# Patient Record
Sex: FEMALE | Race: WHITE | NOT HISPANIC OR LATINO | Employment: OTHER | URBAN - METROPOLITAN AREA
[De-identification: names, ages, dates, MRNs, and addresses within clinical notes are randomized per-mention and may not be internally consistent; named-entity substitution may affect disease eponyms.]

---

## 2017-07-20 LAB — HCV AB SER-ACNC: NEGATIVE

## 2022-11-08 ENCOUNTER — OFFICE VISIT (OUTPATIENT)
Dept: FAMILY MEDICINE CLINIC | Facility: CLINIC | Age: 72
End: 2022-11-08

## 2022-11-08 VITALS
WEIGHT: 164.2 LBS | HEIGHT: 64 IN | DIASTOLIC BLOOD PRESSURE: 98 MMHG | OXYGEN SATURATION: 94 % | SYSTOLIC BLOOD PRESSURE: 154 MMHG | TEMPERATURE: 96.9 F | BODY MASS INDEX: 28.03 KG/M2 | HEART RATE: 60 BPM

## 2022-11-08 DIAGNOSIS — I73.9 PAD (PERIPHERAL ARTERY DISEASE) (HCC): ICD-10-CM

## 2022-11-08 DIAGNOSIS — Z95.828 S/P VASCULAR BYPASS: ICD-10-CM

## 2022-11-08 DIAGNOSIS — Z23 IMMUNIZATION DUE: ICD-10-CM

## 2022-11-08 DIAGNOSIS — M79.89 LEFT LEG SWELLING: ICD-10-CM

## 2022-11-08 DIAGNOSIS — I25.810 CORONARY ARTERY DISEASE INVOLVING CORONARY BYPASS GRAFT OF NATIVE HEART WITHOUT ANGINA PECTORIS: Primary | ICD-10-CM

## 2022-11-08 DIAGNOSIS — R60.0 LOCALIZED EDEMA: ICD-10-CM

## 2022-11-08 DIAGNOSIS — F31.4 BIPOLAR AFFECTIVE DISORDER, DEPRESSED, SEVERE (HCC): ICD-10-CM

## 2022-11-08 DIAGNOSIS — Z11.59 NEED FOR HEPATITIS C SCREENING TEST: ICD-10-CM

## 2022-11-08 RX ORDER — CLOPIDOGREL BISULFATE 75 MG/1
75 TABLET ORAL DAILY
Qty: 90 TABLET | Refills: 1 | Status: SHIPPED | OUTPATIENT
Start: 2022-11-08

## 2022-11-08 RX ORDER — ISOSORBIDE DINITRATE 10 MG/1
10 TABLET ORAL 3 TIMES DAILY
COMMUNITY

## 2022-11-08 RX ORDER — DILTIAZEM HYDROCHLORIDE 120 MG/1
120 TABLET, FILM COATED ORAL DAILY
COMMUNITY
End: 2022-11-08 | Stop reason: SDUPTHER

## 2022-11-08 RX ORDER — CHOLECALCIFEROL (VITAMIN D3) 125 MCG
15 CAPSULE ORAL
COMMUNITY

## 2022-11-08 RX ORDER — ROSUVASTATIN CALCIUM 20 MG/1
20 TABLET, COATED ORAL DAILY
COMMUNITY

## 2022-11-08 RX ORDER — METOPROLOL SUCCINATE 50 MG/1
50 TABLET, EXTENDED RELEASE ORAL DAILY
COMMUNITY

## 2022-11-08 RX ORDER — LAMOTRIGINE 100 MG/1
100 TABLET ORAL 2 TIMES DAILY
COMMUNITY

## 2022-11-08 RX ORDER — CLOPIDOGREL BISULFATE 75 MG/1
75 TABLET ORAL DAILY
COMMUNITY
End: 2022-11-08 | Stop reason: SDUPTHER

## 2022-11-08 RX ORDER — TRAZODONE HYDROCHLORIDE 50 MG/1
50 TABLET ORAL
COMMUNITY
End: 2022-11-08

## 2022-11-08 RX ORDER — DILTIAZEM HYDROCHLORIDE 120 MG/1
120 TABLET, FILM COATED ORAL DAILY
Qty: 30 TABLET | Refills: 3 | Status: SHIPPED | OUTPATIENT
Start: 2022-11-08

## 2022-11-08 NOTE — PATIENT INSTRUCTIONS
Follow up with cardiology, vascular surgery, and psychiatry  Complete blood work and recheck in 1 month

## 2022-11-08 NOTE — PROGRESS NOTES
Assessment/Plan:     1  Coronary artery disease involving coronary bypass graft of native heart without angina pectoris  Assessment & Plan:  Reordered plavix for patient; c/w current medications as last prescribed and establish care with cardiology given significant cardiac history; requested records today in office for review to better assess pt needs; no current sx; f/u guidance given    Orders:  -     diltiazem (CARDIZEM) 120 MG tablet; Take 1 tablet (120 mg total) by mouth in the morning  -     clopidogrel (PLAVIX) 75 mg tablet; Take 1 tablet (75 mg total) by mouth in the morning  -     Ambulatory Referral to Cardiology; Future  -     Comprehensive metabolic panel; Future  -     CBC and differential; Future  -     Lipid panel; Future    2  PAD (peripheral artery disease) ([facility])  Assessment & Plan:  C/w statin statin and is on plavix; reviewed to vascular surgery for follow up given extensive hx; advised importance of smoking cessation    Orders:  -     Ambulatory Referral to Vascular Surgery; Future    3  S/P vascular bypass  -     Ambulatory Referral to Vascular Surgery; Future    4  Bipolar affective disorder, depressed, severe ([facility] [address] )  Assessment & Plan:  Reviewed to psychiatry given dual-diagnosis issues of alcohol abuse and bipolar; has been clean; given printed resources of alcohol abuse and mental health facilities in area and advised establishing care; f/u guidance given    Orders:  -     Ambulatory Referral to Psychiatry; Future    5  Left leg swelling  Assessment & Plan: May be chronic but patient is poor historian; check u/s and f/u with results to r/o DVT    Orders:  -     VAS lower limb venous duplex study, unilateral/limited; Future; Expected date: 11/08/2022    6  Localized edema   -     VAS lower limb venous duplex study, unilateral/limited; Future; Expected date: 11/08/2022    7  Need for hepatitis C screening test    8   Immunization due  -     influenza vaccine, high-dose, PF 0 7 mL (FLUZONE HIGH-DOSE)        Subjective:      Patient ID: Felipa Walker is a 71 y o  female  Patient is here to establish care with significant PMHx of CAD and vascular disease  No records available at time of visit and pt recently moved here from [place]  CAD: Patient had MI in February  Had [address] over [place] and then February had MI  She thinks she had 2 stents placed, per record review pt had CABG in past  Ran out of Plavix  She also states she is out of cardizem  No current chest pain  Still smoking  ? History of A fib but no currently or recently on anticoag  Was managed by cardiology in [place]  Also states she has CVA in past  as well  Chronically has SOB with activity  Bipolar: Pt states her mood is up and down  She states lamotrigine helps control her symptoms  Pt has not seen anyone since she has lived in [place]  Has not been on medication for over 1 month  No SI  Hx of alcohol abuse: Patient quit drinking a little over 100 days ago  Pt states she was drinking a lot prior  History of alcohol abuse for about 50 years  Did not go to rehab  Going to [facility]  Pt has noted more recently her left leg has been swelling  Has occurred since surgeries but seems a little worse more recently  History per last note in chart:     "CAD s/p CABG (neg stress test 2016); HTN; KOLE&BSO, which was complicated by DVT s/p IVC filter; L hip replacement and L foot fx; multple vascular bypass & stents that are now occluded (including aorto-R-renal bypass, L fem-R fem bypass, R fem-BK pop bypass, R EIA stent) now s/p RIGHT AXILLARY TO BIFEMORAL BYPASS 2/21/17 "          The following portions of the patient's history were reviewed and updated as appropriate: allergies, current medications, past family history, past medical history, past social history, past surgical history, and problem list     Review of Systems   Constitutional: Negative for chills and fever  Respiratory: Positive for shortness of breath  Cardiovascular: Negative for palpitations and leg swelling  Psychiatric/Behavioral: Positive for dysphoric mood  Negative for suicidal ideas  The patient is not nervous/anxious  Objective:      /98 (BP Location: Right arm, Patient Position: Sitting, Cuff Size: Adult)   Pulse 60   Temp (!) 96 9 °F (36 1 °C)   Ht 5' 4" (1 626 m)   Wt 74 5 kg (164 lb 3 2 oz)   SpO2 94%   BMI 28 18 kg/m²          Physical Exam  Vitals reviewed  Constitutional:       General: She is not in acute distress  Appearance: Normal appearance  She is not ill-appearing, toxic-appearing or diaphoretic  HENT:      Head: Normocephalic and atraumatic  Eyes:      General: No scleral icterus  Right eye: No discharge  Left eye: No discharge  Conjunctiva/sclera: Conjunctivae normal    Cardiovascular:      Rate and Rhythm: Normal rate and regular rhythm  Pulses: Normal pulses  Heart sounds: Normal heart sounds  No murmur heard  No gallop  Pulmonary:      Effort: Pulmonary effort is normal  No respiratory distress  Breath sounds: Normal breath sounds  No stridor  No wheezing, rhonchi or rales  Musculoskeletal:      Right lower leg: No edema  Left lower leg: Edema present  Neurological:      General: No focal deficit present  Mental Status: She is alert and oriented to person, place, and time  Psychiatric:         Mood and Affect: Mood normal          Behavior: Behavior normal          Thought Content:  Thought content normal          Judgment: Judgment normal

## 2022-11-08 NOTE — ASSESSMENT & PLAN NOTE
C/w statin statin and is on plavix; reviewed to vascular surgery for follow up given extensive hx; advised importance of smoking cessation

## 2022-11-08 NOTE — ASSESSMENT & PLAN NOTE
Reordered plavix for patient; c/w current medications as last prescribed and establish care with cardiology given significant cardiac history; requested records today in office for review to better assess pt needs; no current sx; f/u guidance given

## 2022-11-08 NOTE — ASSESSMENT & PLAN NOTE
Reviewed to psychiatry given dual-diagnosis issues of alcohol abuse and bipolar; has been clean; given printed resources of alcohol abuse and mental health facilities in area and advised establishing care; f/u guidance given

## 2022-11-09 ENCOUNTER — TELEPHONE (OUTPATIENT)
Dept: PSYCHIATRY | Facility: CLINIC | Age: 72
End: 2022-11-09

## 2022-11-10 ENCOUNTER — HOSPITAL ENCOUNTER (OUTPATIENT)
Dept: NON INVASIVE DIAGNOSTICS | Facility: CLINIC | Age: 72
Discharge: HOME | End: 2022-11-10

## 2022-11-10 DIAGNOSIS — M79.89 LEFT LEG SWELLING: ICD-10-CM

## 2022-11-10 DIAGNOSIS — R60.0 LOCALIZED EDEMA: ICD-10-CM

## 2022-11-14 ENCOUNTER — APPOINTMENT (OUTPATIENT)
Dept: LAB | Facility: CLINIC | Age: 72
End: 2022-11-14

## 2022-11-14 DIAGNOSIS — I25.810 CORONARY ARTERY DISEASE INVOLVING CORONARY BYPASS GRAFT OF NATIVE HEART WITHOUT ANGINA PECTORIS: ICD-10-CM

## 2022-11-14 LAB
ALBUMIN SERPL BCP-MCNC: 3.3 G/DL (ref 3.5–5)
ALP SERPL-CCNC: 85 U/L (ref 46–116)
ALT SERPL W P-5'-P-CCNC: 20 U/L (ref 12–78)
ANION GAP SERPL CALCULATED.3IONS-SCNC: 6 MMOL/L (ref 4–13)
AST SERPL W P-5'-P-CCNC: 14 U/L (ref 5–45)
BASOPHILS # BLD AUTO: 0.11 THOUSANDS/ÂΜL (ref 0–0.1)
BASOPHILS NFR BLD AUTO: 1 % (ref 0–1)
BILIRUB SERPL-MCNC: 0.46 MG/DL (ref 0.2–1)
BUN SERPL-MCNC: 15 MG/DL (ref 5–25)
CALCIUM ALBUM COR SERPL-MCNC: 10.1 MG/DL (ref 8.3–10.1)
CALCIUM SERPL-MCNC: 9.5 MG/DL (ref 8.3–10.1)
CHLORIDE SERPL-SCNC: 110 MMOL/L (ref 96–108)
CHOLEST SERPL-MCNC: 165 MG/DL
CO2 SERPL-SCNC: 25 MMOL/L (ref 21–32)
CREAT SERPL-MCNC: 1.09 MG/DL (ref 0.6–1.3)
EOSINOPHIL # BLD AUTO: 0.42 THOUSAND/ÂΜL (ref 0–0.61)
EOSINOPHIL NFR BLD AUTO: 5 % (ref 0–6)
ERYTHROCYTE [DISTWIDTH] IN BLOOD BY AUTOMATED COUNT: 12 % (ref 11.6–15.1)
GFR SERPL CREATININE-BSD FRML MDRD: 51 ML/MIN/1.73SQ M
GLUCOSE P FAST SERPL-MCNC: 88 MG/DL (ref 65–99)
HCT VFR BLD AUTO: 48.1 % (ref 34.8–46.1)
HDLC SERPL-MCNC: 33 MG/DL
HGB BLD-MCNC: 15.7 G/DL (ref 11.5–15.4)
IMM GRANULOCYTES # BLD AUTO: 0.03 THOUSAND/UL (ref 0–0.2)
IMM GRANULOCYTES NFR BLD AUTO: 0 % (ref 0–2)
LDLC SERPL CALC-MCNC: 101 MG/DL (ref 0–100)
LYMPHOCYTES # BLD AUTO: 1.89 THOUSANDS/ÂΜL (ref 0.6–4.47)
LYMPHOCYTES NFR BLD AUTO: 22 % (ref 14–44)
MCH RBC QN AUTO: 30.8 PG (ref 26.8–34.3)
MCHC RBC AUTO-ENTMCNC: 32.6 G/DL (ref 31.4–37.4)
MCV RBC AUTO: 94 FL (ref 82–98)
MONOCYTES # BLD AUTO: 0.59 THOUSAND/ÂΜL (ref 0.17–1.22)
MONOCYTES NFR BLD AUTO: 7 % (ref 4–12)
NEUTROPHILS # BLD AUTO: 5.42 THOUSANDS/ÂΜL (ref 1.85–7.62)
NEUTS SEG NFR BLD AUTO: 65 % (ref 43–75)
NONHDLC SERPL-MCNC: 132 MG/DL
NRBC BLD AUTO-RTO: 0 /100 WBCS
PLATELET # BLD AUTO: 188 THOUSANDS/UL (ref 149–390)
PMV BLD AUTO: 11.1 FL (ref 8.9–12.7)
POTASSIUM SERPL-SCNC: 4.7 MMOL/L (ref 3.5–5.3)
PROT SERPL-MCNC: 7.5 G/DL (ref 6.4–8.4)
RBC # BLD AUTO: 5.1 MILLION/UL (ref 3.81–5.12)
SODIUM SERPL-SCNC: 141 MMOL/L (ref 135–147)
TRIGL SERPL-MCNC: 155 MG/DL
WBC # BLD AUTO: 8.46 THOUSAND/UL (ref 4.31–10.16)

## 2022-11-15 ENCOUNTER — CONSULT (OUTPATIENT)
Dept: VASCULAR SURGERY | Facility: CLINIC | Age: 72
End: 2022-11-15

## 2022-11-15 VITALS
BODY MASS INDEX: 27.55 KG/M2 | DIASTOLIC BLOOD PRESSURE: 58 MMHG | WEIGHT: 161.4 LBS | SYSTOLIC BLOOD PRESSURE: 132 MMHG | HEART RATE: 61 BPM | OXYGEN SATURATION: 99 % | HEIGHT: 64 IN

## 2022-11-15 DIAGNOSIS — I77.9 AORTO-ILIAC DISEASE (HCC): ICD-10-CM

## 2022-11-15 DIAGNOSIS — I73.9 PAD (PERIPHERAL ARTERY DISEASE) (HCC): Primary | ICD-10-CM

## 2022-11-15 DIAGNOSIS — R09.89 CAROTID BRUIT, UNSPECIFIED LATERALITY: ICD-10-CM

## 2022-11-15 DIAGNOSIS — Z95.828 S/P VASCULAR BYPASS: ICD-10-CM

## 2022-11-15 NOTE — PROGRESS NOTES
Assessment/Plan:    PAD (peripheral artery disease) [facility])  80-year-old female smoker poor historian with HTN, CAD s/p CABG, bipolar disorder, alcohol abuse, renal stenosis, KOLE + BSO c/b DVT LLE chronic DVT with IVC, PAD s/p right ax-bi femoral bypass 2017 and multiple vascular interventions presents to establish care with complaints of LLE edema and intermittent left calf claudication     -Patient presents to establish care  She complains of LLE edema and intermittent calf claudication  Also complains of numbness sensation to ball of foot   -She denies rest pain  No tissue loss  -Patient reports multiple surgeries and interventions she is a poor historian, unclear  -Multiple surgical scars with history of right axis and bypass   -she is on optimal medical management with daily Plavix and statin  -Not on AC  -LE warm, perfused  -Palpable femoral pulses bilaterally  -Doppler DP/PT signals bilaterally  -    Plan: Will get updated baseline imaging and return with vascular surgeon  Continue Plavix and statin  Podiatry referral    Essential hypertension  -BP well controlled  -continue current medical regimen  -management per PCP      Tobacco abuse  -current 1 5pk per day smoker  -discussed the pathophysiology and relationship of smoking and vascular disease  -encourage smoking cessation  -reports will try to cut back       Diagnoses and all orders for this visit:    PAD (peripheral artery disease) ([facility] [address] )  -     Ambulatory Referral to Vascular Surgery  -     VAS lower limb arterial duplex, complete bilateral; Future  -     VAS abdominal aorta/iliacs; complete study; Future  -     VAS carotid complete study; Future  -     Ambulatory Referral to Podiatry; Future    S/P vascular bypass  -     Ambulatory Referral to Vascular Surgery  -     VAS lower limb arterial duplex, complete bilateral; Future  -     VAS abdominal aorta/iliacs; complete study; Future  -     Ambulatory Referral to Podiatry;  Future    Aorto-iliac disease ([facility] [address] )  -     VAS abdominal aorta/iliacs; complete study; Future    Carotid bruit, unspecified laterality  -     VAS carotid complete study; Future          Subjective:      Patient ID: Cher De Souza is a 71 y o  female  Patient is new to our office  She was referred here by Arleth Grimes,   She had a LEV done 11/10/2022  Patient has a history of multiple vascular procedure done OSH  Her last procedure was done at [facility] in [facility] about 1 year ago  Patient c/o pain in her left calf after walking 3 blocks  She denies any open wounds  She c/o swelling in the left leg from her knee to her ankle  Patient is taking Plavix and Rosuvastatin  She is a current smoker  HI  See assessment and plan    41-year-old male smoker care  No recent imaging available  Patient is on medical management with daily Plavix and statin  She is a current 1 5 pack/day smoker  Patient has had multiple surgeries and interventions which she is unclear about, poor historian  We will get baseline updated imaging of patient return with vascular surgeon to review make further recommendations if indicated      The following portions of the patient's history were reviewed and updated as appropriate: allergies, current medications, past family history, past medical history, past social history, past surgical history and problem list     Review of Systems   Constitutional: Negative  HENT: Negative  Eyes: Negative  Respiratory: Positive for shortness of breath  Cardiovascular: Positive for leg swelling  Gastrointestinal: Negative  Endocrine: Negative  Genitourinary: Negative  Musculoskeletal: Positive for arthralgias  Pain in legs when walking   Skin: Negative  Allergic/Immunologic: Negative  Neurological: Positive for dizziness and light-headedness  Hematological: Bruises/bleeds easily  Psychiatric/Behavioral: Negative          I have reviewed and made appropriate changes to the review of systems input by the medical assistant  Objective:      /58 (BP Location: Left arm, Patient Position: Sitting, Cuff Size: Standard)   Pulse 61   Ht 5' 4" (1 626 m)   Wt 73 2 kg (161 lb 6 4 oz)   SpO2 99%   BMI 27 70 kg/m²          Physical Exam  Constitutional:       Appearance: Normal appearance  HENT:      Head: Normocephalic and atraumatic  Nose: Nose normal       Mouth/Throat:      Mouth: Mucous membranes are moist    Neck:      Vascular: Carotid bruit present  Cardiovascular:      Rate and Rhythm: Normal rate and regular rhythm  Pulses:           Carotid pulses are 2+ on the right side and 2+ on the left side  Radial pulses are 2+ on the right side and 2+ on the left side  Femoral pulses are 2+ on the right side and 2+ on the left side  Dorsalis pedis pulses are detected w/ Doppler on the right side and detected w/ Doppler on the left side  Posterior tibial pulses are detected w/ Doppler on the right side and detected w/ Doppler on the left side  Heart sounds: Normal heart sounds  No murmur heard  Pulmonary:      Effort: Pulmonary effort is normal  No respiratory distress  Breath sounds: Normal breath sounds  No wheezing  Abdominal:      General: Abdomen is flat  Bowel sounds are normal  There is no abdominal bruit  Palpations: Abdomen is soft  There is no mass or pulsatile mass  Tenderness: There is no abdominal tenderness  Hernia: No hernia is present  Comments: Mid-line scar   Musculoskeletal:         General: Normal range of motion  Cervical back: Normal range of motion and neck supple  Right lower leg: No edema  Left lower leg: Edema present  Skin:     General: Skin is warm and dry  Comments: BLE surgical scars:  Bilateral groin and below-knee  Left medial thigh   Neurological:      General: No focal deficit present        Mental Status: She is alert and oriented to person, place, and time       Sensory: No sensory deficit  Motor: No weakness  Psychiatric:         Behavior: Behavior normal              Vitals:    11/15/22 1005   BP: 132/58   BP Location: Left arm   Patient Position: Sitting   Cuff Size: Standard   Pulse: 61   SpO2: 99%   Weight: 73 2 kg (161 lb 6 4 oz)   Height: 5' 4" (1 626 m)       Patient Active Problem List   Diagnosis   • Bipolar affective disorder, depressed, severe ([facility] [address] )   • CAD (coronary artery disease)   • Essential hypertension   • Hx of CABG   • S/P vascular bypass   • Thyroid nodule   • Renal artery stenosis ([facility])   • Presence of IVC filter   • PAD (peripheral artery disease) ([facility])   • Left leg swelling   • Medicare annual wellness visit, subsequent   • Mixed hyperlipidemia   • Aortoiliac occlusive disease ([facility] )   • Tobacco abuse       History reviewed  No pertinent surgical history  History reviewed  No pertinent family history  Social History     Socioeconomic History   • Marital status:       Spouse name: Not on file   • Number of children: Not on file   • Years of education: Not on file   • Highest education level: Not on file   Occupational History   • Not on file   Tobacco Use   • Smoking status: Every Day     Packs/day: 1 50     Types: Cigarettes     Start date: 5   • Smokeless tobacco: Never   Substance and Sexual Activity   • Alcohol use: Not Currently     Alcohol/week: 50 0 standard drinks     Types: 50 Cans of beer per week     Comment: has not had a drink in 102 days 11/8/22- patient admits to drinking at least 6 beers per day   • Drug use: Not Currently   • Sexual activity: Not Currently   Other Topics Concern   • Not on file   Social History Narrative   • Not on file     Social Determinants of Health     Financial Resource Strain: Not on file   Food Insecurity: Not on file   Transportation Needs: Not on file   Physical Activity: Not on file   Stress: Not on file   Social Connections: Not on file   Intimate Partner Violence: Not on file   Housing Stability: Not on file       No Known Allergies      Current Outpatient Medications:   •  clopidogrel (PLAVIX) 75 mg tablet, Take 1 tablet (75 mg total) by mouth in the morning, Disp: 90 tablet, Rfl: 1  •  diltiazem (CARDIZEM) 120 MG tablet, Take 1 tablet (120 mg total) by mouth in the morning, Disp: 30 tablet, Rfl: 3  •  isosorbide dinitrate (ISORDIL) 10 mg tablet, Take 10 mg by mouth 3 (three) times a day, Disp: , Rfl:   •  Melatonin 5 MG TABS, Take 15 mg by mouth, Disp: , Rfl:   •  metoprolol succinate (TOPROL-XL) 50 mg 24 hr tablet, Take 50 mg by mouth daily, Disp: , Rfl:   •  rosuvastatin (CRESTOR) 20 MG tablet, Take 20 mg by mouth daily, Disp: , Rfl:   •  buPROPion (Wellbutrin XL) 150 mg 24 hr tablet, Take 1 tablet (150 mg total) by mouth daily, Disp: 30 tablet, Rfl: 2  •  ezetimibe (ZETIA) 10 mg tablet, Take 1 tablet (10 mg total) by mouth daily, Disp: 30 tablet, Rfl: 5  •  FOLIC ACID PO, Take by mouth in the morning, Disp: , Rfl:   •  gabapentin (Neurontin) 300 mg capsule, Take 1 capsule (300 mg total) by mouth daily at bedtime, Disp: 30 capsule, Rfl: 0  •  lamoTRIgine (LaMICtal) 25 mg tablet, Take 1 tablet (25 mg total) by mouth daily, Disp: 60 tablet, Rfl: 1  •  lisinopril (ZESTRIL) 10 mg tablet, Take 1 tablet (10 mg total) by mouth daily, Disp: 30 tablet, Rfl: 2

## 2022-11-15 NOTE — PATIENT INSTRUCTIONS
Continue Plavix  Continue statin  Will get updated baseline imaging and return with vascular surgeon  Podiatry referral    Peripheral Artery Disease   AMBULATORY CARE:   Peripheral artery disease (PAD)  is narrow, weak, or blocked arteries  It may affect any arteries outside of your heart and brain  PAD is usually the result of a buildup of fat and cholesterol, also called plaque, along your artery walls  Inflammation, a blood clot, or abnormal cell growth could also block your arteries  PAD prevents normal blood flow to your legs and arms  You are at risk of an amputation if poor blood flow keeps wounds from healing or causes gangrene (tissue death)  Without treatment, PAD can also cause a heart attack or stroke  Common symptoms include:  Mild PAD usually does not cause symptoms  As the disease worsens over time, you may have the following:  Pain or cramps in your leg or hip while you walk    A numb, weak, or heavy feeling in your legs    Dry, scaly, red, or pale skin on your legs    Thick or brittle nails, or hair loss on your arms and legs    Foot sores that will not heal    Burning or aching in your feet and toes while resting (this may be worse when you lie down)    Call your local emergency number (911 in the [address] [facility]) if:   You have any of the following signs of a heart attack:      Squeezing, pressure, or pain in your chest    You may  also have any of the following:     Discomfort or pain in your back, neck, jaw, stomach, or arm    Shortness of breath    Nausea or vomiting    Lightheadedness or a sudden cold sweat    You have any of the following signs of a stroke:      Numbness or drooping on one side of your face     Weakness in an arm or leg    Confusion or difficulty speaking    Dizziness, a severe headache, or vision loss    Seek care immediately if:   You have sores or wounds that will not heal     You notice black or discolored skin on your arm or leg  Your skin is cool to the touch      Call your doctor if:   You have leg pain when you walk 1/8 mile (200 meters) or less, even with treatment  Your legs are red, dry, or pale, even with treatment  You have questions or concerns about your condition or care  Treatment for PAD  can help reduce your risk of a heart attack, stroke, or amputation  You may need more than one of the following:  Medicines  may be given to prevent blood clots and reduce the risk of a heart attack or stroke  You may be given medicine to help prevent your PAD from getting worse  A supervised exercise program  helps you stay active in normal daily activities  Healthcare providers will help you safely walk or do strength training exercises 3 times a week for 30 to 60 minutes  You will do this for several months, then transition to walking on your own  Angioplasty  is a procedure to open your artery so blood can flow through normally  A thin tube called a catheter is used to insert a small balloon into your artery  The balloon is inflated to open your blocked artery, and then removed  A tube called a stent may be placed in your artery to hold it open  Bypass surgery  is used to make a new connection to your artery with a vein from another part of your body, or an artificial graft  The vein or graft is attached to your artery above and below your blockage  This allows blood to flow around the blocked portion of your artery  Manage and prevent PAD:   Walk for 30 to 60 minutes at least 4 times a week  Your healthcare provider may also refer you to a supervised exercise program  The program helps increase how far you can walk without pain  It also helps you stay active in normal daily activities         Do not smoke  Nicotine and other chemicals in cigarettes and cigars can worsen PAD  They can also increase your risk for a heart attack or stroke  Ask your healthcare provider for information if you currently smoke and need help to quit   E-cigarettes or smokeless tobacco still contain nicotine  Talk to your healthcare provider before you use these products  Manage other health conditions  Take your medicines as directed and follow your healthcare provider's instructions if you have high blood pressure or high cholesterol  Perform foot care and check your blood sugar levels as directed if you have diabetes  Eat heart-healthy foods  Eat whole grains, fruits, and vegetables every day  Limit salt and high-fat foods  Ask your healthcare provider for more information on a heart healthy diet  Ask what a healthy weight is for you  Your healthcare provider can help you create a healthy weight-loss plan, if needed  Follow up with your doctor as directed:  Write down your questions so you remember to ask them during your visits  © Copyright [employer] 2022 Information is for End User's use only and may not be sold, redistributed or otherwise used for commercial purposes  All illustrations and images included in CareNotes® are the copyrighted property of A D A M , Inc  or [facility] Laurel Denney   The above information is an  only  It is not intended as medical advice for individual conditions or treatments  Talk to your doctor, nurse or pharmacist before following any medical regimen to see if it is safe and effective for you

## 2022-12-09 ENCOUNTER — OFFICE VISIT (OUTPATIENT)
Dept: FAMILY MEDICINE CLINIC | Facility: CLINIC | Age: 72
End: 2022-12-09

## 2022-12-09 VITALS
DIASTOLIC BLOOD PRESSURE: 72 MMHG | SYSTOLIC BLOOD PRESSURE: 132 MMHG | BODY MASS INDEX: 27.83 KG/M2 | WEIGHT: 163 LBS | RESPIRATION RATE: 16 BRPM | OXYGEN SATURATION: 96 % | HEIGHT: 64 IN | HEART RATE: 68 BPM | TEMPERATURE: 97.5 F

## 2022-12-09 DIAGNOSIS — F17.210 SMOKING GREATER THAN 20 PACK YEARS: ICD-10-CM

## 2022-12-09 DIAGNOSIS — Z12.31 ENCOUNTER FOR SCREENING MAMMOGRAM FOR BREAST CANCER: ICD-10-CM

## 2022-12-09 DIAGNOSIS — Z95.1 HX OF CABG: ICD-10-CM

## 2022-12-09 DIAGNOSIS — Z12.2 ENCOUNTER FOR SCREENING FOR LUNG CANCER: ICD-10-CM

## 2022-12-09 DIAGNOSIS — I73.9 PAD (PERIPHERAL ARTERY DISEASE) (HCC): ICD-10-CM

## 2022-12-09 DIAGNOSIS — Z00.00 MEDICARE ANNUAL WELLNESS VISIT, SUBSEQUENT: Primary | ICD-10-CM

## 2022-12-09 DIAGNOSIS — Z12.11 SCREENING FOR COLORECTAL CANCER: ICD-10-CM

## 2022-12-09 DIAGNOSIS — Z12.12 SCREENING FOR COLORECTAL CANCER: ICD-10-CM

## 2022-12-09 DIAGNOSIS — F31.4 BIPOLAR AFFECTIVE DISORDER, DEPRESSED, SEVERE (HCC): ICD-10-CM

## 2022-12-09 RX ORDER — LAMOTRIGINE 25 MG/1
25 TABLET ORAL DAILY
Qty: 60 TABLET | Refills: 1 | Status: SHIPPED | OUTPATIENT
Start: 2022-12-09

## 2022-12-09 NOTE — ASSESSMENT & PLAN NOTE
Advised on mammogram, CT lung screening, and colonoscopy which she is agreeable; declines DEXA for now; advised on living will; reviewed lifestyle modifications; encouraged to work on smoking cessation

## 2022-12-09 NOTE — PROGRESS NOTES
Assessment and Plan:     Problem List Items Addressed This Visit        Cardiovascular and Mediastinum    PAD (peripheral artery disease) ([facility] [address] )     As per vascular; has f/u studies scheduled            Other    Bipolar affective disorder, depressed, severe ([facility] [address] )     Pt has not been able to establish with psychiatry; has been off lamictal and mood has been worsening; no SI; will restart lamictal with plans of adding back trazodone as needed to help with sleep; advised importance to restart counseling due to hx of alcohol abuse; she remains clean; recheck in 1 month; due to complexity of health reviewed to complex care as she may need some assistance or guidance with establishing in order to prevent relapse         Relevant Medications    lamoTRIgine (LaMICtal) 25 mg tablet    Other Relevant Orders    Ambulatory Referral to Complex Care Management Program    Hx of CABG     Has appt with cardiology to establish care at end of month; no current CP         Medicare annual wellness visit, subsequent - Primary     Advised on mammogram, CT lung screening, and colonoscopy which she is agreeable; declines DEXA for now; advised on living will; reviewed lifestyle modifications; encouraged to work on smoking cessation        Other Visit Diagnoses     Encounter for screening mammogram for breast cancer        Relevant Orders    Mammo screening bilateral w 3d & cad    Screening for colorectal cancer        Relevant Orders    Ambulatory referral for Colonoscopy    Encounter for screening for lung cancer        Relevant Orders    CT lung screening program    Smoking greater than 20 pack years        Relevant Orders    CT lung screening program        BMI Counseling: Body mass index is 28 17 kg/m²  The BMI is above normal  Nutrition recommendations include decreasing portion sizes and moderation in carbohydrate intake  Exercise recommendations include exercising 3-5 times per week   Rationale for BMI follow-up plan is due to patient being overweight or obese  Preventive health issues were discussed with patient, and age appropriate screening tests were ordered as noted in patient's After Visit Summary  Personalized health advice and appropriate referrals for health education or preventive services given if needed, as noted in patient's After Visit Summary  History of Present Illness:     Patient presents for a Medicare Wellness Visit    Patient has not been able to get back into psychiatry  Continues to be sober for about 140 days  Starting to struggle with her bipolar  Having higher anxiety and difficulty with sleep  Did well on lamictal in past and would like to be considered to do it again  Trazodone helped with sleep  Denies any SI  Patient Care Team:  Ellen Meyers DO as PCP - General (Family Medicine)     Review of Systems:     Review of Systems   Constitutional: Negative for chills and fever  Respiratory: Negative for shortness of breath  Cardiovascular: Negative for chest pain  Psychiatric/Behavioral: Positive for dysphoric mood and sleep disturbance  Negative for suicidal ideas  The patient is nervous/anxious  Problem List:     Patient Active Problem List   Diagnosis   • Bipolar affective disorder, depressed, severe ([facility] [address] )   • CAD (coronary artery disease)   • Essential hypertension   • Hx of CABG   • S/P vascular bypass   • Thyroid nodule   • Renal artery stenosis (HCC)   • Presence of IVC filter   • PAD (peripheral artery disease) ([facility] [address] )   • Left leg swelling   • Medicare annual wellness visit, subsequent      Past Medical and Surgical History:     Past Medical History:   Diagnosis Date   • Atrial fibrillation ([facility] [address] )    • [facility] 02/2022   • Stroke [facility])      History reviewed  No pertinent surgical history  Family History:     History reviewed  No pertinent family history  Social History:     Social History     Socioeconomic History   • Marital status:       Spouse name: None   • Number of children: None   • Years of education: None   • Highest education level: None   Occupational History   • None   Tobacco Use   • Smoking status: Every Day     Packs/day: 1 50     Types: Cigarettes     Start date: 1967   • Smokeless tobacco: Never   Substance and Sexual Activity   • Alcohol use: Not Currently     Alcohol/week: 50 0 standard drinks     Types: 50 Cans of beer per week     Comment: has not had a drink in 102 days 11/8/22- patient admits to drinking at least 6 beers per day   • Drug use: Not Currently   • Sexual activity: Not Currently   Other Topics Concern   • None   Social History Narrative   • None     Social Determinants of Health     Financial Resource Strain: Not on file   Food Insecurity: Not on file   Transportation Needs: Not on file   Physical Activity: Not on file   Stress: Not on file   Social Connections: Not on file   Intimate Partner Violence: Not on file   Housing Stability: Not on file      Medications and Allergies:     Current Outpatient Medications   Medication Sig Dispense Refill   • clopidogrel (PLAVIX) 75 mg tablet Take 1 tablet (75 mg total) by mouth in the morning 90 tablet 1   • diltiazem (CARDIZEM) 120 MG tablet Take 1 tablet (120 mg total) by mouth in the morning 30 tablet 3   • isosorbide dinitrate (ISORDIL) 10 mg tablet Take 10 mg by mouth 3 (three) times a day     • lamoTRIgine (LaMICtal) 25 mg tablet Take 1 tablet (25 mg total) by mouth daily 60 tablet 1   • Melatonin 5 MG TABS Take 15 mg by mouth     • metoprolol succinate (TOPROL-XL) 50 mg 24 hr tablet Take 50 mg by mouth daily     • rosuvastatin (CRESTOR) 20 MG tablet Take 20 mg by mouth daily       No current facility-administered medications for this visit       No Known Allergies   Immunizations:     Immunization History   Administered Date(s) Administered   • Influenza, high dose seasonal 0 7 mL 11/08/2022   • Pneumococcal Conjugate 13-Valent 06/20/2016      Health Maintenance:         Topic Date Due   • Hepatitis C Screening  Never done   • Breast Cancer Screening: Mammogram  Never done   • Colorectal Cancer Screening  Never done         Topic Date Due   • Hepatitis B Vaccine (1 of 3 - 3-dose series) Never done   • COVID-19 Vaccine (1) Never done   • Pneumococcal Vaccine: 65+ Years (2 - PPSV23 if available, else PCV20) 06/20/2017      Medicare Screening Tests and Risk Assessments:     Rosario Cardenas is here for her Subsequent Wellness visit  Last Medicare Wellness visit information reviewed, patient interviewed and updates made to the record today  Health Risk Assessment:   Patient rates overall health as fair  Patient feels that their physical health rating is slightly worse  Patient is satisfied with their life  Eyesight was rated as slightly worse  Hearing was rated as slightly worse  Patient feels that their emotional and mental health rating is slightly worse  Patients states they are never, rarely angry  Patient states they are never, rarely unusually tired/fatigued  Pain experienced in the last 7 days has been none  Patient states that she has experienced no weight loss or gain in last 6 months  Fall Risk Screening: In the past year, patient has experienced: no history of falling in past year      Urinary Incontinence Screening:   Patient has not leaked urine accidently in the last six months  Home Safety:  Patient does not have trouble with stairs inside or outside of their home  Patient has working smoke alarms and has working carbon monoxide detector  Home safety hazards include: none  Nutrition:   Current diet is Unhealthy  Medications:   Patient is currently taking over-the-counter supplements  OTC medications include: see medication list  Patient is able to manage medications       Activities of Daily Living (ADLs)/Instrumental Activities of Daily Living (IADLs):   Walk and transfer into and out of bed and chair?: Yes  Dress and groom yourself?: Yes    Bathe or shower yourself?: Yes Feed yourself? Yes  Do your laundry/housekeeping?: Yes  Manage your money, pay your bills and track your expenses?: Yes  Make your own meals?: Yes    Do your own shopping?: Yes    Previous Hospitalizations:   Any hospitalizations or ED visits within the last 12 months?: Yes      Advance Care Planning:   Living will: Yes    Durable POA for healthcare: Yes    Advanced directive: Yes    Advanced directive counseling given: Yes    Five wishes given: Yes    End of Life Decisions reviewed with patient: Yes    Provider agrees with end of life decisions: Yes      Cognitive Screening:   Provider or family/friend/caregiver concerned regarding cognition?: No    PREVENTIVE SCREENINGS      Cardiovascular Screening:    General: Screening Current      Diabetes Screening:     General: Screening Current      Cervical Cancer Screening:    General: Screening Not Indicated      Lung Cancer Screening:     General: Screening Not Indicated    Screening, Brief Intervention, and Referral to Treatment (SBIRT)    Screening    Typical number of drinks in a week: 0    Single Item Drug Screening:  How often have you used an illegal drug (including marijuana) or a prescription medication for non-medical reasons in the past year? never    Single Item Drug Screen Score: 0  Interpretation: Negative screen for possible drug use disorder    No results found  Physical Exam:     /72   Pulse 68   Temp 97 5 °F (36 4 °C) (Temporal)   Resp 16   Ht 5' 3 78" (1 62 m)   Wt 73 9 kg (163 lb)   SpO2 96%   BMI 28 17 kg/m²     Physical Exam  Constitutional:       General: She is not in acute distress  Appearance: Normal appearance  She is not ill-appearing or toxic-appearing  HENT:      Head: Normocephalic and atraumatic  Cardiovascular:      Rate and Rhythm: Normal rate and regular rhythm  Heart sounds: Normal heart sounds  Pulmonary:      Effort: Pulmonary effort is normal       Breath sounds: Normal breath sounds  Neurological:      General: No focal deficit present  Mental Status: She is alert and oriented to person, place, and time  Psychiatric:         Mood and Affect: Mood normal          Behavior: Behavior normal          Thought Content:  Thought content normal          Judgment: Judgment normal           Kennedy Valentin DO

## 2022-12-09 NOTE — ASSESSMENT & PLAN NOTE
Pt has not been able to establish with psychiatry; has been off lamictal and mood has been worsening; no SI; will restart lamictal with plans of adding back trazodone as needed to help with sleep; advised importance to restart counseling due to hx of alcohol abuse; she remains clean; recheck in 1 month; due to complexity of health reviewed to complex care as she may need some assistance or guidance with establishing in order to prevent relapse

## 2022-12-09 NOTE — PATIENT INSTRUCTIONS
Take lamictal 25 mg daily for 2 weeks and increase to 50 mg daily for 2 weeks  Recheck in 1 month  Follow up with psychiatry and cardiology  Medicare Preventive Visit Patient Instructions  Thank you for completing your Welcome to Medicare Visit or Medicare Annual Wellness Visit today  Your next wellness visit will be due in one year (12/10/2023)  The screening/preventive services that you may require over the next 5-10 years are detailed below  Some tests may not apply to you based off risk factors and/or age  Screening tests ordered at today's visit but not completed yet may show as past due  Also, please note that scanned in results may not display below  Preventive Screenings:  Service Recommendations Previous Testing/Comments   Colorectal Cancer Screening  * Colonoscopy    * Fecal Occult Blood Test (FOBT)/Fecal Immunochemical Test (FIT)  * Fecal DNA/Cologuard Test  * Flexible Sigmoidoscopy Age: 39-70 years old   Colonoscopy: every 10 years (may be performed more frequently if at higher risk)  OR  FOBT/FIT: every 1 year  OR  Cologuard: every 3 years  OR  Sigmoidoscopy: every 5 years  Screening may be recommended earlier than age 39 if at higher risk for colorectal cancer  Also, an individualized decision between you and your healthcare provider will decide whether screening between the ages of 74-80 would be appropriate  Colonoscopy: Not on file  FOBT/FIT: Not on file  Cologuard: Not on file  Sigmoidoscopy: Not on file          Breast Cancer Screening Age: 36 years old  Frequency: every 1-2 years  Not required if history of left and right mastectomy Mammogram: Not on file        Cervical Cancer Screening Between the ages of 21-29, pap smear recommended once every 3 years  Between the ages of 33-67, can perform pap smear with HPV co-testing every 5 years     Recommendations may differ for women with a history of total hysterectomy, cervical cancer, or abnormal pap smears in past  Pap Smear: Not on file    Screening Not Indicated   Hepatitis C Screening Once for adults born between 1945 and 1965  More frequently in patients at high risk for Hepatitis C Hep C Antibody: Not on file        Diabetes Screening 1-2 times per year if you're at risk for diabetes or have pre-diabetes Fasting glucose: 88 mg/dL (11/14/2022)  A1C: No results in last 5 years (No results in last 5 years)  Screening Current   Cholesterol Screening Once every 5 years if you don't have a lipid disorder  May order more often based on risk factors  Lipid panel: 11/14/2022    Screening Current     Other Preventive Screenings Covered by Medicare:  Abdominal Aortic Aneurysm (AAA) Screening: covered once if your at risk  You're considered to be at risk if you have a family history of AAA  Lung Cancer Screening: covers low dose CT scan once per year if you meet all of the following conditions: (1) Age 50-69; (2) No signs or symptoms of lung cancer; (3) Current smoker or have quit smoking within the last 15 years; (4) You have a tobacco smoking history of at least 20 pack years (packs per day multiplied by number of years you smoked); (5) You get a written order from a healthcare provider  Glaucoma Screening: covered annually if you're considered high risk: (1) You have diabetes OR (2) Family history of glaucoma OR (3)  aged 48 and older OR (3)  American aged 72 and older  Osteoporosis Screening: covered every 2 years if you meet one of the following conditions: (1) You're estrogen deficient and at risk for osteoporosis based off medical history and other findings; (2) Have a vertebral abnormality; (3) On glucocorticoid therapy for more than 3 months; (4) Have primary hyperparathyroidism; (5) On osteoporosis medications and need to assess response to drug therapy  Last bone density test (DXA Scan): Not on file  HIV Screening: covered annually if you're between the age of 12-76   Also covered annually if you are younger than 13 and older than 72 with risk factors for HIV infection  For pregnant patients, it is covered up to 3 times per pregnancy  Immunizations:  Immunization Recommendations   Influenza Vaccine Annual influenza vaccination during flu season is recommended for all persons aged >= 6 months who do not have contraindications   Pneumococcal Vaccine   * Pneumococcal conjugate vaccine = PCV13 (Prevnar 13), PCV15 (Vaxneuvance), PCV20 (Prevnar 20)  * Pneumococcal polysaccharide vaccine = PPSV23 (Pneumovax) Adults 25-60 years old: 1-3 doses may be recommended based on certain risk factors  Adults 72 years old: 1-2 doses may be recommended based off what pneumonia vaccine you previously received   Hepatitis B Vaccine 3 dose series if at intermediate or high risk (ex: diabetes, end stage renal disease, liver disease)   Tetanus (Td) Vaccine - COST NOT COVERED BY MEDICARE PART B Following completion of primary series, a booster dose should be given every 10 years to maintain immunity against tetanus  Td may also be given as tetanus wound prophylaxis  Tdap Vaccine - COST NOT COVERED BY MEDICARE PART B Recommended at least once for all adults  For pregnant patients, recommended with each pregnancy  Shingles Vaccine (Shingrix) - COST NOT COVERED BY MEDICARE PART B  2 shot series recommended in those aged 48 and above     Health Maintenance Due:      Topic Date Due    Hepatitis C Screening  Never done    Breast Cancer Screening: Mammogram  Never done    Colorectal Cancer Screening  Never done     Immunizations Due:      Topic Date Due    Hepatitis B Vaccine (1 of 3 - 3-dose series) Never done    COVID-19 Vaccine (1) Never done    Pneumococcal Vaccine: 65+ Years (2 - PPSV23 if available, else PCV20) 06/20/2017     Advance Directives   What are advance directives? Advance directives are legal documents that state your wishes and plans for medical care   These plans are made ahead of time in case you lose your ability to make decisions for yourself  Advance directives can apply to any medical decision, such as the treatments you want, and if you want to donate organs  What are the types of advance directives? There are many types of advance directives, and each state has rules about how to use them  You may choose a combination of any of the following:  Living will: This is a written record of the treatment you want  You can also choose which treatments you do not want, which to limit, and which to stop at a certain time  This includes surgery, medicine, IV fluid, and tube feedings  Durable power of  for healthcare [facility]): This is a written record that states who you want to make healthcare choices for you when you are unable to make them for yourself  This person, called a proxy, is usually a family member or a friend  You may choose more than 1 proxy  Do not resuscitate (DNR) order:  A DNR order is used in case your heart stops beating or you stop breathing  It is a request not to have certain forms of treatment, such as CPR  A DNR order may be included in other types of advance directives  Medical directive: This covers the care that you want if you are in a coma, near death, or unable to make decisions for yourself  You can list the treatments you want for each condition  Treatment may include pain medicine, surgery, blood transfusions, dialysis, IV or tube feedings, and a ventilator (breathing machine)  Values history: This document has questions about your views, beliefs, and how you feel and think about life  This information can help others choose the care that you would choose  Why are advance directives important? An advance directive helps you control your care  Although spoken wishes may be used, it is better to have your wishes written down  Spoken wishes can be misunderstood, or not followed  Treatments may be given even if you do not want them   An advance directive may make it easier for your family to make difficult choices about your care  Cigarette Smoking and Your Health   Risks to your health if you smoke:  Nicotine and other chemicals found in tobacco damage every cell in your body  Even if you are a light smoker, you have an increased risk for cancer, heart disease, and lung disease  If you are pregnant or have diabetes, smoking increases your risk for complications  Benefits to your health if you stop smoking: You decrease respiratory symptoms such as coughing, wheezing, and shortness of breath  You reduce your risk for cancers of the lung, mouth, throat, kidney, bladder, pancreas, stomach, and cervix  If you already have cancer, you increase the benefits of chemotherapy  You also reduce your risk for cancer returning or a second cancer from developing  You reduce your risk for heart disease, blood clots, heart attack, and stroke  You reduce your risk for lung infections, and diseases such as pneumonia, asthma, chronic bronchitis, and emphysema  Your circulation improves  More oxygen can be delivered to your body  If you have diabetes, you lower your risk for complications, such as kidney, artery, and eye diseases  You also lower your risk for nerve damage  Nerve damage can lead to amputations, poor vision, and blindness  You improve your body's ability to heal and to fight infections  For more information and support to stop smoking:   [employer]  Phone: 6- 790 - [phone]  Web Address: www [employer]  Weight Management   Why it is important to manage your weight:  Being overweight increases your risk of health conditions such as heart disease, high blood pressure, type 2 diabetes, and certain types of cancer  It can also increase your risk for osteoarthritis, sleep apnea, and other respiratory problems  Aim for a slow, steady weight loss  Even a small amount of weight loss can lower your risk of health problems    How to lose weight safely:  A safe and healthy way to lose weight is to eat fewer calories and get regular exercise  You can lose up about 1 pound a week by decreasing the number of calories you eat by 500 calories each day  Healthy meal plan for weight management:  A healthy meal plan includes a variety of foods, contains fewer calories, and helps you stay healthy  A healthy meal plan includes the following:  Eat whole-grain foods more often  A healthy meal plan should contain fiber  Fiber is the part of grains, fruits, and vegetables that is not broken down by your body  Whole-grain foods are healthy and provide extra fiber in your diet  Some examples of whole-grain foods are whole-wheat breads and pastas, oatmeal, brown rice, and bulgur  Eat a variety of vegetables every day  Include dark, leafy greens such as spinach, kale, ra greens, and mustard greens  Eat yellow and orange vegetables such as carrots, sweet potatoes, and winter squash  Eat a variety of fruits every day  Choose fresh or canned fruit (canned in its own juice or light syrup) instead of juice  Fruit juice has very little or no fiber  Eat low-fat dairy foods  Drink fat-free (skim) milk or 1% milk  Eat fat-free yogurt and low-fat cottage cheese  Try low-fat cheeses such as mozzarella and other reduced-fat cheeses  Choose meat and other protein foods that are low in fat  Choose beans or other legumes such as split peas or lentils  Choose fish, skinless poultry (chicken or turkey), or lean cuts of red meat (beef or pork)  Before you cook meat or poultry, cut off any visible fat  Use less fat and oil  Try baking foods instead of frying them  Add less fat, such as margarine, sour cream, regular salad dressing and mayonnaise to foods  Eat fewer high-fat foods  Some examples of high-fat foods include french fries, doughnuts, ice cream, and cakes  Eat fewer sweets  Limit foods and drinks that are high in sugar  This includes candy, cookies, regular soda, and sweetened drinks    Exercise:  Exercise at least 30 minutes per day on most days of the week  Some examples of exercise include walking, biking, dancing, and swimming  You can also fit in more physical activity by taking the stairs instead of the elevator or parking farther away from stores  Ask your healthcare provider about the best exercise plan for you  © Copyright [employer] 2018 Information is for End User's use only and may not be sold, redistributed or otherwise used for commercial purposes  All illustrations and images included in CareNotes® are the copyrighted property of [employer]  or [facility] Preventive Visit Patient Instructions  Thank you for completing your Welcome to Medicare Visit or Medicare Annual Wellness Visit today  Your next wellness visit will be due in one year (12/10/2023)  The screening/preventive services that you may require over the next 5-10 years are detailed below  Some tests may not apply to you based off risk factors and/or age  Screening tests ordered at today's visit but not completed yet may show as past due  Also, please note that scanned in results may not display below  Preventive Screenings:  Service Recommendations Previous Testing/Comments   Colorectal Cancer Screening  * Colonoscopy    * Fecal Occult Blood Test (FOBT)/Fecal Immunochemical Test (FIT)  * Fecal DNA/Cologuard Test  * Flexible Sigmoidoscopy Age: 39-70 years old   Colonoscopy: every 10 years (may be performed more frequently if at higher risk)  OR  FOBT/FIT: every 1 year  OR  Cologuard: every 3 years  OR  Sigmoidoscopy: every 5 years  Screening may be recommended earlier than age 39 if at higher risk for colorectal cancer  Also, an individualized decision between you and your healthcare provider will decide whether screening between the ages of 74-80 would be appropriate   Colonoscopy: Not on file  FOBT/FIT: Not on file  Cologuard: Not on file  Sigmoidoscopy: Not on file          Breast Cancer Screening Age: 40+ years old  Frequency: every 1-2 years  Not required if history of left and right mastectomy Mammogram: Not on file        Cervical Cancer Screening Between the ages of 21-29, pap smear recommended once every 3 years  Between the ages of 33-67, can perform pap smear with HPV co-testing every 5 years  Recommendations may differ for women with a history of total hysterectomy, cervical cancer, or abnormal pap smears in past  Pap Smear: Not on file    Screening Not Indicated   Hepatitis C Screening Once for adults born between 1945 and 1965  More frequently in patients at high risk for Hepatitis C Hep C Antibody: Not on file        Diabetes Screening 1-2 times per year if you're at risk for diabetes or have pre-diabetes Fasting glucose: 88 mg/dL (11/14/2022)  A1C: No results in last 5 years (No results in last 5 years)  Screening Current   Cholesterol Screening Once every 5 years if you don't have a lipid disorder  May order more often based on risk factors  Lipid panel: 11/14/2022    Screening Current     Other Preventive Screenings Covered by Medicare:  Abdominal Aortic Aneurysm (AAA) Screening: covered once if your at risk  You're considered to be at risk if you have a family history of AAA  Lung Cancer Screening: covers low dose CT scan once per year if you meet all of the following conditions: (1) Age 50-69; (2) No signs or symptoms of lung cancer; (3) Current smoker or have quit smoking within the last 15 years; (4) You have a tobacco smoking history of at least 20 pack years (packs per day multiplied by number of years you smoked); (5) You get a written order from a healthcare provider    Glaucoma Screening: covered annually if you're considered high risk: (1) You have diabetes OR (2) Family history of glaucoma OR (3)  aged 48 and older OR (3)  American aged 72 and older  Osteoporosis Screening: covered every 2 years if you meet one of the following conditions: (1) You're estrogen deficient and at risk for osteoporosis based off medical history and other findings; (2) Have a vertebral abnormality; (3) On glucocorticoid therapy for more than 3 months; (4) Have primary hyperparathyroidism; (5) On osteoporosis medications and need to assess response to drug therapy  Last bone density test (DXA Scan): Not on file  HIV Screening: covered annually if you're between the age of 12-76  Also covered annually if you are younger than 13 and older than 72 with risk factors for HIV infection  For pregnant patients, it is covered up to 3 times per pregnancy  Immunizations:  Immunization Recommendations   Influenza Vaccine Annual influenza vaccination during flu season is recommended for all persons aged >= 6 months who do not have contraindications   Pneumococcal Vaccine   * Pneumococcal conjugate vaccine = PCV13 (Prevnar 13), PCV15 (Vaxneuvance), PCV20 (Prevnar 20)  * Pneumococcal polysaccharide vaccine = PPSV23 (Pneumovax) Adults 25-60 years old: 1-3 doses may be recommended based on certain risk factors  Adults 72 years old: 1-2 doses may be recommended based off what pneumonia vaccine you previously received   Hepatitis B Vaccine 3 dose series if at intermediate or high risk (ex: diabetes, end stage renal disease, liver disease)   Tetanus (Td) Vaccine - COST NOT COVERED BY MEDICARE PART B Following completion of primary series, a booster dose should be given every 10 years to maintain immunity against tetanus  Td may also be given as tetanus wound prophylaxis  Tdap Vaccine - COST NOT COVERED BY MEDICARE PART B Recommended at least once for all adults  For pregnant patients, recommended with each pregnancy     Shingles Vaccine (Shingrix) - COST NOT COVERED BY MEDICARE PART B  2 shot series recommended in those aged 48 and above     Health Maintenance Due:      Topic Date Due    Hepatitis C Screening  Never done    Breast Cancer Screening: Mammogram  Never done    Colorectal Cancer Screening  Never done Immunizations Due:      Topic Date Due    Hepatitis B Vaccine (1 of 3 - 3-dose series) Never done    COVID-19 Vaccine (1) Never done    Pneumococcal Vaccine: 65+ Years (2 - PPSV23 if available, else PCV20) 06/20/2017     Advance Directives   What are advance directives? Advance directives are legal documents that state your wishes and plans for medical care  These plans are made ahead of time in case you lose your ability to make decisions for yourself  Advance directives can apply to any medical decision, such as the treatments you want, and if you want to donate organs  What are the types of advance directives? There are many types of advance directives, and each state has rules about how to use them  You may choose a combination of any of the following:  Living will: This is a written record of the treatment you want  You can also choose which treatments you do not want, which to limit, and which to stop at a certain time  This includes surgery, medicine, IV fluid, and tube feedings  Durable power of  for healthcare [facility]): This is a written record that states who you want to make healthcare choices for you when you are unable to make them for yourself  This person, called a proxy, is usually a family member or a friend  You may choose more than 1 proxy  Do not resuscitate (DNR) order:  A DNR order is used in case your heart stops beating or you stop breathing  It is a request not to have certain forms of treatment, such as CPR  A DNR order may be included in other types of advance directives  Medical directive: This covers the care that you want if you are in a coma, near death, or unable to make decisions for yourself  You can list the treatments you want for each condition  Treatment may include pain medicine, surgery, blood transfusions, dialysis, IV or tube feedings, and a ventilator (breathing machine)  Values history:   This document has questions about your views, beliefs, and how you feel and think about life  This information can help others choose the care that you would choose  Why are advance directives important? An advance directive helps you control your care  Although spoken wishes may be used, it is better to have your wishes written down  Spoken wishes can be misunderstood, or not followed  Treatments may be given even if you do not want them  An advance directive may make it easier for your family to make difficult choices about your care  Cigarette Smoking and Your Health   Risks to your health if you smoke:  Nicotine and other chemicals found in tobacco damage every cell in your body  Even if you are a light smoker, you have an increased risk for cancer, heart disease, and lung disease  If you are pregnant or have diabetes, smoking increases your risk for complications  Benefits to your health if you stop smoking: You decrease respiratory symptoms such as coughing, wheezing, and shortness of breath  You reduce your risk for cancers of the lung, mouth, throat, kidney, bladder, pancreas, stomach, and cervix  If you already have cancer, you increase the benefits of chemotherapy  You also reduce your risk for cancer returning or a second cancer from developing  You reduce your risk for heart disease, blood clots, heart attack, and stroke  You reduce your risk for lung infections, and diseases such as pneumonia, asthma, chronic bronchitis, and emphysema  Your circulation improves  More oxygen can be delivered to your body  If you have diabetes, you lower your risk for complications, such as kidney, artery, and eye diseases  You also lower your risk for nerve damage  Nerve damage can lead to amputations, poor vision, and blindness  You improve your body's ability to heal and to fight infections  For more information and support to stop smoking:   [employer]  Phone: 2- 308 - [phone]  Web Address: www [facility]  Weight Management   Why it is important to manage your weight:  Being overweight increases your risk of health conditions such as heart disease, high blood pressure, type 2 diabetes, and certain types of cancer  It can also increase your risk for osteoarthritis, sleep apnea, and other respiratory problems  Aim for a slow, steady weight loss  Even a small amount of weight loss can lower your risk of health problems  How to lose weight safely:  A safe and healthy way to lose weight is to eat fewer calories and get regular exercise  You can lose up about 1 pound a week by decreasing the number of calories you eat by 500 calories each day  Healthy meal plan for weight management:  A healthy meal plan includes a variety of foods, contains fewer calories, and helps you stay healthy  A healthy meal plan includes the following:  Eat whole-grain foods more often  A healthy meal plan should contain fiber  Fiber is the part of grains, fruits, and vegetables that is not broken down by your body  Whole-grain foods are healthy and provide extra fiber in your diet  Some examples of whole-grain foods are whole-wheat breads and pastas, oatmeal, brown rice, and bulgur  Eat a variety of vegetables every day  Include dark, leafy greens such as spinach, kale, ra greens, and mustard greens  Eat yellow and orange vegetables such as carrots, sweet potatoes, and winter squash  Eat a variety of fruits every day  Choose fresh or canned fruit (canned in its own juice or light syrup) instead of juice  Fruit juice has very little or no fiber  Eat low-fat dairy foods  Drink fat-free (skim) milk or 1% milk  Eat fat-free yogurt and low-fat cottage cheese  Try low-fat cheeses such as mozzarella and other reduced-fat cheeses  Choose meat and other protein foods that are low in fat  Choose beans or other legumes such as split peas or lentils  Choose fish, skinless poultry (chicken or turkey), or lean cuts of red meat (beef or pork)   Before you cook meat or poultry, cut off any visible fat  Use less fat and oil  Try baking foods instead of frying them  Add less fat, such as margarine, sour cream, regular salad dressing and mayonnaise to foods  Eat fewer high-fat foods  Some examples of high-fat foods include french fries, doughnuts, ice cream, and cakes  Eat fewer sweets  Limit foods and drinks that are high in sugar  This includes candy, cookies, regular soda, and sweetened drinks  Exercise:  Exercise at least 30 minutes per day on most days of the week  Some examples of exercise include walking, biking, dancing, and swimming  You can also fit in more physical activity by taking the stairs instead of the elevator or parking farther away from stores  Ask your healthcare provider about the best exercise plan for you  © Copyright [employer] 2018 Information is for End User's use only and may not be sold, redistributed or otherwise used for commercial purposes   All illustrations and images included in CareNotes® are the copyrighted property of [employer]  or [address] [place] [address]

## 2022-12-12 ENCOUNTER — PATIENT OUTREACH (OUTPATIENT)
Dept: FAMILY MEDICINE CLINIC | Facility: CLINIC | Age: 72
End: 2022-12-12

## 2022-12-12 DIAGNOSIS — Z71.89 COMPLEX CARE COORDINATION: Primary | ICD-10-CM

## 2022-12-12 NOTE — PROGRESS NOTES
Outpatient Care Management Note:    New referral received from Dr Rosie Mata  Office visit notes from 12/9 [facility] patient needs assistance to establish with psychiatry and counseling  CM will place a referral to social work care management to outreach and assess

## 2022-12-15 ENCOUNTER — PATIENT OUTREACH (OUTPATIENT)
Dept: FAMILY MEDICINE CLINIC | Facility: CLINIC | Age: 72
End: 2022-12-15

## 2022-12-15 NOTE — PROGRESS NOTES
HEAVENLY DAWSON received referral from PCP to assist with psychiatry and counseling resources  HEAVENLY DAWSON introduced role and reason for call  Patient reported that she was interested with establishing care with a therapist who accepts medicare  Patient stated that she does not think she needs a psychiatrist at this time and is more interested in talk therapy  Patient has means for transportation and is looking for female therapists in the [facility]  HEAVENLY DAWSON will research resources and follow up with patient tomorrow

## 2022-12-16 ENCOUNTER — PATIENT OUTREACH (OUTPATIENT)
Dept: FAMILY MEDICINE CLINIC | Facility: CLINIC | Age: 72
End: 2022-12-16

## 2022-12-16 NOTE — PROGRESS NOTES
OP CM SAMIR contacted [address] clinic in [facility] clinic is currently taking new patients, have female therapists and accept medicare  OP CM SAMIR contacted patient and was provided with contact information and address to [facility] [address] [facility]  Patient was thankful  Patient reported no other needs  OP CM SW will close case but is available for any future needs

## 2022-12-22 ENCOUNTER — HOSPITAL ENCOUNTER (OUTPATIENT)
Dept: NON INVASIVE DIAGNOSTICS | Facility: CLINIC | Age: 72
Discharge: HOME | End: 2022-12-22

## 2022-12-22 DIAGNOSIS — I73.9 PAD (PERIPHERAL ARTERY DISEASE) (HCC): ICD-10-CM

## 2022-12-22 DIAGNOSIS — Z95.828 S/P VASCULAR BYPASS: ICD-10-CM

## 2022-12-22 DIAGNOSIS — R09.89 CAROTID BRUIT, UNSPECIFIED LATERALITY: ICD-10-CM

## 2022-12-22 DIAGNOSIS — I77.9 AORTO-ILIAC DISEASE (HCC): ICD-10-CM

## 2022-12-27 ENCOUNTER — CONSULT (OUTPATIENT)
Dept: CARDIOLOGY CLINIC | Facility: CLINIC | Age: 72
End: 2022-12-27

## 2022-12-27 VITALS
BODY MASS INDEX: 28.67 KG/M2 | SYSTOLIC BLOOD PRESSURE: 170 MMHG | WEIGHT: 161.8 LBS | DIASTOLIC BLOOD PRESSURE: 80 MMHG | HEART RATE: 65 BPM | HEIGHT: 63 IN

## 2022-12-27 DIAGNOSIS — I10 ESSENTIAL HYPERTENSION: ICD-10-CM

## 2022-12-27 DIAGNOSIS — Z95.828 S/P VASCULAR BYPASS: ICD-10-CM

## 2022-12-27 DIAGNOSIS — I48.0 PAF (PAROXYSMAL ATRIAL FIBRILLATION) (HCC): ICD-10-CM

## 2022-12-27 DIAGNOSIS — Z95.1 HX OF CABG: ICD-10-CM

## 2022-12-27 DIAGNOSIS — I70.1 RENAL ARTERY STENOSIS (HCC): ICD-10-CM

## 2022-12-27 DIAGNOSIS — E78.2 MIXED HYPERLIPIDEMIA: ICD-10-CM

## 2022-12-27 DIAGNOSIS — Z95.828 PRESENCE OF IVC FILTER: ICD-10-CM

## 2022-12-27 DIAGNOSIS — I73.9 PAD (PERIPHERAL ARTERY DISEASE) (HCC): ICD-10-CM

## 2022-12-27 DIAGNOSIS — I25.810 CORONARY ARTERY DISEASE INVOLVING CORONARY BYPASS GRAFT OF NATIVE HEART WITHOUT ANGINA PECTORIS: Primary | ICD-10-CM

## 2022-12-27 RX ORDER — LISINOPRIL 10 MG/1
10 TABLET ORAL DAILY
Qty: 30 TABLET | Refills: 2 | Status: SHIPPED | OUTPATIENT
Start: 2022-12-27

## 2022-12-27 RX ORDER — EZETIMIBE 10 MG/1
10 TABLET ORAL DAILY
Qty: 30 TABLET | Refills: 5 | Status: SHIPPED | OUTPATIENT
Start: 2022-12-27

## 2022-12-27 NOTE — PROGRESS NOTES
Cardiology Consultation     Maddi Wing  [id number]  1950  [facility] [place]  [address] [place] [id number]      1  Coronary artery disease involving coronary bypass graft of native heart without angina pectoris  Ambulatory Referral to Cardiology    POCT ECG    Echo complete w/ contrast if indicated    NM myocardial perfusion spect (rx stress and/or rest)      2  Essential hypertension  lisinopril (ZESTRIL) 10 mg tablet      3  Mixed hyperlipidemia  ezetimibe (ZETIA) 10 mg tablet      4  Renal artery stenosis ([facility] )        5  PAD (peripheral artery disease) ([facility] )        6  Hx of CABG        7  S/P vascular bypass        8  Presence of IVC filter        9   PAF (paroxysmal atrial fibrillation) ([facility])  AMB extended holter monitor          Discussion/Summary:  Coronary artery disease  - s/p CABG done in 2000 at [facility] with Plavix 75 mg daily  -Reports exertional dyspnea and generalized fatigue  -Check transthoracic echo and nuclear pharmacologic stress test  Hypertension  -Continue with diltiazem 120 mg daily, Isordil 10 mg 3 times daily, Toprol-XL 50 mg daily   -BP elevated today  - Given her history will start lisinopril 10 mg daily  Hyperlipidemia  -Continue with rosuvastatin 20 mg daily  -Lipid panel 11/14/2022 showed total cholesterol 165, triglycerides 155, HDL 33,   -Cholesterol not at goal  - Discussed starting Repatha, but patient would like to avoid injectable medications  - prescribed Zetia 10 mg daily, will reassess cholesterol in 3 months  Paroxysmal atrial fibrillation  - She reports having an episode of atrial fibrillation while being hospitalized earlier this year  - Will check 2-week Zio patch to see if she is having recurrent episodes of atrial fibrillation  Peripheral arterial disease  -Aorto right renal artery bypass graft in 2000  - 8/6/2014 femoral/popliteal with stents and angioplasty, iliac arteries with stents and angioplasty and tibial peroneal artery angioplasty  -Right Day to below-knee bypass occluded in 2010  - Fem-Fem bypass graft left to right occluded  -2/21/2017 right axillary 2 by Day bypass  -11/15/2022 lower extremity arterial ultrasound right lower limb shows 50-69% stenosis distal to the SFA with high-grade stenosis versus occlusion in the posterior tibial artery LUANN 0 53 (severe range), right Fem to below-knee bypass graft occluded, left lower limb patent CFA to posterior tibial bypass graft, LUANN 0 96  - 11/15/2022 abdominal aorta/iliac study showed occlusion of bilateral common and external iliac arteries, greater than 70% stenosis in the celiac artery, patent right axillo bifemoral arterial bypass graft  -11/15/2022 carotid ultrasound showed less than 50% bilateral carotid stenosis  -Has appoint with vascular surgery tomorrow  -May benefit from low-dose Xarelto versus full AC given her DVT and paroxysmal atrial fibrillation  DVT  -Lower extremity venous duplex 11/10/2022 showed normal right lower extremity, left lower extremity had a chronic nonocclusive DVT in the common femoral, proximal femoral and popliteal veins  -s/p IVC filter  Tobacco use  -Smoking about 1 5 packs/day  - Encouraged smoking cessation  Renal artery stenosis    History of Present Illness:  Jaden Dietrich is a 71y o  year old female with a past medical history of CAD s/p CABG, hypertension, hyperlipidemia, PAD, bipolar disorder, IVC filter, renal artery stenosis and tobacco use  Her main complaint today is generalized fatigue  She reports drinking heavily September 2022 at which time she quit and moved into the area with her son  Since then, she has been establishing care in the area  Denies any episodes of chest pain at rest or with exertion, but does have dyspnea on exertion which has been worsening more recently  She also reports increased generalized fatigue with minimal activities    Also reports circulation issues due to her significant peripheral arterial disease  Still smoking about 1 and half packs per day  Patient Active Problem List   Diagnosis   • Bipolar affective disorder, depressed, severe ([place] [address] )   • CAD (coronary artery disease)   • Essential hypertension   • Hx of CABG   • S/P vascular bypass   • Thyroid nodule   • Renal artery stenosis (HCC)   • Presence of IVC filter   • PAD (peripheral artery disease) ([place] [address] )   • Left leg swelling   • Medicare annual wellness visit, subsequent   • Mixed hyperlipidemia     Past Medical History:   Diagnosis Date   • Atrial fibrillation ([place] [address] )    • Heart attack ([place] [address] ) 02/2022   • Stroke [facility])      Social History     Socioeconomic History   • Marital status:      Spouse name: Not on file   • Number of children: Not on file   • Years of education: Not on file   • Highest education level: Not on file   Occupational History   • Not on file   Tobacco Use   • Smoking status: Every Day     Packs/day: 1 50     Types: Cigarettes     Start date: 5   • Smokeless tobacco: Never   Substance and Sexual Activity   • Alcohol use: Not Currently     Alcohol/week: 50 0 standard drinks     Types: 50 Cans of beer per week     Comment: has not had a drink in 102 days 11/8/22- patient admits to drinking at least 6 beers per day   • Drug use: Not Currently   • Sexual activity: Not Currently   Other Topics Concern   • Not on file   Social History Narrative   • Not on file     Social Determinants of Health     Financial Resource Strain: Not on file   Food Insecurity: Not on file   Transportation Needs: Not on file   Physical Activity: Not on file   Stress: Not on file   Social Connections: Not on file   Intimate Partner Violence: Not on file   Housing Stability: Not on file      History reviewed  No pertinent family history  History reviewed  No pertinent surgical history      Current Outpatient Medications:   •  clopidogrel (PLAVIX) 75 mg tablet, Take 1 tablet (75 mg total) by mouth in the morning, Disp: 90 tablet, Rfl: 1  •  diltiazem (CARDIZEM) 120 MG tablet, Take 1 tablet (120 mg total) by mouth in the morning, Disp: 30 tablet, Rfl: 3  •  ezetimibe (ZETIA) 10 mg tablet, Take 1 tablet (10 mg total) by mouth daily, Disp: 30 tablet, Rfl: 5  •  FOLIC ACID PO, Take by mouth in the morning, Disp: , Rfl:   •  isosorbide dinitrate (ISORDIL) 10 mg tablet, Take 10 mg by mouth 3 (three) times a day, Disp: , Rfl:   •  lamoTRIgine (LaMICtal) 25 mg tablet, Take 1 tablet (25 mg total) by mouth daily, Disp: 60 tablet, Rfl: 1  •  lisinopril (ZESTRIL) 10 mg tablet, Take 1 tablet (10 mg total) by mouth daily, Disp: 30 tablet, Rfl: 2  •  Melatonin 5 MG TABS, Take 15 mg by mouth, Disp: , Rfl:   •  metoprolol succinate (TOPROL-XL) 50 mg 24 hr tablet, Take 50 mg by mouth daily, Disp: , Rfl:   •  rosuvastatin (CRESTOR) 20 MG tablet, Take 20 mg by mouth daily, Disp: , Rfl:   No Known Allergies      Labs:  Lab Results   Component Value Date    ALT 20 11/14/2022    AST 14 11/14/2022    BUN 15 11/14/2022    CALCIUM 9 5 11/14/2022     (H) 11/14/2022    CO2 25 11/14/2022    CREATININE 1 09 11/14/2022    HDL 33 (L) 11/14/2022    HCT 48 1 (H) 11/14/2022    HGB 15 7 (H) 11/14/2022     11/14/2022    K 4 7 11/14/2022    TRIG 155 (H) 11/14/2022    WBC 8 46 11/14/2022       Imaging: VAS abdominal aorta/iliacs; complete study    Result Date: 12/22/2022  Narrative:  [facility] REPORT CLINICAL: Indications: Aortic Occlusive disease [I70 0]  Evaluate for progression of Aorto-Iliac occlusive disease   Operative History: 2017-02-21 Right Axillary to Bi-Fem Bypass, outside facility 2016-11-15 CT Angiogram Aorta w/ runoff, outside facility 2014-08-06 Catheterization Abdominal/Pelvic LE Arterial Branch, outside facility 2014-08-06 Femoral/popliteal w/ stents and angioplasty, outside facility 2014-08-06 Iliac arteries w/ stents and angioplasty, occluded, outside facility 2014-08-06 Tibio-Peroneal Artery Angioplasty, outside facility Aorta-Right Renal A Bypass Graft, 2000, outside facility CABG, 2000, outside facility Cardiac stents x 2, outside facility Right Fem to Below Knee Bypass, occluded, 2010, outside facility Fem-Fem Bypass Graft, Left to Right, occluded, outside facility IVC Filter, outside facility Risk Factors The patient has history of HTN, PAD, CAD and smoking (current) 1 5 ppd  FINDINGS:  Unilateral       Impression  PSV (cm/s)  EDV (cm/s)  AP (cm)  Sup-Jing Ao                           50          17      1 8  Jux Renal Aorta                      79          22           Px Inf-Arsalan Ao                        52                       Ds Inf-Arsalna Ao                        61                  2 1  Celiac           >70%               267          62           Prox  SMA                           112          15           ADA                                 307                        Right                       PSV (cm/s)  EDV (cm/s)  Common Femoral Artery               89           0  Prox SFA                            67           0  Dist Third (Graft)                  82              Dsital Anastomosis (Graft)          41              Middle Third (Graft)                92              Prox Third (Graft)                  72                 CONCLUSION:  Impression The abdominal aorta is patent and normal in caliber  Known occlusion of the bilateral common and external iliac arteries  >70% stenosis identified in the celiac artery  Superior mesenteric artery is patent  Patent right Axillo bi-fem artery bypass graft  SIGNATURE: Electronically Signed by: Nilay Werner on 2022-12-22 05:13:49 PM    VAS carotid complete study    Result Date: 12/22/2022  Narrative:  [facility] REPORT CLINICAL: Indications:  Bilateral Carotid disease w/o CVA [I65 29]  Patient presents with a cervical bruit and multiple cardiovascular risk factors  Patient is asymptomatic from a cerebral vascular standpoint   Operative History: 2017-02-21 Right Axillary to Bi-Fem Bypass, outside facility 2016-11-15 CT Angiogram Aorta w/ runoff, outside facility 2014-08-06 Catheterization Abdominal/Pelvic LE Arterial Branch, outside facility 2014-08-06 Femoral/popliteal w/ stents and angioplasty, outside facility 2014-08-06 Iliac arteries w/ stents and angioplasty, occluded, outside facility 2014-08-06 Tibio-Peroneal Artery Angioplasty, outside facility Aorta-Right Renal A Bypass Graft, 2000, outside facility CABG, 2000, outside facility Cardiac stents x 2, outside facility Right Fem to Below Knee Bypass, occluded, 2010, outside facility Fem-Fem Bypass Graft, Left to Right, occluded, outside facility IVC Filter, outside facility Risk Factors The patient has history of HTN, PAD, CAD and smoking (current) 1 5 ppd  Clinical Right Pressure:  166/ mm Hg, Left Pressure:  179/ mm Hg  FINDINGS:  Right        Impression  PSV  EDV (cm/s)  Direction of Flow  Ratio  Dist  ICA                 67          17                      1 50  Mid  ICA                 139          39                      3 14  Prox  ICA    1 - 49%      53          16                      1 20  Dist CCA                  50          14                            Mid CCA                   44          10                      0 77  Prox CCA                  58          12                            Ext Carotid               64           7                      1 43  Prox Vert                 60          15  Antegrade                 Subclavian               130           0                             Left         Impression  PSV  EDV (cm/s)  Direction of Flow  Ratio  Dist  ICA                 35          15                      0 59  Mid  ICA                  99          34                      1 65  Prox   ICA    1 - 49%      76          25                      1 27  Dist CCA                  50          14                            Mid CCA                   60          15                      0 90  Prox CCA                  67 14                            Ext Carotid               60           6                      1 00  Prox Vert                 72          21  Antegrade                 Subclavian               129           0                               CONCLUSION: Impression RIGHT: There is <50% stenosis noted in the internal carotid artery  Plaque is heterogenous and irregular  Vertebral artery flow is antegrade  There is no significant subclavian artery disease  LEFT: There is <50% stenosis noted in the internal carotid artery  Plaque is heterogenous and irregular  Vertebral artery flow is antegrade  There is no significant subclavian artery disease  SIGNATURE: Electronically Signed by: Daniel Fung DO on 2022-12-22 01:13:47 PM    VAS lower limb arterial duplex, complete bilateral    Result Date: 12/22/2022  Narrative:  [facility] REPORT CLINICAL: Indications:  Bilateral PVD, Unspecified [I73 9]  Physician wants to determine patency of LE bypass graft  Operative History: 2017-02-21 Right Axillary to Bi-Fem Bypass, outside facility 2016-11-15 CT Angiogram Aorta w/ runoff, outside facility 2014-08-06 Catheterization Abdominal/Pelvic LE Arterial Branch, outside facility 2014-08-06 Femoral/popliteal w/ stents and angioplasty, outside facility 2014-08-06 Iliac arteries w/ stents and angioplasty, occluded, outside facility 2014-08-06 Tibio-Peroneal Artery Angioplasty, outside facility Aorta-Right Renal A Bypass Graft, 2000, outside facility CABG, 2000, outside facility Cardiac stents x 2, outside facility Right Fem to Below Knee Bypass, occluded, 2010, outside facility Fem-Fem Bypass Graft, Left to Right, occluded, outside facility IVC Filter, outside facility Risk Factors The patient has history of HTN, PAD, CAD and smoking (current) 1 5 ppd  Clinical Right Pressure:  166/ mm Hg, Left Pressure:  179/ mm Hg    FINDINGS:  Unilateral     PSV (cm/s)  EDV  Ds Inf-Arsalan Ao          61    0   Segment                Right Left                                          PSV (cm/s)  EDV  PSV (cm/s)  EDV  High Thigh (Graft)                              34       Mid Thigh (Graft)                               49       Low Thigh (Graft)                               50       Near Knee (Graft)                               45    5  Outflow Anastomosis                             39       Common Femoral Artery          89                        Prox SFA                       67                        Mid SFA                        65                        Dist SFA                      162    6                   Proximal Pop                   29                        Distal Pop                     22                        Prox Post Tibial                                47       Dist Post Tibial                                28    2  Dist  Ant  Tibial              22                        Dist Peroneal                  35                           CONCLUSION: Impression: RIGHT LOWER LIMB: Diffuse disease throghout the femoral and popliteal arteries with a 50-69% stenosis identified in the distal SFA  High grade stenosis versus occlusion identified in the posterior tibial artery  Known right fem to below knee bypass graft occluded  Ankle/Brachial index:  0 53 (Severe Range) PVR/ PPG tracings are dampened  Metatarsal pressure of 83mmHg Great toe pressure of 54mmHg, within the healing range  LEFT LOWER LIMB: Patent CFA to Posterior tibial bypass graft  Ankle/Brachial index:   0 96 (Normal Range) PVR/ PPG tracings are normal  Metatarsal pressure of 138mmHg Great toe pressure of 117mmHg, within the healing range  SIGNATURE: Electronically Signed by: Abbey Barnes DO on 2022-12-22 01:14:49 PM      ECG: Normal sinus rhythm, incomplete RBBB, nonspecific T wave changes in the lateral leads, borderline left atrial enlargement    Review of Systems:  Review of Systems   Constitutional: Positive for fatigue  Negative for chills, diaphoresis and fever  HENT: Negative for congestion  Eyes: Negative for photophobia and visual disturbance  Respiratory: Positive for shortness of breath  Negative for chest tightness  Cardiovascular: Positive for leg swelling  Negative for chest pain and palpitations  Gastrointestinal: Negative for abdominal distention, abdominal pain, diarrhea, nausea and vomiting  Genitourinary: Negative for difficulty urinating and dysuria  Musculoskeletal: Negative for arthralgias, gait problem and joint swelling  Skin: Negative for color change, pallor and rash  Neurological: Negative for dizziness, syncope, numbness and headaches  Psychiatric/Behavioral: Negative for agitation, behavioral problems and confusion           Vitals:    12/27/22 0929   BP: 170/80   Pulse: 65      Vitals:    12/27/22 0929   Weight: 73 4 kg (161 lb 12 8 oz)     Height: 5' 3" (160 cm)     Physical Exam:  General appearance:  Appears stated age, alert, well appearing and in no distress  HEENT:  PERRLA, EOMI, no scleral icterus, no conjunctival pallor  NECK:  Supple, No elevated JVP, no thyromegaly, no carotid bruits  HEART:  Regular rate and rhythm, normal V8/X1, 3/6 systolic murmur  LUNGS:  Clear to auscultation bilaterally  ABDOMEN:  Soft, non-tender, positive bowel sounds, no rebound or guarding, no organomegaly   EXTREMITIES: 1-2+ left lower extremity edema  VASCULAR:  Normal pedal pulses   SKIN: No lesions or rashes on exposed skin  NEURO:  CN II-XII intact, no focal deficits

## 2022-12-27 NOTE — PROGRESS NOTES
Assessment/Plan:    Renal artery stenosis ([facility] [address] )  ? Hx Aorto rt renal bypass    S/P vascular bypass  Of note patient's surgical procedures have been performed at outside facilities  Patient does have bilateral lower extremity scars indicating possible bilateral Day to pop bypasses  Duplex does suggest chronic occlusion of bypasses  Fortunately she does not have any tissue loss/rest pain  Patient counseled on the ill effects of continued smoking  She is amenable to trialing Wellbutrin  Left leg swelling  History of chronic left common femoral popliteal vein DVT  She does have some residual left lower extremity swelling  Recommend left lower extremity compression stocking  Aortoiliac occlusive disease ([facility] [address] )  Melissa Love is a pleasant 50-year-old woman with a complex vascular surgical history  She has had numerous vascular procedures all performed at outside facilities [facility], [facility], [place], as well as [facility] [address] [place])  Presents to our office to establish vascular care     She has had bilateral axis to bifemoral bypasses  She subsequently has had bilateral lower extremity femoral to popliteal bypasses  Most  recently she reported in 2020/2021 she underwent an aorto bifemoral bypass at [address] [place]  Unclear whether this is accurate although she does have old RUQ incision as well as midline laparotomy incision  Unclear whether she had midline laparotomy incision for history of Gee/BSO  Patient is not the best historian  She has history of ETOH abuse  There are no reports available to review  Nevertheless she does have evidence of multiple bilateral groin incisions  She has bilateral palpable femoral pulses  Her feet are warm and well-perfused  Will obtain surveillance imaging AOIL/GINETTE in 6 months  Diagnoses and all orders for this visit:    Aortoiliac occlusive disease ([facility] [address] )  -     VAS abdominal aorta/iliacs; complete study;  Future  -     VAS lower limb arterial duplex, complete bilateral; Future    PAOD (peripheral arterial occlusive disease) ([facility])  -     VAS lower limb arterial duplex, complete bilateral; Future    Tobacco abuse  -     buPROPion (Wellbutrin XL) 150 mg 24 hr tablet; Take 1 tablet (150 mg total) by mouth daily    S/P vascular bypass    PAD (peripheral artery disease) ([facility])    Renal artery stenosis ([facility])    Left leg swelling          Subjective:      Patient ID: Carmita Villaseñor is a 71 y o  female  Patient present to review AOIL,GINETTE and CV done on 12/22/22  Patient complains of BLE pain and swelling L>R  Patient states she can walk about 50ft before her legs starts hurting  Also heaviness of legs and SOB when going up steps  Patient is currently taking Plavix and Rosuvastatin  Patient smokes about 1PPD  Skyler Palma presents to the office to review on invasive arterial imaging  Does complain of short distance claudication  Also has chronic left lower extremity swelling from chronic DVT  The following portions of the patient's history were reviewed and updated as appropriate: allergies, current medications, past family history, past medical history, past social history, past surgical history and problem list     Review of Systems   Constitutional: Negative  HENT: Negative  Eyes: Negative  Respiratory: Negative  Cardiovascular: Negative  Gastrointestinal: Negative  Endocrine: Negative  Genitourinary: Negative  Musculoskeletal: Negative  Skin: Negative  Allergic/Immunologic: Negative  Neurological: Negative  Hematological: Negative  Psychiatric/Behavioral: Negative  I have personally reviewed the ROS entered by MA and agree as documented  Objective:      /68 (BP Location: Right arm, Patient Position: Sitting, Cuff Size: Adult)   Pulse 63   Ht 5' 3" (1 6 m)   Wt 74 4 kg (164 lb)   BMI 29 05 kg/m²          Physical Exam  Constitutional:       General: She is not in acute distress  Appearance: She is well-developed  HENT:      Head: Normocephalic and atraumatic  Eyes:      General: No scleral icterus  Conjunctiva/sclera: Conjunctivae normal    Neck:      Trachea: No tracheal deviation  Cardiovascular:      Rate and Rhythm: Normal rate and regular rhythm  Pulses:           Femoral pulses are 2+ on the right side and 2+ on the left side  Heart sounds: Normal heart sounds  Pulmonary:      Effort: Pulmonary effort is normal       Breath sounds: Normal breath sounds  Abdominal:      General: There is no distension  Palpations: Abdomen is soft  There is no mass (no appreciable aortic pulsation/aneurysm)  Tenderness: There is no abdominal tenderness  There is no guarding or rebound  Musculoskeletal:         General: Normal range of motion  Cervical back: Normal range of motion and neck supple  Skin:     General: Skin is warm and dry  Neurological:      Mental Status: She is alert and oriented to person, place, and time  Psychiatric:         Behavior: Behavior normal        Aortoiliac duplex:  CONCLUSION:     Impression  The abdominal aorta is patent and normal in caliber  Known occlusion of the bilateral common and external iliac arteries  >70% stenosis identified in the celiac artery  Superior mesenteric artery is  patent  Patent right Axillo bi-fem artery bypass graft  Lower extremity arterial duplex:  CONCLUSION:  Impression:  RIGHT LOWER LIMB:  Diffuse disease throghout the femoral and popliteal arteries with a 50-69%  stenosis identified in the distal SFA  High grade stenosis versus occlusion  identified in the posterior tibial artery  Known right fem to below knee bypass graft occluded  Ankle/Brachial index:  0 53 (Severe Range)  PVR/ PPG tracings are dampened  Metatarsal pressure of 83mmHg  Great toe pressure of 54mmHg, within the healing range     LEFT LOWER LIMB:  Patent CFA to Posterior tibial bypass graft    Ankle/Brachial index:   0 96 (Normal Range)  PVR/ PPG tracings are normal   Metatarsal pressure of 138mmHg  Great toe pressure of 117mmHg, within the healing range

## 2022-12-28 ENCOUNTER — OFFICE VISIT (OUTPATIENT)
Dept: VASCULAR SURGERY | Facility: CLINIC | Age: 72
End: 2022-12-28

## 2022-12-28 ENCOUNTER — OFFICE VISIT (OUTPATIENT)
Dept: PODIATRY | Facility: CLINIC | Age: 72
End: 2022-12-28

## 2022-12-28 VITALS
WEIGHT: 164 LBS | DIASTOLIC BLOOD PRESSURE: 68 MMHG | BODY MASS INDEX: 29.06 KG/M2 | HEART RATE: 63 BPM | SYSTOLIC BLOOD PRESSURE: 122 MMHG | HEIGHT: 63 IN

## 2022-12-28 VITALS
DIASTOLIC BLOOD PRESSURE: 60 MMHG | SYSTOLIC BLOOD PRESSURE: 142 MMHG | WEIGHT: 161 LBS | HEIGHT: 63 IN | BODY MASS INDEX: 28.53 KG/M2

## 2022-12-28 DIAGNOSIS — Z95.828 S/P VASCULAR BYPASS: ICD-10-CM

## 2022-12-28 DIAGNOSIS — G62.9 PERIPHERAL NERVE DISORDER: Primary | ICD-10-CM

## 2022-12-28 DIAGNOSIS — I77.9 PAOD (PERIPHERAL ARTERIAL OCCLUSIVE DISEASE) (HCC): ICD-10-CM

## 2022-12-28 DIAGNOSIS — M79.89 LEFT LEG SWELLING: ICD-10-CM

## 2022-12-28 DIAGNOSIS — Z72.0 TOBACCO ABUSE: ICD-10-CM

## 2022-12-28 DIAGNOSIS — I73.9 PAD (PERIPHERAL ARTERY DISEASE) (HCC): ICD-10-CM

## 2022-12-28 DIAGNOSIS — I70.1 RENAL ARTERY STENOSIS (HCC): ICD-10-CM

## 2022-12-28 DIAGNOSIS — I74.09 AORTOILIAC OCCLUSIVE DISEASE (HCC): Primary | ICD-10-CM

## 2022-12-28 RX ORDER — BUPROPION HYDROCHLORIDE 150 MG/1
150 TABLET ORAL DAILY
Qty: 30 TABLET | Refills: 2 | Status: SHIPPED | OUTPATIENT
Start: 2022-12-28

## 2022-12-28 RX ORDER — GABAPENTIN 300 MG/1
300 CAPSULE ORAL
Qty: 30 CAPSULE | Refills: 0 | Status: SHIPPED | OUTPATIENT
Start: 2022-12-28 | End: 2023-01-27

## 2022-12-28 NOTE — ASSESSMENT & PLAN NOTE
Of note patient's surgical procedures have been performed at outside facilities  Patient does have bilateral lower extremity scars indicating possible bilateral Day to pop bypasses  Duplex does suggest chronic occlusion of bypasses  Fortunately she does not have any tissue loss/rest pain  Patient counseled on the ill effects of continued smoking  She is amenable to trialing Wellbutrin

## 2022-12-28 NOTE — ASSESSMENT & PLAN NOTE
Bruno Boggs is a pleasant 52-year-old woman with a complex vascular surgical history  She has had numerous vascular procedures all performed at outside facilities [facility], [facility], [place], as well as [facility] [address] [place])  Presents to our office to establish vascular care     She has had bilateral axis to bifemoral bypasses  She subsequently has had bilateral lower extremity femoral to popliteal bypasses  Most  recently she reported in 2020/2021 she underwent an aorto bifemoral bypass at [address] [place]  Unclear whether this is accurate although she does have old RUQ incision as well as midline laparotomy incision  Unclear whether she had midline laparotomy incision for history of Gee/BSO  Patient is not the best historian  She has history of ETOH abuse  There are no reports available to review  Nevertheless she does have evidence of multiple bilateral groin incisions  She has bilateral palpable femoral pulses  Her feet are warm and well-perfused  Will obtain surveillance imaging AOIL/GINETTE in 6 months

## 2022-12-28 NOTE — PROGRESS NOTES
Assessment/Plan:  Explained to patient that the discomfort in the left lower extremity could be vascular in nature however it also may be due to peripheral neuropathy  Patient has had difficulties with alcohol in the past   Prescribed gabapentin 300 mg at bedtime for daily dosing  Interestingly, vascular studies are positive for blockage in the right leg while she is here complaining of issues with the left leg  Patient will be reassessed in 4 weeks  No problem-specific Assessment & Plan notes found for this encounter  Diagnoses and all orders for this visit:    Peripheral nerve disorder  -     gabapentin (Neurontin) 300 mg capsule; Take 1 capsule (300 mg total) by mouth daily at bedtime    [facility]  -     Ambulatory Referral to Podiatry    S/P vascular bypass  -     Ambulatory Referral to Podiatry          Subjective:      Patient ID: Thelma Weiss is a 71 y o  female  HPI     Patient, a 30-year-old female presents complaining of paresthesia in the left lower extremity  Patient states that this left leg has bothered her for approximately 20 years  Recently the discomfort has intensified  She relates peculiar sensations in the ball of the foot as well as tingling in the foot  She states that it keeps her from sleeping  Patient has known vascular arterial blockage in the left lower extremity  She has had vascular bypass  Reviewed arterial duplex studies dated 12/22/2022  They are positive for a blockage within the right femoral artery  LUANN right lower limb at 0 53  LUANN left lower extremity 0 96 which is in the normal range and the great toe pressure is also at 117 within the healing range        The following portions of the patient's history were reviewed and updated as appropriate: allergies, current medications, past family history, past medical history, past social history, past surgical history and problem list     Review of Systems   Cardiovascular: Coronary artery disease   Musculoskeletal: Negative  Neurological:        Paresthesia   Psychiatric/Behavioral: Negative  Objective:      /60   Ht 5' 3" (1 6 m)   Wt 73 kg (161 lb)   BMI 28 52 kg/m²          Physical Exam  Constitutional:       Appearance: Normal appearance  Cardiovascular:      Comments: No palpable pedal pulses  Left foot mildly edematous  Musculoskeletal:         General: Normal range of motion  Skin:     General: Skin is warm  Neurological:      General: No focal deficit present  Mental Status: She is oriented to person, place, and time        Comments: Paresthesia left lower extremity

## 2022-12-28 NOTE — ASSESSMENT & PLAN NOTE
History of chronic left common femoral popliteal vein DVT  She does have some residual left lower extremity swelling  Recommend left lower extremity compression stocking

## 2022-12-30 ENCOUNTER — RA CDI HCC (OUTPATIENT)
Dept: OTHER | Facility: HOSPITAL | Age: 72
End: 2022-12-30

## 2022-12-30 NOTE — PROGRESS NOTES
[facility] [address]  coding opportunities       Chart reviewed, no opportunity found: CHART REVIEWED, NO OPPORTUNITY FOUND        Patients Insurance     Medicare Insurance: Medicare

## 2023-01-02 PROBLEM — Z72.0 TOBACCO ABUSE: Status: ACTIVE | Noted: 2023-01-02

## 2023-01-03 NOTE — ASSESSMENT & PLAN NOTE
-current 1 5pk per day smoker  -discussed the pathophysiology and relationship of smoking and vascular disease  -encourage smoking cessation  -reports will try to cut back

## 2023-01-03 NOTE — ASSESSMENT & PLAN NOTE
80-year-old female smoker poor historian with HTN, CAD s/p CABG, bipolar disorder, alcohol abuse, renal stenosis, KOLE + BSO c/b DVT LLE chronic DVT with IVC, PAD s/p right ax-bi femoral bypass 2017 and multiple vascular interventions presents to establish care with complaints of LLE edema and intermittent left calf claudication     -Patient presents to establish care  She complains of LLE edema and intermittent calf claudication  Also complains of numbness sensation to ball of foot   -She denies rest pain  No tissue loss  -Patient reports multiple surgeries and interventions she is a poor historian, unclear  -Multiple surgical scars with history of right axis and bypass   -she is on optimal medical management with daily Plavix and statin  -Not on AC  -LE warm, perfused  -Palpable femoral pulses bilaterally  -Doppler DP/PT signals bilaterally      Plan: Will get updated baseline imaging and return with vascular surgeon    Continue Plavix and statin  Podiatry referral

## 2023-01-09 ENCOUNTER — OFFICE VISIT (OUTPATIENT)
Dept: FAMILY MEDICINE CLINIC | Facility: CLINIC | Age: 73
End: 2023-01-09

## 2023-01-09 VITALS
SYSTOLIC BLOOD PRESSURE: 138 MMHG | HEIGHT: 63 IN | HEART RATE: 64 BPM | OXYGEN SATURATION: 94 % | RESPIRATION RATE: 16 BRPM | TEMPERATURE: 97.8 F | DIASTOLIC BLOOD PRESSURE: 84 MMHG | BODY MASS INDEX: 29.23 KG/M2 | WEIGHT: 165 LBS

## 2023-01-09 DIAGNOSIS — N18.30 STAGE 3 CHRONIC KIDNEY DISEASE, UNSPECIFIED WHETHER STAGE 3A OR 3B CKD (HCC): ICD-10-CM

## 2023-01-09 DIAGNOSIS — Z12.11 SCREENING FOR COLON CANCER: ICD-10-CM

## 2023-01-09 DIAGNOSIS — F31.4 BIPOLAR AFFECTIVE DISORDER, DEPRESSED, SEVERE (HCC): ICD-10-CM

## 2023-01-09 DIAGNOSIS — Z72.0 TOBACCO ABUSE: ICD-10-CM

## 2023-01-09 DIAGNOSIS — I74.09 AORTOILIAC OCCLUSIVE DISEASE (HCC): ICD-10-CM

## 2023-01-09 DIAGNOSIS — I10 ESSENTIAL HYPERTENSION: Primary | ICD-10-CM

## 2023-01-09 DIAGNOSIS — I48.0 PAF (PAROXYSMAL ATRIAL FIBRILLATION) (HCC): ICD-10-CM

## 2023-01-09 DIAGNOSIS — Z78.0 POST-MENOPAUSAL: ICD-10-CM

## 2023-01-09 DIAGNOSIS — R42 DIZZINESS: ICD-10-CM

## 2023-01-09 RX ORDER — LAMOTRIGINE 100 MG/1
100 TABLET ORAL DAILY
Qty: 30 TABLET | Refills: 1 | Status: SHIPPED | OUTPATIENT
Start: 2023-01-09

## 2023-01-09 RX ORDER — METOPROLOL SUCCINATE 50 MG/1
50 TABLET, EXTENDED RELEASE ORAL DAILY
Qty: 30 TABLET | Refills: 3 | Status: SHIPPED | OUTPATIENT
Start: 2023-01-09

## 2023-01-09 NOTE — ASSESSMENT & PLAN NOTE
Chronic and not worsening, infrequent; advised to complete cardiac testing and if unremarkable will need further assessment, possibly neurology; feels okay today; f/u guidance given should sx worsen

## 2023-01-09 NOTE — ASSESSMENT & PLAN NOTE
Lab Results   Component Value Date    EGFR 51 11/14/2022    CREATININE 1 09 11/14/2022     Most recent labs; continue to avoid nephrotoxic medications

## 2023-01-09 NOTE — PATIENT INSTRUCTIONS
Increase lamictal to 100 mg daily  Try nicotine patches  Follow up with [facility] and other specialists  Complete cardiac testing and follow up with cardiology

## 2023-01-09 NOTE — PROGRESS NOTES
Assessment/Plan:     1  Essential hypertension  Assessment & Plan:  Stable; c/w current tx    Orders:  -     metoprolol succinate (TOPROL-XL) 50 mg 24 hr tablet; Take 1 tablet (50 mg total) by mouth daily    2  Post-menopausal    3  Screening for colon cancer  -     Ambulatory referral for colonoscopy; Future    4  Bipolar affective disorder, depressed, severe ([place] [address] )  Assessment & Plan:  Suspect acute worsening of symptoms related to wellbutrin which she has already self discontinued; is establishing with first counseling tomorrow with [facility] clinic for alcohol abuse; remains sober at this time; will increase lamictal to 100 mg daily from 50 mg; recheck in 1 month; f/u guidance given should sx worsen    Orders:  -     lamoTRIgine (LaMICtal) 100 mg tablet; Take 1 tablet (100 mg total) by mouth daily    5  PAF (paroxysmal atrial fibrillation) ([place] [address] )  Assessment & Plan:  Advised f/u with cardiology for heart monitor; f/u with cardiology as recommended      6  Stage 3 chronic kidney disease, unspecified whether stage 3a or 3b CKD ([place] [address] )  Assessment & Plan:  Lab Results   Component Value Date    EGFR 51 11/14/2022    CREATININE 1 09 11/14/2022     Most recent labs; continue to avoid nephrotoxic medications      7  Aortoiliac occlusive disease ([place] [address] )  Assessment & Plan:  Management as per vascular      8  Tobacco abuse  Assessment & Plan:  Advised need to quit smoking but stopped wellbutrin due to intolerance and making mood unstable; encouraged to go back to patches which she has at home      9  Dizziness  Assessment & Plan:  Chronic and not worsening, infrequent; advised to complete cardiac testing and if unremarkable will need further assessment, possibly neurology; feels okay today; f/u guidance given should sx worsen          Subjective:      Patient ID: Elis Canales is a 68 y o  female  Patient has an appointment tomorrow with [facility] clinic to start counseling   She feels somewhat better since starting Lamictal but feels needs higher dose  She was put on wellbutrin for smoking cessation but did not tolerate medication  Gave headaches and made her feel dizzy and mood was off  She took for a few days and stopped  Pt remains sober  Getting overwhelmed by the testing she needs to do for her health but taking it day by day  Patient has an appointment for GI coming up  Patient states she broke the heart monitor and has to call to have replaced  Recently saw cardiology who ordered stress test, Echo, and extended monitor  She was also started on Gabapentin which helps with her symptoms of pain with foot  Also saw vascular surgery and has upcoming vascular studies  She admits to recurrent dizziness  Says she has had this for years but no one could figure out cause  Feels lightheaded at times  Does not monitor home blood pressure  No syncope or falls  Seemed worse on Wellbutrin but improved since coming off  The following portions of the patient's history were reviewed and updated as appropriate: allergies, current medications, past family history, past medical history, past social history, past surgical history, and problem list     Review of Systems   Constitutional: Negative for chills and fever  Neurological: Positive for headaches  Negative for light-headedness  Psychiatric/Behavioral: Positive for dysphoric mood  Negative for suicidal ideas           Objective:      /84 (BP Location: Left arm, Patient Position: Sitting, Cuff Size: Standard)   Pulse 64   Temp 97 8 °F (36 6 °C) (Tympanic)   Resp 16   Ht 5' 3" (1 6 m)   Wt 74 8 kg (165 lb)   SpO2 94%   BMI 29 23 kg/m²          Physical Exam

## 2023-01-09 NOTE — ASSESSMENT & PLAN NOTE
Advised need to quit smoking but stopped wellbutrin due to intolerance and making mood unstable; encouraged to go back to patches which she has at home

## 2023-01-09 NOTE — ASSESSMENT & PLAN NOTE
Suspect acute worsening of symptoms related to wellbutrin which she has already self discontinued; is establishing with first counseling tomorrow with [address] [place] [address]  clinic for alcohol abuse; remains sober at this time; will increase lamictal to 100 mg daily from 50 mg; recheck in 1 month; f/u guidance given should sx worsen

## 2023-01-19 ENCOUNTER — TELEPHONE (OUTPATIENT)
Dept: CARDIOLOGY CLINIC | Facility: CLINIC | Age: 73
End: 2023-01-19

## 2023-01-19 NOTE — TELEPHONE ENCOUNTER
[employer] called issue with monitor and leads not staying connected patient only wore for 1 day and sending back  Judy Rosales  Ref #[id number]  Will scan what they receive and if needs to be reapplied notify [employer] with above ref #

## 2023-01-25 ENCOUNTER — OFFICE VISIT (OUTPATIENT)
Dept: PODIATRY | Facility: CLINIC | Age: 73
End: 2023-01-25

## 2023-01-25 VITALS
BODY MASS INDEX: 29.23 KG/M2 | WEIGHT: 165 LBS | HEIGHT: 63 IN | DIASTOLIC BLOOD PRESSURE: 72 MMHG | SYSTOLIC BLOOD PRESSURE: 132 MMHG

## 2023-01-25 DIAGNOSIS — G62.9 PERIPHERAL NERVE DISORDER: Primary | ICD-10-CM

## 2023-01-25 RX ORDER — GABAPENTIN 300 MG/1
300 CAPSULE ORAL
Qty: 90 CAPSULE | Refills: 1 | Status: SHIPPED | OUTPATIENT
Start: 2023-01-25 | End: 2023-07-24

## 2023-01-25 NOTE — PROGRESS NOTES
Patient presents for assessment of left lower extremity  Patient was diagnosed with peripheral neuropathy at her last visit  She was placed on gabapentin 300 mg at bedtime  Patient relates significant improvement  She can now sleep and does not have left foot discomfort  No adverse effects from the medication  Explained that we cannot increase the dosage from this due to history of renal disease  Prescribed gabapentin 300 mg at bedtime for 90 days with 1 refill  Reappoint 6 months

## 2023-01-26 ENCOUNTER — HOSPITAL ENCOUNTER (OUTPATIENT)
Dept: NUCLEAR MEDICINE | Facility: HOSPITAL | Age: 73
Discharge: HOME | End: 2023-01-26
Attending: STUDENT IN AN ORGANIZED HEALTH CARE EDUCATION/TRAINING PROGRAM

## 2023-01-26 ENCOUNTER — HOSPITAL ENCOUNTER (OUTPATIENT)
Dept: NON INVASIVE DIAGNOSTICS | Facility: HOSPITAL | Age: 73
Discharge: HOME | End: 2023-01-26
Attending: STUDENT IN AN ORGANIZED HEALTH CARE EDUCATION/TRAINING PROGRAM

## 2023-01-26 VITALS
BODY MASS INDEX: 29.23 KG/M2 | HEIGHT: 63 IN | DIASTOLIC BLOOD PRESSURE: 72 MMHG | HEART RATE: 64 BPM | SYSTOLIC BLOOD PRESSURE: 132 MMHG | WEIGHT: 165 LBS

## 2023-01-26 VITALS — HEIGHT: 63 IN | WEIGHT: 165 LBS | BODY MASS INDEX: 29.23 KG/M2

## 2023-01-26 DIAGNOSIS — I25.810 CORONARY ARTERY DISEASE INVOLVING CORONARY BYPASS GRAFT OF NATIVE HEART WITHOUT ANGINA PECTORIS: ICD-10-CM

## 2023-01-26 LAB
AORTIC ROOT: 2.7 CM
AORTIC VALVE MEAN VELOCITY: 12.5 M/S
APICAL FOUR CHAMBER EJECTION FRACTION: 60 %
ASCENDING AORTA: 4 CM
AV AREA BY CONTINUOUS VTI: 1.5 CM2
AV AREA PEAK VELOCITY: 1.4 CM2
AV LVOT MEAN GRADIENT: 3 MMHG
AV LVOT PEAK GRADIENT: 5 MMHG
AV MEAN GRADIENT: 7 MMHG
AV PEAK GRADIENT: 15 MMHG
AV REGURGITATION PRESSURE HALF TIME: 787 MS
AV VALVE AREA: 1.5 CM2
AV VELOCITY RATIO: 0.56
CHEST PAIN STATEMENT: NORMAL
DOP CALC AO PEAK VEL: 1.95 M/S
DOP CALC AO VTI: 44.39 CM
DOP CALC LVOT AREA: 2.54 CM2
DOP CALC LVOT DIAMETER: 1.8 CM
DOP CALC LVOT PEAK VEL VTI: 26.15 CM
DOP CALC LVOT PEAK VEL: 1.1 M/S
DOP CALC LVOT STROKE INDEX: 35.4 ML/M2
DOP CALC LVOT STROKE VOLUME: 66.51
E WAVE DECELERATION TIME: 146 MS
FRACTIONAL SHORTENING: 35 (ref 28–44)
INTERVENTRICULAR SEPTUM IN DIASTOLE (PARASTERNAL SHORT AXIS VIEW): 1 CM
INTERVENTRICULAR SEPTUM: 1 CM (ref 0.6–1.1)
IVC: 22.4 MM
LAAS-AP2: 19.6 CM2
LAAS-AP4: 19.3 CM2
LEFT ATRIUM SIZE: 4.2 CM
LEFT INTERNAL DIMENSION IN SYSTOLE: 3.1 CM (ref 2.1–4)
LEFT VENTRICULAR INTERNAL DIMENSION IN DIASTOLE: 4.8 CM (ref 3.5–6)
LEFT VENTRICULAR POSTERIOR WALL IN END DIASTOLE: 1 CM
LEFT VENTRICULAR STROKE VOLUME: 71 ML
LVSV (TEICH): 71 ML
MAX DIASTOLIC BP: 74 MMHG
MAX HEART RATE: 72 BPM
MAX HR PERCENT: 48 %
MAX HR: 72 BPM
MAX PREDICTED HEART RATE: 148 BPM
MAX. SYSTOLIC BP: 145 MMHG
MV E'TISSUE VEL-SEP: 8 CM/S
MV PEAK A VEL: 0.63 M/S
MV PEAK E VEL: 99 CM/S
MV STENOSIS PRESSURE HALF TIME: 42 MS
MV VALVE AREA P 1/2 METHOD: 5.24
NUC STRESS EJECTION FRACTION: 61 %
PROTOCOL NAME: NORMAL
RA PRESSURE ESTIMATED: 10 MMHG
RATE PRESSURE PRODUCT: 9017
REASON FOR TERMINATION: NORMAL
RIGHT ATRIAL 2D VOLUME: 31 ML
RIGHT ATRIUM AREA SYSTOLE A4C: 14.2 CM2
RIGHT VENTRICLE ID DIMENSION: 4.1 CM
RV PSP: 40 MMHG
SL CV AV DECELERATION TIME RETROGRADE: 2713 MS
SL CV AV PEAK GRADIENT RETROGRADE: 46 MMHG
SL CV LEFT ATRIUM LENGTH A2C: 5.4 CM
SL CV LV EF: 60
SL CV PED ECHO LEFT VENTRICLE DIASTOLIC VOLUME (MOD BIPLANE) 2D: 110 ML
SL CV PED ECHO LEFT VENTRICLE SYSTOLIC VOLUME (MOD BIPLANE) 2D: 39 ML
SL CV REST NUCLEAR ISOTOPE DOSE: 10.6 MCI
SL CV STRESS NUCLEAR ISOTOPE DOSE: 32.4 MCI
SL CV STRESS RECOVERY BP: NORMAL MMHG
SL CV STRESS RECOVERY HR: 65 BPM
SL CV STRESS RECOVERY O2 SAT: 94 %
STRESS ANGINA INDEX: 0
STRESS BASELINE BP: NORMAL MMHG
STRESS BASELINE HR: 49 BPM
STRESS O2 SAT REST: 96 %
STRESS PEAK HR: 71 BPM
STRESS POST ESTIMATED WORKLOAD: 0 METS
STRESS POST O2 SAT PEAK: 93 %
STRESS POST PEAK BP: 127 MMHG
STRESS/REST PERFUSION RATIO: 1.06
TARGET HR FORMULA: NORMAL
TEST INDICATION: NORMAL
TIME IN EXERCISE PHASE: NORMAL
TR MAX PG: 30 MMHG
TR PEAK VELOCITY: 2.8 M/S
TRICUSPID ANNULAR PLANE SYSTOLIC EXCURSION: 1.9 CM
TRICUSPID VALVE PEAK REGURGITATION VELOCITY: 2.75 M/S

## 2023-01-26 RX ADMIN — REGADENOSON 0.4 MG: 0.08 INJECTION, SOLUTION INTRAVENOUS at 09:53

## 2023-02-03 ENCOUNTER — RA CDI HCC (OUTPATIENT)
Dept: OTHER | Facility: HOSPITAL | Age: 73
End: 2023-02-03

## 2023-02-06 NOTE — PROGRESS NOTES
Cardiology Consultation     Seng Campoverde  [id number]  1950  [facility] CARDIOLOGY [place]  [address] [place] [id number]      1  Coronary artery disease involving coronary bypass graft of native heart without angina pectoris  rivaroxaban (Xarelto) 2 5 mg tablet      2  PAF (paroxysmal atrial fibrillation) ([facility] )        3  Essential hypertension        4  Mixed hyperlipidemia        5  PAD (peripheral artery disease) ([facility])  rivaroxaban (Xarelto) 2 5 mg tablet      6  Aortoiliac occlusive disease ([facility] )        7  Renal artery stenosis ([facility] )        8  Hx of CABG        9  Tobacco abuse        10   Sleep apnea, unspecified type  Diagnostic Sleep Study          Discussion/Summary:  Coronary artery disease  - s/p CABG done in 2000 at [facility] with Plavix 75 mg daily  -Reports exertional dyspnea and generalized fatigue  -TTE 1/26/2023 showed EF 60%, mildly dilated RV, mild to moderate AI, mild AS, mild TR, estimated PA pressure 40 mmHg  - Nuclear pharmacologic stress test 1/26/2023 showed a perfusion defect in the anterior wall that was likely due to artifact from breast attenuation, cannot completely exclude a small area of ischemia, normal LV systolic function  Hypertension  -Continue with diltiazem 120 mg daily, Imdur 30 mg daily, Toprol-XL 50 mg daily, lisinopril 10 mg daily  -BP mildly elevated again today, but appears better controlled on prior visits  - Will continue to monitor for now  Sleep apnea  - Reports multiple frequent nighttime awakenings as well as heavy snoring  - Also reports daytime fatigue as well as feeling headaches she wakes up first thing in the morning  - Will check a sleep study to evaluate for possible sleep apnea  Hyperlipidemia  -Continue with rosuvastatin 20 mg daily and Zetia 10 mg daily  -Lipid panel 11/14/2022 showed total cholesterol 165, triglycerides 155, HDL 33,   -Cholesterol not at goal  - Discussed starting Repatha, but patient would like to avoid injectable medications  - prescribed Zetia 10 mg daily, will reassess cholesterol in April 2023  Paroxysmal atrial fibrillation  - She reports having an episode of atrial fibrillation while being hospitalized earlier this year  -Ordered a 2-week Zio patch last visit, however had a problem with the device and it was only worn for 1 day for sending back  - Working on getting in contact with company to repeat the Zio patch  Peripheral arterial disease  -Aorto right renal artery bypass graft in 2000  - 8/6/2014 femoral/popliteal with stents and angioplasty, iliac arteries with stents and angioplasty and tibial peroneal artery angioplasty  -Right Fem to below-knee bypass occluded in 2010  - Fem-Fem bypass graft left to right occluded  -2/21/2017 right axillary to Bi-Fem bypass  -11/15/2022 lower extremity arterial ultrasound right lower limb shows 50-69% stenosis distal to the SFA with high-grade stenosis versus occlusion in the posterior tibial artery LUANN 0 53 (severe range), right Fem to below-knee bypass graft occluded, left lower limb patent CFA to posterior tibial bypass graft, LUANN 0 96  - 11/15/2022 abdominal aorta/iliac study showed occlusion of bilateral common and external iliac arteries, greater than 70% stenosis in the celiac artery, patent right axillo bifemoral arterial bypass graft  -11/15/2022 carotid ultrasound showed less than 50% bilateral carotid stenosis  -Following with vascular surgery  -Started low-dose Xarelto 2 5 mg twice daily with her Plavix 75 mg daily  DVT  -Lower extremity venous duplex 11/10/2022 showed normal right lower extremity, left lower extremity had a chronic nonocclusive DVT in the common femoral, proximal femoral and popliteal veins  -s/p IVC filter  Tobacco use  -Smoking about 1 to 1 5 packs/day  -Tried Wellbutrin prescribed by vascular surgery for 1 week, however discontinued due to side effects  -Again encouraged smoking cessation  Renal artery stenosis    History of Present Illness:  Evelyn Chan is a 68y o  year old female with a past medical history of CAD s/p CABG, hypertension, hyperlipidemia, PAD, bipolar disorder, IVC filter, renal artery stenosis and tobacco use  12/27/2022: Her main complaint today is generalized fatigue  She reports drinking heavily September 2022 at which time she quit and moved into the area with her son  Since then, she has been establishing care in the area  Denies any episodes of chest pain at rest or with exertion, but does have dyspnea on exertion which has been worsening more recently  She also reports increased generalized fatigue with minimal activities  Also reports circulation issues due to her significant peripheral arterial disease  Still smoking about 1 and half packs per day  Interval history:  Reports feeling okay today  She continues to have generalized fatigue, reports it does fluctuate  Today she reports feeling well, but last week she reports feeling very tired throughout the whole week  Denies any episodes of chest pain, palpitations, lower extremity edema, shortness of breath or other cardiac symptoms with rest or with exertion  Patient Active Problem List   Diagnosis   • Bipolar affective disorder, depressed, severe ([facility] )   • CAD (coronary artery disease)   • Essential hypertension   • Hx of CABG   • S/P vascular bypass   • Thyroid nodule   • Renal artery stenosis (HCC)   • Presence of IVC filter   • PAD (peripheral artery disease) ([facility])   • Left leg swelling   • Mixed hyperlipidemia   • Aortoiliac occlusive disease (HCC)   • Tobacco abuse   • PAF (paroxysmal atrial fibrillation) ([facility])   • Stage 3 chronic kidney disease, unspecified whether stage 3a or 3b CKD ([facility] )   • Dizziness     Past Medical History:   Diagnosis Date   • Atrial fibrillation ([facility] )    • Heart attack ([facility] ) 02/2022   • Hypertension    • Stroke [facility])      Social History     Socioeconomic History   • Marital status:       Spouse name: Not on file   • Number of children: Not on file   • Years of education: Not on file   • Highest education level: Not on file   Occupational History   • Not on file   Tobacco Use   • Smoking status: Every Day     Packs/day: 1 00     Types: Cigarettes     Start date: 5   • Smokeless tobacco: Never   Vaping Use   • Vaping Use: Never used   Substance and Sexual Activity   • Alcohol use: Not Currently     Alcohol/week: 50 0 standard drinks     Types: 50 Cans of beer per week     Comment: has not had a drink in 102 days 11/8/22- patient admits to drinking at least 6 beers per day   • Drug use: Never   • Sexual activity: Not Currently     Partners: Male   Other Topics Concern   • Not on file   Social History Narrative   • Not on file     Social Determinants of Health     Financial Resource Strain: Not on file   Food Insecurity: Not on file   Transportation Needs: Not on file   Physical Activity: Not on file   Stress: Not on file   Social Connections: Not on file   Intimate Partner Violence: Not on file   Housing Stability: Not on file      History reviewed  No pertinent family history  History reviewed  No pertinent surgical history      Current Outpatient Medications:   •  clopidogrel (PLAVIX) 75 mg tablet, Take 1 tablet (75 mg total) by mouth in the morning, Disp: 90 tablet, Rfl: 1  •  diltiazem (CARDIZEM) 120 MG tablet, Take 1 tablet (120 mg total) by mouth in the morning, Disp: 30 tablet, Rfl: 3  •  ezetimibe (ZETIA) 10 mg tablet, Take 1 tablet (10 mg total) by mouth daily, Disp: 30 tablet, Rfl: 5  •  FOLIC ACID PO, Take by mouth in the morning, Disp: , Rfl:   •  gabapentin (Neurontin) 300 mg capsule, Take 1 capsule (300 mg total) by mouth daily at bedtime, Disp: 90 capsule, Rfl: 1  •  isosorbide mononitrate (IMDUR) 30 mg 24 hr tablet, Take 30 mg by mouth every morning, Disp: , Rfl:   •  lamoTRIgine (LaMICtal) 100 mg tablet, Take 1 tablet (100 mg total) by mouth daily (Patient taking differently: Take 50 mg by mouth daily), Disp: 30 tablet, Rfl: 1  •  lisinopril (ZESTRIL) 10 mg tablet, Take 1 tablet (10 mg total) by mouth daily, Disp: 30 tablet, Rfl: 2  •  Melatonin 5 MG TABS, Take 15 mg by mouth Patient states she takes 20MG, Disp: , Rfl:   •  metoprolol succinate (TOPROL-XL) 50 mg 24 hr tablet, Take 1 tablet (50 mg total) by mouth daily, Disp: 30 tablet, Rfl: 3  •  rivaroxaban (Xarelto) 2 5 mg tablet, Take 1 tablet (2 5 mg total) by mouth 2 (two) times a day, Disp: 60 tablet, Rfl: 3  •  rosuvastatin (CRESTOR) 20 MG tablet, Take 20 mg by mouth daily, Disp: , Rfl:   No Known Allergies      Labs:  Lab Results   Component Value Date    ALT 20 11/14/2022    AST 14 11/14/2022    BUN 15 11/14/2022    CALCIUM 9 5 11/14/2022     (H) 11/14/2022    CO2 25 11/14/2022    CREATININE 1 09 11/14/2022    HDL 33 (L) 11/14/2022    HCT 48 1 (H) 11/14/2022    HGB 15 7 (H) 11/14/2022     11/14/2022    K 4 7 11/14/2022    TRIG 155 (H) 11/14/2022    WBC 8 46 11/14/2022       Imaging: VAS abdominal aorta/iliacs; complete study    Result Date: 12/22/2022  Narrative:  [facility] REPORT CLINICAL: Indications: Aortic Occlusive disease [I70 0]  Evaluate for progression of Aorto-Iliac occlusive disease   Operative History: 2017-02-21 Right Axillary to Bi-Fem Bypass, outside facility 2016-11-15 CT Angiogram Aorta w/ runoff, outside facility 2014-08-06 Catheterization Abdominal/Pelvic LE Arterial Branch, outside facility 2014-08-06 Femoral/popliteal w/ stents and angioplasty, outside facility 2014-08-06 Iliac arteries w/ stents and angioplasty, occluded, outside facility 2014-08-06 Tibio-Peroneal Artery Angioplasty, outside facility Aorta-Right Renal A Bypass Graft, 2000, outside facility CABG, 2000, outside facility Cardiac stents x 2, outside facility Right Fem to Below Knee Bypass, occluded, 2010, outside facility Fem-Fem Bypass Graft, Left to Right, occluded, outside facility IVC Filter, outside facility Risk Factors The patient has history of HTN, PAD, CAD and smoking (current) 1 5 ppd  FINDINGS:  Unilateral       Impression  PSV (cm/s)  EDV (cm/s)  AP (cm)  Sup-Jing Ao                           50          17      1 8  Jux Renal Aorta                      79          22           Px Inf-Arsalan Ao                        52                       Ds Inf-Arsalan Ao                        61                  2 1  Celiac           >70%               267          62           Prox  SMA                           112          15           ADA                                 307                        Right                       PSV (cm/s)  EDV (cm/s)  Common Femoral Artery               89           0  Prox SFA                            67           0  Dist Third (Graft)                  82              Dsital Anastomosis (Graft)          41              Middle Third (Graft)                92              Prox Third (Graft)                  72                 CONCLUSION:  Impression The abdominal aorta is patent and normal in caliber  Known occlusion of the bilateral common and external iliac arteries  >70% stenosis identified in the celiac artery  Superior mesenteric artery is patent  Patent right Axillo bi-fem artery bypass graft  SIGNATURE: Electronically Signed by: Sal Quintana on 2022-12-22 05:13:49 PM    VAS carotid complete study    Result Date: 12/22/2022  Narrative:  [facility] REPORT CLINICAL: Indications:  Bilateral Carotid disease w/o CVA [I65 29]  Patient presents with a cervical bruit and multiple cardiovascular risk factors  Patient is asymptomatic from a cerebral vascular standpoint   Operative History: 2017-02-21 Right Axillary to Bi-Fem Bypass, outside facility 2016-11-15 CT Angiogram Aorta w/ runoff, outside facility 2014-08-06 Catheterization Abdominal/Pelvic LE Arterial Branch, outside facility 2014-08-06 Femoral/popliteal w/ stents and angioplasty, outside facility 2014-08-06 Iliac arteries w/ stents and angioplasty, occluded, outside facility 2014-08-06 Tibio-Peroneal Artery Angioplasty, outside facility Aorta-Right Renal A Bypass Graft, 2000, outside facility CABG, 2000, outside facility Cardiac stents x 2, outside facility Right Fem to Below Knee Bypass, occluded, 2010, outside facility Fem-Fem Bypass Graft, Left to Right, occluded, outside facility IVC Filter, outside facility Risk Factors The patient has history of HTN, PAD, CAD and smoking (current) 1 5 ppd  Clinical Right Pressure:  166/ mm Hg, Left Pressure:  179/ mm Hg  FINDINGS:  Right        Impression  PSV  EDV (cm/s)  Direction of Flow  Ratio  Dist  ICA                 67          17                      1 50  Mid  ICA                 139          39                      3 14  Prox  ICA    1 - 49%      53          16                      1 20  Dist CCA                  50          14                            Mid CCA                   44          10                      0 77  Prox CCA                  58          12                            Ext Carotid               64           7                      1 43  Prox Vert                 60          15  Antegrade                 Subclavian               130           0                             Left         Impression  PSV  EDV (cm/s)  Direction of Flow  Ratio  Dist  ICA                 35          15                      0 59  Mid  ICA                  99          34                      1 65  Prox   ICA    1 - 49%      76          25                      1 27  Dist CCA                  50          14                            Mid CCA                   60          15                      0 90  Prox CCA                  67          14                            Ext Carotid               60           6                      1 00  Prox Vert                 72          21  Antegrade                 Subclavian               129           0                               CONCLUSION: Impression RIGHT: There is <50% stenosis noted in the internal carotid artery  Plaque is heterogenous and irregular  Vertebral artery flow is antegrade  There is no significant subclavian artery disease  LEFT: There is <50% stenosis noted in the internal carotid artery  Plaque is heterogenous and irregular  Vertebral artery flow is antegrade  There is no significant subclavian artery disease  SIGNATURE: Electronically Signed by: Kong Hussein DO on 2022-12-22 01:13:47 PM    VAS lower limb arterial duplex, complete bilateral    Result Date: 12/22/2022  Narrative:  [facility] REPORT CLINICAL: Indications:  Bilateral PVD, Unspecified [I73 9]  Physician wants to determine patency of LE bypass graft  Operative History: 2017-02-21 Right Axillary to Bi-Fem Bypass, outside facility 2016-11-15 CT Angiogram Aorta w/ runoff, outside facility 2014-08-06 Catheterization Abdominal/Pelvic LE Arterial Branch, outside facility 2014-08-06 Femoral/popliteal w/ stents and angioplasty, outside facility 2014-08-06 Iliac arteries w/ stents and angioplasty, occluded, outside facility 2014-08-06 Tibio-Peroneal Artery Angioplasty, outside facility Aorta-Right Renal A Bypass Graft, 2000, outside facility CABG, 2000, outside facility Cardiac stents x 2, outside facility Right Fem to Below Knee Bypass, occluded, 2010, outside facility Fem-Fem Bypass Graft, Left to Right, occluded, outside facility IVC Filter, outside facility Risk Factors The patient has history of HTN, PAD, CAD and smoking (current) 1 5 ppd  Clinical Right Pressure:  166/ mm Hg, Left Pressure:  179/ mm Hg    FINDINGS:  Unilateral     PSV (cm/s)  EDV  Ds Inf-Arsalan Ao          61    0   Segment                Right            Left                                          PSV (cm/s)  EDV  PSV (cm/s)  EDV  High Thigh (Graft)                              34       Mid Thigh (Graft)                               49       Low Thigh (Graft)                               50       Near Knee (Graft) 45    5  Outflow Anastomosis                             39       Common Femoral Artery          89                        Prox SFA                       67                        Mid SFA                        65                        Dist SFA                      162    6                   Proximal Pop                   29                        Distal Pop                     22                        Prox Post Tibial                                47       Dist Post Tibial                                28    2  Dist  Ant  Tibial              22                        Dist Peroneal                  35                           CONCLUSION: Impression: RIGHT LOWER LIMB: Diffuse disease throghout the femoral and popliteal arteries with a 50-69% stenosis identified in the distal SFA  High grade stenosis versus occlusion identified in the posterior tibial artery  Known right fem to below knee bypass graft occluded  Ankle/Brachial index:  0 53 (Severe Range) PVR/ PPG tracings are dampened  Metatarsal pressure of 83mmHg Great toe pressure of 54mmHg, within the healing range  LEFT LOWER LIMB: Patent CFA to Posterior tibial bypass graft  Ankle/Brachial index:   0 96 (Normal Range) PVR/ PPG tracings are normal  Metatarsal pressure of 138mmHg Great toe pressure of 117mmHg, within the healing range  SIGNATURE: Electronically Signed by: Juanpablo Ellington DO on 2022 01:14:49 PM      EC2022 normal sinus rhythm, incomplete RBBB, nonspecific T wave changes in the lateral leads, borderline left atrial enlargement    Review of Systems:  Review of Systems   Constitutional: Positive for fatigue  Negative for chills, diaphoresis and fever  HENT: Negative for congestion  Eyes: Negative for photophobia and visual disturbance  Respiratory: Negative for chest tightness and shortness of breath  Cardiovascular: Negative for chest pain, palpitations and leg swelling     Gastrointestinal: Negative for abdominal distention, abdominal pain, diarrhea, nausea and vomiting  Genitourinary: Negative for difficulty urinating and dysuria  Musculoskeletal: Negative for arthralgias, gait problem and joint swelling  Skin: Negative for color change, pallor and rash  Neurological: Negative for dizziness, syncope, numbness and headaches  Psychiatric/Behavioral: Negative for agitation, behavioral problems and confusion           Vitals:    02/08/23 0912   BP: 150/82   Pulse: 65   SpO2: 95%      Vitals:    02/08/23 0912   Weight: 73 7 kg (162 lb 6 4 oz)     Height: 5' 3" (160 cm)     Physical Exam:  General appearance:  Appears stated age, alert, well appearing and in no distress  HEENT:  PERRLA, EOMI, no scleral icterus, no conjunctival pallor  NECK:  Supple, No elevated JVP, no thyromegaly, no carotid bruits  HEART:  Regular rate and rhythm, normal L3/P9, 3/6 systolic murmur  LUNGS:  Clear to auscultation bilaterally  ABDOMEN:  Soft, non-tender, positive bowel sounds, no rebound or guarding, no organomegaly   EXTREMITIES: No significant lower extremity edema  VASCULAR:  Normal pedal pulses   SKIN: No lesions or rashes on exposed skin  NEURO:  CN II-XII intact, no focal deficits

## 2023-02-07 PROBLEM — Z00.00 MEDICARE ANNUAL WELLNESS VISIT, SUBSEQUENT: Status: RESOLVED | Noted: 2022-12-09 | Resolved: 2023-02-07

## 2023-02-08 ENCOUNTER — OFFICE VISIT (OUTPATIENT)
Dept: CARDIOLOGY CLINIC | Facility: CLINIC | Age: 73
End: 2023-02-08

## 2023-02-08 VITALS
HEIGHT: 63 IN | OXYGEN SATURATION: 95 % | WEIGHT: 162.4 LBS | DIASTOLIC BLOOD PRESSURE: 82 MMHG | HEART RATE: 65 BPM | BODY MASS INDEX: 28.77 KG/M2 | SYSTOLIC BLOOD PRESSURE: 150 MMHG

## 2023-02-08 DIAGNOSIS — I25.810 CORONARY ARTERY DISEASE INVOLVING CORONARY BYPASS GRAFT OF NATIVE HEART WITHOUT ANGINA PECTORIS: Primary | ICD-10-CM

## 2023-02-08 DIAGNOSIS — Z95.1 HX OF CABG: ICD-10-CM

## 2023-02-08 DIAGNOSIS — E78.2 MIXED HYPERLIPIDEMIA: ICD-10-CM

## 2023-02-08 DIAGNOSIS — I73.9 PAD (PERIPHERAL ARTERY DISEASE) (HCC): ICD-10-CM

## 2023-02-08 DIAGNOSIS — G47.30 SLEEP APNEA, UNSPECIFIED TYPE: ICD-10-CM

## 2023-02-08 DIAGNOSIS — I74.09 AORTOILIAC OCCLUSIVE DISEASE (HCC): ICD-10-CM

## 2023-02-08 DIAGNOSIS — I48.0 PAF (PAROXYSMAL ATRIAL FIBRILLATION) (HCC): ICD-10-CM

## 2023-02-08 DIAGNOSIS — Z72.0 TOBACCO ABUSE: ICD-10-CM

## 2023-02-08 DIAGNOSIS — I10 ESSENTIAL HYPERTENSION: ICD-10-CM

## 2023-02-08 DIAGNOSIS — I70.1 RENAL ARTERY STENOSIS (HCC): ICD-10-CM

## 2023-02-08 RX ORDER — ISOSORBIDE MONONITRATE 30 MG/1
30 TABLET, EXTENDED RELEASE ORAL EVERY MORNING
COMMUNITY
Start: 2022-12-11

## 2023-02-08 NOTE — PATIENT INSTRUCTIONS
Hypertension   AMBULATORY CARE:   Hypertension  is high blood pressure  Your blood pressure is the force of your blood moving against the walls of your arteries  Hypertension causes your blood pressure to get so high that your heart has to work much harder than normal  This can damage your heart  The cause of hypertension may not be known  This is called essential or primary hypertension  Hypertension caused by another medical condition, such as kidney disease, is called secondary hypertension  Common symptoms include the following:   Headache    Blurred vision    Chest pain    Dizziness or weakness    Trouble breathing    Nosebleeds    Call your local emergency number (911 in the [address] [facility]) or have someone call if:   You have chest pain  You have any of the following signs of a heart attack:      Squeezing, pressure, or pain in your chest    You may  also have any of the following:     Discomfort or pain in your back, neck, jaw, stomach, or arm    Shortness of breath    Nausea or vomiting    Lightheadedness or a sudden cold sweat    You become confused or have trouble speaking  You suddenly feel lightheaded or have trouble breathing  Seek care immediately if:   You have a severe headache or vision loss  You have weakness in an arm or leg  Call your doctor or cardiologist if:   You feel faint, dizzy, confused, or drowsy  You have been taking your blood pressure medicine but your pressure is higher than your provider says it should be  You have questions or concerns about your condition or care  What you need to know about the stages of hypertension:       Normal blood pressure is 119/79 or lower   Your healthcare provider may only check your blood pressure each year if it stays at a normal level  Elevated blood pressure is 120/79 to 129/79   This is sometimes called prehypertension  Your healthcare provider may suggest lifestyle changes to help lower your blood pressure to a normal level   He or she may then check it again in 3 to 6 months  Stage 1 hypertension is 130/80  to 139/89   Your provider may recommend lifestyle changes, medication, and checks every 3 to 6 months until your blood pressure is controlled  Stage 2 hypertension is 140/90 or higher   Your provider will recommend lifestyle changes and have you take 2 kinds of hypertension medicines  You will also need to have your blood pressure checked monthly until it is controlled  Treatment for hypertension  may include medicine to lower your blood pressure and lower your cholesterol level  A low cholesterol level helps prevent heart disease and makes it easier to control your blood pressure  Take your medicine exactly as directed  You may also need to make lifestyle changes  Manage hypertension:   Check your blood pressure at home  Avoid smoking, caffeine, and exercise at least 30 minutes before checking your blood pressure  Sit and rest for 5 minutes before you take your blood pressure  Extend your arm and support it on a flat surface  Your arm should be at the same level as your heart  Follow the directions that came with your blood pressure monitor  Check your blood pressure 2 times, 1 minute apart, before you take your medicine in the morning  Also check your blood pressure before your evening meal  Keep a record of your readings and bring it to your follow-up visits  Ask your healthcare provider what your blood pressure should be  Manage any other health conditions you have  Health conditions such as diabetes can increase your risk for hypertension  Follow your healthcare provider's instructions and take all your medicines as directed  Ask about all medicines  Certain medicines can increase your blood pressure  Examples include oral birth control pills, decongestants, herbal supplements, and NSAIDs, such as ibuprofen  Your healthcare provider can tell you which medicines are safe for you to take   This includes prescription and over-the-counter medicines  Lifestyle changes you can make to manage hypertension:  Your healthcare provider may recommend you work with a team to manage hypertension  The team may include medical experts such as a dietitian, an exercise or physical therapist, and a behavior therapist  Your family members may be included in helping you create lifestyle changes  Limit sodium (salt) as directed  Too much sodium can affect your fluid balance  Check labels to find low-sodium or no-salt-added foods  Some low-sodium foods use potassium salts for flavor  Too much potassium can also cause health problems  Your healthcare provider will tell you how much sodium and potassium are safe for you to have in a day  He or she may recommend that you limit sodium to 2,300 mg a day  Follow the meal plan recommended by your healthcare provider  A dietitian or your provider can give you more information on low-sodium plans or the DASH (Dietary Approaches to Stop Hypertension) eating plan  The DASH plan is low in sodium, processed sugar, unhealthy fats, and total fat  It is high in potassium, calcium, and fiber  These can be found in vegetables, fruit, and whole-grain foods  Be physically active throughout the day  Physical activity, such as exercise, can help control your blood pressure and your weight  Be physically active for at least 30 minutes per day, on most days of the week  Include aerobic activity, such as walking or riding a bicycle  Also include strength training at least 2 times each week  Your healthcare providers can help you create a physical activity plan  Decrease stress  This may help lower your blood pressure  Learn ways to relax, such as deep breathing or listening to music  Limit alcohol as directed  Alcohol can increase your blood pressure  A drink of alcohol is 12 ounces of beer, 5 ounces of wine, or 1½ ounces of liquor  Do not smoke    Nicotine and other chemicals in cigarettes and cigars can increase your blood pressure and also cause lung damage  Ask your healthcare provider for information if you currently smoke and need help to quit  E-cigarettes or smokeless tobacco still contain nicotine  Talk to your healthcare provider before you use these products  Follow up with your doctor or cardiologist as directed: You will need to return to have your blood pressure checked and to have other lab tests done  Write down your questions so you remember to ask them during your visits  © Copyright [employer] 2022 Information is for End User's use only and may not be sold, redistributed or otherwise used for commercial purposes  All illustrations and images included in CareNotes® are the copyrighted property of A D A M , Inc  or [facility]   The above information is an  only  It is not intended as medical advice for individual conditions or treatments  Talk to your doctor, nurse or pharmacist before following any medical regimen to see if it is safe and effective for you

## 2023-02-10 ENCOUNTER — OFFICE VISIT (OUTPATIENT)
Dept: FAMILY MEDICINE CLINIC | Facility: CLINIC | Age: 73
End: 2023-02-10

## 2023-02-10 VITALS
SYSTOLIC BLOOD PRESSURE: 120 MMHG | HEIGHT: 63 IN | RESPIRATION RATE: 16 BRPM | HEART RATE: 56 BPM | TEMPERATURE: 97.7 F | OXYGEN SATURATION: 96 % | WEIGHT: 164 LBS | DIASTOLIC BLOOD PRESSURE: 70 MMHG | BODY MASS INDEX: 29.06 KG/M2

## 2023-02-10 DIAGNOSIS — Z72.0 TOBACCO ABUSE: ICD-10-CM

## 2023-02-10 DIAGNOSIS — G47.00 INSOMNIA, UNSPECIFIED TYPE: ICD-10-CM

## 2023-02-10 DIAGNOSIS — I25.810 CORONARY ARTERY DISEASE INVOLVING CORONARY BYPASS GRAFT OF NATIVE HEART WITHOUT ANGINA PECTORIS: ICD-10-CM

## 2023-02-10 DIAGNOSIS — I73.9 PAD (PERIPHERAL ARTERY DISEASE) (HCC): ICD-10-CM

## 2023-02-10 DIAGNOSIS — F31.4 BIPOLAR AFFECTIVE DISORDER, DEPRESSED, SEVERE (HCC): Primary | ICD-10-CM

## 2023-02-10 RX ORDER — AMLODIPINE BESYLATE 5 MG/1
5 TABLET ORAL DAILY
COMMUNITY
Start: 2023-02-01

## 2023-02-10 RX ORDER — TRAZODONE HYDROCHLORIDE 50 MG/1
50 TABLET ORAL
Qty: 30 TABLET | Refills: 0 | Status: SHIPPED | OUTPATIENT
Start: 2023-02-10

## 2023-02-10 NOTE — PROGRESS NOTES
Assessment/Plan:     1  Bipolar affective disorder, depressed, severe ([facility] [address] )  Assessment & Plan:  C/w counseling from [facility]; feels stable on current lamictal dosage; recheck in 1 month      2  Insomnia, unspecified type  Assessment & Plan:  Trial of trazodone which she has done well with in past; reviewed medication and potential SE; recheck in 1 month    Orders:  -     traZODone (DESYREL) 50 mg tablet; Take 1 tablet (50 mg total) by mouth daily at bedtime    3  PAD (peripheral artery disease) (HCC)  Assessment & Plan:  C/w plavix and statin; repeat u/s in 3-4 months      4  Coronary artery disease involving coronary bypass graft of native heart without angina pectoris  Assessment & Plan:  Stable; c/w current tx; established with cardiology      5  Tobacco abuse  Assessment & Plan:  Encouraged again smoking cessation; did not tolerate wellbutrin          Subjective:      Patient ID: Jody Jessica is a 68 y o  female  Patient is here for follow up on bipolar  Bipolar: Patient states she is feeling good on the lamictal  She states mood feels stable  No alcohol relapses  No SI  Still having difficulty with sleep  Took trazodone in past and really helped her without adverse SE  Currently started at [facility], has not seen a psychiatrist yet  Smoking: Can't take wellbutrin  Has patches but has not tried them  Not quite yet ready to quit  CAD: Pt established with cardiology  Placed on Xarelto for PAF, was unable to wear monitor long to see if recurrent  The following portions of the patient's history were reviewed and updated as appropriate: allergies, current medications, past family history, past medical history, past social history, past surgical history, and problem list     Review of Systems   Constitutional: Negative for chills and fever  Respiratory: Negative for shortness of breath  Cardiovascular: Negative for chest pain     Psychiatric/Behavioral: Positive for sleep disturbance  Negative for dysphoric mood and suicidal ideas  The patient is not nervous/anxious  Objective:      /70 (BP Location: Left arm, Patient Position: Sitting, Cuff Size: Adult)   Pulse 56   Temp 97 7 °F (36 5 °C) (Temporal)   Resp 16   Ht 5' 2 99" (1 6 m)   Wt 74 4 kg (164 lb)   SpO2 96%   BMI 29 06 kg/m²          Physical Exam  Vitals reviewed  Constitutional:       General: She is not in acute distress  Appearance: Normal appearance  She is not ill-appearing, toxic-appearing or diaphoretic  HENT:      Head: Normocephalic and atraumatic  Eyes:      General: No scleral icterus  Right eye: No discharge  Left eye: No discharge  Conjunctiva/sclera: Conjunctivae normal    Cardiovascular:      Rate and Rhythm: Normal rate and regular rhythm  Pulses: Normal pulses  Heart sounds: Normal heart sounds  No murmur heard  No gallop  Pulmonary:      Effort: Pulmonary effort is normal  No respiratory distress  Breath sounds: Normal breath sounds  No stridor  No wheezing, rhonchi or rales  Musculoskeletal:      Right lower leg: No edema  Left lower leg: No edema  Neurological:      General: No focal deficit present  Mental Status: She is alert and oriented to person, place, and time  Psychiatric:         Mood and Affect: Mood normal          Behavior: Behavior normal          Thought Content:  Thought content normal          Judgment: Judgment normal

## 2023-02-10 NOTE — ASSESSMENT & PLAN NOTE
C/w counseling from [facility]; feels stable on current lamictal dosage; recheck in 1 month

## 2023-02-10 NOTE — ASSESSMENT & PLAN NOTE
Trial of trazodone which she has done well with in past; reviewed medication and potential SE; recheck in 1 month

## 2023-02-15 ENCOUNTER — TELEPHONE (OUTPATIENT)
Dept: CARDIOLOGY CLINIC | Facility: CLINIC | Age: 73
End: 2023-02-15

## 2023-02-15 NOTE — TELEPHONE ENCOUNTER
Phone call to [facility] regarding Zio worn for less than 24 hours  Spoke with Anyi Stevenson  She states there is documentation patient wore Zio less than 24 hours  [employer] will overnight another Zio monitor  Left message patient voice mail she should receive new zio monitor by the end of this week

## 2023-02-16 ENCOUNTER — OFFICE VISIT (OUTPATIENT)
Dept: GASTROENTEROLOGY | Facility: MEDICAL CENTER | Age: 73
End: 2023-02-16

## 2023-02-16 ENCOUNTER — TELEPHONE (OUTPATIENT)
Dept: GASTROENTEROLOGY | Facility: MEDICAL CENTER | Age: 73
End: 2023-02-16

## 2023-02-16 VITALS
WEIGHT: 165.6 LBS | BODY MASS INDEX: 29.34 KG/M2 | HEART RATE: 74 BPM | DIASTOLIC BLOOD PRESSURE: 68 MMHG | HEIGHT: 63 IN | OXYGEN SATURATION: 95 % | SYSTOLIC BLOOD PRESSURE: 126 MMHG

## 2023-02-16 DIAGNOSIS — R14.0 BLOATING: ICD-10-CM

## 2023-02-16 DIAGNOSIS — Z12.12 SCREENING FOR COLORECTAL CANCER: Primary | ICD-10-CM

## 2023-02-16 DIAGNOSIS — Z12.11 SCREENING FOR COLORECTAL CANCER: Primary | ICD-10-CM

## 2023-02-16 DIAGNOSIS — K59.00 CONSTIPATION, UNSPECIFIED CONSTIPATION TYPE: ICD-10-CM

## 2023-02-16 NOTE — PROGRESS NOTES
[facility] - Outpatient Consultation  Valeria Alfredo 68 y o  female MRN: [id number]  Encounter: [id number]          ASSESSMENT AND PLAN:   81-year-old female with history of coronary artery disease status post CABG on Plavix, atrial fibrillation on Xarelto, peripheral arterial disease hypertension, bipolar disorder who presents for evaluation  1  Screening for colorectal cancer  She reports undergoing a colonoscopy 15 years ago and states that exam was normal   Procedure report is not available for review  She is overdue for colonoscopy at this time  Discussed indication, risk and benefit of colonoscopy with her today and she is agreeable to proceed  We will request cardiac clearance and clearance to hold her antiplatelet and anticoagulant medications  She states she is taking Plavix but is not taking Xarelto due to cost and she will clarify this with the doctor that prescribes the medication  - Colonoscopy; Future  - polyethylene glycol (GOLYTELY) 4000 mL solution; Take as instructed on bowel preparation instructions  Dispense: 4000 mL; Refill: 0    2  Constipation, unspecified constipation type  3  Bloating  She reports constipation with hard difficult to pass bowel movement starting around the time the gabapentin was started  She also has abdominal bloating  She is started a stool softener with some relief  Discussed constipation management with her including high-fiber diet, fiber supplementation with goal of 25 g/day and adequate hydration  ______________________________________________________________________    HPI: 81-year-old female with history of coronary artery disease status post CABG on Plavix, atrial fibrillation on Xarelto, peripheral arterial disease hypertension, bipolar disorder who presents for evaluation  She believes she underwent a colonoscopy 15 years ago for routine screening purposes    She states that exam was normal   Procedure report is not available for review  She reports new onset of constipation with hard and difficult to pass bowel movements  She states this started around the time she started taking gabapentin  She has to strain with bowel movements  She denies rectal bleeding  She is started a stool softener with some relief  She reports abdominal bloating as well  She has no family history of colorectal cancer  Her antiplatelet use includes Plavix  She is ordered for Xarelto twice daily but states she has not taken the medication due to cost        November 2022 hemoglobin 15 7  Liver enzymes within normal limits  She has no abdominal imaging available for review        REVIEW OF SYSTEMS:    CONSTITUTIONAL: Denies any fever, chills, rigors, and weight loss  HEENT: No earache or tinnitus  Denies hearing loss or visual disturbances  CARDIOVASCULAR: No chest pain or palpitations  RESPIRATORY: Denies any cough, hemoptysis, shortness of breath or dyspnea on exertion  GASTROINTESTINAL: As noted in the History of Present Illness  GENITOURINARY: No problems with urination  Denies any hematuria or dysuria  NEUROLOGIC: No dizziness or vertigo, denies headaches  MUSCULOSKELETAL: Denies any muscle or joint pain  SKIN: Denies skin rashes or itching  ENDOCRINE: Denies excessive thirst  Denies intolerance to heat or cold  PSYCHOSOCIAL: Denies depression or anxiety  Denies any recent memory loss  Historical Information   Past Medical History:   Diagnosis Date   • Atrial fibrillation ([facility] [address] )    • Heart attack ([facility] [address] ) 02/2022   • Hypertension    • Stroke [facility])      No past surgical history on file    Social History   Social History     Substance and Sexual Activity   Alcohol Use Not Currently   • Alcohol/week: 50 0 standard drinks   • Types: 50 Cans of beer per week    Comment: has not had a drink in 102 days 11/8/22- patient admits to drinking at least 6 beers per day     Social History     Substance and Sexual Activity   Drug Use Never     Social History     Tobacco Use   Smoking Status Every Day   • Packs/day: 1 00   • Types: Cigarettes   • Start date: 5   Smokeless Tobacco Never     No family history on file  Meds/Allergies       Current Outpatient Medications:   •  amLODIPine (NORVASC) 5 mg tablet  •  clopidogrel (PLAVIX) 75 mg tablet  •  diltiazem (CARDIZEM) 120 MG tablet  •  ezetimibe (ZETIA) 10 mg tablet  •  FOLIC ACID PO  •  gabapentin (Neurontin) 300 mg capsule  •  isosorbide mononitrate (IMDUR) 30 mg 24 hr tablet  •  lamoTRIgine (LaMICtal) 100 mg tablet  •  lisinopril (ZESTRIL) 10 mg tablet  •  Melatonin 5 MG TABS  •  metoprolol succinate (TOPROL-XL) 50 mg 24 hr tablet  •  rivaroxaban (Xarelto) 2 5 mg tablet  •  rosuvastatin (CRESTOR) 20 MG tablet  •  traZODone (DESYREL) 50 mg tablet    No Known Allergies        Objective     Blood pressure 126/68, pulse 74, height 5' 3" (1 6 m), weight 75 1 kg (165 lb 9 6 oz), SpO2 95 %  There is no height or weight on file to calculate BMI  PHYSICAL EXAM:      General Appearance:   Alert, cooperative, no distress   HEENT:   Normocephalic, atraumatic, anicteric  Neck:  Supple, symmetrical, trachea midline   Lungs:   Clear to auscultation bilaterally; no rales, rhonchi or wheezing; respirations unlabored    Heart[de-identified]   Regular rate and rhythm; no murmur, rub, or gallop  Abdomen:   Soft, well-healed abdominal surgical scars and CABG scar non-tender, non-distended; normal bowel sounds; no masses, no organomegaly    Genitalia:   Deferred    Rectal:   Deferred    Extremities:  No cyanosis, clubbing or edema    Pulses:  2+ and symmetric    Skin:  No jaundice, rashes, or lesions    Lymph nodes:  No palpable cervical lymphadenopathy        Lab Results:   No visits with results within 1 Day(s) from this visit     Latest known visit with results is:   Hospital Outpatient Visit on 01/26/2023   Component Date Value   • Protocol Name 01/26/2023 ANDERS CUEVAS    • Time In Exercise Phase 01/26/2023 00:03:28    • MAX  SYSTOLIC BP 53/48/0913 893    • Max Diastolic Bp 80/43/0254 74    • Max Heart Rate 01/26/2023 72    • Max Predicted Heart Rate 01/26/2023 148    • Reason for Termination 01/26/2023 Test Complete    • Test Indication 01/26/2023 CHEST PAIN    • Target Hr Formular 01/26/2023 (220 - Age)*85%    • Chest Pain Statement 01/26/2023 none          Radiology Results:   Stress strip    Result Date: 1/26/2023  Narrative: Confirmed by Reymundo Cornell ([id number]),  Renetta Snell ([id number]) on 1/26/2023 11:17:56 AM    Echo complete w/ contrast if indicated    Result Date: 1/26/2023  Narrative: •  Left Ventricle: Left ventricular cavity size is normal  Wall thickness is normal  The left ventricular ejection fraction is 60% by single dimension measurement  Systolic function is normal  Wall motion is normal  Diastolic function is normal   Left atrial filling pressure is normal  •  Right Ventricle: Right ventricular cavity size is mildly dilated  •  Aortic Valve: The aortic valve is trileaflet  The leaflets are mildly thickened  The leaflets are mildly calcified  There is mildly reduced mobility  There is mild to moderate regurgitation  There is mild stenosis  The aortic valve peak velocity is 1 95 m/s  The aortic valve mean gradient is 7 mmHg  The dimensionless velocity index is 0 56  The aortic valve area is 1 50 cm2  The stroke volume index is 35 40 ml/m2  •  Tricuspid Valve: There is mild regurgitation  The right ventricular systolic pressure is mildly elevated  The estimated right ventricular systolic pressure is 47 58 mmHg  •  Aorta: The aortic root is normal in size  The ascending aorta is mildly dilated  NM myocardial perfusion spect (rx stress and/or rest)    Result Date: 1/26/2023  Narrative: •  Stress ECG: There were no arrhythmias during recovery    The ECG was not diagnostic due to resting ST-T abnormalities and pharmacological (vasodilator) stress   •  Stress Function: Left ventricular function post-stress is normal  Post-stress ejection fraction is 61 %  •  Perfusion: There is a left ventricular perfusion defect that is small in size with mild to moderate reduction in uptake present in the mid to apical anterior location(s) that is partially reversible  •  Stress Combined Conclusion: Left ventricular perfusion is abnormal  There is a small sized, mild perfusion defect at rest in the anterior wall that worsens slightly with stress  This likely represents artifact from breast attenuation, but cannot completely exclude a small area of ischemia  Normal LV systolic function with no regional wall motion abnormalities noted

## 2023-02-16 NOTE — TELEPHONE ENCOUNTER
BLOOD THINNER CLEARANCE    Our mutual patient is scheduled for procedure: Colonoscopy     On: 05/11/2023      With: Dr Vargas President    She is taking the following blood thinner:         Plavix                                       Can this be stopped 5 days prior to the procedure?        Physician Approving clearance: ________________________

## 2023-02-16 NOTE — TELEPHONE ENCOUNTER
BLOOD THINNER CLEARANCE    Our mutual patient is scheduled for procedure: Colonoscopy     On: 05/11/2023     With: Dr Ab Jewell    Could you please clarify for us if she is taking Xarelto as well as Plavix (prescribed by PCP>)  If she is on Xarelto we would like to request a hold  We requested a separate hold for Plavix from her PCP  Can this be stopped 2 days prior to the procedure?        Physician Approving clearance: ________________________

## 2023-02-16 NOTE — PATIENT INSTRUCTIONS
Constipation   AMBULATORY CARE:   Constipation  means you have hard, dry bowel movements, or you go longer than usual between bowel movements  Constipation may be caused by a lack of water or high-fiber foods  Certain medicines, or a lack of fiber or physical activity may also cause constipation  Common symptoms include the following:   Trouble pushing out your bowel movement    Pain or bleeding during your bowel movement    A feeling that you did not finish having your bowel movement    Nausea    Bloating    Headache    Call your doctor if:   You have blood in your bowel movements  You have a fever and abdominal pain with the constipation  Your constipation gets worse  You start to vomit  You have questions or concerns about your condition or care  Relieve constipation:  Medicine can keep you have a bowel movement more easily  Medicines may increase moisture in your bowel movement or increase the motion of your intestines  A suppository  may be used to help soften your bowel movements  This may make them easier to pass  A suppository is guided into your rectum through your anus  Laxatives  can help stimulate your bowels to have a bowel movement  Your provider may recommend you only use laxatives for a short time  Long-term use may make your bowels dependent on the medicine  An enema  is liquid medicine used to clear bowel movement from your rectum  The medicine is put into your rectum through your anus  Prevent constipation:   Drink liquids as directed  You may need to drink extra liquids to help soften and move your bowels  Ask how much liquid to drink each day and which liquids are best for you  Eat high-fiber foods  This may help decrease constipation by adding bulk to your bowel movements  High-fiber foods include fruit, vegetables, whole-grain breads and cereals, and beans  Your healthcare provider or dietitian can help you create a high-fiber meal plan   Your provider may also recommend a fiber supplement if you cannot get enough fiber from food  Exercise regularly  Regular physical activity can help stimulate your intestines  Walking is a good exercise to prevent or relieve constipation  Ask which exercises are best for you  Schedule a time each day to have a bowel movement  This may help train your body to have regular bowel movements  Bend forward while you are on the toilet to help move the bowel movement out  Sit on the toilet for at least 10 minutes, even if you do not have a bowel movement  Talk to your healthcare provider about your medicines  Certain medicines, such as opioids, can cause constipation  Your provider may be able to make medicine changes  For example, he or she may change the kind of medicine, or change when you take it  Follow up with your doctor as directed:  Write down your questions so you remember to ask them during your visits  © Copyright [employer] 2022 Information is for End User's use only and may not be sold, redistributed or otherwise used for commercial purposes  All illustrations and images included in CareNotes® are the copyrighted property of A D A M , Inc  or [facility] Laurel Denney   The above information is an  only  It is not intended as medical advice for individual conditions or treatments  Talk to your doctor, nurse or pharmacist before following any medical regimen to see if it is safe and effective for you  High Fiber Diet   AMBULATORY CARE:   A high-fiber diet  includes foods that have a high amount of fiber  Fiber is the part of fruits, vegetables, and grains that is not broken down by your body  Fiber keeps your bowel movements regular  Fiber can also help lower your cholesterol level, control blood sugar in people with diabetes, and relieve constipation  Fiber can also help you control your weight because it helps you feel full faster   Most adults should eat 25 to 35 grams of fiber each day  Talk to your dietitian or healthcare provider about the amount of fiber you need  Good sources of fiber:       Foods with at least 4 grams of fiber per serving:      ? to ½ cup of high-fiber cereal (check the nutrition label on the box)    ½ cup of blackberries or raspberries    4 dried prunes    1 cooked artichoke    ½ cup of cooked legumes, such as lentils, or red, kidney, and iqbal beans    Foods with 1 to 3 grams of fiber per servin slice of whole-wheat, pumpernickel, or rye bread    ½ cup of cooked brown rice    4 whole-wheat crackers    1 cup of oatmeal    ½ cup of cereal with 1 to 3 grams of fiber per serving (check the nutrition label on the box)    1 small piece of fruit, such as an apple, banana, pear, kiwi, or orange    3 dates    ½ cup of canned apricots, fruit cocktail, peaches, or pears    ½ cup of raw or cooked vegetables, such as carrots, cauliflower, cabbage, spinach, squash, or corn    Ways that you can increase fiber in your diet:   Choose brown or wild rice instead of white rice  Use whole wheat flour in recipes instead of white or all-purpose flour  Add beans and peas to casseroles or soups  Choose fresh fruit and vegetables with peels or skins on instead of juices  Other diet guidelines to follow: Add fiber to your diet slowly  You may have abdominal discomfort, bloating, and gas if you add fiber to your diet too quickly  Drink plenty of liquids as you add fiber to your diet  You may have nausea or develop constipation if you do not drink enough water  Ask how much liquid to drink each day and which liquids are best for you  © Copyright [employer]  Information is for End User's use only and may not be sold, redistributed or otherwise used for commercial purposes  All illustrations and images included in CareNotes® are the copyrighted property of A D A M , Inc  or [employer]  The above information is an  only   It is not intended as medical advice for individual conditions or treatments  Talk to your doctor, nurse or pharmacist before following any medical regimen to see if it is safe and effective for you      Scheduled date of Colonoscopy (as of today) 05/11/2023  Physician performing: Dr Ivon Victoria  [facility] of [facility]  [facility]  Instructions given to patient:  [place]  Clearances: 5 day Plavix hold requested PCP< 2 day Xarelto hold requested Cardio

## 2023-02-24 ENCOUNTER — TELEPHONE (OUTPATIENT)
Dept: GASTROENTEROLOGY | Facility: MEDICAL CENTER | Age: 73
End: 2023-02-24

## 2023-02-24 NOTE — TELEPHONE ENCOUNTER
Left voice message for patient that she is to hold her Xarelto 2 days prior to the procedure and she is also to hold her Plavix 5 day prior to the procedure   Patient was given the office number if she has any further questions

## 2023-03-10 ENCOUNTER — OFFICE VISIT (OUTPATIENT)
Dept: FAMILY MEDICINE CLINIC | Facility: CLINIC | Age: 73
End: 2023-03-10

## 2023-03-10 VITALS
HEIGHT: 64 IN | BODY MASS INDEX: 28 KG/M2 | SYSTOLIC BLOOD PRESSURE: 134 MMHG | WEIGHT: 164 LBS | TEMPERATURE: 98.2 F | OXYGEN SATURATION: 92 % | HEART RATE: 64 BPM | DIASTOLIC BLOOD PRESSURE: 90 MMHG

## 2023-03-10 DIAGNOSIS — I48.0 PAF (PAROXYSMAL ATRIAL FIBRILLATION) (HCC): ICD-10-CM

## 2023-03-10 DIAGNOSIS — G47.00 INSOMNIA, UNSPECIFIED TYPE: ICD-10-CM

## 2023-03-10 DIAGNOSIS — R42 ORTHOSTATIC LIGHTHEADEDNESS: ICD-10-CM

## 2023-03-10 DIAGNOSIS — R42 DIZZINESS: Primary | ICD-10-CM

## 2023-03-10 DIAGNOSIS — F31.4 BIPOLAR AFFECTIVE DISORDER, DEPRESSED, SEVERE (HCC): ICD-10-CM

## 2023-03-10 RX ORDER — TRAZODONE HYDROCHLORIDE 50 MG/1
50 TABLET ORAL
Qty: 30 TABLET | Refills: 1 | Status: SHIPPED | OUTPATIENT
Start: 2023-03-10

## 2023-03-10 RX ORDER — LAMOTRIGINE 100 MG/1
50 TABLET ORAL DAILY
Qty: 30 TABLET | Refills: 1 | Status: SHIPPED | OUTPATIENT
Start: 2023-03-10 | End: 2023-03-10 | Stop reason: SDUPTHER

## 2023-03-10 RX ORDER — LAMOTRIGINE 100 MG/1
100 TABLET ORAL DAILY
Qty: 30 TABLET | Refills: 1 | Status: SHIPPED | OUTPATIENT
Start: 2023-03-10

## 2023-03-10 NOTE — PROGRESS NOTES
Assessment/Plan:     1  Dizziness  Assessment & Plan:  ziopatch results pending; will add Tilt test although suspect more vertigo given hx; neuro exam WNL; per pt had MRI brain in past that was unremarkable; referred to PT and neurology since chronic and ongoing    Orders:  -     Ambulatory Referral to Neurology; Future  -     Ambulatory Referral to Physical Therapy; Future    2  Bipolar affective disorder, depressed, severe ([facility] [address] )  Assessment & Plan:  Overall stable; c/w current tx; encouraged counseling    Orders:  -     lamoTRIgine (LaMICtal) 100 mg tablet; Take 1 tablet (100 mg total) by mouth daily    3  Orthostatic lightheadedness  -     Tilt table; Future; Expected date: 03/10/2023    4  Insomnia, unspecified type  Assessment & Plan:  C/w trazodone; does not appear to be cause of her symptoms as it started prior to onset    Orders:  -     traZODone (DESYREL) 50 mg tablet; Take 1 tablet (50 mg total) by mouth daily at bedtime    5  PAF [facility]oxys[facility]  Assessment & Plan:  C/w tx; f/u with cardiology as recommended; HR controlled today          Subjective:      Patient ID: Shivani Thorpe is a 68 y o  female  Patient is here with multiple concerns  States symptoms started about 3-4 years ago  Seems to be getting worse  She states it started when she was drinking but when she stopped did not notice any change  She had thought it was due to her alcohol abuse she had those symptoms initially  She states she feels like she has symptoms related to POTS  Complains of various symptoms including palpitations, brain fog, migraines, muscle fatigue, tremors, poor concentration, lightheadedness  She states her vision and concentration seem off at times  Feels dizzy and sometimes off balance  Notices it is the worst with position changes like going from laying to standing  Feels room is spinning at times  She states she just handed in her extended Holter monitor   She feels her heart starts pounding at times  Does have pain but also has fibromylagia  Hx of alcohol abuse  Pt states she did have an MRI of brain and was WNL as she was sent for balance issues  States it is not due to her medications as she has been put on these medications after sx started  Frustrated as she said she has had so much testing and no one can figure out the cause  The following portions of the patient's history were reviewed and updated as appropriate: allergies, current medications, past family history, past medical history, past social history, past surgical history, and problem list     Review of Systems   Constitutional: Positive for fatigue  Negative for chills and fever  Cardiovascular: Positive for palpitations  Musculoskeletal: Positive for arthralgias  Neurological: Positive for dizziness  Objective:      /90 (BP Location: Left arm, Patient Position: Sitting, Cuff Size: Adult)   Pulse 64   Temp 98 2 °F (36 8 °C)   Ht 5' 3 78" (1 62 m)   Wt 74 4 kg (164 lb)   SpO2 92%   BMI 28 35 kg/m²          Physical Exam  Vitals reviewed  Constitutional:       General: She is not in acute distress  Appearance: Normal appearance  She is not ill-appearing, toxic-appearing or diaphoretic  HENT:      Head: Normocephalic and atraumatic  Right Ear: Tympanic membrane normal       Left Ear: Tympanic membrane normal    Eyes:      General: No scleral icterus  Right eye: No discharge  Left eye: No discharge  Extraocular Movements: Extraocular movements intact  Conjunctiva/sclera: Conjunctivae normal       Pupils: Pupils are equal, round, and reactive to light  Cardiovascular:      Rate and Rhythm: Normal rate and regular rhythm  Pulses: Normal pulses  Heart sounds: Normal heart sounds  No murmur heard  No gallop  Pulmonary:      Effort: Pulmonary effort is normal  No respiratory distress  Breath sounds: Normal breath sounds  No stridor   No wheezing, rhonchi or rales  Musculoskeletal:      Right lower leg: No edema  Left lower leg: No edema  Neurological:      General: No focal deficit present  Mental Status: She is alert and oriented to person, place, and time  Cranial Nerves: No cranial nerve deficit  Psychiatric:         Mood and Affect: Mood normal          Behavior: Behavior normal          Thought Content:  Thought content normal          Judgment: Judgment normal

## 2023-03-10 NOTE — ASSESSMENT & PLAN NOTE
ziopatch results pending; will add Tilt test although suspect more vertigo given hx; neuro exam WNL; per pt had MRI brain in past that was unremarkable; referred to PT and neurology since chronic and ongoing

## 2023-03-10 NOTE — PATIENT INSTRUCTIONS
Complete Tilt table test and if holter and tilt table unremarkable I do think you would benefit from physical therapy and seeing neurology

## 2023-03-13 DIAGNOSIS — I25.810 CORONARY ARTERY DISEASE INVOLVING CORONARY BYPASS GRAFT OF NATIVE HEART WITHOUT ANGINA PECTORIS: ICD-10-CM

## 2023-03-13 RX ORDER — DILTIAZEM HYDROCHLORIDE 120 MG/1
120 TABLET, FILM COATED ORAL DAILY
Qty: 30 TABLET | Refills: 3 | Status: SHIPPED | OUTPATIENT
Start: 2023-03-13

## 2023-03-17 ENCOUNTER — CLINICAL SUPPORT (OUTPATIENT)
Dept: CARDIOLOGY CLINIC | Facility: CLINIC | Age: 73
End: 2023-03-17

## 2023-03-17 DIAGNOSIS — I48.0 PAF (PAROXYSMAL ATRIAL FIBRILLATION) (HCC): ICD-10-CM

## 2023-03-20 ENCOUNTER — EVALUATION (OUTPATIENT)
Dept: PHYSICAL THERAPY | Facility: CLINIC | Age: 73
End: 2023-03-20

## 2023-03-20 DIAGNOSIS — R42 DIZZINESS: Primary | ICD-10-CM

## 2023-03-20 NOTE — PROGRESS NOTES
PT Evaluation     Today's date: 3/20/2023  Patient name: Joann Vazquez  : 1950  MRN: [id number]  Referring provider: Anyi Álvarez DO  Dx:   Encounter Diagnosis     ICD-10-CM    1  Dizziness  R42 Ambulatory Referral to Physical Therapy                     Assessment  Assessment details: Joann Vazquez is a 68 y o  female presenting to physical therapy with dizziness, LOB, and many other red flag symptoms that are chronic in nature and present with gait/balance dysfunction and decreased activity tolerance  Assessment negative for vestibular pathology of symptoms  Secondary to these impairments, patient has increased difficulty performing ADL's and household chores  Ingrid Burgos would benefit from further assessment to rule out a neurological cause of symptoms  Impairments: abnormal gait, abnormal movement, activity intolerance, impaired balance and poor posture     Symptom irritability: highUnderstanding of Dx/Px/POC: excellent   Prognosis: fair    Goals  N/A    Plan  Plan details: HEP only  Patient would benefit from: PT eval and skilled physical therapy  Planned therapy interventions: patient education, strengthening, therapeutic activities, therapeutic exercise, home exercise program and balance  Treatment plan discussed with: patient        Subjective Evaluation    History of Present Illness  Mechanism of injury: Patient presents to outpatient PT secondary to the onset of vertigo symptoms  Patient states that the symptoms began several years ago (which at that time she attributed to alcohol abuse and describes the symptoms as lightheaded and dizzy that worsens with positional changes and is improved with _____  Saw her PCP who ordered an MRI of the brain which was normal, Tilt table assessment, and referred her to outpatient PT and neurology for further assessment   Patient reports many red flags (night sweats, fainting, nausea, SOB among many others), self diagnosing with POTS and notes that these are all chronic  Patient is retired but notes difficulty with ADL's and household chores as a result  Patients goals for PT are to resolve the dizziness to improve ADL/work related functions  Recurrent probem    Quality of life: good    Pain  No pain reported  Relieving factors: rest and relaxation  Exacerbated by: change of position in the morning  Progression: worsening    Social Support  Steps to enter house: yes  Stairs in house: yes   Lives in: multiple-level home  Lives with: adult children    Employment status: not working    Diagnostic Tests  MRI studies: normal  Treatments  No previous or current treatments  Patient Goals  Patient goals for therapy: independence with ADLs/IADLs, return to sport/leisure activities, return to work, increased motion and improved balance          Objective     Concurrent Complaints  Positive for headaches, nausea/motion sickness, tinnitus, memory loss, aural fullness and poor concentration  Negative for visual change, hearing loss and peripheral neuropathy  Neuro Exam:     Dizziness  Positive for disequilibrium, vertigo, oscillopsia, motion sickness, light-headedness, rocking or swaying, diplopia and floating or swimming  Exacerbating factors  Positive for bending over, looking up, supine to/from sitting, optokinetic movement and walking in busy environment  Negative for rolling in bed, walking and turning head  Headaches   Patient reports headaches: Yes  Cervical exam   Ligament Laxity Testing   Alar ligament: WNL  Sharp Evie:  WNL  Modified VBI   Left: asymptomatic  Right: asymptomatic    Oculomotor exam   Oculomotor ROM: WNL  Resting nystagmus: not present   Gaze holding nystagmus: not present left  and not present right  Smooth pursuits: within normal limits  Vertical saccades: normal  Horizontal saccades: normal  Convergence: normal (5cm)  Head thrust: left normal and right normal    Positional testing   Lg-Hallpike   Left posterior canal: WNL  Right posterior canal: WNL  Roll test   Left horizontal canal: WNL  Right horizontal canal: WNL  Positional testing comment: Balance screen:    EO firm: > 20 seconds    EC firm: > 2 seconds w/ increased sway    EO foam: > 20 seconds w/ increased sway    EC foam: > 20 seconds w/ increased sway (no LOB)    Gait Assessment: Normal gait speed, no LOB, left Trendelenburg pattern    Orthostatic testin/76 in supine; 170/82 in sitting (negative test)      Flowsheet Rows    Flowsheet Row Most Recent Value   PT/OT G-Codes    Current Score 46   Projected Score 0             Precautions: Hx of MI w/ CABG, Hx of TIA, HTN, A-fib, Hx of Bipolar disorder, Hx of fibromyalgia, CKD stage 3      Manuals 3/20                                                                Neuro Re-Ed             Balance HEP (corner + varying surfaces and narrow base) 10'                                                                                          Ther Ex                                                                                                                     Ther Activity                                       Gait Training                                       Modalities

## 2023-03-27 ENCOUNTER — TELEPHONE (OUTPATIENT)
Dept: PSYCHIATRY | Facility: CLINIC | Age: 73
End: 2023-03-27

## 2023-04-06 ENCOUNTER — RA CDI HCC (OUTPATIENT)
Dept: OTHER | Facility: HOSPITAL | Age: 73
End: 2023-04-06

## 2023-04-27 ENCOUNTER — TELEPHONE (OUTPATIENT)
Dept: GASTROENTEROLOGY | Facility: MEDICAL CENTER | Age: 73
End: 2023-04-27

## 2023-04-27 NOTE — TELEPHONE ENCOUNTER
Spoke to patient confirming upcoming procedure  Patient was instructed to call [phone] if they have any questions or concerns about the prep instructions or if they need to change or cancel the procedure  Pt is aware to hold Plavix 5 days prior to her procedure  She stated she is not taking Xarelto anymore

## 2023-05-01 DIAGNOSIS — I25.810 CORONARY ARTERY DISEASE INVOLVING CORONARY BYPASS GRAFT OF NATIVE HEART WITHOUT ANGINA PECTORIS: ICD-10-CM

## 2023-05-01 RX ORDER — CLOPIDOGREL BISULFATE 75 MG/1
75 TABLET ORAL DAILY
Qty: 90 TABLET | Refills: 1 | Status: SHIPPED | OUTPATIENT
Start: 2023-05-01

## 2023-05-02 ENCOUNTER — APPOINTMENT (OUTPATIENT)
Dept: LAB | Facility: CLINIC | Age: 73
End: 2023-05-02

## 2023-05-02 DIAGNOSIS — I10 ESSENTIAL HYPERTENSION: ICD-10-CM

## 2023-05-02 LAB
BASOPHILS # BLD AUTO: 0.12 THOUSANDS/ΜL (ref 0–0.1)
BASOPHILS NFR BLD AUTO: 1 % (ref 0–1)
EOSINOPHIL # BLD AUTO: 0.43 THOUSAND/ΜL (ref 0–0.61)
EOSINOPHIL NFR BLD AUTO: 4 % (ref 0–6)
ERYTHROCYTE [DISTWIDTH] IN BLOOD BY AUTOMATED COUNT: 13.8 % (ref 11.6–15.1)
HCT VFR BLD AUTO: 45.8 % (ref 34.8–46.1)
HGB BLD-MCNC: 14.5 G/DL (ref 11.5–15.4)
IMM GRANULOCYTES # BLD AUTO: 0.04 THOUSAND/UL (ref 0–0.2)
IMM GRANULOCYTES NFR BLD AUTO: 0 % (ref 0–2)
LYMPHOCYTES # BLD AUTO: 2.13 THOUSANDS/ΜL (ref 0.6–4.47)
LYMPHOCYTES NFR BLD AUTO: 21 % (ref 14–44)
MCH RBC QN AUTO: 28.3 PG (ref 26.8–34.3)
MCHC RBC AUTO-ENTMCNC: 31.7 G/DL (ref 31.4–37.4)
MCV RBC AUTO: 89 FL (ref 82–98)
MONOCYTES # BLD AUTO: 0.65 THOUSAND/ΜL (ref 0.17–1.22)
MONOCYTES NFR BLD AUTO: 6 % (ref 4–12)
NEUTROPHILS # BLD AUTO: 6.81 THOUSANDS/ΜL (ref 1.85–7.62)
NEUTS SEG NFR BLD AUTO: 68 % (ref 43–75)
NRBC BLD AUTO-RTO: 0 /100 WBCS
PLATELET # BLD AUTO: 205 THOUSANDS/UL (ref 149–390)
PMV BLD AUTO: 10.7 FL (ref 8.9–12.7)
RBC # BLD AUTO: 5.13 MILLION/UL (ref 3.81–5.12)
WBC # BLD AUTO: 10.18 THOUSAND/UL (ref 4.31–10.16)

## 2023-05-03 LAB
ALBUMIN SERPL BCP-MCNC: 3.7 G/DL (ref 3.5–5)
ALP SERPL-CCNC: 85 U/L (ref 46–116)
ALT SERPL W P-5'-P-CCNC: 18 U/L (ref 12–78)
ANION GAP SERPL CALCULATED.3IONS-SCNC: 3 MMOL/L (ref 4–13)
AST SERPL W P-5'-P-CCNC: 13 U/L (ref 5–45)
BILIRUB SERPL-MCNC: 0.34 MG/DL (ref 0.2–1)
BUN SERPL-MCNC: 12 MG/DL (ref 5–25)
CALCIUM SERPL-MCNC: 9.2 MG/DL (ref 8.3–10.1)
CHLORIDE SERPL-SCNC: 110 MMOL/L (ref 96–108)
CO2 SERPL-SCNC: 23 MMOL/L (ref 21–32)
CREAT SERPL-MCNC: 0.89 MG/DL (ref 0.6–1.3)
GFR SERPL CREATININE-BSD FRML MDRD: 64 ML/MIN/1.73SQ M
GLUCOSE SERPL-MCNC: 86 MG/DL (ref 65–140)
POTASSIUM SERPL-SCNC: 4.2 MMOL/L (ref 3.5–5.3)
PROT SERPL-MCNC: 7.5 G/DL (ref 6.4–8.4)
SODIUM SERPL-SCNC: 136 MMOL/L (ref 135–147)
TSH SERPL DL<=0.05 MIU/L-ACNC: 1.3 UIU/ML (ref 0.45–4.5)

## 2023-05-08 ENCOUNTER — HOSPITAL ENCOUNTER (OUTPATIENT)
Facility: MEDICAL CENTER | Age: 73
Discharge: HOME | End: 2023-05-08

## 2023-05-08 DIAGNOSIS — R42 DIZZINESS: ICD-10-CM

## 2023-05-08 RX ADMIN — GADOBUTROL 7 ML: 604.72 INJECTION INTRAVENOUS at 09:24

## 2023-05-10 RX ORDER — ONDANSETRON 2 MG/ML
4 INJECTION INTRAMUSCULAR; INTRAVENOUS EVERY 6 HOURS PRN
Status: CANCELLED | OUTPATIENT
Start: 2023-05-10

## 2023-05-10 RX ORDER — SODIUM CHLORIDE 9 MG/ML
125 INJECTION, SOLUTION INTRAVENOUS CONTINUOUS
Status: CANCELLED | OUTPATIENT
Start: 2023-05-10

## 2023-05-11 ENCOUNTER — ANESTHESIA (OUTPATIENT)
Dept: GASTROENTEROLOGY | Facility: HOSPITAL | Age: 73
End: 2023-05-11

## 2023-05-11 ENCOUNTER — ANESTHESIA EVENT (OUTPATIENT)
Dept: GASTROENTEROLOGY | Facility: HOSPITAL | Age: 73
End: 2023-05-11

## 2023-05-11 ENCOUNTER — HOSPITAL ENCOUNTER (OUTPATIENT)
Dept: GASTROENTEROLOGY | Facility: HOSPITAL | Age: 73
Setting detail: OUTPATIENT SURGERY
Discharge: HOME | End: 2023-05-11
Attending: INTERNAL MEDICINE

## 2023-05-11 VITALS
OXYGEN SATURATION: 97 % | RESPIRATION RATE: 20 BRPM | TEMPERATURE: 96.1 F | SYSTOLIC BLOOD PRESSURE: 144 MMHG | DIASTOLIC BLOOD PRESSURE: 62 MMHG | HEART RATE: 61 BPM

## 2023-05-11 DIAGNOSIS — Z12.11 SCREENING FOR COLORECTAL CANCER: ICD-10-CM

## 2023-05-11 DIAGNOSIS — Z12.12 SCREENING FOR COLORECTAL CANCER: ICD-10-CM

## 2023-05-11 RX ORDER — PROPOFOL 10 MG/ML
INJECTION, EMULSION INTRAVENOUS AS NEEDED
Status: DISCONTINUED | OUTPATIENT
Start: 2023-05-11 | End: 2023-05-11

## 2023-05-11 RX ORDER — SODIUM CHLORIDE 9 MG/ML
125 INJECTION, SOLUTION INTRAVENOUS CONTINUOUS
Status: DISCONTINUED | OUTPATIENT
Start: 2023-05-11 | End: 2023-05-15 | Stop reason: HOSPADM

## 2023-05-11 RX ORDER — ONDANSETRON 2 MG/ML
4 INJECTION INTRAMUSCULAR; INTRAVENOUS EVERY 6 HOURS PRN
Status: DISCONTINUED | OUTPATIENT
Start: 2023-05-11 | End: 2023-05-15 | Stop reason: HOSPADM

## 2023-05-11 RX ADMIN — PROPOFOL 80 MG: 10 INJECTION, EMULSION INTRAVENOUS at 07:39

## 2023-05-11 RX ADMIN — PROPOFOL 50 MG: 10 INJECTION, EMULSION INTRAVENOUS at 07:51

## 2023-05-11 RX ADMIN — PROPOFOL 20 MG: 10 INJECTION, EMULSION INTRAVENOUS at 07:41

## 2023-05-11 RX ADMIN — PROPOFOL 40 MG: 10 INJECTION, EMULSION INTRAVENOUS at 07:45

## 2023-05-11 RX ADMIN — SODIUM CHLORIDE 125 ML/HR: 0.9 INJECTION, SOLUTION INTRAVENOUS at 07:19

## 2023-05-11 RX ADMIN — PROPOFOL 50 MG: 10 INJECTION, EMULSION INTRAVENOUS at 07:57

## 2023-05-11 RX ADMIN — PROPOFOL 50 MG: 10 INJECTION, EMULSION INTRAVENOUS at 08:01

## 2023-05-11 NOTE — H&P
H&P EXAM - Outpatient Endoscopy   Sonia Hampton 68 y o  female MRN: [id number]    [id number] GI LAB PRE/POST   Encounter: [id number]      History and Physical -  [facility]  Sonia Hampton 68 y o  female MRN: [id number]                  HPI: Sonia Hampton is a 68y o  year old female who presents for colon cancer screning      REVIEW OF SYSTEMS: Per the HPI, and otherwise unremarkable  Historical Information   Past Medical History:   Diagnosis Date   • Atrial fibrillation ([facility] [address] )    • Heart attack ([facility] [address] ) 02/2022   • Hypertension    • Myocardial infarction [facility])     2022   • Stroke [facility])      Past Surgical History:   Procedure Laterality Date   • CARDIAC SURGERY     • HYSTERECTOMY     • VASCULAR SURGERY       Social History   Social History     Substance and Sexual Activity   Alcohol Use Not Currently   • Alcohol/week: 50 0 standard drinks   • Types: 50 Cans of beer per week    Comment: has not had a drink in 102 days 11/8/22- patient admits to drinking at least 6 beers per day     Social History     Substance and Sexual Activity   Drug Use Never     Social History     Tobacco Use   Smoking Status Every Day   • Packs/day: 1 00   • Types: Cigarettes   • Start date: 1967   Smokeless Tobacco Never     History reviewed  No pertinent family history  Meds/Allergies     (Not in a hospital admission)      No Known Allergies    Objective     /67   Temp 97 6 °F (36 4 °C) (Temporal)   Resp 15   SpO2 95%       PHYSICAL EXAM    Gen: NAD  CV: RRR  CHEST: Clear  ABD: soft, NT/ND  EXT: no edema      ASSESSMENT/PLAN:  This is a 68y o  year old female here for colonoscopy, and she is stable and optimized for her procedure

## 2023-05-11 NOTE — ANESTHESIA PREPROCEDURE EVALUATION
Procedure:  COLONOSCOPY    Relevant Problems   ANESTHESIA (within normal limits)      CARDIO  Stress ECG: There were no arrhythmias during recovery    The ECG was not diagnostic due to resting ST-T abnormalities and pharmacological (vasodilator) stress  •  Stress Function: Left ventricular function post-stress is normal  Post-stress ejection fraction is 61 %  •  Perfusion: There is a left ventricular perfusion defect that is small in size with mild to moderate reduction in uptake present in the mid to apical anterior location(s) that is partially reversible  •  Stress Combined Conclusion: Left ventricular perfusion is abnormal      There is a small sized, mild perfusion defect at rest in the anterior wall that worsens slightly with stress  This likely represents artifact from breast attenuation, but cannot completely exclude a small area of ischemia  Normal LV systolic function with no regional wall motion abnormalities noted      History    CAD, CABG, HTN, HLD, Smoker, COPD, PAF, PAD  Meds: Plavix, Cardizem, Isordil, Lisinopril, Metoprolol-Succinate, Rosuvastatin  Pt reported to taking all meds morning of test        (+) Aortoiliac occlusive disease (HCC)   (+) CAD (coronary artery disease)   (+) Essential hypertension   (+) Hx of CABG   (+) Mixed hyperlipidemia   (+) PAD (peripheral artery disease) (HCC)   (+) PAF (paroxysmal atrial fibrillation) ([facility])   (+) Renal artery stenosis (HCC)      /RENAL   (+) Stage 3 chronic kidney disease, unspecified whether stage 3a or 3b CKD ([facility])      NEURO/PSYCH   (+) Bipolar affective disorder, depressed, severe ([facility])        Physical Exam    Airway    Mallampati score: II         Dental   lower dentures and upper dentures,     Cardiovascular  Rhythm: regular, Rate: normal, Cardiovascular exam normal    Pulmonary  Pulmonary exam normal Breath sounds clear to auscultation,     Other Findings        Anesthesia Plan  ASA Score- 3     Anesthesia Type- general with ASA Monitors  Additional Monitors:   Airway Plan:           Plan Factors-    Chart reviewed  Patient summary reviewed  Patient is not a current smoker  Induction- intravenous  Postoperative Plan-     Informed Consent- Anesthetic plan and risks discussed with patient

## 2023-05-16 NOTE — RESULT ENCOUNTER NOTE
Please call the patient with the results    The  colon polyp removed was called an adenoma  This is a pre-cancerous lesion and was completely removed  There was no evidence of cancer in the polyp       She should have the colonoscopy repeated in 3 years due to a history of colon polyps

## 2023-05-31 ENCOUNTER — HOSPITAL ENCOUNTER (OUTPATIENT)
Dept: CT IMAGING | Facility: HOSPITAL | Age: 73
Discharge: HOME | End: 2023-05-31

## 2023-05-31 DIAGNOSIS — F17.210 SMOKING GREATER THAN 20 PACK YEARS: ICD-10-CM

## 2023-05-31 DIAGNOSIS — Z12.2 ENCOUNTER FOR SCREENING FOR LUNG CANCER: ICD-10-CM

## 2023-06-07 ENCOUNTER — RA CDI HCC (OUTPATIENT)
Dept: OTHER | Facility: HOSPITAL | Age: 73
End: 2023-06-07

## 2023-06-08 DIAGNOSIS — R91.8 LUNG MASS: Primary | ICD-10-CM

## 2023-06-13 ENCOUNTER — HOSPITAL ENCOUNTER (OUTPATIENT)
Dept: NON INVASIVE DIAGNOSTICS | Facility: HOSPITAL | Age: 73
Discharge: HOME | End: 2023-06-13
Payer: MEDICARE

## 2023-06-13 VITALS
RESPIRATION RATE: 16 BRPM | HEART RATE: 50 BPM | DIASTOLIC BLOOD PRESSURE: 60 MMHG | WEIGHT: 165 LBS | BODY MASS INDEX: 28.17 KG/M2 | SYSTOLIC BLOOD PRESSURE: 120 MMHG | HEIGHT: 64 IN

## 2023-06-13 DIAGNOSIS — R42 ORTHOSTATIC LIGHTHEADEDNESS: ICD-10-CM

## 2023-06-13 LAB
CHEST PAIN STATEMENT: NORMAL
MAX DIASTOLIC BP: 62 MMHG
MAX HEART RATE: 60 BPM
MAX PREDICTED HEART RATE: 148 BPM
MAX. SYSTOLIC BP: 126 MMHG
PROTOCOL NAME: NORMAL
REASON FOR TERMINATION: NORMAL
TARGET HR FORMULA: NORMAL
TEST INDICATION: NORMAL
TIME IN EXERCISE PHASE: NORMAL

## 2023-06-13 PROCEDURE — 93660 TILT TABLE EVALUATION: CPT

## 2023-06-14 ENCOUNTER — TELEPHONE (OUTPATIENT)
Dept: HEMATOLOGY ONCOLOGY | Facility: CLINIC | Age: 73
End: 2023-06-14

## 2023-06-14 ENCOUNTER — OFFICE VISIT (OUTPATIENT)
Dept: FAMILY MEDICINE CLINIC | Facility: CLINIC | Age: 73
End: 2023-06-14
Payer: MEDICARE

## 2023-06-14 ENCOUNTER — TELEPHONE (OUTPATIENT)
Dept: ADMINISTRATIVE | Facility: OTHER | Age: 73
End: 2023-06-14

## 2023-06-14 VITALS
OXYGEN SATURATION: 90 % | DIASTOLIC BLOOD PRESSURE: 66 MMHG | WEIGHT: 168.2 LBS | BODY MASS INDEX: 28.87 KG/M2 | HEART RATE: 90 BPM | TEMPERATURE: 98.5 F | SYSTOLIC BLOOD PRESSURE: 120 MMHG

## 2023-06-14 DIAGNOSIS — I10 ESSENTIAL HYPERTENSION: ICD-10-CM

## 2023-06-14 DIAGNOSIS — R91.8 LUNG MASS: Primary | ICD-10-CM

## 2023-06-14 DIAGNOSIS — R42 DIZZINESS: ICD-10-CM

## 2023-06-14 DIAGNOSIS — Z78.0 POSTMENOPAUSAL: ICD-10-CM

## 2023-06-14 DIAGNOSIS — F31.4 BIPOLAR AFFECTIVE DISORDER, DEPRESSED, SEVERE (HCC): ICD-10-CM

## 2023-06-14 DIAGNOSIS — N18.30 STAGE 3 CHRONIC KIDNEY DISEASE, UNSPECIFIED WHETHER STAGE 3A OR 3B CKD (HCC): ICD-10-CM

## 2023-06-14 PROCEDURE — 93660 TILT TABLE EVALUATION: CPT | Performed by: INTERNAL MEDICINE

## 2023-06-14 PROCEDURE — 99214 OFFICE O/P EST MOD 30 MIN: CPT | Performed by: FAMILY MEDICINE

## 2023-06-14 RX ORDER — AMLODIPINE BESYLATE 5 MG/1
5 TABLET ORAL DAILY
COMMUNITY
Start: 2023-05-02

## 2023-06-14 RX ORDER — METOPROLOL SUCCINATE 50 MG/1
50 TABLET, EXTENDED RELEASE ORAL DAILY
Qty: 30 TABLET | Refills: 3 | Status: SHIPPED | OUTPATIENT
Start: 2023-06-14

## 2023-06-14 NOTE — ASSESSMENT & PLAN NOTE
MRI and cardiac testing so far unremarkable; no worsening of symptoms; has f/u with neurology and given fluid in sinuses advised f/u with ENT; pt will trial antihistamine

## 2023-06-14 NOTE — ASSESSMENT & PLAN NOTE
Lab Results   Component Value Date    CREATININE 0 89 05/02/2023    CREATININE 1 09 11/14/2022    EGFR 64 05/02/2023    EGFR 51 11/14/2022   stable; repeat labs

## 2023-06-14 NOTE — TELEPHONE ENCOUNTER
I called Leonor Case in response to a referral that was received for patient to establish care with Medical Oncology  Outreach was made to schedule a consultation     I left a voicemail explaining the reason for my call and advised patient to call [facility] at [phone]  Another attempt will be made to contact patient

## 2023-06-14 NOTE — TELEPHONE ENCOUNTER
----- Message from Magali English DO sent at 6/14/2023  8:27 AM EDT -----  Regarding: Quality Update  06/14/23 8:27 AM    Hello, our patient Bertha Hare has had Hepatitis C completed/performed  Please assist in updating the patient chart by pulling the Care Everywhere (CE) document  The date of service is 2017       Thank you,  Magali English DO  [facility]

## 2023-06-14 NOTE — ASSESSMENT & PLAN NOTE
Reviewed again CT results with patient; pt has appt with pulmonary scheduled next week; also referred to heme/onc

## 2023-06-14 NOTE — ASSESSMENT & PLAN NOTE
Has not yet established with psychiatrist but going to [facility]; c/w counseling; overall stable on current medication and will continue with current tx; f/u guidance given

## 2023-06-14 NOTE — PATIENT INSTRUCTIONS
Follow up with pulmonary and other specialists  Recheck in 2 months  Call if any questions or concerns       Complete mammogram

## 2023-06-14 NOTE — PROGRESS NOTES
Assessment/Plan:     1  Lung mass  Assessment & Plan:  Reviewed again CT results with patient; pt has appt with pulmonary scheduled next week; also referred to heme/onc    Orders:  -     Ambulatory Referral to Hematology / Oncology; Future    2  Essential hypertension  Assessment & Plan:  Controlled; c/w current tx    Orders:  -     metoprolol succinate (TOPROL-XL) 50 mg 24 hr tablet; Take 1 tablet (50 mg total) by mouth daily  -     CBC and differential; Future  -     Comprehensive metabolic panel; Future    3  Dizziness  Assessment & Plan:  MRI and cardiac testing so far unremarkable; no worsening of symptoms; has f/u with neurology and given fluid in sinuses advised f/u with ENT; pt will trial antihistamine    Orders:  -     CBC and differential; Future  -     Comprehensive metabolic panel; Future    4  Bipolar affective disorder, depressed, severe ([facility] [address] )  Assessment & Plan:  Has not yet established with psychiatrist but going to [facility] clinic; c/w counseling; overall stable on current medication and will continue with current tx; f/u guidance given    Orders:  -     CBC and differential; Future  -     Comprehensive metabolic panel; Future    5  Stage 3 chronic kidney disease, unspecified whether stage 3a or 3b CKD [facility])  Assessment & Plan:  Lab Results   Component Value Date    CREATININE 0 89 05/02/2023    CREATININE 1 09 11/14/2022    EGFR 64 05/02/2023    EGFR 51 11/14/2022   stable; repeat labs    Orders:  -     CBC and differential; Future  -     Comprehensive metabolic panel; Future    6  Postmenopausal  -     DXA bone density spine hip and pelvis; Future; Expected date: 06/14/2023        Subjective:      Patient ID: Elenita Walsh is a 68 y o  female  Patient is here for follow up  Lung mass: Recent lung cancer screening showed a 1 8 cm nodule concerning it is malignant  Has already scheduled f/u with pulmonary on 6/20   She has noticed a cough recently when she lays down but felt it was related to PND  She does complain of fatigue  Bipolar: Patient states she has been splitting the 100 mg in half and taking it twice a day  She has not noticed a difference  She tried decreasing medication to see if it affected her dizziness at all but did not notice a difference  Mood has been stable  No thoughts of wanting to drink alcohol  Has not been able to establish with psychiatrist yet  Dizziness: Patient had recent Holter and tilt test  Preliminary findings unremarkable for cause of symptoms  Pt was evaluated by physical therapy and did not have significant findings that are vestibular  MRI should some fluid in mastoid region but otherwise unremarkable  She is going to try an antihistamine  Has upcoming appt with vascular  The following portions of the patient's history were reviewed and updated as appropriate: allergies, current medications, past family history, past medical history, past social history, past surgical history, and problem list     Review of Systems   Constitutional: Positive for fatigue  Negative for chills and fever  HENT: Positive for postnasal drip  Respiratory: Positive for cough  Cardiovascular: Negative for chest pain  Neurological: Positive for dizziness  Psychiatric/Behavioral: Negative for dysphoric mood and suicidal ideas  Objective:      /66 (BP Location: Right arm, Patient Position: Sitting, Cuff Size: Adult)   Pulse 90   Temp 98 5 °F (36 9 °C)   Wt 76 3 kg (168 lb 3 2 oz)   SpO2 90%   BMI 28 87 kg/m²          Physical Exam  Vitals reviewed  Constitutional:       General: She is not in acute distress  Appearance: Normal appearance  She is not ill-appearing, toxic-appearing or diaphoretic  HENT:      Head: Normocephalic and atraumatic  Eyes:      General: No scleral icterus  Right eye: No discharge  Left eye: No discharge        Conjunctiva/sclera: Conjunctivae normal    Cardiovascular:      Rate and Rhythm: Normal rate and regular rhythm  Pulses: Normal pulses  Heart sounds: Normal heart sounds  No murmur heard  No gallop  Pulmonary:      Effort: Pulmonary effort is normal  No respiratory distress  Breath sounds: Normal breath sounds  No stridor  No wheezing, rhonchi or rales  Musculoskeletal:      Right lower leg: No edema  Left lower leg: No edema  Neurological:      General: No focal deficit present  Mental Status: She is alert and oriented to person, place, and time  Psychiatric:         Mood and Affect: Mood normal          Behavior: Behavior normal          Thought Content:  Thought content normal          Judgment: Judgment normal

## 2023-06-15 ENCOUNTER — TELEPHONE (OUTPATIENT)
Dept: HEMATOLOGY ONCOLOGY | Facility: CLINIC | Age: 73
End: 2023-06-15

## 2023-06-15 NOTE — TELEPHONE ENCOUNTER
I called Lynette Sue in response to a referral that was received for patient to establish care with Thoracic Surgery  Outreach was made to schedule a consultation       A consultation was scheduled for patient during this call   Patient is scheduled on 7/5/23 at 11:00AM with Dr Ashley Sim at the [facility]

## 2023-06-15 NOTE — TELEPHONE ENCOUNTER
Appointment Change  Cancel, Reschedule, Change to Virtual      Who are you speaking with? Patient   If it is not the patient, are they listed on an active communication consent form? N/A   Which provider is the appointment scheduled with? Dr Tessa Balderrama   When is the appointment scheduled? Please list date and time 7/5/23 940   At which location is the appointment scheduled to take place? [facility]   Was the appointment rescheduled or changed from an in person visit to a virtual visit? If so, please list the details of the change  7/5/23 320 albander   What is the reason for the appointment change? appt conflict   Was [facility] transport scheduled for this visit? N/A   Does [facility] transport need to be scheduled for the new visit (if applicable) N/A   Does the patient need an infusion appointment rescheduled? N/A   Does the patient have an infusion appointment scheduled? If so, when? No   Is the patient undergoing chemotherapy? N/A   Was the no-show policy reviewed for appointments being changed with less then 24 hours of notice?  N/A

## 2023-06-15 NOTE — TELEPHONE ENCOUNTER
I called Chaitanya Wise in response to a referral that was received for patient to establish care with Medical Oncology  Outreach was made to schedule a consultation       A consultation was scheduled for patient during this call   Patient is scheduled on 07/05/23 at 9:40AM with Dr Ketty Castro at the [facility]

## 2023-06-20 ENCOUNTER — CONSULT (OUTPATIENT)
Dept: PULMONOLOGY | Facility: CLINIC | Age: 73
End: 2023-06-20
Payer: MEDICARE

## 2023-06-20 VITALS
SYSTOLIC BLOOD PRESSURE: 132 MMHG | BODY MASS INDEX: 28.51 KG/M2 | WEIGHT: 167 LBS | OXYGEN SATURATION: 91 % | HEIGHT: 64 IN | DIASTOLIC BLOOD PRESSURE: 80 MMHG | RESPIRATION RATE: 18 BRPM | TEMPERATURE: 97.5 F | HEART RATE: 69 BPM

## 2023-06-20 DIAGNOSIS — R91.8 LUNG MASS: ICD-10-CM

## 2023-06-20 DIAGNOSIS — R06.02 SHORTNESS OF BREATH: Primary | ICD-10-CM

## 2023-06-20 DIAGNOSIS — I25.9 CHRONIC ISCHEMIC HEART DISEASE, UNSPECIFIED: ICD-10-CM

## 2023-06-20 PROCEDURE — 99205 OFFICE O/P NEW HI 60 MIN: CPT | Performed by: INTERNAL MEDICINE

## 2023-06-20 PROCEDURE — 94618 PULMONARY STRESS TESTING: CPT | Performed by: INTERNAL MEDICINE

## 2023-06-20 RX ORDER — ALBUTEROL SULFATE 90 UG/1
2 AEROSOL, METERED RESPIRATORY (INHALATION) EVERY 6 HOURS PRN
Qty: 8.5 G | Refills: 2 | Status: SHIPPED | OUTPATIENT
Start: 2023-06-20

## 2023-06-20 NOTE — PROGRESS NOTES
Pulmonary Initial Visit  Elisabeth Peck 68 y o  female MRN: [id number]  @ Encounter: [id number]      Impressions/Recommendations:   Patient is a 73-year-old female with past medical history significant for active tobacco abuse, vascular disease who presents to establish pulmonary care  Patient was noted to have a lung mass on CT scan which given the size and her history is considered high risk  Discussed with IP and feel that PET/CT would be next best step with potential biopsy if positive  We will check PFTs to determine patient's functional status in case the testing is positive  Does report shortness of breath and limitations in her daily activities  We will further evaluate via PFTs  Also the patient did have an oxygen titration walk which was negative for need for supplemental oxygen but did not have a significant rise in heart rate  Management of beta-blockers to primary team and cardiology      Lung mass  -Reviewed with IP, recommended checking PET CT scan   -We will check PET CT scan as well as CT chest with navigation protocol    Shortness of breath  -Likely strong component of COPD but no PFTs available for review  -Sample of Trelegy provided, may refill if patient finds benefit  -Order placed for albuterol HFA, spacer provided  -PFTs  -Patient went in office oxygen titration study, there was no significant desaturation although her oxygen at rest was low but also her heart rate at rest was 58 and did not increase above 70   -May discuss her current beta-blockade with cardiology to see if this is an inappropriately low heart rate    Nicotine dependence  -Counseled on smoking cessation  -On Wellbutrin per PCP    Vascular disease  -Patient with history of CAD status post CABG, peripheral vascular disease status post bypass x2  -She is on antiplatelet as well as low dose xarelto, this will be held prior to any procedure   -Unclear if patient is still taking Xarelto  -The patient was able tolerate a colonoscopy while off medications therefore less likely needed bridging  -  S/p IVC filter given chronic non  occlusive LLE DVT    Follow-up will be dependent upon results of testing  Orders Placed This Encounter   Procedures   • POCT Oxygen Titration   • NM PET CT skull base to mid thigh     Standing Status:   Future     Standing Expiration Date:   6/20/2027     Scheduling Instructions: Your study will take approximately 2 hours to complete  You will be receiving an injection for this test  Do not eat/drink anything but plain water 6 hours prior to the start of the test   Please bring your insurance cards, a form of photo ID and a list of your medications with you  Arrive 15 minutes prior to your appointment time in order to register  To schedule this appointment, please contact Central Scheduling at [phone]  Order Specific Question:   Release to patient through [employer]     Answer:   Immediate     Order Specific Question:   Reason for Exam (FREE TEXT)     Answer:   1 8cm lung mass   • CT chest without contrast     With navigation bronchoscopy protocol  Standing Status:   Future     Standing Expiration Date:   6/20/2027     Scheduling Instructions: There is no prep for this study  Please bring your insurance cards, a form of photo ID and a list of your medications with you  Arrive 15 minutes prior to your appointment time to register  On the day of your test, please bring any prior CT or MRI studies of this area with you that were not performed at a [facility]  To schedule this appointment, please contact Central Scheduling at [phone]  Order Specific Question:   What is the patient's sedation requirement? If Medication for Claustrophobia is selected, order medication at this point       Answer:   No Sedation     Order Specific Question:   Release to patient through [employer]     Answer:   Immediate     Order Specific Question: Reason for Exam (FREE TEXT)     Answer:   lung nodule   • Protime-INR     Standing Status:   Future     Standing Expiration Date:   6/20/2024   • Complete PFT with post bronchodilator     Standing Status:   Future     Standing Expiration Date:   6/20/2024     Order Specific Question:   Would you like to add MVV, MIP, MEP into this order? Answer:   No         History of Present Illness   HPI:  Keena Aragon is a 68 y o  female with pmhx sig for CVA, CAD s/p CABG, Atrial fibrillation who presents to establish care in setting of pulmonary mass  The patient denies any recent fevers, chills, night sweats or weight loss  The patient denies use of inhalers  The patient has had bypass in both legs  She recently had a negative stress test       She does not monitor her oxygen at home  Her breathing has gotten worse over the past several years  She does report shortness of breath  She has difficulty walking up a flight of steps  She is smoking 1 5ppd  She started smoking at age 14  She is interested in quitting  The chantix made her sick  The patient is an etoh  She quit in June 2022  She was drinking 6 beers daily  She denies illicit drug use  She was a   She was off medications for her colonoscopy  She had no difficulty off of antiplatelet and anticoagulation  Review of systems:  Review of systems was completed and was otherwise negative except as listed in HPI  Historical Information   Past Medical History:   Diagnosis Date   • Atrial fibrillation ([facility] [address] )    • Heart attack ([facility] [address] ) 02/2022   • Hypertension    • Myocardial infarction [facility])     2022   • Stroke [facility])      Past Surgical History:   Procedure Laterality Date   • CARDIAC SURGERY     • HYSTERECTOMY     • VASCULAR SURGERY       Family History   Problem Relation Age of Onset   • Stomach cancer Mother      Social History     Socioeconomic History   • Marital status:       Spouse name: None   • Number "of children: None   • Years of education: None   • Highest education level: None   Occupational History   • None   Tobacco Use   • Smoking status: Every Day     Packs/day: 1 50     Types: Cigarettes     Start date: 5   • Smokeless tobacco: Never   Vaping Use   • Vaping Use: Never used   Substance and Sexual Activity   • Alcohol use: Not Currently     Alcohol/week: 50 0 standard drinks of alcohol     Types: 50 Cans of beer per week     Comment: has not had a drink in 102 days 11/8/22- patient admits to drinking at least 6 beers per day   • Drug use: Never   • Sexual activity: Not Currently     Partners: Male   Other Topics Concern   • None   Social History Narrative   • None     Social Determinants of Health     Financial Resource Strain: Not on file   Food Insecurity: Not on file   Transportation Needs: Not on file   Physical Activity: Not on file   Stress: Not on file   Social Connections: Not on file   Intimate Partner Violence: Not on file   Housing Stability: Not on file       Meds/Allergies   No current facility-administered medications for this visit  (Not in a hospital admission)    No Known Allergies    Vitals: Blood pressure 132/80, pulse 69, temperature 97 5 °F (36 4 °C), temperature source Tympanic, resp  rate 18, height 5' 4\" (1 626 m), weight 75 8 kg (167 lb), SpO2 91 % , RA, Body mass index is 28 67 kg/m²      Physical Exam  General: Pleasant, Awake alert and oriented x 3, conversant without conversational dyspnea, NAD, normal affect  HEENT:  PERRL, Sclera noninjected, nonicteric OU, Nares patent, no nasal flaring, no nasal drainage, Mucous membranes, moist, no oral lesions, normal dentition  NECK: Trachea midline, no accessory muscle use, no stridor, no cervical or supraclavicular adenopathy, JVP not elevated  CARDIAC: Reg, single s1/S2, no m/r/g  PULM: CTA b/l; no w/r/c  CHEST: No gross deformities, equal chest expansion on inspiration bilaterally  ABD: Normoactive bowel sounds, soft nontender, " nondistended, no rebound, no rigidity, no guarding  EXT: No cyanosis, no clubbing, no edema, normal capillary refill  SKIN:  No rashes, no lesions  NEURO: no focal neurologic deficits, AAOx3, moving all extremities appropriately    Labs: I have personally reviewed pertinent lab results  Lab Results   Component Value Date    SODIUM 136 05/02/2023    K 4 2 05/02/2023     (H) 05/02/2023    CO2 23 05/02/2023    BUN 12 05/02/2023    CREATININE 0 89 05/02/2023    GLUC 86 05/02/2023    CALCIUM 9 2 05/02/2023     Lab Results   Component Value Date    WBC 10 18 (H) 05/02/2023    HGB 14 5 05/02/2023    HCT 45 8 05/02/2023    MCV 89 05/02/2023     05/02/2023     Imaging and other studies: I have personally reviewed pertinent reports  and I have personally reviewed pertinent films in PACS  CT Lung Screening 5/31/2023  LUNGS: Occlusion of the posterior segment right upper lobe bronchus with 1 8 cm right upper lobe nodule  Moderate emphysema  AIRWAYS: No significant filling defects  PLEURA:  Unremarkable  HEART/GREAT VESSELS: Mild cardiomegaly  Moderate coronary artery calcifications status post CABG  Pulmonary artery enlargement  Ascending aorta at upper limit of normal in size at 3 9 cm  MEDIASTINUM AND ROSIBEL:  Unremarkable  CHEST WALL AND LOWER NECK: Bilateral axillofemoral bypass grafts with left axillary clips  8 mm right thyroid nodule  No follow-up needed  UPPER ABDOMEN: Subcentimeter hepatic cyst  1 5 cm lipid rich right adrenal adenoma  Pulmonary function testing:     No pulmonary function testing available for review  Echocardiogram: I have personally reviewed pertinent reports  1/26/2023:    Left Ventricle: Left ventricular cavity size is normal  Wall thickness is normal  The left ventricular ejection fraction is 60% by single dimension measurement   Systolic function is normal  Wall motion is normal  Diastolic function is normal   Left atrial filling pressure is normal   • Right Ventricle: Right ventricular cavity size is mildly dilated  •  Aortic Valve: The aortic valve is trileaflet  The leaflets are mildly thickened  The leaflets are mildly calcified  There is mildly reduced mobility  There is mild to moderate regurgitation  There is mild stenosis  The aortic valve peak velocity is 1 95 m/s  The aortic valve mean gradient is 7 mmHg  The dimensionless velocity index is 0 56  The aortic valve area is 1 50 cm2  The stroke volume index is 35 40 ml/m2  •  Tricuspid Valve: There is mild regurgitation  The right ventricular systolic pressure is mildly elevated  The estimated right ventricular systolic pressure is 06 77 mmHg  •  Aorta: The aortic root is normal in size  The ascending aorta is mildly dilated  EKG, Pathology, and Other Studies: I have personally reviewed pertinent reports  and I have personally reviewed pertinent films in PACS  12/27/2022:  Normal sinus rhythm with incomplete bundle branch block    Referring:  Aisha Doss DO  [address]  [address]  [facility],  [address]  [facility]

## 2023-06-25 ENCOUNTER — HOSPITAL ENCOUNTER (OUTPATIENT)
Dept: CT IMAGING | Facility: HOSPITAL | Age: 73
Discharge: HOME | End: 2023-06-25
Attending: INTERNAL MEDICINE
Payer: MEDICARE

## 2023-06-25 DIAGNOSIS — R91.8 LUNG MASS: ICD-10-CM

## 2023-06-25 PROCEDURE — G1004 CDSM NDSC: HCPCS

## 2023-06-25 PROCEDURE — 71250 CT THORAX DX C-: CPT

## 2023-06-27 ENCOUNTER — HOSPITAL ENCOUNTER (OUTPATIENT)
Dept: PULMONOLOGY | Facility: HOSPITAL | Age: 73
Discharge: HOME | End: 2023-06-27
Attending: INTERNAL MEDICINE
Payer: MEDICARE

## 2023-06-27 DIAGNOSIS — R91.8 LUNG MASS: ICD-10-CM

## 2023-06-27 PROCEDURE — 94729 DIFFUSING CAPACITY: CPT

## 2023-06-27 PROCEDURE — 94760 N-INVAS EAR/PLS OXIMETRY 1: CPT

## 2023-06-27 PROCEDURE — 94060 EVALUATION OF WHEEZING: CPT

## 2023-06-27 PROCEDURE — 94726 PLETHYSMOGRAPHY LUNG VOLUMES: CPT

## 2023-06-27 RX ORDER — ALBUTEROL SULFATE 2.5 MG/3ML
2.5 SOLUTION RESPIRATORY (INHALATION) ONCE
Status: COMPLETED | OUTPATIENT
Start: 2023-06-27 | End: 2023-06-27

## 2023-06-27 RX ADMIN — ALBUTEROL SULFATE 2.5 MG: 2.5 SOLUTION RESPIRATORY (INHALATION) at 11:19

## 2023-06-28 ENCOUNTER — HOSPITAL ENCOUNTER (OUTPATIENT)
Dept: NON INVASIVE DIAGNOSTICS | Facility: CLINIC | Age: 73
Discharge: HOME | End: 2023-06-28
Payer: MEDICARE

## 2023-06-28 DIAGNOSIS — I77.9 PAOD (PERIPHERAL ARTERIAL OCCLUSIVE DISEASE) (HCC): ICD-10-CM

## 2023-06-28 DIAGNOSIS — I74.09 AORTOILIAC OCCLUSIVE DISEASE (HCC): ICD-10-CM

## 2023-06-28 PROCEDURE — 93925 LOWER EXTREMITY STUDY: CPT

## 2023-06-28 PROCEDURE — 93978 VASCULAR STUDY: CPT

## 2023-06-28 PROCEDURE — 93923 UPR/LXTR ART STDY 3+ LVLS: CPT

## 2023-07-05 ENCOUNTER — OFFICE VISIT (OUTPATIENT)
Dept: PODIATRY | Facility: CLINIC | Age: 73
End: 2023-07-05
Payer: MEDICARE

## 2023-07-05 ENCOUNTER — OFFICE VISIT (OUTPATIENT)
Dept: CARDIAC SURGERY | Facility: CLINIC | Age: 73
End: 2023-07-05
Payer: MEDICARE

## 2023-07-05 ENCOUNTER — DOCUMENTATION (OUTPATIENT)
Dept: CARDIAC SURGERY | Facility: CLINIC | Age: 73
End: 2023-07-05

## 2023-07-05 VITALS
WEIGHT: 167.55 LBS | BODY MASS INDEX: 28.6 KG/M2 | TEMPERATURE: 97.8 F | DIASTOLIC BLOOD PRESSURE: 66 MMHG | HEART RATE: 73 BPM | SYSTOLIC BLOOD PRESSURE: 122 MMHG | OXYGEN SATURATION: 96 % | RESPIRATION RATE: 16 BRPM | HEIGHT: 64 IN

## 2023-07-05 VITALS
DIASTOLIC BLOOD PRESSURE: 74 MMHG | BODY MASS INDEX: 28.51 KG/M2 | HEIGHT: 64 IN | WEIGHT: 167 LBS | SYSTOLIC BLOOD PRESSURE: 136 MMHG | RESPIRATION RATE: 18 BRPM | HEART RATE: 77 BPM

## 2023-07-05 DIAGNOSIS — Z95.828 S/P VASCULAR BYPASS: Primary | ICD-10-CM

## 2023-07-05 DIAGNOSIS — I73.9 PAD (PERIPHERAL ARTERY DISEASE) (HCC): ICD-10-CM

## 2023-07-05 DIAGNOSIS — G62.9 PERIPHERAL NERVE DISORDER: ICD-10-CM

## 2023-07-05 DIAGNOSIS — R91.1 NODULE OF UPPER LOBE OF RIGHT LUNG: ICD-10-CM

## 2023-07-05 DIAGNOSIS — R91.8 LUNG MASS: Primary | ICD-10-CM

## 2023-07-05 PROCEDURE — 99213 OFFICE O/P EST LOW 20 MIN: CPT | Performed by: PODIATRIST

## 2023-07-05 PROCEDURE — 99205 OFFICE O/P NEW HI 60 MIN: CPT | Performed by: THORACIC SURGERY (CARDIOTHORACIC VASCULAR SURGERY)

## 2023-07-05 RX ORDER — GABAPENTIN 300 MG/1
300 CAPSULE ORAL 2 TIMES DAILY
Qty: 60 CAPSULE | Refills: 1 | Status: SHIPPED | OUTPATIENT
Start: 2023-07-05 | End: 2023-09-03

## 2023-07-05 NOTE — PROGRESS NOTES
Thoracic Consult  Assessment/Plan:    Nodule of upper lobe of right lung  Ms. Steve's imaging and results were reviewed personally by myself in PACS, with Dr. Zack Richmond and with the patient in detail. She has a right upper lobe lung nodule and given it's appearance as well as her smoking history, this is suspicious for malignancy. Dr. Zack Richmond is recommending a flexible bronchoscopy, endobronchial ultrasound and possible navigational bronchoscopy for tissue biopsy. Dr. Zack Richmond explained the procedure course, risks, benefits, and alternatives in detail and consent was signed at this visit. She will need to be off her Palvix for 7 days prior to this procedure. She should continue with her PET-CT scheduled 7/7/2023. We did discuss smoking cessation in detail, she does not feel like this is possible for her at this time. I offered referral to smoking cessation program which she declined. All questions were answered and she is in agreement with this plan. Diagnoses and all orders for this visit:    Lung mass  -     Case request operating room: ENDOBRONCHIAL ULTRASOUND (EBUS), BRONCHOSCOPY FLEXIBLE, BRONCHOSCOPY NAVIGATIONAL; Standing  -     Type and screen; Future  -     Case request operating room: ENDOBRONCHIAL ULTRASOUND (EBUS), BRONCHOSCOPY FLEXIBLE, BRONCHOSCOPY NAVIGATIONAL    Nodule of upper lobe of right lung    Other orders  -     Diet NPO; Sips with meds; Standing  -     Void on call to OR; Standing  -     Insert peripheral IV; Standing  -     Place sequential compression device; Standing          Thoracic History   Diagnosis: Right upper lobe lung nodule    Procedures/Surgeries:    Pathology:    Adjuvant Therapy:       Subjective:    Patient ID: Nataliia Lopez is a 68 y.o. female. ECOG 2    HPI   Ms. Mark Douglass is a 68year old female with a PMH current tobacco abuse 1-1.5pk/day at least 65pk/yr  history  CAD s/p CABG 2000 on Plavix,  CKID III, history of DVT s/p IVC filter, HLD,  HTN, PAD,  who was referred to our office by Dr. Sterling Palafox for evaluation of a 1.8cm right upper lobe lung nodule found on routine lung surveillance screening. CT chest 6/25/2023 reveals occlusion of the posterior segment of the right upper lobe bronchus with a 1.8cm right upper lobe nodule. PFTs 6/7/2023 FEV1 1.31L 79% predicted, DLCOcor 38%    PET-CT scheduled for 7/7/2023     On discussion she cannot climb one flight of stairs without SOB and has to stop half way up to catch her breath. She is also limited by claudication. She has noticed a productive cough with clear sputum that started about a month ago. Has some night sweats. Denies unintentional weight loss. The following portions of the patient's history were reviewed and updated as appropriate: allergies, current medications, past family history, past medical history, past social history, past surgical history and problem list.    Past Medical History:   Diagnosis Date   • Atrial fibrillation ([address])    • Heart attack ([address]) 02/2022   • Hypertension    • Myocardial infarction ([address])     2022   • Stroke [facility])       Past Surgical History:   Procedure Laterality Date   • CARDIAC SURGERY     • HYSTERECTOMY     • VASCULAR SURGERY        Family History   Problem Relation Age of Onset   • Stomach cancer Mother       Social History     Socioeconomic History   • Marital status:       Spouse name: Not on file   • Number of children: Not on file   • Years of education: Not on file   • Highest education level: Not on file   Occupational History   • Not on file   Tobacco Use   • Smoking status: Every Day     Packs/day: 1.50     Types: Cigarettes     Start date: 5   • Smokeless tobacco: Never   Vaping Use   • Vaping Use: Never used   Substance and Sexual Activity   • Alcohol use: Not Currently     Alcohol/week: 50.0 standard drinks of alcohol     Types: 50 Cans of beer per week     Comment: has not had a drink in 102 days 11/8/22- patient admits to drinking at least 6 beers per day   • Drug use: Never   • Sexual activity: Not Currently     Partners: Male   Other Topics Concern   • Not on file   Social History Narrative   • Not on file     Social Determinants of Health     Financial Resource Strain: Not on file   Food Insecurity: Not on file   Transportation Needs: Not on file   Physical Activity: Not on file   Stress: Not on file   Social Connections: Not on file   Intimate Partner Violence: Not on file   Housing Stability: Not on file      Review of Systems   Constitutional: Negative for chills, diaphoresis and unexpected weight change. HENT: Negative. Eyes: Negative. Respiratory: Negative for cough, shortness of breath and wheezing. Cardiovascular: Negative for chest pain and leg swelling. Gastrointestinal: Negative for abdominal pain, diarrhea and nausea. Endocrine: Negative. Genitourinary: Negative. Musculoskeletal: Negative. Skin: Negative. Allergic/Immunologic: Negative. Neurological: Negative. Hematological: Negative for adenopathy. Does not bruise/bleed easily. Psychiatric/Behavioral: Negative. All other systems reviewed and are negative. Objective:   Physical Exam  Vitals reviewed. Constitutional:       General: She is not in acute distress. Appearance: Normal appearance. She is not ill-appearing. HENT:      Head: Normocephalic. Nose: Nose normal.      Mouth/Throat:      Mouth: Mucous membranes are moist.   Eyes:      Pupils: Pupils are equal, round, and reactive to light. Cardiovascular:      Rate and Rhythm: Normal rate and regular rhythm. Heart sounds: Normal heart sounds. Pulmonary:      Effort: Pulmonary effort is normal.      Breath sounds: Normal breath sounds. No wheezing, rhonchi or rales. Abdominal:      Palpations: Abdomen is soft. Musculoskeletal:         General: No swelling. Normal range of motion. Lymphadenopathy:      Cervical: No cervical adenopathy.    Skin:     General: Skin is warm.   Neurological:      General: No focal deficit present. Mental Status: She is alert and oriented to person, place, and time. Psychiatric:         Mood and Affect: Mood normal.     /66 (BP Location: Left arm, Patient Position: Sitting, Cuff Size: Standard)   Pulse 73   Temp 97.8 °F (36.6 °C) (Temporal)   Resp 16   Ht 5' 4" (1.626 m)   Wt 76 kg (167 lb 8.8 oz)   SpO2 96%   BMI 28.76 kg/m²     No Chest XR results available for this patient. CT chest without contrast    Result Date: 6/30/2023  Narrative CT CHEST WITHOUT IV CONTRAST INDICATION:   R91.8: Other nonspecific abnormal finding of lung field. Follow-up lung nodule. Patient is a current smoker. COMPARISON: Chest CT from 5/31/2023. TECHNIQUE: CT examination of the chest was performed without intravenous contrast. Multiplanar 2D reformatted images were created from the source data. Study was performed with navigation bronchoscopy protocol. This examination, like all CT scans performed in the [facility], was performed utilizing techniques to minimize radiation dose exposure, including the use of iterative reconstruction and automated exposure control. Radiation dose length product (DLP) for this visit:  470.12 mGy-cm FINDINGS: LUNGS: Unchanged 1.8 cm right upper lobe pulmonary nodule with occlusion of the posterior segment of the right upper lobe bronchus (series 6 image 45.) Again seen is moderate emphysema. No new pulmonary abnormalities. PLEURA:  Unremarkable. HEART/GREAT VESSELS: Heart is unremarkable for patient's age. No thoracic aortic aneurysm. MEDIASTINUM AND ROSIBEL:  Unremarkable. CHEST WALL AND LOWER NECK:  Unremarkable. VISUALIZED STRUCTURES IN THE UPPER ABDOMEN:  Unremarkable. OSSEOUS STRUCTURES:  No acute fracture or destructive osseous lesion.      Impression Unchanged 1.8 cm right upper lobe pulmonary nodule with occlusion of the posterior segment of the right upper lobe bronchus (series 6 image 45.) This is suspicious for malignancy. Again seen is moderate emphysema. Workstation performed: [id number]     No CT Chest,Abdomen,Pelvis results available for this patient. No NM PET CT results available for this patient. No Barium Swallow results available for this patient.

## 2023-07-05 NOTE — PROGRESS NOTES
I met with Radha Patterson at her visit with Dr. Evalyn Carrel today. I introduced myself and explained my role to her. Questions answered, support provided. Patient declined smoking cessation counseling.

## 2023-07-05 NOTE — ASSESSMENT & PLAN NOTE
Ms. Espinoza Oz imaging and results were reviewed personally by myself in PACS, with Dr. Delicia Ruiz and with the patient in detail. She has a right upper lobe lung nodule and given it's appearance as well as her smoking history, this is suspicious for malignancy. Dr. Delicia Ruiz is recommending a flexible bronchoscopy, endobronchial ultrasound and possible navigational bronchoscopy for tissue biopsy. Dr. Delicia Ruiz explained the procedure course, risks, benefits, and alternatives in detail and consent was signed at this visit. She will need to be off her Palvix for 7 days prior to this procedure. She should continue with her PET-CT scheduled 7/7/2023. We did discuss smoking cessation in detail, she does not feel like this is possible for her at this time. I offered referral to smoking cessation program which she declined. All questions were answered and she is in agreement with this plan.

## 2023-07-05 NOTE — PROGRESS NOTES
Patient presents in severe pain secondary to likely vascular compromise left lower extremity. Patient is unable to walk very far without severe pain. At one time, I placed her on gabapentin 300 mg at bedtime for pain and there was relief especially at night when she tried to sleep. Patient was recently  referred for arterial Doppler studies. Patient has a history of vascular invention left lower extremity on 2 occasions. I personally reviewed the results of an arterial Doppler scheduled 6/28/2023 by Dr. Abdifatah Morrow. The studies reveal an occluded graft left lower extremity. There are also severe vascular issues right leg but patient is not complaining of pain in the right leg. At patient request, increased gabapentin dosage to 300 mg twice daily from 300 mg at bedtime. I personally reviewed a comprehensive metabolic panel dated 5/1/9398. The BUN was within normal limits at 12 and the creatinine was within normal limits at 0.89.     Advised patient to contact Dr. Abdifatah Morrow for a vascular referral.

## 2023-07-07 ENCOUNTER — HOSPITAL ENCOUNTER (OUTPATIENT)
Dept: NUCLEAR MEDICINE | Facility: HOSPITAL | Age: 73
End: 2023-07-07
Attending: INTERNAL MEDICINE
Payer: MEDICARE

## 2023-07-07 DIAGNOSIS — D38.1 NEOPLASM OF UNCERTAIN BEHAVIOR OF RIGHT UPPER LOBE OF LUNG: ICD-10-CM

## 2023-07-07 LAB — GLUCOSE SERPL-MCNC: 101 MG/DL (ref 65–140)

## 2023-07-07 PROCEDURE — A9552 F18 FDG: HCPCS

## 2023-07-07 PROCEDURE — 82948 REAGENT STRIP/BLOOD GLUCOSE: CPT

## 2023-07-07 PROCEDURE — G1004 CDSM NDSC: HCPCS

## 2023-07-07 PROCEDURE — 78815 PET IMAGE W/CT SKULL-THIGH: CPT

## 2023-07-09 NOTE — PROGRESS NOTES
Hematology/Oncology Outpatient Office Note    Date of Service: 7/11/2023    [facility]  [phone]    Reason for Consultation:   Chief Complaint   Patient presents with   • Consult       Cancer Stage at diagnosis: at least 1A2     Referral Physician: Amanda Moran DO    Primary Care Physician:  Amanda Moran DO     Nickname: Digna Parker lives with her: Kvng Weinberg ECOG: 3    Today's ECOG: 3 (sits more than half the day due to PAD)    Goals and Barriers:  Current Goal: Minimize effects of disease burden, extend life. Barriers to accomplishing this: pain when she walks in her calf/feet    Patient's Capacity to Self Care:  Patient is able to self care      ASSESSMENT & PLAN      Diagnosis ICD-10-CM Associated Orders   1. Nodule of upper lobe of right lung  R91.1       2. Lung mass  R91.8 Ambulatory Referral to Hematology / Oncology            This is a 68 y.o. c PMHx notable for CAD s/p MI/CABG in 2000, COURTNEY, PAD with stents, paroxysmal A-fib, HTN, CVA, IVC filter hx, renal artery stenosis, being seen in consultation for early suspected stage lung cancer        Discussion of decision making  • Oncology history updated, accordingly, during this visit  • Goals of care/patient communication  o I discussed with the patient the clinical course leading up to their cancer diagnosis. I reviewed relevant office notes, imaging reports and pathology result as well.  o I told the patient that this is a case of curable disease and what this means. We discussed that the goal of anti-cancer therapy is to provide best quality of life, extend overall survival, and progression free survival as shown in clinical trials.  We also discussed that there might be a point when the cancer will no longer respond to this anti-neoplastic therapy. [id number]  • TNM/Staging At Diagnosis  o [id number]  Cancer Staging   1A2 (clinical)  • Disease Features/Tumor Markers/Genetics  o Tumor Marker: n/a  o Notable Path Features: planned  • Treatment  • Other Supportive care:   • Treatment Team Members:  o Surgeon: Dr. Hedy Gonzales  5/8/2023 MRI Brain w/wo c: Moderate, chronic microangiopathy. Large left mastoid air cell effusion. Trace right mastoid air cell effusion. No acute infarction, intracranial mass or mass. No cerebellopontine angle cistern mass lesion. Normal appearing bilateral 7th or 8th cranial nerve complexes  7/7/2023 PET/CT: Marked increased metabolic activity in association with right upper lobe nodule highly suggestive for neoplasm. No evidence for thoracic adenopathy or metastatic disease to the abdomen or pelvis. Right renal atrophy and multiple cystic renal lesions. Likely nonfunctioning right kidney. 2 mm right upper lobe nodule series series 4/52 is unchanged. . 1.8 x 1.9 cm nodule in the right upper lobe associated with occlusion of posterior segment right upper lobe bronchus SUV 22    Discussion of decision making    I personally reviewed the case with Dr. Chan Ortega and the following lab results, the image studies, pathology, other specialty/physicians consult notes and recommendations, and outside medical records. I had a lengthy discussion with the patient and shared the work-up findings. We discussed the diagnosis and management plan as below. I spent 62 minutes reviewing the records (labs, clinician notes, outside records, medical history, ordering medicine/tests/procedures, interpreting the imaging/labs previously done) and coordination of care as well as direct time with the patient today, of which greater than 50% of the time was spent in counseling and coordination of care with the patient/family.       · Plan/Labs  · F/u 7/31/2023 EBUS with Thoracic Surgery  · Pt may be a candidate for definitive resection for cure and it is unlikely she will need adjuvant therapy if she remains stage 1A2 at the time of surgery  · Should she be found to be stage 1B or greater, then would get EGFR to determine if adjuvant anti-EGFR therapy is indicated  · Should she be found to unexpectedly be Stage II or higher, then would assess her for adjuvant chemotherapy        Follow Up: 6 weeks to assess for adjuvant therapy    All questions were answered to the patient's satisfaction during this encounter. The patient knows the contact information for our office and knows to reach out for any relevant concerns related to this encounter. They are to call for any temperature 100.4 or higher, new symptoms including but not restricted to shaking chills, decreased appetite, nausea, vomiting, diarrhea, increased fatigue, shortness of breath or chest pain, confusion, and not feeling the strength to come to the clinic. For all other listed problems and medical diagnosis in their chart - they are managed by PCP and/or other specialists, which the patient acknowledges. Thank you very much for your consultation and making us a part of this patient's care. We are continuing to follow closely with you. Please do not hesitate to reach out to me with any additional questions or concerns. Quan Oliva MD  Hematology & Medical Oncology Staff Physician             Disclaimer: This document was prepared using [employer] technology. If a word or phrase is confusing, or does not make sense, this is likely due to recognition error which was not discovered during this clinician's review. If you believe an error has occurred, please contact me through [phone] [facility] line for breanna? cation. ONCOLOGY HISTORY OF PRESENT ILLNESS        Oncology History    No history exists.          SUBJECTIVE  (INTERVAL HISTORY)      Clotting History Arterial disease related thrombi   Bleeding History None   Cancer History Lung mass   Family Cancer History Mom (gastric), sister (blood cancer)   H/O Blood/Plt Transfusion None   Tobacco/etoh/drug abuse 1 PPD x 52 years (since age 22), no etoh abuse or rec drug use       Cancer Screening history C-scope (polyps removed, due again 3 years)   Occupation Retired from cleaning work     Pain: L knee      I have reviewed the relevant past medical, surgical, social and family history. I have also reviewed allergies and medications for this patient. Review of Systems    Baseline weight: 165-170 lbs    Can get a shaky feeling. TOMAS going up the steps. Gets SOB lying down on her back (sleeps on 3 pillows). Chest palpitations is daily. Chronic fatigue. Denies F/C, N/V, CP, LH, HA, rash, itching, gen weakness, melena, hematuria, hematochezia, falls, diarrhea, or constipation at this time. Sometimes she can have days with: Nausea, chest pain or pressure, lightheadedness, brain fog, migraines, high or low blood pressure, tremors, poor sleep, muscle weakness, cold in her extremities, light or sound intolerance, abnormal sweating, increased or decreased urination, incomplete bladder emptying, blurred vision, poor concentration and memory, bloating, trouble swallowing, dry mouth, poor balance, high or low blood pressure, exercise intolerance, abdominal discomfort, joint pain or stiffness       A 10-point review of system was performed, pertinent positive and negative were detailed as above. Otherwise, the 10-point review of system was negative. Past Medical History:   Diagnosis Date   • Atrial fibrillation ([address])    • Heart attack ([address]) 02/2022   • Hypertension    • Myocardial infarction [facility])     2022   • Stroke [facility])        Past Surgical History:   Procedure Laterality Date   • CARDIAC SURGERY     • HYSTERECTOMY     • VASCULAR SURGERY         Family History   Problem Relation Age of Onset   • Stomach cancer Mother        Social History     Socioeconomic History   • Marital status:       Spouse name: Not on file   • Number of children: Not on file   • Years of education: Not on file   • Highest education level: Not on file   Occupational History   • Not on file   Tobacco Use   • Smoking status: Every Day     Packs/day: 1.50     Types: Cigarettes     Start date: 1967   • Smokeless tobacco: Never   Vaping Use   • Vaping Use: Never used   Substance and Sexual Activity   • Alcohol use: Not Currently     Alcohol/week: 50.0 standard drinks of alcohol     Types: 50 Cans of beer per week     Comment: has not had a drink in 102 days 11/8/22- patient admits to drinking at least 6 beers per day   • Drug use: Never   • Sexual activity: Not Currently     Partners: Male   Other Topics Concern   • Not on file   Social History Narrative   • Not on file     Social Determinants of Health     Financial Resource Strain: Not on file   Food Insecurity: Not on file   Transportation Needs: Not on file   Physical Activity: Not on file   Stress: Not on file   Social Connections: Not on file   Intimate Partner Violence: Not on file   Housing Stability: Not on file       No Known Allergies    Current Outpatient Medications   Medication Sig Dispense Refill   • albuterol (ProAir HFA) 90 mcg/act inhaler Inhale 2 puffs every 6 (six) hours as needed for wheezing 8.5 g 2   • amLODIPine (NORVASC) 5 mg tablet Take 5 mg by mouth daily     • clopidogrel (PLAVIX) 75 mg tablet Take 1 tablet (75 mg total) by mouth in the morning 90 tablet 1   • diltiazem (CARDIZEM) 120 MG tablet Take 1 tablet (120 mg total) by mouth in the morning 30 tablet 3   • ezetimibe (ZETIA) 10 mg tablet Take 1 tablet (10 mg total) by mouth daily 30 tablet 5   • Folic Acid 0.8 MG CAPS Take 1 capsule (0.8 mg total) by mouth in the morning 90 capsule 1   • gabapentin (Neurontin) 300 mg capsule Take 1 capsule (300 mg total) by mouth daily at bedtime 90 capsule 1   • gabapentin (Neurontin) 300 mg capsule Take 1 capsule (300 mg total) by mouth 2 (two) times a day 60 capsule 1   • isosorbide mononitrate (IMDUR) 30 mg 24 hr tablet Take 30 mg by mouth every morning • lamoTRIgine (LaMICtal) 100 mg tablet Take 1 tablet (100 mg total) by mouth daily 30 tablet 3   • lisinopril (ZESTRIL) 10 mg tablet take 1 tablet by mouth once daily 30 tablet 5   • Melatonin 5 MG TABS Take 15 mg by mouth Patient states she takes 20MG     • metoprolol succinate (TOPROL-XL) 50 mg 24 hr tablet Take 1 tablet (50 mg total) by mouth daily 30 tablet 3   • rosuvastatin (CRESTOR) 20 MG tablet Take 20 mg by mouth daily     • traZODone (DESYREL) 50 mg tablet Take 1 tablet (50 mg total) by mouth daily at bedtime (Patient taking differently: Take 50 mg by mouth daily at bedtime as needed) 30 tablet 1     No current facility-administered medications for this visit. (Not in a hospital admission)      Objective:     24 Hour Vitals Assessment:     Vitals:    07/11/23 0952   BP: 128/76   Pulse: 90   Resp: 18   Temp: (!) 96.8 °F (36 °C)   SpO2: 90%       PHYSICIAN EXAM:    General: Appearance: alert, cooperative, no distress. HEENT: Normocephalic, atraumatic. No scleral icterus. conjunctivae clear. EOMI. Chest: No tenderness to palpation. No open wound noted. Lungs: Clear to auscultation bilaterally, Respirations unlabored. Cardiac: Regular rate and rhythm, +S1and S2  Abdomen: Soft, non-tender, non-distended. Bowel sounds are normal.  Extremities:  No edema, cyanosis, clubbing. Skin: Skin color, turgor are normal. No rashes. Lymphatics: no palpable supra-cervical, axillary, or inguinal adenopathy  Neurologic: Awake, Alert, and oriented, no gross focal deficits noted b/l. DATA REVIEW:    Pathology Result:    Final Diagnosis   Date Value Ref Range Status   05/11/2023   Final    A. Large Intestine, Cecum, polyp:  - Tubular adenoma. - Negative for high-grade dysplasia and malignancy. B. Large Intestine, Right/Ascending Colon, polyp:  - Tubular adenoma. - Negative for high-grade dysplasia and malignancy.      C. Large Intestine, Right/Ascending Colon, polypoid tissue:  - Polypoid colonic mucosa with no significant histopathologic abnormality.  - Negative for dysplasia and malignancy. D. Large Intestine, Transverse Colon, lipoma biopsy:  - Polypoid colonic mucosa with no significant histopathologic abnormality.  - Negative for dysplasia and malignancy. E. Large Intestine, Left/Descending Colon, polyps x 4:  - Multiple fragments of tubular adenoma. - Negative for high-grade dysplasia and malignancy. F. Large Intestine, Sigmoid Colon, polyp:  - Tubular adenoma. - Negative for high-grade dysplasia and malignancy. Image Results:   Image result are reviewed and documented in Hematology/Oncology history    NM PET CT skull base to mid thigh  Narrative: PET/CT SCAN    INDICATION: D38.1: Neoplasm of uncertain behavior of trachea, bronchus and lung 1.8 cm right upper lobe lung nodule with occlusion of right upper lobe bronchus    MODIFIER: PI    COMPARISON: CT thorax June 2023, May 2023    CELL TYPE: Not available, initial staging for lung nodule    TECHNIQUE:   8.3 mCi F-18-FDG administered IV. Multiplanar attenuation corrected and non attenuation corrected PET images are available for interpretation, and contiguous, low dose, axial CT sections were obtained from the skull base through the femurs. Intravenous contrast material was not utilized. This examination, like all CT scans performed in the [facility], was performed utilizing techniques to minimize radiation dose exposure, including the use of iterative reconstruction and   automated exposure control. Fasting serum glucose: 167 mg/dl    FINDINGS:  Mediastinal blood flow SUV max 2.2  Hepatic parenchyma SUV max 3.3  VISUALIZED BRAIN:  No acute abnormalities are seen. HEAD/NECK:   There is intramuscular activity in the right neck with no mass visualized, image 44. No FDG avid cervical adenopathy is seen. CT images: Unremarkable.     CHEST:  There is marked increased metabolic activity in association with the right upper lobe lung nodule with SUV max of 22 on image 66. This is highly suggestive for malignancy. There is no hypermetabolic adenopathy. No other hypermetabolic lung nodules are demonstrated. CT images:  Centrilobular emphysema. 2 mm right upper lobe nodule series series 4/52 is unchanged. .  1.8 x 1.9 cm nodule in the right upper lobe associated with occlusion of posterior segment right upper lobe bronchus, series, series 4/67  Nonspecific interstitial opacities are seen in the right upper lobe series 4/70, unchanged. Tortuosity/aneurysmal dilatation of the distal thoracic aorta series 3/85. ABDOMEN:  No FDG avid soft tissue lesions are seen. There is no metabolic activity in association with the right kidney. CT images: Right renal atrophy and multiple renal cystic lesions are seen. IVC filter. Axillobifemoral bypass graft. Severe atherosclerotic disease of the abdominal aorta and its branches. PELVIS:  There is metabolic activity is seen at the level of the left psoas which is slightly heterogeneous. Findings could reflect underlying infection or inflammation in association with total left hip replacement. Additional evaluation with CT pelvis should be   considered. SUV max is 11 on image 164. CT images: Heterogeneous density of the left psoas muscle. Series 4/161. Hysterectomy. OSSEOUS STRUCTURES:  No FDG avid lesions are seen. CT images: No significant findings. Impression: 1. Marked increased metabolic activity in association with right upper lobe nodule highly suggestive for neoplasm. 2. No evidence for thoracic adenopathy or metastatic disease to the abdomen or pelvis. 3. Right renal atrophy and multiple cystic renal lesions. Likely nonfunctioning right kidney. 4. Metabolic activity in the left psoas muscle with heterogeneity. Findings are most likely secondary to bursitis/infection or inflammation in the setting of left hip replacement. Correlate with CT pelvis.   4. Dilatation of the distal descending thoracic aorta which is tortuous and possibly aneurysmal.  The study was marked in EPIC for significant notification. Workstation performed: [id number]      LABS:  Lab data are reviewed and documented in HemOnc history. Lab Results   Component Value Date    HGB 14.5 05/02/2023    HCT 45.8 05/02/2023    MCV 89 05/02/2023     05/02/2023    WBC 10.18 (H) 05/02/2023    NRBC 0 05/02/2023     Lab Results   Component Value Date    K 4.2 05/02/2023     (H) 05/02/2023    CO2 23 05/02/2023    BUN 12 05/02/2023    CREATININE 0.89 05/02/2023    GLUF 88 11/14/2022    CALCIUM 9.2 05/02/2023    CORRECTEDCA 10.1 11/14/2022    AST 13 05/02/2023    ALT 18 05/02/2023    ALKPHOS 85 05/02/2023    EGFR 64 05/02/2023       No results found for: "IRON", "TIBC", "FERRITIN"    No results found for: "Rudolfo Bellow"    No results for input(s): "WBC", "CREAT", "PLT" in the last 72 hours.     By:  Alysha Babcock MD, 7/11/2023, 10:06 AM

## 2023-07-10 ENCOUNTER — TELEPHONE (OUTPATIENT)
Dept: PULMONOLOGY | Facility: CLINIC | Age: 73
End: 2023-07-10

## 2023-07-11 ENCOUNTER — TELEPHONE (OUTPATIENT)
Dept: CARDIAC SURGERY | Facility: CLINIC | Age: 73
End: 2023-07-11

## 2023-07-11 ENCOUNTER — CONSULT (OUTPATIENT)
Dept: HEMATOLOGY ONCOLOGY | Facility: CLINIC | Age: 73
End: 2023-07-11
Payer: MEDICARE

## 2023-07-11 VITALS
RESPIRATION RATE: 18 BRPM | HEIGHT: 64 IN | SYSTOLIC BLOOD PRESSURE: 128 MMHG | DIASTOLIC BLOOD PRESSURE: 76 MMHG | HEART RATE: 90 BPM | TEMPERATURE: 96.8 F | OXYGEN SATURATION: 90 % | WEIGHT: 169 LBS | BODY MASS INDEX: 28.85 KG/M2

## 2023-07-11 DIAGNOSIS — R91.8 LUNG MASS: ICD-10-CM

## 2023-07-11 DIAGNOSIS — T45.1X5A CHEMOTHERAPY INDUCED NAUSEA AND VOMITING: ICD-10-CM

## 2023-07-11 DIAGNOSIS — R91.1 NODULE OF UPPER LOBE OF RIGHT LUNG: Primary | ICD-10-CM

## 2023-07-11 DIAGNOSIS — R11.2 CHEMOTHERAPY INDUCED NAUSEA AND VOMITING: ICD-10-CM

## 2023-07-11 PROCEDURE — 99205 OFFICE O/P NEW HI 60 MIN: CPT | Performed by: INTERNAL MEDICINE

## 2023-07-11 RX ORDER — ONDANSETRON 8 MG/1
8 TABLET, ORALLY DISINTEGRATING ORAL EVERY 8 HOURS PRN
Qty: 20 TABLET | Refills: 0 | Status: ON HOLD | OUTPATIENT
Start: 2023-07-11

## 2023-07-11 NOTE — TELEPHONE ENCOUNTER
I called and left a message on [employer] I had reviewed her PET scan. This shows significant PET uptake at the right upper lobe posterior segmental mass as expected. No other evidence of distant uptake. Based on this I would recommend continuing our our plan for a navigational bronchoscopy with biopsy on the 31st.  I discussed this with her over message. I asked her to call us with any questions or concerns.   Otherwise we will proceed with biopsy as previously scheduled    Ronal Alicea

## 2023-07-13 ENCOUNTER — TELEPHONE (OUTPATIENT)
Dept: FAMILY MEDICINE CLINIC | Facility: CLINIC | Age: 73
End: 2023-07-13

## 2023-07-13 DIAGNOSIS — I73.9 PAD (PERIPHERAL ARTERY DISEASE) (HCC): Primary | ICD-10-CM

## 2023-07-13 NOTE — TELEPHONE ENCOUNTER
Please notify patient I have placed a referral to vascular surgery for her for her peripheral artery disease as recommended by podiatry.

## 2023-07-18 ENCOUNTER — APPOINTMENT (EMERGENCY)
Dept: RADIOLOGY | Facility: HOSPITAL | Age: 73
DRG: 240 | End: 2023-07-18
Payer: MEDICARE

## 2023-07-18 ENCOUNTER — HOSPITAL ENCOUNTER (INPATIENT)
Facility: HOSPITAL | Age: 73
LOS: 7 days | DRG: 240 | End: 2023-07-25
Attending: EMERGENCY MEDICINE | Admitting: SURGERY
Payer: MEDICARE

## 2023-07-18 ENCOUNTER — TELEPHONE (OUTPATIENT)
Dept: VASCULAR SURGERY | Facility: CLINIC | Age: 73
End: 2023-07-18

## 2023-07-18 DIAGNOSIS — I99.8 ACUTE LOWER LIMB ISCHEMIA: ICD-10-CM

## 2023-07-18 DIAGNOSIS — Z72.0 TOBACCO ABUSE: ICD-10-CM

## 2023-07-18 DIAGNOSIS — I74.09 AORTOILIAC OCCLUSIVE DISEASE (HCC): ICD-10-CM

## 2023-07-18 DIAGNOSIS — M79.672 PAIN IN LEFT FOOT: ICD-10-CM

## 2023-07-18 DIAGNOSIS — Z95.828 S/P VASCULAR BYPASS: ICD-10-CM

## 2023-07-18 DIAGNOSIS — Z95.1 HX OF CABG: ICD-10-CM

## 2023-07-18 DIAGNOSIS — I74.09 AORTOILIAC OCCLUSIVE DISEASE (HCC): Primary | ICD-10-CM

## 2023-07-18 DIAGNOSIS — G47.00 INSOMNIA, UNSPECIFIED TYPE: ICD-10-CM

## 2023-07-18 DIAGNOSIS — M79.609 PAIN IN LIMB: ICD-10-CM

## 2023-07-18 DIAGNOSIS — Z89.612 HX OF AKA (ABOVE KNEE AMPUTATION), LEFT (HCC): Primary | ICD-10-CM

## 2023-07-18 DIAGNOSIS — I73.9 PAD (PERIPHERAL ARTERY DISEASE) (HCC): ICD-10-CM

## 2023-07-18 LAB
ALBUMIN SERPL BCP-MCNC: 4 G/DL (ref 3.5–5)
ALP SERPL-CCNC: 75 U/L (ref 46–116)
ALT SERPL W P-5'-P-CCNC: 25 U/L (ref 12–78)
ANION GAP SERPL CALCULATED.3IONS-SCNC: 4 MMOL/L
APTT PPP: 101 SECONDS (ref 23–37)
APTT PPP: 26 SECONDS (ref 23–37)
AST SERPL W P-5'-P-CCNC: 28 U/L (ref 5–45)
ATRIAL RATE: 59 BPM
BASOPHILS # BLD AUTO: 0.16 THOUSANDS/ÂΜL (ref 0–0.1)
BASOPHILS NFR BLD AUTO: 1 % (ref 0–1)
BILIRUB SERPL-MCNC: 0.42 MG/DL (ref 0.2–1)
BUN SERPL-MCNC: 12 MG/DL (ref 5–25)
CALCIUM SERPL-MCNC: 9.7 MG/DL (ref 8.3–10.1)
CHLORIDE SERPL-SCNC: 110 MMOL/L (ref 96–108)
CO2 SERPL-SCNC: 22 MMOL/L (ref 21–32)
CREAT SERPL-MCNC: 0.97 MG/DL (ref 0.6–1.3)
EOSINOPHIL # BLD AUTO: 0.49 THOUSAND/ÂΜL (ref 0–0.61)
EOSINOPHIL NFR BLD AUTO: 4 % (ref 0–6)
ERYTHROCYTE [DISTWIDTH] IN BLOOD BY AUTOMATED COUNT: 13.2 % (ref 11.6–15.1)
GFR SERPL CREATININE-BSD FRML MDRD: 58 ML/MIN/1.73SQ M
GLUCOSE SERPL-MCNC: 94 MG/DL (ref 65–140)
HCT VFR BLD AUTO: 45 % (ref 34.8–46.1)
HGB BLD-MCNC: 15.1 G/DL (ref 11.5–15.4)
IMM GRANULOCYTES # BLD AUTO: 0.05 THOUSAND/UL (ref 0–0.2)
IMM GRANULOCYTES NFR BLD AUTO: 0 % (ref 0–2)
INR PPP: 0.92 (ref 0.84–1.19)
LYMPHOCYTES # BLD AUTO: 1.87 THOUSANDS/ÂΜL (ref 0.6–4.47)
LYMPHOCYTES NFR BLD AUTO: 16 % (ref 14–44)
MCH RBC QN AUTO: 29.6 PG (ref 26.8–34.3)
MCHC RBC AUTO-ENTMCNC: 33.6 G/DL (ref 31.4–37.4)
MCV RBC AUTO: 88 FL (ref 82–98)
MONOCYTES # BLD AUTO: 0.75 THOUSAND/ÂΜL (ref 0.17–1.22)
MONOCYTES NFR BLD AUTO: 6 % (ref 4–12)
NEUTROPHILS # BLD AUTO: 8.66 THOUSANDS/ÂΜL (ref 1.85–7.62)
NEUTS SEG NFR BLD AUTO: 73 % (ref 43–75)
NRBC BLD AUTO-RTO: 0 /100 WBCS
P AXIS: 52 DEGREES
PLATELET # BLD AUTO: 205 THOUSANDS/UL (ref 149–390)
PMV BLD AUTO: 10.2 FL (ref 8.9–12.7)
POTASSIUM SERPL-SCNC: 4.6 MMOL/L (ref 3.5–5.3)
PR INTERVAL: 146 MS
PROT SERPL-MCNC: 7.9 G/DL (ref 6.4–8.4)
PROTHROMBIN TIME: 12.5 SECONDS (ref 11.6–14.5)
QRS AXIS: 52 DEGREES
QRSD INTERVAL: 90 MS
QT INTERVAL: 414 MS
QTC INTERVAL: 409 MS
RBC # BLD AUTO: 5.1 MILLION/UL (ref 3.81–5.12)
SODIUM SERPL-SCNC: 136 MMOL/L (ref 135–147)
T WAVE AXIS: 58 DEGREES
VENTRICULAR RATE: 59 BPM
WBC # BLD AUTO: 11.98 THOUSAND/UL (ref 4.31–10.16)

## 2023-07-18 PROCEDURE — 85730 THROMBOPLASTIN TIME PARTIAL: CPT | Performed by: SURGERY

## 2023-07-18 PROCEDURE — 99223 1ST HOSP IP/OBS HIGH 75: CPT | Performed by: SURGERY

## 2023-07-18 PROCEDURE — 93010 ELECTROCARDIOGRAM REPORT: CPT | Performed by: INTERNAL MEDICINE

## 2023-07-18 PROCEDURE — 85610 PROTHROMBIN TIME: CPT | Performed by: PHYSICIAN ASSISTANT

## 2023-07-18 PROCEDURE — NC001 PR NO CHARGE: Performed by: SURGERY

## 2023-07-18 PROCEDURE — NC001 PR NO CHARGE: Performed by: NURSE PRACTITIONER

## 2023-07-18 PROCEDURE — 96375 TX/PRO/DX INJ NEW DRUG ADDON: CPT

## 2023-07-18 PROCEDURE — 99223 1ST HOSP IP/OBS HIGH 75: CPT | Performed by: INTERNAL MEDICINE

## 2023-07-18 PROCEDURE — G1004 CDSM NDSC: HCPCS

## 2023-07-18 PROCEDURE — 99285 EMERGENCY DEPT VISIT HI MDM: CPT

## 2023-07-18 PROCEDURE — 93005 ELECTROCARDIOGRAM TRACING: CPT

## 2023-07-18 PROCEDURE — 71275 CT ANGIOGRAPHY CHEST: CPT

## 2023-07-18 PROCEDURE — 96365 THER/PROPH/DIAG IV INF INIT: CPT

## 2023-07-18 PROCEDURE — 36415 COLL VENOUS BLD VENIPUNCTURE: CPT

## 2023-07-18 PROCEDURE — 85025 COMPLETE CBC W/AUTO DIFF WBC: CPT

## 2023-07-18 PROCEDURE — 73706 CT ANGIO LWR EXTR W/O&W/DYE: CPT

## 2023-07-18 PROCEDURE — 99223 1ST HOSP IP/OBS HIGH 75: CPT | Performed by: STUDENT IN AN ORGANIZED HEALTH CARE EDUCATION/TRAINING PROGRAM

## 2023-07-18 PROCEDURE — 74174 CTA ABD&PLVS W/CONTRAST: CPT

## 2023-07-18 PROCEDURE — 99291 CRITICAL CARE FIRST HOUR: CPT | Performed by: EMERGENCY MEDICINE

## 2023-07-18 PROCEDURE — 80053 COMPREHEN METABOLIC PANEL: CPT

## 2023-07-18 PROCEDURE — 85730 THROMBOPLASTIN TIME PARTIAL: CPT | Performed by: PHYSICIAN ASSISTANT

## 2023-07-18 RX ORDER — ONDANSETRON 2 MG/ML
4 INJECTION INTRAMUSCULAR; INTRAVENOUS EVERY 6 HOURS PRN
Status: DISCONTINUED | OUTPATIENT
Start: 2023-07-18 | End: 2023-07-25 | Stop reason: HOSPADM

## 2023-07-18 RX ORDER — HEPARIN SODIUM 1000 [USP'U]/ML
3000 INJECTION, SOLUTION INTRAVENOUS; SUBCUTANEOUS EVERY 6 HOURS PRN
Status: DISCONTINUED | OUTPATIENT
Start: 2023-07-18 | End: 2023-07-21

## 2023-07-18 RX ORDER — LIDOCAINE 50 MG/G
1 PATCH TOPICAL DAILY
Status: DISCONTINUED | OUTPATIENT
Start: 2023-07-18 | End: 2023-07-19

## 2023-07-18 RX ORDER — METOPROLOL SUCCINATE 50 MG/1
50 TABLET, EXTENDED RELEASE ORAL DAILY
Status: DISCONTINUED | OUTPATIENT
Start: 2023-07-18 | End: 2023-07-25 | Stop reason: HOSPADM

## 2023-07-18 RX ORDER — AMLODIPINE BESYLATE 5 MG/1
5 TABLET ORAL DAILY
Status: DISCONTINUED | OUTPATIENT
Start: 2023-07-18 | End: 2023-07-25 | Stop reason: HOSPADM

## 2023-07-18 RX ORDER — POLYETHYLENE GLYCOL 3350 17 G/17G
17 POWDER, FOR SOLUTION ORAL DAILY PRN
Status: DISCONTINUED | OUTPATIENT
Start: 2023-07-18 | End: 2023-07-25 | Stop reason: HOSPADM

## 2023-07-18 RX ORDER — HYDROMORPHONE HCL/PF 1 MG/ML
0.5 SYRINGE (ML) INJECTION EVERY 4 HOURS PRN
Status: DISCONTINUED | OUTPATIENT
Start: 2023-07-18 | End: 2023-07-18

## 2023-07-18 RX ORDER — METHOCARBAMOL 500 MG/1
500 TABLET, FILM COATED ORAL EVERY 6 HOURS SCHEDULED
Status: DISCONTINUED | OUTPATIENT
Start: 2023-07-18 | End: 2023-07-25 | Stop reason: HOSPADM

## 2023-07-18 RX ORDER — IODIXANOL 320 MG/ML
120 INJECTION, SOLUTION INTRAVASCULAR
Status: COMPLETED | OUTPATIENT
Start: 2023-07-18 | End: 2023-07-18

## 2023-07-18 RX ORDER — NICOTINE 21 MG/24HR
1 PATCH, TRANSDERMAL 24 HOURS TRANSDERMAL DAILY
Status: DISCONTINUED | OUTPATIENT
Start: 2023-07-18 | End: 2023-07-25 | Stop reason: HOSPADM

## 2023-07-18 RX ORDER — LAMOTRIGINE 100 MG/1
100 TABLET ORAL DAILY
Status: DISCONTINUED | OUTPATIENT
Start: 2023-07-18 | End: 2023-07-25 | Stop reason: HOSPADM

## 2023-07-18 RX ORDER — HEPARIN SODIUM 1000 [USP'U]/ML
6000 INJECTION, SOLUTION INTRAVENOUS; SUBCUTANEOUS ONCE
Status: COMPLETED | OUTPATIENT
Start: 2023-07-18 | End: 2023-07-18

## 2023-07-18 RX ORDER — DOCUSATE SODIUM 100 MG/1
100 CAPSULE, LIQUID FILLED ORAL 2 TIMES DAILY
Status: DISCONTINUED | OUTPATIENT
Start: 2023-07-18 | End: 2023-07-25 | Stop reason: HOSPADM

## 2023-07-18 RX ORDER — OXYCODONE HYDROCHLORIDE 10 MG/1
10 TABLET ORAL EVERY 4 HOURS PRN
Status: DISCONTINUED | OUTPATIENT
Start: 2023-07-18 | End: 2023-07-25 | Stop reason: HOSPADM

## 2023-07-18 RX ORDER — TRAZODONE HYDROCHLORIDE 50 MG/1
50 TABLET ORAL
Status: DISCONTINUED | OUTPATIENT
Start: 2023-07-18 | End: 2023-07-25 | Stop reason: HOSPADM

## 2023-07-18 RX ORDER — GABAPENTIN 300 MG/1
300 CAPSULE ORAL
Status: DISCONTINUED | OUTPATIENT
Start: 2023-07-18 | End: 2023-07-25 | Stop reason: HOSPADM

## 2023-07-18 RX ORDER — ALBUTEROL SULFATE 90 UG/1
2 AEROSOL, METERED RESPIRATORY (INHALATION) EVERY 6 HOURS PRN
Status: DISCONTINUED | OUTPATIENT
Start: 2023-07-18 | End: 2023-07-25 | Stop reason: HOSPADM

## 2023-07-18 RX ORDER — EZETIMIBE 10 MG/1
10 TABLET ORAL DAILY
Status: DISCONTINUED | OUTPATIENT
Start: 2023-07-18 | End: 2023-07-25 | Stop reason: HOSPADM

## 2023-07-18 RX ORDER — ACETAMINOPHEN 325 MG/1
650 TABLET ORAL EVERY 6 HOURS PRN
Status: DISCONTINUED | OUTPATIENT
Start: 2023-07-18 | End: 2023-07-20

## 2023-07-18 RX ORDER — HYDROMORPHONE HCL/PF 1 MG/ML
0.5 SYRINGE (ML) INJECTION ONCE
Status: COMPLETED | OUTPATIENT
Start: 2023-07-18 | End: 2023-07-18

## 2023-07-18 RX ORDER — HYDROMORPHONE HCL/PF 1 MG/ML
0.5 SYRINGE (ML) INJECTION EVERY 2 HOUR PRN
Status: DISCONTINUED | OUTPATIENT
Start: 2023-07-18 | End: 2023-07-25 | Stop reason: HOSPADM

## 2023-07-18 RX ORDER — ISOSORBIDE MONONITRATE 30 MG/1
30 TABLET, EXTENDED RELEASE ORAL EVERY MORNING
Status: DISCONTINUED | OUTPATIENT
Start: 2023-07-19 | End: 2023-07-25 | Stop reason: HOSPADM

## 2023-07-18 RX ORDER — LANOLIN ALCOHOL/MO/W.PET/CERES
9 CREAM (GRAM) TOPICAL
Status: DISCONTINUED | OUTPATIENT
Start: 2023-07-18 | End: 2023-07-25 | Stop reason: HOSPADM

## 2023-07-18 RX ORDER — ATORVASTATIN CALCIUM 40 MG/1
40 TABLET, FILM COATED ORAL
Status: DISCONTINUED | OUTPATIENT
Start: 2023-07-18 | End: 2023-07-25 | Stop reason: HOSPADM

## 2023-07-18 RX ORDER — ACETAMINOPHEN 325 MG/1
975 TABLET ORAL EVERY 8 HOURS SCHEDULED
Status: DISCONTINUED | OUTPATIENT
Start: 2023-07-18 | End: 2023-07-25 | Stop reason: HOSPADM

## 2023-07-18 RX ORDER — HEPARIN SODIUM 1000 [USP'U]/ML
6000 INJECTION, SOLUTION INTRAVENOUS; SUBCUTANEOUS EVERY 6 HOURS PRN
Status: DISCONTINUED | OUTPATIENT
Start: 2023-07-18 | End: 2023-07-21

## 2023-07-18 RX ORDER — FOLIC ACID 1 MG/1
1 TABLET ORAL DAILY
Status: DISCONTINUED | OUTPATIENT
Start: 2023-07-18 | End: 2023-07-25 | Stop reason: HOSPADM

## 2023-07-18 RX ORDER — CLOPIDOGREL BISULFATE 75 MG/1
75 TABLET ORAL DAILY
Status: DISCONTINUED | OUTPATIENT
Start: 2023-07-18 | End: 2023-07-25 | Stop reason: HOSPADM

## 2023-07-18 RX ORDER — HEPARIN SODIUM 10000 [USP'U]/100ML
3-30 INJECTION, SOLUTION INTRAVENOUS
Status: DISCONTINUED | OUTPATIENT
Start: 2023-07-18 | End: 2023-07-21

## 2023-07-18 RX ORDER — DILTIAZEM HYDROCHLORIDE 60 MG/1
120 TABLET, FILM COATED ORAL DAILY
Status: DISCONTINUED | OUTPATIENT
Start: 2023-07-18 | End: 2023-07-25 | Stop reason: HOSPADM

## 2023-07-18 RX ORDER — GABAPENTIN 300 MG/1
300 CAPSULE ORAL 2 TIMES DAILY
Status: DISCONTINUED | OUTPATIENT
Start: 2023-07-18 | End: 2023-07-25 | Stop reason: HOSPADM

## 2023-07-18 RX ORDER — OXYCODONE HYDROCHLORIDE 5 MG/1
5 TABLET ORAL EVERY 4 HOURS PRN
Status: DISCONTINUED | OUTPATIENT
Start: 2023-07-18 | End: 2023-07-25 | Stop reason: HOSPADM

## 2023-07-18 RX ADMIN — FOLIC ACID 1 MG: 1 TABLET ORAL at 14:38

## 2023-07-18 RX ADMIN — AMLODIPINE BESYLATE 5 MG: 5 TABLET ORAL at 20:52

## 2023-07-18 RX ADMIN — DOCUSATE SODIUM 100 MG: 100 CAPSULE ORAL at 17:51

## 2023-07-18 RX ADMIN — HEPARIN SODIUM 6000 UNITS: 1000 INJECTION INTRAVENOUS; SUBCUTANEOUS at 12:30

## 2023-07-18 RX ADMIN — LAMOTRIGINE 100 MG: 100 TABLET ORAL at 17:11

## 2023-07-18 RX ADMIN — GABAPENTIN 300 MG: 300 CAPSULE ORAL at 20:50

## 2023-07-18 RX ADMIN — MELATONIN TAB 3 MG 9 MG: 3 TAB at 20:49

## 2023-07-18 RX ADMIN — METHOCARBAMOL TABLETS 500 MG: 500 TABLET, COATED ORAL at 23:33

## 2023-07-18 RX ADMIN — ACETAMINOPHEN 975 MG: 325 TABLET, FILM COATED ORAL at 20:50

## 2023-07-18 RX ADMIN — OXYCODONE HYDROCHLORIDE 10 MG: 10 TABLET ORAL at 20:50

## 2023-07-18 RX ADMIN — NICOTINE 7 MG: 7 PATCH, EXTENDED RELEASE TRANSDERMAL at 12:55

## 2023-07-18 RX ADMIN — HEPARIN SODIUM 18 UNITS/KG/HR: 10000 INJECTION, SOLUTION INTRAVENOUS at 12:30

## 2023-07-18 RX ADMIN — IODIXANOL 120 ML: 320 INJECTION, SOLUTION INTRAVASCULAR at 12:24

## 2023-07-18 RX ADMIN — METHOCARBAMOL TABLETS 500 MG: 500 TABLET, COATED ORAL at 17:51

## 2023-07-18 RX ADMIN — GABAPENTIN 300 MG: 300 CAPSULE ORAL at 17:51

## 2023-07-18 RX ADMIN — OXYCODONE HYDROCHLORIDE 10 MG: 10 TABLET ORAL at 14:38

## 2023-07-18 RX ADMIN — HYDROMORPHONE HYDROCHLORIDE 0.5 MG: 1 INJECTION, SOLUTION INTRAMUSCULAR; INTRAVENOUS; SUBCUTANEOUS at 23:33

## 2023-07-18 RX ADMIN — HYDROMORPHONE HYDROCHLORIDE 0.5 MG: 1 INJECTION, SOLUTION INTRAMUSCULAR; INTRAVENOUS; SUBCUTANEOUS at 11:27

## 2023-07-18 NOTE — TELEPHONE ENCOUNTER
Per William OHARA pt to go to [facility] AND [facility] ED for eval, s/w pt and notified of same. Swapnil Lugo and FORTUNATO Crabtree to make them aware.

## 2023-07-18 NOTE — CONSULTS
Consultation - Vascular Surgery   Pastora Kee 68 y.o. female MRN: [id number]  Unit/Bed#: ED 03 Encounter: [id number]      Assessment/ Plan:    Acute LLE ischemia  PAD/ AIOD s/p mult open & endovascular procedures   -- exhausted options for LLE revascularization  --admit for pain control/ surgical intervention  --initiate anticoagulation w/ Heparin gtt  --cont Plavix/ statin  --anticipate R AKA  (h/o L THR)- Thurs, 7/20/23  --Cardiology consult for risk stratification  --APS consult for ischemic pain/ postop pain  --PMR consult      Tobacco abuse  --Nicotine patch  --cessation discussed    B/L Carotid stenosis  --b/l <50% (duplex 12/22/22)  --routine outpt surveillance    RUL mass w/ plan for Navigational bronch/ biopsy 7/31/2023 (Dr. Flakita Ferreira)  --Thoracic surg service notified of admission      HTN  --Norvasc, Metoprolol, Imdur  --Lisinopril held (KEATON risk reduction)  HLD  --Zetia, Crestor  CAD/ CABG  --Plavix, statin, Bblocker, Nitrate, CCB  --ACE held  PAF/ SVT  COURTNEY          *Seen and evaluated w/ Dr. Garfield Hernandez  _______________________________________________________________  Physician Requesting Consult: Ngoc Ruth MD    Additional consultants:     Reason for Consult / Principal Problem: cold L leg    HPI: Pastora Kee is a 68y.o. year old female w/ HTN, HLD, CAD/ CABG, h/o CVA, CKD 3, COURTNEY, PAF/ SVT (not on a/c), h/o LLE DVT s/p IVC filter, ongoing tobacco abuse (1ppd x 60+ years), and newly diagnosed RUL lung mass for which she is planned Navigational bronchoscopy w/ biopsy on 7/31/2023 w/ Thoracic surgery, Dr. Flakita Ferreira, and an extensive vascular history to include b/l carotid stenosis and AIOD/ PAD having previously undergone the following at [facility], [facility] [place], and [facility]:  · B/L Ax-fem bypass  · Fem-Fem bypass  · B/L Fem-BK popliteal bypass  · Ao- L fem bypass  · Ao- RRA bypass ?   She presents to the [facility] emergency department with c/o L leg pain & numbness. Symptoms have been intermittently present for a few months with L calf claudication but has worsened over 2 days w/ worsening rest pain and numbness. She describes pain as "burning like it's on fire". She has some relief at times w/ the limb in a dependent position. She is able to move the foot. She affirms the extensive history of her vascular procedures stating that her last surgeon told her that "no one else will be able to get through there again" following her Ao- L fem surgical procedure. There is no tissue loss. Patient does affirm baseline swelling of LLE from prior ankle fracture. Patient is able to ambulate w/ assistance of a cane.     Risk factors for Limb ischemia:  · Age  · Sex   · Ongoing tobacco abuse of 1ppd for 60+years  · Known atherosclerosis w/ CAD/ CABG, B/L carotid stenosis  · HTN  · HLD  · Hypercoagulability w/ RUL mass/ malignancy      Historical Information   Past Medical History:   Diagnosis Date   • Aorto-iliac disease ([facility])    • Atrial fibrillation ([facility])    • CAD (coronary artery disease)    • Carotid stenosis, bilateral    • Celiac artery stenosis ([facility])    • CKD (chronic kidney disease) stage 3, GFR 30-59 ml/min ([facility])    • DVT (deep venous thrombosis) ([facility])     LLE (CFV, popl)   • Heart attack ([facility]) 02/2022   • HLD (hyperlipidemia)    • Hypertension    • Lung mass     RUL   • Myocardial infarction ([facility])     2022   • COURTNEY (obstructive sleep apnea)    • PAD (peripheral artery disease) ([facility])    • Stroke [facility])      Past Surgical History:   Procedure Laterality Date   • AORTA - FEMORAL ARTERY BYPASS GRAFT Left    • AXILLO-FEMORAL BYASS GRAFT Bilateral    • BYPASS FEMORAL-FEMORAL     • CORONARY ARTERY BYPASS GRAFT  2000   • FEMORAL ARTERY - POPLITEAL ARTERY BYPASS GRAFT Bilateral    • IVC FILTER INSERTION     • TOTAL ABDOMINAL HYSTERECTOMY W/ BILATERAL SALPINGOOPHORECTOMY     • TOTAL HIP ARTHROPLASTY Left      Social History   Social History Substance and Sexual Activity   Alcohol Use Not Currently   • Alcohol/week: 50.0 standard drinks of alcohol   • Types: 50 Cans of beer per week    Comment: has not had a drink in 243 days (7/18/23)  h/o at least 6 beers per day     Social History     Substance and Sexual Activity   Drug Use Never     Social History     Tobacco Use   Smoking Status Every Day   • Packs/day: 1.50   • Types: Cigarettes   • Start date: 1967   Smokeless Tobacco Never     Family History:   Family History   Problem Relation Age of Onset   • Stomach cancer Mother    }    Meds/Allergies   Home meds:   Prior to Admission medications    Medication Sig Start Date End Date Taking?  Authorizing Provider   albuterol (ProAir HFA) 90 mcg/act inhaler Inhale 2 puffs every 6 (six) hours as needed for wheezing 6/20/23   Maisha Merino MD   amLODIPine (NORVASC) 5 mg tablet Take 5 mg by mouth daily 5/2/23   Historical Provider, MD   clopidogrel (PLAVIX) 75 mg tablet Take 1 tablet (75 mg total) by mouth in the morning 5/1/23   Faisal Backer, DO   diltiazem (CARDIZEM) 120 MG tablet Take 1 tablet (120 mg total) by mouth in the morning 3/13/23   Faisal Backer, DO   ezetimibe (ZETIA) 10 mg tablet Take 1 tablet (10 mg total) by mouth daily 12/27/22   Avery James MD   Folic Acid 0.8 MG CAPS Take 1 capsule (0.8 mg total) by mouth in the morning 4/14/23   Faisal Backer, DO   gabapentin (Neurontin) 300 mg capsule Take 1 capsule (300 mg total) by mouth daily at bedtime 1/25/23 7/24/23  Blossom Macie, DPM   gabapentin (Neurontin) 300 mg capsule Take 1 capsule (300 mg total) by mouth 2 (two) times a day 7/5/23 9/3/23  Blossom Macie, DPM   isosorbide mononitrate (IMDUR) 30 mg 24 hr tablet Take 30 mg by mouth every morning 12/11/22   Historical Provider, MD   lamoTRIgine (LaMICtal) 100 mg tablet Take 1 tablet (100 mg total) by mouth daily 4/14/23   Faisal Backer, DO   lisinopril (ZESTRIL) 10 mg tablet take 1 tablet by mouth once daily 5/15/23   Atul Cobb MD   Melatonin 5 MG TABS Take 15 mg by mouth Patient states she takes 20MG    Historical Provider, MD   metoprolol succinate (TOPROL-XL) 50 mg 24 hr tablet Take 1 tablet (50 mg total) by mouth daily 6/14/23   Pauly Owens DO   ondansetron (ZOFRAN-ODT) 8 mg disintegrating tablet Take 1 tablet (8 mg total) by mouth every 8 (eight) hours as needed for nausea or vomiting 7/11/23   Keke Rodney MD   rosuvastatin (CRESTOR) 20 MG tablet Take 20 mg by mouth daily    Historical Provider, MD   traZODone (DESYREL) 50 mg tablet Take 1 tablet (50 mg total) by mouth daily at bedtime  Patient taking differently: Take 50 mg by mouth daily at bedtime as needed 3/10/23   Pauly Owens DO     Scheduled Meds:  Current Facility-Administered Medications   Medication Dose Route Frequency Provider Last Rate   • acetaminophen  650 mg Oral Q6H PRN Nikkiissa Ramos PA-C     • acetaminophen  975 mg Oral Q8H [facility] Shar Maza MD     • albuterol  2 puff Inhalation Q6H PRN Nikkirenetta Ramos PA-C     • amLODIPine  5 mg Oral Daily Nikki Ramos PA-C     • atorvastatin  40 mg Oral Daily With Norberto Desai PA-C     • clopidogrel  75 mg Oral Daily Nikki MARCI Ramos     • diltiazem  120 mg Oral Daily Nikki MARCI Ramos     • docusate sodium  100 mg Oral BID Nikki Ramos PA-C     • ezetimibe  10 mg Oral Daily Nikki MARCI Ramos     • folic acid  1 mg Oral Daily Nikki MARCI Ramos     • gabapentin  300 mg Oral HS Nikki MARCI Ramos     • gabapentin  300 mg Oral BID Nikki MARCI Ramos     • heparin (porcine)  3-30 Units/kg/hr (Order-Specific) Intravenous Titrated Nikki JAYLEN RamosC 18 Units/kg/hr (07/18/23 1230)   • heparin (porcine)  3,000 Units Intravenous Q6H PRN Nikki MARCI Ramos     • heparin (porcine)  6,000 Units Intravenous Q6H PRN Nikki Ramos PA-C     • HYDROmorphone  0.5 mg Intravenous Q2H PRN Shar Hughes MD     • [START ON 7/19/2023] isosorbide mononitrate  30 mg Oral QAM Kimberly Quijano PA-C     • lamoTRIgine  100 mg Oral Daily Kimberly Quijano PA-C     • lidocaine  1 patch Topical Daily Shar Hughes MD     • melatonin  9 mg Oral HS PRN Viona MacieMARCI caban     • methocarbamol  500 mg Oral Q6H [address] Shar Sarabia MD     • metoprolol succinate  50 mg Oral Daily Viona MacieMARCI caban     • nicotine  1 patch Transdermal Daily Viona MacieMARCI caban     • ondansetron  4 mg Intravenous Q6H PRN Viona MacieMARCI caban     • oxyCODONE  5 mg Oral Q4H PRN Viona MacieMARCI caban      Or   • oxyCODONE  10 mg Oral Q4H PRN Viona MacieJAYLEN cabanC     • polyethylene glycol  17 g Oral Daily PRN Perlaona MacieMARCI caban     • traZODone  50 mg Oral HS PRN Viona Macie, MARCI       Continuous Infusions:heparin (porcine), 3-30 Units/kg/hr (Order-Specific), Last Rate: 18 Units/kg/hr (07/18/23 1230)      PRN Meds:  •  acetaminophen  •  albuterol  •  heparin (porcine)  •  heparin (porcine)  •  HYDROmorphone  •  melatonin  •  ondansetron  •  oxyCODONE **OR** oxyCODONE  •  polyethylene glycol  •  traZODone    ALLERGIES: No Known Allergies    Review of Systems:  General: positive for  - as noted in HPI  Cardiovascular: no chest pain or dyspnea on exertion  Respiratory: no cough, shortness of breath, or wheezing  Gastrointestinal: no abdominal pain, change in bowel habits, or black or bloody stools  Genitourinary: positive for - incontinence for which patient wears adult disposable undergarment  Musculoskeletal: positive for - as noted in HPI  Neurological: no TIA or stroke symptoms  Hematological and Lymphatic: positive for - RUL mass  Dermatological : negative  Psychological: negative  Ophthalmic: negative  ENT: negative      Objective   Vitals:  Blood pressure 108/72, pulse 62, temperature 97.7 °F (36.5 °C), temperature source Oral, resp. rate 20, SpO2 93 %. There is no height or weight on file to calculate BMI. I/Os:  I/O     None          Invasive Lines/Tubes:  Invasive Devices     Peripheral Intravenous Line  Duration           Peripheral IV 06/13/23 Left;Ventral (anterior) Forearm 35 days    Peripheral IV 07/18/23 Right Arm <1 day    Peripheral IV 07/18/23 Right;Ventral (anterior) Forearm <1 day                Physical Exam  General appearance: alert and appears stated age . Cooperative. Moderate distress from LLE rest pain  Head: Normocephalic, without obvious abnormality, atraumatic  Eyes: conjunctivae/corneas clear. PERRL, EOM's intact. Neck: no adenopathy, no carotid bruit, no JVD and supple, symmetrical, trachea midline  Back: symmetric, no curvature. ROM normal. No CVA tenderness. Lungs: clear to auscultation bilaterally  Chest wall: no tenderness, sternotomy scar. B/L infraclavicular scars w/ palpable prosthetic graft tunneled along lateral chest walls  Heart[de-identified] regular rate and rhythm, S1, S2 normal, no murmur, click, rub or gallop  Abdomen: soft, non-tender; bowel sounds normal; no masses,  no organomegaly. Midline surgical scar  Genitalia: deferred, Adult disposable underwear  Rectal: deferred  Extremities: BLE atraumatic.   - RLE non-threatened. Warm to touch. (-) tissue loss  - LLE threatened. Cool to touch from knee to toes. Toes and foot cyanotic. (-) tissue loss. Hair growth to level of ankle. Delayed capillary refill to level of knee. +M. Sensation intact but abnormal (feels touch lateral foot w/ palpation of GT. Subjectively less than RLE).  Moderate swelling at baseline (h/o old ankle fx)  Skin: Skin color, texture, turgor normal. No rashes or lesions  Neurologic: Grossly normal x as noted above    Pulse exam:  Radial: Right: 1+               Left: 1+  Femoral: Right: 1+                   Left: doppler signal  Popliteal: Right: non-palpable                    Left: doppler signal  DP: Right: doppler signal Left: absent  PT: Right: doppler signal         Left: absent     R AxFem: doppler signal  L AxFem: doppler signal  Fem-Fem: absent    Lab Results and Cultures:   COVID:   Last COVID19 Screening Values     None         CBC:   Results from last 7 days   Lab Units 07/18/23  1128   WBC Thousand/uL 11.98*   HEMOGLOBIN g/dL 15.1   HEMATOCRIT % 45.0   PLATELETS Thousands/uL 205     BMP/CMP:  Results from last 7 days   Lab Units 07/18/23  1128   POTASSIUM mmol/L 4.6   CHLORIDE mmol/L 110*   CO2 mmol/L 22   BUN mg/dL 12   CREATININE mg/dL 0.97   CALCIUM mg/dL 9.7     Coags:   Results from last 7 days   Lab Units 07/18/23  1225   INR  0.92   PTT seconds 26     Lipid panel:     HgbA1c: No results found for: "HGBA1C"    Imaging Studies: I have personally reviewed pertinent films in PACS with a Radiologist. (interventionalist) and Dr. Nora Romero    EKG, Pathology, and Other Studies:    VTE Prophylaxis:      Code Status: Level 1 - Full Code  Advance Directive and Living Will:      Power of :    POLST:        Palma Gates PA-C  7/18/2023

## 2023-07-18 NOTE — ED PROVIDER NOTES
History  Chief Complaint   Patient presents with   • Foot Pain     poor circulation in L foot c/o pain and it "turning a funny color" sent from pcp for further evaluation. HPI  Patient 78-year-old female presenting for left foot pain. Past medical history significant for CAD, hypertension, CABG, vascular bypass surgeries, PAD, aortoiliac occlusive disease, PAF, stage III CKD. Patient states that she noticed that she had increased pain over the left foot in approximately 2 days ago and is acutely worse over the last 24 hours, accompanied by numbness, and color change of her foot. Patient denies any trauma or falls. Denies any fever/chills, nausea/vomit/diarrhea, chest pain, shortness of breath, lightheadedness, dizziness. On exam, patient's left foot is bluish discoloration, swollen, no palpable DP or PT pulses and no dopplerable PT or PT pulses. Patient still has motor of her foot (is able to wiggle toes, move her foot up and down) but sensation is significantly decreased, is able to feel slight pressure. Patient states she has been taking all her medications as prescribed. Prior to Admission Medications   Prescriptions Last Dose Informant Patient Reported? Taking?    Folic Acid 0.8 MG CAPS  Self No No   Sig: Take 1 capsule (0.8 mg total) by mouth in the morning   Melatonin 5 MG TABS  Self Yes No   Sig: Take 15 mg by mouth Patient states she takes 20MG   albuterol (ProAir HFA) 90 mcg/act inhaler  Self No No   Sig: Inhale 2 puffs every 6 (six) hours as needed for wheezing   amLODIPine (NORVASC) 5 mg tablet  Self Yes No   Sig: Take 5 mg by mouth daily   clopidogrel (PLAVIX) 75 mg tablet  Self No No   Sig: Take 1 tablet (75 mg total) by mouth in the morning   diltiazem (CARDIZEM) 120 MG tablet  Self No No   Sig: Take 1 tablet (120 mg total) by mouth in the morning   ezetimibe (ZETIA) 10 mg tablet  Self No No   Sig: Take 1 tablet (10 mg total) by mouth daily   gabapentin (Neurontin) 300 mg capsule  Self No No   Sig: Take 1 capsule (300 mg total) by mouth daily at bedtime   gabapentin (Neurontin) 300 mg capsule   No No   Sig: Take 1 capsule (300 mg total) by mouth 2 (two) times a day   isosorbide mononitrate (IMDUR) 30 mg 24 hr tablet  Self Yes No   Sig: Take 30 mg by mouth every morning   lamoTRIgine (LaMICtal) 100 mg tablet  Self No No   Sig: Take 1 tablet (100 mg total) by mouth daily   lisinopril (ZESTRIL) 10 mg tablet  Self No No   Sig: take 1 tablet by mouth once daily   metoprolol succinate (TOPROL-XL) 50 mg 24 hr tablet  Self No No   Sig: Take 1 tablet (50 mg total) by mouth daily   ondansetron (ZOFRAN-ODT) 8 mg disintegrating tablet   No No   Sig: Take 1 tablet (8 mg total) by mouth every 8 (eight) hours as needed for nausea or vomiting   rosuvastatin (CRESTOR) 20 MG tablet  Self Yes No   Sig: Take 20 mg by mouth daily   traZODone (DESYREL) 50 mg tablet  Self No No   Sig: Take 1 tablet (50 mg total) by mouth daily at bedtime   Patient taking differently: Take 50 mg by mouth daily at bedtime as needed      Facility-Administered Medications: None       Past Medical History:   Diagnosis Date   • Aorto-iliac disease ([address] [facility])    • Atrial fibrillation ([facility])    • CAD (coronary artery disease)    • Carotid stenosis, bilateral    • Celiac artery stenosis ([facility])    • CKD (chronic kidney disease) stage 3, GFR 30-59 ml/min ([facility])    • DVT (deep venous thrombosis) ([facility])     LLE (CFV, popl)   • Heart attack ([address]) 02/2022   • HLD (hyperlipidemia)    • Hypertension    • Lung mass     RUL   • Myocardial infarction ([address])     2022   • COURTNEY (obstructive sleep apnea)    • PAD (peripheral artery disease) ([facility])    • Stroke [facility])        Past Surgical History:   Procedure Laterality Date   • AORTA - FEMORAL ARTERY BYPASS GRAFT Left    • AXILLO-FEMORAL BYASS GRAFT Bilateral    • BYPASS FEMORAL-FEMORAL     • CORONARY ARTERY BYPASS GRAFT  2000   • FEMORAL ARTERY - POPLITEAL ARTERY BYPASS GRAFT Bilateral    • IVC FILTER INSERTION     • TOTAL ABDOMINAL HYSTERECTOMY W/ BILATERAL SALPINGOOPHORECTOMY     • TOTAL HIP ARTHROPLASTY Left        Family History   Problem Relation Age of Onset   • Stomach cancer Mother      I have reviewed and agree with the history as documented. E-Cigarette/Vaping   • E-Cigarette Use Never User      E-Cigarette/Vaping Substances   • Nicotine No    • THC No    • CBD No    • Flavoring No    • Other No    • Unknown No      Social History     Tobacco Use   • Smoking status: Every Day     Packs/day: 1.50     Types: Cigarettes     Start date: 1967   • Smokeless tobacco: Never   Vaping Use   • Vaping Use: Never used   Substance Use Topics   • Alcohol use: Not Currently     Alcohol/week: 50.0 standard drinks of alcohol     Types: 50 Cans of beer per week     Comment: has not had a drink in 243 days (7/18/23)  h/o at least 6 beers per day   • Drug use: Never        Review of Systems   Constitutional: Negative. HENT: Negative. Eyes: Negative. Respiratory: Negative. Cardiovascular: Negative. Gastrointestinal: Negative. Endocrine: Negative. Genitourinary: Negative. Musculoskeletal:        Acute left leg pain with bluish discoloration and swelling   Skin: Negative. Neurological: Positive for numbness. Hematological: Negative. Psychiatric/Behavioral: Negative.         Physical Exam  ED Triage Vitals   Temperature Pulse Respirations Blood Pressure SpO2   07/18/23 1041 07/18/23 1041 07/18/23 1041 07/18/23 1041 07/18/23 1041   97.7 °F (36.5 °C) 79 18 148/70 91 %      Temp Source Heart Rate Source Patient Position - Orthostatic VS BP Location FiO2 (%)   07/18/23 1041 07/18/23 1041 07/18/23 1041 07/18/23 1041 --   Oral Monitor Lying Right arm       Pain Score       07/18/23 1127       9             Orthostatic Vital Signs  Vitals:    07/18/23 1041 07/18/23 1245 07/18/23 1430   BP: 148/70 161/74 108/72   Pulse: 79 62 62   Patient Position - Orthostatic VS: Lying Lying Lying       Physical Exam  Vitals and nursing note reviewed. Constitutional:       Appearance: Normal appearance. She is normal weight. HENT:      Head: Normocephalic and atraumatic. Right Ear: Tympanic membrane, ear canal and external ear normal.      Left Ear: Tympanic membrane, ear canal and external ear normal.      Nose: Nose normal.      Mouth/Throat:      Mouth: Mucous membranes are moist.      Pharynx: Oropharynx is clear. Eyes:      Extraocular Movements: Extraocular movements intact. Conjunctiva/sclera: Conjunctivae normal.      Pupils: Pupils are equal, round, and reactive to light. Cardiovascular:      Rate and Rhythm: Normal rate and regular rhythm. Heart sounds: Normal heart sounds. Comments: Patient has no palpable or dopplerable PT/DP pulses over left foot. Foot is bluish in color and swollen. Acutely tender to touch. Patient has sensory deficit, numbness but is able to wiggle his toes and dorsiflex/plantarflex foot. Plan to emergently contact vascular for concerns of acute limb ischemia/occlusion. Pulmonary:      Effort: Pulmonary effort is normal.      Breath sounds: Normal breath sounds. Abdominal:      General: Abdomen is flat. Bowel sounds are normal.      Palpations: Abdomen is soft. Musculoskeletal:         General: Swelling and tenderness present. Cervical back: Normal range of motion and neck supple. Skin:     General: Skin is warm. Comments: Bluish discoloration of left foot. Cool to touch. Neurological:      Mental Status: She is alert and oriented to person, place, and time. Psychiatric:         Mood and Affect: Mood normal.         Behavior: Behavior normal.         Thought Content:  Thought content normal.         Judgment: Judgment normal.         ED Medications  Medications   heparin (porcine) 25,000 units in 0.45% NaCl 250 mL infusion (premix) ( Intravenous Not Given 7/18/23 1251)   heparin (porcine) injection 6,000 Units (has no administration in time range)   heparin (porcine) injection 3,000 Units (has no administration in time range)   albuterol (PROVENTIL HFA,VENTOLIN HFA) inhaler 2 puff (has no administration in time range)   amLODIPine (NORVASC) tablet 5 mg (has no administration in time range)   clopidogrel (PLAVIX) tablet 75 mg (75 mg Oral Not Given 7/18/23 1432)   diltiazem (CARDIZEM) tablet 120 mg (120 mg Oral Not Given 7/18/23 1432)   ezetimibe (ZETIA) tablet 10 mg (10 mg Oral Not Given 9/23/85 0589)   folic acid (FOLVITE) tablet 1 mg (1 mg Oral Given 7/18/23 1438)   gabapentin (NEURONTIN) capsule 300 mg (has no administration in time range)   gabapentin (NEURONTIN) capsule 300 mg (has no administration in time range)   isosorbide mononitrate (IMDUR) 24 hr tablet 30 mg (has no administration in time range)   lamoTRIgine (LaMICtal) tablet 100 mg (has no administration in time range)   melatonin tablet 9 mg (has no administration in time range)   metoprolol succinate (TOPROL-XL) 24 hr tablet 50 mg (50 mg Oral Not Given 7/18/23 1433)   atorvastatin (LIPITOR) tablet 40 mg (has no administration in time range)   traZODone (DESYREL) tablet 50 mg (has no administration in time range)   docusate sodium (COLACE) capsule 100 mg (has no administration in time range)   polyethylene glycol (MIRALAX) packet 17 g (has no administration in time range)   ondansetron (ZOFRAN) injection 4 mg (has no administration in time range)   nicotine (NICODERM CQ) 21 mg/24 hr TD 24 hr patch 1 patch (1 patch Transdermal Not Given 7/18/23 1437)   oxyCODONE (ROXICODONE) IR tablet 5 mg ( Oral See Alternative 7/18/23 1438)     Or   oxyCODONE (ROXICODONE) immediate release tablet 10 mg (10 mg Oral Given 7/18/23 1438)   acetaminophen (TYLENOL) tablet 650 mg (has no administration in time range)   HYDROmorphone (DILAUDID) injection 0.5 mg (has no administration in time range)   acetaminophen (TYLENOL) tablet 975 mg (has no administration in time range)   methocarbamol (ROBAXIN) tablet 500 mg (has no administration in time range)   lidocaine (LIDODERM) 5 % patch 1 patch (has no administration in time range)   HYDROmorphone (DILAUDID) injection 0.5 mg (0.5 mg Intravenous Given 7/18/23 1127)   heparin (porcine) injection 6,000 Units (6,000 Units Intravenous Given 7/18/23 1230)   iodixanol (VISIPAQUE) 320 MG/ML injection 120 mL (120 mL Intravenous Given 7/18/23 1224)       Diagnostic Studies  Results Reviewed     Procedure Component Value Units Date/Time    APTT [[id number]]  (Normal) Collected: 07/18/23 1225    Lab Status: Final result Specimen: Blood from Arm, Right Updated: 07/18/23 1303     PTT 26 seconds     Protime-INR [[id number]]  (Normal) Collected: 07/18/23 1225    Lab Status: Final result Specimen: Blood from Arm, Right Updated: 07/18/23 1303     Protime 12.5 seconds      INR 0.92    Comprehensive metabolic panel [[id number]]  (Abnormal) Collected: 07/18/23 1128    Lab Status: Final result Specimen: Blood from Arm, Right Updated: 07/18/23 1153     Sodium 136 mmol/L      Potassium 4.6 mmol/L      Chloride 110 mmol/L      CO2 22 mmol/L      ANION GAP 4 mmol/L      BUN 12 mg/dL      Creatinine 0.97 mg/dL      Glucose 94 mg/dL      Calcium 9.7 mg/dL      AST 28 U/L      ALT 25 U/L      Alkaline Phosphatase 75 U/L      Total Protein 7.9 g/dL      Albumin 4.0 g/dL      Total Bilirubin 0.42 mg/dL      eGFR 58 ml/min/1.73sq m     Narrative:      [place] guidelines for Chronic Kidney Disease (CKD):   •  Stage 1 with normal or high GFR (GFR > 90 mL/min/1.73 square meters)  •  Stage 2 Mild CKD (GFR = 60-89 mL/min/1.73 square meters)  •  Stage 3A Moderate CKD (GFR = 45-59 mL/min/1.73 square meters)  •  Stage 3B Moderate CKD (GFR = 30-44 mL/min/1.73 square meters)  •  Stage 4 Severe CKD (GFR = 15-29 mL/min/1.73 square meters)  •  Stage 5 End Stage CKD (GFR <15 mL/min/1.73 square meters)  Note: GFR calculation is accurate only with a steady state creatinine    CBC and differential [[id number]] (Abnormal) Collected: 07/18/23 1128    Lab Status: Final result Specimen: Blood from Arm, Right Updated: 07/18/23 1139     WBC 11.98 Thousand/uL      RBC 5.10 Million/uL      Hemoglobin 15.1 g/dL      Hematocrit 45.0 %      MCV 88 fL      MCH 29.6 pg      MCHC 33.6 g/dL      RDW 13.2 %      MPV 10.2 fL      Platelets 288 Thousands/uL      nRBC 0 /100 WBCs      Neutrophils Relative 73 %      Immat GRANS % 0 %      Lymphocytes Relative 16 %      Monocytes Relative 6 %      Eosinophils Relative 4 %      Basophils Relative 1 %      Neutrophils Absolute 8.66 Thousands/µL      Immature Grans Absolute 0.05 Thousand/uL      Lymphocytes Absolute 1.87 Thousands/µL      Monocytes Absolute 0.75 Thousand/µL      Eosinophils Absolute 0.49 Thousand/µL      Basophils Absolute 0.16 Thousands/µL                  CTA chest abdomen pelvis w wo contrast   Final Result by Marcella Campos MD (07/18 1350)      Minimal collateral flow to the left thigh with a few diminutive patent collaterals below the left knee, but no flow in the major vessels. Occlusion of left femoral and popliteal bypass grafts, as detailed above. The critical finding was discussed with    Dr. Chantelle Bernstein, vascular surgery, 7/18/2023 at . The vascular surgery team has already reviewed the images. Complete occlusion of the right common, external and internal iliac arteries. Reconstitution of flow at the right common femoral artery from the subclavian to femoral bypass. Occluded right femoropopliteal bypass. Patent right deep femoral artery with    multiple collaterals supplying the right popliteal artery. Nonvisualization of the distal most right popliteal artery, with multiple collaterals supplying three-vessel runoff to the ankle and at least 2 vessel run off to the foot. Ectasia of the descending thoracic aorta to 2.9 cm. Atherosclerotic changes. Infrarenal aortic mural thrombus occupying about 50% of the lumen.    Right perihilar mass, 2.3 cm, suspicious for neoplasm on a recent PET. Right adrenal nodule, without FDG avidity, likely an adenoma. Hepatic hyperenhancing foci, possibly perfusion anomalies, not further characterized. Recommend nonemergent MRI with contrast.                     Workstation performed: [id number]         CTA lower extremity left w wo contrast    (Results Pending)         Procedures  Procedures      ED Course  ED Course as of 07/18/23 1526   Tue Jul 18, 2023   1145 WBC(!): 11.98                                       Medical Decision Making  Patient 67-year-old female presenting for acute left foot pain/leg pain. DDx: Acute arterial limb ischemia  Based on patient presents and physical exam findings, primary concern is for acute limb ischemia/arterial occlusion. Vascular surgery emergently contacted, consult placed. Lab ordered. Imaging ordered. Heparin/anticoagulation per vascular. Plan to admit to vascular after discussion. Patient understands and agrees. Patient admitted to vascular surgery service. Acute lower limb ischemia: acute illness or injury  Pain in limb: acute illness or injury  Amount and/or Complexity of Data Reviewed  Labs: ordered. Decision-making details documented in ED Course. Risk  Prescription drug management. Decision regarding hospitalization.             Disposition  Final diagnoses:   Pain in limb   Acute lower limb ischemia     Time reflects when diagnosis was documented in both MDM as applicable and the Disposition within this note     Time User Action Codes Description Comment    7/18/2023 11:20 AM Darshan Morena Add [M79.609] Pain in limb     7/18/2023  1:18 PM Darshan [place] Add [I99.8] Acute lower limb ischemia     7/18/2023  1:18 PM Darshan [place]na Modify [M79.609] Pain in limb     7/18/2023  1:18 PM Darshan [place] Modify [I99.8] Acute lower limb ischemia     7/18/2023  1:30 PM Charles Desai L Add [Z95.1] Hx of CABG     7/18/2023  1:30 PM Charles Desai L Add [Z72.0] Tobacco abuse 7/18/2023  1:30 PM Danyell Desai Add [I73.9] PAD (peripheral artery disease) ([address])     7/18/2023  1:40 PM Nba Desai Add [W71.514] S/P vascular bypass     7/18/2023  1:40 PM Nba Desai Add [I74.09] Aortoiliac occlusive disease [facility])       ED Disposition     ED Disposition   Admit    Condition   Stable    Date/Time   Tue Jul 18, 2023  1:18 PM    Comment   Case was discussed with Vascular Surgery and the patient's admission status was agreed to be Admission Status: inpatient status to the service of Dr. Nabeel Bates . Follow-up Information    None         Patient's Medications   Discharge Prescriptions    No medications on file     No discharge procedures on file. PDMP Review       Value Time User    PDMP Reviewed  Yes 7/18/2023  2:14 PM Shar Montgomery MD           ED Provider  Attending physically available and evaluated Jaret Degroot. I managed the patient along with the ED Attending.     Electronically Signed by         Hebert Romberg, MD  07/18/23 1651

## 2023-07-18 NOTE — TELEPHONE ENCOUNTER
Pt called requesting ov to be evaluated for L foot pain and discoloration. Pt states since yesterday her L foot is purple and grey, "pain unbearable" she rates it 8-9 out of 10. She states foot is cold to touch and she has numbness. She was only able to sleep for 2 hours last night and must use a cane to ambulate (she normally does not need to use a cane). She was last in office 12/28/22 to see Dr. Hilda Nick and had LEAD 6/28/23. Her symptoms were not present at time of doppler. Discussed w/ R. Aldair Carlisle, she advises pt to go to [facility] ED for eval.  I will tiger text Renée Gonzales to make her aware and placed ADT21 order.

## 2023-07-18 NOTE — ED ATTENDING ATTESTATION
7/18/2023  I, Dereck Banuelos DO, saw and evaluated the patient. I have discussed the patient with the resident/non-physician practitioner and agree with the resident's/non-physician practitioner's findings, Plan of Care, and MDM as documented in the resident's/non-physician practitioner's note, except where noted. All available labs and Radiology studies were reviewed. I was present for key portions of any procedure(s) performed by the resident/non-physician practitioner and I was immediately available to provide assistance. At this point I agree with the current assessment done in the Emergency Department.   I have conducted an independent evaluation of this patient a history and physical is as follows:  MDM    All labs reviewed and interpreted by myself   All radiology studies independently viewed by me and interpreted by myself     No orders to display       Labs Reviewed - No data to display    Clinical Impression:    Final diagnoses:   None         ED Course         Critical Care Time  Procedures

## 2023-07-18 NOTE — ED ATTENDING ATTESTATION
7/18/2023  I, Deonte Loo DO, saw and evaluated the patient. I have discussed the patient with the resident/non-physician practitioner and agree with the resident's/non-physician practitioner's findings, Plan of Care, and MDM as documented in the resident's/non-physician practitioner's note, except where noted. All available labs and Radiology studies were reviewed. I was present for key portions of any procedure(s) performed by the resident/non-physician practitioner and I was immediately available to provide assistance. At this point I agree with the current assessment done in the Emergency Department. I have conducted an independent evaluation of this patient a history and physical is as follows:  Medical Decision Making  60-year-old female presents emergency department with left lower extremity pain with noted loss of pulse over the last 2 days duration. Patient has known      Unable to palpate a pulse with noted Doppler or palpation, patient has noted change in coloration and cool to the touch,    Concern for possible issues of arterial clot, venous clot, emergent stat consult with vascular surgery to come at the bedside. With admitted for heparin drip and further management    Amount and/or Complexity of Data Reviewed  Labs: ordered. Radiology: ordered. Risk  OTC drugs. Prescription drug management. Decision regarding hospitalization.           All labs reviewed and interpreted by myself   All radiology studies independently viewed by me and interpreted by myself     CTA lower extremity left w wo contrast    (Results Pending)   CTA chest abdomen pelvis w wo contrast    (Results Pending)       Labs Reviewed   CBC AND DIFFERENTIAL - Abnormal       Result Value Ref Range Status    WBC 11.98 (*) 4.31 - 10.16 Thousand/uL Final    RBC 5.10  3.81 - 5.12 Million/uL Final    Hemoglobin 15.1  11.5 - 15.4 g/dL Final    Hematocrit 45.0  34.8 - 46.1 % Final    MCV 88  82 - 98 fL Final    MCH 29.6 26.8 - 34.3 pg Final    MCHC 33.6  31.4 - 37.4 g/dL Final    RDW 13.2  11.6 - 15.1 % Final    MPV 10.2  8.9 - 12.7 fL Final    Platelets 459  420 - 390 Thousands/uL Final    nRBC 0  /100 WBCs Final    Neutrophils Relative 73  43 - 75 % Final    Immat GRANS % 0  0 - 2 % Final    Lymphocytes Relative 16  14 - 44 % Final    Monocytes Relative 6  4 - 12 % Final    Eosinophils Relative 4  0 - 6 % Final    Basophils Relative 1  0 - 1 % Final    Neutrophils Absolute 8.66 (*) 1.85 - 7.62 Thousands/µL Final    Immature Grans Absolute 0.05  0.00 - 0.20 Thousand/uL Final    Lymphocytes Absolute 1.87  0.60 - 4.47 Thousands/µL Final    Monocytes Absolute 0.75  0.17 - 1.22 Thousand/µL Final    Eosinophils Absolute 0.49  0.00 - 0.61 Thousand/µL Final    Basophils Absolute 0.16 (*) 0.00 - 0.10 Thousands/µL Final   COMPREHENSIVE METABOLIC PANEL - Abnormal    Sodium 136  135 - 147 mmol/L Final    Potassium 4.6  3.5 - 5.3 mmol/L Final    Chloride 110 (*) 96 - 108 mmol/L Final    CO2 22  21 - 32 mmol/L Final    ANION GAP 4  mmol/L Final    BUN 12  5 - 25 mg/dL Final    Creatinine 0.97  0.60 - 1.30 mg/dL Final    Comment: Standardized to IDMS reference method    Glucose 94  65 - 140 mg/dL Final    Comment: If the patient is fasting, the ADA then defines impaired fasting glucose as > 100 mg/dL and diabetes as > or equal to 123 mg/dL. Specimen collection should occur prior to Sulfasalazine administration due to the potential for falsely depressed results. Specimen collection should occur prior to Sulfapyridine administration due to the potential for falsely elevated results. Calcium 9.7  8.3 - 10.1 mg/dL Final    AST 28  5 - 45 U/L Final    Comment: Specimen collection should occur prior to Sulfasalazine administration due to the potential for falsely depressed results.      ALT 25  12 - 78 U/L Final    Comment: Specimen collection should occur prior to Sulfasalazine and/or Sulfapyridine administration due to the potential for falsely depressed results. Alkaline Phosphatase 75  46 - 116 U/L Final    Total Protein 7.9  6.4 - 8.4 g/dL Final    Albumin 4.0  3.5 - 5.0 g/dL Final    Total Bilirubin 0.42  0.20 - 1.00 mg/dL Final    Comment: Use of this assay is not recommended for patients undergoing treatment with eltrombopag due to the potential for falsely elevated results. eGFR 58  ml/min/1.73sq m Final    Narrative:     [place] guidelines for Chronic Kidney Disease (CKD):   •  Stage 1 with normal or high GFR (GFR > 90 mL/min/1.73 square meters)  •  Stage 2 Mild CKD (GFR = 60-89 mL/min/1.73 square meters)  •  Stage 3A Moderate CKD (GFR = 45-59 mL/min/1.73 square meters)  •  Stage 3B Moderate CKD (GFR = 30-44 mL/min/1.73 square meters)  •  Stage 4 Severe CKD (GFR = 15-29 mL/min/1.73 square meters)  •  Stage 5 End Stage CKD (GFR <15 mL/min/1.73 square meters)  Note: GFR calculation is accurate only with a steady state creatinine   APTT - Normal    PTT 26  23 - 37 seconds Final    Comment: Therapeutic Heparin Range =  60-90 seconds   PROTIME-INR - Normal    Protime 12.5  11.6 - 14.5 seconds Final    INR 0.92  0.84 - 1.19 Final       Clinical Impression:    Final diagnoses:   Pain in limb   Acute lower limb ischemia         ED Course  ED Course as of 07/18/23 1345   Tue Jul 18, 2023   1117 Patient noted with no pulse in the LLE, discoloration cool to touch, no doppler pulse. 2 days hx. Stat consult to vascular surgery at this time.             Critical Care Time  CriticalCare Time    Date/Time: 7/18/2023 1:44 PM    Performed by: Paty Bolaños DO  Authorized by: Paty Bolaños DO    Critical care provider statement:     Critical care time (minutes):  60    Critical care time was exclusive of:  Separately billable procedures and treating other patients and teaching time    Critical care was necessary to treat or prevent imminent or life-threatening deterioration of the following conditions: vascular emergency with arterial clot in      Critical care was time spent personally by me on the following activities:  Blood draw for specimens, obtaining history from patient or surrogate, development of treatment plan with patient or surrogate, evaluation of patient's response to treatment, examination of patient, ordering and performing treatments and interventions, ordering and review of laboratory studies, ordering and review of radiographic studies, re-evaluation of patient's condition and review of old charts  Comments:      Upon my evaluation, this patient has a high probability of imminent or life-threatening deterioration due to vascular emergency  which required my direct attention, intervention, and personal management.     I have personally provided 60 minutes of critical care time, exclusive of procedures, teaching, and any prior time recorded by providers other than myself. Time includes review of laboratory data, radiology results, discussion with consultants, and monitoring for potential decompensation.

## 2023-07-18 NOTE — H&P
Please see consult as completed by me and use as H&P. Pt was seen in ED as vascular consult w/ decision to admit to our service given extensive vascular history and Acute L limb ischemia.         Rosetta Nichols  7/18/2023

## 2023-07-18 NOTE — CONSULTS
[facility]  CONSULT    Patient Name: Rubio Lewis  Patient MRN: [id number]  Admission Date: 7/18/2023 10:47 AM  Attending Provider: Missael Tirado MD  Service: Cardiology    Reason for consult: Pre-op cardiac risk assessment    Assessment / Plan  This is a 68 y.o. female who presents with    #Pre-operative cardiac risk assessment  #Acute limb ischemia  #CAD s/p CABG 2000  #Infrarenal aortic mural thrombus (note on CTA)   · Planned procedure: Left AKA for acute limb ischemia  · Active cardiac complaints: None  · Cardiac co-morbidities: CAD s/p CABG, PAD, hypertension, paroxysmal atrial fibrillation, tobacco use. · Functional status: Decreased due to PAD  · Able to walk 4 blocks without symptoms?: No  · Able to climb 2 flights of stairs without symptoms?: No  · No h/o heart failure or hemodynamically significant valve disease. · Vitals - reviewed and stable  · ECG - Sinus rhythm with sinus arrhythmia  · Echo - As noted below. · Stress Test - As noted below. · No cardiac contraindications to proceed with planned procedure as high but not prohibitive cardiac risk for vascular surgery. She has had recent structural and ischemia evaluation as outlined below, and I do not anticipate that additional testing would further optimize her risk profile from a cardiac standpoint. IV Heparin and Plavix per vascular surgery. Code Status: Level 1 - Full Code    HPI    This is a 68 y.o. female with a past medical history significant for CAD s/p CABG (Done in 2000 at [facility]), renal artery stenosis s/p aorto-renal bypass graft 2000, hypertension, paroxysmal atrial fibrillation, tobacco use disorder, PAD, and COURTNEY, who presented for evaluation of left sided foot pain. Cardiology consultation has been requested for pre-operative cardiac risk assessment prior to planned above knee amputation within the next 48 hours for acute limb ischemia.  She reports that she has had left lower extremity pain and numbness for weeks, and was unable to describe to me what exactly changed recently that prompted presentation. Per the chart, it appears that increasing pain was her reason for presentation. Past Medical History:   Diagnosis Date   • Aorto-iliac disease ([address])    • Atrial fibrillation (HCC)    • CAD (coronary artery disease)    • Carotid stenosis, bilateral    • Celiac artery stenosis (HCC)    • CKD (chronic kidney disease) stage 3, GFR 30-59 ml/min (HCC)    • DVT (deep venous thrombosis) ([facility])     LLE (CFV, popl)   • Heart attack ([address]) 02/2022   • HLD (hyperlipidemia)    • Hypertension    • Lung mass     RUL   • Myocardial infarction ([address])     2022   • COURTNEY (obstructive sleep apnea)    • PAD (peripheral artery disease) ([facility])    • Stroke ([facility])        Past Surgical History:   Procedure Laterality Date   • AORTA - FEMORAL ARTERY BYPASS GRAFT Left    • AXILLO-FEMORAL BYASS GRAFT Bilateral    • BYPASS FEMORAL-FEMORAL     • CORONARY ARTERY BYPASS GRAFT  2000   • FEMORAL ARTERY - POPLITEAL ARTERY BYPASS GRAFT Bilateral    • IVC FILTER INSERTION     • TOTAL ABDOMINAL HYSTERECTOMY W/ BILATERAL SALPINGOOPHORECTOMY     • TOTAL HIP ARTHROPLASTY Left        Family History   Problem Relation Age of Onset   • Stomach cancer Mother        Social History     Tobacco Use   • Smoking status: Every Day     Packs/day: 1.50     Types: Cigarettes     Start date: 1967   • Smokeless tobacco: Never   Substance Use Topics   • Alcohol use: Not Currently     Alcohol/week: 50.0 standard drinks of alcohol     Types: 50 Cans of beer per week     Comment: has not had a drink in 243 days (7/18/23)  h/o at least 6 beers per day       Vitals:    07/18/23 1430   BP: 108/72   Pulse: 62   Resp: 20   Temp:    SpO2: 93%        Oxygen:  O2 Device: None (Room air)      Physical Examination:  Gen: A&Ox3  HEENT: no JVD  CVS: RRR, S1S2 audible and w/o m/r/g. LLE is cool and dry. Mild edema. RLE warm. Distal pulses non-palpable.    Resp: Clear to ascultation bilaterally, no wheeze, rales, ronchi. Chest wall non tender  Abd: Soft, non-tender, non-distended.    Neuro: Normal strength and sensory exam.      Current Medications:  Scheduled Meds:  Current Facility-Administered Medications   Medication Dose Route Frequency Provider Last Rate   • acetaminophen  650 mg Oral Q6H PRN Mary Danger, PA-C     • acetaminophen  975 mg Oral [facility] Shar Helton MD     • albuterol  2 puff Inhalation Q6H PRN Mary Danger, PA-C     • amLODIPine  5 mg Oral Daily Mary Danger, PA-C     • atorvastatin  40 mg Oral Daily With [employer] Strockoz-Scaff, PA-C     • clopidogrel  75 mg Oral Daily Mary Danger, PA-C     • diltiazem  120 mg Oral Daily Mary Danger, PA-C     • docusate sodium  100 mg Oral BID Mary Danger, PA-C     • ezetimibe  10 mg Oral Daily Mary Danger, PA-C     • folic acid  1 mg Oral Daily Mary Danger, PA-C     • gabapentin  300 mg Oral HS Mary Danger, PA-C     • gabapentin  300 mg Oral BID Mary Danger, PA-C     • heparin (porcine)  3-30 Units/kg/hr (Order-Specific) Intravenous Titrated Mary Danger, PA-C 18 Units/kg/hr (07/18/23 1230)   • heparin (porcine)  3,000 Units Intravenous Q6H PRN Mary Danger, PA-C     • heparin (porcine)  6,000 Units Intravenous Q6H PRN Mary Danger, PA-C     • HYDROmorphone  0.5 mg Intravenous Q2H PRN Shar Hughes MD     • [START ON 7/19/2023] isosorbide mononitrate  30 mg Oral QAM Mary Danger, PA-C     • lamoTRIgine  100 mg Oral Daily Mary Danger, PA-C     • lidocaine  1 patch Topical Daily Shar Hughes MD     • melatonin  9 mg Oral HS PRN Mary Danger, PA-C     • methocarbamol  500 mg Oral Q6H [address] Shar Hughes MD     • metoprolol succinate  50 mg Oral Daily Mary Danger, PA-C     • nicotine  1 patch Transdermal Daily Mary Danger, PA-C     • ondansetron  4 mg Intravenous Q6H PRN Gary Burhc PA-C     • oxyCODONE  5 mg Oral Q4H PRN Gary Burch PA-C      Or   • oxyCODONE  10 mg Oral Q4H PRN Gary Burch PA-C     • polyethylene glycol  17 g Oral Daily PRN Gary Burch PA-C     • traZODone  50 mg Oral HS PRN Gary Burch PA-C       Continuous Infusions:heparin (porcine), 3-30 Units/kg/hr (Order-Specific), Last Rate: 18 Units/kg/hr (07/18/23 1230)      PRN Meds:.•  acetaminophen  •  albuterol  •  heparin (porcine)  •  heparin (porcine)  •  HYDROmorphone  •  melatonin  •  ondansetron  •  oxyCODONE **OR** oxyCODONE  •  polyethylene glycol  •  traZODone    Laboratory Studies:  Lab Results   Component Value Date    WBC 11.98 (H) 07/18/2023    HGB 15.1 07/18/2023    HCT 45.0 07/18/2023    MCV 88 07/18/2023     07/18/2023       Lab Results   Component Value Date    CALCIUM 9.7 07/18/2023    SODIUM 136 07/18/2023    K 4.6 07/18/2023    CO2 22 07/18/2023     (H) 07/18/2023    BUN 12 07/18/2023    CREATININE 0.97 07/18/2023         Lab Results   Component Value Date    HDL 33 (L) 11/14/2022    LDLCALC 101 (H) 11/14/2022    TRIG 155 (H) 11/14/2022       CARDIAC IMAGING:    Cardiac testing:   EKG reviewed personally: Sinus rhythm with sinus arrhythmia. Prior vascular imaging:   Aorto right renal artery bypass graft in 2000  - 8/6/2014 femoral/popliteal with stents and angioplasty, iliac arteries with stents and angioplasty and tibial peroneal artery angioplasty  -Right Day to below-knee bypass occluded in 2010  - Fem-Fem bypass graft left to right occluded  -2/21/2017 right axillary 2 by Day bypass  -11/15/2022 lower extremity arterial ultrasound right lower limb shows 50-69% stenosis distal to the SFA with high-grade stenosis versus occlusion in the posterior tibial artery LUANN 0.53 (severe range), right Fem to below-knee bypass graft occluded, left lower limb patent CFA to posterior tibial bypass graft, LUANN 0.96  - 11/15/2022 abdominal aorta/iliac study showed occlusion of bilateral common and external iliac arteries, greater than 70% stenosis in the celiac artery, patent right axillo bifemoral arterial bypass graft  -11/15/2022 carotid ultrasound showed less than 50% bilateral carotid stenosis    Echocardiogram:  TTE 1/26/2023 showed EF 60%, mildly dilated RV, mild to moderate AI, mild AS, mild TR, estimated PA pressure 40 mmHg    Cardiac Cath:  None    Stress Test:  Nuclear pharmacologic stress test 1/26/2023 showed a perfusion defect in the anterior wall that was likely due to artifact from breast attenuation, cannot completely exclude a small area of ischemia, normal LV systolic function    IMAGING  CTA chest abdomen pelvis w wo contrast   Final Result      Minimal collateral flow to the left thigh with a few diminutive patent collaterals below the left knee, but no flow in the major vessels. Occlusion of left femoral and popliteal bypass grafts, as detailed above. The critical finding was discussed with    Dr. Tamara Peña, vascular surgery, 7/18/2023 at . The vascular surgery team has already reviewed the images. Complete occlusion of the right common, external and internal iliac arteries. Reconstitution of flow at the right common femoral artery from the subclavian to femoral bypass. Occluded right femoropopliteal bypass. Patent right deep femoral artery with    multiple collaterals supplying the right popliteal artery. Nonvisualization of the distal most right popliteal artery, with multiple collaterals supplying three-vessel runoff to the ankle and at least 2 vessel run off to the foot. Ectasia of the descending thoracic aorta to 2.9 cm. Atherosclerotic changes. Infrarenal aortic mural thrombus occupying about 50% of the lumen. Right perihilar mass, 2.3 cm, suspicious for neoplasm on a recent PET. Right adrenal nodule, without FDG avidity, likely an adenoma.    Hepatic hyperenhancing foci, possibly perfusion anomalies, not further characterized.  Recommend nonemergent MRI with contrast.                     Workstation performed: [id number]         CTA lower extremity left w wo contrast    (Results Pending)          Jake Guan MD

## 2023-07-18 NOTE — CONSULTS
Consult Note- Acute Pain Service   Keren Don 68 y.o. female MRN: [id number]  Unit/Bed#: ED 03 Encounter: [id number]               Assessment/Plan     Assessment:   Patient Active Problem List   Diagnosis   • Bipolar affective disorder, depressed, severe ([address])   • CAD (coronary artery disease)   • Essential hypertension   • Hx of CABG   • S/P vascular bypass   • Thyroid nodule   • Renal artery stenosis (HCC)   • Presence of IVC filter   • PAD (peripheral artery disease) ([address])   • Left leg swelling   • Mixed hyperlipidemia   • Aortoiliac occlusive disease (HCC)   • Tobacco abuse   • PAF (paroxysmal atrial fibrillation) ([facility])   • Stage 3 chronic kidney disease, unspecified whether stage 3a or 3b CKD ([address])   • Dizziness   • Insomnia   • Nodule of upper lobe of right lung      Keren Don is a 68 y.o. female with history of hypertension, lung cancer, CAD status post MI/CABG in 2018, PAD with stenting, renal artery stenosis, hyperlipidemia, aortoiliac occlusive disease, paroxysmal A-fib, stage III CKD, bipolar disorder, who presents with left lower extremity pain and numbness. Pain is currently 8-9 out of 10, and described as burning, sharp, achy, throbbing, cold. Patient has been taking gabapentin and ibuprofen for pain recently, but has been escalating ibuprofen doses. Patient takes Plavix at home. At this time, recommend continue current medication with oxycodone for moderate/severe pain, with Dilaudid IV for breakthrough pain. Recommend decreasing Dilaudid IV dose from every 4 hours as needed to every 2 hours as needed for breakthrough pain. Recommend starting multimodal analgesia as outlined below. Patient is not a candidate for neuraxial analgesia due to utilization of Plavix, which would require 5-7 days of medication hold per [facility] recommendations and guidelines.      Plan:   • Continue oxycodone 5 mg/10 mg PO q4hrs PRN for moderate/severe pain  • Decrease Dilaudid 0.5 mg IV q4hr to q2hr PRN for breakthrough pain  • Continue Neurontin 300 mg TID for neuropathic pain    Multimodal analgesia:  • Tylenol 975 mg PO q8hrs standing  • Robaxin 500 mg PO q6hrs    • Lidoderm 5% patch 12 hrs on/off    Bowel Regimen:  • - Bowel regimen when appropriate from surgical perspective    APS will continue to follow. Please contact Acute Pain Service - [facility] via [employer] from [phone] with additional questions or concerns. See Dejamort or Amion for additional contacts and after hours information. History of Present Illness    Admit Date:  7/18/2023  Hospital Day:  0 days  Primary Service:  Vascular Surgery  Attending Provider:  Rena Bryan MD  Reason for Consult / Principal Problem: "Acute/chronic pain"  HPI: Manuel Nazario is a 68 y.o. female hypertension, Lung Cancer, CAD status post MI/CABG in 2018, PAD with stenting, renal artery stenosis, hyperlipidemia, aortoiliac occlusive disease, paroxysmal A-fib, stage III CKD, bipolar disorder, who presents with left lower extremity pain and numbness. Patient has history of vascular intervention of the left lower extremity on 2 separate occasions before. On arterial Doppler ultrasound on 6/28/2023, patient appeared to have an occluded graft of the left lower extremity. Pt reports that she has been experiencing pain in the left lower extremity for several years and has recently worsened in the last several days. She notes her pain for the last two days has been 8-10/10. It is mostly located below her knee but notes feeling crampy above the knee to the area of thigh. Pain quality reported as sharp, burning, achy, throbbing, and she reports feeling numb In that extremity for the last several days. Because of pain, she notes that she is unable to move around and has not been able to sleep. She has been using Ibuprofen at escalating doses multiple times a day. Today, she called her vascular surgery office and was told to come to the ED.  She notes that she takes Plavix at home. Current pain location(s): left foot  Pain Scale:   7-9/10  Quality: sharp, achy, throbbing, burning, cold  Current Analgesic regimen:  Oxycodone 5/10 mg Q4H PRN for moderate/severe pain, Dilaudid 0.5 mg IV PRN Q4H for breakthrough pain    Pain History: pain for several years. Does not take opioid medications. Takes Gabapentin and Ibuprofen 6-8 times per day, 1-2 tablets at a time. Pain Management Provider:  none    I have reviewed the patient's controlled substance dispensing history in the Prescription Drug Monitoring Program in compliance with the [facility] regulations before prescribing any controlled substances. Consults    Review of Systems   Constitutional: Negative for appetite change, chills, diaphoresis, fatigue and fever. HENT: Negative for nosebleeds, rhinorrhea, sneezing, sore throat and trouble swallowing. Eyes: Negative for photophobia, pain and visual disturbance. Respiratory: Negative for cough, chest tightness and shortness of breath. Cardiovascular: Negative for chest pain and palpitations. Gastrointestinal: Negative for abdominal pain, diarrhea, nausea and vomiting. Genitourinary: Negative for difficulty urinating, hematuria and pelvic pain. Musculoskeletal: Negative for arthralgias and back pain. Skin: Positive for color change (left lower extremity). Neurological: Negative for facial asymmetry, speech difficulty, light-headedness and headaches. Psychiatric/Behavioral: Positive for sleep disturbance. Negative for behavioral problems and confusion.        Historical Information   Past Medical History:   Diagnosis Date   • Atrial fibrillation ([address])    • Heart attack ([address]) 02/2022   • Hypertension    • Myocardial infarction [facility])     2022   • Stroke [facility])      Past Surgical History:   Procedure Laterality Date   • CARDIAC SURGERY     • HYSTERECTOMY     • VASCULAR SURGERY       Social History   Social History     Substance and Sexual Activity   Alcohol Use Not Currently   • Alcohol/week: 50.0 standard drinks of alcohol   • Types: 50 Cans of beer per week    Comment: has not had a drink in 102 days 11/8/22- patient admits to drinking at least 6 beers per day     Social History     Substance and Sexual Activity   Drug Use Never     Social History     Tobacco Use   Smoking Status Every Day   • Packs/day: 1.50   • Types: Cigarettes   • Start date: 1966   Smokeless Tobacco Never     Family History:   Family History   Problem Relation Age of Onset   • Stomach cancer Mother        Meds/Allergies   all current active meds have been reviewed, current meds:   Current Facility-Administered Medications   Medication Dose Route Frequency   • acetaminophen (TYLENOL) tablet 650 mg  650 mg Oral Q6H PRN   • acetaminophen (TYLENOL) tablet 975 mg  975 mg Oral Q8H [address]   • albuterol (PROVENTIL HFA,VENTOLIN HFA) inhaler 2 puff  2 puff Inhalation Q6H PRN   • amLODIPine (NORVASC) tablet 5 mg  5 mg Oral Daily   • atorvastatin (LIPITOR) tablet 40 mg  40 mg Oral Daily With Dinner   • clopidogrel (PLAVIX) tablet 75 mg  75 mg Oral Daily   • diltiazem (CARDIZEM) tablet 120 mg  120 mg Oral Daily   • docusate sodium (COLACE) capsule 100 mg  100 mg Oral BID   • ezetimibe (ZETIA) tablet 10 mg  10 mg Oral Daily   • folic acid (FOLVITE) tablet 1 mg  1 mg Oral Daily   • gabapentin (NEURONTIN) capsule 300 mg  300 mg Oral HS   • gabapentin (NEURONTIN) capsule 300 mg  300 mg Oral BID   • heparin (porcine) 25,000 units in 0.45% NaCl 250 mL infusion (premix)  3-30 Units/kg/hr (Order-Specific) Intravenous Titrated   • heparin (porcine) injection 3,000 Units  3,000 Units Intravenous Q6H PRN   • heparin (porcine) injection 6,000 Units  6,000 Units Intravenous Q6H PRN   • HYDROmorphone (DILAUDID) injection 0.5 mg  0.5 mg Intravenous Q2H PRN   • [START ON 7/19/2023] isosorbide mononitrate (IMDUR) 24 hr tablet 30 mg  30 mg Oral QAM   • lamoTRIgine (LaMICtal) tablet 100 mg  100 mg Oral Daily   • lidocaine (LIDODERM) 5 % patch 1 patch  1 patch Topical Daily   • melatonin tablet 9 mg  9 mg Oral HS PRN   • methocarbamol (ROBAXIN) tablet 500 mg  500 mg Oral Q6H [address]   • metoprolol succinate (TOPROL-XL) 24 hr tablet 50 mg  50 mg Oral Daily   • nicotine (NICODERM CQ) 21 mg/24 hr TD 24 hr patch 1 patch  1 patch Transdermal Daily   • ondansetron (ZOFRAN) injection 4 mg  4 mg Intravenous Q6H PRN   • oxyCODONE (ROXICODONE) IR tablet 5 mg  5 mg Oral Q4H PRN    Or   • oxyCODONE (ROXICODONE) immediate release tablet 10 mg  10 mg Oral Q4H PRN   • polyethylene glycol (MIRALAX) packet 17 g  17 g Oral Daily PRN   • traZODone (DESYREL) tablet 50 mg  50 mg Oral HS PRN    and PTA meds:   Prior to Admission Medications   Prescriptions Last Dose Informant Patient Reported? Taking?    Folic Acid 0.8 MG CAPS  Self No No   Sig: Take 1 capsule (0.8 mg total) by mouth in the morning   Melatonin 5 MG TABS  Self Yes No   Sig: Take 15 mg by mouth Patient states she takes 20MG   albuterol (ProAir HFA) 90 mcg/act inhaler  Self No No   Sig: Inhale 2 puffs every 6 (six) hours as needed for wheezing   amLODIPine (NORVASC) 5 mg tablet  Self Yes No   Sig: Take 5 mg by mouth daily   clopidogrel (PLAVIX) 75 mg tablet  Self No No   Sig: Take 1 tablet (75 mg total) by mouth in the morning   diltiazem (CARDIZEM) 120 MG tablet  Self No No   Sig: Take 1 tablet (120 mg total) by mouth in the morning   ezetimibe (ZETIA) 10 mg tablet  Self No No   Sig: Take 1 tablet (10 mg total) by mouth daily   gabapentin (Neurontin) 300 mg capsule  Self No No   Sig: Take 1 capsule (300 mg total) by mouth daily at bedtime   gabapentin (Neurontin) 300 mg capsule   No No   Sig: Take 1 capsule (300 mg total) by mouth 2 (two) times a day   isosorbide mononitrate (IMDUR) 30 mg 24 hr tablet  Self Yes No   Sig: Take 30 mg by mouth every morning   lamoTRIgine (LaMICtal) 100 mg tablet  Self No No   Sig: Take 1 tablet (100 mg total) by mouth daily   lisinopril (ZESTRIL) 10 mg tablet  Self No No   Sig: take 1 tablet by mouth once daily   metoprolol succinate (TOPROL-XL) 50 mg 24 hr tablet  Self No No   Sig: Take 1 tablet (50 mg total) by mouth daily   ondansetron (ZOFRAN-ODT) 8 mg disintegrating tablet   No No   Sig: Take 1 tablet (8 mg total) by mouth every 8 (eight) hours as needed for nausea or vomiting   rosuvastatin (CRESTOR) 20 MG tablet  Self Yes No   Sig: Take 20 mg by mouth daily   traZODone (DESYREL) 50 mg tablet  Self No No   Sig: Take 1 tablet (50 mg total) by mouth daily at bedtime   Patient taking differently: Take 50 mg by mouth daily at bedtime as needed      Facility-Administered Medications: None       No Known Allergies    Objective   Temp:  [97.7 °F (36.5 °C)] 97.7 °F (36.5 °C)  HR:  [62-79] 62  Resp:  [18-20] 20  BP: (148-161)/(70-74) 161/74  No intake or output data in the 24 hours ending 07/18/23 1404    Physical Exam  Vitals and nursing note reviewed. Constitutional:       General: She is not in acute distress. Appearance: Normal appearance. She is obese. She is not diaphoretic. HENT:      Head: Normocephalic and atraumatic. Right Ear: External ear normal.      Left Ear: External ear normal.      Nose: Nose normal.      Mouth/Throat:      Pharynx: Oropharynx is clear. Eyes:      Extraocular Movements: Extraocular movements intact. Conjunctiva/sclera: Conjunctivae normal.   Cardiovascular:      Rate and Rhythm: Normal rate and regular rhythm. Pulmonary:      Effort: Pulmonary effort is normal. No respiratory distress. Abdominal:      General: Abdomen is flat. Tenderness: There is no guarding. Musculoskeletal:         General: Tenderness (LLE below knee) present. Normal range of motion. Cervical back: Normal range of motion. Skin:     General: Skin is warm and dry. Comments: LLE purple discoloration below knee level. Cool to touch below knee. Neurological:      General: No focal deficit present.       Mental Status: She is alert and oriented to person, place, and time. Psychiatric:         Behavior: Behavior normal.         Thought Content: Thought content normal.         Lab Results:   I have personally reviewed pertinent labs. , CBC:   Lab Results   Component Value Date    WBC 11.98 (H) 07/18/2023    HGB 15.1 07/18/2023    HCT 45.0 07/18/2023    MCV 88 07/18/2023     07/18/2023    RBC 5.10 07/18/2023    MCH 29.6 07/18/2023    MCHC 33.6 07/18/2023    RDW 13.2 07/18/2023    MPV 10.2 07/18/2023    NRBC 0 07/18/2023   , CMP:   Lab Results   Component Value Date    SODIUM 136 07/18/2023    K 4.6 07/18/2023     (H) 07/18/2023    CO2 22 07/18/2023    BUN 12 07/18/2023    CREATININE 0.97 07/18/2023    CALCIUM 9.7 07/18/2023    AST 28 07/18/2023    ALT 25 07/18/2023    ALKPHOS 75 07/18/2023    EGFR 58 07/18/2023   , BMP:  Lab Results   Component Value Date    SODIUM 136 07/18/2023    K 4.6 07/18/2023     (H) 07/18/2023    CO2 22 07/18/2023    BUN 12 07/18/2023    CREATININE 0.97 07/18/2023    GLUC 94 07/18/2023    CALCIUM 9.7 07/18/2023    AGAP 4 07/18/2023    EGFR 58 07/18/2023   , PT/PTT:  Lab Results   Component Value Date    PTT 26 07/18/2023       Imaging Studies: I have personally reviewed pertinent reports. EKG, Pathology, and Other Studies: I have personally reviewed pertinent reports. Counseling / Coordination of Care  Total floor / unit time spent today Level 1 = 20 minutes. Greater than 50% of total time was spent with the patient and / or family counseling and / or coordination of care. A description of the counseling / coordination of care: Patient interview, physical examination, review of PDMP, review of medical record, review of imaging and laboratory data, development of pain management plan, discussion of pain management plan with patient and primary service. Please note that the APS provides consultative services regarding pain management only.   With the exception of ketamine and epidural infusions and except when indicated, final decisions regarding starting or changing doses of analgesic medications are at the discretion of the consulting service. Off hours consultation and/or medication management is generally not available.     Shar Helton MD  Acute Pain Service

## 2023-07-19 ENCOUNTER — ANESTHESIA EVENT (INPATIENT)
Dept: PERIOP | Facility: HOSPITAL | Age: 73
DRG: 240 | End: 2023-07-19
Payer: MEDICARE

## 2023-07-19 LAB
ABO GROUP BLD: NORMAL
ABO GROUP BLD: NORMAL
ANION GAP SERPL CALCULATED.3IONS-SCNC: 3 MMOL/L
APTT PPP: 136 SECONDS (ref 23–37)
APTT PPP: 50 SECONDS (ref 23–37)
APTT PPP: 54 SECONDS (ref 23–37)
APTT PPP: 75 SECONDS (ref 23–37)
BLD GP AB SCN SERPL QL: POSITIVE
BLOOD GROUP ANTIBODIES SERPL: NORMAL
BUN SERPL-MCNC: 17 MG/DL (ref 5–25)
CALCIUM SERPL-MCNC: 8.7 MG/DL (ref 8.3–10.1)
CHLORIDE SERPL-SCNC: 109 MMOL/L (ref 96–108)
CO2 SERPL-SCNC: 21 MMOL/L (ref 21–32)
CREAT SERPL-MCNC: 1.13 MG/DL (ref 0.6–1.3)
E AG RBC QL: NEGATIVE
ERYTHROCYTE [DISTWIDTH] IN BLOOD BY AUTOMATED COUNT: 13.6 % (ref 11.6–15.1)
GFR SERPL CREATININE-BSD FRML MDRD: 48 ML/MIN/1.73SQ M
GLUCOSE SERPL-MCNC: 68 MG/DL (ref 65–140)
HCT VFR BLD AUTO: 42.3 % (ref 34.8–46.1)
HGB BLD-MCNC: 13.9 G/DL (ref 11.5–15.4)
LITTLE C AG RBC QL: NEGATIVE
MCH RBC QN AUTO: 30.2 PG (ref 26.8–34.3)
MCHC RBC AUTO-ENTMCNC: 32.9 G/DL (ref 31.4–37.4)
MCV RBC AUTO: 92 FL (ref 82–98)
PLATELET # BLD AUTO: 166 THOUSANDS/UL (ref 149–390)
PMV BLD AUTO: 10.9 FL (ref 8.9–12.7)
POTASSIUM SERPL-SCNC: 5.7 MMOL/L (ref 3.5–5.3)
RBC # BLD AUTO: 4.6 MILLION/UL (ref 3.81–5.12)
RH BLD: POSITIVE
RH BLD: POSITIVE
SODIUM SERPL-SCNC: 133 MMOL/L (ref 135–147)
SPECIMEN EXPIRATION DATE: NORMAL
WBC # BLD AUTO: 12.28 THOUSAND/UL (ref 4.31–10.16)

## 2023-07-19 PROCEDURE — 86921 COMPATIBILITY TEST INCUBATE: CPT

## 2023-07-19 PROCEDURE — 86870 RBC ANTIBODY IDENTIFICATION: CPT | Performed by: SURGERY

## 2023-07-19 PROCEDURE — 86900 BLOOD TYPING SEROLOGIC ABO: CPT | Performed by: PHYSICIAN ASSISTANT

## 2023-07-19 PROCEDURE — 86901 BLOOD TYPING SEROLOGIC RH(D): CPT | Performed by: PHYSICIAN ASSISTANT

## 2023-07-19 PROCEDURE — 86922 COMPATIBILITY TEST ANTIGLOB: CPT

## 2023-07-19 PROCEDURE — 85730 THROMBOPLASTIN TIME PARTIAL: CPT | Performed by: SURGERY

## 2023-07-19 PROCEDURE — 86905 BLOOD TYPING RBC ANTIGENS: CPT

## 2023-07-19 PROCEDURE — 86850 RBC ANTIBODY SCREEN: CPT | Performed by: PHYSICIAN ASSISTANT

## 2023-07-19 PROCEDURE — 85730 THROMBOPLASTIN TIME PARTIAL: CPT | Performed by: PHYSICIAN ASSISTANT

## 2023-07-19 PROCEDURE — 99232 SBSQ HOSP IP/OBS MODERATE 35: CPT | Performed by: ANESTHESIOLOGY

## 2023-07-19 PROCEDURE — 99222 1ST HOSP IP/OBS MODERATE 55: CPT | Performed by: NURSE PRACTITIONER

## 2023-07-19 PROCEDURE — NC001 PR NO CHARGE: Performed by: SURGERY

## 2023-07-19 PROCEDURE — 80048 BASIC METABOLIC PNL TOTAL CA: CPT | Performed by: SURGERY

## 2023-07-19 PROCEDURE — 85027 COMPLETE CBC AUTOMATED: CPT | Performed by: SURGERY

## 2023-07-19 RX ORDER — CEFAZOLIN SODIUM 1 G/50ML
1000 SOLUTION INTRAVENOUS ONCE
Status: CANCELLED | OUTPATIENT
Start: 2023-07-19 | End: 2023-07-19

## 2023-07-19 RX ORDER — SODIUM CHLORIDE 9 MG/ML
75 INJECTION, SOLUTION INTRAVENOUS CONTINUOUS
Status: DISCONTINUED | OUTPATIENT
Start: 2023-07-20 | End: 2023-07-20

## 2023-07-19 RX ORDER — CHLORHEXIDINE GLUCONATE 0.12 MG/ML
15 RINSE ORAL ONCE
Status: COMPLETED | OUTPATIENT
Start: 2023-07-19 | End: 2023-07-19

## 2023-07-19 RX ORDER — LIDOCAINE 50 MG/G
2 PATCH TOPICAL DAILY
Status: DISCONTINUED | OUTPATIENT
Start: 2023-07-19 | End: 2023-07-25 | Stop reason: HOSPADM

## 2023-07-19 RX ADMIN — OXYCODONE HYDROCHLORIDE 5 MG: 5 TABLET ORAL at 09:31

## 2023-07-19 RX ADMIN — LIDOCAINE 1 PATCH: 50 PATCH CUTANEOUS at 09:11

## 2023-07-19 RX ADMIN — GABAPENTIN 300 MG: 300 CAPSULE ORAL at 21:40

## 2023-07-19 RX ADMIN — OXYCODONE HYDROCHLORIDE 10 MG: 10 TABLET ORAL at 21:39

## 2023-07-19 RX ADMIN — SODIUM CHLORIDE 75 ML/HR: 0.9 INJECTION, SOLUTION INTRAVENOUS at 23:03

## 2023-07-19 RX ADMIN — METHOCARBAMOL TABLETS 500 MG: 500 TABLET, COATED ORAL at 17:43

## 2023-07-19 RX ADMIN — AMLODIPINE BESYLATE 5 MG: 5 TABLET ORAL at 21:40

## 2023-07-19 RX ADMIN — METHOCARBAMOL TABLETS 500 MG: 500 TABLET, COATED ORAL at 04:55

## 2023-07-19 RX ADMIN — OXYCODONE HYDROCHLORIDE 10 MG: 10 TABLET ORAL at 14:35

## 2023-07-19 RX ADMIN — HEPARIN SODIUM 19 UNITS/KG/HR: 10000 INJECTION, SOLUTION INTRAVENOUS at 23:01

## 2023-07-19 RX ADMIN — CLOPIDOGREL BISULFATE 75 MG: 75 TABLET ORAL at 09:09

## 2023-07-19 RX ADMIN — CHLORHEXIDINE GLUCONATE 15 ML: 1.2 SOLUTION ORAL at 12:24

## 2023-07-19 RX ADMIN — HEPARIN SODIUM 18 UNITS/KG/HR: 10000 INJECTION, SOLUTION INTRAVENOUS at 04:55

## 2023-07-19 RX ADMIN — MELATONIN TAB 3 MG 9 MG: 3 TAB at 21:43

## 2023-07-19 RX ADMIN — EZETIMIBE 10 MG: 10 TABLET ORAL at 09:10

## 2023-07-19 RX ADMIN — DILTIAZEM HYDROCHLORIDE 120 MG: 60 TABLET, FILM COATED ORAL at 09:09

## 2023-07-19 RX ADMIN — HEPARIN SODIUM 3000 UNITS: 1000 INJECTION INTRAVENOUS; SUBCUTANEOUS at 23:06

## 2023-07-19 RX ADMIN — METHOCARBAMOL TABLETS 500 MG: 500 TABLET, COATED ORAL at 12:24

## 2023-07-19 RX ADMIN — DOCUSATE SODIUM 100 MG: 100 CAPSULE ORAL at 09:09

## 2023-07-19 RX ADMIN — GABAPENTIN 300 MG: 300 CAPSULE ORAL at 17:43

## 2023-07-19 RX ADMIN — DOCUSATE SODIUM 100 MG: 100 CAPSULE ORAL at 17:43

## 2023-07-19 RX ADMIN — ACETAMINOPHEN 975 MG: 325 TABLET, FILM COATED ORAL at 14:36

## 2023-07-19 RX ADMIN — ISOSORBIDE MONONITRATE 30 MG: 30 TABLET, EXTENDED RELEASE ORAL at 09:10

## 2023-07-19 RX ADMIN — NICOTINE 1 PATCH: 21 PATCH, EXTENDED RELEASE TRANSDERMAL at 09:09

## 2023-07-19 RX ADMIN — LIDOCAINE 5% 2 PATCH: 700 PATCH TOPICAL at 12:23

## 2023-07-19 RX ADMIN — METHOCARBAMOL TABLETS 500 MG: 500 TABLET, COATED ORAL at 23:07

## 2023-07-19 RX ADMIN — METOPROLOL SUCCINATE 50 MG: 50 TABLET, EXTENDED RELEASE ORAL at 09:09

## 2023-07-19 RX ADMIN — ACETAMINOPHEN 975 MG: 325 TABLET, FILM COATED ORAL at 21:39

## 2023-07-19 RX ADMIN — GABAPENTIN 300 MG: 300 CAPSULE ORAL at 09:09

## 2023-07-19 RX ADMIN — HEPARIN SODIUM 3000 UNITS: 1000 INJECTION INTRAVENOUS; SUBCUTANEOUS at 07:28

## 2023-07-19 RX ADMIN — OXYCODONE HYDROCHLORIDE 10 MG: 10 TABLET ORAL at 04:55

## 2023-07-19 RX ADMIN — ACETAMINOPHEN 975 MG: 325 TABLET, FILM COATED ORAL at 04:55

## 2023-07-19 RX ADMIN — FOLIC ACID 1 MG: 1 TABLET ORAL at 09:10

## 2023-07-19 RX ADMIN — LAMOTRIGINE 100 MG: 100 TABLET ORAL at 09:10

## 2023-07-19 RX ADMIN — ATORVASTATIN CALCIUM 40 MG: 40 TABLET, FILM COATED ORAL at 17:43

## 2023-07-19 NOTE — PROGRESS NOTES
Progress Note - Acute Pain Service    Deion Benjamin 68 y.o. female MRN: [id number]  Unit/Bed#: [facility] 12-46 Encounter: [id number]      Assessment:     Acute Left Lower Extremity Ischemia  Patient Active Problem List   Diagnosis   • Bipolar affective disorder, depressed, severe ([address])   • CAD (coronary artery disease)   • Essential hypertension   • Hx of CABG   • S/P vascular bypass   • Thyroid nodule   • Renal artery stenosis (HCC)   • Presence of IVC filter   • PAD (peripheral artery disease) (HCC)   • Left leg swelling   • Mixed hyperlipidemia   • Aortoiliac occlusive disease (HCC)   • Tobacco abuse   • PAF (paroxysmal atrial fibrillation) (HCC)   • Stage 3 chronic kidney disease, unspecified whether stage 3a or 3b CKD ([address])   • Dizziness   • Insomnia   • Nodule of upper lobe of right lung       Deion Benjamin is a 68 y.o. female  with history of hypertension, lung cancer, CAD status post MI/CABG in 2018, PAD with stenting, renal artery stenosis, hyperlipidemia, aortoiliac occlusive disease, paroxysmal A-fib, stage III CKD, bipolar disorder, who presents with left lower extremity pain and numbness. Etiology of presentation due to Aortoiliac and infrainguinal arterial occlusive disease. Pain is currently 8-9 out of 10, and described as burning, sharp, achy, throbbing, cold. Patient has been taking gabapentin and ibuprofen for pain recently, but has been escalating ibuprofen doses. Patient takes Plavix at home. Pt was started on Oxycodone 5/10 mg Q4H PRN for moderate/severe pain, and Dilaudid 0.5 mg IV Q4H PRN for breakthrough pain, which was decreased to Q2H PRN. Today, patient reports that the pain level has improved, currently at 4/10 and localized to the left lower extremity, throbbing quality today. She notes that the current medication regimen has been working to control her pain. Also has been using lidocaine patch on her foot which she says has provided good pain relief.  She reports some nausea, but denies vomiting, and denies chills, diaphoresis, headaches, lightheadedness (though notes she has had chronic dizziness in the past and evaluated for POTS), SOB, CP, pain elsewhere. This time, patient pain is controlled on current medication regimen. Patient is tentatively scheduled for left above-knee amputation tomorrow. She will also receive regional nerve block of her left lower extremity at the time. Plan:   · Continue Oxycodone 5 mg / 10 mg every 4 hours as needed for moderate/severe pain  · Continue Dilaudid 0.5 mg IV every 2 hours as needed for breakthrough pain  · Plan for Perioperative Regional Analgesia of left lower extremity    Multimodal analgesia:   - Tylenol 975 mg PO q8hrs standing  - Gabapentin 300 mg PO TID  - Robaxin 500 mg PO q6hrs   - Lidocaine patches to affected areas 12 hours on, 12 hours off    Bowel Regimen:  - Docusate (Colace) 100 mg PO twice daily  - Polyethylene glycol (Miralax) 17g PO once daily PRN    APS will continue to follow. Please contact Acute Pain Service - [facility] via [facility] from [phone] with additional questions or concerns. See [facility] or [facility] for additional contacts and after hours information. Pain History  Current pain location(s): Left lower extremity  Pain Scale:   4/10  Quality: Throbbing  24 hour history: Reports pain has improved on current medication regimen. Notes pain ranging from 4-8 out of 10. No acute events overnight. Labs reviewed, shows hypokalemia with 5.7, and hyponatremia of 133. Leukocytosis with white count of 12.28 on CBC today.     Opioid requirement previous 24 hours: Oxycodone p.o. 20 mg, hydromorphone IV 0.5 mg    Meds/Allergies   all current active meds have been reviewed, current meds:   Current Facility-Administered Medications   Medication Dose Route Frequency   • acetaminophen (TYLENOL) tablet 650 mg  650 mg Oral Q6H PRN   • acetaminophen (TYLENOL) tablet 975 mg  975 mg Oral Q8H [address]   • albuterol (PROVENTIL HFA,VENTOLIN HFA) inhaler 2 puff  2 puff Inhalation Q6H PRN   • amLODIPine (NORVASC) tablet 5 mg  5 mg Oral Daily   • atorvastatin (LIPITOR) tablet 40 mg  40 mg Oral Daily With Dinner   • clopidogrel (PLAVIX) tablet 75 mg  75 mg Oral Daily   • diltiazem (CARDIZEM) tablet 120 mg  120 mg Oral Daily   • docusate sodium (COLACE) capsule 100 mg  100 mg Oral BID   • ezetimibe (ZETIA) tablet 10 mg  10 mg Oral Daily   • folic acid (FOLVITE) tablet 1 mg  1 mg Oral Daily   • gabapentin (NEURONTIN) capsule 300 mg  300 mg Oral HS   • gabapentin (NEURONTIN) capsule 300 mg  300 mg Oral BID   • heparin (porcine) 25,000 units in 0.45% NaCl 250 mL infusion (premix)  3-30 Units/kg/hr (Order-Specific) Intravenous Titrated   • heparin (porcine) injection 3,000 Units  3,000 Units Intravenous Q6H PRN   • heparin (porcine) injection 6,000 Units  6,000 Units Intravenous Q6H PRN   • HYDROmorphone (DILAUDID) injection 0.5 mg  0.5 mg Intravenous Q2H PRN   • isosorbide mononitrate (IMDUR) 24 hr tablet 30 mg  30 mg Oral QAM   • lamoTRIgine (LaMICtal) tablet 100 mg  100 mg Oral Daily   • lidocaine (LIDODERM) 5 % patch 1 patch  1 patch Topical Daily   • melatonin tablet 9 mg  9 mg Oral HS PRN   • methocarbamol (ROBAXIN) tablet 500 mg  500 mg Oral Q6H TAIWO   • metoprolol succinate (TOPROL-XL) 24 hr tablet 50 mg  50 mg Oral Daily   • nicotine (NICODERM CQ) 21 mg/24 hr TD 24 hr patch 1 patch  1 patch Transdermal Daily   • ondansetron (ZOFRAN) injection 4 mg  4 mg Intravenous Q6H PRN   • oxyCODONE (ROXICODONE) IR tablet 5 mg  5 mg Oral Q4H PRN    Or   • oxyCODONE (ROXICODONE) immediate release tablet 10 mg  10 mg Oral Q4H PRN   • polyethylene glycol (MIRALAX) packet 17 g  17 g Oral Daily PRN   • traZODone (DESYREL) tablet 50 mg  50 mg Oral HS PRN    and PTA meds:   Prior to Admission Medications   Prescriptions Last Dose Informant Patient Reported? Taking?    Folic Acid 0.8 MG CAPS  Self No No   Sig: Take 1 capsule (0.8 mg total) by mouth in the morning   Melatonin 5 MG TABS  Self Yes No   Sig: Take 15 mg by mouth Patient states she takes 20MG   albuterol (ProAir HFA) 90 mcg/act inhaler  Self No No   Sig: Inhale 2 puffs every 6 (six) hours as needed for wheezing   amLODIPine (NORVASC) 5 mg tablet  Self Yes No   Sig: Take 5 mg by mouth daily   clopidogrel (PLAVIX) 75 mg tablet  Self No No   Sig: Take 1 tablet (75 mg total) by mouth in the morning   diltiazem (CARDIZEM) 120 MG tablet  Self No No   Sig: Take 1 tablet (120 mg total) by mouth in the morning   ezetimibe (ZETIA) 10 mg tablet  Self No No   Sig: Take 1 tablet (10 mg total) by mouth daily   gabapentin (Neurontin) 300 mg capsule  Self No No   Sig: Take 1 capsule (300 mg total) by mouth daily at bedtime   gabapentin (Neurontin) 300 mg capsule   No No   Sig: Take 1 capsule (300 mg total) by mouth 2 (two) times a day   isosorbide mononitrate (IMDUR) 30 mg 24 hr tablet  Self Yes No   Sig: Take 30 mg by mouth every morning   lamoTRIgine (LaMICtal) 100 mg tablet  Self No No   Sig: Take 1 tablet (100 mg total) by mouth daily   lisinopril (ZESTRIL) 10 mg tablet  Self No No   Sig: take 1 tablet by mouth once daily   metoprolol succinate (TOPROL-XL) 50 mg 24 hr tablet  Self No No   Sig: Take 1 tablet (50 mg total) by mouth daily   ondansetron (ZOFRAN-ODT) 8 mg disintegrating tablet   No No   Sig: Take 1 tablet (8 mg total) by mouth every 8 (eight) hours as needed for nausea or vomiting   rosuvastatin (CRESTOR) 20 MG tablet  Self Yes No   Sig: Take 20 mg by mouth daily   traZODone (DESYREL) 50 mg tablet  Self No No   Sig: Take 1 tablet (50 mg total) by mouth daily at bedtime   Patient taking differently: Take 50 mg by mouth daily at bedtime as needed      Facility-Administered Medications: None       No Known Allergies    Objective     Temp:  [97.2 °F (36.2 °C)-98.1 °F (36.7 °C)] 98.1 °F (36.7 °C)  HR:  [50-79] 62  Resp:  [18-20] 18  BP: (107-161)/(57-74) 137/61    Physical Exam  Vitals and nursing note reviewed.    Constitutional: General: She is not in acute distress. Appearance: Normal appearance. She is not ill-appearing, toxic-appearing or diaphoretic. HENT:      Head: Normocephalic and atraumatic. Right Ear: External ear normal.      Left Ear: External ear normal.      Nose: Nose normal.      Mouth/Throat:      Pharynx: Oropharynx is clear. Eyes:      General: No scleral icterus. Extraocular Movements: Extraocular movements intact. Conjunctiva/sclera: Conjunctivae normal.   Cardiovascular:      Rate and Rhythm: Normal rate and regular rhythm. Pulmonary:      Effort: Pulmonary effort is normal. No respiratory distress. Abdominal:      General: Abdomen is flat. There is no distension. Tenderness: There is no guarding. Musculoskeletal:         General: Tenderness (LLE below knee, worse in dorsal and plantar aspect of foot) present. Normal range of motion. Cervical back: Normal range of motion. Skin:     General: Skin is warm and dry. Comments: Purple discoloration of LLE below knee     Neurological:      General: No focal deficit present. Mental Status: She is alert and oriented to person, place, and time. Psychiatric:         Behavior: Behavior normal.         Thought Content: Thought content normal.         Lab Results:   Results from last 7 days   Lab Units 07/19/23  0446   WBC Thousand/uL 12.28*   HEMOGLOBIN g/dL 13.9   HEMATOCRIT % 42.3   PLATELETS Thousands/uL 166      Results from last 7 days   Lab Units 07/19/23  0446 07/18/23  1128   POTASSIUM mmol/L 5.7* 4.6   CHLORIDE mmol/L 109* 110*   CO2 mmol/L 21 22   BUN mg/dL 17 12   CREATININE mg/dL 1.13 0.97   CALCIUM mg/dL 8.7 9.7   ALK PHOS U/L  --  75   ALT U/L  --  25   AST U/L  --  28       Imaging Studies: I have personally reviewed pertinent reports. EKG, Pathology, and Other Studies: I have personally reviewed pertinent reports. Counseling / Coordination of Care  Total floor / unit time spent today 15 minutes. Greater than 50% of total time was spent with the patient and / or family counseling and / or coordination of care. A description of the counseling / coordination of care: Patient interview, physical examination, review of PDMP, review of medical record, review of imaging and laboratory data, development of pain management plan, discussion of pain management plan with patient and primary service. Please note that the APS provides consultative services regarding pain management only. With the exception of ketamine and epidural infusions and except when indicated, final decisions regarding starting or changing doses of analgesic medications are at the discretion of the consulting service. Off hours consultation and/or medication management is generally not available.     Shar Hercules MD  Acute Pain Service

## 2023-07-19 NOTE — CONSULTS
sPHYSICAL MEDICINE AND REHABILITATION CONSULT NOTE  Angie Reid 68 y.o. female MRN: [id number]  Unit/Bed#: [facility] 512-01 Encounter: [id number]    Requested by (Physician/Service): Nabeel Washburn MD  Reason for Consultation:  Assessment of rehabilitation needs    Assessment:  Rehabilitation Diagnosis:   • Left lower extremity ischemia due to aortoiliac and infrainguinal arterial occlusive disease s/p multiple re-vascularization procedures   • Impaired mobility and self care    Recommendations:  Rehabilitation Plan:  • Therapy is pending AKA however the patient may be a candidate for acute inpatient rehabilitation once medically stable. Will continue to follow. • Follow up in amputee clinic with PM&R. • Covid-19 Testing: [facility] inpatient rehabilitation units require testing within 48 hours of all potential admissions at this time. *Re-testing is NOT required for patients recovering from COVID-19 infection if isolation has been discontinued per CDC criteria. Medical Co-morbidities Plan:  · Hypertension   · Lung cancer  · CAD s/p MI/CABG  · Renal artery stenosis  · HLD  · Atrial fibrillation  · CKD stage 3  · Bipolar disorder  · DVT ppx: heparin SQ and SCD    Thank you for this consultation. Do not hesitate to contact service with further questions. LEV Stewart  PM&R    I have spent a total time of 30 minutes on 07/19/23 in caring for this patient including Patient and family education, Counseling / Coordination of care, Documenting in the medical record, Reviewing / ordering tests, medicine, procedures  , Obtaining or reviewing history   and Communicating with other healthcare professionals . History of Present Illness:  Angie Reid is a 68 y.o. female with a PMH of HTN, lung cancer, CAD s/p MI/CABG, renal artery stenosis, HLD, atrial fibrillation.  CKD stage 3, bipolar disorder and chronic left lower extremity ischemia due to aortoiliac and infrainguinal arterial occlusive disease s/p multiple failed revascularization procedures presented to the [address] [place] [address] on 7/18/2023 with left lower extremity pain, claudication and numbness. CTA showed multiple inflow graft occlusions on the left. She was placed on a heparin drip and APS was consulted for pain management. She will likely require AKA TBD. PM&R are consulted for rehabilitation recommendations. The patient was seen in her room. She reports significant pain in her left leg which is worse with elevation. She denies any chest pain, SOB or dizziness. Review of Systems: 10 point ROS negative except for what is noted in HPI    Function:  Prior level of function and living situation: The patient lives with her family and was independent. She used a cane for ambulation. Current level of function:  Physical Therapy: Pending  Occupational Therapy: Pending     Physical Exam:  /61 (BP Location: Right arm)   Pulse 62   Temp 98.1 °F (36.7 °C) (Oral)   Resp 18   Ht 5' 4" (1.626 m)   Wt 76.6 kg (168 lb 12.8 oz)   SpO2 90%   BMI 28.97 kg/m²        Intake/Output Summary (Last 24 hours) at 7/19/2023 1021  Last data filed at 7/19/2023 0600  Gross per 24 hour   Intake 716.26 ml   Output --   Net 716.26 ml       Body mass index is 28.97 kg/m². Physical Exam  Constitutional:       General: She is not in acute distress. Appearance: She is not toxic-appearing. HENT:      Head: Normocephalic and atraumatic. Right Ear: External ear normal.      Left Ear: External ear normal.      Nose: Nose normal.      Mouth/Throat:      Mouth: Mucous membranes are moist.      Pharynx: Oropharynx is clear. Eyes:      Extraocular Movements: Extraocular movements intact. Pulmonary:      Effort: Pulmonary effort is normal. No respiratory distress. Abdominal:      General: There is no distension.    Musculoskeletal:      Comments: LLE with ischemic changes, cool to touch  Bilateral UE/RLE 5/5 throughout Skin:     General: Skin is warm and dry. Neurological:      Mental Status: She is alert and oriented to person, place, and time. Psychiatric:         Mood and Affect: Mood normal.        Social History:    Social History     Socioeconomic History   • Marital status:       Spouse name: None   • Number of children: None   • Years of education: None   • Highest education level: None   Occupational History   • None   Tobacco Use   • Smoking status: Every Day     Packs/day: 1.50     Types: Cigarettes     Start date: 5   • Smokeless tobacco: Never   Vaping Use   • Vaping Use: Never used   Substance and Sexual Activity   • Alcohol use: Not Currently     Alcohol/week: 50.0 standard drinks of alcohol     Types: 50 Cans of beer per week     Comment: has not had a drink in 243 days (7/18/23)  h/o at least 6 beers per day   • Drug use: Never   • Sexual activity: Not Currently     Partners: Male   Other Topics Concern   • None   Social History Narrative   • None     Social Determinants of Health     Financial Resource Strain: Not on file   Food Insecurity: Not on file   Transportation Needs: Not on file   Physical Activity: Not on file   Stress: Not on file   Social Connections: Not on file   Intimate Partner Violence: Not on file   Housing Stability: Not on file        Family History:    Family History   Problem Relation Age of Onset   • Stomach cancer Mother          Medications:     Current Facility-Administered Medications:   •  acetaminophen (TYLENOL) tablet 650 mg, 650 mg, Oral, Q6H PRN, Radhaees Barrier, PA-C  •  acetaminophen (TYLENOL) tablet 975 mg, 975 mg, Oral, Q8H [address], Shar Hughes MD, 975 mg at 07/19/23 0455  •  albuterol (PROVENTIL HFA,VENTOLIN HFA) inhaler 2 puff, 2 puff, Inhalation, Q6H PRN, Ulgriffin Barrier, PA-C  •  amLODIPine (NORVASC) tablet 5 mg, 5 mg, Oral, Daily, Ulrajanees Barrier, PA-C, 5 mg at 07/18/23 2052  •  atorvastatin (LIPITOR) tablet 40 mg, 40 mg, Oral, Daily With Sole Desai, PA-C  •  chlorhexidine (PERIDEX) 0.12 % oral rinse 15 mL, 15 mL, Swish & Spit, Once, [employer], PA-C  •  clopidogrel (PLAVIX) tablet 75 mg, 75 mg, Oral, Daily, [employer], PA-C, 75 mg at 07/19/23 0909  •  diltiazem (CARDIZEM) tablet 120 mg, 120 mg, Oral, Daily, [employer], PA-C, 120 mg at 07/19/23 0909  •  docusate sodium (COLACE) capsule 100 mg, 100 mg, Oral, BID, [employer], PA-C, 100 mg at 07/19/23 4653  •  ezetimibe (ZETIA) tablet 10 mg, 10 mg, Oral, Daily, [employer], PA-C, 10 mg at 03/39/38 8149  •  folic acid (FOLVITE) tablet 1 mg, 1 mg, Oral, Daily, [employer], PA-C, 1 mg at 07/19/23 7076  •  gabapentin (NEURONTIN) capsule 300 mg, 300 mg, Oral, HS, [employer], PA-C, 300 mg at 07/18/23 2050  •  gabapentin (NEURONTIN) capsule 300 mg, 300 mg, Oral, BID, [employer], PA-C, 300 mg at 07/19/23 2324  •  heparin (porcine) 25,000 units in 0.45% NaCl 250 mL infusion (premix), 3-30 Units/kg/hr (Order-Specific), Intravenous, Titrated, [employer], PA-C, Last Rate: 15 mL/hr at 07/19/23 0729, 20 Units/kg/hr at 07/19/23 0729  •  heparin (porcine) injection 3,000 Units, 3,000 Units, Intravenous, Q6H PRN, [employer], PA-C, 3,000 Units at 07/19/23 9149  •  heparin (porcine) injection 6,000 Units, 6,000 Units, Intravenous, Q6H PRN, [employer], PA-C  •  HYDROmorphone (DILAUDID) injection 0.5 mg, 0.5 mg, Intravenous, Q2H PRN, Shar Hughes MD, 0.5 mg at 07/18/23 2333  •  isosorbide mononitrate (IMDUR) 24 hr tablet 30 mg, 30 mg, Oral, Dori RONQUILLO, PA-C, 30 mg at 07/19/23 6211  •  lamoTRIgine (LaMICtal) tablet 100 mg, 100 mg, Oral, Daily, [employer], PA-C, 100 mg at 07/19/23 0910  •  lidocaine (LIDODERM) 5 % patch 1 patch, 1 patch, Topical, Daily, Shar Hughes MD, 1 patch at 07/19/23 0911  •  melatonin tablet 9 mg, 9 mg, Oral, HS PRN, Ana Baires MARCI Desai, 9 mg at 07/18/23 2049  •  methocarbamol (ROBAXIN) tablet 500 mg, 500 mg, Oral, Q6H [address] [facility], Shar Hughes MD, 500 mg at 07/19/23 0455  •  metoprolol succinate (TOPROL-XL) 24 hr tablet 50 mg, 50 mg, Oral, Daily, Briana Schlatter, PA-C, 50 mg at 07/19/23 0909  •  nicotine (NICODERM CQ) 21 mg/24 hr TD 24 hr patch 1 patch, 1 patch, Transdermal, Daily, Briana Schlatter, PA-C, 1 patch at 07/19/23 8724  •  ondansetron (ZOFRAN) injection 4 mg, 4 mg, Intravenous, Q6H PRN, Briana Schlatter, PA-C  •  oxyCODONE (ROXICODONE) IR tablet 5 mg, 5 mg, Oral, Q4H PRN, 5 mg at 07/19/23 0931 **OR** oxyCODONE (ROXICODONE) immediate release tablet 10 mg, 10 mg, Oral, Q4H PRN, Briana Schlatter, PA-C, 10 mg at 07/19/23 0455  •  polyethylene glycol (MIRALAX) packet 17 g, 17 g, Oral, Daily PRN, Briana Schlatter, PA-C  •  [START ON 7/20/2023] sodium chloride 0.9 % infusion, 75 mL/hr, Intravenous, Continuous, Briana Schlatter, PA-C  •  traZODone (DESYREL) tablet 50 mg, 50 mg, Oral, HS PRN, Briana Schlatter, PA-C    Past Medical History:     Past Medical History:   Diagnosis Date   • Aorto-iliac disease ([address])    • Atrial fibrillation ([address])    • CAD (coronary artery disease)    • Carotid stenosis, bilateral    • Celiac artery stenosis (HCC)    • CKD (chronic kidney disease) stage 3, GFR 30-59 ml/min ([facility])    • DVT (deep venous thrombosis) ([facility])     LLE (CFV, popl)   • Heart attack ([address]) 02/2022   • HLD (hyperlipidemia)    • Hypertension    • Lung mass     RUL   • Myocardial infarction ([address])     2022   • COURTNEY (obstructive sleep apnea)    • PAD (peripheral artery disease) ([address])    • Stroke [facility])         Past Surgical History:     Past Surgical History:   Procedure Laterality Date   • AORTA - FEMORAL ARTERY BYPASS GRAFT Left    • AXILLO-FEMORAL BYASS GRAFT Bilateral    • BYPASS FEMORAL-FEMORAL     • CORONARY ARTERY BYPASS GRAFT  2000   • FEMORAL ARTERY - POPLITEAL ARTERY BYPASS GRAFT Bilateral    • IVC FILTER INSERTION     • TOTAL ABDOMINAL HYSTERECTOMY W/ BILATERAL SALPINGOOPHORECTOMY     • TOTAL HIP ARTHROPLASTY Left          Allergies:     No Known Allergies        LABORATORY RESULTS:      Lab Results   Component Value Date    HGB 13.9 07/19/2023    HCT 42.3 07/19/2023    WBC 12.28 (H) 07/19/2023     Lab Results   Component Value Date    BUN 17 07/19/2023    K 5.7 (H) 07/19/2023     (H) 07/19/2023    CREATININE 1.13 07/19/2023     Lab Results   Component Value Date    PROTIME 12.5 07/18/2023    INR 0.92 07/18/2023        DIAGNOSTIC STUDIES: Reviewed  CTA chest abdomen pelvis w wo contrast    Result Date: 7/18/2023  Impression: Minimal collateral flow to the left thigh with a few diminutive patent collaterals below the left knee, but no flow in the major vessels. Occlusion of left femoral and popliteal bypass grafts, as detailed above. The critical finding was discussed with Dr. Vickey Mayen, vascular surgery, 7/18/2023 at . The vascular surgery team has already reviewed the images. Complete occlusion of the right common, external and internal iliac arteries. Reconstitution of flow at the right common femoral artery from the subclavian to femoral bypass. Occluded right femoropopliteal bypass. Patent right deep femoral artery with multiple collaterals supplying the right popliteal artery. Nonvisualization of the distal most right popliteal artery, with multiple collaterals supplying three-vessel runoff to the ankle and at least 2 vessel run off to the foot. Ectasia of the descending thoracic aorta to 2.9 cm. Atherosclerotic changes. Infrarenal aortic mural thrombus occupying about 50% of the lumen. Right perihilar mass, 2.3 cm, suspicious for neoplasm on a recent PET. Right adrenal nodule, without FDG avidity, likely an adenoma. Hepatic hyperenhancing foci, possibly perfusion anomalies, not further characterized.  Recommend nonemergent MRI with contrast. Workstation performed: [id number]

## 2023-07-19 NOTE — PROGRESS NOTES
Progress Note -vascular surgery  Jaret Degroot 68 y.o. female MRN: [id number]  Unit/Bed#: [facility] 12-46 Encounter: [id number]    Assessment:  68 y.o. female with chronic left lower extremity ischemia secondary to aortoiliac and infrainguinal arterial occlusive disease. Patient has multiple failed revascularization procedures. She has no further targets for revascularization in the left lower extremity. 7/18 CTA CAP: Minimal collateral flow to the left thigh with a few diminutive patent collaterals below the left knee, but no flow in the major vessels. Occlusion of left femoral and popliteal bypass grafts. Complete occlusion of the right common, external and internal iliac arteries. Reconstitution of flow at the right common femoral artery from the subclavian to femoral bypass. Occluded right femoropopliteal bypass. Patent right deep femoral artery with   multiple collaterals supplying the right popliteal artery. Plan:  - Diet Regular; Regular House  - Pain and Nausea control PRN  -Maintain on heparin drip  - Plan for L AKA sometime this week  -Continue Plavix, statin    Subjective/Objective     Subjective: Patient reports mild pain. Objective:   Vitals: Blood pressure 123/64, pulse 77, temperature 97.6 °F (36.4 °C), resp. rate 20, height 5' 4" (1.626 m), weight 76.6 kg (168 lb 12.8 oz), SpO2 91 %. ,Body mass index is 28.97 kg/m². I/O       07/17 0701  07/18 0700 07/18 0701  07/19 0700 07/19 0701  07/20 0700    P. O.  480     I.V. (mL/kg)  236.3 (3.1)     Total Intake(mL/kg)  716.3 (9.4)     Net  +716.3            Unmeasured Urine Occurrence  1 x           Physical Exam:  Gen: NAD, Aox3, Comfortable in bed  Chest: Normal work of breathing, no respiratory distress  Abd: Soft, ND, NT. Ext: Cool LLE palpable popliteal negative distal signals. Bluish discoloration of the leg. Skin: Warm, Dry, Intact      Lab, Imaging and other studies:   I have personally reviewed pertinent reports.   , CBC with diff: Lab Results   Component Value Date    WBC 12.28 (H) 07/19/2023    HGB 13.9 07/19/2023    HCT 42.3 07/19/2023    MCV 92 07/19/2023     07/19/2023    RBC 4.60 07/19/2023    MCH 30.2 07/19/2023    MCHC 32.9 07/19/2023    RDW 13.6 07/19/2023    MPV 10.9 07/19/2023    NRBC 0 07/18/2023   , BMP/CMP:   Lab Results   Component Value Date    SODIUM 133 (L) 07/19/2023    K 5.7 (H) 07/19/2023     (H) 07/19/2023    CO2 21 07/19/2023    BUN 17 07/19/2023    CREATININE 1.13 07/19/2023    CALCIUM 8.7 07/19/2023    AST 28 07/18/2023    ALT 25 07/18/2023    ALKPHOS 75 07/18/2023    EGFR 48 07/19/2023   , Magnesium: No components found for: "MAG"  VTE Pharmacologic Prophylaxis: Heparin  VTE Mechanical Prophylaxis: sequential compression device        Salli Favre, MD  7/19/2023  7:25 AM

## 2023-07-19 NOTE — CASE MANAGEMENT
Case Management Assessment    Patient name Nedra Escort  Location [address] [facility] 512/[facility] 594-56 MRN [id number]  : 1950 Date 2023       Current Admission Date: 2023  Current Admission Diagnosis:Pain in limb, Tobacco abuse, Left foot pain, PAD (peripheral artery disease) ([address]), Hx of CABG, Aortoiliac occlusive disease ([address]), S/P vascular bypass, Acute lower limb ischemia   Patient Active Problem List    Diagnosis Date Noted   • Nodule of upper lobe of right lung 2023   • Insomnia 02/10/2023   • PAF (paroxysmal atrial fibrillation) ([address]) 2023   • Stage 3 chronic kidney disease, unspecified whether stage 3a or 3b CKD ([address]) 2023   • Dizziness 2023   • Tobacco abuse 2023   • Aortoiliac occlusive disease ([address]) 2022   • Mixed hyperlipidemia 2022   • Left leg swelling 2022   • Bipolar affective disorder, depressed, severe ([address]) 2017   • S/P vascular bypass 2017   • Hx of CABG 2016   • Presence of IVC filter 2016   • Essential hypertension 2016   • Thyroid nodule 2016   • CAD (coronary artery disease) 2014   • Renal artery stenosis ([address]) 2014   • PAD (peripheral artery disease) ([address]) 2014      LOS (days): 1  Geometric Mean LOS (GMLOS) (days): 2.70  Days to GMLOS:1.8     OBJECTIVE:    Risk of Unplanned Readmission Score: 10.92         Current admission status: Inpatient       Preferred Pharmacy:   [address] [place] [address] Drive, [address] [place] [address] - [place] [address] [address]   Phone: [phone] Fax: [phone]    Primary Care Provider: Vickey Gonsalez DO    Primary Insurance: MEDICARE  Secondary Insurance:     ASSESSMENT:  [place] Proxies    There are no active Health Care Proxies on file.        Advance Directives  Does patient have a [address]?: No    Readmission Root Cause  30 Day Readmission: No    Patient Information  Admitted from[de-identified] Home  Mental Status: Alert  During Assessment patient was accompanied by: Not accompanied during assessment  Assessment information provided by[de-identified] Patient  Primary Caregiver: Self  Support Systems: Son, Daughter  [place] of Residence: [address] [place] [address] do you live in?: [address]. entry access options.  Select all that apply.: Stairs  Number of steps to enter home.: 2  Do the steps have railings?: Yes  Type of Current Residence: 2 story home  Upon entering residence, is there a bedroom on the main floor (no further steps)?: Yes  Upon entering residence, is there a bathroom on the main floor (no further steps)?: Yes  In the last 12 months, was there a time when you were not able to pay the mortgage or rent on time?: No  In the last 12 months, how many places have you lived?: 1  In the last 12 months, was there a time when you did not have a steady place to sleep or slept in a shelter (including now)?: No  Homeless/housing insecurity resource given?: N/A  Living Arrangements: Lives w/ Son  Is patient a ?: No    Activities of Daily Living Prior to Admission  Functional Status: Independent  Completes ADLs independently?: Yes  Ambulates independently?: Yes  Does patient use assisted devices?: Yes  Assisted Devices (DME) used: Straight Cane  Does patient currently own DME?: Yes  What DME does the patient currently own?: Straight Cane, Other (Comment) (inhaler)  Does patient have a history of Outpatient Therapy (PT/OT)?: No  Does the patient have a history of Short-Term Rehab?: No  Does patient have a history of HHC?: No  Does patient currently have [facility]?: No    Patient Information Continued  Income Source: Pension/snf  Does patient have prescription coverage?: Yes  Within the past 12 months, you worried that your food would run out before you got the money to buy more.: Never true  Within the past 12 months, the food you bought just didn't last and you didn't have money to get more.: Never true  Food insecurity resource given?: N/A  Does patient receive dialysis treatments?: No  Does patient have a history of substance abuse?: No  Does patient have a history of Mental Health Diagnosis?: No    Means of Transportation  Means of Transport to Appts[de-identified] Family transport  In the past 12 months, has lack of transportation kept you from medical appointments or from getting medications?: No  In the past 12 months, has lack of transportation kept you from meetings, work, or from getting things needed for daily living?: No  Was application for public transport provided?: N/A      Summary: Met with patient at bedside. Lives with son, daughter in law and grandchildren. Has a 1st floor setup with 1/2 bath. Independent with cane. Family provides transport to appointments. Anticipated rehab on DC. CM reviewed d/c planning process including the following: identifying help at home, patient preference for d/c planning needs,[facility] Meds to Bed program, availability of treatment team to discuss questions or concerns patient and/or family may have regarding understanding medications and recognizing signs and symptoms once discharged. CM also encouraged patient to follow up with all recommended appointments after discharge. Patient advised of importance for patient and family to participate in managing patient’s medical well being.

## 2023-07-20 ENCOUNTER — ANESTHESIA (INPATIENT)
Dept: PERIOP | Facility: HOSPITAL | Age: 73
DRG: 240 | End: 2023-07-20
Payer: MEDICARE

## 2023-07-20 PROBLEM — I99.8 ACUTE LOWER LIMB ISCHEMIA: Status: ACTIVE | Noted: 2023-07-20

## 2023-07-20 LAB
ANION GAP SERPL CALCULATED.3IONS-SCNC: 3 MMOL/L
APTT PPP: 102 SECONDS (ref 23–37)
APTT PPP: 53 SECONDS (ref 23–37)
BUN SERPL-MCNC: 12 MG/DL (ref 5–25)
CALCIUM SERPL-MCNC: 8.9 MG/DL (ref 8.3–10.1)
CHLORIDE SERPL-SCNC: 109 MMOL/L (ref 96–108)
CO2 SERPL-SCNC: 23 MMOL/L (ref 21–32)
CREAT SERPL-MCNC: 0.86 MG/DL (ref 0.6–1.3)
ERYTHROCYTE [DISTWIDTH] IN BLOOD BY AUTOMATED COUNT: 13.1 % (ref 11.6–15.1)
GFR SERPL CREATININE-BSD FRML MDRD: 67 ML/MIN/1.73SQ M
GLUCOSE SERPL-MCNC: 94 MG/DL (ref 65–140)
HCT VFR BLD AUTO: 39.9 % (ref 34.8–46.1)
HGB BLD-MCNC: 12.7 G/DL (ref 11.5–15.4)
MCH RBC QN AUTO: 29.3 PG (ref 26.8–34.3)
MCHC RBC AUTO-ENTMCNC: 31.8 G/DL (ref 31.4–37.4)
MCV RBC AUTO: 92 FL (ref 82–98)
PLATELET # BLD AUTO: 190 THOUSANDS/UL (ref 149–390)
PMV BLD AUTO: 11 FL (ref 8.9–12.7)
POTASSIUM SERPL-SCNC: 4.2 MMOL/L (ref 3.5–5.3)
RBC # BLD AUTO: 4.34 MILLION/UL (ref 3.81–5.12)
SODIUM SERPL-SCNC: 135 MMOL/L (ref 135–147)
WBC # BLD AUTO: 11.41 THOUSAND/UL (ref 4.31–10.16)

## 2023-07-20 PROCEDURE — 80048 BASIC METABOLIC PNL TOTAL CA: CPT | Performed by: PHYSICIAN ASSISTANT

## 2023-07-20 PROCEDURE — 85027 COMPLETE CBC AUTOMATED: CPT | Performed by: PHYSICIAN ASSISTANT

## 2023-07-20 PROCEDURE — 99024 POSTOP FOLLOW-UP VISIT: CPT | Performed by: SURGERY

## 2023-07-20 PROCEDURE — 0Y6D0Z3 DETACHMENT AT LEFT UPPER LEG, LOW, OPEN APPROACH: ICD-10-PCS | Performed by: SURGERY

## 2023-07-20 PROCEDURE — 85730 THROMBOPLASTIN TIME PARTIAL: CPT | Performed by: SURGERY

## 2023-07-20 PROCEDURE — 99232 SBSQ HOSP IP/OBS MODERATE 35: CPT | Performed by: ANESTHESIOLOGY

## 2023-07-20 RX ADMIN — DOCUSATE SODIUM 100 MG: 100 CAPSULE ORAL at 08:55

## 2023-07-20 RX ADMIN — MELATONIN TAB 3 MG 9 MG: 3 TAB at 21:25

## 2023-07-20 RX ADMIN — METHOCARBAMOL TABLETS 500 MG: 500 TABLET, COATED ORAL at 06:28

## 2023-07-20 RX ADMIN — OXYCODONE HYDROCHLORIDE 10 MG: 10 TABLET ORAL at 06:28

## 2023-07-20 RX ADMIN — METHOCARBAMOL TABLETS 500 MG: 500 TABLET, COATED ORAL at 11:56

## 2023-07-20 RX ADMIN — GABAPENTIN 300 MG: 300 CAPSULE ORAL at 17:54

## 2023-07-20 RX ADMIN — METOPROLOL SUCCINATE 50 MG: 50 TABLET, EXTENDED RELEASE ORAL at 08:55

## 2023-07-20 RX ADMIN — ATORVASTATIN CALCIUM 40 MG: 40 TABLET, FILM COATED ORAL at 17:53

## 2023-07-20 RX ADMIN — SODIUM CHLORIDE 75 ML/HR: 0.9 INJECTION, SOLUTION INTRAVENOUS at 12:41

## 2023-07-20 RX ADMIN — OXYCODONE HYDROCHLORIDE 10 MG: 10 TABLET ORAL at 18:14

## 2023-07-20 RX ADMIN — HEPARIN SODIUM 19 UNITS/KG/HR: 10000 INJECTION, SOLUTION INTRAVENOUS at 23:52

## 2023-07-20 RX ADMIN — NICOTINE 1 PATCH: 21 PATCH, EXTENDED RELEASE TRANSDERMAL at 08:56

## 2023-07-20 RX ADMIN — AMLODIPINE BESYLATE 5 MG: 5 TABLET ORAL at 21:38

## 2023-07-20 RX ADMIN — FOLIC ACID 1 MG: 1 TABLET ORAL at 08:55

## 2023-07-20 RX ADMIN — METHOCARBAMOL TABLETS 500 MG: 500 TABLET, COATED ORAL at 17:54

## 2023-07-20 RX ADMIN — METHOCARBAMOL TABLETS 500 MG: 500 TABLET, COATED ORAL at 23:52

## 2023-07-20 RX ADMIN — GABAPENTIN 300 MG: 300 CAPSULE ORAL at 21:25

## 2023-07-20 RX ADMIN — HEPARIN SODIUM 3000 UNITS: 1000 INJECTION INTRAVENOUS; SUBCUTANEOUS at 21:27

## 2023-07-20 RX ADMIN — LAMOTRIGINE 100 MG: 100 TABLET ORAL at 08:55

## 2023-07-20 RX ADMIN — HYDROMORPHONE HYDROCHLORIDE 0.5 MG: 1 INJECTION, SOLUTION INTRAMUSCULAR; INTRAVENOUS; SUBCUTANEOUS at 08:56

## 2023-07-20 RX ADMIN — HYDROMORPHONE HYDROCHLORIDE 0.5 MG: 1 INJECTION, SOLUTION INTRAMUSCULAR; INTRAVENOUS; SUBCUTANEOUS at 14:42

## 2023-07-20 RX ADMIN — OXYCODONE HYDROCHLORIDE 10 MG: 10 TABLET ORAL at 23:51

## 2023-07-20 RX ADMIN — OXYCODONE HYDROCHLORIDE 10 MG: 10 TABLET ORAL at 11:56

## 2023-07-20 RX ADMIN — ACETAMINOPHEN 975 MG: 325 TABLET, FILM COATED ORAL at 21:25

## 2023-07-20 RX ADMIN — DILTIAZEM HYDROCHLORIDE 120 MG: 60 TABLET, FILM COATED ORAL at 08:55

## 2023-07-20 RX ADMIN — ACETAMINOPHEN 975 MG: 325 TABLET, FILM COATED ORAL at 06:28

## 2023-07-20 RX ADMIN — ISOSORBIDE MONONITRATE 30 MG: 30 TABLET, EXTENDED RELEASE ORAL at 08:55

## 2023-07-20 RX ADMIN — CLOPIDOGREL BISULFATE 75 MG: 75 TABLET ORAL at 08:55

## 2023-07-20 RX ADMIN — GABAPENTIN 300 MG: 300 CAPSULE ORAL at 08:55

## 2023-07-20 RX ADMIN — EZETIMIBE 10 MG: 10 TABLET ORAL at 08:55

## 2023-07-20 RX ADMIN — ACETAMINOPHEN 975 MG: 325 TABLET, FILM COATED ORAL at 13:58

## 2023-07-20 NOTE — DISCHARGE INSTR - AVS FIRST PAGE
DISCHARGE INSTRUCTIONS  AMPUTATION    REHABILITATION:   You will require some form of rehabilitation after this procedure. This will assist with your return to daily activities by incorporating exercise and balance training. This may be in the form of visiting nurses and physical therapy in your home or more commonly, admission to a rehabilitation facility for a period of time before you return home. ACTIVITY:  You cannot put any weight on the bottom of your residual limb. Physical therapy and rehab staff will direct progression of your activity based on your progress. Please follow their recommendations closely. It is incredibly important to avoid falling on your residual limb as this can cause bleeding and the wound to open up. Your surgeon will decide when you are ready to be referred to the prosthetist for a fitting. This will occur sometime after your 4-week appointment and often later. DIET:  Resume your normal diet. Good nutrition is important for healing of your incision. INCISION:  Wash incision daily with soap and water and thoroughly pat dry. Apply clean, dry gauze and an ACE wrap daily to decrease swelling and get your stump ready to be fit for a prosthetic. It is normal to have swelling or discoloration around the incision. If increasing redness or pain develops, call our office immediately. You will have stitches or staples and these will be evaluated for removal at your first post-operative visit around 4 weeks after surgery. If any of your incisions are open and require dressing changes, you will be given instructions for your daily incision care. If you are not able to change the dressings, a visiting nurse will be arranged. Do not bathe in a tub or swim until your incision is completely healed. DO NOT put any powders, creams, ointments, or lotions on your incision.     FOLLOW UP APPOINTMENTS:  Making and keeping follow up appointments and ultrasound tests are important to your recovery. If you have difficulty making it to or keeping your follow up appointments, call the office. If you have increased pain, fever >101.5, increased drainage, redness or a bad smell at your surgery site, new coldness/numbness of your arm or leg, please call us immediately and GO directly to the ER. Appt rachel/ LEV Baires: 8/18/2023 at [facility] office      PLEASE CALL THE OFFICE IF YOU HAVE ANY QUESTIONS  [phone]  [phone]  [address] [facility]., 97 [facility] Ajit ([place]), 2601 [facility]  [address] [facility],409, [facility], [facility], [address] [facility] [address]  [address], [facility], 630 [facility]  [address] [facility], 161 [facility], [facility] [address]  1001 [facility],Sixth Floor, 1st Floor, [place], [address]  [address], [address], [address], [facility], [address]  [address], 4800 [facility] Ajit [address] ([place]), 1200 [facility]  1501 [facility], 319 [facility], [address] [place] [address] [address] [place] [address] [address], [place]  400 [facility], [facility], 5266 [facility]

## 2023-07-20 NOTE — PROGRESS NOTES
Progress Note - Acute Pain Service    Kimberly Phipps 68 y.o. female MRN: [id number]  Unit/Bed#: [facility] 12-46 Encounter: [id number]      Assessment:   Principal Problem:    Acute lower limb ischemia    Patient Active Problem List   Diagnosis   • Bipolar affective disorder, depressed, severe ([address])   • CAD (coronary artery disease)   • Essential hypertension   • Hx of CABG   • S/P vascular bypass   • Thyroid nodule   • Renal artery stenosis (HCC)   • Presence of IVC filter   • PAD (peripheral artery disease) (HCC)   • Left leg swelling   • Mixed hyperlipidemia   • Aortoiliac occlusive disease (HCC)   • Tobacco abuse   • PAF (paroxysmal atrial fibrillation) (HCC)   • Stage 3 chronic kidney disease, unspecified whether stage 3a or 3b CKD (HCC)   • Dizziness   • Insomnia   • Nodule of upper lobe of right lung   • Acute lower limb ischemia       Kimberly Phipps is a 68 y.o. female  with history of hypertension, lung cancer, CAD status post MI/CABG in 2018, PAD with stenting, renal artery stenosis, hyperlipidemia, aortoiliac occlusive disease, paroxysmal A-fib, stage III CKD, bipolar disorder, who presents with left lower extremity pain and numbness. Etiology of presentation due to Aortoiliac and infrainguinal arterial occlusive disease. Pain is currently 8-9 out of 10, and described as burning, sharp, achy, throbbing, cold. Patient has been taking gabapentin and ibuprofen for pain recently, but has been escalating ibuprofen doses. Patient takes Plavix at home. Pt was started on Oxycodone 5/10 mg Q4H PRN for moderate/severe pain, and Dilaudid 0.5 mg IV Q4H PRN for breakthrough pain, which was decreased to Q2H PRN. Patient reports ongoing pain, though notes that his controlled on current medication regimen. She notes that her pain is 3/10, localized to the left lower extremity, mostly below the knee. Quality of pain is throbbing.   Reports some nausea when she takes her medications, but has not had episodes of vomiting. She would like to continue p.o. medications. Denies chills, diaphoresis, headaches, lightheadedness, shortness of breath, chest pain, pain elsewhere. Pain is controlled on current medication regimen. Patient is scheduled for left above-knee amputation today. Patient will receive preoperative nerve block. Plan:   ·  Continue Oxycodone 5 mg / 10 mg every 4 hours as needed for moderate/severe pain  ·  Continue Dilaudid 0.5 mg IV every 2 hours as needed for breakthrough pain  ·  Plan for Pre-operative blocks for postoperative analgesia    Multimodal analgesia:  - Tylenol 975 mg PO q8hrs standing  - Gabapentin 300 mg PO TID  - Robaxin 500 mg PO q6hrs   - Lidocaine patches to affected areas 12 hours on, 12 hours off    Bowel Regimen:  - per primary team    APS will continue to follow. Please contact Acute Pain Service - [facility] via SeeMe from [phone] with additional questions or concerns. See SeeMe or Amion for additional contacts and after hours information. Pain History  Current pain location(s): left lower extremity  Pain Scale:   3-4/10  Quality: throbbing  24 hour history: Reports that pain is controlled with current medication regimen. No acute events noted overnight. Labs reviewed. Hyperkalemia resolved.      Opioid requirement previous 24 hours: 35 mg Oxycodone PO, 0.5 mg Dilaudid IV    Meds/Allergies   all current active meds have been reviewed, current meds:   Current Facility-Administered Medications   Medication Dose Route Frequency   • acetaminophen (TYLENOL) tablet 975 mg  975 mg Oral Q8H [address]   • albuterol (PROVENTIL HFA,VENTOLIN HFA) inhaler 2 puff  2 puff Inhalation Q6H PRN   • amLODIPine (NORVASC) tablet 5 mg  5 mg Oral Daily   • atorvastatin (LIPITOR) tablet 40 mg  40 mg Oral Daily With Dinner   • clopidogrel (PLAVIX) tablet 75 mg  75 mg Oral Daily   • diltiazem (CARDIZEM) tablet 120 mg  120 mg Oral Daily   • docusate sodium (COLACE) capsule 100 mg  100 mg Oral BID   • ezetimibe (ZETIA) tablet 10 mg  10 mg Oral Daily   • folic acid (FOLVITE) tablet 1 mg  1 mg Oral Daily   • gabapentin (NEURONTIN) capsule 300 mg  300 mg Oral HS   • gabapentin (NEURONTIN) capsule 300 mg  300 mg Oral BID   • heparin (porcine) 25,000 units in 0.45% NaCl 250 mL infusion (premix)  3-30 Units/kg/hr (Order-Specific) Intravenous Titrated   • heparin (porcine) injection 3,000 Units  3,000 Units Intravenous Q6H PRN   • heparin (porcine) injection 6,000 Units  6,000 Units Intravenous Q6H PRN   • HYDROmorphone (DILAUDID) injection 0.5 mg  0.5 mg Intravenous Q2H PRN   • isosorbide mononitrate (IMDUR) 24 hr tablet 30 mg  30 mg Oral QAM   • lamoTRIgine (LaMICtal) tablet 100 mg  100 mg Oral Daily   • lidocaine (LIDODERM) 5 % patch 2 patch  2 patch Topical Daily   • melatonin tablet 9 mg  9 mg Oral HS PRN   • methocarbamol (ROBAXIN) tablet 500 mg  500 mg Oral Q6H TAIWO   • metoprolol succinate (TOPROL-XL) 24 hr tablet 50 mg  50 mg Oral Daily   • nicotine (NICODERM CQ) 21 mg/24 hr TD 24 hr patch 1 patch  1 patch Transdermal Daily   • ondansetron (ZOFRAN) injection 4 mg  4 mg Intravenous Q6H PRN   • oxyCODONE (ROXICODONE) IR tablet 5 mg  5 mg Oral Q4H PRN    Or   • oxyCODONE (ROXICODONE) immediate release tablet 10 mg  10 mg Oral Q4H PRN   • polyethylene glycol (MIRALAX) packet 17 g  17 g Oral Daily PRN   • sodium chloride 0.9 % infusion  75 mL/hr Intravenous Continuous   • traZODone (DESYREL) tablet 50 mg  50 mg Oral HS PRN    and PTA meds:   Prior to Admission Medications   Prescriptions Last Dose Informant Patient Reported? Taking?    Folic Acid 0.8 MG CAPS  Self No No   Sig: Take 1 capsule (0.8 mg total) by mouth in the morning   Melatonin 5 MG TABS  Self Yes No   Sig: Take 15 mg by mouth Patient states she takes 20MG   albuterol (ProAir HFA) 90 mcg/act inhaler  Self No No   Sig: Inhale 2 puffs every 6 (six) hours as needed for wheezing   amLODIPine (NORVASC) 5 mg tablet  Self Yes No   Sig: Take 5 mg by mouth daily clopidogrel (PLAVIX) 75 mg tablet  Self No No   Sig: Take 1 tablet (75 mg total) by mouth in the morning   diltiazem (CARDIZEM) 120 MG tablet  Self No No   Sig: Take 1 tablet (120 mg total) by mouth in the morning   ezetimibe (ZETIA) 10 mg tablet  Self No No   Sig: Take 1 tablet (10 mg total) by mouth daily   gabapentin (Neurontin) 300 mg capsule  Self No No   Sig: Take 1 capsule (300 mg total) by mouth daily at bedtime   gabapentin (Neurontin) 300 mg capsule   No No   Sig: Take 1 capsule (300 mg total) by mouth 2 (two) times a day   isosorbide mononitrate (IMDUR) 30 mg 24 hr tablet  Self Yes No   Sig: Take 30 mg by mouth every morning   lamoTRIgine (LaMICtal) 100 mg tablet  Self No No   Sig: Take 1 tablet (100 mg total) by mouth daily   lisinopril (ZESTRIL) 10 mg tablet  Self No No   Sig: take 1 tablet by mouth once daily   metoprolol succinate (TOPROL-XL) 50 mg 24 hr tablet  Self No No   Sig: Take 1 tablet (50 mg total) by mouth daily   ondansetron (ZOFRAN-ODT) 8 mg disintegrating tablet   No No   Sig: Take 1 tablet (8 mg total) by mouth every 8 (eight) hours as needed for nausea or vomiting   rosuvastatin (CRESTOR) 20 MG tablet  Self Yes No   Sig: Take 20 mg by mouth daily   traZODone (DESYREL) 50 mg tablet  Self No No   Sig: Take 1 tablet (50 mg total) by mouth daily at bedtime   Patient taking differently: Take 50 mg by mouth daily at bedtime as needed      Facility-Administered Medications: None       No Known Allergies    Objective     Temp:  [97.3 °F (36.3 °C)-97.7 °F (36.5 °C)] 97.6 °F (36.4 °C)  HR:  [50-59] 54  Resp:  [16-20] 16  BP: (124-163)/(65-90) 153/71    Physical Exam  Vitals and nursing note reviewed. Constitutional:       General: She is not in acute distress. Appearance: Normal appearance. She is not ill-appearing or diaphoretic. HENT:      Head: Normocephalic and atraumatic.       Right Ear: External ear normal.      Left Ear: External ear normal.      Nose: Nose normal. Mouth/Throat:      Pharynx: Oropharynx is clear. Eyes:      Extraocular Movements: Extraocular movements intact. Conjunctiva/sclera: Conjunctivae normal.   Cardiovascular:      Rate and Rhythm: Normal rate and regular rhythm. Pulmonary:      Effort: Pulmonary effort is normal. No respiratory distress. Abdominal:      Palpations: Abdomen is soft. Tenderness: There is no abdominal tenderness. There is no guarding. Musculoskeletal:         General: Tenderness (ankle, dorsal and plantar side of foot) present. Normal range of motion. Cervical back: Normal range of motion. Left lower leg: Edema present. Skin:     General: Skin is warm and dry. Comments: Edema of left lower extremity below knee, purple discoloration, cool to touch   Neurological:      General: No focal deficit present. Mental Status: She is alert and oriented to person, place, and time. Psychiatric:         Behavior: Behavior normal.         Thought Content: Thought content normal.         Lab Results:   Results from last 7 days   Lab Units 07/20/23  0507   WBC Thousand/uL 11.41*   HEMOGLOBIN g/dL 12.7   HEMATOCRIT % 39.9   PLATELETS Thousands/uL 190      Results from last 7 days   Lab Units 07/20/23  0507 07/19/23  0446 07/18/23  1128   POTASSIUM mmol/L 4.2   < > 4.6   CHLORIDE mmol/L 109*   < > 110*   CO2 mmol/L 23   < > 22   BUN mg/dL 12   < > 12   CREATININE mg/dL 0.86   < > 0.97   CALCIUM mg/dL 8.9   < > 9.7   ALK PHOS U/L  --   --  75   ALT U/L  --   --  25   AST U/L  --   --  28    < > = values in this interval not displayed. Imaging Studies: I have personally reviewed pertinent reports. EKG, Pathology, and Other Studies: I have personally reviewed pertinent reports. Counseling / Coordination of Care  Total floor / unit time spent today 15 minutes. Greater than 50% of total time was spent with the patient and / or family counseling and / or coordination of care.  A description of the counseling / coordination of care: Patient interview, physical examination, review of PDMP, review of medical record, review of imaging and laboratory data, development of pain management plan, discussion of pain management plan with patient and primary service. Please note that the APS provides consultative services regarding pain management only. With the exception of ketamine and epidural infusions and except when indicated, final decisions regarding starting or changing doses of analgesic medications are at the discretion of the consulting service. Off hours consultation and/or medication management is generally not available.     Shar Helton MD  Acute Pain Service

## 2023-07-20 NOTE — PROGRESS NOTES
Progress Note - Vascular Surgery  Erika Gabriela 68 y.o. female MRN: [id number]  Unit/Bed#: [facility] 12-46 Encounter: [id number]    Assessment:  68 y.o. female with chronic LLE ischemia secondary to aortoiliac and infrainguinal arterial occlusive disease, multiple failed revasc procedures, no further targets for revascularization. Plan:  - Diet NPO; Sips with meds  - Pain and Nausea control PRN  - OOB, ambulate as tolerated  - Plan for L AKA today. Discussed at bedside with patient, any questions and concerns addressed. Written consent obtained. - Hold heparin on call to OR.  - Continue plavix/statin. Subjective/Objective     Subjective: Patient is awake, alert, no acute distress. Reports pain to LLE that improves when leg is in dependent position. She is aware that she is going for procedure today. NPO confirmed. Objective:   Vitals: Blood pressure 163/90, pulse (!) 50, temperature (!) 97.3 °F (36.3 °C), resp. rate 20, height 5' 4" (1.626 m), weight 77.7 kg (171 lb 3.2 oz), SpO2 91 %. ,Body mass index is 29.39 kg/m². I/O       07/18 0701  07/19 0700 07/19 0701  07/20 0700    P. O. 480 240    I.V. (mL/kg) 236.3 (3.1)     Total Intake(mL/kg) 716.3 (9.4) 240 (3.1)    Urine (mL/kg/hr)  802 (0.4)    Stool  0    Total Output  802    Net +716.3 -562          Unmeasured Urine Occurrence 1 x     Unmeasured Stool Occurrence  1 x          Physical Exam:  Gen: NAD, Aox3, Comfortable in bed  Chest: Normal work of breathing, no respiratory distress  Abd: Soft, ND, NT. Ext: LLE rubor, edema. B/l lower extremity 5/5 MMT and ROM WNL. Skin: Warm, Dry, Intact. LLE cool to touch. Pulses: palp fem b/l, palpable pop b/l, RLE DP/PT dopplerable, LLE DP/PT non dopplerable      Lab, Imaging and other studies: I have personally reviewed pertinent reports.     VTE Pharmacologic Prophylaxis: Heparin  VTE Mechanical Prophylaxis: sequential compression device        Poonam Jasso DPM  7/20/2023  6:50 AM

## 2023-07-21 LAB
ABO GROUP BLD: NORMAL
ANION GAP SERPL CALCULATED.3IONS-SCNC: 4 MMOL/L
APTT PPP: 36 SECONDS (ref 23–37)
APTT PPP: 62 SECONDS (ref 23–37)
BLD GP AB SCN SERPL QL: POSITIVE
BUN SERPL-MCNC: 11 MG/DL (ref 5–25)
CALCIUM SERPL-MCNC: 9.1 MG/DL (ref 8.3–10.1)
CHLORIDE SERPL-SCNC: 107 MMOL/L (ref 96–108)
CO2 SERPL-SCNC: 25 MMOL/L (ref 21–32)
CREAT SERPL-MCNC: 0.84 MG/DL (ref 0.6–1.3)
ERYTHROCYTE [DISTWIDTH] IN BLOOD BY AUTOMATED COUNT: 13.2 % (ref 11.6–15.1)
GFR SERPL CREATININE-BSD FRML MDRD: 69 ML/MIN/1.73SQ M
GLUCOSE SERPL-MCNC: 114 MG/DL (ref 65–140)
HCT VFR BLD AUTO: 39.1 % (ref 34.8–46.1)
HGB BLD-MCNC: 12.9 G/DL (ref 11.5–15.4)
MCH RBC QN AUTO: 29.6 PG (ref 26.8–34.3)
MCHC RBC AUTO-ENTMCNC: 33 G/DL (ref 31.4–37.4)
MCV RBC AUTO: 90 FL (ref 82–98)
PLATELET # BLD AUTO: 182 THOUSANDS/UL (ref 149–390)
PMV BLD AUTO: 10.1 FL (ref 8.9–12.7)
POTASSIUM SERPL-SCNC: 3.9 MMOL/L (ref 3.5–5.3)
RBC # BLD AUTO: 4.36 MILLION/UL (ref 3.81–5.12)
RH BLD: POSITIVE
SODIUM SERPL-SCNC: 136 MMOL/L (ref 135–147)
SPECIMEN EXPIRATION DATE: NORMAL
WBC # BLD AUTO: 10.66 THOUSAND/UL (ref 4.31–10.16)

## 2023-07-21 PROCEDURE — 99233 SBSQ HOSP IP/OBS HIGH 50: CPT | Performed by: STUDENT IN AN ORGANIZED HEALTH CARE EDUCATION/TRAINING PROGRAM

## 2023-07-21 PROCEDURE — 86901 BLOOD TYPING SEROLOGIC RH(D): CPT | Performed by: SURGERY

## 2023-07-21 PROCEDURE — 85027 COMPLETE CBC AUTOMATED: CPT | Performed by: SURGERY

## 2023-07-21 PROCEDURE — 86900 BLOOD TYPING SEROLOGIC ABO: CPT | Performed by: SURGERY

## 2023-07-21 PROCEDURE — 86850 RBC ANTIBODY SCREEN: CPT | Performed by: SURGERY

## 2023-07-21 PROCEDURE — 88300 SURGICAL PATH GROSS: CPT | Performed by: STUDENT IN AN ORGANIZED HEALTH CARE EDUCATION/TRAINING PROGRAM

## 2023-07-21 PROCEDURE — 80048 BASIC METABOLIC PNL TOTAL CA: CPT | Performed by: SURGERY

## 2023-07-21 PROCEDURE — 27590 AMPUTATE LEG AT THIGH: CPT | Performed by: SURGERY

## 2023-07-21 PROCEDURE — 99024 POSTOP FOLLOW-UP VISIT: CPT | Performed by: SURGERY

## 2023-07-21 PROCEDURE — 85730 THROMBOPLASTIN TIME PARTIAL: CPT | Performed by: SURGERY

## 2023-07-21 RX ORDER — CHLORHEXIDINE GLUCONATE 0.12 MG/ML
15 RINSE ORAL ONCE
Status: COMPLETED | OUTPATIENT
Start: 2023-07-21 | End: 2023-07-21

## 2023-07-21 RX ORDER — CEFAZOLIN SODIUM 1 G/3ML
INJECTION, POWDER, FOR SOLUTION INTRAMUSCULAR; INTRAVENOUS AS NEEDED
Status: DISCONTINUED | OUTPATIENT
Start: 2023-07-21 | End: 2023-07-21

## 2023-07-21 RX ORDER — ROCURONIUM BROMIDE 10 MG/ML
INJECTION, SOLUTION INTRAVENOUS AS NEEDED
Status: DISCONTINUED | OUTPATIENT
Start: 2023-07-21 | End: 2023-07-21

## 2023-07-21 RX ORDER — DEXAMETHASONE SODIUM PHOSPHATE 10 MG/ML
INJECTION, SOLUTION INTRAMUSCULAR; INTRAVENOUS AS NEEDED
Status: DISCONTINUED | OUTPATIENT
Start: 2023-07-21 | End: 2023-07-21

## 2023-07-21 RX ORDER — SODIUM CHLORIDE, SODIUM LACTATE, POTASSIUM CHLORIDE, CALCIUM CHLORIDE 600; 310; 30; 20 MG/100ML; MG/100ML; MG/100ML; MG/100ML
INJECTION, SOLUTION INTRAVENOUS CONTINUOUS PRN
Status: DISCONTINUED | OUTPATIENT
Start: 2023-07-21 | End: 2023-07-21

## 2023-07-21 RX ORDER — SODIUM CHLORIDE 9 MG/ML
INJECTION, SOLUTION INTRAVENOUS CONTINUOUS PRN
Status: DISCONTINUED | OUTPATIENT
Start: 2023-07-21 | End: 2023-07-21

## 2023-07-21 RX ORDER — SODIUM CHLORIDE 9 MG/ML
100 INJECTION, SOLUTION INTRAVENOUS CONTINUOUS
Status: DISPENSED | OUTPATIENT
Start: 2023-07-21 | End: 2023-07-21

## 2023-07-21 RX ORDER — PROPOFOL 10 MG/ML
INJECTION, EMULSION INTRAVENOUS AS NEEDED
Status: DISCONTINUED | OUTPATIENT
Start: 2023-07-21 | End: 2023-07-21

## 2023-07-21 RX ORDER — FENTANYL CITRATE 50 UG/ML
INJECTION, SOLUTION INTRAMUSCULAR; INTRAVENOUS AS NEEDED
Status: DISCONTINUED | OUTPATIENT
Start: 2023-07-21 | End: 2023-07-21

## 2023-07-21 RX ORDER — HYDROMORPHONE HCL/PF 1 MG/ML
0.5 SYRINGE (ML) INJECTION
Status: DISCONTINUED | OUTPATIENT
Start: 2023-07-21 | End: 2023-07-21 | Stop reason: HOSPADM

## 2023-07-21 RX ORDER — FENTANYL CITRATE/PF 50 MCG/ML
25 SYRINGE (ML) INJECTION
Status: DISCONTINUED | OUTPATIENT
Start: 2023-07-21 | End: 2023-07-21 | Stop reason: HOSPADM

## 2023-07-21 RX ORDER — PROPOFOL 10 MG/ML
INJECTION, EMULSION INTRAVENOUS CONTINUOUS PRN
Status: DISCONTINUED | OUTPATIENT
Start: 2023-07-21 | End: 2023-07-21

## 2023-07-21 RX ORDER — ONDANSETRON 2 MG/ML
4 INJECTION INTRAMUSCULAR; INTRAVENOUS ONCE AS NEEDED
Status: DISCONTINUED | OUTPATIENT
Start: 2023-07-21 | End: 2023-07-21 | Stop reason: HOSPADM

## 2023-07-21 RX ORDER — LIDOCAINE HYDROCHLORIDE 10 MG/ML
INJECTION, SOLUTION EPIDURAL; INFILTRATION; INTRACAUDAL; PERINEURAL AS NEEDED
Status: DISCONTINUED | OUTPATIENT
Start: 2023-07-21 | End: 2023-07-21

## 2023-07-21 RX ORDER — HEPARIN SODIUM 5000 [USP'U]/ML
5000 INJECTION, SOLUTION INTRAVENOUS; SUBCUTANEOUS EVERY 8 HOURS SCHEDULED
Status: DISCONTINUED | OUTPATIENT
Start: 2023-07-21 | End: 2023-07-25 | Stop reason: HOSPADM

## 2023-07-21 RX ORDER — ONDANSETRON 2 MG/ML
INJECTION INTRAMUSCULAR; INTRAVENOUS AS NEEDED
Status: DISCONTINUED | OUTPATIENT
Start: 2023-07-21 | End: 2023-07-21

## 2023-07-21 RX ORDER — BUPIVACAINE HYDROCHLORIDE 5 MG/ML
INJECTION, SOLUTION EPIDURAL; INTRACAUDAL AS NEEDED
Status: DISCONTINUED | OUTPATIENT
Start: 2023-07-21 | End: 2023-07-21

## 2023-07-21 RX ADMIN — ISOSORBIDE MONONITRATE 30 MG: 30 TABLET, EXTENDED RELEASE ORAL at 08:51

## 2023-07-21 RX ADMIN — CLOPIDOGREL BISULFATE 75 MG: 75 TABLET ORAL at 08:51

## 2023-07-21 RX ADMIN — METOPROLOL SUCCINATE 50 MG: 50 TABLET, EXTENDED RELEASE ORAL at 08:50

## 2023-07-21 RX ADMIN — METHOCARBAMOL TABLETS 500 MG: 500 TABLET, COATED ORAL at 05:32

## 2023-07-21 RX ADMIN — HEPARIN SODIUM 5000 UNITS: 5000 INJECTION INTRAVENOUS; SUBCUTANEOUS at 21:20

## 2023-07-21 RX ADMIN — CHLORHEXIDINE GLUCONATE 15 ML: 1.2 SOLUTION ORAL at 08:50

## 2023-07-21 RX ADMIN — ROCURONIUM BROMIDE 50 MG: 10 INJECTION, SOLUTION INTRAVENOUS at 14:21

## 2023-07-21 RX ADMIN — DOCUSATE SODIUM 100 MG: 100 CAPSULE ORAL at 17:41

## 2023-07-21 RX ADMIN — SUGAMMADEX 100 MG: 100 INJECTION, SOLUTION INTRAVENOUS at 15:29

## 2023-07-21 RX ADMIN — AMLODIPINE BESYLATE 5 MG: 5 TABLET ORAL at 21:16

## 2023-07-21 RX ADMIN — PROPOFOL 100 MCG/KG/MIN: 10 INJECTION, EMULSION INTRAVENOUS at 14:33

## 2023-07-21 RX ADMIN — FOLIC ACID 1 MG: 1 TABLET ORAL at 08:51

## 2023-07-21 RX ADMIN — CEFAZOLIN 2000 MG: 1 INJECTION, POWDER, FOR SOLUTION INTRAMUSCULAR; INTRAVENOUS at 14:27

## 2023-07-21 RX ADMIN — LAMOTRIGINE 100 MG: 100 TABLET ORAL at 08:51

## 2023-07-21 RX ADMIN — EZETIMIBE 10 MG: 10 TABLET ORAL at 08:50

## 2023-07-21 RX ADMIN — METHOCARBAMOL TABLETS 500 MG: 500 TABLET, COATED ORAL at 17:41

## 2023-07-21 RX ADMIN — PROPOFOL 150 MG: 10 INJECTION, EMULSION INTRAVENOUS at 14:21

## 2023-07-21 RX ADMIN — SUGAMMADEX 50 MG: 100 INJECTION, SOLUTION INTRAVENOUS at 15:40

## 2023-07-21 RX ADMIN — OXYCODONE HYDROCHLORIDE 10 MG: 10 TABLET ORAL at 11:06

## 2023-07-21 RX ADMIN — FENTANYL CITRATE 50 MCG: 50 INJECTION, SOLUTION INTRAMUSCULAR; INTRAVENOUS at 13:26

## 2023-07-21 RX ADMIN — OXYCODONE HYDROCHLORIDE 10 MG: 10 TABLET ORAL at 21:16

## 2023-07-21 RX ADMIN — MELATONIN TAB 3 MG 9 MG: 3 TAB at 21:16

## 2023-07-21 RX ADMIN — SODIUM CHLORIDE 100 ML/HR: 0.9 INJECTION, SOLUTION INTRAVENOUS at 17:43

## 2023-07-21 RX ADMIN — GABAPENTIN 300 MG: 300 CAPSULE ORAL at 17:41

## 2023-07-21 RX ADMIN — NICOTINE 1 PATCH: 21 PATCH, EXTENDED RELEASE TRANSDERMAL at 08:53

## 2023-07-21 RX ADMIN — BUPIVACAINE HYDROCHLORIDE 20 ML: 5 INJECTION, SOLUTION EPIDURAL; INTRACAUDAL; PERINEURAL at 13:31

## 2023-07-21 RX ADMIN — SODIUM CHLORIDE: 9 INJECTION, SOLUTION INTRAVENOUS at 14:33

## 2023-07-21 RX ADMIN — ACETAMINOPHEN 975 MG: 325 TABLET, FILM COATED ORAL at 05:32

## 2023-07-21 RX ADMIN — METHOCARBAMOL TABLETS 500 MG: 500 TABLET, COATED ORAL at 11:06

## 2023-07-21 RX ADMIN — DEXAMETHASONE SODIUM PHOSPHATE 8 MG: 10 INJECTION, SOLUTION INTRAMUSCULAR; INTRAVENOUS at 15:21

## 2023-07-21 RX ADMIN — DILTIAZEM HYDROCHLORIDE 120 MG: 60 TABLET, FILM COATED ORAL at 08:51

## 2023-07-21 RX ADMIN — METHOCARBAMOL TABLETS 500 MG: 500 TABLET, COATED ORAL at 23:07

## 2023-07-21 RX ADMIN — DOCUSATE SODIUM 100 MG: 100 CAPSULE ORAL at 08:50

## 2023-07-21 RX ADMIN — OXYCODONE HYDROCHLORIDE 10 MG: 10 TABLET ORAL at 05:35

## 2023-07-21 RX ADMIN — SODIUM CHLORIDE, SODIUM LACTATE, POTASSIUM CHLORIDE, AND CALCIUM CHLORIDE: .6; .31; .03; .02 INJECTION, SOLUTION INTRAVENOUS at 14:05

## 2023-07-21 RX ADMIN — GABAPENTIN 300 MG: 300 CAPSULE ORAL at 08:51

## 2023-07-21 RX ADMIN — BUPIVACAINE HYDROCHLORIDE 30 ML: 5 INJECTION, SOLUTION EPIDURAL; INTRACAUDAL; PERINEURAL at 13:39

## 2023-07-21 RX ADMIN — FENTANYL CITRATE 50 MCG: 50 INJECTION, SOLUTION INTRAMUSCULAR; INTRAVENOUS at 13:33

## 2023-07-21 RX ADMIN — GABAPENTIN 300 MG: 300 CAPSULE ORAL at 21:16

## 2023-07-21 RX ADMIN — LIDOCAINE HYDROCHLORIDE 50 MG: 10 INJECTION, SOLUTION EPIDURAL; INFILTRATION; INTRACAUDAL; PERINEURAL at 14:21

## 2023-07-21 RX ADMIN — LIDOCAINE 5% 1 PATCH: 700 PATCH TOPICAL at 08:56

## 2023-07-21 RX ADMIN — ACETAMINOPHEN 975 MG: 325 TABLET, FILM COATED ORAL at 21:16

## 2023-07-21 RX ADMIN — ONDANSETRON 4 MG: 2 INJECTION INTRAMUSCULAR; INTRAVENOUS at 14:21

## 2023-07-21 NOTE — TREATMENT PLAN
Vascular Surgery  Marquita Gomez 68 y.o. female MRN: [id number]  Unit/Bed#: [facility] 512-01 Encounter: [id number]      TREATMENT PLAN   - Plan for Left AKA 7/21, Discussed risks and benefits of the procedure.  All questions and concerns were addressed at bedside.   - NPO at midnight  - Okay to continue Hep Gtt overnight.  - Continue Plavix/ Statin      Jenna Leon PA-C   Vascular Surgery

## 2023-07-21 NOTE — ANESTHESIA POSTPROCEDURE EVALUATION
Post-Op Assessment Note    CV Status:  Stable  Pain Score: 0    Pain management: adequate     Mental Status:  Alert and awake   Hydration Status:  Euvolemic   PONV Controlled:  Controlled   Airway Patency:  Patent      Post Op Vitals Reviewed: Yes      Staff: CRNA, Anesthesiologist   Comments: Report given to recovering RN, VSS, Pt states she is comfortable        There were no known notable events for this encounter.     /64 (07/21/23 1602)    Temp 97.7 °F (36.5 °C) (07/21/23 1602)    Pulse (!) 53 (07/21/23 1602)   Resp 16 (07/21/23 1602)    SpO2 96 % (07/21/23 1602)

## 2023-07-21 NOTE — QUICK NOTE
Post-Op Check - Vascular Surgery   Aloma Carter 68 y.o. female MRN: [id number]  Unit/Bed#: [facility] [place] 12-46 Encounter: [id number]    ASSESMENT:  66yoF s/p L AKA in setting of LLE CLTI with no further revascularization options    PLAN:  - Incentive spirometry, wean O2 per NC as able  - Diet as tolerated; NS 100mL/hr x 4 hours post-op or until PO intake  - PRN Analgesia  - SQ Heparin for VTE prophylaxis  - Plan for dressing down POD 2; 7/23    Subjective: Patient denies chest pain, shortness of breath, or pain at present. Denies phantom limb pains. Vitals:  /61   Pulse 63   Temp 97.5 °F (36.4 °C)   Resp 16   Ht 5' 4" (1.626 m)   Wt 78.1 kg (172 lb 3.2 oz)   SpO2 (!) 88%   BMI 29.56 kg/m²     Physical Exam:  General appearance: alert and oriented, in no acute distress  Neurologic: Grossly neurologically intact  Neck: supple, symmetrical, trachea midline  Lungs: Normal work of breathing. Heart: Regular rate, grossly warm and well-perfused  Abdomen: Soft, rounded abdomen non-tender to palpation  Extremities: RLE M/S intact. L AKA dressing in place with ACE/Ioban without noted saturation, bleeding, or other discharge; clean and dry    Pulse exam:  Femoral: Right: 2+ Left: 2+  DP: Right: doppler signal PT: Right: doppler signal     I/Os:  I/O last 3 completed shifts: In: 6653 [P.O.:1340; I.V.:450]  Out: 4400 [Urine:4400]  I/O this shift:  In: 649.1 [I.V.:649.1]  Out: 700 [Urine:600; Blood:100]    Invasive Lines/Tubes:  Invasive Devices     Peripheral Intravenous Line  Duration           Peripheral IV 07/20/23 Dorsal (posterior); Left Forearm 1 day    Peripheral IV 07/21/23 Left Hand <1 day    Peripheral IV 07/21/23 Right Wrist <1 day              VTE Prophylaxis: Heparin    Eber Jimenez PA-C  7/21/2023

## 2023-07-21 NOTE — ANESTHESIA PROCEDURE NOTES
Peripheral Block    Patient location during procedure: holding area  Start time: 7/21/2023 1:31 PM  Reason for block: at surgeon's request and post-op pain management  Staffing  Performed by: Nuria Solitario MD  Authorized by: Nuria Solitario MD    Preanesthetic Checklist  Completed: patient identified, IV checked, site marked, risks and benefits discussed, surgical consent, monitors and equipment checked, pre-op evaluation and timeout performed  Peripheral Block  Patient position: supine  Prep: ChloraPrep  Patient monitoring: continuous pulse ox and frequent blood pressure checks  Block type: femoral  Laterality: left  Injection technique: single-shot  Procedures: ultrasound guided, Ultrasound guidance required for the procedure to increase accuracy and safety of medication placement and decrease risk of complications. Needle  Needle type: Stimuplex   Needle localization: ultrasound guidance and anatomical landmarks  Test dose: negative  Assessment  Injection assessment: incremental injection, local visualized surrounding nerve on ultrasound, negative aspiration for heme and no paresthesia on injection  Paresthesia pain: none  Heart rate change: no  Slow fractionated injection: yes  Post-procedure:  site cleaned  patient tolerated the procedure well with no immediate complications  Additional Notes  Difficult visualization due to shadowing from prior femoral artery graft or bypass.

## 2023-07-21 NOTE — ANESTHESIA PREPROCEDURE EVALUATION
Procedure:  AMPUTATION ABOVE KNEE (AKA) (Left: Leg Upper)    Relevant Problems   CARDIO   (+) Aortoiliac occlusive disease (HCC)   (+) CAD (coronary artery disease)   (+) Essential hypertension   (+) Mixed hyperlipidemia   (+) PAF (paroxysmal atrial fibrillation) (HCC)      /RENAL   (+) Stage 3 chronic kidney disease, unspecified whether stage 3a or 3b CKD (HCC)      NEURO/PSYCH   (+) Bipolar affective disorder, depressed, severe (HCC)      CAD s/p CABG  COURTNEY  Daily smoker    Stress test Jan 2023:  •  Stress ECG: There were no arrhythmias during recovery. . The ECG was not diagnostic due to resting ST-T abnormalities and pharmacological (vasodilator) stress. •  Stress Function: Left ventricular function post-stress is normal. Post-stress ejection fraction is 61 %. •  Perfusion: There is a left ventricular perfusion defect that is small in size with mild to moderate reduction in uptake present in the mid to apical anterior location(s) that is partially reversible. •  Stress Combined Conclusion: Left ventricular perfusion is abnormal.      Physical Exam    Airway    Mallampati score: II  TM Distance: >3 FB  Neck ROM: full     Dental       Cardiovascular  Cardiovascular exam normal    Pulmonary  Pulmonary exam normal     Other Findings        Anesthesia Plan  ASA Score- 3     Anesthesia Type- general with ASA Monitors. Additional Monitors:   Airway Plan: ETT. Comment: + PNB. Plan Factors-    Chart reviewed. EKG reviewed. Existing labs reviewed. Patient summary reviewed. Obstructive sleep apnea risk education given perioperatively. Induction- intravenous. Postoperative Plan- Plan for postoperative opioid use. Planned trial extubation    Informed Consent- Anesthetic plan and risks discussed with patient. I personally reviewed this patient with the CRNA. Discussed and agreed on the Anesthesia Plan with the CRNA. Cristiana Mulligan

## 2023-07-21 NOTE — PHYSICAL THERAPY NOTE
PHYSICAL THERAPY NOTE          Patient Name: Angie Reid  [id number] Date: 7/21/2023 07/21/23 1158   PT Last Visit   PT Visit Date 07/21/23   Note Type   Note type Evaluation; Cancelled Session   Cancel Reasons Patient to operating room     PT orders received and chart reviewed. Patient off floor at OR for L AKA. PT will continue to follow as able.     Brigitte Ahuja, PT, DPT

## 2023-07-21 NOTE — OP NOTE
OPERATIVE REPORT  PATIENT NAME: Erika Ochoa    :  1950  MRN: [id number]  Pt Location: BE OR ROOM 08    SURGERY DATE: 2023    Surgeon(s) and Role:     * Montrell Shearer MD - Primary     * Juwan Matos MD - DO [place] - Fellow    Preop Diagnosis:  Acute lower limb ischemia [I99.8]  PAD (peripheral artery disease) ([address]) [I73.9]  S/P vascular bypass [Z95.828]  Aortoiliac occlusive disease ([address]) [I74.09]    Post-Op Diagnosis Codes:     * Acute lower limb ischemia [I99.8]     * PAD (peripheral artery disease) ([address]) [I73.9]     * S/P vascular bypass [T42.078]     * Aortoiliac occlusive disease ([address]) [I74.09]    Procedure(s):  Left - AMPUTATION ABOVE KNEE (AKA)    Specimen(s):  * No specimens in log *    Estimated Blood Loss:   Minimal    Drains:  * No LDAs found *    Anesthesia Type:   General    Operative Indications:  Acute lower limb ischemia [I99.8]  PAD (peripheral artery disease) ([address]) [I73.9]  S/P vascular bypass [Z95.828]  Aortoiliac occlusive disease ([address]) [I74.09]    Ms. Gertrudis Walker is a 66yo female with multiple revascularization procedures now presenting with chronic left lower extremity limb threatening ischemia. Plan for left AKA. Informed consent was obtained. Operative Findings:  Viable muscle noted in left thigh, contractile  Amputation at distal 3/8GQ femor    Complications:   None    Procedure and Technique:  The patient was brought to the operating room. The patient was placed in the supine position. Femoral nerve block was performed by anesthesia in pre-op holding. After anesthesia monitoring lines were placed, induction of anesthesia was performed. Time out with site verification of the left leg was performed. The patient was prepped and draped in a sterile manner. The patient had a preoperative antibiotic administered within one hour of the skin incision.     A fishmouth skin incision was made and carried through the soft tissues and muscles down to the left femur. Satisfactory bleeding and tissue color were noted. The femur was transected with an oscillating saw and beveled ensuring the bone was cut to a smooth surface. Posterior muscle groups were divided and the specimen was delivered from the operative field. Additional hemostasis was achieved with cautery or ligation of individual vessels. The thrombosed left leg PTFE bypass graft was identified, isolated and ligated/transected high up in the thigh and allowed to retract upwards. The wound was irrigated with saline. A myoxpexy over the bone was performed. Fascia was re-approximated with interrupted 2-0 Vicryl. The subcutaneous tissue was re-approximated with interrupted 3-0 vicryl. The skin was re-approximated with skin staples. All sponge, needle and instrument counts were correct at the end of the case. The patient tolerated the procedure well. I was present for the entire procedure.     Patient Disposition:  PACU  and hemodynamically stable        SIGNATURE: Angi Solorzano MD  DATE: July 21, 2023  TIME: 2:51 PM

## 2023-07-21 NOTE — ANESTHESIA PROCEDURE NOTES
Peripheral Block    Patient location during procedure: holding area  Start time: 7/21/2023 1:39 PM  Reason for block: at surgeon's request and post-op pain management  Staffing  Performed by: Debra Anaya MD  Authorized by: Debra Anaya MD    Preanesthetic Checklist  Completed: patient identified, IV checked, site marked, risks and benefits discussed, surgical consent, monitors and equipment checked, pre-op evaluation and timeout performed  Peripheral Block  Patient position: right lateral decubitus  Prep: ChloraPrep  Patient monitoring: continuous pulse ox and frequent blood pressure checks  Block type: sciatic  Laterality: left  Injection technique: single-shot  Procedures: ultrasound guided, Ultrasound guidance required for the procedure to increase accuracy and safety of medication placement and decrease risk of complications.   Needle  Needle type: Stimuplex   Needle localization: ultrasound guidance and anatomical landmarks  Test dose: negative  Assessment  Injection assessment: incremental injection, local visualized surrounding nerve on ultrasound, negative aspiration for heme, no paresthesia on injection and transient paresthesias  Paresthesia pain: none  Heart rate change: no  Slow fractionated injection: yes  Post-procedure:  site cleaned  patient tolerated the procedure well with no immediate complications

## 2023-07-21 NOTE — PROGRESS NOTES
Progress Note - Vascular Surgery  Aloma Staple 68 y.o. female MRN: [id number]  Unit/Bed#: [facility] 12-46 Encounter: [id number]    Assessment:  68 y.o. female with chronic LLE ischemia secondary to aortoiliac and infrainguinal arterial occlusive disease, multiple failed revasc procedures, no further targets for revascularization. Plan:  - Diet NPO; Sips with meds  - Pain and Nausea control PRN  - OOB, ambulate as tolerated  - Plan for OR today for L AKA. Discussed procedure at bedside with patient. Any questions and concerns addressed. - Hold heparin on call to OR.  - Continue Plavix/statin. Subjective/Objective     Subjective: Patient is awake, alert, no acute distress. She is aware that she is going for procedure today. Pain reported to LLE. NPO status confirmed. Objective:   Vitals: Blood pressure 156/76, pulse 61, temperature 98.1 °F (36.7 °C), resp. rate 20, height 5' 4" (1.626 m), weight 78.1 kg (172 lb 3.2 oz), SpO2 90 %. ,Body mass index is 29.56 kg/m². I/O       07/19 0701  07/20 0700 07/20 0701  07/21 0700    P. O. 720 620    I.V. (mL/kg)  450 (5.8)    Total Intake(mL/kg) 720 (9.3) 1070 (13.7)    Urine (mL/kg/hr) 1802 (1) 2600 (1.4)    Stool 0     Total Output 1802 2600    Net -1082 -1530          Unmeasured Stool Occurrence 1 x           Physical Exam:  Gen: NAD, Aox3, Comfortable in bed  Chest: Normal work of breathing, no respiratory distress  Abd: Soft, ND, NT. Ext: LLE rubor, edema. B/l lower extremity 5/5 MMT and ROM WNL. Skin: Warm, Dry, Intact. LLE cool to touch. Pulses: palp fem b/l, palpable pop b/l, RLE DP/PT dopplerable, LLE DP/PT non dopplerable      Lab, Imaging and other studies: I have personally reviewed pertinent reports.     VTE Pharmacologic Prophylaxis: Heparin  VTE Mechanical Prophylaxis: sequential compression device        Marce Peguero DPM  7/21/2023  6:59 AM

## 2023-07-21 NOTE — PROGRESS NOTES
Progress Note - Acute Pain Service    Alberto Martines 68 y.o. female MRN: [id number]  Unit/Bed#: [facility] 12-46 Encounter: [id number]      Assessment:   Principal Problem:    Acute lower limb ischemia    Patient Active Problem List   Diagnosis   • Bipolar affective disorder, depressed, severe ([address])   • CAD (coronary artery disease)   • Essential hypertension   • Hx of CABG   • S/P vascular bypass   • Thyroid nodule   • Renal artery stenosis (HCC)   • Presence of IVC filter   • PAD (peripheral artery disease) (HCC)   • Left leg swelling   • Mixed hyperlipidemia   • Aortoiliac occlusive disease (HCC)   • Tobacco abuse   • PAF (paroxysmal atrial fibrillation) (HCC)   • Stage 3 chronic kidney disease, unspecified whether stage 3a or 3b CKD (HCC)   • Dizziness   • Insomnia   • Nodule of upper lobe of right lung   • Acute lower limb ischemia       Alberto Martines is a 68 y.o. female  with history of hypertension, lung cancer, CAD status post MI/CABG in 2018, PAD with stenting, renal artery stenosis, hyperlipidemia, aortoiliac occlusive disease, paroxysmal A-fib, stage III CKD, bipolar disorder, who presents with left lower extremity pain and numbness. Etiology of presentation due to Aortoiliac and infrainguinal arterial occlusive disease. Pain is currently 8-9 out of 10, and described as burning, sharp, achy, throbbing, cold. Patient has been taking gabapentin and ibuprofen for pain recently, but has been escalating ibuprofen doses. Patient takes Plavix at home. Pt was started on Oxycodone 5/10 mg Q4H PRN for moderate/severe pain, and Dilaudid 0.5 mg IV Q4H PRN for breakthrough pain, which was decreased to Q2H PRN. At this time, patient reports that she is getting adequate pain relief whenever she takes the as needed oxycodone 10 mg dose. However, she notes variable duration of pain relief and pain exacerbation.   She notes that when she does not move her left lower extremity, pain is lesser and more tolerable, and when she uses it, pain exacerbates. She reports readiness to proceed with the amputation scheduled today at 2 PM.  She denied dizziness, lightheadedness, headaches, chest pain, shortness of breath, nausea, vomiting, pain elsewhere. At this time, continue current medication regimen. She is scheduled for a left AKA at 2 PM today. Plan for preoperative nerve blocks for analgesia. Plan:   - Continue Oxycodone 5 mg / 10 mg every 4 hours as needed for moderate/severe pain  - Continue Dilaudid 0.5 mg IV every 2 hours as needed for breakthrough pain  - Plan for latia-operative nerve block       Multimodal analgesia:  - Tylenol 975 mg PO q8hrs standing  - Gabapentin 300 mg PO TID  - Robaxin 500 mg PO q6hrs   - Lidocaine patches to affected areas 12 hours on, 12 hours off    Bowel Regimen:  - Bowel regimen when appropriate from surgical perspective    APS will continue to follow. Please contact Acute Pain Service - [facility] via [employer] from [phone] with additional questions or concerns. See [employer] or [facility] for additional contacts and after hours information. Pain History  Current pain location(s): left lower extremity  Pain Scale:   7-8/10  Quality: throbbing  24 hour history: Reports adequate pain control when she takes the Oxycodone 10 mg, providing pain relief for at least several hours, though unsure how long exactly pain relief lasts. Has requested PRN opioids infrequently.      Opioid requirement previous 24 hours: 40 mg of oxycodone PO, 1 mg of Dilaudid IV    Meds/Allergies   all current active meds have been reviewed, current meds:   Current Facility-Administered Medications   Medication Dose Route Frequency   • acetaminophen (TYLENOL) tablet 975 mg  975 mg Oral Q8H [address]   • albuterol (PROVENTIL HFA,VENTOLIN HFA) inhaler 2 puff  2 puff Inhalation Q6H PRN   • amLODIPine (NORVASC) tablet 5 mg  5 mg Oral Daily   • atorvastatin (LIPITOR) tablet 40 mg  40 mg Oral Daily With Dinner   • clopidogrel (PLAVIX) tablet 75 mg  75 mg Oral Daily   • diltiazem (CARDIZEM) tablet 120 mg  120 mg Oral Daily   • docusate sodium (COLACE) capsule 100 mg  100 mg Oral BID   • ezetimibe (ZETIA) tablet 10 mg  10 mg Oral Daily   • folic acid (FOLVITE) tablet 1 mg  1 mg Oral Daily   • gabapentin (NEURONTIN) capsule 300 mg  300 mg Oral HS   • gabapentin (NEURONTIN) capsule 300 mg  300 mg Oral BID   • heparin (porcine) 25,000 units in 0.45% NaCl 250 mL infusion (premix)  3-30 Units/kg/hr (Order-Specific) Intravenous Titrated   • heparin (porcine) injection 3,000 Units  3,000 Units Intravenous Q6H PRN   • heparin (porcine) injection 6,000 Units  6,000 Units Intravenous Q6H PRN   • HYDROmorphone (DILAUDID) injection 0.5 mg  0.5 mg Intravenous Q2H PRN   • isosorbide mononitrate (IMDUR) 24 hr tablet 30 mg  30 mg Oral QAM   • lamoTRIgine (LaMICtal) tablet 100 mg  100 mg Oral Daily   • lidocaine (LIDODERM) 5 % patch 2 patch  2 patch Topical Daily   • melatonin tablet 9 mg  9 mg Oral HS PRN   • methocarbamol (ROBAXIN) tablet 500 mg  500 mg Oral Q6H TAIWO   • metoprolol succinate (TOPROL-XL) 24 hr tablet 50 mg  50 mg Oral Daily   • nicotine (NICODERM CQ) 21 mg/24 hr TD 24 hr patch 1 patch  1 patch Transdermal Daily   • ondansetron (ZOFRAN) injection 4 mg  4 mg Intravenous Q6H PRN   • oxyCODONE (ROXICODONE) IR tablet 5 mg  5 mg Oral Q4H PRN    Or   • oxyCODONE (ROXICODONE) immediate release tablet 10 mg  10 mg Oral Q4H PRN   • polyethylene glycol (MIRALAX) packet 17 g  17 g Oral Daily PRN   • traZODone (DESYREL) tablet 50 mg  50 mg Oral HS PRN    and PTA meds:   Prior to Admission Medications   Prescriptions Last Dose Informant Patient Reported? Taking?    Folic Acid 0.8 MG CAPS  Self No No   Sig: Take 1 capsule (0.8 mg total) by mouth in the morning   Melatonin 5 MG TABS  Self Yes No   Sig: Take 15 mg by mouth Patient states she takes 20MG   albuterol (ProAir HFA) 90 mcg/act inhaler  Self No No   Sig: Inhale 2 puffs every 6 (six) hours as needed for wheezing amLODIPine (NORVASC) 5 mg tablet  Self Yes No   Sig: Take 5 mg by mouth daily   clopidogrel (PLAVIX) 75 mg tablet  Self No No   Sig: Take 1 tablet (75 mg total) by mouth in the morning   diltiazem (CARDIZEM) 120 MG tablet  Self No No   Sig: Take 1 tablet (120 mg total) by mouth in the morning   ezetimibe (ZETIA) 10 mg tablet  Self No No   Sig: Take 1 tablet (10 mg total) by mouth daily   gabapentin (Neurontin) 300 mg capsule  Self No No   Sig: Take 1 capsule (300 mg total) by mouth daily at bedtime   gabapentin (Neurontin) 300 mg capsule   No No   Sig: Take 1 capsule (300 mg total) by mouth 2 (two) times a day   isosorbide mononitrate (IMDUR) 30 mg 24 hr tablet  Self Yes No   Sig: Take 30 mg by mouth every morning   lamoTRIgine (LaMICtal) 100 mg tablet  Self No No   Sig: Take 1 tablet (100 mg total) by mouth daily   lisinopril (ZESTRIL) 10 mg tablet  Self No No   Sig: take 1 tablet by mouth once daily   metoprolol succinate (TOPROL-XL) 50 mg 24 hr tablet  Self No No   Sig: Take 1 tablet (50 mg total) by mouth daily   ondansetron (ZOFRAN-ODT) 8 mg disintegrating tablet   No No   Sig: Take 1 tablet (8 mg total) by mouth every 8 (eight) hours as needed for nausea or vomiting   rosuvastatin (CRESTOR) 20 MG tablet  Self Yes No   Sig: Take 20 mg by mouth daily   traZODone (DESYREL) 50 mg tablet  Self No No   Sig: Take 1 tablet (50 mg total) by mouth daily at bedtime   Patient taking differently: Take 50 mg by mouth daily at bedtime as needed      Facility-Administered Medications: None       No Known Allergies    Objective     Temp:  [97.5 °F (36.4 °C)-98.1 °F (36.7 °C)] 98.1 °F (36.7 °C)  HR:  [56-70] 56  Resp:  [16-20] 20  BP: (130-156)/(68-81) 146/73    Physical Exam  Vitals and nursing note reviewed. Constitutional:       General: She is not in acute distress. Appearance: Normal appearance. She is not ill-appearing or diaphoretic. HENT:      Head: Normocephalic and atraumatic.       Right Ear: External ear normal.      Left Ear: External ear normal.      Nose: Nose normal.      Mouth/Throat:      Pharynx: Oropharynx is clear. Eyes:      Extraocular Movements: Extraocular movements intact. Conjunctiva/sclera: Conjunctivae normal.   Cardiovascular:      Rate and Rhythm: Normal rate and regular rhythm. Pulmonary:      Effort: Pulmonary effort is normal. No respiratory distress. Abdominal:      Palpations: Abdomen is soft. Tenderness: There is no abdominal tenderness. There is no guarding. Musculoskeletal:         General: Swelling and tenderness (pain on palpation of Left ankle, dorsum of foot, and plantar foot.) present. Normal range of motion. Cervical back: Normal range of motion. Comments: Below knee, limb is cool to touch, purple discoloration. Skin:     General: Skin is warm and dry. Neurological:      General: No focal deficit present. Mental Status: She is alert and oriented to person, place, and time. Psychiatric:         Behavior: Behavior normal.         Thought Content: Thought content normal.       Lab Results:   Results from last 7 days   Lab Units 07/21/23  0353   WBC Thousand/uL 10.66*   HEMOGLOBIN g/dL 12.9   HEMATOCRIT % 39.1   PLATELETS Thousands/uL 182      Results from last 7 days   Lab Units 07/21/23  0353 07/19/23  0446 07/18/23  1128   POTASSIUM mmol/L 3.9   < > 4.6   CHLORIDE mmol/L 107   < > 110*   CO2 mmol/L 25   < > 22   BUN mg/dL 11   < > 12   CREATININE mg/dL 0.84   < > 0.97   CALCIUM mg/dL 9.1   < > 9.7   ALK PHOS U/L  --   --  75   ALT U/L  --   --  25   AST U/L  --   --  28    < > = values in this interval not displayed. Imaging Studies: I have personally reviewed pertinent reports. EKG, Pathology, and Other Studies: I have personally reviewed pertinent reports. Counseling / Coordination of Care  Total floor / unit time spent today 20 minutes.  Greater than 50% of total time was spent with the patient and / or family counseling and / or coordination of care. A description of the counseling / coordination of care: Patient interview, physical examination, review of PDMP, review of medical record, review of imaging and laboratory data, development of pain management plan, discussion of pain management plan with patient and primary service. Please note that the APS provides consultative services regarding pain management only. With the exception of ketamine and epidural infusions and except when indicated, final decisions regarding starting or changing doses of analgesic medications are at the discretion of the consulting service. Off hours consultation and/or medication management is generally not available.     Shar Jaquez MD  Acute Pain Service

## 2023-07-21 NOTE — PLAN OF CARE
Problem: PAIN - ADULT  Goal: Verbalizes/displays adequate comfort level or baseline comfort level  Description: Interventions:  - Encourage patient to monitor pain and request assistance  - Assess pain using appropriate pain scale  - Administer analgesics based on type and severity of pain and evaluate response  - Implement non-pharmacological measures as appropriate and evaluate response  - Consider cultural and social influences on pain and pain management  - Notify physician/advanced practitioner if interventions unsuccessful or patient reports new pain  Outcome: Progressing     Problem: INFECTION - ADULT  Goal: Absence or prevention of progression during hospitalization  Description: INTERVENTIONS:  - Assess and monitor for signs and symptoms of infection  - Monitor lab/diagnostic results  - Monitor all insertion sites, i.e. indwelling lines, tubes, and drains  - Monitor endotracheal if appropriate and nasal secretions for changes in amount and color  - Dawson appropriate cooling/warming therapies per order  - Administer medications as ordered  - Instruct and encourage patient and family to use good hand hygiene technique  - Identify and instruct in appropriate isolation precautions for identified infection/condition  Outcome: Progressing     Problem: INFECTION - ADULT  Goal: Absence of fever/infection during neutropenic period  Description: INTERVENTIONS:  - Monitor WBC    Outcome: Progressing

## 2023-07-22 LAB
ABO GROUP BLD BPU: NORMAL
ABO GROUP BLD BPU: NORMAL
ANION GAP SERPL CALCULATED.3IONS-SCNC: 5 MMOL/L
BPU ID: NORMAL
BPU ID: NORMAL
BUN SERPL-MCNC: 9 MG/DL (ref 5–25)
CALCIUM SERPL-MCNC: 8.9 MG/DL (ref 8.3–10.1)
CHLORIDE SERPL-SCNC: 110 MMOL/L (ref 96–108)
CO2 SERPL-SCNC: 24 MMOL/L (ref 21–32)
CREAT SERPL-MCNC: 0.83 MG/DL (ref 0.6–1.3)
CROSSMATCH: NORMAL
CROSSMATCH: NORMAL
ERYTHROCYTE [DISTWIDTH] IN BLOOD BY AUTOMATED COUNT: 13.3 % (ref 11.6–15.1)
GFR SERPL CREATININE-BSD FRML MDRD: 70 ML/MIN/1.73SQ M
GLUCOSE SERPL-MCNC: 153 MG/DL (ref 65–140)
HCT VFR BLD AUTO: 38.2 % (ref 34.8–46.1)
HGB BLD-MCNC: 12.8 G/DL (ref 11.5–15.4)
MCH RBC QN AUTO: 29.7 PG (ref 26.8–34.3)
MCHC RBC AUTO-ENTMCNC: 33.5 G/DL (ref 31.4–37.4)
MCV RBC AUTO: 89 FL (ref 82–98)
PLATELET # BLD AUTO: 164 THOUSANDS/UL (ref 149–390)
PMV BLD AUTO: 10.6 FL (ref 8.9–12.7)
POTASSIUM SERPL-SCNC: 4.1 MMOL/L (ref 3.5–5.3)
RBC # BLD AUTO: 4.31 MILLION/UL (ref 3.81–5.12)
SODIUM SERPL-SCNC: 139 MMOL/L (ref 135–147)
UNIT DISPENSE STATUS: NORMAL
UNIT DISPENSE STATUS: NORMAL
UNIT PRODUCT CODE: NORMAL
UNIT PRODUCT CODE: NORMAL
UNIT PRODUCT VOLUME: 350 ML
UNIT PRODUCT VOLUME: 350 ML
UNIT RH: NORMAL
UNIT RH: NORMAL
WBC # BLD AUTO: 13.95 THOUSAND/UL (ref 4.31–10.16)

## 2023-07-22 PROCEDURE — 99024 POSTOP FOLLOW-UP VISIT: CPT | Performed by: SURGERY

## 2023-07-22 PROCEDURE — 85027 COMPLETE CBC AUTOMATED: CPT | Performed by: SURGERY

## 2023-07-22 PROCEDURE — 99232 SBSQ HOSP IP/OBS MODERATE 35: CPT | Performed by: STUDENT IN AN ORGANIZED HEALTH CARE EDUCATION/TRAINING PROGRAM

## 2023-07-22 PROCEDURE — 97167 OT EVAL HIGH COMPLEX 60 MIN: CPT

## 2023-07-22 PROCEDURE — 80048 BASIC METABOLIC PNL TOTAL CA: CPT | Performed by: SURGERY

## 2023-07-22 RX ADMIN — ACETAMINOPHEN 975 MG: 325 TABLET, FILM COATED ORAL at 13:13

## 2023-07-22 RX ADMIN — DOCUSATE SODIUM 100 MG: 100 CAPSULE ORAL at 08:59

## 2023-07-22 RX ADMIN — LAMOTRIGINE 100 MG: 100 TABLET ORAL at 08:59

## 2023-07-22 RX ADMIN — AMLODIPINE BESYLATE 5 MG: 5 TABLET ORAL at 21:35

## 2023-07-22 RX ADMIN — FOLIC ACID 1 MG: 1 TABLET ORAL at 08:59

## 2023-07-22 RX ADMIN — METOPROLOL SUCCINATE 50 MG: 50 TABLET, EXTENDED RELEASE ORAL at 08:59

## 2023-07-22 RX ADMIN — ATORVASTATIN CALCIUM 40 MG: 40 TABLET, FILM COATED ORAL at 17:08

## 2023-07-22 RX ADMIN — HEPARIN SODIUM 5000 UNITS: 5000 INJECTION INTRAVENOUS; SUBCUTANEOUS at 13:13

## 2023-07-22 RX ADMIN — OXYCODONE HYDROCHLORIDE 10 MG: 10 TABLET ORAL at 17:08

## 2023-07-22 RX ADMIN — ISOSORBIDE MONONITRATE 30 MG: 30 TABLET, EXTENDED RELEASE ORAL at 08:59

## 2023-07-22 RX ADMIN — HEPARIN SODIUM 5000 UNITS: 5000 INJECTION INTRAVENOUS; SUBCUTANEOUS at 05:44

## 2023-07-22 RX ADMIN — OXYCODONE HYDROCHLORIDE 10 MG: 10 TABLET ORAL at 13:13

## 2023-07-22 RX ADMIN — GABAPENTIN 300 MG: 300 CAPSULE ORAL at 17:08

## 2023-07-22 RX ADMIN — OXYCODONE HYDROCHLORIDE 10 MG: 10 TABLET ORAL at 21:34

## 2023-07-22 RX ADMIN — ACETAMINOPHEN 975 MG: 325 TABLET, FILM COATED ORAL at 05:44

## 2023-07-22 RX ADMIN — EZETIMIBE 10 MG: 10 TABLET ORAL at 08:59

## 2023-07-22 RX ADMIN — METHOCARBAMOL TABLETS 500 MG: 500 TABLET, COATED ORAL at 23:36

## 2023-07-22 RX ADMIN — METHOCARBAMOL TABLETS 500 MG: 500 TABLET, COATED ORAL at 05:44

## 2023-07-22 RX ADMIN — DOCUSATE SODIUM 100 MG: 100 CAPSULE ORAL at 17:09

## 2023-07-22 RX ADMIN — CLOPIDOGREL BISULFATE 75 MG: 75 TABLET ORAL at 08:59

## 2023-07-22 RX ADMIN — OXYCODONE HYDROCHLORIDE 10 MG: 10 TABLET ORAL at 08:59

## 2023-07-22 RX ADMIN — GABAPENTIN 300 MG: 300 CAPSULE ORAL at 08:59

## 2023-07-22 RX ADMIN — DILTIAZEM HYDROCHLORIDE 120 MG: 60 TABLET, FILM COATED ORAL at 08:59

## 2023-07-22 RX ADMIN — MELATONIN TAB 3 MG 9 MG: 3 TAB at 21:34

## 2023-07-22 RX ADMIN — METHOCARBAMOL TABLETS 500 MG: 500 TABLET, COATED ORAL at 17:08

## 2023-07-22 RX ADMIN — HEPARIN SODIUM 5000 UNITS: 5000 INJECTION INTRAVENOUS; SUBCUTANEOUS at 21:34

## 2023-07-22 RX ADMIN — METHOCARBAMOL TABLETS 500 MG: 500 TABLET, COATED ORAL at 12:17

## 2023-07-22 RX ADMIN — ACETAMINOPHEN 975 MG: 325 TABLET, FILM COATED ORAL at 21:35

## 2023-07-22 RX ADMIN — GABAPENTIN 300 MG: 300 CAPSULE ORAL at 21:35

## 2023-07-22 RX ADMIN — NICOTINE 1 PATCH: 21 PATCH, EXTENDED RELEASE TRANSDERMAL at 09:00

## 2023-07-22 NOTE — ASSESSMENT & PLAN NOTE
68 y.o. female with chronic left lower extremity ischemia secondary to aortoiliac and infrainguinal arterial occlusive disease. Patient has multiple failed revascularization procedures. She has no further targets for revascularization in the left lower extremity.     Now S/p L AKA 7/21/23 Elpidio Gaston)    Recommendations:  - Incentive spirometry, wean O2 per NC as able  - Diet as tolerated  - PRN Analgesia  - SQ Heparin for VTE prophylaxis  - Plan for dressing down POD 2; 7/23  - Appreciate APS/PMR input  - Will discuss with Dr. Elpidio Gaston

## 2023-07-22 NOTE — OCCUPATIONAL THERAPY NOTE
Occupational Therapy Evaluation      Jesu Spray    7/22/2023    Principal Problem:    Acute lower limb ischemia  Active Problems:    PAD (peripheral artery disease) ([facility])      Past Medical History:   Diagnosis Date   • Aorto-iliac disease ([address])    • Atrial fibrillation ([address])    • CAD (coronary artery disease)    • Carotid stenosis, bilateral    • Celiac artery stenosis ([facility])    • CKD (chronic kidney disease) stage 3, GFR 30-59 ml/min ([facility])    • DVT (deep venous thrombosis) ([facility])     LLE (CFV, popl)   • Heart attack ([address]) 02/2022   • HLD (hyperlipidemia)    • Hypertension    • Lung mass     RUL   • Myocardial infarction ([address])     2022   • COURTNEY (obstructive sleep apnea)    • PAD (peripheral artery disease) ([address])    • Stroke [facility])        Past Surgical History:   Procedure Laterality Date   • AORTA - FEMORAL ARTERY BYPASS GRAFT Left    • AXILLO-FEMORAL BYASS GRAFT Bilateral    • BYPASS FEMORAL-FEMORAL     • CORONARY ARTERY BYPASS GRAFT  2000   • FEMORAL ARTERY - POPLITEAL ARTERY BYPASS GRAFT Bilateral    • IVC FILTER INSERTION     • TOTAL ABDOMINAL HYSTERECTOMY W/ BILATERAL SALPINGOOPHORECTOMY     • TOTAL HIP ARTHROPLASTY Left         07/22/23 0829   OT Last Visit   OT Visit Date 07/22/23   Note Type   Note type Evaluation   Pain Assessment   Pain Assessment Tool 0-10   Pain Score 8   Pain Location/Orientation Orientation: Left; Location: Leg;Location: Incision   Patient's Stated Pain Goal No pain   Hospital Pain Intervention(s) Repositioned; Ambulation/increased activity; Emotional support   Restrictions/Precautions   Weight Bearing Precautions Per Order Yes   LLE Weight Bearing Per Order (S)  NWB  (s/p AKA)   Other Precautions Chair Alarm; Bed Alarm;Multiple lines;O2;Fall Risk;Pain  (4L NC O2)   Home Living   Type of [address] [place] [address] Dr One level;Performs ADLs on one level;Stairs to enter with rails    FFSU; 2STE thru front door and garage   Bathroom Shower/Tub Tub/shower unit   Bathroom Toilet Standard Bathroom Equipment   (denies DME)   Home Equipment Cane   Additional Comments Pt reports use of SPC only for a few days leading up to her surgery. Prior to that, she was ambulating w/o AD. Prior Function   Level of [place] Independent with ADLs; Independent with functional mobility   Lives With Son  (and daughter in law, grandkids)   [address] Help From Family   IADLs Independent with driving; Independent with meal prep; Independent with medication management   Falls in the last 6 months 0   Vocational Retired   Lifestyle   Autonomy Pt reports being IND w/ all ADLS and IADLS; (+) drives; only recently ambulating w/ SPC PTA   Reciprocal Relationships Pt lives w/ her son, dtr in law, adult grandkids, and greatgrandkid. Pt reports her dtr in law works from home and son works out of the home. Pt reports her adult grandkids are home on summer break from school. Service to Others Pt is retired   Intrinsic Gratification Pt reports enjoying spending time w/ family   ADL   Where Assessed Edge of bed   Eating Assistance 5  Supervision/Setup   Grooming Assistance 5  Supervision/Setup   UB Bathing Assistance 4  Minimal Assistance    [address] 3  Moderate Assistance   UB Dressing Assistance 4  Minimal Assistance   LB [address] Final 2  Maximal [address]  2  Maximal Assistance   Bed Mobility   Supine to Sit 3  Moderate assistance   Additional items Assist x 1; Increased time required;LE management;Verbal cues; Bedrails;HOB elevated   Sit to Supine 4  Minimal assistance   Additional items Assist x 1; Increased time required;LE management;Verbal cues; Bedrails   Additional Comments Pt laying supine in bed upon OT arrival. Pt seated at EOB at close S level for ~5 min. Pt not agreeable for OOB transfers at this time due to LLE pain. Pt returned laying supine in bed w/ HOB elevated and all needs in reach s/p OT session.    Transfers   Sit to Stand Unable to assess   Stand to Sit Unable to assess   Sit pivot Unable to assess   Additional Comments Pt not agreeable for OOB transfers at this time due to LLE pain   Balance   Static Sitting Fair -   Dynamic Sitting Poor +   Activity Tolerance   Activity Tolerance Patient limited by fatigue;Patient limited by pain   Nurse Made Aware JACQUELYN Fisher cleared Pt for OT session   RUE Assessment   RUE Assessment WFL   LUE Assessment   LUE Assessment WFL   Hand Function   Gross Motor Coordination Functional   Fine Motor Coordination Functional   Sensation   Additional Comments Pt reports (+) phantom sensations LLE   Cognition   Overall Cognitive Status WFL   Arousal/Participation Alert; Cooperative   Attention Within functional limits   Orientation Level Oriented X4   Memory Within functional limits   Following Commands Follows all commands and directions without difficulty   Comments Pt is pleasant and cooperative for therapy today   Assessment   Limitation Decreased ADL status; Decreased UE strength;Decreased Safe judgement during ADL;Decreased endurance;Decreased high-level ADLs   Prognosis Good   Assessment Pt is a 69 yo female seen for OT eval s/p adm to [facility] on 7/18/23 dx'd w/ acute lower limb ischemia. Pt now s/p L AKA 7/21/23. Pt  has a past medical history of Aorto-iliac disease ([address]), Atrial fibrillation ([address]), CAD (coronary artery disease), Carotid stenosis, bilateral, Celiac artery stenosis ([address]), CKD (chronic kidney disease) stage 3, GFR 30-59 ml/min ([address]), DVT (deep venous thrombosis) ([address]), Heart attack ([address]) (02/2022), HLD (hyperlipidemia), Hypertension, Lung mass, Myocardial infarction ([address]), COURTNEY (obstructive sleep apnea), PAD (peripheral artery disease) ([address]), and Stroke ([address]). Pt with active OT orders and activity as tolerated orders. Pt is retired and resides w/ son, dtr in law, adult grandkids, and great grand kid w/ FFSU and 2STE. Pt reports family members work during the day, however adult grandkids are home for school summer break.  Pt was I w/  ADLS and IADLS, drove, & required use of DME PTA, including only recent use of SPC PTA. Pt is currently demonstrating the following occupational deficits: Min A U self care, Mod to Max A LB self care and toileting, Mod A bed mobility. OOB transfers not assessed at this time due to Pt refusal from LLE pain. These deficits that are impacting pt's baseline areas of occupation are a result of the following impairments: pain, endurance, activity tolerance, functional mobility, forward functional reach, balance, functional standing tolerance, unsupportive home environment, decreased I w/ ADLS/IADLS, strength and decreased safety awareness. The following Occupational Performance Areas to address include: grooming, bathing/shower, toilet hygiene, dressing, health maintenance, functional mobility and clothing management. Based on the aforementioned OT evaluation, functional performance deficits, and assessments, pt has been identified as a high complexity evaluation. Recommend STR upon D/C. The patient's raw score on the -PAC Daily Activity Inpatient Short Form is 16. A raw score of less than 19 suggests the patient may benefit from discharge to post-acute rehabilitation services. Please refer to the recommendation of the Occupational Therapist for safe discharge planning. Pt to continue to benefit from acute immediate OT services to address the following goals 2-3x/week to  w/in 10-14 days:   Goals   Patient Goals To decrease pain and increase strength   LTG Time Frame 7-10   Long Term Goal #1 Refer to goals below   Plan   Treatment Interventions ADL retraining;Functional transfer training;UE strengthening/ROM; Endurance training;Patient/family training;Equipment evaluation/education; Neuromuscular reeducation; Fine motor coordination activities; Compensatory technique education; Energy conservation; Activityengagement   Goal Expiration Date 23   OT Frequency 2-3x/wk   Recommendation   OT Discharge Recommendation Post acute rehabilitation services   AM-PAC Daily Activity Inpatient   Lower Body Dressing 2   Bathing 2   Toileting 2   Upper Body Dressing 3   Grooming 3   Eating 4   Daily Activity Raw Score 16   Daily Activity Standardized Score (Calc for Raw Score >=11) 35.96   AM-PAC Applied Cognition Inpatient   Following a Speech/Presentation 4   Understanding Ordinary Conversation 4   Taking Medications 4   Remembering Where Things Are Placed or Put Away 4   Remembering List of 4-5 Errands 4   Taking Care of Complicated Tasks 4   Applied Cognition Raw Score 24   Applied Cognition Standardized Score 62.21     GOALS    1) Pt will improve sitting tolerance to 10 minutes at EOB with mod I to facilitate OOB participation in ADLs   2) Pt will improve activity tolerance to G for min 30 min txment sessions to increase participation in ADLs. 3) Pt will increase bed mobility to mod I and transfer EOB to participate in functional activity with G tolerance and balance.   4) Pt will complete ADLs/self care at seated level w/ mod I w/ G hyiene/thoroughness using adaptive equipment as needed w/ min cues fro cog support  5) Pt will complete toileting w/ mod I w/ G hygiene/thoroughness using DME as needed  6) Pt will improve functional sit pivot/ slide board transfers on/off all surfaces using DME as needed w/ G balance/safety including toileting w/ mod I  7) Pt will improve functional sit to stand transfers on/off all surfaces using DME as needed w/ G balance/safety including toileting w/ supervision  8) Pt will improve functional mobility using w/c during ADL/IADL/leisure tasks using DME as needed w/ g balance/safety w/ mod I  9) Pt will improve functional mobility using rw during ADL/IADL/leisure tasks using DME as needed w/ g balance/safety w/ min A  10) Pt will demonstrate G carryover of pt/caregiver education and training as appropriate w/ mod I w/o cues w/ G tolerance  11) Pt will demonstrate 100% carryover of learned E.C. techniques s/p skilled education w/o cues t/o functional ADL/ IADL/leisure interest tasks w/ mod I  12) Pt will demonstrate 100% carryover of precautions s/p review w/o cues w/ mod I w/ G tolerance/participation t/o functional ADL/IADL/leisure tasks   13) Pt will identify 2-3 coping strategies to manage stress   14) Pt will be able tolerate desensitization to residual limb s/p skilled education and guided practice  15) Pt will engage in depression screen/leisure interest checklist w/ mod I and G participation to monitor s/s depression and ID 3 positive coping strategies to A w/ emotional regulation and management  16) Pt will engage in ongoing cognitive assessment w/ G participation w/ mod I to assist w/ safe d/c planning/recommendations  17) Pt will demonstrate G high level balance and tolerance t/o fx'l I/ADL/leisure tasks w/ mod I w/ DME PRN w/ G balance/safety w/o cues  18) Pt will improve UB fx'l use/strength and core strength via therex s/p skilled education of HEP w/ mod I w/o cues for carryover      Andres Barriga, MS, OTR/L

## 2023-07-22 NOTE — PROGRESS NOTES
[address] [place] [address]  Progress Note  Name: Jesus Mckeon  MRN: [id number]  Unit/Bed#: [facility] 355-66 I Date of Admission: 7/18/2023   Date of Service: 7/22/2023 I Hospital Day: 4    Assessment/Plan   PAD (peripheral artery disease) [facility])  Assessment & Plan  68 y.o. female with chronic left lower extremity ischemia secondary to aortoiliac and infrainguinal arterial occlusive disease. Patient has multiple failed revascularization procedures. She has no further targets for revascularization in the left lower extremity. Now S/p L AKA 7/21/23 Enrique Pacheco)    Recommendations:  - Incentive spirometry, wean O2 per NC as able  - Diet as tolerated  - PRN Analgesia  - SQ Heparin for VTE prophylaxis  - Plan for dressing down POD 2; 7/23  - Appreciate APS/PMR input  - Will discuss with Dr. Tyrese Hernandez: Patient feels well with pain well controlled, Does endorse some L phantom limb sensation but without phantom limb pain. Denies chest pain, shortness of breath    Vitals:  /63 (BP Location: Right arm)   Pulse 70   Temp 97.5 °F (36.4 °C) (Oral)   Resp 18   Ht 5' 4" (1.626 m)   Wt 73.4 kg (161 lb 12.8 oz)   SpO2 93%   BMI 27.77 kg/m²     I/Os:  I/O last 3 completed shifts: In: 1719.1 [P.O.:620; I.V.:1099.1]  Out: 3300 [Urine:3200; Blood:100]  I/O this shift:   In: 428.3 [I.V.:428.3]  Out: 1850 [Urine:1850]      Lab Results and Cultures:   CBC with diff: Lab Results   Component Value Date    WBC 10.66 (H) 07/21/2023    HGB 12.9 07/21/2023    HCT 39.1 07/21/2023    MCV 90 07/21/2023     07/21/2023    RBC 4.36 07/21/2023    MCH 29.6 07/21/2023    MCHC 33.0 07/21/2023    RDW 13.2 07/21/2023    MPV 10.1 07/21/2023    NRBC 0 07/18/2023   ,   BMP/CMP:  Lab Results   Component Value Date    SODIUM 136 07/21/2023    K 3.9 07/21/2023     07/21/2023    CO2 25 07/21/2023    BUN 11 07/21/2023    CREATININE 0.84 07/21/2023    CALCIUM 9.1 07/21/2023    AST 28 07/18/2023    ALT 25 07/18/2023    ALKPHOS 75 07/18/2023    EGFR 69 07/21/2023   ,   Lipid Panel: No results found for: "CHOL",   Coags:   Lab Results   Component Value Date    PTT 36 07/21/2023    INR 0.92 07/18/2023   ,     Blood Culture: No results found for: "BLOODCX",   Urinalysis: No results found for: "COLORU", "CLARITYU", "SPECGRAV", "PHUR", "LEUKOCYTESUR", "NITRITE", "PROTEINUA", "GLUCOSEU", "Marzella Double", "BILIRUBINUR", "BLOODU",   Urine Culture: No results found for: "URINECX",   Wound Culure: No results found for: "WOUNDCULT"    Medications:  Current Facility-Administered Medications   Medication Dose Route Frequency   • acetaminophen (TYLENOL) tablet 975 mg  975 mg Oral Q8H [facility]   • albuterol (PROVENTIL HFA,VENTOLIN HFA) inhaler 2 puff  2 puff Inhalation Q6H PRN   • amLODIPine (NORVASC) tablet 5 mg  5 mg Oral Daily   • atorvastatin (LIPITOR) tablet 40 mg  40 mg Oral Daily With Dinner   • clopidogrel (PLAVIX) tablet 75 mg  75 mg Oral Daily   • diltiazem (CARDIZEM) tablet 120 mg  120 mg Oral Daily   • docusate sodium (COLACE) capsule 100 mg  100 mg Oral BID   • ezetimibe (ZETIA) tablet 10 mg  10 mg Oral Daily   • folic acid (FOLVITE) tablet 1 mg  1 mg Oral Daily   • gabapentin (NEURONTIN) capsule 300 mg  300 mg Oral HS   • gabapentin (NEURONTIN) capsule 300 mg  300 mg Oral BID   • heparin (porcine) subcutaneous injection 5,000 Units  5,000 Units Subcutaneous Q8H [facility]   • HYDROmorphone (DILAUDID) injection 0.5 mg  0.5 mg Intravenous Q2H PRN   • isosorbide mononitrate (IMDUR) 24 hr tablet 30 mg  30 mg Oral QAM   • lamoTRIgine (LaMICtal) tablet 100 mg  100 mg Oral Daily   • lidocaine (LIDODERM) 5 % patch 2 patch  2 patch Topical Daily   • melatonin tablet 9 mg  9 mg Oral HS PRN   • methocarbamol (ROBAXIN) tablet 500 mg  500 mg Oral Q6H [facility]   • metoprolol succinate (TOPROL-XL) 24 hr tablet 50 mg  50 mg Oral Daily   • nicotine (NICODERM CQ) 21 mg/24 hr TD 24 hr patch 1 patch  1 patch Transdermal Daily   • ondansetron (ZOFRAN) injection 4 mg  4 mg Intravenous Q6H PRN   • oxyCODONE (ROXICODONE) IR tablet 5 mg  5 mg Oral Q4H PRN    Or   • oxyCODONE (ROXICODONE) immediate release tablet 10 mg  10 mg Oral Q4H PRN   • polyethylene glycol (MIRALAX) packet 17 g  17 g Oral Daily PRN   • traZODone (DESYREL) tablet 50 mg  50 mg Oral HS PRN       Imaging:  Reviewed    Physical Exam:  General appearance: alert and oriented, in no acute distress  Neurologic: Grossly neurologically intact  Neck: supple, symmetrical, trachea midline  Lungs: Normal work of breathing. Heart: Regular rate, grossly warm and well-perfused  Abdomen: Soft, rounded abdomen non-tender to palpation  Extremities: RLE M/S intact.  L AKA dressing in place with ACE/Ioban without noted saturation, bleeding, or other discharge; clean and dry     Pulse exam:  Femoral: Right: 2+ Left: 2+  DP: Right: doppler signal PT: Right: doppler signal     Flakito Amor PA-C  7/22/2023

## 2023-07-22 NOTE — PLAN OF CARE
Problem: OCCUPATIONAL THERAPY ADULT  Goal: Performs self-care activities at highest level of function for planned discharge setting. See evaluation for individualized goals. Description: Treatment Interventions: ADL retraining, Functional transfer training, UE strengthening/ROM, Endurance training, Patient/family training, Equipment evaluation/education, Neuromuscular reeducation, Fine motor coordination activities, Compensatory technique education, Energy conservation, Activityengagement          See flowsheet documentation for full assessment, interventions and recommendations. Note: Limitation: Decreased ADL status, Decreased UE strength, Decreased Safe judgement during ADL, Decreased endurance, Decreased high-level ADLs  Prognosis: Good  Assessment: Pt is a 69 yo female seen for OT eval s/p adm to [facility] on 7/18/23 dx'd w/ acute lower limb ischemia. Pt now s/p L AKA 7/21/23. Pt  has a past medical history of Aorto-iliac disease ([address]), Atrial fibrillation ([address]), CAD (coronary artery disease), Carotid stenosis, bilateral, Celiac artery stenosis ([address]), CKD (chronic kidney disease) stage 3, GFR 30-59 ml/min ([address]), DVT (deep venous thrombosis) ([address]), Heart attack ([address]) (02/2022), HLD (hyperlipidemia), Hypertension, Lung mass, Myocardial infarction ([address]), COURTNEY (obstructive sleep apnea), PAD (peripheral artery disease) ([address]), and Stroke ([address]). Pt with active OT orders and activity as tolerated orders. Pt is retired and resides w/ son, dtr in law, adult grandkids, and great grand kid w/ FFSU and 2STE. Pt reports family members work during the day, however adult grandkids are home for school summer break. Pt was I w/  ADLS and IADLS, drove, & required use of DME PTA, including only recent use of SPC PTA. Pt is currently demonstrating the following occupational deficits: Min A U self care, Mod to Max A LB self care and toileting, Mod A bed mobility.  OOB transfers not assessed at this time due to Pt refusal from [facility] pain. These deficits that are impacting pt's baseline areas of occupation are a result of the following impairments: pain, endurance, activity tolerance, functional mobility, forward functional reach, balance, functional standing tolerance, unsupportive home environment, decreased I w/ ADLS/IADLS, strength and decreased safety awareness. The following Occupational Performance Areas to address include: grooming, bathing/shower, toilet hygiene, dressing, health maintenance, functional mobility and clothing management. Based on the aforementioned OT evaluation, functional performance deficits, and assessments, pt has been identified as a high complexity evaluation. Recommend STR upon D/C. The patient's raw score on the AM-PAC Daily Activity Inpatient Short Form is 16. A raw score of less than 19 suggests the patient may benefit from discharge to post-acute rehabilitation services. Please refer to the recommendation of the Occupational Therapist for safe discharge planning.  Pt to continue to benefit from acute immediate OT services to address the following goals 2-3x/week to  w/in 10-14 days:     OT Discharge Recommendation: Post acute rehabilitation services

## 2023-07-22 NOTE — PROGRESS NOTES
Progress Note - Acute Pain Service    Bea Gardner 68 y.o. female MRN: [id number]  Unit/Bed#: [facility] 12-46 Encounter: [id number]      Assessment:   Principal Problem:    Acute lower limb ischemia  Active Problems:    PAD (peripheral artery disease) ([address])    Bea Gardner is a 68 y.o. female  with history of hypertension, lung cancer, CAD status post MI/CABG in 2018, PAD with stenting, renal artery stenosis, hyperlipidemia, aortoiliac occlusive disease, paroxysmal A-fib, stage III CKD, bipolar disorder, who presents with left lower extremity pain and numbness. She is s/p left AKA on 7/21. She received left femoral and infragluteal sciatic blocks with 0.5% bupivacaine for post-operative analgesia. APS consulted for post-operative pain control. Upon bedside evaluation, George Christianson was doing well. In bed without acute distress. Endorses some stump but phantom pain. Pain controlled on MMA. Stump looks intact. Denies opioid-induced side effects including nausea/vomiting/itching/constipation. Plan: no indication for repeat blocks  - oxycodone 5 mg/10 mg PO q4hrs PRN for moderate/severe pain   - D/c IV dilaudid for breakthrough    Multimodal analgesia:  - - Tylenol 975 mg PO q8hrs standing  - Gabapentin 300 mg PO TID  - Robaxin 500 mg PO q6hrs     Bowel Regimen:  - Polyethylene glycol (Miralax) 17g PO once daily PRN    APS will sign off at this time. Thank you for the consult. All opioids and other analgesics to be written at discretion of primary team. Please contact Acute Pain Service - [facility] via [employer] from [phone] with additional questions or concerns. See [employer] or [employer] for additional contacts and after hours information.     Pain History  Current pain location(s): Left AKA stump  Pain Scale:   7-8/10  Quality: Achy  24 hour history: See above    Opioid requirement previous 24 hours: PO oxycodone 20mg    Meds/Allergies   all current active meds have been reviewed    No Known Allergies    Objective     Temp: [97.5 °F (36.4 °C)-98.8 °F (37.1 °C)] 98.7 °F (37.1 °C)  HR:  [52-73] 69  Resp:  [16-19] 19  BP: (124-163)/(61-78) 131/66    Physical Exam  HENT:      Head: Atraumatic. Mouth/Throat:      Mouth: Mucous membranes are dry. Eyes:      Pupils: Pupils are equal, round, and reactive to light. Cardiovascular:      Rate and Rhythm: Normal rate. Pulses: Normal pulses. Pulmonary:      Effort: Pulmonary effort is normal.   Abdominal:      Palpations: Abdomen is soft. Musculoskeletal:      Comments: Left AKA   Skin:     General: Skin is warm and dry. Neurological:      General: No focal deficit present. Mental Status: She is alert and oriented to person, place, and time. Psychiatric:         Mood and Affect: Mood normal.         Lab Results:   Results from last 7 days   Lab Units 07/22/23  0616   WBC Thousand/uL 13.95*   HEMOGLOBIN g/dL 12.8   HEMATOCRIT % 38.2   PLATELETS Thousands/uL 164      Results from last 7 days   Lab Units 07/22/23  0616 07/19/23  0446 07/18/23  1128   POTASSIUM mmol/L 4.1   < > 4.6   CHLORIDE mmol/L 110*   < > 110*   CO2 mmol/L 24   < > 22   BUN mg/dL 9   < > 12   CREATININE mg/dL 0.83   < > 0.97   CALCIUM mg/dL 8.9   < > 9.7   ALK PHOS U/L  --   --  75   ALT U/L  --   --  25   AST U/L  --   --  28    < > = values in this interval not displayed. Imaging Studies: I have personally reviewed pertinent reports. EKG, Pathology, and Other Studies: I have personally reviewed pertinent reports. Counseling / Coordination of Care  Total floor / unit time spent today 20 minutes. Greater than 50% of total time was spent with the patient and / or family counseling and / or coordination of care. Please note that the APS provides consultative services regarding pain management only.   With the exception of ketamine and epidural infusions and except when indicated, final decisions regarding starting or changing doses of analgesic medications are at the discretion of the consulting service. Off hours consultation and/or medication management is generally not available.     Kelly Gutiérrez MD  Acute Pain Service

## 2023-07-22 NOTE — PLAN OF CARE
Problem: PAIN - ADULT  Goal: Verbalizes/displays adequate comfort level or baseline comfort level  Description: Interventions:  - Encourage patient to monitor pain and request assistance  - Assess pain using appropriate pain scale  - Administer analgesics based on type and severity of pain and evaluate response  - Implement non-pharmacological measures as appropriate and evaluate response  - Consider cultural and social influences on pain and pain management  - Notify physician/advanced practitioner if interventions unsuccessful or patient reports new pain  Outcome: Progressing     Problem: PAIN - ADULT  Goal: Agree to be compliant with medication regime, as prescribed and report medication side effects  Outcome: Progressing     Problem: INFECTION - ADULT  Goal: Absence or prevention of progression during hospitalization  Description: INTERVENTIONS:  - Assess and monitor for signs and symptoms of infection  - Monitor lab/diagnostic results  - Monitor all insertion sites, i.e. indwelling lines, tubes, and drains  - Monitor endotracheal if appropriate and nasal secretions for changes in amount and color  - [place] appropriate cooling/warming therapies per order  - Administer medications as ordered  - Instruct and encourage patient and family to use good hand hygiene technique  - Identify and instruct in appropriate isolation precautions for identified infection/condition  Outcome: Progressing     Problem: INFECTION - ADULT  Goal: Absence of fever/infection during neutropenic period  Description: INTERVENTIONS:  - Monitor WBC    Outcome: Progressing     Problem: Nutrition/Hydration-ADULT  Goal: Nutrient/Hydration intake appropriate for improving, restoring or maintaining nutritional needs  Description: Monitor and assess patient's nutrition/hydration status for malnutrition. Collaborate with interdisciplinary team and initiate plan and interventions as ordered.   Monitor patient's weight and dietary intake as ordered or per policy. Utilize nutrition screening tool and intervene as necessary. Determine patient's food preferences and provide high-protein, high-caloric foods as appropriate.      INTERVENTIONS:  - Monitor oral intake, urinary output, labs, and treatment plans  - Assess nutrition and hydration status and recommend course of action  - Evaluate amount of meals eaten  - Assist patient with eating if necessary   - Allow adequate time for meals  - Recommend/ encourage appropriate diets, oral nutritional supplements, and vitamin/mineral supplements  - Order, calculate, and assess calorie counts as needed  - Recommend, monitor, and adjust tube feedings and TPN/PPN based on assessed needs  - Assess need for intravenous fluids  - Provide specific nutrition/hydration education as appropriate  - Include patient/family/caregiver in decisions related to nutrition  Outcome: Progressing     Problem: MOBILITY - ADULT  Goal: Maintain or return to baseline ADL function  Description: INTERVENTIONS:  -  Assess patient's ability to carry out ADLs; assess patient's baseline for ADL function and identify physical deficits which impact ability to perform ADLs (bathing, care of mouth/teeth, toileting, grooming, dressing, etc.)  - Assess/evaluate cause of self-care deficits   - Assess range of motion  - Assess patient's mobility; develop plan if impaired  - Assess patient's need for assistive devices and provide as appropriate  - Encourage maximum independence but intervene and supervise when necessary  - Involve family in performance of ADLs  - Assess for home care needs following discharge   - Consider OT consult to assist with ADL evaluation and planning for discharge  - Provide patient education as appropriate  Outcome: Progressing

## 2023-07-23 ENCOUNTER — APPOINTMENT (INPATIENT)
Dept: RADIOLOGY | Facility: HOSPITAL | Age: 73
DRG: 240 | End: 2023-07-23
Payer: MEDICARE

## 2023-07-23 LAB
ERYTHROCYTE [DISTWIDTH] IN BLOOD BY AUTOMATED COUNT: 13.7 % (ref 11.6–15.1)
HCT VFR BLD AUTO: 35.4 % (ref 34.8–46.1)
HGB BLD-MCNC: 11.5 G/DL (ref 11.5–15.4)
MCH RBC QN AUTO: 29.9 PG (ref 26.8–34.3)
MCHC RBC AUTO-ENTMCNC: 32.5 G/DL (ref 31.4–37.4)
MCV RBC AUTO: 92 FL (ref 82–98)
PLATELET # BLD AUTO: 167 THOUSANDS/UL (ref 149–390)
PMV BLD AUTO: 10.6 FL (ref 8.9–12.7)
RBC # BLD AUTO: 3.85 MILLION/UL (ref 3.81–5.12)
WBC # BLD AUTO: 13.11 THOUSAND/UL (ref 4.31–10.16)

## 2023-07-23 PROCEDURE — 99024 POSTOP FOLLOW-UP VISIT: CPT | Performed by: SURGERY

## 2023-07-23 PROCEDURE — 85027 COMPLETE CBC AUTOMATED: CPT | Performed by: SURGERY

## 2023-07-23 PROCEDURE — 71045 X-RAY EXAM CHEST 1 VIEW: CPT

## 2023-07-23 RX ADMIN — METOPROLOL SUCCINATE 50 MG: 50 TABLET, EXTENDED RELEASE ORAL at 09:52

## 2023-07-23 RX ADMIN — HEPARIN SODIUM 5000 UNITS: 5000 INJECTION INTRAVENOUS; SUBCUTANEOUS at 05:04

## 2023-07-23 RX ADMIN — METHOCARBAMOL TABLETS 500 MG: 500 TABLET, COATED ORAL at 12:25

## 2023-07-23 RX ADMIN — ISOSORBIDE MONONITRATE 30 MG: 30 TABLET, EXTENDED RELEASE ORAL at 09:51

## 2023-07-23 RX ADMIN — HEPARIN SODIUM 5000 UNITS: 5000 INJECTION INTRAVENOUS; SUBCUTANEOUS at 21:54

## 2023-07-23 RX ADMIN — METHOCARBAMOL TABLETS 500 MG: 500 TABLET, COATED ORAL at 23:17

## 2023-07-23 RX ADMIN — ATORVASTATIN CALCIUM 40 MG: 40 TABLET, FILM COATED ORAL at 17:52

## 2023-07-23 RX ADMIN — OXYCODONE HYDROCHLORIDE 10 MG: 10 TABLET ORAL at 09:52

## 2023-07-23 RX ADMIN — ACETAMINOPHEN 975 MG: 325 TABLET, FILM COATED ORAL at 05:05

## 2023-07-23 RX ADMIN — HEPARIN SODIUM 5000 UNITS: 5000 INJECTION INTRAVENOUS; SUBCUTANEOUS at 13:54

## 2023-07-23 RX ADMIN — DILTIAZEM HYDROCHLORIDE 120 MG: 60 TABLET, FILM COATED ORAL at 09:51

## 2023-07-23 RX ADMIN — MELATONIN TAB 3 MG 9 MG: 3 TAB at 21:55

## 2023-07-23 RX ADMIN — CLOPIDOGREL BISULFATE 75 MG: 75 TABLET ORAL at 09:51

## 2023-07-23 RX ADMIN — FOLIC ACID 1 MG: 1 TABLET ORAL at 09:51

## 2023-07-23 RX ADMIN — DOCUSATE SODIUM 100 MG: 100 CAPSULE ORAL at 17:52

## 2023-07-23 RX ADMIN — METHOCARBAMOL TABLETS 500 MG: 500 TABLET, COATED ORAL at 17:52

## 2023-07-23 RX ADMIN — NICOTINE 1 PATCH: 21 PATCH, EXTENDED RELEASE TRANSDERMAL at 09:52

## 2023-07-23 RX ADMIN — GABAPENTIN 300 MG: 300 CAPSULE ORAL at 21:55

## 2023-07-23 RX ADMIN — OXYCODONE HYDROCHLORIDE 10 MG: 10 TABLET ORAL at 17:52

## 2023-07-23 RX ADMIN — ACETAMINOPHEN 975 MG: 325 TABLET, FILM COATED ORAL at 13:54

## 2023-07-23 RX ADMIN — OXYCODONE HYDROCHLORIDE 10 MG: 10 TABLET ORAL at 13:54

## 2023-07-23 RX ADMIN — LAMOTRIGINE 100 MG: 100 TABLET ORAL at 09:51

## 2023-07-23 RX ADMIN — DOCUSATE SODIUM 100 MG: 100 CAPSULE ORAL at 09:51

## 2023-07-23 RX ADMIN — AMLODIPINE BESYLATE 5 MG: 5 TABLET ORAL at 21:55

## 2023-07-23 RX ADMIN — GABAPENTIN 300 MG: 300 CAPSULE ORAL at 09:51

## 2023-07-23 RX ADMIN — OXYCODONE HYDROCHLORIDE 10 MG: 10 TABLET ORAL at 21:55

## 2023-07-23 RX ADMIN — ACETAMINOPHEN 975 MG: 325 TABLET, FILM COATED ORAL at 21:55

## 2023-07-23 RX ADMIN — METHOCARBAMOL TABLETS 500 MG: 500 TABLET, COATED ORAL at 05:05

## 2023-07-23 RX ADMIN — GABAPENTIN 300 MG: 300 CAPSULE ORAL at 17:52

## 2023-07-23 RX ADMIN — EZETIMIBE 10 MG: 10 TABLET ORAL at 09:51

## 2023-07-23 NOTE — ASSESSMENT & PLAN NOTE
68 y.o. female with chronic left lower extremity ischemia secondary to aortoiliac and infrainguinal arterial occlusive disease. Patient has multiple failed revascularization procedures. She has no further targets for revascularization in the left lower extremity.     Now S/p L AKA 7/21/23 Nelia Arnold)    Recommendations:  - Plan for dressing change today  - Emphasized incentive spirometry, wean O2 per NC as able, still with 4.5L O2 NC not on home O2  - Diet as tolerated  - PRN Analgesia; appreciate APS recs  - Appreciate PMR input  - Appreciate PT/OT evaluation - would benefit from post-acute rehab  - Will discuss with Dr. Nelia Arnold

## 2023-07-23 NOTE — PLAN OF CARE
Problem: PAIN - ADULT  Goal: Verbalizes/displays adequate comfort level or baseline comfort level  Description: Interventions:  - Encourage patient to monitor pain and request assistance  - Assess pain using appropriate pain scale  - Administer analgesics based on type and severity of pain and evaluate response  - Implement non-pharmacological measures as appropriate and evaluate response  - Consider cultural and social influences on pain and pain management  - Notify physician/advanced practitioner if interventions unsuccessful or patient reports new pain  Outcome: Progressing  Goal: Agree to be compliant with medication regime, as prescribed and report medication side effects  Outcome: Progressing     Problem: INFECTION - ADULT  Goal: Absence or prevention of progression during hospitalization  Description: INTERVENTIONS:  - Assess and monitor for signs and symptoms of infection  - Monitor lab/diagnostic results  - Monitor all insertion sites, i.e. indwelling lines, tubes, and drains  - Monitor endotracheal if appropriate and nasal secretions for changes in amount and color  - [place] appropriate cooling/warming therapies per order  - Administer medications as ordered  - Instruct and encourage patient and family to use good hand hygiene technique  - Identify and instruct in appropriate isolation precautions for identified infection/condition  Outcome: Progressing  Goal: Absence of fever/infection during neutropenic period  Description: INTERVENTIONS:  - Monitor WBC    Outcome: Progressing     Problem: Nutrition/Hydration-ADULT  Goal: Nutrient/Hydration intake appropriate for improving, restoring or maintaining nutritional needs  Description: Monitor and assess patient's nutrition/hydration status for malnutrition. Collaborate with interdisciplinary team and initiate plan and interventions as ordered. Monitor patient's weight and dietary intake as ordered or per policy.  Utilize nutrition screening tool and intervene as necessary. Determine patient's food preferences and provide high-protein, high-caloric foods as appropriate.      INTERVENTIONS:  - Monitor oral intake, urinary output, labs, and treatment plans  - Assess nutrition and hydration status and recommend course of action  - Evaluate amount of meals eaten  - Assist patient with eating if necessary   - Allow adequate time for meals  - Recommend/ encourage appropriate diets, oral nutritional supplements, and vitamin/mineral supplements  - Order, calculate, and assess calorie counts as needed  - Recommend, monitor, and adjust tube feedings and TPN/PPN based on assessed needs  - Assess need for intravenous fluids  - Provide specific nutrition/hydration education as appropriate  - Include patient/family/caregiver in decisions related to nutrition  Outcome: Progressing

## 2023-07-23 NOTE — PLAN OF CARE
Problem: PAIN - ADULT  Goal: Verbalizes/displays adequate comfort level or baseline comfort level  Description: Interventions:  - Encourage patient to monitor pain and request assistance  - Assess pain using appropriate pain scale  - Administer analgesics based on type and severity of pain and evaluate response  - Implement non-pharmacological measures as appropriate and evaluate response  - Consider cultural and social influences on pain and pain management  - Notify physician/advanced practitioner if interventions unsuccessful or patient reports new pain  Outcome: Progressing  Goal: Agree to be compliant with medication regime, as prescribed and report medication side effects  Outcome: Progressing

## 2023-07-23 NOTE — PROGRESS NOTES
[address] [place] [address]  Progress Note  Name: Kimberlee Meraz  MRN: [id number]  [facility] Date of Admission: 7/18/2023   Date of Service: 7/23/2023 I Hospital Day: 5    Assessment/Plan   PAD (peripheral artery disease) [facility])  Assessment & Plan  68 y.o. female with chronic left lower extremity ischemia secondary to aortoiliac and infrainguinal arterial occlusive disease. Patient has multiple failed revascularization procedures. She has no further targets for revascularization in the left lower extremity. Now S/p L AKA 7/21/23 Ghada Kelly)    Recommendations:  - Plan for dressing change today  - Emphasized incentive spirometry, wean O2 per NC as able, still with 4.5L O2 NC not on home O2; consider CXR  - Diet as tolerated  - PRN Analgesia; appreciate APS recs  - Appreciate PMR input  - Appreciate PT/OT evaluation - would benefit from post-acute rehab  - Will discuss with Dr. Ghada Kelly       Subjective:  Pain well controlled, without acute complaints. Vitals:  /58 (BP Location: Right arm)   Pulse 58   Temp 98 °F (36.7 °C) (Oral)   Resp 16   Ht 5' 4" (1.626 m)   Wt 76.1 kg (167 lb 11.2 oz)   SpO2 (!) 89%   BMI 28.79 kg/m²     I/Os:  I/O last 3 completed shifts: In: 1784.4 [P.O.:707;  I.V.:1077.4]  Out: 3450 [Urine:3350; Blood:100]  I/O this shift:  In: 240 [P.O.:240]  Out: 1000 [Urine:1000]    Lab Results and Cultures:   CBC with diff: Lab Results   Component Value Date    WBC 13.11 (H) 07/23/2023    HGB 11.5 07/23/2023    HCT 35.4 07/23/2023    MCV 92 07/23/2023     07/23/2023    RBC 3.85 07/23/2023    MCH 29.9 07/23/2023    MCHC 32.5 07/23/2023    RDW 13.7 07/23/2023    MPV 10.6 07/23/2023    NRBC 0 07/18/2023   ,   BMP/CMP:  Lab Results   Component Value Date    SODIUM 139 07/22/2023    K 4.1 07/22/2023     (H) 07/22/2023    CO2 24 07/22/2023    BUN 9 07/22/2023    CREATININE 0.83 07/22/2023    CALCIUM 8.9 07/22/2023    AST 28 07/18/2023    ALT 25 07/18/2023    ALKPHOS 75 07/18/2023    EGFR 70 07/22/2023   ,   Lipid Panel: No results found for: "CHOL",   Coags:   Lab Results   Component Value Date    PTT 36 07/21/2023    INR 0.92 07/18/2023   ,     Blood Culture: No results found for: "BLOODCX",   Urinalysis: No results found for: "COLORU", "CLARITYU", "SPECGRAV", "PHUR", "LEUKOCYTESUR", "NITRITE", "PROTEINUA", "GLUCOSEU", "García Columba", "BILIRUBINUR", "BLOODU",   Urine Culture: No results found for: "URINECX",   Wound Culure: No results found for: "WOUNDCULT"    Medications:  Current Facility-Administered Medications   Medication Dose Route Frequency   • acetaminophen (TYLENOL) tablet 975 mg  975 mg Oral Q8H [address]   • albuterol (PROVENTIL HFA,VENTOLIN HFA) inhaler 2 puff  2 puff Inhalation Q6H PRN   • amLODIPine (NORVASC) tablet 5 mg  5 mg Oral Daily   • atorvastatin (LIPITOR) tablet 40 mg  40 mg Oral Daily With Dinner   • clopidogrel (PLAVIX) tablet 75 mg  75 mg Oral Daily   • diltiazem (CARDIZEM) tablet 120 mg  120 mg Oral Daily   • docusate sodium (COLACE) capsule 100 mg  100 mg Oral BID   • ezetimibe (ZETIA) tablet 10 mg  10 mg Oral Daily   • folic acid (FOLVITE) tablet 1 mg  1 mg Oral Daily   • gabapentin (NEURONTIN) capsule 300 mg  300 mg Oral HS   • gabapentin (NEURONTIN) capsule 300 mg  300 mg Oral BID   • heparin (porcine) subcutaneous injection 5,000 Units  5,000 Units Subcutaneous Q8H [address]   • HYDROmorphone (DILAUDID) injection 0.5 mg  0.5 mg Intravenous Q2H PRN   • isosorbide mononitrate (IMDUR) 24 hr tablet 30 mg  30 mg Oral QAM   • lamoTRIgine (LaMICtal) tablet 100 mg  100 mg Oral Daily   • lidocaine (LIDODERM) 5 % patch 2 patch  2 patch Topical Daily   • melatonin tablet 9 mg  9 mg Oral HS PRN   • methocarbamol (ROBAXIN) tablet 500 mg  500 mg Oral Q6H [address]   • metoprolol succinate (TOPROL-XL) 24 hr tablet 50 mg  50 mg Oral Daily   • nicotine (NICODERM CQ) 21 mg/24 hr TD 24 hr patch 1 patch  1 patch Transdermal Daily   • ondansetron (ZOFRAN) injection 4 mg  4 mg Intravenous Q6H PRN   • oxyCODONE (ROXICODONE) IR tablet 5 mg  5 mg Oral Q4H PRN    Or   • oxyCODONE (ROXICODONE) immediate release tablet 10 mg  10 mg Oral Q4H PRN   • polyethylene glycol (MIRALAX) packet 17 g  17 g Oral Daily PRN   • traZODone (DESYREL) tablet 50 mg  50 mg Oral HS PRN       Imaging:  Reviewed    Physical Exam:  General appearance: alert and oriented, in no acute distress  Neurologic: Grossly neurologically intact  Neck: supple, symmetrical, trachea midline  Lungs: Normal work of breathing.   Heart: Regular rate, grossly warm and well-perfused  Abdomen: Soft, rounded abdomen non-tender to palpation  Extremities: RLE M/S intact.  L AKA dressing in place with ACE/Ioban without noted saturation, bleeding, or other discharge; clean and dry     Pulse exam:  Femoral: Right: 2+ Left: 2+  DP: Right: doppler signal PT: Right: doppler signal     Saritha Sauer PA-C  7/23/2023

## 2023-07-24 ENCOUNTER — DOCUMENTATION (OUTPATIENT)
Dept: VASCULAR SURGERY | Facility: CLINIC | Age: 73
End: 2023-07-24

## 2023-07-24 PROBLEM — I99.8 ACUTE LOWER LIMB ISCHEMIA: Status: RESOLVED | Noted: 2023-07-20 | Resolved: 2023-07-24

## 2023-07-24 LAB
BASOPHILS # BLD AUTO: 0.09 THOUSANDS/ÂΜL (ref 0–0.1)
BASOPHILS NFR BLD AUTO: 1 % (ref 0–1)
EOSINOPHIL # BLD AUTO: 0.39 THOUSAND/ÂΜL (ref 0–0.61)
EOSINOPHIL NFR BLD AUTO: 4 % (ref 0–6)
ERYTHROCYTE [DISTWIDTH] IN BLOOD BY AUTOMATED COUNT: 13.8 % (ref 11.6–15.1)
FLUAV RNA RESP QL NAA+PROBE: NEGATIVE
FLUBV RNA RESP QL NAA+PROBE: NEGATIVE
HCT VFR BLD AUTO: 38.5 % (ref 34.8–46.1)
HGB BLD-MCNC: 12.4 G/DL (ref 11.5–15.4)
IMM GRANULOCYTES # BLD AUTO: 0.04 THOUSAND/UL (ref 0–0.2)
IMM GRANULOCYTES NFR BLD AUTO: 0 % (ref 0–2)
LYMPHOCYTES # BLD AUTO: 2.15 THOUSANDS/ÂΜL (ref 0.6–4.47)
LYMPHOCYTES NFR BLD AUTO: 20 % (ref 14–44)
MCH RBC QN AUTO: 29.7 PG (ref 26.8–34.3)
MCHC RBC AUTO-ENTMCNC: 32.2 G/DL (ref 31.4–37.4)
MCV RBC AUTO: 92 FL (ref 82–98)
MONOCYTES # BLD AUTO: 1.02 THOUSAND/ÂΜL (ref 0.17–1.22)
MONOCYTES NFR BLD AUTO: 9 % (ref 4–12)
NEUTROPHILS # BLD AUTO: 7.27 THOUSANDS/ÂΜL (ref 1.85–7.62)
NEUTS SEG NFR BLD AUTO: 66 % (ref 43–75)
NRBC BLD AUTO-RTO: 0 /100 WBCS
PLATELET # BLD AUTO: 187 THOUSANDS/UL (ref 149–390)
PMV BLD AUTO: 10.6 FL (ref 8.9–12.7)
RBC # BLD AUTO: 4.18 MILLION/UL (ref 3.81–5.12)
RSV RNA RESP QL NAA+PROBE: NEGATIVE
SARS-COV-2 RNA RESP QL NAA+PROBE: NEGATIVE
WBC # BLD AUTO: 10.96 THOUSAND/UL (ref 4.31–10.16)

## 2023-07-24 PROCEDURE — 97535 SELF CARE MNGMENT TRAINING: CPT

## 2023-07-24 PROCEDURE — 97163 PT EVAL HIGH COMPLEX 45 MIN: CPT

## 2023-07-24 PROCEDURE — 85025 COMPLETE CBC W/AUTO DIFF WBC: CPT | Performed by: SURGERY

## 2023-07-24 PROCEDURE — 97530 THERAPEUTIC ACTIVITIES: CPT

## 2023-07-24 PROCEDURE — 99024 POSTOP FOLLOW-UP VISIT: CPT | Performed by: SURGERY

## 2023-07-24 PROCEDURE — 0241U HB NFCT DS VIR RESP RNA 4 TRGT: CPT | Performed by: PHYSICIAN ASSISTANT

## 2023-07-24 RX ORDER — LISINOPRIL 10 MG/1
10 TABLET ORAL DAILY
Status: DISCONTINUED | OUTPATIENT
Start: 2023-07-24 | End: 2023-07-25 | Stop reason: HOSPADM

## 2023-07-24 RX ADMIN — ACETAMINOPHEN 975 MG: 325 TABLET, FILM COATED ORAL at 13:11

## 2023-07-24 RX ADMIN — ACETAMINOPHEN 975 MG: 325 TABLET, FILM COATED ORAL at 21:15

## 2023-07-24 RX ADMIN — METHOCARBAMOL TABLETS 500 MG: 500 TABLET, COATED ORAL at 17:14

## 2023-07-24 RX ADMIN — DOCUSATE SODIUM 100 MG: 100 CAPSULE ORAL at 17:14

## 2023-07-24 RX ADMIN — GABAPENTIN 300 MG: 300 CAPSULE ORAL at 09:13

## 2023-07-24 RX ADMIN — HEPARIN SODIUM 5000 UNITS: 5000 INJECTION INTRAVENOUS; SUBCUTANEOUS at 13:12

## 2023-07-24 RX ADMIN — EZETIMIBE 10 MG: 10 TABLET ORAL at 09:13

## 2023-07-24 RX ADMIN — GABAPENTIN 300 MG: 300 CAPSULE ORAL at 17:14

## 2023-07-24 RX ADMIN — NICOTINE 1 PATCH: 21 PATCH, EXTENDED RELEASE TRANSDERMAL at 09:13

## 2023-07-24 RX ADMIN — METOPROLOL SUCCINATE 50 MG: 50 TABLET, EXTENDED RELEASE ORAL at 09:13

## 2023-07-24 RX ADMIN — METHOCARBAMOL TABLETS 500 MG: 500 TABLET, COATED ORAL at 11:41

## 2023-07-24 RX ADMIN — AMLODIPINE BESYLATE 5 MG: 5 TABLET ORAL at 21:15

## 2023-07-24 RX ADMIN — METHOCARBAMOL TABLETS 500 MG: 500 TABLET, COATED ORAL at 05:15

## 2023-07-24 RX ADMIN — METHOCARBAMOL TABLETS 500 MG: 500 TABLET, COATED ORAL at 23:34

## 2023-07-24 RX ADMIN — ATORVASTATIN CALCIUM 40 MG: 40 TABLET, FILM COATED ORAL at 17:14

## 2023-07-24 RX ADMIN — OXYCODONE HYDROCHLORIDE 10 MG: 10 TABLET ORAL at 09:16

## 2023-07-24 RX ADMIN — FOLIC ACID 1 MG: 1 TABLET ORAL at 09:13

## 2023-07-24 RX ADMIN — DOCUSATE SODIUM 100 MG: 100 CAPSULE ORAL at 09:13

## 2023-07-24 RX ADMIN — GABAPENTIN 300 MG: 300 CAPSULE ORAL at 21:15

## 2023-07-24 RX ADMIN — ACETAMINOPHEN 975 MG: 325 TABLET, FILM COATED ORAL at 05:15

## 2023-07-24 RX ADMIN — LISINOPRIL 10 MG: 10 TABLET ORAL at 09:39

## 2023-07-24 RX ADMIN — LAMOTRIGINE 100 MG: 100 TABLET ORAL at 09:13

## 2023-07-24 RX ADMIN — CLOPIDOGREL BISULFATE 75 MG: 75 TABLET ORAL at 09:13

## 2023-07-24 RX ADMIN — OXYCODONE HYDROCHLORIDE 10 MG: 10 TABLET ORAL at 17:14

## 2023-07-24 RX ADMIN — DILTIAZEM HYDROCHLORIDE 120 MG: 60 TABLET, FILM COATED ORAL at 09:13

## 2023-07-24 RX ADMIN — ISOSORBIDE MONONITRATE 30 MG: 30 TABLET, EXTENDED RELEASE ORAL at 09:13

## 2023-07-24 RX ADMIN — HEPARIN SODIUM 5000 UNITS: 5000 INJECTION INTRAVENOUS; SUBCUTANEOUS at 05:15

## 2023-07-24 RX ADMIN — HEPARIN SODIUM 5000 UNITS: 5000 INJECTION INTRAVENOUS; SUBCUTANEOUS at 21:16

## 2023-07-24 NOTE — ARC ADMISSION
Referral was received for consideration of patient for inpatient acute rehab at [facility].  After review with [facility] physician patient is accepted for [facility] pending medical stability and bed availability

## 2023-07-24 NOTE — DISCHARGE INSTR - OTHER ORDERS
L AKA Incisional Care:  --ok to shower  --Wash incision daily w/ soap and water. Pat dry thoroughly.   --Betadine fany  --ABD pad  --ACE wrap to assist w/ edema control/ stump shrinkage

## 2023-07-24 NOTE — DISCHARGE SUMMARY
Discharge Summary   [facility] 68 y.o. female MRN: [id number]  Unit/Bed#: [facility] 12-46 Encounter: [id number]    Admission Date: 7/18/2023     Discharge Date: 7/25/2023      Attending:Rosendo Dodd MD     Consultants:    · Cardiology  · Acute pain service  · PM&R    Admitting Diagnosis: Pain in limb [M79.609]  Tobacco abuse [Z72.0]  Left foot pain [M79.672]  PAD (peripheral artery disease) ([address]) [I73.9]  Hx of CABG [Z95.1]  Aortoiliac occlusive disease ([address]) [I74.09]  S/P vascular bypass [Z95.828]  Acute lower limb ischemia [I99.8]    Principle/ Secondary Diagnosis:  · AIOD/PAD with acute left lower extremity ischemia    Past Medical History:   Diagnosis Date   • Aorto-iliac disease ([address])    • Atrial fibrillation ([address])    • CAD (coronary artery disease)    • Carotid stenosis, bilateral    • Celiac artery stenosis ([facility])    • CKD (chronic kidney disease) stage 3, GFR 30-59 ml/min ([facility])    • DVT (deep venous thrombosis) ([facility])     LLE (CFV, popl)   • Heart attack ([address]) 02/2022   • HLD (hyperlipidemia)    • Hypertension    • Lung mass     RUL   • Myocardial infarction ([address])     2022   • COURTNEY (obstructive sleep apnea)    • PAD (peripheral artery disease) ([address])    • Stroke [facility])      Past Surgical History:   Procedure Laterality Date   • AORTA - FEMORAL ARTERY BYPASS GRAFT Left    • AXILLO-FEMORAL BYASS GRAFT Bilateral    • BYPASS FEMORAL-FEMORAL     • CORONARY ARTERY BYPASS GRAFT  2000   • FEMORAL ARTERY - POPLITEAL ARTERY BYPASS GRAFT Bilateral    • IVC FILTER INSERTION     • MS AMPUTATION THIGH THROUGH FEMUR ANY LEVEL Left 7/21/2023    Procedure: AMPUTATION ABOVE KNEE (AKA);   Surgeon: Nicole Regalado MD;  Location: [facility];  Service: Vascular   • TOTAL ABDOMINAL HYSTERECTOMY W/ BILATERAL SALPINGOOPHORECTOMY     • TOTAL HIP ARTHROPLASTY Left        Procedures Performed:   · 7/21/2023 left AKA- [place]    Laboratory data at discharge:   Results from last 7 days   Lab Units 07/24/23  0650 07/23/23  0459 07/22/23  0802 WBC Thousand/uL 10.96* 13.11* 13.95*   HEMOGLOBIN g/dL 12.4 11.5 12.8   HEMATOCRIT % 38.5 35.4 38.2   PLATELETS Thousands/uL 187 167 164     Results from last 7 days   Lab Units 07/22/23  0616 07/21/23  0353 07/20/23  0507   POTASSIUM mmol/L 4.1 3.9 4.2   CHLORIDE mmol/L 110* 107 109*   CO2 mmol/L 24 25 23   BUN mg/dL 9 11 12   CREATININE mg/dL 0.83 0.84 0.86   CALCIUM mg/dL 8.9 9.1 8.9     Results from last 7 days   Lab Units 07/21/23  1113 07/21/23  0353 07/20/23  2030 07/18/23  1830 07/18/23  1225   INR   --   --   --   --  0.92   PTT seconds 36 62* 53*   < > 26    < > = values in this interval not displayed. Discharge instructions/Information to patient and family:   See after visit summary for information provided to patient and family. · Amputation instructions    Discharge Medications:  See after visit summary for reconciled discharge medications provided to patient and family. · Continued antiplatelet-Plavix  · [facility]:   Deep Tian is a 68y.o. year old female w/ HTN, HLD, CAD/ CABG, h/o CVA, CKD 3, COURTNEY, PAF/ SVT (not on a/c), h/o LLE DVT s/p IVC filter, ongoing tobacco abuse (1ppd x 60+ years), and newly diagnosed RUL lung mass for which she is planned Navigational bronchoscopy w/ biopsy on 7/31/2023 w/ Thoracic surgery, Dr. Peace Youngblood, and an extensive vascular history to include b/l carotid stenosis and AIOD/ PAD having previously undergone the following [place] [facility] [place] [address] [place] [address]:  • B/L Ax-fem bypass  • Fem-Fem bypass  • B/L Fem-BK popliteal bypass  • Ao- L fem bypass  • Ao- RRA bypass ? She presents to the [address] [place] [address] emergency department with c/o L leg pain & numbness. Symptoms have been intermittently present for a few months with L calf claudication but has worsened over 2 days w/ worsening rest pain and numbness. She describes pain as "burning like it's on fire".   She has some relief at times w/ the limb in a dependent position. She is able to move the foot. She affirms the extensive history of her vascular procedures stating that her last surgeon told her that "no one else will be able to get through there again" following her Ao- L fem surgical procedure. There is no tissue loss. Patient does affirm baseline swelling of LLE from prior ankle fracture. Patient is able to ambulate w/ assistance of a cane. The patient was admitted to the hospital on 7/19/2023 at which time she was started on anticoagulation in the form of heparin drip. Was performed to further delineate anatomy. With radiologic results reviewed, it was felt that she had exhausted options for left lower extremity revascularization, therefore the plan was for admission for pain control and eventual amputation. Consultation was placed to cardiology for risk stratification who deemed her high but not prohibitive cardiac risk. Consultation was placed to our acute pain service for assistance in overall pain management. Physical medicine and rehab were consulted for assistance with disposition planning. On 7/21/2023, the patient was taken to the operating room at which time she underwent left AKA by Dr. Robert Blevins. Postoperatively, she was maintained in the PACU then transferred to a medical surgical/telemetry/stepdown floor where she continued to convalesce for the remainder of her hospitalization. Her pain control was assisted by her acute pain service with a multimodal regimen to include oral and IV narcotic, gabapentin which she was previously on as a home med, along with ATC Tylenol and Robaxin. Operative dressing was removed on POD #2 with a well appearing incision. Hospital course was complicated by the following:  · Tobacco abuse and COPD  Given his significant history and active use of tobacco with known COPD, the patient was oxygen requiring during the hospitalization.   With mobilization and respiratory protocol, oxygen was able to be weaned off to keep sat >88%. By the date of discharge, the patient remained neurovascularly intact with dopplerable right femoral, popliteal, DP and PT pulse. The left AKA incision was healing well. Her pain was controlled with a multimodal regimen to include narcotic, Tylenol, Neurontin, and Robaxin. She was tolerating a diet. She was evaluated by physical therapy and Occupational Therapy who deemed her appropriate for postacute rehab. Physical medicine and rehab recommendations were for acute rehab. With the assistance of our case management colleagues, arrangements were made for the patient to transition to the [address] [place] [address] for continued convalescence. Preadmission screening COVID testing was negative. Insurance authorization was obtained. The patient was deemed appropriate and stable for discharge and was discharged on 7/25/2023. Prior to discharge, the patient was given instructions for outpatient care and follow-up. The patient has been instructed to call w/ any questions, changes, or concerns for the medical condition. For further details of the hospitalization, please refer to the medical record. Condition at Discharge: stable     Provisions for Follow-Up Care:  See after visit summary for information related to follow-up care and any pertinent home health orders. Disposition: Short-term rehab at [facility]    Planned Readmission: No    Discharge Statement   I spent 30 minutes discharging the patient. This time was spent on the day of discharge. I had direct contact with the patient on the day of discharge. Additional documentation is required if more than 30 minutes were spent on discharge. Matthew Leavitt PA-C  7/25/2023      This text is generated with voice recognition software. There may be translation, syntax,  or grammatical errors.  If you have any questions, please contact the dictating provider.

## 2023-07-24 NOTE — COVID-19 HEALTH CARE FACILITY TRANSFER FORM
[facility] to [address] [place] [address] 349 Transfer - COVID-19 Assessment             Name of Patient: Deion Benjamin                : 1950          Transport Date: 23       Has the patient been laboratory tested for COVID-19? []  NO  If No,Test was not indicated per  CDC Testing Criteria.  May Transfer Patient   [x] YES  If Tested Results below     COVID-19 References              SARS-CoV-2   Date/Time Value Ref Range Status   2023 02:04 PM Negative Negative Final     Comment:                  Question is to be completed for any patient who tests positive for COVID-19.       1. [x] Yes [May Transfer] [] No [May Not Transfer]          Question is to be completed for any patient who is tested for COVID-19.           2.   [] Yes [May Not Transfer] [x] No [May Transfer]          Signature of Physician or Health Care Professional: Jessica Avila PA-C 23          Form updated as of 3/24/2020

## 2023-07-24 NOTE — PROGRESS NOTES
Vascular Nurse Navigator Post Op Education    Met with patient at bedside to introduce myself as Vascular Nurse Navigator and explained my role. Patient is appropriate and accepting to education. Patient was educated with Review of written materials provided, Teachback, Explanation, Demonstration and Question & Answer on expectations of post op care and recovery on Left AKA. Patient is a smoker  (1 1/2 ppd x 56 yrs), as such Smoking effects on the lungs, tobacco triggers and Smoking cessation was reviewed. Education provided to patient on infection prevention, activity limitations, when to call the office, importance of follow up, and incisional care. Discharge instruction handout provided to patient to review. Provided patient with information on [facility] and a list of prosthetic suppliers to review.

## 2023-07-24 NOTE — OCCUPATIONAL THERAPY NOTE
Occupational Therapy Progress Note     Patient Name: Nedra Rodney  OWOCG'I Date: 7/24/2023  Problem List  Principal Problem:    Acute lower limb ischemia  Active Problems:    PAD (peripheral artery disease) ([address])            07/24/23 1100   OT Last Visit   OT Visit Date 07/24/23   Note Type   Note Type Treatment   Pain Assessment   Pain Assessment Tool 0-10   Pain Score No Pain   Restrictions/Precautions   Weight Bearing Precautions Per Order Yes   LLE Weight Bearing Per Order NWB  (s/p AKA)   Other Precautions Fall Risk;O2   Lifestyle   Autonomy Pt reports being IND w/ all ADLS and IADLS; (+) drives; only recently ambulating w/ [facility] PTA   Reciprocal Relationships Pt lives w/ her son, dtr in law, adult grandkids, and greatgrandkid. Pt reports her dtr in law works from home and son works out of the home. Pt reports her adult grandkids are home on summer break from school. Service to Others Pt is retired   Intrinsic Gratification Pt reports enjoying spending time w/ family   ADL   Where Assessed Chair   UB Dressing Assistance 5  Supervision/Setup   UB Dressing Comments Pt able to don hospital gown. LB Dressing Assistance 4  Minimal Assistance   LB Dressing Deficit Increased time to complete; Don/doff R sock   LB Dressing Comments While seated in chair, pt doffed and donned new sock. She needs assist in standing, therefore will need assist with pulling up clothing. Functional Standing Tolerance   Time 1 minute   Activity standing at chair and hopping 2-3 steps forward to work on balance and endurance as a prereq for ADLs. Comments Pt benefited from min Ax1 with mobility for steadying   Bed Mobility   Supine to Sit 3  Moderate assistance   Additional items Assist x 1; Increased time required   Transfers   Sit to Stand 3  Moderate assistance   Additional items Assist x 1   Stand to Sit 3  Moderate assistance   Additional items Assist x 1   Sit pivot 4  Minimal assistance   Additional items Assist x 2; Increased time required   Additional Comments Pt needed Ax2 to sit pivot from bed to drop arm recliner w/ cues for positioning and technique. Cognition   Overall Cognitive Status WFL   Arousal/Participation Alert; Responsive; Cooperative   Attention Within functional limits   Orientation Level Oriented X4   Memory Within functional limits   Following Commands Follows one step commands without difficulty   Comments Pt pleasant and motivated to participate. Activity Tolerance   Activity Tolerance Patient tolerated treatment well   Medical Staff Made Aware RN and PT   Assessment   Assessment Patient participated in Skilled OT session this date with interventions consisting of ADL re training with the use of correct body mechanics, Energy Conservation techniques, safety awareness and fall prevention techniques, increase dynamic sit/ stand balance during functional activity  and increase OOB/ sitting tolerance. Patient agreeable to OT treatment session, upon arrival patient was found supine in bed. In comparison to previous session, patient with improvements in activity tolerance, endurance, transfer, and functional mobility. Pt completed UB ADLs w S while seated and LB ADLs w/ min A. She benefits from 1-2 person for transfer OOB using sit pivot and progressed to Ax1 for sit <>stand and hopping. Patient continues to be functioning below baseline level, occupational performance remains limited secondary to factors listed above and increased risk for falls and injury. From OT standpoint, recommendation at time of d/c would be Short Term Rehab. Patient to benefit from continued Occupational Therapy treatment while in the hospital to address deficits as defined above and maximize level of functional independence with ADLs and functional mobility. Plan   Treatment Interventions ADL retraining;Functional transfer training; Endurance training;Equipment evaluation/education; Compensatory technique education; Energy conservation Goal Expiration Date 08/01/23   OT Treatment Day 1   OT Frequency 2-3x/wk   Recommendation   OT Discharge Recommendation Post acute rehabilitation services   AM-PAC Daily Activity Inpatient   Lower Body Dressing 3   Bathing 2   Toileting 2   Upper Body Dressing 4   Grooming 3   Eating 4   Daily Activity Raw Score 18   Daily Activity Standardized Score (Calc for Raw Score >=11) 38.66       Lakhwinder Frye OTR/L

## 2023-07-24 NOTE — PHYSICAL THERAPY NOTE
Physical Therapy Evaluation and Treatment    Patient's Name: Hilliard Sever    Admitting Diagnosis  Pain in limb [M79.609]  Tobacco abuse [Z72.0]  Left foot pain [M79.672]  PAD (peripheral artery disease) ([address]) [I73.9]  Hx of CABG [Z95.1]  Aortoiliac occlusive disease ([address]) [I74.09]  S/P vascular bypass [Z95.828]  Acute lower limb ischemia [I99.8]    Problem List  Patient Active Problem List   Diagnosis   • Bipolar affective disorder, depressed, severe ([address])   • CAD (coronary artery disease)   • Essential hypertension   • Hx of CABG   • S/P vascular bypass   • Thyroid nodule   • Renal artery stenosis (HCC)   • Presence of IVC filter   • PAD (peripheral artery disease) ([address])   • Left leg swelling   • Mixed hyperlipidemia   • Aortoiliac occlusive disease (HCC)   • Tobacco abuse   • PAF (paroxysmal atrial fibrillation) ([facility])   • Stage 3 chronic kidney disease, unspecified whether stage 3a or 3b CKD ([facility])   • Dizziness   • Insomnia   • Nodule of upper lobe of right lung   • Acute lower limb ischemia       Past Medical History  Past Medical History:   Diagnosis Date   • Aorto-iliac disease ([address])    • Atrial fibrillation ([address])    • CAD (coronary artery disease)    • Carotid stenosis, bilateral    • Celiac artery stenosis ([facility])    • CKD (chronic kidney disease) stage 3, GFR 30-59 ml/min ([facility])    • DVT (deep venous thrombosis) ([facility])     LLE (CFV, popl)   • Heart attack ([address]) 02/2022   • HLD (hyperlipidemia)    • Hypertension    • Lung mass     RUL   • Myocardial infarction ([address])     2022   • COURTNEY (obstructive sleep apnea)    • PAD (peripheral artery disease) ([address])    • Stroke [facility])        Past Surgical History  Past Surgical History:   Procedure Laterality Date   • AORTA - FEMORAL ARTERY BYPASS GRAFT Left    • AXILLO-FEMORAL BYASS GRAFT Bilateral    • BYPASS FEMORAL-FEMORAL     • CORONARY ARTERY BYPASS GRAFT  2000   • FEMORAL ARTERY - POPLITEAL ARTERY BYPASS GRAFT Bilateral    • IVC FILTER INSERTION     • OH AMPUTATION THIGH THROUGH FEMUR ANY LEVEL Left 7/21/2023    Procedure: AMPUTATION ABOVE KNEE (AKA); Surgeon: Aleksandra Moon MD;  [facility]: [facility];  Service: Vascular   • TOTAL ABDOMINAL HYSTERECTOMY W/ BILATERAL SALPINGOOPHORECTOMY     • TOTAL HIP ARTHROPLASTY Left         07/24/23 1059   PT Last Visit   PT Visit Date 07/24/23   Note Type   Note type Evaluation  (and treatment)   Pain Assessment   Pain Assessment Tool FLACC   Pain Location/Orientation Orientation: Left  (residual limb)   Hospital Pain Intervention(s) Ambulation/increased activity;MD notified (Comment)   Pain Rating: FLACC (Rest) - Face 0   Pain Rating: FLACC (Rest) - Legs 0   Pain Rating: FLACC (Rest) - Activity 0   Pain Rating: FLACC (Rest) - Cry 0   Pain Rating: FLACC (Rest) - Consolability 0   Score: FLACC (Rest) 0   Pain Rating: FLACC (Activity) - Face 1   Pain Rating: FLACC (Activity) - Legs 0   Pain Rating: FLACC (Activity) - Activity 0   Pain Rating: FLACC (Activity) - Cry 0   Pain Rating: FLACC (Activity) - Consolability 0   Score: FLACC (Activity) 1   Restrictions/Precautions   Weight Bearing Precautions Per Order Yes   LLE Weight Bearing Per Order (S)  NWB  (s/p AKA)   Other Precautions Pain; Fall Risk;O2;Multiple lines; Bed Alarm; Chair Alarm   Home Living   Type of [address] [place] [address] One level;Stairs to enter with rails  (2 imani)   Bathroom Shower/Tub Tub/shower unit   [facility]   Additional Comments Prior to this admission patient resided with son, d-I-l, and grandchildren in a one level home (2 IMANI through front door and garage; first floor set up available). At her baseline she is I with mobility (recenlty ambulating with SPC), ADLs, and iADLS. + . Dght and S-I-L work from home. Prior Function   Level of [place] Independent with ADLs; Independent with functional mobility   Lives With [place] Help From Family   IADLs Independent with driving; Independent with meal prep; Independent with medication management   Falls in the last 6 months 0   Vocational Retired   General   Additional Pertinent History 68 y.o. female admitted to [facility] on 7/18/2023 with symptoms of: L foot pain and discoloration. Patient with acute L LE ischemia and underwent L AKA on 7/21. Family/Caregiver Present No   Cognition   Overall Cognitive Status WFL   Arousal/Participation Alert   Attention Within functional limits   Orientation Level Oriented X4   Memory Within functional limits   Following Commands Follows one step commands without difficulty   Subjective   Subjective "when I go home I know I have to use a wheelchair"   RUE Assessment   RUE Assessment WFL   LUE Assessment   LUE Assessment WFL   RLE Assessment   RLE Assessment WFL   LLE Assessment   LLE Assessment   (s/p AKA; hip flexion 2/5 grossly)   Bed Mobility   Supine to Sit 3  Moderate assistance   Additional items Assist x 1; Increased time required;Verbal cues   Sit to Supine Unable to assess   Additional Comments fair sitting balance EOB; post eval/treat patient in chair with alarm active   Transfers   Sit pivot 4  Minimal assistance   Additional items Assist x 2; Increased time required;Verbal cues   Additional Comments min-AX2 for sit pivot transfer from bed to drop arm chair w/ increased VC for mechanics/hand positioning   Endurance Deficit   Endurance Deficit Yes   Endurance Deficit Description s/p AKA, pain, fatigue   Activity Tolerance   Activity Tolerance Patient limited by pain; Patient limited by fatigue   Medical Staff Made Aware This high complexity evaluation was performed with an occupational therapist due to the patient's co-morbidities, clinically unstable presentation, and present impairments which are a regression from the patient's baseline. Nurse Made Aware alban to see per RN Ila and f/u post   Assessment   Prognosis Good   Problem List Decreased strength;Decreased endurance; Impaired balance;Decreased mobility;Pain;Decreased skin integrity   Assessment PT completed evaluation of 68 y.o. female admitted to [address] [place] [address] on 7/18/2023 with symptoms of: L foot pain and discoloration. Patient with acute L LE ischemia and underwent L AKA on 7/21. Patient's current status instabilities include ongoing pain, continuous O2/HR monitoring, falls risk, bed/chair alarms, and a regression in function from baseline. PMH is significant for COVID (patient states daily AM nausea and dizziness from COVID), HTN, CABG, vascular bypass surgeries, PAD, and CKD. Prior to this admission patient resided with son, d-I-l, and grandchildren in a one level home (2 IMANI through front door and garage; first floor set up available). At her baseline she is I with mobility (recenlty ambulating with SPC), ADLs, and iADLS. + . Dght and S-I-L work from home. Patient presents at time of PT evaluation functioning below baseline and currently w/ overall mobility deficits 2* to: impaired balance, gait deviations, decreased activity tolerance and fall risk. During PT evaluation, patient currently is requiring mod-AX1 for bed mobility and min-AX2 for sit pivot transfer (bed to drop arm chair). See treatment note below for ongoing mobility. This patient is functioning below their baseline and is recommended for rehab (PMR referral). Patient will continue to benefit from continued skilled PT this admission to achieve maximal function and safety.    Goals   Patient Goals to go home   LTG Expiration Date 08/14/23   Long Term Goal #1 1) Perform bed mobility mod-I to participate in frequent repositioning and improve skin integrity; 2) Perform functional transfers mod-I to promote I with toileting and OOB mobility; 3) Ambulate 50 feet mod-I with least restrictive device to participate in household and community level mobility; 4) Improve b/l LE strength by 1/2 grade in order to improve efficiency of tranfers; 5) Navigate 2 steps S level in order to safely navigate steps into home; 6) Self propel manual WC x 150 feet mod-I in order to participate in community level mobility   PT Treatment Day 0   Plan   Treatment/Interventions Functional transfer training;LE strengthening/ROM; Therapeutic exercise; Endurance training;Patient/family training;Equipment eval/education; Bed mobility;Gait training;OT;Spoke to nursing;Elevations   PT Frequency 3-5x/wk   Recommendation   PT Discharge Recommendation Post acute rehabilitation services  (PMR consult)   Equipment Recommended Vicci Ayse; Wheelchair   AM-PAC Basic Mobility Inpatient   Turning in Flat Bed Without Bedrails 2   Lying on Back to Sitting on Edge of Flat Bed Without Bedrails 2   Moving Bed to Chair 2   Standing Up From Chair Using Arms 2   Walk in Room 2   Climb 3-5 Stairs With Railing 1   Basic Mobility Inpatient Raw Score 11   Basic Mobility Standardized Score 30.25   Highest Level Of Mobility   JH-HLM Goal 4: Move to chair/commode   JH-HLM Achieved 4: Move to chair/commode   Additional Treatment Session   Start Time 1048   End Time 1059   Treatment Assessment PT initiated treatment session in order to assist patient in achieving goals to improve transfers and gait training, and overall activity tolerance. With use of RW and VC for hand placement/mechanics while transferring, she performed sit-->stand transfer from chair with mod-AX1 and maintained stance x 30 seconds. Patient utilized RW to performed controlled hops on R LE in order to ambulate 5 feet with min-AX1. Patient able to perform functional mobility tasks AX1. She is motivated and eager to progress. Post treatment patient seated OOB in chair with alarm active. Patient will continue to benefit from continued skilled PT this admission to achieve maximal function and safety. Equipment Use rw   Additional Treatment Day 1   End of Consult   Patient Position at End of Consult Bedside chair; All needs within reach;Bed/Chair alarm activated     The patient's AM-PAC Basic Mobility Inpatient Standardized Score is less than 42.9, suggesting this patient may benefit from discharge to post-acute rehabilitation services. Please also refer to the recommendation of the Physical Therapist for safe discharge planning.       Katrina Ricketts, PT, DPT

## 2023-07-24 NOTE — PROGRESS NOTES
[address] [place] [address]  Progress Note  Name: Taina Dixon  MRN: [id number]  Unit/Bed#: [facility] 369-58 I Date of Admission: 7/18/2023   Date of Service: 7/24/2023 I Hospital Day: 6    Assessment/Plan   PAD (peripheral artery disease) [facility])  Assessment & Plan  68 y.o. female with chronic left lower extremity ischemia secondary to aortoiliac and infrainguinal arterial occlusive disease. Patient has multiple failed revascularization procedures. She has no further targets for revascularization in the left lower extremity. Now S/p L AKA 7/21/23 Sekou Carvajal)    Recommendations:  - Dressing change completed today; see media image, well approximated, staple line intact  - Weaned off of O2 this AM  - Diet as tolerated  - PRN Analgesia; appreciate APS recs  - Will re-engage PMR for candidacy in [facility]  - Appreciate PT/OT evaluation - would benefit from post-acute rehab  - CM for dispo planning  - Will discuss with Dr. Alecia Leon: In good spirits this AM, pain well controlled, without shortness of breath. Vitals:  /61 (BP Location: Right arm)   Pulse 57   Temp 98.1 °F (36.7 °C) (Oral)   Resp 18   Ht 5' 4" (1.626 m)   Wt 76.2 kg (168 lb)   SpO2 92%   BMI 28.84 kg/m²     I/Os:  I/O last 3 completed shifts: In: 510 [P.O.:510]  Out: 3300 [Urine:3300]  No intake/output data recorded.       Lab Results and Cultures:   CBC with diff: Lab Results   Component Value Date    WBC 10.96 (H) 07/24/2023    HGB 12.4 07/24/2023    HCT 38.5 07/24/2023    MCV 92 07/24/2023     07/24/2023    RBC 4.18 07/24/2023    MCH 29.7 07/24/2023    MCHC 32.2 07/24/2023    RDW 13.8 07/24/2023    MPV 10.6 07/24/2023    NRBC 0 07/24/2023   ,   BMP/CMP:  Lab Results   Component Value Date    SODIUM 139 07/22/2023    K 4.1 07/22/2023     (H) 07/22/2023    CO2 24 07/22/2023    BUN 9 07/22/2023    CREATININE 0.83 07/22/2023    CALCIUM 8.9 07/22/2023    AST 28 07/18/2023    ALT 25 07/18/2023    ALKPHOS 75 07/18/2023    EGFR 70 07/22/2023   ,   Lipid Panel: No results found for: "CHOL",   Coags:   Lab Results   Component Value Date    PTT 36 07/21/2023    INR 0.92 07/18/2023   ,     Blood Culture: No results found for: "BLOODCX",   Urinalysis: No results found for: "COLORU", "CLARITYU", "SPECGRAV", "PHUR", "LEUKOCYTESUR", "NITRITE", "PROTEINUA", "GLUCOSEU", "Marzella Double", "BILIRUBINUR", "BLOODU",   Urine Culture: No results found for: "URINECX",   Wound Culure: No results found for: "WOUNDCULT"    Medications:  Current Facility-Administered Medications   Medication Dose Route Frequency   • acetaminophen (TYLENOL) tablet 975 mg  975 mg Oral Q8H [facility]   • albuterol (PROVENTIL HFA,VENTOLIN HFA) inhaler 2 puff  2 puff Inhalation Q6H PRN   • amLODIPine (NORVASC) tablet 5 mg  5 mg Oral Daily   • atorvastatin (LIPITOR) tablet 40 mg  40 mg Oral Daily With Dinner   • clopidogrel (PLAVIX) tablet 75 mg  75 mg Oral Daily   • diltiazem (CARDIZEM) tablet 120 mg  120 mg Oral Daily   • docusate sodium (COLACE) capsule 100 mg  100 mg Oral BID   • ezetimibe (ZETIA) tablet 10 mg  10 mg Oral Daily   • folic acid (FOLVITE) tablet 1 mg  1 mg Oral Daily   • gabapentin (NEURONTIN) capsule 300 mg  300 mg Oral HS   • gabapentin (NEURONTIN) capsule 300 mg  300 mg Oral BID   • heparin (porcine) subcutaneous injection 5,000 Units  5,000 Units Subcutaneous Q8H [facility]   • HYDROmorphone (DILAUDID) injection 0.5 mg  0.5 mg Intravenous Q2H PRN   • isosorbide mononitrate (IMDUR) 24 hr tablet 30 mg  30 mg Oral QAM   • lamoTRIgine (LaMICtal) tablet 100 mg  100 mg Oral Daily   • lidocaine (LIDODERM) 5 % patch 2 patch  2 patch Topical Daily   • melatonin tablet 9 mg  9 mg Oral HS PRN   • methocarbamol (ROBAXIN) tablet 500 mg  500 mg Oral Q6H [facility]WO   • metoprolol succinate (TOPROL-XL) 24 hr tablet 50 mg  50 mg Oral Daily   • nicotine (NICODERM CQ) 21 mg/24 hr TD 24 hr patch 1 patch  1 patch Transdermal Daily   • ondansetron (ZOFRAN) injection 4 mg  4 mg Intravenous Q6H PRN   • oxyCODONE (ROXICODONE) IR tablet 5 mg  5 mg Oral Q4H PRN    Or   • oxyCODONE (ROXICODONE) immediate release tablet 10 mg  10 mg Oral Q4H PRN   • polyethylene glycol (MIRALAX) packet 17 g  17 g Oral Daily PRN   • traZODone (DESYREL) tablet 50 mg  50 mg Oral HS PRN       Physical Exam:  General appearance: alert and oriented, in no acute distress  Neurologic: Grossly neurologically intact  Neck: supple, symmetrical, trachea midline  Lungs: Normal work of breathing.   Heart: Regular rate, grossly warm and well-perfused  Abdomen: Soft, rounded abdomen non-tender to palpation  Extremities: RLE M/S intact.  L AKA dressing in place with ACE without noted saturation, bleeding, or other discharge; clean and dry           Pulse exam:  Femoral: Right: 2+ Left: 2+  DP: Right: doppler signal PT: Right: doppler signal     Chris Nice PA-C  7/24/2023

## 2023-07-24 NOTE — CASE MANAGEMENT
Case Management Progress Note    Patient name Marino Yang  Location [address] [place] [id number] [id number]  : 1950 Date 2023       LOS (days): 6  Geometric Mean LOS (GMLOS) (days): 2.70  Days to GMLOS:-3.2        OBJECTIVE:        Current admission status: Inpatient  Preferred Pharmacy:   [address] [place] [address]  Phone: [phone] Fax: [phone]    Primary Care Provider: Jaz Dhaliwal DO    Primary Insurance: MEDICARE  Secondary Insurance:     PROGRESS NOTE: Per provider, patient medically cleared for DC. Patient was evaluated by PMR on  and was a probable AR[facility] candidate. Met with patient at bedside to discuss DC plan. Patient agreeable to rehab on DC. Patient requesting 1st choice to be [facility] AR[facility]. Educated on referral process. Patient verbalized understanding. IMM reviewed with patient, patient agrees with discharge determination. Referral sent to [facility]. Awaiting review and acceptance. Update at [phone]: Patient accepted into ARC. No available bed today. COVID requested in anticipation of possible admission tomorrow.

## 2023-07-24 NOTE — PROGRESS NOTES
PHYSICAL MEDICINE AND REHABILITATION   PREADMISSION ASSESSMENT      Projected [facility] Diagnoses:  Impairment of mobility, safety and Activities of Daily Living (ADLs) due to Amputation:  05.5  Bilateral Lower Limb Above the Knee  Etiologic: Left lower extremity ischemia s/p L AKA  Date of Onset: 7/18/23   Date of surgery: 7/21/23    PATIENT INFORMATION  Name: Nedra Rodney Phone #: [phone] (home)   Address: [place] [id number]  YOB: 1950 Age: 68 y.o. SS#   Marital Status:   Ethnicity: White  Employment Status: retired  Extended Emergency Contact Information  Primary Emergency Contact: [address] [facility]  Mobile Phone: [phone]  Relation: Son  Advance Directive: Level 1 Full Code (no ACP docs)    INSURANCE/COVERAGE:     Primary Payor: MEDICARE / Plan: MEDICARE A AND B / Product Type: Medicare A & B Fee for Service /   Secondary Payer:Self Pay   Payer Contact:  Payer Contact:   Contact Phone:  Contact Phone:     MEDICARE #: [id number]  Medicare Days: 60/30/60  Medical Record #: [id number]    REFERRAL SOURCE:   Referring provider: Radha Reina MD  Referring facility: [facility]   Room: [address] [place] [address]  PCP: Vickey Gonsalez DO PCP phone number: [phone]    MEDICAL INFORMATION  HPI: Pt is a 68 y.o. female with a PMH of HTN, lung cancer, CAD s/p MI/CABG, renal artery stenosis, HLD, atrial fibrillation. CKD stage 3, bipolar disorder and chronic left lower extremity ischemia due to aortoiliac and infrainguinal arterial occlusive disease s/p multiple failed revascularization procedures presented to the [facility] on 7/18/2023 with left lower extremity pain, claudication and numbness. CTA showed multiple inflow graft occlusions on the left. She was placed on a heparin drip and APS was consulted for pain management. Pt is s/p L AKA 7/21/23. Doing well, pain controlled. Intermittently with 1-2L NC O2 requirements. Dressing change completed 7/24. PT and OT have been consulted and are recommending post-acute rehab services. Patient's case has been reviewed with [facility] medical director, patient meets medical criteria for acute rehab and has demonstrated the ability to tolerate three or more hours of therapy per day. Patient is medically stable and ready for discharge to [facility]. No vaccines on file; Covid test negative 7/24    Past Medical History:   Past Surgical History: Allergies:     Past Medical History:   Diagnosis Date   • Aorto-iliac disease ([address])    • Atrial fibrillation (HCC)    • CAD (coronary artery disease)    • Carotid stenosis, bilateral    • Celiac artery stenosis ([facility])    • CKD (chronic kidney disease) stage 3, GFR 30-59 ml/min ([facility])    • DVT (deep venous thrombosis) ([facility])     LLE (CFV, popl)   • Heart attack ([address]) 02/2022   • HLD (hyperlipidemia)    • Hypertension    • Lung mass     RUL   • Myocardial infarction ([address])     2022   • COURTNEY (obstructive sleep apnea)    • PAD (peripheral artery disease) ([address])    • Stroke [facility])     Past Surgical History:   Procedure Laterality Date   • AORTA - FEMORAL ARTERY BYPASS GRAFT Left    • AXILLO-FEMORAL BYASS GRAFT Bilateral    • BYPASS FEMORAL-FEMORAL     • CORONARY ARTERY BYPASS GRAFT  2000   • FEMORAL ARTERY - POPLITEAL ARTERY BYPASS GRAFT Bilateral    • IVC FILTER INSERTION     • MN AMPUTATION THIGH THROUGH FEMUR ANY LEVEL Left 7/21/2023    Procedure: AMPUTATION ABOVE KNEE (AKA);   Surgeon: Maral Teran MD;  Location: [facility];  Service: Vascular   • TOTAL ABDOMINAL HYSTERECTOMY W/ BILATERAL SALPINGOOPHORECTOMY     • TOTAL HIP ARTHROPLASTY Left      No Known Allergies      Medical/functional conditions requiring inpatient rehabilitation: Left lower extremity ischemia due to aortoiliac and infrainguinal arterial occlusive disease s/p multiple re-vascularization procedures, Impaired mobility and self care    Risk for medical/clinical complications: Risk for falls, Risk for skin breakdown secondary to decreased mobility, Risk for infection or dehiscence, Risk for hypertensive episodes    Comorbidities:  • Hypertension   • Lung cancer  • CAD s/p MI/CABG  • Renal artery stenosis  • HLD  • Atrial fibrillation  • CKD stage 3  • Bipolar disorder  • DVT ppx: heparin SQ and SCD       Surgeries in the last 100 days: s/p L AKA 7/21/23    CURRENT VITAL SIGNS:   Temp:  [98.3 °F (36.8 °C)-99 °F (37.2 °C)] 98.3 °F (36.8 °C)  HR:  [54-67] 67  Resp:  [20] 20  BP: (121-149)/(58-72) 146/72   Intake/Output Summary (Last 24 hours) at 7/25/2023 1023  Last data filed at 7/25/2023 0548  Gross per 24 hour   Intake 820 ml   Output 2200 ml   Net -1380 ml        LABORATORY RESULTS:      Lab Results   Component Value Date    HGB 12.4 07/24/2023    HCT 38.5 07/24/2023    WBC 10.96 (H) 07/24/2023     Lab Results   Component Value Date    BUN 9 07/22/2023    K 4.1 07/22/2023     (H) 07/22/2023    CREATININE 0.83 07/22/2023     Lab Results   Component Value Date    PROTIME 12.5 07/18/2023    INR 0.92 07/18/2023        DIAGNOSTIC STUDIES:  CTA chest abdomen pelvis w wo contrast    Result Date: 7/18/2023  Impression: Minimal collateral flow to the left thigh with a few diminutive patent collaterals below the left knee, but no flow in the major vessels. Occlusion of left femoral and popliteal bypass grafts, as detailed above. The critical finding was discussed with Dr. Grey Shah, vascular surgery, 7/18/2023 at . The vascular surgery team has already reviewed the images. Complete occlusion of the right common, external and internal iliac arteries. Reconstitution of flow at the right common femoral artery from the subclavian to femoral bypass. Occluded right femoropopliteal bypass. Patent right deep femoral artery with multiple collaterals supplying the right popliteal artery.  Nonvisualization of the distal most right popliteal artery, with multiple collaterals supplying three-vessel runoff to the ankle and at least 2 vessel run off to the foot. Ectasia of the descending thoracic aorta to 2.9 cm. Atherosclerotic changes. Infrarenal aortic mural thrombus occupying about 50% of the lumen. Right perihilar mass, 2.3 cm, suspicious for neoplasm on a recent PET. Right adrenal nodule, without FDG avidity, likely an adenoma. Hepatic hyperenhancing foci, possibly perfusion anomalies, not further characterized.  Recommend nonemergent MRI with contrast. Workstation performed: [id number]       PRECAUTIONS/SPECIAL NEEDS:  Weight Bearing Precautions:  NWB L LE, Anticoagulation:  clopidogrel 75 mg orally every day and heparin, Edema Management, Safety Concerns, Pain Management, Requires O2: 2 L/min, Dietary Restrictions: [place]/CHO controlled and Language Preference: English    MEDICATIONS:     Current Facility-Administered Medications:   •  acetaminophen (TYLENOL) tablet 975 mg, 975 mg, Oral, Q8H [address], Jeremie Humphries MD, 975 mg at 07/25/23 0530  •  albuterol (PROVENTIL HFA,VENTOLIN HFA) inhaler 2 puff, 2 puff, Inhalation, Q6H PRN, Jeremie Humphries MD  •  amLODIPine (NORVASC) tablet 5 mg, 5 mg, Oral, Daily, Jeremie Humphries MD, 5 mg at 07/24/23 2115  •  atorvastatin (LIPITOR) tablet 40 mg, 40 mg, Oral, Daily With Dinner, Jeremie Humphries MD, 40 mg at 07/24/23 1714  •  clopidogrel (PLAVIX) tablet 75 mg, 75 mg, Oral, Daily, Jeremie Humphries MD, 75 mg at 07/25/23 2206  •  diltiazem (CARDIZEM) tablet 120 mg, 120 mg, Oral, Daily, Jeremie Humphries MD, 120 mg at 07/25/23 8161  •  docusate sodium (COLACE) capsule 100 mg, 100 mg, Oral, BID, Jeremie Humphries MD, 100 mg at 07/25/23 3226  •  ezetimibe (ZETIA) tablet 10 mg, 10 mg, Oral, Daily, Jeremie Humphries MD, 10 mg at 45/72/18 9382  •  folic acid (FOLVITE) tablet 1 mg, 1 mg, Oral, Daily, Jeremie Humphries MD, 1 mg at 07/25/23 7933  •  gabapentin (NEURONTIN) capsule 300 mg, 300 mg, Oral, HS, Jeremie Humphries MD, 300 mg at 07/24/23 7026  •  gabapentin (NEURONTIN) capsule 300 mg, 300 mg, Oral, BID, Nicole Regalado MD, 300 mg at 07/25/23 0903  •  heparin (porcine) subcutaneous injection 5,000 Units, 5,000 Units, Subcutaneous, Q8H [address], Murphy Saleh PA-C, 5,000 Units at 07/25/23 0530  •  HYDROmorphone (DILAUDID) injection 0.5 mg, 0.5 mg, Intravenous, Q2H PRN, Nicole Regalado MD, 0.5 mg at 07/20/23 1442  •  isosorbide mononitrate (IMDUR) 24 hr tablet 30 mg, 30 mg, Oral, QAM, Nicole Regalado MD, 30 mg at 07/25/23 0900  •  lamoTRIgine (LaMICtal) tablet 100 mg, 100 mg, Oral, Daily, Nicole Regalado MD, 100 mg at 07/25/23 6168  •  lidocaine (LIDODERM) 5 % patch 2 patch, 2 patch, Topical, Daily, Nicole Regalado MD, 1 patch at 07/21/23 0856  •  lisinopril (ZESTRIL) tablet 10 mg, 10 mg, Oral, Daily, Anila Macdonald PA-C, 10 mg at 07/25/23 4440  •  melatonin tablet 9 mg, 9 mg, Oral, HS PRN, Nicole Regalado MD, 9 mg at 07/23/23 2155  •  methocarbamol (ROBAXIN) tablet 500 mg, 500 mg, Oral, Q6H [address], Nicole Regalado MD, 500 mg at 07/25/23 0530  •  metoprolol succinate (TOPROL-XL) 24 hr tablet 50 mg, 50 mg, Oral, Daily, Nicole Regalado MD, 50 mg at 07/25/23 4827  •  nicotine (NICODERM CQ) 21 mg/24 hr TD 24 hr patch 1 patch, 1 patch, Transdermal, Daily, Nicole Regalado MD, 1 patch at 07/25/23 0939  •  ondansetron (ZOFRAN) injection 4 mg, 4 mg, Intravenous, Q6H PRN, Nicole Regalado MD  •  oxyCODONE (ROXICODONE) IR tablet 5 mg, 5 mg, Oral, Q4H PRN, 5 mg at 07/19/23 0931 **OR** oxyCODONE (ROXICODONE) immediate release tablet 10 mg, 10 mg, Oral, Q4H PRN, Nicole Regalado MD, 10 mg at 07/24/23 1714  •  polyethylene glycol (MIRALAX) packet 17 g, 17 g, Oral, Daily PRN, Nicole Regalado MD  •  traZODone (DESYREL) tablet 50 mg, 50 mg, Oral, HS PRN, Nicole Regalado MD    SKIN INTEGRITY:   Left leg stump site  Left distal, posterior arm skin tear    PRIOR LEVEL OF FUNCTION:  She lives in a(n) single family home  [facility] is  and lives with their son. Self Care: Independent, Indoor Mobility: Independent, Stairs (in/outdoor):  Independent and Cognition: Independent    FALLS IN THE LAST 6 MONTHS: 0    HOME ENVIRONMENT:  The living area: can live on one level  There are 2 steps to enter the home. The patient will have 24 hour supervision/physical assistance available upon discharge. PREVIOUS DME:  Equipment in home (previous DME): None    FUNCTIONAL STATUS:  Physical Therapy Occupational Therapy Speech Therapy   07/24/23 1059    PT Last Visit   PT Visit Date 07/24/23   Note Type   Note type Evaluation  (and treatment)   Pain Assessment   Pain Assessment Tool FLACC   Pain Location/Orientation Orientation: Left  (residual limb)   Hospital Pain Intervention(s) Ambulation/increased activity;MD notified (Comment)   Pain Rating: FLACC (Rest) - Face 0   Pain Rating: FLACC (Rest) - Legs 0   Pain Rating: FLACC (Rest) - Activity 0   Pain Rating: FLACC (Rest) - Cry 0   Pain Rating: FLACC (Rest) - Consolability 0   Score: FLACC (Rest) 0   Pain Rating: FLACC (Activity) - Face 1   Pain Rating: FLACC (Activity) - Legs 0   Pain Rating: FLACC (Activity) - Activity 0   Pain Rating: FLACC (Activity) - Cry 0   Pain Rating: FLACC (Activity) - Consolability 0   Score: FLACC (Activity) 1   Restrictions/Precautions   Weight Bearing Precautions Per Order Yes   LLE Weight Bearing Per Order (S)  NWB  (s/p AKA)   Other Precautions Pain; Fall Risk;O2;Multiple lines; Bed Alarm; Chair Alarm   Home Living   Type of [address] [place] [address] One level;Stairs to enter with rails  (2 imani)   Bathroom Shower/Tub Tub/shower unit   [facility]   Additional Comments Prior to this admission patient resided with son, d-I-l, and grandchildren in a one level home (2 IMANI through front door and garage; first floor set up available). At her baseline she is I with mobility (recenlty ambulating with SPC), ADLs, and iADLS. + . Dght and S-I-L work from home. Prior Function   Level of [place] Independent with ADLs; Independent with functional mobility   Lives With Son Receives Help From Family   IADLs Independent with driving; Independent with meal prep; Independent with medication management   Falls in the last 6 months 0   Vocational Retired   General   Additional Pertinent History 68 y.o. female admitted to [facility] on 7/18/2023 with symptoms of: L foot pain and discoloration. Patient with acute L LE ischemia and underwent L AKA on 7/21. Family/Caregiver Present No   Cognition   Overall Cognitive Status WFL   Arousal/Participation Alert   Attention Within functional limits   Orientation Level Oriented X4   Memory Within functional limits   Following Commands Follows one step commands without difficulty   Subjective   Subjective "when I go home I know I have to use a wheelchair"   RUE Assessment   RUE Assessment WFL   LUE Assessment   LUE Assessment WFL   RLE Assessment   RLE Assessment WFL   LLE Assessment   LLE Assessment    (s/p AKA; hip flexion 2/5 grossly)   Bed Mobility   Supine to Sit 3  Moderate assistance   Additional items Assist x 1; Increased time required;Verbal cues   Sit to Supine Unable to assess   Additional Comments fair sitting balance EOB; post eval/treat patient in chair with alarm active   Transfers   Sit pivot 4  Minimal assistance   Additional items Assist x 2; Increased time required;Verbal cues   Additional Comments min-AX2 for sit pivot transfer from bed to drop arm chair w/ increased VC for mechanics/hand positioning   Endurance Deficit   Endurance Deficit Yes   Endurance Deficit Description s/p AKA, pain, fatigue   Activity Tolerance   Activity Tolerance Patient limited by pain; Patient limited by fatigue   Medical Staff Made Aware This high complexity evaluation was performed with an occupational therapist due to the patient's co-morbidities, clinically unstable presentation, and present impairments which are a regression from the patient's baseline.    Nurse Made Aware alban to see per RN Ila and f/u post   Assessment   Prognosis Good Problem List Decreased strength;Decreased endurance; Impaired balance;Decreased mobility;Pain;Decreased skin integrity   Assessment PT completed evaluation of 68 y.o. female admitted to [facility] on 7/18/2023 with symptoms of: L foot pain and discoloration. Patient with acute L LE ischemia and underwent L AKA on 7/21.      Patient's current status instabilities include ongoing pain, continuous O2/HR monitoring, falls risk, bed/chair alarms, and a regression in function from baseline. PMH is significant for COVID (patient states daily AM nausea and dizziness from COVID), HTN, CABG, vascular bypass surgeries, PAD, and CKD. Prior to this admission patient resided with son, d-I-l, and grandchildren in a one level home (2 IMANI through front door and garage; first floor set up available). At her baseline she is I with mobility (recenlty ambulating with SPC), ADLs, and iADLS. + . Dght and S-I-L work from home.      Patient presents at time of PT evaluation functioning below baseline and currently w/ overall mobility deficits 2* to: impaired balance, gait deviations, decreased activity tolerance and fall risk. During PT evaluation, patient currently is requiring mod-AX1 for bed mobility and min-AX2 for sit pivot transfer (bed to drop arm chair). See treatment note below for ongoing mobility.      This patient is functioning below their baseline and is recommended for rehab (PMR referral). Patient will continue to benefit from continued skilled PT this admission to achieve maximal function and safety.       07/24/23 1100    OT Last Visit   OT Visit Date 07/24/23   Note Type   Note Type Treatment   Pain Assessment   Pain Assessment Tool 0-10   Pain Score No Pain   Restrictions/Precautions   Weight Bearing Precautions Per Order Yes   LLE Weight Bearing Per Order NWB  (s/p AKA)   Other Precautions Fall Risk;O2   Lifestyle   Autonomy Pt reports being IND w/ all ADLS and IADLS; (+) drives; only recently ambulating w/ SPC PTA   Reciprocal Relationships Pt lives w/ her son, dtr in law, adult grandkids, and greatgrandkid. Pt reports her dtr in law works from home and son works out of the home. Pt reports her adult grandkids are home on summer break from school. Service to Others Pt is retired   Intrinsic Gratification Pt reports enjoying spending time w/ family   ADL   Where Assessed Chair   UB Dressing Assistance 5  Supervision/Setup   UB Dressing Comments Pt able to don hospital gown. LB Dressing Assistance 4  Minimal Assistance   LB Dressing Deficit Increased time to complete; Don/doff R sock   LB Dressing Comments While seated in chair, pt doffed and donned new sock. She needs assist in standing, therefore will need assist with pulling up clothing. Functional Standing Tolerance   Time 1 minute   Activity standing at chair and hopping 2-3 steps forward to work on balance and endurance as a prereq for ADLs. Comments Pt benefited from min Ax1 with mobility for steadying   Bed Mobility   Supine to Sit 3  Moderate assistance   Additional items Assist x 1; Increased time required   Transfers   Sit to Stand 3  Moderate assistance   Additional items Assist x 1   Stand to Sit 3  Moderate assistance   Additional items Assist x 1   Sit pivot 4  Minimal assistance   Additional items Assist x 2; Increased time required   Additional Comments Pt needed Ax2 to sit pivot from bed to drop arm recliner w/ cues for positioning and technique. Cognition   Overall Cognitive Status WFL   Arousal/Participation Alert; Responsive; Cooperative   Attention Within functional limits   Orientation Level Oriented X4   Memory Within functional limits   Following Commands Follows one step commands without difficulty   Comments Pt pleasant and motivated to participate.    Activity Tolerance   Activity Tolerance Patient tolerated treatment well   Medical Staff Made Aware RN and PT   Assessment   Assessment Patient participated in Skilled OT session this date with interventions consisting of ADL re training with the use of correct body mechanics, Energy Conservation techniques, safety awareness and fall prevention techniques, increase dynamic sit/ stand balance during functional activity  and increase OOB/ sitting tolerance. Patient agreeable to OT treatment session, upon arrival patient was found supine in bed. In comparison to previous session, patient with improvements in activity tolerance, endurance, transfer, and functional mobility. Pt completed UB ADLs w S while seated and LB ADLs w/ min A. She benefits from 1-2 person for transfer OOB using sit pivot and progressed to Ax1 for sit <>stand and hopping. Patient continues to be functioning below baseline level, occupational performance remains limited secondary to factors listed above and increased risk for falls and injury. From OT standpoint, recommendation at time of d/c would be Short Term Rehab. Patient to benefit from continued Occupational Therapy treatment while in the hospital to address deficits as defined above and maximize level of functional independence with ADLs and functional mobility. CARE SCORES:  Self Care:  Eatin: Supervision or touching  assistance  Oral hygiene: 04: Supervision or touching  assistance  Toilet hygiene: 02:  Substantial/maximal assistance  Shower/bathing self: 03: Partial/moderate assistance  Upper body dressin: Supervision or touching  assistance  Lower body dressin: Partial/moderate assistance  Putting on/taking off footwear: 03: Partial/moderate assistance  Transfers:  Roll left and right: 09: Not applicable  Sit to lyin: Partial/moderate assistance  Lying to sitting on side of bed: 03: Partial/moderate assistance  Sit to stand: 01: Dependent (Min Ax2)   Chair/bed to chair transfer: 01: Dependent  (Min Ax2)   Toilet transfer: 09: Not applicable  Mobility:  Walk 10 ft: 09: Not applicable  Walk 50 ft with two turns: 09: Not applicable  Walk [address]: 09: Not applicable    CURRENT GAP IN FUNCTION  Prior to Admission: Functional Status: Patient was independent with mobility/ambulation, transfers, ADL's, IADL's. Expected functional outcomes: It is expected that with skilled acute rehabilitation services the patient will progress to Supervision for self care and Supervision for mobility     Estimated length of stay: 10 to 14 days    Anticipated Post-Discharge Disposition/Treatment  Disposition: Return to previous home/apartment. Outpatient Services: Physical Therapy (PT) and Occupational Therapy (OT)    BARRIERS TO DISCHARGE  Weakness, Pain, Balance Difficulty, Fatigue, Home Accessibility, Caregiver Accessibility and Equipment Needs    INTERVENTIONS FOR DISCHARGE  Adaptive equipment, Patient/Family/Caregiver Education, [employer], Arrange DME needs, Therapy exercises and Energy conservation education     REQUIRED THERAPY:  Patient will require PT and OT 90 minutes each per day, five days per week to achieve rehab goals. REQUIRED FUNCTIONAL AND MEDICAL MANAGEMENT FOR INPATIENT REHABILITATION:  Skin:  Monitor skin for breakdown , Pain Management: Overall pain is moderately controlled, Deep Vein Thrombosis (DVT) Prophylaxis:  Per MD orders, further internal medicine management of additional medical conditions while on ARC, PT/OT intervention, patient/family education and training, and any needed consults PRN. RECOMMENDED LEVEL OF CARE:    Pt is a 68 y.o. female with a PMH of HTN, lung cancer, CAD s/p MI/CABG, renal artery stenosis, HLD, atrial fibrillation. CKD stage 3, bipolar disorder and chronic left lower extremity ischemia due to aortoiliac and infrainguinal arterial occlusive disease s/p multiple failed revascularization procedures presented to the [address] [place] [address] on 7/18/2023 with left lower extremity pain, claudication and numbness. CTA showed multiple inflow graft occlusions on the left.  She was placed on a heparin drip and APS was consulted for pain management. Pt is s/p L AKA 7/21/23. Doing well, pain controlled. Intermittently with 1-2L NC O2 requirements. Dressing change completed 7/24. Pt was independent PTA with transfers, amb, and ADLs. Pt lives with son and DIL  in a one level home with 2 IMANI. Pt is currently functioning below baseline needing up to Max A for ADLs and Min Ax2 A for transfers. Pt would benefit from [facility] admission to have close medical management while participating in 3 hours of therapy per day that will include physical and occupational therapy. Physical and occupational therapists will address functional mobility deficits and assist patient in improving their strength, endurance, ROM, and self care. Pt will have 24/7 nursing care to monitor routine vitals, I/Os, skin integrity, and overall condition. The rehab nursing staff will follow therapy recommendations to have 24 hour follow through during non-therapy hours. PM&R to maximize function and provide medical oversight. The MD will monitor patient co-morbidities while on the unit as well as order any additional tests, labs, and consults needed. The [facility] specialized interdisciplinary team will meet weekly to discuss patient overall medical status and rehab goals in preparation for D/C home. Inpatient acute rehab is recommended for patient to maximize overall strength, endurance, self care, and mobility for a safe and timely transition back home.

## 2023-07-24 NOTE — PLAN OF CARE
Problem: OCCUPATIONAL THERAPY ADULT  Goal: Performs self-care activities at highest level of function for planned discharge setting. See evaluation for individualized goals. Description: Treatment Interventions: ADL retraining, Functional transfer training, UE strengthening/ROM, Endurance training, Patient/family training, Equipment evaluation/education, Neuromuscular reeducation, Fine motor coordination activities, Compensatory technique education, Energy conservation, Activityengagement          See flowsheet documentation for full assessment, interventions and recommendations. Outcome: Progressing  Note: Limitation: Decreased ADL status, Decreased UE strength, Decreased Safe judgement during ADL, Decreased endurance, Decreased high-level ADLs  Prognosis: Good  Assessment: Patient participated in Skilled OT session this date with interventions consisting of ADL re training with the use of correct body mechanics, Energy Conservation techniques, safety awareness and fall prevention techniques, increase dynamic sit/ stand balance during functional activity  and increase OOB/ sitting tolerance. Patient agreeable to OT treatment session, upon arrival patient was found supine in bed. In comparison to previous session, patient with improvements in activity tolerance, endurance, transfer, and functional mobility. Pt completed UB ADLs w S while seated and LB ADLs w/ min A. She benefits from 1-2 person for transfer OOB using sit pivot and progressed to Ax1 for sit <>stand and hopping. Patient continues to be functioning below baseline level, occupational performance remains limited secondary to factors listed above and increased risk for falls and injury. From OT standpoint, recommendation at time of d/c would be Short Term Rehab.  Patient to benefit from continued Occupational Therapy treatment while in the hospital to address deficits as defined above and maximize level of functional independence with ADLs and functional mobility.      OT Discharge Recommendation: Post acute rehabilitation services

## 2023-07-24 NOTE — ASSESSMENT & PLAN NOTE
68 y.o. female with chronic left lower extremity ischemia secondary to aortoiliac and infrainguinal arterial occlusive disease. Patient has multiple failed revascularization procedures. She has no further targets for revascularization in the left lower extremity.     Now S/p L AKA 7/21/23 Elisha Escamilla)    Recommendations:  - Dressing change completed today; see media image, well approximated, staple line intact  - Weaned off of O2 this AM  - Diet as tolerated  - PRN Analgesia; appreciate APS recs  - Will re-engage PMR for candidacy in [facility]  - Appreciate PT/OT evaluation - would benefit from post-acute rehab  - Will discuss with Dr. Elisha Escamilla

## 2023-07-24 NOTE — PLAN OF CARE
Problem: PHYSICAL THERAPY ADULT  Goal: Performs mobility at highest level of function for planned discharge setting. See evaluation for individualized goals. Description: Treatment/Interventions: Functional transfer training, LE strengthening/ROM, Therapeutic exercise, Endurance training, Patient/family training, Equipment eval/education, Bed mobility, Gait training, OT, Spoke to nursing, Elevations  Equipment Recommended: Bria Half, Wheelchair       See flowsheet documentation for full assessment, interventions and recommendations. Note: Prognosis: Good  Problem List: Decreased strength, Decreased endurance, Impaired balance, Decreased mobility, Pain, Decreased skin integrity  Assessment: PT completed evaluation of 68 y.o. female admitted to [facility] on [facility]/18/2023 with symptoms of: L foot pain and discoloration. Patient with acute L LE ischemia and underwent L AKA on 7/21. Patient's current status instabilities include ongoing pain, continuous O2/HR monitoring, falls risk, bed/chair alarms, and a regression in function from baseline. PMH is significant for COVID (patient states daily AM nausea and dizziness from COVID), HTN, CABG, vascular bypass surgeries, PAD, and CKD. Prior to this admission patient resided with son, d-I-l, and grandchildren in a one level home (2 IMANI through front door and garage; first floor set up available). At her baseline she is I with mobility (recenlty ambulating with SPC), ADLs, and iADLS. + . Dght and S-I-L work from home. Patient presents at time of PT evaluation functioning below baseline and currently w/ overall mobility deficits 2* to: impaired balance, gait deviations, decreased activity tolerance and fall risk. During PT evaluation, patient currently is requiring mod-AX1 for bed mobility and min-AX2 for sit pivot transfer (bed to drop arm chair). See treatment note below for ongoing mobility.  This patient is functioning below their baseline and is recommended for rehab (PMR referral). Patient will continue to benefit from continued skilled PT this admission to achieve maximal function and safety. PT Discharge Recommendation: Post acute rehabilitation services (PMR consult)    See flowsheet documentation for full assessment.

## 2023-07-25 ENCOUNTER — HOSPITAL ENCOUNTER (INPATIENT)
Facility: HOSPITAL | Age: 73
LOS: 15 days | Discharge: HOME HEALTH | DRG: 560 | End: 2023-08-09
Payer: MEDICARE

## 2023-07-25 VITALS
DIASTOLIC BLOOD PRESSURE: 72 MMHG | TEMPERATURE: 98.3 F | WEIGHT: 168 LBS | RESPIRATION RATE: 20 BRPM | HEART RATE: 67 BPM | BODY MASS INDEX: 28.68 KG/M2 | OXYGEN SATURATION: 90 % | SYSTOLIC BLOOD PRESSURE: 146 MMHG | HEIGHT: 64 IN

## 2023-07-25 DIAGNOSIS — I48.0 PAF (PAROXYSMAL ATRIAL FIBRILLATION) (HCC): ICD-10-CM

## 2023-07-25 DIAGNOSIS — E78.2 MIXED HYPERLIPIDEMIA: ICD-10-CM

## 2023-07-25 DIAGNOSIS — F31.4 BIPOLAR AFFECTIVE DISORDER, DEPRESSED, SEVERE (HCC): ICD-10-CM

## 2023-07-25 DIAGNOSIS — R52 PAIN: ICD-10-CM

## 2023-07-25 DIAGNOSIS — G25.81 RESTLESS LEG SYNDROME: ICD-10-CM

## 2023-07-25 DIAGNOSIS — N18.30 STAGE 3 CHRONIC KIDNEY DISEASE, UNSPECIFIED WHETHER STAGE 3A OR 3B CKD (HCC): Primary | ICD-10-CM

## 2023-07-25 DIAGNOSIS — Z89.612 HX OF AKA (ABOVE KNEE AMPUTATION), LEFT (HCC): ICD-10-CM

## 2023-07-25 DIAGNOSIS — Z89.619 S/P AKA (ABOVE KNEE AMPUTATION) (HCC): ICD-10-CM

## 2023-07-25 DIAGNOSIS — I74.09 AORTOILIAC OCCLUSIVE DISEASE (HCC): ICD-10-CM

## 2023-07-25 PROCEDURE — 97112 NEUROMUSCULAR REEDUCATION: CPT

## 2023-07-25 PROCEDURE — 99223 1ST HOSP IP/OBS HIGH 75: CPT

## 2023-07-25 PROCEDURE — 99024 POSTOP FOLLOW-UP VISIT: CPT | Performed by: SURGERY

## 2023-07-25 PROCEDURE — 88300 SURGICAL PATH GROSS: CPT | Performed by: STUDENT IN AN ORGANIZED HEALTH CARE EDUCATION/TRAINING PROGRAM

## 2023-07-25 PROCEDURE — NC001 PR NO CHARGE

## 2023-07-25 PROCEDURE — 97530 THERAPEUTIC ACTIVITIES: CPT

## 2023-07-25 RX ORDER — ACETAMINOPHEN 325 MG/1
975 TABLET ORAL EVERY 8 HOURS SCHEDULED
Status: ON HOLD
Start: 2023-07-25

## 2023-07-25 RX ORDER — ALBUTEROL SULFATE 90 UG/1
2 AEROSOL, METERED RESPIRATORY (INHALATION) EVERY 6 HOURS PRN
Status: DISCONTINUED | OUTPATIENT
Start: 2023-07-25 | End: 2023-08-09 | Stop reason: HOSPADM

## 2023-07-25 RX ORDER — POLYETHYLENE GLYCOL 3350 17 G/17G
17 POWDER, FOR SOLUTION ORAL DAILY PRN
Status: DISCONTINUED | OUTPATIENT
Start: 2023-07-25 | End: 2023-07-25

## 2023-07-25 RX ORDER — POLYETHYLENE GLYCOL 3350 17 G/17G
17 POWDER, FOR SOLUTION ORAL DAILY
Status: DISCONTINUED | OUTPATIENT
Start: 2023-07-25 | End: 2023-08-01

## 2023-07-25 RX ORDER — OXYCODONE HYDROCHLORIDE 10 MG/1
10 TABLET ORAL EVERY 4 HOURS PRN
Status: DISCONTINUED | OUTPATIENT
Start: 2023-07-25 | End: 2023-08-09 | Stop reason: HOSPADM

## 2023-07-25 RX ORDER — ACETAMINOPHEN 325 MG/1
975 TABLET ORAL EVERY 8 HOURS SCHEDULED
Status: DISCONTINUED | OUTPATIENT
Start: 2023-07-25 | End: 2023-08-09 | Stop reason: HOSPADM

## 2023-07-25 RX ORDER — CHOLECALCIFEROL (VITAMIN D3) 125 MCG
20 CAPSULE ORAL
Status: ON HOLD
Start: 2023-07-25

## 2023-07-25 RX ORDER — HEPARIN SODIUM 5000 [USP'U]/ML
5000 INJECTION, SOLUTION INTRAVENOUS; SUBCUTANEOUS EVERY 8 HOURS SCHEDULED
Status: DISCONTINUED | OUTPATIENT
Start: 2023-07-25 | End: 2023-08-09 | Stop reason: HOSPADM

## 2023-07-25 RX ORDER — METHOCARBAMOL 500 MG/1
500 TABLET, FILM COATED ORAL EVERY 6 HOURS SCHEDULED
Status: DISCONTINUED | OUTPATIENT
Start: 2023-07-25 | End: 2023-07-28

## 2023-07-25 RX ORDER — OXYCODONE HYDROCHLORIDE 5 MG/1
5 TABLET ORAL EVERY 4 HOURS PRN
Status: DISCONTINUED | OUTPATIENT
Start: 2023-07-25 | End: 2023-08-09 | Stop reason: HOSPADM

## 2023-07-25 RX ORDER — GABAPENTIN 300 MG/1
300 CAPSULE ORAL
Status: DISCONTINUED | OUTPATIENT
Start: 2023-07-25 | End: 2023-07-26

## 2023-07-25 RX ORDER — GABAPENTIN 300 MG/1
300 CAPSULE ORAL 2 TIMES DAILY
Status: DISCONTINUED | OUTPATIENT
Start: 2023-07-25 | End: 2023-08-01

## 2023-07-25 RX ORDER — LISINOPRIL 10 MG/1
10 TABLET ORAL DAILY
Status: DISCONTINUED | OUTPATIENT
Start: 2023-07-26 | End: 2023-07-29

## 2023-07-25 RX ORDER — LAMOTRIGINE 100 MG/1
100 TABLET ORAL DAILY
Status: DISCONTINUED | OUTPATIENT
Start: 2023-07-26 | End: 2023-08-09 | Stop reason: HOSPADM

## 2023-07-25 RX ORDER — OXYCODONE HYDROCHLORIDE 5 MG/1
5 TABLET ORAL EVERY 4 HOURS PRN
Status: ON HOLD
Start: 2023-07-25 | End: 2023-08-04

## 2023-07-25 RX ORDER — DOCUSATE SODIUM 100 MG/1
100 CAPSULE, LIQUID FILLED ORAL 2 TIMES DAILY
Status: ON HOLD
Start: 2023-07-25

## 2023-07-25 RX ORDER — ONDANSETRON 4 MG/1
4 TABLET, ORALLY DISINTEGRATING ORAL EVERY 6 HOURS PRN
Status: DISCONTINUED | OUTPATIENT
Start: 2023-07-25 | End: 2023-08-09 | Stop reason: HOSPADM

## 2023-07-25 RX ORDER — METHOCARBAMOL 500 MG/1
500 TABLET, FILM COATED ORAL EVERY 6 HOURS SCHEDULED
Status: ON HOLD
Start: 2023-07-25

## 2023-07-25 RX ORDER — NICOTINE 21 MG/24HR
1 PATCH, TRANSDERMAL 24 HOURS TRANSDERMAL DAILY
Status: ON HOLD
Start: 2023-07-25

## 2023-07-25 RX ORDER — LIDOCAINE 50 MG/G
2 PATCH TOPICAL DAILY
Status: ON HOLD
Start: 2023-07-25

## 2023-07-25 RX ORDER — ATORVASTATIN CALCIUM 40 MG/1
40 TABLET, FILM COATED ORAL
Status: DISCONTINUED | OUTPATIENT
Start: 2023-07-25 | End: 2023-08-09 | Stop reason: HOSPADM

## 2023-07-25 RX ORDER — TRAZODONE HYDROCHLORIDE 50 MG/1
50 TABLET ORAL
Status: ON HOLD
Start: 2023-07-25

## 2023-07-25 RX ORDER — DOCUSATE SODIUM 100 MG/1
100 CAPSULE, LIQUID FILLED ORAL 2 TIMES DAILY
Status: DISCONTINUED | OUTPATIENT
Start: 2023-07-25 | End: 2023-08-09 | Stop reason: HOSPADM

## 2023-07-25 RX ORDER — BISACODYL 10 MG
10 SUPPOSITORY, RECTAL RECTAL ONCE
Status: COMPLETED | OUTPATIENT
Start: 2023-07-25 | End: 2023-07-25

## 2023-07-25 RX ORDER — METOPROLOL SUCCINATE 50 MG/1
50 TABLET, EXTENDED RELEASE ORAL DAILY
Status: DISCONTINUED | OUTPATIENT
Start: 2023-07-26 | End: 2023-08-09 | Stop reason: HOSPADM

## 2023-07-25 RX ORDER — LANOLIN ALCOHOL/MO/W.PET/CERES
9 CREAM (GRAM) TOPICAL
Status: DISCONTINUED | OUTPATIENT
Start: 2023-07-25 | End: 2023-08-01

## 2023-07-25 RX ORDER — EZETIMIBE 10 MG/1
10 TABLET ORAL DAILY
Status: DISCONTINUED | OUTPATIENT
Start: 2023-07-26 | End: 2023-08-09 | Stop reason: HOSPADM

## 2023-07-25 RX ORDER — CLOPIDOGREL BISULFATE 75 MG/1
75 TABLET ORAL DAILY
Status: DISCONTINUED | OUTPATIENT
Start: 2023-07-26 | End: 2023-08-09 | Stop reason: HOSPADM

## 2023-07-25 RX ORDER — ISOSORBIDE MONONITRATE 30 MG/1
30 TABLET, EXTENDED RELEASE ORAL EVERY MORNING
Status: DISCONTINUED | OUTPATIENT
Start: 2023-07-26 | End: 2023-08-09 | Stop reason: HOSPADM

## 2023-07-25 RX ORDER — DILTIAZEM HYDROCHLORIDE 60 MG/1
120 TABLET, FILM COATED ORAL DAILY
Status: DISCONTINUED | OUTPATIENT
Start: 2023-07-26 | End: 2023-08-09 | Stop reason: HOSPADM

## 2023-07-25 RX ORDER — POLYETHYLENE GLYCOL 3350 17 G/17G
17 POWDER, FOR SOLUTION ORAL DAILY PRN
Refills: 0 | Status: ON HOLD
Start: 2023-07-25

## 2023-07-25 RX ORDER — NICOTINE 21 MG/24HR
1 PATCH, TRANSDERMAL 24 HOURS TRANSDERMAL DAILY
Status: DISCONTINUED | OUTPATIENT
Start: 2023-07-26 | End: 2023-08-09 | Stop reason: HOSPADM

## 2023-07-25 RX ORDER — OXYCODONE HYDROCHLORIDE 10 MG/1
10 TABLET ORAL EVERY 4 HOURS PRN
Status: ON HOLD
Start: 2023-07-25 | End: 2023-08-04

## 2023-07-25 RX ORDER — FOLIC ACID 1 MG/1
1 TABLET ORAL DAILY
Status: DISCONTINUED | OUTPATIENT
Start: 2023-07-26 | End: 2023-08-09 | Stop reason: HOSPADM

## 2023-07-25 RX ORDER — AMLODIPINE BESYLATE 5 MG/1
5 TABLET ORAL DAILY
Status: DISCONTINUED | OUTPATIENT
Start: 2023-07-25 | End: 2023-07-29

## 2023-07-25 RX ORDER — LIDOCAINE 50 MG/G
2 PATCH TOPICAL DAILY
Status: DISCONTINUED | OUTPATIENT
Start: 2023-07-26 | End: 2023-07-30

## 2023-07-25 RX ORDER — BISACODYL 10 MG
10 SUPPOSITORY, RECTAL RECTAL DAILY PRN
Status: DISCONTINUED | OUTPATIENT
Start: 2023-07-25 | End: 2023-08-09 | Stop reason: HOSPADM

## 2023-07-25 RX ORDER — TRAZODONE HYDROCHLORIDE 50 MG/1
50 TABLET ORAL
Status: DISCONTINUED | OUTPATIENT
Start: 2023-07-25 | End: 2023-08-09 | Stop reason: HOSPADM

## 2023-07-25 RX ADMIN — METHOCARBAMOL 500 MG: 500 TABLET ORAL at 17:20

## 2023-07-25 RX ADMIN — AMLODIPINE BESYLATE 5 MG: 5 TABLET ORAL at 21:27

## 2023-07-25 RX ADMIN — LAMOTRIGINE 100 MG: 100 TABLET ORAL at 09:38

## 2023-07-25 RX ADMIN — CLOPIDOGREL BISULFATE 75 MG: 75 TABLET ORAL at 09:38

## 2023-07-25 RX ADMIN — FOLIC ACID 1 MG: 1 TABLET ORAL at 09:38

## 2023-07-25 RX ADMIN — DOCUSATE SODIUM 100 MG: 100 CAPSULE ORAL at 17:20

## 2023-07-25 RX ADMIN — HEPARIN SODIUM 5000 UNITS: 5000 INJECTION INTRAVENOUS; SUBCUTANEOUS at 05:30

## 2023-07-25 RX ADMIN — GABAPENTIN 300 MG: 300 CAPSULE ORAL at 09:38

## 2023-07-25 RX ADMIN — ACETAMINOPHEN 975 MG: 325 TABLET, FILM COATED ORAL at 21:27

## 2023-07-25 RX ADMIN — ISOSORBIDE MONONITRATE 30 MG: 30 TABLET, EXTENDED RELEASE ORAL at 09:38

## 2023-07-25 RX ADMIN — DILTIAZEM HYDROCHLORIDE 120 MG: 60 TABLET, FILM COATED ORAL at 09:38

## 2023-07-25 RX ADMIN — POLYETHYLENE GLYCOL 3350 17 G: 17 POWDER, FOR SOLUTION ORAL at 15:35

## 2023-07-25 RX ADMIN — Medication 10 MG: at 15:35

## 2023-07-25 RX ADMIN — NICOTINE 1 PATCH: 21 PATCH, EXTENDED RELEASE TRANSDERMAL at 09:39

## 2023-07-25 RX ADMIN — METOPROLOL SUCCINATE 50 MG: 50 TABLET, EXTENDED RELEASE ORAL at 09:38

## 2023-07-25 RX ADMIN — METHOCARBAMOL TABLETS 500 MG: 500 TABLET, COATED ORAL at 11:11

## 2023-07-25 RX ADMIN — ATORVASTATIN CALCIUM 40 MG: 40 TABLET, FILM COATED ORAL at 15:35

## 2023-07-25 RX ADMIN — EZETIMIBE 10 MG: 10 TABLET ORAL at 09:38

## 2023-07-25 RX ADMIN — OXYCODONE HYDROCHLORIDE 5 MG: 5 TABLET ORAL at 21:30

## 2023-07-25 RX ADMIN — GABAPENTIN 300 MG: 300 CAPSULE ORAL at 17:20

## 2023-07-25 RX ADMIN — LISINOPRIL 10 MG: 10 TABLET ORAL at 09:38

## 2023-07-25 RX ADMIN — METHOCARBAMOL TABLETS 500 MG: 500 TABLET, COATED ORAL at 05:30

## 2023-07-25 RX ADMIN — OXYCODONE HYDROCHLORIDE 5 MG: 5 TABLET ORAL at 15:36

## 2023-07-25 RX ADMIN — GABAPENTIN 300 MG: 300 CAPSULE ORAL at 21:27

## 2023-07-25 RX ADMIN — HEPARIN SODIUM 5000 UNITS: 5000 INJECTION INTRAVENOUS; SUBCUTANEOUS at 21:27

## 2023-07-25 RX ADMIN — DOCUSATE SODIUM 100 MG: 100 CAPSULE ORAL at 09:37

## 2023-07-25 RX ADMIN — ACETAMINOPHEN 975 MG: 325 TABLET, FILM COATED ORAL at 05:30

## 2023-07-25 NOTE — PLAN OF CARE
Problem: PHYSICAL THERAPY ADULT  Goal: Performs mobility at highest level of function for planned discharge setting. See evaluation for individualized goals. Description: Treatment/Interventions: Functional transfer training, LE strengthening/ROM, Therapeutic exercise, Endurance training, Patient/family training, Equipment eval/education, Bed mobility, Gait training, OT, Spoke to nursing, Elevations  Equipment Recommended: Jon Goldebra, Wheelchair       See flowsheet documentation for full assessment, interventions and recommendations. Outcome: Progressing  Note: Prognosis: Good  Problem List: Decreased strength, Decreased endurance, Impaired balance, Decreased mobility, Pain, Decreased skin integrity  Assessment: PT initiated treatment session in order to assist patient in achieving goals to improve transfers, gait training, and overall activity tolerance. Patient demonstrated progress toward achieving functional mobility goals as evidenced by performing increased number of transfers. Patient ambulating short distances mod-AX1 with use of RW (2 feet x 3). Throughout treatment session patient required both verbal and tactile cuing to improve safety, efficiency, and mechanics of mobility in addition to hands on assistance for all aspects of functional mobility. Additionally, she required increased time to execute specific mobility tasks with rest breaks in between secondary to gross fatigue and weakness. PT d/c recommendation is for rehab. Patient will continue to benefit from continued skilled PT this admission to achieve maximal function and safety. PT Discharge Recommendation: Post acute rehabilitation services    See flowsheet documentation for full assessment.

## 2023-07-25 NOTE — ASSESSMENT & PLAN NOTE
- episode during previous hospitalization  - Zio patch (3/17/23) showed 5 episodes of SVT (longest 7 beats) 2 triggered events correlated with SR, no other significant arrhythmias  - home: Toprol XL 50 mg daily, diltiazem 120 mg daily  - Not on full A/C per prior providers   - continue here  - OP Cards follow-up

## 2023-07-25 NOTE — PROGRESS NOTES
Progress Note - Vascular Surgery  Silas Norwood 68 y.o. female MRN: [id number]  Unit/Bed#: [facility] 12-46 Encounter: [id number]    Assessment:  68 y.o. female now 4 Days Post-Op s/p Procedure(s) (LRB):  AMPUTATION ABOVE KNEE (AKA) (Left)    Plan:  - Diet [place]/CHO Controlled; Consistent Carbohydrate Diet Level 1 (4 carb servings/60 grams CHO/meal)  - Pain and Nausea control PRN  - Incentive spirometry  - OOB, ambulate as tolerated  - Dressings c/d/i.   - Continue statin/Plavix. - Continue APS recommendations. - Dispo planning - COVID testing negative. Accepted for [facility] pending bed availability. Appreciate CM assistance with this. Subjective/Objective     Subjective: Patient is awake, alert, no acute distress. Reports pain to LLE, well controlled on current regimen. Resting comfortably in chair. Objective:   Vitals: Blood pressure 149/68, pulse 63, temperature 99 °F (37.2 °C), resp. rate 20, height 5' 4" (1.626 m), weight 76.2 kg (168 lb), SpO2 (!) 88 %. ,Body mass index is 28.84 kg/m². I/O       07/23 0701  07/24 0700 07/24 0701  07/25 0700    P. O. 270 1160    Total Intake(mL/kg) 270 (3.5) 1160 (15.2)    Urine (mL/kg/hr) 2300 (1.3) 2200 (1.2)    Total Output 2300 2200    Net -2030 -1040          Unmeasured Urine Occurrence  1 x          Physical Exam:  Gen: NAD, Aox3, Comfortable in bed  Chest: Normal work of breathing, no respiratory distress  Abd: Soft, ND, NT. Ext: No Edema, 5/5 MMT, ROM WNL, s/p LLE AKA  Skin: Warm, Dry, Intact. LLE dressings c/d/i. Lab, Imaging and other studies: I have personally reviewed pertinent reports.     VTE Pharmacologic Prophylaxis: Heparin  VTE Mechanical Prophylaxis: sequential compression device        Zenda Galeazzi, DPM  7/25/2023  6:43 AM

## 2023-07-25 NOTE — H&P
PHYSICAL MEDICINE AND REHABILITATION H&P/ADMISSION NOTE  Erika Gabriela 68 y.o. female MRN: [id number]  Unit/Bed#: [facility] 457-01 Encounter: [id number]     Rehab Diagnosis: Impairment of mobility, safety and Activities of Daily Living (ADLs) due to Amputation:  05.3  Unilateral Lower Limb Above the Knee  Etiologic: Left lower extremity ischemia s/p L AKA  Date of Onset: 7/18/23     Date of surgery: 7/21/23    History of Present Illness:    Vahe Larson is a 68 y.o. female with a medical history of CAD, hypertension, CABG, vascular bypass surgeries, PAD, aortoiliac occlusive disease, tobacco abuse (1 ppd x 60+yrs), PAF/SVT (no AC), LLE DVT s/p IVC filter, RUL lung mass, stage III CKD who presented to [address] [place] [address] on 07/18 with left foot pain. Patient states that she noticed that she had increased pain over the left foot in approximately 2 days ago and is acutely worse over the last 24 hours, accompanied by numbness, and color change of her foot. On exam, patient's left foot is bluish discoloration, swollen, no palpable or doppler DP or PT pulses. Patient still has motor of her foot (is able to wiggle toes, move her foot up and down) but sensation is significantly decreased, is able to feel slight pressure. CT angiogram with multiple inflow graft occlusions on the left. The right to left component of the right axillobifemoral graft is occluded. The left axillary to profunda graft is occluded and a segment is excised. The aorta to left common femoral and jump graft from the alex of the common femoral artery anastomosis to below-knee popliteal artery is also occluded. The deep femoral artery beyond the previous anastomosis does reconstitute but is secondary or tertiary branch that is small at less than 2 mm. There are no other named vessels that reconstitute within the upper leg.   There does appear to be reconstitution of tibial vessels beyond the trifurcation but again these vessels are less than 2 mm in diameter. Vascular surgery was consulted, recommending L AKA. Patient was placed on heparin gtt and APS was consulted, patient now on oral analgesics. On 07/21 patient underwent a left AKA with Dr. Rober Cornell, EBL minimal. Patient is non-weight bearing left lower extremity. PT/OT were consulted and recommend inpatient acute rehabilitation. PT/OT were consulted and recommend acute inpatient rehabilitation. The patient was evaluated by the Rehabilitation team and deemed an appropriate candidate for comprehensive inpatient rehabilitation and admitted to the [facility] on 7/25/2023  2:33 PM    • Functional deficits: impaired mobility, self care  Begin PT/OT/SLP. Rehabilitation goals are to achieve a supervision level with mobility and self care. Prognosis is good. ELOS is 10-14 days. Estimated discharge is home. DVT prophylaxis  • Heparin sc    Pain  • Tylenol 975 mg every 8 hours prn  • Gabapentin 300 mg TID  • Robaxin 500 mg every 6 hours  • Oxycodone 5-10 mg every 4 hours prn      Bladder plan  • Incontinent  • Urge  • Timed void every 4 hours day, 6 hours night  • PVRs every 4 hours      Bowel plan  • Continent  • Colace 100 mg BID  • Senokot 17.2 mg q hs  • Miralax daily  • Bisacodyl suppository daily prn      Code Status  • Level 1- Full Code      * Hx of AKA (above knee amputation), left [facility])  Assessment & Plan  - 7/18 pt admitted with increased foot pain over 2 days with change of color and numbness  - exam, left foot with no palpable or doppler DP/PT, significantly decreased sensation, bluish color  - home: plavix  - was on Xarelto 2.5 mg, but discontinued d/t cost  -CT angiogram: multiple inflow graft occlusions on the left. The right to left component of the right axillobifemoral graft is occluded. The left axillary to profunda graft is occluded and a segment is excised.   The aorta to left common femoral and jump graft from the alex of the common femoral artery anastomosis to below-knee popliteal artery is also occluded. The deep femoral artery beyond the previous anastomosis does reconstitute but is secondary or tertiary branch that is small at less than 2 mm. There are no other named vessels that reconstitute within the upper leg.   There does appear to be reconstitution of tibial vessels beyond the trifurcation but again these vessels are less than 2 mm in diameter.  - Vascular surgery recommended AKA  - 7/21: underwent left AKA w/ Dr. Mary Delcid, no significant blood loss  - wound care  - amputation dressing training  - pain management  -PT/OT 3 hours a day, 5-6 days a week    PAD (peripheral artery disease) ([address])  Assessment & Plan  - hx of PAD, carotid stenosis   - following procedures with [facility], [facility], [facility] in [place], and [facility]:  - Aorto- right renal artery bypass 2000  - 8/6/2014 femoral/popliteal with stents and angioplasty, iliac arteries with stents and angioplasty and tibial peroneal artery angioplasty  -Right Fem to below-knee bypass occluded in 2010  - Fem-Fem bypass graft left to right occluded  -2/21/2017 right axillary to Bi-Fem bypass  -11/15/2022 lower extremity arterial ultrasound right lower limb shows 50-69% stenosis distal to the SFA with high-grade stenosis versus occlusion in the posterior tibial artery LUANN 0.53 (severe range), right Fem to below-knee bypass graft occluded, left lower limb patent CFA to posterior tibial bypass graft, LUANN 0.96  - 11/15/2022 abdominal aorta/iliac study showed occlusion of bilateral common and external iliac arteries, greater than 70% stenosis in the celiac artery, patent right axillo bifemoral arterial bypass graft  -11/15/2022 carotid ultrasound showed less than 50% bilateral carotid stenosis  - home: Plavix 75 mg, (previously on Xarelo 2.5 mg d/c d/t cost)    Tobacco abuse  Assessment & Plan  - 1 1/2 ppd for 56 years  - nicotine patch  - tried Wellbutrin prescribed by vascular surgery for 1 week, however discontinued due to side effects  - encourage smoking cessation    Nodule of upper lobe of right lung  Assessment & Plan  - newly diagnosed RUL lung mass  - PET/CT (7/7/23): marked increased metabolic activity in association with RUL nodule highly suggestive of neoplasm  - follows with [facility] Dr. Ninoska Masters  - planned navigational bronchoscopy with biopsy for 7/31/23 with thoracic surgery, Dr. Keri Webb    CAD (coronary artery disease)  Assessment & Plan  - home: Plavix 75 mg daily  [facility]  - TTE (1/26/23): EF 60%, mild dilated RV, mild-mod AI, mild AS/TR  - follows with Dr. Tamara Kuo        Stage 3 chronic kidney disease, unspecified whether stage 3a or 3b CKD [facility])  Assessment & Plan  Lab Results   Component Value Date    EGFR 70 07/22/2023    EGFR 69 07/21/2023    EGFR 67 07/20/2023    CREATININE 0.83 07/22/2023    CREATININE 0.84 07/21/2023    CREATININE 0.86 07/20/2023     - avoid nephrotoxic agents  - monitor BMP with routine labs  -consult IM for assist with management  - stable      PAF (paroxysmal atrial fibrillation) (HCC)  Assessment & Plan  - episode during previous hospitalization  - Zio patch (3/17/23) showed 5 episodes of SVT (longest 7 beats) 2 triggered events correlated with SR, no other significant arrhythmias  - home: Toprol XL 50 mg daily, diltiazem 120 mg daily  - continue here      Essential hypertension  Assessment & Plan  - home: amlodipine 5 mg daily, diltiazem 120 mg daily, lisinopril 10 mg daily, Toprol-XL 50 mg daily  - here: continue   - monitor BP  - monitor electrolytes  - consult IM to assist w/ management    Mixed hyperlipidemia  Assessment & Plan  - home: rosuvastatin 20 mg daily, Zetia 10 mg daily  - here: Zetia 10 mg, atorvastatin 40 mg (therapeutic substitution)  - Lipid panel (80/578039) total cholesterol 165, triglycerides 155, HDL 33,       Insomnia  Assessment & Plan  - here: Melatonin 9 mg qhs, Trazodone 50 mg qhs prn    Bipolar affective disorder, depressed, severe ([address])  Assessment & Plan  - home: lamictal 100 mg daily  - continue here  - neuropsychology consulted      Subjective/Interval Events:        Review of Systems   Constitutional: Negative for chills and fatigue. HENT: Negative for congestion and sore throat. Eyes: Negative for photophobia and visual disturbance. Respiratory: Negative for cough and shortness of breath. Cardiovascular: Negative for chest pain and palpitations. Gastrointestinal: Negative for constipation, diarrhea, nausea and vomiting. Endocrine: Negative for cold intolerance and heat intolerance. Genitourinary: Positive for urgency. Negative for dysuria. Musculoskeletal: Positive for back pain and myalgias. Skin: Negative for pallor and rash. Neurological: Positive for dizziness, weakness and light-headedness. Negative for headaches. Psychiatric/Behavioral: Negative for agitation and behavioral problems. Function:    Prior level of function and living situation:  PRIOR LEVEL OF FUNCTION:  She lives in [facility] single family home  [place] is  and lives with their son. Self Care: Independent, Indoor Mobility: Independent, Stairs (in/outdoor): Independent and Cognition: Independent     FALLS IN THE LAST 6 MONTHS: 0     HOME ENVIRONMENT:  The living area: can live on one level  There are 2 steps to enter the home. The patient will have 24 hour supervision/physical assistance available upon discharge.      PREVIOUS DME:  Equipment in home (previous DME): None  Current level of function:  Physical therapy: mobility- mod A, transfers- min A,   Occupational therapy: ADL: bathing- not assessed, dressing- UB supervision LB min A, toileting- not assessed  Speech therapy: dysphagia level 2/HTL liquids, aphasia, cognitive impairment      Physical Exam:  /87 (BP Location: Left arm)   Pulse 79   Temp 98.6 °F (37 °C) (Oral)   Resp 16   Ht 5' 4" (1.626 m)   Wt 74 kg (163 lb 2.3 oz) SpO2 90%   BMI 28.00 kg/m²      No intake or output data in the 24 hours ending 07/25/23 1635    Body mass index is 28 kg/m². Physical Exam  Constitutional:       Appearance: Normal appearance. HENT:      Head: Normocephalic and atraumatic. Nose: Nose normal.      Mouth/Throat:      Mouth: Mucous membranes are moist.   Cardiovascular:      Rate and Rhythm: Normal rate and regular rhythm. Pulses: Normal pulses. Heart sounds: Normal heart sounds. Pulmonary:      Effort: Pulmonary effort is normal.      Breath sounds: Normal breath sounds. Comments: diminished  Abdominal:      Palpations: Abdomen is soft. Comments: LBM 7/25   Musculoskeletal:         General: Tenderness present. Normal range of motion. Cervical back: Normal range of motion. Left lower leg: Edema present. Skin:     General: Skin is warm and dry. Capillary Refill: Capillary refill takes less than 2 seconds. Findings: Bruising present. Neurological:      Mental Status: She is alert and oriented to person, place, and time. Psychiatric:         Mood and Affect: Mood normal.         Judgment: Judgment normal.        Labs, medications, and imaging personally reviewed.     Laboratory:    Lab Results   Component Value Date    SODIUM 139 07/22/2023    K 4.1 07/22/2023     (H) 07/22/2023    CO2 24 07/22/2023    BUN 9 07/22/2023    CREATININE 0.83 07/22/2023    GLUC 153 (H) 07/22/2023    CALCIUM 8.9 07/22/2023     Lab Results   Component Value Date    WBC 10.96 (H) 07/24/2023    HGB 12.4 07/24/2023    HCT 38.5 07/24/2023    MCV 92 07/24/2023     07/24/2023     Lab Results   Component Value Date    INR 0.92 07/18/2023    PROTIME 12.5 07/18/2023         Current Facility-Administered Medications:   •  acetaminophen (TYLENOL) tablet 975 mg, 975 mg, Oral, Q8H Suzan MARAVILLA CRNP  •  albuterol (PROVENTIL HFA,VENTOLIN HFA) inhaler 2 puff, 2 puff, Inhalation, Q6H PRN, LEV Gallegos  • amLODIPine (NORVASC) tablet 5 mg, 5 mg, Oral, Daily, LEV Turner  •  atorvastatin (LIPITOR) tablet 40 mg, 40 mg, Oral, Daily With Dinner, LEV Peña, 40 mg at 07/25/23 1535  •  bisacodyl (DULCOLAX) rectal suppository 10 mg, 10 mg, Rectal, Daily PRN, LEV Peña  •  [START ON 7/26/2023] clopidogrel (PLAVIX) tablet 75 mg, 75 mg, Oral, Daily, LEV Turner  •  [START ON 7/26/2023] diltiazem (CARDIZEM) tablet 120 mg, 120 mg, Oral, Daily, LEV Turner  •  docusate sodium (COLACE) capsule 100 mg, 100 mg, Oral, BID, LEV Turner  •  [START ON 7/26/2023] ezetimibe (ZETIA) tablet 10 mg, 10 mg, Oral, Daily, LEV Turner  •  [START ON 3/70/8634] folic acid (FOLVITE) tablet 1 mg, 1 mg, Oral, Daily, LEV Turner  •  gabapentin (NEURONTIN) capsule 300 mg, 300 mg, Oral, BID, LEV Turner  •  gabapentin (NEURONTIN) capsule 300 mg, 300 mg, Oral, HS, LEV Turner  •  heparin (porcine) subcutaneous injection 5,000 Units, 5,000 Units, Subcutaneous, Q8H [facility], LEV Turner  •  [START ON 7/26/2023] isosorbide mononitrate (IMDUR) 24 hr tablet 30 mg, 30 mg, Oral, QAM, LEV Turner  •  [START ON 7/26/2023] lamoTRIgine (LaMICtal) tablet 100 mg, 100 mg, Oral, Daily, LEV Turner  •  [START ON 7/26/2023] lidocaine (LIDODERM) 5 % patch 2 patch, 2 patch, Topical, Daily, LEV Turner  •  [START ON 7/26/2023] lisinopril (ZESTRIL) tablet 10 mg, 10 mg, Oral, Daily, LEV Turner  •  melatonin tablet 9 mg, 9 mg, Oral, HS PRN, LEV Turner  •  methocarbamol (ROBAXIN) tablet 500 mg, 500 mg, Oral, Q6H TAIWO, LEV Turner  •  [START ON 7/26/2023] metoprolol succinate (TOPROL-XL) 24 hr tablet 50 mg, 50 mg, Oral, Daily, LEV Turner  •  [START ON 7/26/2023] nicotine (NICODERM CQ) 21 mg/24 hr TD 24 hr patch 1 patch, 1 patch, Transdermal, Daily, LEV Turner  •  ondansetron (ZOFRAN-ODT) dispersible tablet 4 mg, 4 mg, Oral, Q6H PRN, Nickola Felty, CRNP  •  oxyCODONE (ROXICODONE) IR tablet 5 mg, 5 mg, Oral, Q4H PRN, 5 mg at 07/25/23 1536 **OR** oxyCODONE (ROXICODONE) immediate release tablet 10 mg, 10 mg, Oral, Q4H PRN, LEV Turner  •  polyethylene glycol (MIRALAX) packet 17 g, 17 g, Oral, Daily, LEV Turner, 17 g at 07/25/23 1535  •  traZODone (DESYREL) tablet 50 mg, 50 mg, Oral, HS PRN, Nickola Felty, CRNP  No Known Allergies   Patient Active Problem List    Diagnosis Date Noted   • Hx of AKA (above knee amputation), left ([address]) 07/25/2023   • PAD (peripheral artery disease) ([address]) 06/23/2014   • Tobacco abuse 01/02/2023   • Nodule of upper lobe of right lung 06/14/2023   • CAD (coronary artery disease) 06/23/2014   • Stage 3 chronic kidney disease, unspecified whether stage 3a or 3b CKD ([address]) 01/09/2023   • PAF (paroxysmal atrial fibrillation) ([address]) 01/09/2023   • Essential hypertension 01/22/2016   • Mixed hyperlipidemia 12/27/2022   • Insomnia 02/10/2023   • Dizziness 01/09/2023   • Aortoiliac occlusive disease ([address]) 12/28/2022   • Left leg swelling 11/08/2022   • Bipolar affective disorder, depressed, severe ([address]) 04/19/2017   • S/P vascular bypass 02/24/2017   • Hx of CABG 11/30/2016   • Presence of IVC filter 11/30/2016   • Thyroid nodule 01/22/2016   • Renal artery stenosis ([address]) 06/23/2014     Past Medical History:   Diagnosis Date   • Aorto-iliac disease ([address])    • Atrial fibrillation ([address])    • CAD (coronary artery disease)    • Carotid stenosis, bilateral    • Celiac artery stenosis (HCC)    • CKD (chronic kidney disease) stage 3, GFR 30-59 ml/min (HCC)    • DVT (deep venous thrombosis) (HCC)     LLE (CFV, popl)   • Heart attack ([address]) 02/2022   • HLD (hyperlipidemia)    • Hypertension    • Lung mass     RUL   • Myocardial infarction ([address])     2022   • COURTNEY (obstructive sleep apnea)    • PAD (peripheral artery disease) ([address])    • Stroke [facility])      Past Surgical History:   Procedure Laterality Date   • AORTA - FEMORAL ARTERY BYPASS GRAFT Left    • AXILLO-FEMORAL BYASS GRAFT Bilateral    • BYPASS FEMORAL-FEMORAL     • CORONARY ARTERY BYPASS GRAFT  2000   • FEMORAL ARTERY - POPLITEAL ARTERY BYPASS GRAFT Bilateral    • IVC FILTER INSERTION     • VA AMPUTATION THIGH THROUGH FEMUR ANY LEVEL Left 7/21/2023    Procedure: AMPUTATION ABOVE KNEE (AKA); Surgeon: Peter Patel MD;  Location: [facility];  Service: Vascular   • TOTAL ABDOMINAL HYSTERECTOMY W/ BILATERAL SALPINGOOPHORECTOMY     • TOTAL HIP ARTHROPLASTY Left      Social History     Socioeconomic History   • Marital status:      Spouse name: Not on file   • Number of children: Not on file   • Years of education: Not on file   • Highest education level: Not on file   Occupational History   • Not on file   Tobacco Use   • Smoking status: Every Day     Packs/day: 1.50     Types: Cigarettes     Start date: 5   • Smokeless tobacco: Never   Vaping Use   • Vaping Use: Never used   Substance and Sexual Activity   • Alcohol use: Not Currently     Alcohol/week: 50.0 standard drinks of alcohol     Types: 50 Cans of beer per week     Comment: has not had a drink in 243 days (7/18/23)  h/o at least 6 beers per day   • Drug use: Never   • Sexual activity: Not Currently     Partners: Male   Other Topics Concern   • Not on file   Social History Narrative   • Not on file     Social Determinants of Health     Financial Resource Strain: Not on file   Food Insecurity: No Food Insecurity (7/19/2023)    Hunger Vital Sign    • Worried About Running Out of Food in the Last Year: Never true    • Ran Out of Food in the Last Year: Never true   Transportation Needs: No Transportation Needs (7/19/2023)    PRAPARE - Transportation    • Lack of Transportation (Medical): No    • Lack of Transportation (Non-Medical):  No   Physical Activity: Not on file   Stress: Not on file   Social Connections: Not on file   Intimate Partner Violence: Not on file   Housing Stability: Low Risk  (7/19/2023) Housing Stability Vital Sign    • Unable to Pay for Housing in the Last Year: No    • Number of Places Lived in the Last Year: 1    • Unstable Housing in the Last Year: No     Social History     Tobacco Use   Smoking Status Every Day   • Packs/day: 1.50   • Types: Cigarettes   • Start date: 5   Smokeless Tobacco Never     Social History     Substance and Sexual Activity   Alcohol Use Not Currently   • Alcohol/week: 50.0 standard drinks of alcohol   • Types: 50 Cans of beer per week    Comment: has not had a drink in 243 days (7/18/23)  h/o at least 6 beers per day     Family History   Problem Relation Age of Onset   • Stomach cancer Mother          Medical Necessity Criteria for [facility] Admission: Chronic Kidney Disease, Hypertension, Bowel/Bladder Management, Incision/Wound care and Leukocystosis. In addition, the preadmission screen, post-admission physical evaluation, overall plan of care and admissions order demonstrate a reasonable expectation that the following criteria were met at the time of admission to the [facility]. 1. The patient requires active and ongoing therapeutic intervention of multiple therapy disciplines (physical therapy, occupational therapy, speech-language pathology, or prosthetics/orthotics), one of which is physical or occupational therapy. 2. Patient requires an intensive rehabilitation therapy program, as defined in Chapter 1, section 110.2.2 of the CMS Medicare Policy Manual. This intensive rehabilitation therapy program will consist of at least 3 hours of therapy per day at least 5 days per week or at least 15 hours of intensive rehabilitation therapy within a 7 consecutive day period, beginning with the date of admission to the [facility]. 3. The patient is reasonably expected to actively participate in, and benefit significantly from, the intensive rehabilitation therapy program as defined in Chapter 1, section 110.2.2 of the CMS Medicare Policy Manual at this time of admission to the [facility]. She can reasonably be expected to make measurable improvement (that will be of practical value to improve the patient’s functional capacity or adaptation to impairments) as a result of the rehabilitation treatment, as defined in section 110.3, and such improvement can be expected to be made within the prescribed period of time. As noted in the CMS Medicare Policy Manual, the patient need not be expected to achieve complete independence in the domain of self-care nor be expected to return to his or her prior level of functioning in order to meet this standard. 4. The patient must require physician supervision by a rehabilitation physician. As such, a rehabilitation physician will conduct face-to-face visits with the patient at least 3 days per week throughout the patient’s stay in the [facility] to assess the patient both medically and functionally, as well as to modify the course of treatment as needed to maximize the patient’s capacity to benefit from the rehabilitation process. 5. The patient requires an intensive and coordinated interdisciplinary approach to providing rehabilitation, as defined in Chapter 1, section 110.2.5 of the CMS Medicare Policy Manual. This will be achieved through periodic team conferences, conducted at least once in a 7-day period, and comprising of an interdisciplinary team of medical professionals consisting of: a rehabilitation physician, registered nurse,  and/or , and a licensed/certified therapist from each therapy discipline involved in treating the patient. Changes Since Pre-admission Assessment: None -This patient's participation in rehab continues to be reasonable, necessary and appropriate. CMS Required Post-Admission Physician Evaluation Elements  History and Physical, including medical history, functional history and active comorbidities as in above text.      Post-Admission Physician Evaluation:  The patient has the potential to make improvement and is in need of physical, occupational, and/or therapy services. The patient may also need nutritional services. Given the patient's complex medical condition and risk of further medical complications, rehabilitative services cannot be safely provided at a lower level of care, such as a skilled nursing facility. I have reviewed the patient's functional and medical status at the time of the preadmission screening and they are the same as on the day of this admission. I acknowledge that I have personally performed a full physical examination on this patient within 24 hours of admission. The patient and/or family demonstrated understanding the rehabilitation program and the discharge process after we discussed them.      Agree in entirety: yes  Minor adaptions: none    Major changes: none    Major LEV Clinton  Physical Medicine and [place]

## 2023-07-25 NOTE — ASSESSMENT & PLAN NOTE
Lab Results   Component Value Date    EGFR 64 08/07/2023    EGFR 69 08/05/2023    EGFR 61 08/03/2023    CREATININE 0.90 08/07/2023    CREATININE 0.84 08/05/2023    CREATININE 0.93 08/03/2023     - Stable 7/27  - avoid nephrotoxic agents  - monitor BMP with routine labs  -consult IM for assist with management  - stable

## 2023-07-25 NOTE — PHYSICAL THERAPY NOTE
PT Treatment       07/25/23 1029   PT Last Visit   PT Visit Date 07/25/23   Note Type   Note Type Treatment   Pain Assessment   Pain Assessment Tool FLACC   Pain Location/Orientation Orientation: Left; Location: Leg  (s/p AKA)   Pain Rating: FLACC (Rest) - Face 1   Pain Rating: FLACC (Rest) - Legs 0   Pain Rating: FLACC (Rest) - Activity 0   Pain Rating: FLACC (Rest) - Cry 0   Pain Rating: FLACC (Rest) - Consolability 0   Score: FLACC (Rest) 1   Pain Rating: FLACC (Activity) - Face 1   Pain Rating: FLACC (Activity) - Legs 0   Pain Rating: FLACC (Activity) - Activity 0   Pain Rating: FLACC (Activity) - Cry 1   Pain Rating: FLACC (Activity) - Consolability 1   Score: FLACC (Activity) 3   Restrictions/Precautions   Weight Bearing Precautions Per Order Yes   LLE Weight Bearing Per Order (S)  NWB  (s/p AKA)   Other Precautions Fall Risk;Bed Alarm;O2;Pain   General   Chart Reviewed Yes   Additional Pertinent History 68 y.o. female admitted to [facility] on 7/18/2023 with symptoms of: L foot pain and discoloration. Patient with acute L LE ischemia and underwent L AKA on 7/21. Family/Caregiver Present No   Cognition   Overall Cognitive Status WFL   Arousal/Participation Alert   Attention Within functional limits   Orientation Level Oriented X4   Memory Within functional limits   Following Commands Follows one step commands without difficulty   Comments expresses frusteration today   Subjective   Subjective "I'm having a terrible day"   Bed Mobility   Supine to Sit Unable to assess   Sit to Supine 4  Minimal assistance   Additional items Assist x 1;HOB elevated; Increased time required;LE management   Additional Comments patient supine in bed (per her request) with alarm active post eval   Transfers   Sit to Stand 3  Moderate assistance   Additional items Assist x 1; Increased time required;Verbal cues   Stand to Sit 4  Minimal assistance   Additional items Assist x 1; Increased time required;Verbal cues   Stand pivot 3  Moderate assistance   Additional items Assist x 1; Increased time required;Verbal cues   Toilet transfer 3  Moderate assistance   Additional items Assist x 2; Increased time required;Verbal cues   Additional Comments patient received in chair and requesting to use bathroom. chair was positioned approx 2 feet from toilet and patient used RW to ambulate 2 feet (chair to toilet) with mod-AX1. mod-AX1 for stand-->sit toilet onto low positiond toilet. S for toileting, patient on edge of toilet with urination on floor. required extended time for cleaning/change gown/socks. patient required mod-AX2 for sit-->stand from toilet. ambulated 2 feet (toilet to chair) with mod-AX1. chair positioned next to bed and patient ambulated 2 feet (chair to bed). Ambulation/Elevation   Gait pattern Excessively slow;Decreased foot clearance;Decreased L stance   Gait Assistance 3  Moderate assist   Additional items Assist x 1   Assistive Device Rolling walker   Distance 2 feet x 2   Ambulation/Elevation Additional Comments controlled hops on R LE   Balance   Static Sitting Fair   Static Standing Poor +   Ambulatory Poor   Endurance Deficit   Endurance Deficit Yes   Endurance Deficit Description pain, s/p AKA, fatigue   Activity Tolerance   Activity Tolerance Patient limited by fatigue   Nurse Made Aware alban to see per RN Tracy Goodwin- aware patient's ace bandage to L LE removed since patient urinated and RN will replace   Exercises   Neuro re-ed 3 stand pivot transfers, mod-AX1   Assessment   Prognosis Good   Problem List Decreased strength;Decreased endurance; Impaired balance;Decreased mobility;Pain;Decreased skin integrity   Assessment PT initiated treatment session in order to assist patient in achieving goals to improve transfers, gait training, and overall activity tolerance. Patient demonstrated progress toward achieving functional mobility goals as evidenced by performing increased number of transfers.  Patient ambulating short distances mod-AX1 with use of RW (2 feet x 3). Throughout treatment session patient required both verbal and tactile cuing to improve safety, efficiency, and mechanics of mobility in addition to hands on assistance for all aspects of functional mobility. Additionally, she required increased time to execute specific mobility tasks with rest breaks in between secondary to gross fatigue and weakness. PT d/c recommendation is for rehab. Patient will continue to benefit from continued skilled PT this admission to achieve maximal function and safety. Goals   Patient Goals to go to the bathroom   LTG Expiration Date 08/14/23   PT Treatment Day 1   Plan   Treatment/Interventions Functional transfer training;LE strengthening/ROM; Therapeutic exercise; Endurance training;Patient/family training;Equipment eval/education; Bed mobility;Gait training;OT;Spoke to nursing   Progress Progressing toward goals   PT Frequency 3-5x/wk   Recommendation   PT Discharge Recommendation Post acute rehabilitation services   Equipment Recommended Wheelchair;Walker   AM-PAC Basic Mobility Inpatient   Turning in Flat Bed Without Bedrails 2   Lying on Back to Sitting on Edge of Flat Bed Without Bedrails 2   Moving Bed to Chair 2   Standing Up From Chair Using Arms 2   Walk in Room 2   Climb 3-5 Stairs With Railing 1   Basic Mobility Inpatient Raw Score 11   Basic Mobility Standardized Score 30.25   Highest Level Of Mobility   JH-HLM Goal 4: Move to chair/commode   JH-HLM Achieved 4: Move to chair/commode       The patient's AM-PAC Basic Mobility Inpatient Standardized Score is less than 42.9, suggesting this patient may benefit from discharge to post-acute rehabilitation services. Please also refer to the recommendation of the Physical Therapist for safe discharge planning.     FERNANDO ANDERSEN PT, DPT

## 2023-07-25 NOTE — ASSESSMENT & PLAN NOTE
- 1 1/2 ppd for 56 years  - nicotine patch  - tried Wellbutrin prescribed by vascular surgery for 1 week, however discontinued due to side effects  - encourage smoking cessation but is resistant

## 2023-07-25 NOTE — TREATMENT PLAN
Individualized Plan of [facility] 68 y.o. female MRN: [id number]  Unit/Bed#: -01 Encounter: [id number]     PATIENT INFORMATION  ADMISSION DATE: 7/25/2023  2:33 PM PATRICIO CATEGORY:Amputation:  05.3  Unilateral Lower Limb Above the Knee   ADMISSION DIAGNOSIS: Hx of AKA (above knee amputation), left ([address]) [Z89.612]  EXPECTED LOS: 10 to 14 days     MEDICAL/FUNCTIONAL PROGNOSIS  Based on my assessment of the patient's medical conditions and current functional status, the prognosis for attaining medical and functional goals or the IRF stay is:  Good    Medical Goals: Patient will be medically stable for discharge to [facility] upon completion of rehab program and Patient will be able to manage medical conditions and comorbid conditions with medications and follow up upon completion of rehab program    Ruddy Urias: Home - with supervision  INSTITUTIONAL SETTING: Intermediate care  Is a 24-hr caregiver available? Yes  Has discharge plan been discussed with primary caregiver? Yes  Date of Discussion: 07/25/2023    ANTICIPATED FOLLOW-UP SERVICE:   Outpatient Therapy Services: PT and OT          DISCIPLINE SPECIFIC PLANS:  Required Disciplines & Services: Rehabillitation Nursing, Case Management, Dietay/Nutrition and Prosthetics/Orthostics    REQUIRED THERAPY:  Therapy Hours per Day Days per Week Total Days   Physical Therapy 1.5 5 10-14   Occupational Therapy 1.5 5 10-14   NOTE: Additional therapy time(s) or changes to allocation of therapies as appropriate to meet patient needs and to achieve functional goals.     Patient will participate in above therapy regimen consisting of PT and OT due to the following medical procedure/condition:Amputation:  05.3  Unilateral Lower Limb Above the Knee    ANTICIPATED FUNCTIONAL OUTCOMES:  ADL:  supervision   Bladder/Bowel:  supervision   Transfers:  supervision Locomotion:  supervision   Cognitive:  supervision     DISCHARGE PLANNING NEEDS  Equipment needs: Discharge needs to be reviewed with team      REHAB ANTICIPATED PARTICIPATION RESTRICTIONS:  Assist with Mobility, Inability to Drive, Inaccessible Bathroom, Inaccessibel Home Entrance, Rquires Assist with ADLS, Requires Assist with Heavy Homemaking, Requires Assit with Homemaking, Requires Assit with Steps and Weight Bearing NWB LLE    Medical Necessity Criteria for ARC Admission:  The preadmission screen, post-admission physical evaluation, overall plan of care and admissions order demonstrate a reasonable expectation that the following criteria were met at the time of admission to the [facility]. (See "Specific areas of management and oversight in ARC setting" for additional details on medical necessity as outlined below). 1. The patient requires active and ongoing therapeutic intervention of multiple therapy disciplines (physical therapy, occupational therapy, speech-language pathology, or prosthetics/orthotics), one of which is physical or occupational therapy. 2. Patient requires an intensive rehabilitation therapy program, as defined in Chapter 1, section 110.2.2 of the CMS Medicare Policy Manual. This intensive rehabilitation therapy program will consist of at least 3 hours of therapy per day at least 5 days per week or at least 15 hours of intensive rehabilitation therapy within a 7 consecutive day period, beginning with the date of admission to the [facility]. 3. The patient is reasonably expected to actively participate in, and benefit significantly from, the intensive rehabilitation therapy program as defined in Chapter 1, section 110.2.2 of the CMS Medicare Policy Manual at this time of admission to the [facility].  She can reasonably be expected to make measurable improvement (that will be of practical value to improve the patient’s functional capacity or adaptation to impairments) as a result of the rehabilitation treatment, as defined in section 110.3, and such improvement can be expected to be made within the prescribed period of time. As noted in the CMS Medicare Policy Manual, the patient need not be expected to achieve complete independence in the domain of self-care nor be expected to return to his or her prior level of functioning in order to meet this standard. 4. The patient must require physician supervision by a rehabilitation physician. As such, a rehabilitation physician will conduct face-to-face visits with the patient at least 3 days per week throughout the patient’s stay in the [facility] to assess the patient both medically and functionally, as well as to modify the course of treatment as needed to maximize the patient’s capacity to benefit from the rehabilitation process. 5. The patient requires an intensive and coordinated interdisciplinary approach to providing rehabilitation, as defined in Chapter 1, section 110.2.5 of the CMS Medicare Policy Manual. This will be achieved through periodic team conferences, conducted at least once in a 7-day period, and comprising of an interdisciplinary team of medical professionals consisting of: a rehabilitation physician, registered nurse,  and/or , and a licensed/certified therapist from each therapy discipline involved in treating the patient. Changes Since Pre-admission Assessment: None -This patient's participation in rehab continues to be reasonable, necessary and appropriate. CMS Required Post-Admission Physician Evaluation Elements  History and Physical, including medical history, functional history and active comorbidities as in above text. Post-Admission Physician Evaluation:  The patient has the potential to make improvement and is in need of physical, occupational, and/or therapy services. The patient may also need nutritional services.  Given the patient's complex medical condition and risk of further medical complications, rehabilitative services cannot be safely provided at a lower level of care, such as a skilled nursing facility. I have reviewed the patient's functional and medical status at the time of the preadmission screening and they are the same as on the day of this admission. I acknowledge that I have personally performed a full physical examination on this patient within 24 hours of admission. The patient demonstrated understanding the rehabilitation program and the discharge process after we discussed them. Agree in entirety: yes  Minor adaptions: none    Major changes: none    Specific areas of management and oversight in ARC setting:  Cardiopulmonary function: Ensure cardiopulmonary stability and optimize cardiopulmonary function not only at rest but with activity as patient's activity level significantly increases in acute rehab compared with prior to transfer in preparation for safe discharge from [facility]. Must closely and frequently monitor blood pressure and HR to ensure adequate cardiac output during ADLs and ambulation as patient is at increased risk for orthostatic hypotension/syncope and potential injury if not monitored for and managed adequately. Blood pressure management:    Frequent monitoring of blood pressure with appropriate adjustments in blood pressure medication management to optimize blood pressure control and prevent/limit renal complications. Monitoring impact of blood pressure and side-effects of blood pressure medications at rest and with activity. Hypoxia prevention: Ensure appropriate level of oxygenation at rest and with activity to avoid symptomatic hypoxia, maximize functional performance, and decrease risk of atelectasis/pneumonia through close and frequent monitoring, providing appropriate respiratory treatments (such as incentive spirometry), and when necessary provide/adjust respiratory medications.     Pain management:  Pain will improve with frequent evaluation of pain, careful adjustments in medications, frequent re-evaluation of patient's pain and medical/neurologic status to ensure optimal pain control, avoidance of potential serious and even life-threatening side-effects and drug interactions, as well as weaning pain medications as soon as possible to decrease risk of short and long-term use. Orthopedic Disorder: L AKA causing impaired mobility, ADLs, and gait:  intensive skilled therapies with physical therapy and occupational therapy with close oversight and management by rehab specialized physician in acute rehabilitation setting to most expeditiously and effectively improve functional mobility, transfers, upper and lower body strengthening, conditioning, balance, and gait training with appropriate assistive device. Patient will have optimal supervision and management of patient's underlying orthopedic disorder with specialized rehabilitation physician during this period of recovery to ensure most expeditious and optimal recovery with decreased risks of fall/injury and other complications including acute worsening of ortho disorder, decrease risk of VTE, PNA, and skin ulceration. Inpatient rehabilitation education/teaching: To be provided to patient and typically family/caregiver (if able to be identified) by all skilled therapists, rehab nursing, case management, and rehab specialized physician to ensure optimal recovery and decrease risks of complications in both acute rehabilitation setting as well as after discharge. Depression: Patient's depression and it's impact on therapy participation and functional recovery will improve during course with supportive counseling, relaxation/breathing techniques and if necessary medication management. Requires frequent re-assessment and close management to ensure anxiety/depression management during acute rehab course with planning for appropriate outpatient management to ensure optimal mental health and functional recovery. Substance abuse hx: Alcohol dependence history - provide teaching/resources on substance abuse to patient (and family if appropriate) as well as counseling on abstinence, coping with anxiety/depression, and provide outpatient resources. Rehab physician oversight of behavioral management and if necessary pharmacologic management during course. Recommend psychology consultation and management as well during acute rehab course and possible additional recommendations after discharge from acute rehab setting. chronic kidney failure management and blood pressure management: Frequent measurements and evaluation of urinary intake, urinary output, and labs which include BUN/Cr and electrolytes with appropriate adjustments in medication and when necessary order additional testing. Frequent monitoring of blood pressure with appropriate adjustments in blood pressure medication management to optimize blood pressure control and prevent/limit renal complications. Bladder dysfunction:  Appropriate bladder management with appropriate toileting program from rehab nursing and staff with oversight management by rehabilitation physicians which include appropriate monitoring and possible adjustments in medications to with goals to optimize bladder function and decrease risk of bladder retention, incontinence, and urinary tract infection. Obesity:  Close monitoring of nutrition status, nutrition specialist with adjustments in diet.  on appropriate short and long term nutrition and activity. Obesity increases complexity of patient's overall condition and causes unique challenges during this part of patient's recovery process. Supervise and if necessary make adjustments in rehab nursing care and skilled therapy care to ensure appropriate toileting, bed mobility, other ADLs, and ambulation to decrease risk of falls/injuries, VTE, skin breakdown/ulceration and optimize functional recovery.      Skin wounds: Appropriate skin checks for wound/skin evaluation including evaluation of healing, worsening of wounds, or signs of infection. Wound care management from rehab nursing, wound care nursing, physicians. Ensure frequent appropriate turning, positioning in bed, in chair, when mobilizing, and when appropriate with use of appropriate devices to optimize healing and decrease risk of worsening or new skin breakdown.       Meño Galaviz, [address]

## 2023-07-25 NOTE — ASSESSMENT & PLAN NOTE
- home: amlodipine 5 mg daily, diltiazem 120 mg daily, lisinopril 10 mg daily, Toprol-XL 50 mg daily  - here: diltiazem 120 mg daily, lisinopril 10 mg daily, toprol xl 50 mg daily (amlodipine held since 7/29)  - monitor BP  - monitor electrolytes  - consult IM to assist w/ management  - follow-up with cardiology as outpatient

## 2023-07-25 NOTE — ARC ADMISSION
Pt is cleared for DC today. Pt will be admitting to [facility]. Pt will be going to room 457. Report # [id number] Fax# [phone]. Pt will have a 130 transport time to [facility].  CM made aware

## 2023-07-25 NOTE — ASSESSMENT & PLAN NOTE
- home: rosuvastatin 20 mg daily, Zetia 10 mg daily  - here: Zetia 10 mg, atorvastatin 40 mg (therapeutic substitution)  - Lipid panel (03/422712) total cholesterol 165, triglycerides 155, HDL 33,

## 2023-07-25 NOTE — CASE MANAGEMENT
Case Management Discharge Planning Note    Patient name Ghanshyam Batista  Location [address] [place] [address] [facility] [id number] MRN [id number]  : 1950 Date 2023       Current Admission Date: 2023  Current Admission Diagnosis:PAD (peripheral artery disease) [facility])  Patient Active Problem List    Diagnosis Date Noted   • Nodule of upper lobe of right lung 2023   • Insomnia 02/10/2023   • PAF (paroxysmal atrial fibrillation) ([address]) 2023   • Stage 3 chronic kidney disease, unspecified whether stage 3a or 3b CKD ([address]) 2023   • Dizziness 2023   • Tobacco abuse 2023   • Aortoiliac occlusive disease ([address]) 2022   • Mixed hyperlipidemia 2022   • Left leg swelling 2022   • Bipolar affective disorder, depressed, severe ([address]) 2017   • S/P vascular bypass 2017   • Hx of CABG 2016   • Presence of IVC filter 2016   • Essential hypertension 2016   • Thyroid nodule 2016   • CAD (coronary artery disease) 2014   • Renal artery stenosis ([address]) 2014   • PAD (peripheral artery disease) ([address]) 2014      LOS (days): 7  Geometric Mean LOS (GMLOS) (days): 2.70  Days to GMLOS:-4.2     OBJECTIVE:  Risk of Unplanned Readmission Score: 11.34         Current admission status: Inpatient   Preferred Pharmacy:   [address] [place] [address]  Phone: [phone] Fax: [phone]    Primary Care Provider: Deedee Mendez DO    Primary Insurance: MEDICARE  Secondary Insurance:     DISCHARGE DETAILS:    Discharge planning discussed with[de-identified] Patient  Freedom of Choice: Yes  Comments - Freedom of Choice: [facility]  CM contacted family/caregiver?: No- see comments  Were Treatment Team discharge recommendations reviewed with patient/caregiver?: Yes  Did patient/caregiver verbalize understanding of patient care needs?: N/A- going to facility  Were patient/caregiver advised of the risks associated with not following Treatment Team discharge recommendations?: Yes    Treatment Team Recommendation: Acute Rehab  Discharge Destination Plan[de-identified] Acute Rehab  Transport at Discharge : Other (Comment)     Number/Name of Dispatcher: Internal transport  Transported by [employer] and Unit #): Internal transport  ETA of Transport (Date): 07/25/23  ETA of Transport (Time): 1330     Transfer Mode: [facility] Name, [address] : [facility]  Receiving Facility/Agency Phone Number: [phone]  Facility/Agency Fax Number: [phone]          Summary: B [facility] able to accept today. Patient going to room 457. Transport set for 1:30 PM. Nurse and MD updated.

## 2023-07-25 NOTE — ASSESSMENT & PLAN NOTE
- newly diagnosed RUL lung mass  - PET/CT (7/7/23): marked increased metabolic activity in association with RUL nodule highly suggestive of neoplasm  - if remains stage 1A2 , she will not require adjuvant therapy    - follows with [facility] Dr. Davina Merino  - planned navigational bronchoscopy with biopsy for 7/31/23 with thoracic surgery, Dr. Brooks Padilla   - discussed with family who notified sx office and they rescheduled bx appt to 8/14

## 2023-07-25 NOTE — ASSESSMENT & PLAN NOTE
- hx of PAD, carotid stenosis   - following procedures with [facility], [facility] Hosp in [address]- right renal artery bypass 2000  - 8/6/2014 femoral/popliteal with stents and angioplasty, iliac arteries with stents and angioplasty and tibial peroneal artery angioplasty  -Right Fem to below-knee bypass occluded in 2010  - Fem-Fem bypass graft left to right occluded  -2/21/2017 right axillary to Bi-Fem bypass  -11/15/2022 lower extremity arterial ultrasound right lower limb shows 50-69% stenosis distal to the SFA with high-grade stenosis versus occlusion in the posterior tibial artery LUANN 0.53 (severe range), right Fem to below-knee bypass graft occluded, left lower limb patent CFA to posterior tibial bypass graft, LUANN 0.96  - 11/15/2022 abdominal aorta/iliac study showed occlusion of bilateral common and external iliac arteries, greater than 70% stenosis in the celiac artery, patent right axillo bifemoral arterial bypass graft  -11/15/2022 carotid ultrasound showed less than 50% bilateral carotid stenosis  - home: Plavix 75 mg, (previously on Xarelo 2.5 mg d/c d/t cost)  - here: plavix, statin, optimal BP control

## 2023-07-25 NOTE — ASSESSMENT & PLAN NOTE
Improved   - here: Melatonin 9 mg 2000, Traz 50mg HS PRN  - On gabapentin 400mg HS for pain at night as well now   - if needed start low dose melatonin (apparently was on high dose at home)  - gabapentin also for pain   - Recommend appropriate sleep hygiene  - Patient counselled/will be counselled on this when appropriate

## 2023-07-25 NOTE — ASSESSMENT & PLAN NOTE
Mood stable   - home: lamictal 100 mg daily  - continue here  - neuropsychology consulted  - refer to [facility] for outpatient follow-up

## 2023-07-25 NOTE — ASSESSMENT & PLAN NOTE
- 7/18 pt admitted with increased foot pain over 2 days with change of color and numbness found to have critical limb ischemia   -CT angiogram: multiple inflow graft occlusions on the left. The right to left component of the right axillobifemoral graft is occluded. The left axillary to profunda graft is occluded and a segment is excised. The aorta to left common femoral and jump graft from the alex of the common femoral artery anastomosis to below-knee popliteal artery is also occluded. The deep femoral artery beyond the previous anastomosis does reconstitute but is secondary or tertiary branch that is small at less than 2 mm. There are no other named vessels that reconstitute within the upper leg. There does appear to be reconstitution of tibial vessels beyond the trifurcation but again these vessels are less than 2 mm in diameter.  - Vascular surgery s/p AKA 7/21 by Dr. Zeina Owens  - Plavix, optimal BP control, statin  - NWB LLE  - Monitor CBC intermittently   - Optimal ROM to avoid/decrease risk of contractures  - Monitor incision/area for infection or dehiscence/impaired healing    - 7/27 slight latia-incisional erythema - recommended vasc sx c/s who saw patient on 7/27 and reported no concerns for infection at that    - thigh DTI possibly from overly tight ace wrap that was POA to [facility] - wound care c/s 7/27 - allevyn foam dressing; use Kerlex only for a few days and then go back to ACE but avoid overly tightening   - 7/28 slight latia-incisional erythema again today but stable without increased warmth, tenderness, or drainage - continue close monitoring , started on Keflex and probiotic continue to monitor with daily incisional site imaging/media. Discussed with nursing   - 8/3 increased latia-incisional erythema, Vascular surgery made aware. Antibiotics escalated to vancomycin and cefepime. VS monitoring for few days before final decision on discharge plan. Continue daily dressing changes.     - 8/8 IV abx through discharge, PO Bactrim for 4 days  - It is normal to have some swelling or discoloration around the incision initially post-operatively. If develops increasing redness, tenderness/pain, discharge, or dehiscence develops contact surgical service   - Wound care of incision site per vasc sx   - WBC stable mildly elevated at 10 K on 7/27 > monitor intermittently  - Monitor for signs/symptoms of VTE, atelectasis, acute blood loss anemia, or other infection   - Patient (if applicable caregiver) training and education on amputation, possible phantom symptoms, recovery process  - Neuropsychology consult, supportive counseling  - Optimal pain control  - Monitor contralateral limb closely for concerning s/s   - Fall Precautions   - Tolerating ARC setting adequately   - Continue acute comprehensive interdisciplinary inpatient rehabilitation to include intensive skilled therapies (PT, OT) as outlined with oversight and management by rehabilitation physician as well as inpatient rehab level nursing, case management and weekly interdisciplinary team meetings.    - Follow-up with PMR and Vas Sx after discharge

## 2023-07-25 NOTE — ASSESSMENT & PLAN NOTE
- s/p CABG 2020 [facility]  - TTE (1/26/23): EF 60%, mild dilated RV, mild-mod AI, mild AS/TR  - follows with Dr. Macie Astudillo  - Plavix, ISMN, statin

## 2023-07-26 PROBLEM — R32 URINARY INCONTINENCE: Status: ACTIVE | Noted: 2023-07-26

## 2023-07-26 PROCEDURE — 97530 THERAPEUTIC ACTIVITIES: CPT

## 2023-07-26 PROCEDURE — 99232 SBSQ HOSP IP/OBS MODERATE 35: CPT

## 2023-07-26 PROCEDURE — 97167 OT EVAL HIGH COMPLEX 60 MIN: CPT

## 2023-07-26 PROCEDURE — 97542 WHEELCHAIR MNGMENT TRAINING: CPT

## 2023-07-26 PROCEDURE — 97535 SELF CARE MNGMENT TRAINING: CPT

## 2023-07-26 PROCEDURE — 97162 PT EVAL MOD COMPLEX 30 MIN: CPT

## 2023-07-26 PROCEDURE — 99222 1ST HOSP IP/OBS MODERATE 55: CPT | Performed by: INTERNAL MEDICINE

## 2023-07-26 RX ORDER — GABAPENTIN 400 MG/1
400 CAPSULE ORAL
Status: DISCONTINUED | OUTPATIENT
Start: 2023-07-26 | End: 2023-08-02

## 2023-07-26 RX ORDER — SENNOSIDES 8.6 MG
1 TABLET ORAL 2 TIMES DAILY
Status: DISCONTINUED | OUTPATIENT
Start: 2023-07-26 | End: 2023-08-01

## 2023-07-26 RX ADMIN — ACETAMINOPHEN 975 MG: 325 TABLET, FILM COATED ORAL at 13:14

## 2023-07-26 RX ADMIN — TRAZODONE HYDROCHLORIDE 50 MG: 50 TABLET ORAL at 21:48

## 2023-07-26 RX ADMIN — HEPARIN SODIUM 5000 UNITS: 5000 INJECTION INTRAVENOUS; SUBCUTANEOUS at 05:44

## 2023-07-26 RX ADMIN — NICOTINE 1 PATCH: 21 PATCH, EXTENDED RELEASE TRANSDERMAL at 09:38

## 2023-07-26 RX ADMIN — SENNOSIDES 8.6 MG: 8.6 TABLET, FILM COATED ORAL at 09:39

## 2023-07-26 RX ADMIN — GABAPENTIN 300 MG: 300 CAPSULE ORAL at 09:38

## 2023-07-26 RX ADMIN — METOPROLOL SUCCINATE 50 MG: 50 TABLET, EXTENDED RELEASE ORAL at 09:38

## 2023-07-26 RX ADMIN — HEPARIN SODIUM 5000 UNITS: 5000 INJECTION INTRAVENOUS; SUBCUTANEOUS at 21:40

## 2023-07-26 RX ADMIN — ATORVASTATIN CALCIUM 40 MG: 40 TABLET, FILM COATED ORAL at 16:35

## 2023-07-26 RX ADMIN — HEPARIN SODIUM 5000 UNITS: 5000 INJECTION INTRAVENOUS; SUBCUTANEOUS at 13:14

## 2023-07-26 RX ADMIN — CLOPIDOGREL BISULFATE 75 MG: 75 TABLET ORAL at 09:38

## 2023-07-26 RX ADMIN — GABAPENTIN 400 MG: 400 CAPSULE ORAL at 21:43

## 2023-07-26 RX ADMIN — OXYCODONE HYDROCHLORIDE 5 MG: 5 TABLET ORAL at 21:49

## 2023-07-26 RX ADMIN — FOLIC ACID 1 MG: 1 TABLET ORAL at 09:38

## 2023-07-26 RX ADMIN — DOCUSATE SODIUM 100 MG: 100 CAPSULE ORAL at 17:13

## 2023-07-26 RX ADMIN — SENNOSIDES 8.6 MG: 8.6 TABLET, FILM COATED ORAL at 17:13

## 2023-07-26 RX ADMIN — METHOCARBAMOL 500 MG: 500 TABLET ORAL at 05:44

## 2023-07-26 RX ADMIN — LISINOPRIL 10 MG: 10 TABLET ORAL at 09:38

## 2023-07-26 RX ADMIN — EZETIMIBE 10 MG: 10 TABLET ORAL at 09:38

## 2023-07-26 RX ADMIN — DOCUSATE SODIUM 100 MG: 100 CAPSULE ORAL at 09:38

## 2023-07-26 RX ADMIN — ACETAMINOPHEN 975 MG: 325 TABLET, FILM COATED ORAL at 21:39

## 2023-07-26 RX ADMIN — DILTIAZEM HYDROCHLORIDE 120 MG: 60 TABLET, FILM COATED ORAL at 09:38

## 2023-07-26 RX ADMIN — GABAPENTIN 300 MG: 300 CAPSULE ORAL at 17:13

## 2023-07-26 RX ADMIN — ACETAMINOPHEN 975 MG: 325 TABLET, FILM COATED ORAL at 05:44

## 2023-07-26 RX ADMIN — OXYCODONE HYDROCHLORIDE 5 MG: 5 TABLET ORAL at 09:47

## 2023-07-26 RX ADMIN — METHOCARBAMOL 500 MG: 500 TABLET ORAL at 17:13

## 2023-07-26 RX ADMIN — ISOSORBIDE MONONITRATE 30 MG: 30 TABLET, EXTENDED RELEASE ORAL at 09:39

## 2023-07-26 RX ADMIN — AMLODIPINE BESYLATE 5 MG: 5 TABLET ORAL at 21:40

## 2023-07-26 RX ADMIN — LAMOTRIGINE 100 MG: 100 TABLET ORAL at 09:38

## 2023-07-26 RX ADMIN — METHOCARBAMOL 500 MG: 500 TABLET ORAL at 13:14

## 2023-07-26 NOTE — PROGRESS NOTES
07/26/23 1000   Rehab Team Goals   Transfer Team Goal Patient will be independent with transfers with least restrictive device upon completion of rehab program   Locomotion Team Goal Patient will be independent with locomotion with least restrictive device upon completion of rehab program   PT Transfer Goal   Roll left and right Goal 06. Independent - Patient completes the activity by him/herself with no assistance from a helper. Sit to lying Goal 06. Independent - Patient completes the activity by him/herself with no assistance from a helper. Lying to sitting on side of bed Goal 06. Independent - Patient completes the activity by him/herself with no assistance from a helper. Sit to stand Goal 06. Independent - Patient completes the activity by him/herself with no assistance from a helper. Chair/bed-to-chair transfer Goal 06. Independent - Patient completes the activity by him/herself with no assistance from a helper. Car Transfer Goal 05. Setup or clean-up assistance - [place] SETS UP or CLEANS UP, patient completes activity. [place] assists only prior to or following the activity. Assistive Device Wheelchair   Status Ongoing; Target goal - two weeks; Discharge Home   Locomotion Goal   Primary discharge mode of locomotion Wheelchair   Target Walk Distance 5 ft   Assist Device Roller Walker   Gait Pattern Improvement   (Hopping)   Walk 10 feet Goal 88. Not attempted due to medical condition or safety concerns   Walk 50 feet with 2 turns Goal 88. Not attempted due to medical condition or safety concerns   Walk 150 feet Goal 88. Not attempted due to medical condition or safety concerns   Walking 10 feet on uneven surface 88. Not attempted due to medical condition or safety concerns   Walking Goal Status Ongoing; Target goal - two weeks; Discharge Home   Type of Wheelchair Used 1. Manual   Target Wheel Distance- Level 150 ft   Target Wheel Distance- Grade 10 ft   Wheel 50 feet with 2 turns Goal 06.  Independent - Patient completes the activity by him/herself with no assistance from a helper. Wheel 150 feet Goal 06. Independent - Patient completes the activity by him/herself with no assistance from a helper. Decrease Assist With Locking Brakes; Remove Leg Rest;Replace Leg Rest   Environment Well Lit   Wheelchair Goal Status Ongoing; Target goal - two weeks; Discharge Home   Stairs Goal   1 step or curb goal 04. TOUCHING/ STEADYING assistance as patient completes activity. (hopping up step bkwd with RW in front, then sitting into chair on 2nd step to get in house through the garage)   4 steps Goal 88. Not attempted due to medical condition or safety concerns   12 steps Goal 88. Not attempted due to medical condition or safety concerns   Number of Stairs 2   Technique Curb Step   Status Ongoing; Target goal - two weeks; Discharge Home   Object Retrieval Goal   Picking up object Goal 88. Not attempted due to medical condition or safety concerns  (w/c level mobility)   Additional Goal (other)   Goal Type (other) Family training completed for skin care and wrapping of residual limb   Status Target goal - two weeks; Discharge Home

## 2023-07-26 NOTE — PLAN OF CARE
Problem: Prexisting or High Potential for Compromised Skin Integrity  Goal: Skin integrity is maintained or improved  Description: INTERVENTIONS:  - Identify patients at risk for skin breakdown  - Assess and monitor skin integrity  - Assess and monitor nutrition and hydration status  - Monitor labs   - Assess for incontinence   - Turn and reposition patient  - Assist with mobility/ambulation  - Relieve pressure over bony prominences  - Avoid friction and shearing  - Provide appropriate hygiene as needed including keeping skin clean and dry  - Evaluate need for skin moisturizer/barrier cream  - Collaborate with interdisciplinary team   - Patient/family teaching  - Consider wound care consult   Outcome: Progressing     Problem: MOBILITY - ADULT  Goal: Maintain or return to baseline ADL function  Description: INTERVENTIONS:  -  Assess patient's ability to carry out ADLs; assess patient's baseline for ADL function and identify physical deficits which impact ability to perform ADLs (bathing, care of mouth/teeth, toileting, grooming, dressing, etc.)  - Assess/evaluate cause of self-care deficits   - Assess range of motion  - Assess patient's mobility; develop plan if impaired  - Assess patient's need for assistive devices and provide as appropriate  - Encourage maximum independence but intervene and supervise when necessary  - Involve family in performance of ADLs  - Assess for home care needs following discharge   - Consider OT consult to assist with ADL evaluation and planning for discharge  - Provide patient education as appropriate  Outcome: Progressing  Goal: Maintains/Returns to pre admission functional level  Description: INTERVENTIONS:  - Perform BMAT or MOVE assessment daily.   - Set and communicate daily mobility goal to care team and patient/family/caregiver. - Collaborate with rehabilitation services on mobility goals if consulted  - Perform Range of Motion 3 times a day.   - Reposition patient every 2 hours.  - Dangle patient 3 times a day  - Stand patient 3 times a day  - Ambulate patient 3 times a day  - Out of bed to chair 3 times a day   - Out of bed for meals 3 times a day  - Out of bed for toileting  - Record patient progress and toleration of activity level   Outcome: Progressing     Problem: PAIN - ADULT  Goal: Verbalizes/displays adequate comfort level or baseline comfort level  Description: Interventions:  - Encourage patient to monitor pain and request assistance  - Assess pain using appropriate pain scale  - Administer analgesics based on type and severity of pain and evaluate response  - Implement non-pharmacological measures as appropriate and evaluate response  - Consider cultural and social influences on pain and pain management  - Notify physician/advanced practitioner if interventions unsuccessful or patient reports new pain  Outcome: Progressing     Problem: INFECTION - ADULT  Goal: Absence or prevention of progression during hospitalization  Description: INTERVENTIONS:  - Assess and monitor for signs and symptoms of infection  - Monitor lab/diagnostic results  - Monitor all insertion sites, i.e. indwelling lines, tubes, and drains  - Monitor endotracheal if appropriate and nasal secretions for changes in amount and color  - [place] appropriate cooling/warming therapies per order  - Administer medications as ordered  - Instruct and encourage patient and family to use good hand hygiene technique  - Identify and instruct in appropriate isolation precautions for identified infection/condition  Outcome: Progressing  Goal: Absence of fever/infection during neutropenic period  Description: INTERVENTIONS:  - Monitor WBC    Outcome: Progressing     Problem: SAFETY ADULT  Goal: Patient will remain free of falls  Description: INTERVENTIONS:  - Educate patient/family on patient safety including physical limitations  - Instruct patient to call for assistance with activity   - Consult OT/PT to assist with strengthening/mobility   - Keep Call bell within reach  - Keep bed low and locked with side rails adjusted as appropriate  - Keep care items and personal belongings within reach  - Initiate and maintain comfort rounds  - Make Fall Risk Sign visible to staff  - Offer Toileting every 2 Hours, in advance of need  - Initiate/Maintain bed/chair alarm  - Obtain necessary fall risk management equipment: non-skid footwear, pt instructed to call for assistance, call bell in reach and pt rings appropriately, pt in room near nurses' station  - Apply yellow socks and bracelet for high fall risk patients  - Consider moving patient to room near nurses station  Outcome: Progressing  Goal: Maintain or return to baseline ADL function  Description: INTERVENTIONS:  -  Assess patient's ability to carry out ADLs; assess patient's baseline for ADL function and identify physical deficits which impact ability to perform ADLs (bathing, care of mouth/teeth, toileting, grooming, dressing, etc.)  - Assess/evaluate cause of self-care deficits   - Assess range of motion  - Assess patient's mobility; develop plan if impaired  - Assess patient's need for assistive devices and provide as appropriate  - Encourage maximum independence but intervene and supervise when necessary  - Involve family in performance of ADLs  - Assess for home care needs following discharge   - Consider OT consult to assist with ADL evaluation and planning for discharge  - Provide patient education as appropriate  Outcome: Progressing  Goal: Maintains/Returns to pre admission functional level  Description: INTERVENTIONS:  - Perform BMAT or MOVE assessment daily.   - Set and communicate daily mobility goal to care team and patient/family/caregiver. - Collaborate with rehabilitation services on mobility goals if consulted  - Perform Range of Motion 3 times a day. - Reposition patient every 2 hours.   - Dangle patient 3 times a day  - Stand patient 3 times a day  - Ambulate patient 3 times a day  - Out of bed to chair 3 times a day   - Out of bed for meals 3 times a day  - Out of bed for toileting  - Record patient progress and toleration of activity level   Outcome: Progressing     Problem: DISCHARGE PLANNING  Goal: Discharge to home or other facility with appropriate resources  Description: INTERVENTIONS:  - Identify barriers to discharge w/patient and caregiver  - Arrange for needed discharge resources and transportation as appropriate  - Identify discharge learning needs (meds, wound care, etc.)  - Arrange for interpretive services to assist at discharge as needed  - Refer to Case Management Department for coordinating discharge planning if the patient needs post-hospital services based on physician/advanced practitioner order or complex needs related to functional status, cognitive ability, or social support system  Outcome: Progressing     Problem: Knowledge Deficit  Goal: Patient/family/caregiver demonstrates understanding of disease process, treatment plan, medications, and discharge instructions  Description: Complete learning assessment and assess knowledge base.   Interventions:  - Provide teaching at level of understanding  - Provide teaching via preferred learning methods  Outcome: Progressing

## 2023-07-26 NOTE — PROGRESS NOTES
PM&R PROGRESS NOTE:  Pastora Kee 68 y.o. female MRN: [id number]  Unit/Bed#: -01 Encounter: [id number]        Rehabilitation Diagnosis: Impairment of mobility, safety and Activities of Daily Living (ADLs) due to Amputation:  05.3  Unilateral Lower Limb Above the Knee    HPI: Alexis Hyde is a 68 y.o. female with a medical history of CAD, hypertension, CABG, vascular bypass surgeries, PAD, aortoiliac occlusive disease, tobacco abuse (1 ppd x 60+yrs), PAF/SVT (no AC), LLE DVT s/p IVC filter, RUL lung mass, stage III CKD who presented to [address] [place] [address] on 07/18 with left foot pain. Patient states that she noticed that she had increased pain over the left foot in approximately 2 days ago and is acutely worse over the last 24 hours, accompanied by numbness, and color change of her foot. On exam, patient's left foot is bluish discoloration, swollen, no palpable or doppler DP or PT pulses. Patient still has motor of her foot (is able to wiggle toes, move her foot up and down) but sensation is significantly decreased, is able to feel slight pressure. CT angiogram with multiple inflow graft occlusions on the left. The right to left component of the right axillobifemoral graft is occluded. The left axillary to profunda graft is occluded and a segment is excised. The aorta to left common femoral and jump graft from the alex of the common femoral artery anastomosis to below-knee popliteal artery is also occluded. The deep femoral artery beyond the previous anastomosis does reconstitute but is secondary or tertiary branch that is small at less than 2 mm. There are no other named vessels that reconstitute within the upper leg. There does appear to be reconstitution of tibial vessels beyond the trifurcation but again these vessels are less than 2 mm in diameter. Vascular surgery was consulted, recommending L AKA.  Patient was placed on heparin gtt and APS was consulted, patient now on oral analgesics. On 07/21 patient underwent a left AKA with Dr. Miah Morocho, EBL minimal. Patient is non-weight bearing left lower extremity. PT/OT were consulted and recommend inpatient acute rehabilitation. PT/OT were consulted and recommend acute inpatient rehabilitation. The patient was evaluated by the Rehabilitation team and deemed an appropriate candidate for comprehensive inpatient rehabilitation and admitted to the [address] [place] [address] on 7/25/2023  2:33 PM       SUBJECTIVE: Patient seen face to face. No acute issues. Slept interrupted, no environmental cause. Patient on scheduled melatonin and prn Trazadone. Did not take prn last night, will request if unable to sleep this evening. Pain is well controlled with current regimen, currently a dull ache. Visualized skin , left leg abrasion. Good appetite, voiding, LBM 7/25. Denies chest pain, shortness of breath, fever, chills, N/V, abdominal pain. ASSESSMENT: Stable, progressing    PLAN:  - incontinence: continue bladder scans every 4 hours with PVRs, timed voiding every 4 hours during the day and every 6 hours at night. - SpO2 88% with therapy, 2L nc oxygen applied, titrate as warranted, keep SpO2 >90%  - skin impairment:   · left thigh abrasion likely caused by tight ace wrapping, Allevyn foam dressing to abrasion, change every 3 days with light ace wrapping. · Buttocks: excoriation, apply Calazime cream twice a day and prn  - pain: continue current regimen  - interrupted sleep: on schedule Melatonin 9 mg, prn Trazadone. Recommend taking prn trazadone as needed, good sleep hygiene- darken room, television off, repositioning for comfort     Rehabilitation  • Functional deficits:  LLE NWB, mobility, self-care  • Continue current rehabilitation plan of care to maximize function.     • Functional update:   o PT: Awaiting evaluation  o OT: Awaiting evaluation    • Estimated Discharge: anticipated 10-14 days      Pain  • Left leg  • Tylenol 975 mg every 8 hours • Gabapentin 300 mg TID  • Robaxin 500 mg every 6 hours  • Oxycodone 5-10 mg every 4 hours prn    DVT prophylaxis  • Heparin sc    Bladder plan  • incontinent    Bowel plan  • Continent  • Colace 100 mg BID  • Senokot 17.2 mg daily hs  • miralax daily  • Bisacodyl suppository daily prn      * Hx of AKA (above knee amputation), left [facility])  Assessment & Plan  - 7/18 pt admitted with increased foot pain over 2 days with change of color and numbness  - exam, left foot with no palpable or doppler DP/PT, significantly decreased sensation, bluish color  - home: plavix  - was on Xarelto 2.5 mg, but discontinued d/t cost  -CT angiogram: multiple inflow graft occlusions on the left. The right to left component of the right axillobifemoral graft is occluded. The left axillary to profunda graft is occluded and a segment is excised. The aorta to left common femoral and jump graft from the alex of the common femoral artery anastomosis to below-knee popliteal artery is also occluded. The deep femoral artery beyond the previous anastomosis does reconstitute but is secondary or tertiary branch that is small at less than 2 mm. There are no other named vessels that reconstitute within the upper leg.   There does appear to be reconstitution of tibial vessels beyond the trifurcation but again these vessels are less than 2 mm in diameter.  - Vascular surgery recommended [facility]  - 7/21: underwent left AKA w/ Dr. Miah Morocho, no significant blood loss  - wound care  - amputation dressing training  - pain management  -PT/OT 3 hours a day, 5-6 days a week    PAD (peripheral artery disease) ([address])  Assessment & Plan  - hx of PAD, carotid stenosis   - following procedures with [facility] [place], and [facility]:  - Aorto- right renal artery bypass 2000  - 8/6/2014 femoral/popliteal with stents and angioplasty, iliac arteries with stents and angioplasty and tibial peroneal artery angioplasty  -Right Fem to below-knee bypass occluded in 2010  - Fem-Fem bypass graft left to right occluded  -2/21/2017 right axillary to Bi-Fem bypass  -11/15/2022 lower extremity arterial ultrasound right lower limb shows 50-69% stenosis distal to the SFA with high-grade stenosis versus occlusion in the posterior tibial artery LUANN 0.53 (severe range), right Fem to below-knee bypass graft occluded, left lower limb patent CFA to posterior tibial bypass graft, LUANN 0.96  - 11/15/2022 abdominal aorta/iliac study showed occlusion of bilateral common and external iliac arteries, greater than 70% stenosis in the celiac artery, patent right axillo bifemoral arterial bypass graft  -11/15/2022 carotid ultrasound showed less than 50% bilateral carotid stenosis  - home: Plavix 75 mg, (previously on Xarelo 2.5 mg d/c d/t cost)    Tobacco abuse  Assessment & Plan  - 1 1/2 ppd for 56 years  - nicotine patch  - tried Wellbutrin prescribed by vascular surgery for 1 week, however discontinued due to side effects  - encourage smoking cessation    Nodule of upper lobe of right lung  Assessment & Plan  - newly diagnosed RUL lung mass  - PET/CT (7/7/23): marked increased metabolic activity in association with RUL nodule highly suggestive of neoplasm  - follows with [facility] Dr. Romelia Dickerson  - planned navigational bronchoscopy with biopsy for 7/31/23 with thoracic surgery, Dr. Demario Leal    CAD (coronary artery disease)  Assessment & Plan  - home: Plavix 75 mg daily  [facility]  - TTE (1/26/23): EF 60%, mild dilated RV, mild-mod AI, mild AS/TR  - follows with Dr. Jose Alberto March        Stage 3 chronic kidney disease, unspecified whether stage 3a or 3b CKD [facility])  Assessment & Plan  Lab Results   Component Value Date    EGFR 70 07/22/2023    EGFR 69 07/21/2023    EGFR 67 07/20/2023    CREATININE 0.83 07/22/2023    CREATININE 0.84 07/21/2023    CREATININE 0.86 07/20/2023     - avoid nephrotoxic agents  - monitor BMP with routine labs  -consult IM for assist with management  - stable      PAF (paroxysmal atrial fibrillation) ([facility])  Assessment & Plan  - episode during previous hospitalization  - Zio patch (3/17/23) showed 5 episodes of SVT (longest 7 beats) 2 triggered events correlated with SR, no other significant arrhythmias  - home: Toprol XL 50 mg daily, diltiazem 120 mg daily  - continue here      Essential hypertension  Assessment & Plan  - home: amlodipine 5 mg daily, diltiazem 120 mg daily, lisinopril 10 mg daily, Toprol-XL 50 mg daily  - here: continue   - monitor BP  - monitor electrolytes  - consult IM to assist w/ management    Mixed hyperlipidemia  Assessment & Plan  - home: rosuvastatin 20 mg daily, Zetia 10 mg daily  - here: Zetia 10 mg, atorvastatin 40 mg (therapeutic substitution)  - Lipid panel (83/908508) total cholesterol 165, triglycerides 155, HDL 33,       Insomnia  Assessment & Plan  - here: Melatonin 9 mg qhs, Trazodone 50 mg qhs prn    Urinary incontinence  Assessment & Plan  - hx of inability to urinate with urgency  - bladder training  -timed void  - bladder scan every 4 hours with PVRs  - I/O    Bipolar affective disorder, depressed, severe ([address])  Assessment & Plan  - home: lamictal 100 mg daily  - continue here  - neuropsychology consulted      Appreciate IM consultants medical co-management. Labs, medications, and imaging personally reviewed. ROS:  A ten point review of systems was completed on 07/26/23 and pertinent positives are listed in subjective section. All other systems reviewed were negative. Review of Systems     OBJECTIVE:   /78   Pulse 63   Temp 98.2 °F (36.8 °C) (Oral)   Resp 18   Ht 5' 4" (1.626 m)   Wt 74 kg (163 lb 2.3 oz)   SpO2 92%   BMI 28.00 kg/m²     Physical Exam  Constitutional:       Appearance: Normal appearance. HENT:      Head: Normocephalic and atraumatic.       Nose: Nose normal.      Mouth/Throat:      Mouth: Mucous membranes are moist.   Cardiovascular:      Rate and Rhythm: Normal rate and regular rhythm. Pulses: Normal pulses. Heart sounds: Normal heart sounds. Pulmonary:      Effort: Pulmonary effort is normal.      Breath sounds: Normal breath sounds. Abdominal:      General: Bowel sounds are normal.      Palpations: Abdomen is soft. Musculoskeletal:         General: Tenderness present. Normal range of motion. Cervical back: Normal range of motion. Left lower leg: Edema present. Skin:     General: Skin is warm and dry. Capillary Refill: Capillary refill takes less than 2 seconds. Findings: Bruising and lesion (left inner thigh) present. Comments: - buttocks excoriation   Neurological:      Mental Status: She is alert and oriented to person, place, and time.    Psychiatric:         Mood and Affect: Mood normal.         Judgment: Judgment normal.          Lab Results   Component Value Date    WBC 10.96 (H) 07/24/2023    HGB 12.4 07/24/2023    HCT 38.5 07/24/2023    MCV 92 07/24/2023     07/24/2023     Lab Results   Component Value Date    SODIUM 139 07/22/2023    K 4.1 07/22/2023     (H) 07/22/2023    CO2 24 07/22/2023    BUN 9 07/22/2023    CREATININE 0.83 07/22/2023    GLUC 153 (H) 07/22/2023    CALCIUM 8.9 07/22/2023     Lab Results   Component Value Date    INR 0.92 07/18/2023    PROTIME 12.5 07/18/2023           Current Facility-Administered Medications:   •  acetaminophen (TYLENOL) tablet 975 mg, 975 mg, Oral, Q8H [address], LEV Turner, 975 mg at 07/26/23 0544  •  albuterol (PROVENTIL HFA,VENTOLIN HFA) inhaler 2 puff, 2 puff, Inhalation, Q6H PRN, LEV Turner  •  amLODIPine (NORVASC) tablet 5 mg, 5 mg, Oral, Daily, LEV Turner, 5 mg at 07/25/23 2127  •  atorvastatin (LIPITOR) tablet 40 mg, 40 mg, Oral, Daily With Dinner, LEV Turner, 40 mg at 07/25/23 1535  •  bisacodyl (DULCOLAX) rectal suppository 10 mg, 10 mg, Rectal, Daily PRN, LEV Turner  •  clopidogrel (PLAVIX) tablet 75 mg, 75 mg, Oral, Daily, LEV Turner  •  diltiazem (CARDIZEM) tablet 120 mg, 120 mg, Oral, Daily, LEV Turner  •  docusate sodium (COLACE) capsule 100 mg, 100 mg, Oral, BID, LEV Turner, 100 mg at 07/25/23 1720  •  ezetimibe (ZETIA) tablet 10 mg, 10 mg, Oral, Daily, LEV Turner  •  folic acid (FOLVITE) tablet 1 mg, 1 mg, Oral, Daily, LEV Turner  •  gabapentin (NEURONTIN) capsule 300 mg, 300 mg, Oral, BID, LEV Turner, 300 mg at 07/25/23 1720  •  gabapentin (NEURONTIN) capsule 300 mg, 300 mg, Oral, HS, LEV Turner, 300 mg at 07/25/23 2127  •  heparin (porcine) subcutaneous injection 5,000 Units, 5,000 Units, Subcutaneous, Q8H [address], LEV Turnre, 5,000 Units at 07/26/23 0544  •  isosorbide mononitrate (IMDUR) 24 hr tablet 30 mg, 30 mg, Oral, QAM, LEV Turner  •  lamoTRIgine (LaMICtal) tablet 100 mg, 100 mg, Oral, Daily, LEV Turner  •  lidocaine (LIDODERM) 5 % patch 2 patch, 2 patch, Topical, Daily, LEV Turner  •  lisinopril (ZESTRIL) tablet 10 mg, 10 mg, Oral, Daily, LEV Turner  •  melatonin tablet 9 mg, 9 mg, Oral, HS PRN, LEV Turner  •  methocarbamol (ROBAXIN) tablet 500 mg, 500 mg, Oral, Q6H [address], Park Armas MD, 500 mg at 07/26/23 0544  •  metoprolol succinate (TOPROL-XL) 24 hr tablet 50 mg, 50 mg, Oral, Daily, LEV Turner  •  nicotine (NICODERM CQ) 21 mg/24 hr TD 24 hr patch 1 patch, 1 patch, Transdermal, Daily, LEV Turner  •  ondansetron (ZOFRAN-ODT) dispersible tablet 4 mg, 4 mg, Oral, Q6H PRN, LEV Turner  •  oxyCODONE (ROXICODONE) IR tablet 5 mg, 5 mg, Oral, Q4H PRN, 5 mg at 07/25/23 2130 **OR** oxyCODONE (ROXICODONE) immediate release tablet 10 mg, 10 mg, Oral, Q4H PRN, Park Armas MD  •  polyethylene glycol (MIRALAX) packet 17 g, 17 g, Oral, Daily, LEV Turner, 17 g at 07/25/23 1535  •  senna (SENOKOT) tablet 8.6 mg, 1 tablet, Oral, BID, Park Armas MD  •  traZODone (DESYREL) tablet 50 mg, 50 mg, Oral, HS PRN, LEV Gillette    Past Medical History:   Diagnosis Date   • Aorto-iliac disease ([address])    • Atrial fibrillation ([address])    • CAD (coronary artery disease)    • Carotid stenosis, bilateral    • Celiac artery stenosis (HCC)    • CKD (chronic kidney disease) stage 3, GFR 30-59 ml/min (HCC)    • DVT (deep venous thrombosis) (HCC)     LLE (CFV, popl)   • Heart attack ([address]) 02/2022   • HLD (hyperlipidemia)    • Hypertension    • Lung mass     RUL   • Myocardial infarction ([address])     2022   • COURTNEY (obstructive sleep apnea)    • PAD (peripheral artery disease) ([address])    • Stroke [facility])        Patient Active Problem List    Diagnosis Date Noted   • Hx of AKA (above knee amputation), left ([address]) 07/25/2023   • PAD (peripheral artery disease) ([address]) 06/23/2014   • Tobacco abuse 01/02/2023   • Nodule of upper lobe of right lung 06/14/2023   • CAD (coronary artery disease) 06/23/2014   • Stage 3 chronic kidney disease, unspecified whether stage 3a or 3b CKD ([address]) 01/09/2023   • PAF (paroxysmal atrial fibrillation) ([address]) 01/09/2023   • Essential hypertension 01/22/2016   • Mixed hyperlipidemia 12/27/2022   • Insomnia 02/10/2023   • Urinary incontinence 07/26/2023   • Dizziness 01/09/2023   • Aortoiliac occlusive disease ([address]) 12/28/2022   • Left leg swelling 11/08/2022   • Bipolar affective disorder, depressed, severe ([address]) 04/19/2017   • S/P vascular bypass 02/24/2017   • Hx of CABG 11/30/2016   • Presence of IVC filter 11/30/2016   • Thyroid nodule 01/22/2016   • Renal artery stenosis ([address]) 06/23/2014          LEV [place]  Physical Medicine and [place]

## 2023-07-26 NOTE — PROGRESS NOTES
Occupational Therapy Evaluation         07/26/23 0700   Patient Data   Rehab Impairment Impairment of mobility, safety and Activities of Daily Living (ADLs) due to Amputation:  05.5  Bilateral Lower Limb Above the Knee   Etiologic Diagnosis Left lower extremity ischemia s/p L AKA   Date of Onset 07/18/23   Support System   Name pt lives with son and dtr in law and their children. Dtr in law works from home   Home Setup   Type of Home Multi Level   Method of Entry Stairs   Number of Stairs 2   First Floor Bathroom Half   Second Floor Bathroom Tub; Shower;Combo   First Floor Setup Available Yes   Available Equipment Roller Walker;Single Point Cane   Baseline Information   Vocation   (retired)   Transportation    Prior Device(s) [address] [employer]   Prior IADL Participation   Money Management Identify Money;Estimate Costs;Estimate Change   Meal Preparation Full Participation   Laundry Full Participation   Home Cleaning Partial Participation   Prior Level of Function   Self-Care 3. Independent - Patient completed the activities by him/herself, with or without an assistive device, with no assistance from a helper. Indoor-Mobility (Ambulation) 3. Independent - Patient completed the activities by him/herself, with or without an assistive device, with no assistance from a helper. Stairs 3. Independent - Patient completed the activities by him/herself, with or without an assistive device, with no assistance from a helper. Functional Cognition 3. Independent - Patient completed the activities by him/herself, with or without an assistive device, with no assistance from a helper. Prior Device Used Z. None of the above   Falls in the Last Year   Number of falls in the past 12 months 0   Psychosocial   Psychosocial (WDL) X   Patient Behaviors/Mood Flat affect   Restrictions/Precautions   Precautions Bed/chair alarms; Fall Risk;Supervision on toilet/commode   LLE Weight Bearing Per Order (S)  NWB   Pain Assessment   Pain Assessment Tool 0-10   Pain Score 6   Pain Location/Orientation Orientation: Left; Location: Hip   Hospital Pain Intervention(s) Repositioned;Relaxation technique   Eating Assessment   Type of Assistance Needed Independent   Physical Assistance Level No physical assistance   Eating CARE Score 6   Oral Hygiene   Type of Assistance Needed Physical assistance   Physical Assistance Level Total assistance   Comment if baseline in stance   Oral Hygiene CARE Score 1   Tub/Shower Transfer   Reason Not Assessed Medical;Sponge Bath   Shower/Bathe Self   Type of Assistance Needed Physical assistance   Physical Assistance Level Total assistance   Comment if at baseline seated/in stance   Shower/Bathe Self CARE Score 1   Dressing/Undressing Clothing   Type of Assistance Needed Physical assistance   Physical Assistance Level Total assistance   Upper Body Dressing CARE Score 1   Type of Assistance Needed Physical assistance   Physical Assistance Level Total assistance   Lower Body Dressing CARE Score 1   Putting On/Taking Off Footwear   Type of Assistance Needed Physical assistance   Physical Assistance Level 51%-75%   Putting On/Taking Off Footwear CARE Score 2   Toileting Hygiene   Type of Assistance Needed Physical assistance   Physical Assistance Level Total assistance   Comment recommend assist x2   Toileting Hygiene CARE Score 1   Toilet Transfer   Type of Assistance Needed Physical assistance; Adaptive equipment   Physical Assistance Level Total assistance   Comment recommend assist x2   Toilet Transfer CARE Score 1   Transfer Bed/Chair/Wheelchair   Type of Assistance Needed Physical assistance   Physical Assistance Level Total assistance   Comment recommend assist x2 outside of therapy   Chair/Bed-to-Chair Transfer CARE Score 1   Roll Left and Right   Type of Assistance Needed Physical assistance   Physical Assistance Level 51%-75%   Roll Left and Right CARE Score 2   Sit to Lying   Type of Assistance Needed Physical assistance   Physical Assistance Level 25% or less   Sit to Lying CARE Score 3   Lying to Sitting on Side of Bed   Type of Assistance Needed Physical assistance   Physical Assistance Level 25% or less   Lying to Sitting on Side of Bed CARE Score 3   Comprehension   QI: Comprehension 3. Usually Understands: Understands most conversations, but misses some part/intent of message. Requires cues at times to understand. Expression   QI: Expression 3. Exhibits some difficulty with expressing needs and ideas (e.g., some words or finishing thoughts) or speech is not clear   RUE Assessment   RUE Assessment WFL   LUE Assessment   LUE Assessment WFL   Cognition   Arousal/Participation Alert; Cooperative   Attention Within functional limits   Following Commands Follows one step commands without difficulty   Comments will continue to assess. Pt appears overwhelmed during session, however this could be 2* reporting feelings of anxiousness opposed to cognition   Discharge Information   Vocational Plan Retired/not working   Patient's Discharge Plan home with family support   Barriers to Discharge Home Limited Family Support;Decreased Strength;Decreased Endurance; Safety Considerations   Impressions Pt is a 68 y.o. female who was admitted to [address] [place] [address] on 7/25/2023. Pt's current problem list includes: L AKA 7/21. Co morbidities include - HTN, lung CA, AFIB, CKD 3, bipolar d/o. Pt's precautions include: L LE NWB. PTA pt was dealing with much pain in L LE and required help from family but pt's baseline is independent in ADL and participates in some household IADLs. Pt lives at home with her son and daughter in law. Currently pt requires total assist for overall ADLS and total assist for transfers based on her baseline functional status. At bed level pt with mod/max assist for ADL, able to complete sliding board transfer with mod assist x1 but recommend assist x2 outside of therapy.  Will plan to assess sit/squat pivot transfers next session. Pt currently presents with impairments in the following categories - activity tolerance, endurance, standing balance/tolerance and UE strength. These impairments, as well as pt's fatigue, pain, decreased caregiver support, risk for falls and home environment  limit pt's ability to safely engage in all baseline areas of occupation, including grooming, bathing, dressing, toileting, functional mobility/transfers, house maintenance, social participation  and leisure activities  . Pt presents with good rehab potential. Pt is unsafe to D/C home at this time, recommending ELOS 2 weeks to achieve independent level goals at w/c level, setup assist for ADL. During session pt on room air 88% supine, per nursing applied 2 L and 02 was 92%.    OT Therapy Minutes   OT Time In 0700   OT Time Out 0830   OT Total Time (minutes) 90   OT Mode of treatment - Individual (minutes) 90   OT Mode of treatment - Concurrent (minutes) 0   OT Mode of treatment - Group (minutes) 0   OT Mode of treatment - Co-treat (minutes) 0   OT Mode of Treatment - Total time(minutes) 90 minutes   OT Cumulative Minutes 90   Cumulative Minutes   Cumulative therapy minutes 90

## 2023-07-26 NOTE — PROGRESS NOTES
OT LONG TERM GOAL        07/26/23 0700   Rehab Team Goals   ADL Team Goal Patient will require supervision with ADLs with least restrictive device upon completion of rehab program   Rehab Team Interventions   OT Interventions Self Care;Home Management; Energy Conservation;Patient/Family Education; Therapeutic Exercise   Eating Goal   Eating Goal 06. Independent - Patient completes the activity by him/herself with no assistance from a helper. Status Ongoing; Target goal - two weeks   Grooming Goal   Oral Hygiene Goal 06. Independent - Patient completes the activity by him/herself with no assistance from a helper. Status Ongoing; Target goal - two weeks   Intervention Balance Work; Therapeutic Exercise; Tolerance Work   Tub/Shower Transfer Goal   Method   (N/A - sponge bathing)   Bathing Goal   Shower/bathe self Goal 04. Supervision or touching assistance- [place] provides VERBAL CUES or supervision throughout activity. Environment Seated; In Bed   Status Ongoing; Target goal - two weeks   Intervention Assistive Device; Therapeutic Exercise   Upper Body Dressing Goal   Upper body dressing Goal 05. Setup or clean-up assistance - [place] SETS UP or CLEANS UP, patient completes activity. [place] assists only prior to or following the activity. Task Upper Body   Status Ongoing; Target goal - two weeks   Lower Body Dressing Goal   Lower body dressing Goal 05. Setup or clean-up assistance - [place] SETS UP or CLEANS UP, patient completes activity. [place] assists only prior to or following the activity. Putting on/taking off footwear Goal 06. Independent - Patient completes the activity by him/herself with no assistance from a helper. Task Lower Body   Status Ongoing; Target goal - two weeks   Intervention Balance Work; Therapeutic Exercise; Tolerance Work   Toileting Transfer Goal   Toilet transfer Goal 06. Independent - Patient completes the activity by him/herself with no assistance from a helper.    Assistive Device Drop Arm Commode   Status Ongoing; Target goal - two weeks   Intervention ADL Training;Balance Work;Assistive Device   Toileting Goal   Toileting hygiene Goal 05. Setup or clean-up assistance - [place] SETS UP or CLEANS UP, patient completes activity. [place] assists only prior to or following the activity. Task Pants Up;Pants Down;Hygiene   Status Ongoing; Target goal - two weeks   Intervention ADL Training;Balance Work;Assistive Device   Meal Prep and Kitchen Mobility   Assist Level Independent  (w/c level)   Status Ongoing

## 2023-07-26 NOTE — PLAN OF CARE
Problem: Prexisting or High Potential for Compromised Skin Integrity  Goal: Skin integrity is maintained or improved  Description: INTERVENTIONS:  - Identify patients at risk for skin breakdown  - Assess and monitor skin integrity  - Assess and monitor nutrition and hydration status  - Monitor labs   - Assess for incontinence   - Turn and reposition patient  - Assist with mobility/ambulation  - Relieve pressure over bony prominences  - Avoid friction and shearing  - Provide appropriate hygiene as needed including keeping skin clean and dry  - Evaluate need for skin moisturizer/barrier cream  - Collaborate with interdisciplinary team   - Patient/family teaching  - Consider wound care consult   Outcome: Progressing     Problem: MOBILITY - ADULT  Goal: Maintain or return to baseline ADL function  Description: INTERVENTIONS:  -  Assess patient's ability to carry out ADLs; assess patient's baseline for ADL function and identify physical deficits which impact ability to perform ADLs (bathing, care of mouth/teeth, toileting, grooming, dressing, etc.)  - Assess/evaluate cause of self-care deficits   - Assess range of motion  - Assess patient's mobility; develop plan if impaired  - Assess patient's need for assistive devices and provide as appropriate  - Encourage maximum independence but intervene and supervise when necessary  - Involve family in performance of ADLs  - Assess for home care needs following discharge   - Consider OT consult to assist with ADL evaluation and planning for discharge  - Provide patient education as appropriate  Outcome: Progressing  Goal: Maintains/Returns to pre admission functional level  Description: INTERVENTIONS:  - Perform BMAT or MOVE assessment daily.   - Set and communicate daily mobility goal to care team and patient/family/caregiver. - Collaborate with rehabilitation services on mobility goals if consulted  - Perform Range of Motion 3 times a day.   - Reposition patient every 2 hours.  - Dangle patient 3 times a day  - Stand patient 3 times a day  - Ambulate patient 3 times a day  - Out of bed to chair 3 times a day   - Out of bed for meals 3 times a day  - Out of bed for toileting  - Record patient progress and toleration of activity level   Outcome: Progressing     Problem: PAIN - ADULT  Goal: Verbalizes/displays adequate comfort level or baseline comfort level  Description: Interventions:  - Encourage patient to monitor pain and request assistance  - Assess pain using appropriate pain scale  - Administer analgesics based on type and severity of pain and evaluate response  - Implement non-pharmacological measures as appropriate and evaluate response  - Consider cultural and social influences on pain and pain management  - Notify physician/advanced practitioner if interventions unsuccessful or patient reports new pain  Outcome: Progressing     Problem: INFECTION - ADULT  Goal: Absence or prevention of progression during hospitalization  Description: INTERVENTIONS:  - Assess and monitor for signs and symptoms of infection  - Monitor lab/diagnostic results  - Monitor all insertion sites, i.e. indwelling lines, tubes, and drains  - Monitor endotracheal if appropriate and nasal secretions for changes in amount and color  - [place] appropriate cooling/warming therapies per order  - Administer medications as ordered  - Instruct and encourage patient and family to use good hand hygiene technique  - Identify and instruct in appropriate isolation precautions for identified infection/condition  Outcome: Progressing  Goal: Absence of fever/infection during neutropenic period  Description: INTERVENTIONS:  - Monitor WBC    Outcome: Progressing     Problem: SAFETY ADULT  Goal: Patient will remain free of falls  Description: INTERVENTIONS:  - Educate patient/family on patient safety including physical limitations  - Instruct patient to call for assistance with activity   - Consult OT/PT to assist with strengthening/mobility   - Keep Call bell within reach  - Keep bed low and locked with side rails adjusted as appropriate  - Keep care items and personal belongings within reach  - Initiate and maintain comfort rounds  - Make Fall Risk Sign visible to staff  - Offer Toileting every 2 Hours, in advance of need  - Initiate/Maintain bed/chair alarm  - Obtain necessary fall risk management equipment:   - Apply yellow socks and bracelet for high fall risk patients  - Consider moving patient to room near nurses station  Outcome: Progressing  Goal: Maintain or return to baseline ADL function  Description: INTERVENTIONS:  -  Assess patient's ability to carry out ADLs; assess patient's baseline for ADL function and identify physical deficits which impact ability to perform ADLs (bathing, care of mouth/teeth, toileting, grooming, dressing, etc.)  - Assess/evaluate cause of self-care deficits   - Assess range of motion  - Assess patient's mobility; develop plan if impaired  - Assess patient's need for assistive devices and provide as appropriate  - Encourage maximum independence but intervene and supervise when necessary  - Involve family in performance of ADLs  - Assess for home care needs following discharge   - Consider OT consult to assist with ADL evaluation and planning for discharge  - Provide patient education as appropriate  Outcome: Progressing  Goal: Maintains/Returns to pre admission functional level  Description: INTERVENTIONS:  - Perform BMAT or MOVE assessment daily.   - Set and communicate daily mobility goal to care team and patient/family/caregiver. - Collaborate with rehabilitation services on mobility goals if consulted  - Perform Range of Motion 3 times a day. - Reposition patient every 2 hours.   - Dangle patient 3 times a day  - Stand patient 3 times a day  - Ambulate patient 3 times a day  - Out of bed to chair 3 times a day   - Out of bed for meals 3 times a day  - Out of bed for toileting  - Record patient progress and toleration of activity level   Outcome: Progressing     Problem: DISCHARGE PLANNING  Goal: Discharge to home or other facility with appropriate resources  Description: INTERVENTIONS:  - Identify barriers to discharge w/patient and caregiver  - Arrange for needed discharge resources and transportation as appropriate  - Identify discharge learning needs (meds, wound care, etc.)  - Arrange for interpretive services to assist at discharge as needed  - Refer to Case Management Department for coordinating discharge planning if the patient needs post-hospital services based on physician/advanced practitioner order or complex needs related to functional status, cognitive ability, or social support system  Outcome: Progressing     Problem: Knowledge Deficit  Goal: Patient/family/caregiver demonstrates understanding of disease process, treatment plan, medications, and discharge instructions  Description: Complete learning assessment and assess knowledge base.   Interventions:  - Provide teaching at level of understanding  - Provide teaching via preferred learning methods  Outcome: Progressing

## 2023-07-26 NOTE — CONSULTS
Internal Medicine Consultation Note    Patient: Bea Gardner  Age/sex: 68 y.o. female  Medical Record #: [id number]      ASSESSMENT PLAN  29-year-old female with history of CAD/CABG, hypertension, PAD s/p multiple vascular interventions,  lower extremity DVT s/p IVC filter, right upper lobe mass planned for biopsy, CKD stage III, current smoker, who presented initially to the hospital with ongoing left foot pain for 3 months, imaging revealing multilevel occlusive disease in the left lower lobe limb, ultimately s/p  left AKA by vascular surgeon on 7/25. Internal medicine is consulted for management of other medical comorbidities. PAD-s/p a left AKA  -Continue medical optimization with Plavix, ezetimibe and statin   -Home Crestor 20 mg daily, receiving atorvastatin 40 mg daily  -LLE wound healthy, wound care  -Pain management as per primary    Coronary artery disease  -S/p CABG in 2000  -Currently denies any angina or shortness of breath. -TTE on 1/26/2023 with EF of 60%, mild to moderate AI, mild AS, estimated PA pressure of 40 mmHg  -Nuclear pharmacological stress test on 1/26/2023 with anterior wall perfusion defect likely artifact/cannot exclude small area of ischemia, normal LV systolic function.  -Continue Plavix, statin, ezetimibe, Imdur, diltiazem, Toprol-XL    Hypertension  -Blood pressure stable as per BP log review  -Home: Diltiazem 120 mg p.o. daily, amlodipine 5 mg daily, Imdur 30 mg p.o. daily, lisinopril 10 mg daily, Toprol-XL 50 mg p.o. daily  -Continue medications as above here.     Hyperlipidemia  -11/14/2022 LDL of 101 not at goal  -Continue atorvastatin and Zetia here  -Follow-up with outpatient cardiology for possible Repatha    Paroxysmal atrial fibrillation  -Currently in sinus rhythm  -S/p a 2-week Zio patch in March 2023 with 5 episodes of SVT without other significant arrhythmia  -Continue diltiazem 120 mg daily, Toprol XL 50 mg daily    DVT  - lower extremity DVT,S/p IVC filter  -DVT prophylaxis with subcutaneous heparin  -Continue to monitor    Tobacco use  -Continues to smoke 1 to 1.5 packs/day  -Did not tolerate Wellbutrin/Chantix in the past  -Counseled for smoking cessation-  -Nicotine patch offered here.  -Patient not ready to quit as yet. COPD  -CTL with evidence of emphysema  -PFT in June 2023: Moderate obstruction, no reversibility with bronchodilator   -Was recommended Trelegy and rescue inhaler, has not initiated Trelegy as yet and does not plan to, doing albuterol as needed although patient, denies any shortness of breath at rest or exertion.  -Currently saturating 92% on 2 L of oxygen via nasal cannula. -Recommend outpatient follow-up with pulmonology and smoking cessation    2.3 cm right perihilar mass as evidenced on CT imaging  -Planned for biopsy on 7/31 by thoracic surgeon.  -Continue to monitor    Stage II CKD  -Creatinine stable at 0.83  -Avoid nephrotoxic agents, hypotension  -Antihypertensives as above. Bipolar disease  -stable continue Lamictal 100 mg daily            Subjective/ HPI: Patient seen and examined. Reports pain in the left lower extremity around the stump site, but improving with current pain management. No complaints of chest pain, shortness of breath, palpitations, fevers chills nausea vomiting belly pain. Last bowel movement was yesterday. ROS:   A 10 point ROS was performed; negative except as noted above.           Social History:    Substance Use History:   Social History     Substance and Sexual Activity   Alcohol Use Not Currently   • Alcohol/week: 50.0 standard drinks of alcohol   • Types: 50 Cans of beer per week    Comment: has not had a drink in 243 days (7/18/23)  h/o at least 6 beers per day     Social History     Tobacco Use   Smoking Status Every Day   • Packs/day: 1.50   • Types: Cigarettes   • Start date: 5   Smokeless Tobacco Never     Social History     Substance and Sexual Activity   Drug Use Never       Family History:    Family History   Problem Relation Age of Onset   • Stomach cancer Mother          Review of Scheduled Meds:  Current Facility-Administered Medications   Medication Dose Route Frequency Provider Last Rate   • acetaminophen  975 mg Oral Q8H [address]  LEV Turner     • albuterol  2 puff Inhalation Q6H PRN LEV Turner     • amLODIPine  5 mg Oral Daily LEV Turner     • atorvastatin  40 mg Oral Daily With LEV Marcano     • bisacodyl  10 mg Rectal Daily PRN LEV Peña     • clopidogrel  75 mg Oral Daily LEV Turner     • diltiazem  120 mg Oral Daily LEV Turner     • docusate sodium  100 mg Oral BID LEV Turner     • ezetimibe  10 mg Oral Daily LEV Turner     • folic acid  1 mg Oral Daily LEV Turner     • gabapentin  300 mg Oral BID LEV Turner     • gabapentin  300 mg Oral HS LEV Turner     • heparin (porcine)  5,000 Units Subcutaneous Q8H [address]  LEV Turner     • isosorbide mononitrate  30 mg Oral QAM LEV Turner     • lamoTRIgine  100 mg Oral Daily LEV Turner     • lidocaine  2 patch Topical Daily LEV Turner     • lisinopril  10 mg Oral Daily LEV Turner     • melatonin  9 mg Oral HS PRN LEV Peña     • methocarbamol  500 mg Oral Q6H [address] Deya Johnson MD     • metoprolol succinate  50 mg Oral Daily LEV Turner     • nicotine  1 patch Transdermal Daily LEV Turner     • ondansetron  4 mg Oral Q6H PRN LEV Peña     • oxyCODONE  5 mg Oral Q4H PRN Deya Johnson MD      Or   • oxyCODONE  10 mg Oral Q4H PRN Deya Johnson MD     • polyethylene glycol  17 g Oral Daily LEV Turner     • senna  1 tablet Oral BID Deya Johnson MD     • traZODone  50 mg Oral HS PRN LEV Peña         Labs:     Results from last 7 days   Lab Units 07/24/23  0650 07/23/23  0459   WBC Thousand/uL 10.96* 13.11* HEMOGLOBIN g/dL 12.4 11.5   HEMATOCRIT % 38.5 35.4   PLATELETS Thousands/uL 187 167     Results from last 7 days   Lab Units 23  0616 23  0353   SODIUM mmol/L 139 136   POTASSIUM mmol/L 4.1 3.9   CHLORIDE mmol/L 110* 107   CO2 mmol/L 24 25   BUN mg/dL 9 11   CREATININE mg/dL 0.83 0.84   CALCIUM mg/dL 8.9 9.1                      No results found for: "BLOODCX", "Og Do", "WOUNDCULT", "SPUTUMCULTUR"    Input and Output Summary (last 24 hours): Intake/Output Summary (Last 24 hours) at 2023 1447  Last data filed at 2023 1235  Gross per 24 hour   Intake 780 ml   Output 1800 ml   Net -1020 ml       Imaging:     No orders to display       *Labs /Radiology studiesLabs reviewed  *Medications reviewed and reconciled as needed  *Please refer to order section for additional ordered labs studies  *Case discussed with primary attending during morning huddle case rounds    Vitals:   Temp (24hrs), Av.9 °F (36.6 °C), Min:97.7 °F (36.5 °C), Max:98.2 °F (36.8 °C)    Temp:  [97.7 °F (36.5 °C)-98.2 °F (36.8 °C)] 97.7 °F (36.5 °C)  HR:  [63-89] 65  Resp:  [16-18] 16  BP: (109-168)/(52-88) 109/52  SpO2:  [90 %-96 %] 94 %  Body mass index is 28 kg/m².      Physical Exam:   Constitutional-sitting comfortably on his recliner, not in any acute distress conversive  Head-normocephalic atraumatic  Mouth-mucous membranes moist  CVS-S1-S2 heard, no murmurs regular rhythm  RS-respiratory effort normal, normal breath sounds heard bilaterally  Abdomen-soft, no tenderness  Skin-warm and dry, no right lower leg edema  Musculoskeletal-left lower extremity AKA wound covered in Ace wrap, dressing dry and clean, media images reviewed wound appears to be healthy  Neurological-alert and oriented           Invasive Devices     Drain  Duration           External Urinary Catheter 4 days                 VTE Pharmacologic Prophylaxis: Heparin  Code Status: Level 1 - Full Code  Current Length of Stay: 1 day(s)    Total floor / unit time spent today 45 minutes with more than 50% spent counseling/coordinating care. Counseling includes discussion with patient re: progress  and discussion with patient of his/her current medical state/information. Coordination of patient's care was performed in conjunction with primary service. Time invested included review of patient's labs, vitals, and management of their comorbidities with continued monitoring. In addition, this patient was discussed with medical team including physician and advanced extenders. The care of the patient was extensively discussed and appropriate treatment plan was formulated unique for this patient by supervising physician unless stated otherwise in their attestation statement. ** Please Note: voice to text software may have been used in the creation of this document.  Audio transcription errors may occur**

## 2023-07-27 ENCOUNTER — TELEPHONE (OUTPATIENT)
Dept: SURGICAL ONCOLOGY | Facility: CLINIC | Age: 73
End: 2023-07-27

## 2023-07-27 LAB
ANION GAP SERPL CALCULATED.3IONS-SCNC: 6 MMOL/L
BASOPHILS # BLD AUTO: 0.09 THOUSANDS/ÂΜL (ref 0–0.1)
BASOPHILS NFR BLD AUTO: 1 % (ref 0–1)
BUN SERPL-MCNC: 13 MG/DL (ref 5–25)
CALCIUM SERPL-MCNC: 9.2 MG/DL (ref 8.3–10.1)
CHLORIDE SERPL-SCNC: 106 MMOL/L (ref 96–108)
CO2 SERPL-SCNC: 28 MMOL/L (ref 21–32)
CREAT SERPL-MCNC: 0.78 MG/DL (ref 0.6–1.3)
EOSINOPHIL # BLD AUTO: 0.67 THOUSAND/ÂΜL (ref 0–0.61)
EOSINOPHIL NFR BLD AUTO: 7 % (ref 0–6)
ERYTHROCYTE [DISTWIDTH] IN BLOOD BY AUTOMATED COUNT: 13.5 % (ref 11.6–15.1)
GFR SERPL CREATININE-BSD FRML MDRD: 76 ML/MIN/1.73SQ M
GLUCOSE SERPL-MCNC: 99 MG/DL (ref 65–140)
HCT VFR BLD AUTO: 40.6 % (ref 34.8–46.1)
HGB BLD-MCNC: 12.8 G/DL (ref 11.5–15.4)
IMM GRANULOCYTES # BLD AUTO: 0.06 THOUSAND/UL (ref 0–0.2)
IMM GRANULOCYTES NFR BLD AUTO: 1 % (ref 0–2)
LYMPHOCYTES # BLD AUTO: 2.08 THOUSANDS/ÂΜL (ref 0.6–4.47)
LYMPHOCYTES NFR BLD AUTO: 20 % (ref 14–44)
MCH RBC QN AUTO: 29.6 PG (ref 26.8–34.3)
MCHC RBC AUTO-ENTMCNC: 31.5 G/DL (ref 31.4–37.4)
MCV RBC AUTO: 94 FL (ref 82–98)
MONOCYTES # BLD AUTO: 0.88 THOUSAND/ÂΜL (ref 0.17–1.22)
MONOCYTES NFR BLD AUTO: 9 % (ref 4–12)
NEUTROPHILS # BLD AUTO: 6.6 THOUSANDS/ÂΜL (ref 1.85–7.62)
NEUTS SEG NFR BLD AUTO: 62 % (ref 43–75)
NRBC BLD AUTO-RTO: 0 /100 WBCS
PLATELET # BLD AUTO: 262 THOUSANDS/UL (ref 149–390)
PMV BLD AUTO: 10.3 FL (ref 8.9–12.7)
POTASSIUM SERPL-SCNC: 3.6 MMOL/L (ref 3.5–5.3)
RBC # BLD AUTO: 4.33 MILLION/UL (ref 3.81–5.12)
SODIUM SERPL-SCNC: 140 MMOL/L (ref 135–147)
WBC # BLD AUTO: 10.38 THOUSAND/UL (ref 4.31–10.16)

## 2023-07-27 PROCEDURE — 97110 THERAPEUTIC EXERCISES: CPT

## 2023-07-27 PROCEDURE — 97530 THERAPEUTIC ACTIVITIES: CPT

## 2023-07-27 PROCEDURE — 97535 SELF CARE MNGMENT TRAINING: CPT

## 2023-07-27 PROCEDURE — 85025 COMPLETE CBC W/AUTO DIFF WBC: CPT | Performed by: NURSE PRACTITIONER

## 2023-07-27 PROCEDURE — 99232 SBSQ HOSP IP/OBS MODERATE 35: CPT

## 2023-07-27 PROCEDURE — NC001 PR NO CHARGE: Performed by: SURGERY

## 2023-07-27 PROCEDURE — 80048 BASIC METABOLIC PNL TOTAL CA: CPT | Performed by: NURSE PRACTITIONER

## 2023-07-27 PROCEDURE — 99232 SBSQ HOSP IP/OBS MODERATE 35: CPT | Performed by: INTERNAL MEDICINE

## 2023-07-27 RX ADMIN — METOPROLOL SUCCINATE 50 MG: 50 TABLET, EXTENDED RELEASE ORAL at 09:06

## 2023-07-27 RX ADMIN — HEPARIN SODIUM 5000 UNITS: 5000 INJECTION INTRAVENOUS; SUBCUTANEOUS at 14:26

## 2023-07-27 RX ADMIN — HEPARIN SODIUM 5000 UNITS: 5000 INJECTION INTRAVENOUS; SUBCUTANEOUS at 21:22

## 2023-07-27 RX ADMIN — OXYCODONE HYDROCHLORIDE 5 MG: 5 TABLET ORAL at 14:29

## 2023-07-27 RX ADMIN — GABAPENTIN 300 MG: 300 CAPSULE ORAL at 09:05

## 2023-07-27 RX ADMIN — ACETAMINOPHEN 975 MG: 325 TABLET, FILM COATED ORAL at 14:26

## 2023-07-27 RX ADMIN — GABAPENTIN 400 MG: 400 CAPSULE ORAL at 21:22

## 2023-07-27 RX ADMIN — ISOSORBIDE MONONITRATE 30 MG: 30 TABLET, EXTENDED RELEASE ORAL at 09:06

## 2023-07-27 RX ADMIN — METHOCARBAMOL 500 MG: 500 TABLET ORAL at 05:42

## 2023-07-27 RX ADMIN — METHOCARBAMOL 500 MG: 500 TABLET ORAL at 11:28

## 2023-07-27 RX ADMIN — DOCUSATE SODIUM 100 MG: 100 CAPSULE ORAL at 17:22

## 2023-07-27 RX ADMIN — CLOPIDOGREL BISULFATE 75 MG: 75 TABLET ORAL at 09:06

## 2023-07-27 RX ADMIN — FOLIC ACID 1 MG: 1 TABLET ORAL at 09:06

## 2023-07-27 RX ADMIN — OXYCODONE HYDROCHLORIDE 5 MG: 5 TABLET ORAL at 05:46

## 2023-07-27 RX ADMIN — AMLODIPINE BESYLATE 5 MG: 5 TABLET ORAL at 21:21

## 2023-07-27 RX ADMIN — METHOCARBAMOL 500 MG: 500 TABLET ORAL at 17:22

## 2023-07-27 RX ADMIN — SENNOSIDES 8.6 MG: 8.6 TABLET, FILM COATED ORAL at 17:22

## 2023-07-27 RX ADMIN — HEPARIN SODIUM 5000 UNITS: 5000 INJECTION INTRAVENOUS; SUBCUTANEOUS at 05:42

## 2023-07-27 RX ADMIN — DILTIAZEM HYDROCHLORIDE 120 MG: 60 TABLET, FILM COATED ORAL at 09:06

## 2023-07-27 RX ADMIN — NICOTINE 1 PATCH: 21 PATCH, EXTENDED RELEASE TRANSDERMAL at 09:06

## 2023-07-27 RX ADMIN — EZETIMIBE 10 MG: 10 TABLET ORAL at 09:06

## 2023-07-27 RX ADMIN — LAMOTRIGINE 100 MG: 100 TABLET ORAL at 21:27

## 2023-07-27 RX ADMIN — LISINOPRIL 10 MG: 10 TABLET ORAL at 09:06

## 2023-07-27 RX ADMIN — ACETAMINOPHEN 975 MG: 325 TABLET, FILM COATED ORAL at 21:22

## 2023-07-27 RX ADMIN — OXYCODONE HYDROCHLORIDE 5 MG: 5 TABLET ORAL at 10:17

## 2023-07-27 RX ADMIN — ATORVASTATIN CALCIUM 40 MG: 40 TABLET, FILM COATED ORAL at 17:23

## 2023-07-27 RX ADMIN — ACETAMINOPHEN 975 MG: 325 TABLET, FILM COATED ORAL at 05:42

## 2023-07-27 RX ADMIN — DOCUSATE SODIUM 100 MG: 100 CAPSULE ORAL at 09:06

## 2023-07-27 RX ADMIN — GABAPENTIN 300 MG: 300 CAPSULE ORAL at 17:22

## 2023-07-27 NOTE — PROGRESS NOTES
07/27/23 1000   Pain Assessment   Pain Score 6   Pain Location/Orientation Orientation: Bilateral;Orientation: Lower; Location: Back  (L hip and residual limb)   Hospital Pain Intervention(s) Repositioned; Rest  (nurse made aware)   Restrictions/Precautions   Precautions Fall Risk;Pain;Bed/chair alarms;O2  (on RA most of session)   LLE Weight Bearing Per Order NWB   Cognition   Overall Cognitive Status WFL   Following Commands Follows multistep commands with increased time or repetition   Subjective   Subjective Complains of pain as above otherwise no new complaints.  Initially on 2L O2 but wanted to have it off before session started with Spo2 at 92%   Roll Left and Right   Type of Assistance Needed Supervision   Comment on flat surface, used edge of mat to help her roll   Roll Left and Right CARE Score 4   Sit to Lying   Type of Assistance Needed Physical assistance   Physical Assistance Level 26%-50%   Comment assist/supported L LE   Sit to Lying CARE Score 3   Lying to Sitting on Side of Bed   Type of Assistance Needed Physical assistance   Physical Assistance Level 26%-50%   Comment assist/supported L LE   Lying to Sitting on Side of Bed CARE Score 3   Sit to Stand   Type of Assistance Needed Physical assistance   Physical Assistance Level 51%-75%   Comment mod A using armrest and // bars   Sit to Stand CARE Score 2   Bed-Chair Transfer   Type of Assistance Needed Physical assistance   Physical Assistance Level 51%-75%   Comment mod A modified SPT, min A to the R, mod A to the L   Chair/Bed-to-Chair Transfer CARE Score 2   Transfer Bed/Chair/Wheelchair   Adaptive Equipment Parrallel Bars;None   Walk 10 Feet   Comment to preserve R limb integrity   Reason if not Attempted Safety concerns   Walk 10 Feet CARE Score 88   Walk 50 Feet with Two Turns   Reason if not Attempted Safety concerns   Walk 50 Feet with Two Turns CARE Score 88   Walk 150 Feet   Reason if not Attempted Safety concerns   Walk 150 Feet CARE Score 88   Ambulation   Primary Mode of Locomotion Prior to Admission Walk   Distance Walked (feet) 5 ft   Assist Device Parallel Bars   Gait Pattern Hopping   Limitations Noted In Balance; Endurance   Walk Assist Level Minimum Assist;Chair Follow   Does the patient walk? 2. Yes   Wheel 50 Feet with Two Turns   Type of Assistance Needed Supervision;Verbal cues   Comment verbal cues for turns   Wheel 50 Feet with Two Turns CARE Score 4   Wheel 150 Feet   Type of Assistance Needed Supervision;Verbal cues   Comment verbal cues for turns   Wheel 150 Feet CARE Score 4   Wheelchair mobility   Does the patient use a wheelchair? 1. Yes   Type of Wheelchair Used 1. Manual   Method Right upper extremity; Left upper extremity;Right lower extremity   Assistance Provided For Remove armrests;Replace armrests   Distance Level Surface (feet) 150 ft   Curb or Single Stair   Style negotiated Curb   Type of Assistance Needed Physical assistance   Physical Assistance Level Total assistance   Comment at // bars, hopping backwards into 4" step box X 2 reps   1 Step (Curb) CARE Score 1   4 Steps   Reason if not Attempted Safety concerns   4 Steps CARE Score 88   12 Steps   Reason if not Attempted Safety concerns   12 Steps CARE Score 88   Stairs   Type Curb  (4" step box)   # of Steps 2   Weight Bearing Precautions LLE;NWB   Assist Devices Parallel Bars   Toilet Transfer   Type of Assistance Needed Physical assistance   Physical Assistance Level 51%-75%   Comment modified SPT using grab bar   Toilet Transfer CARE Score 2   Toilet Transfer   Surface Assessed   (commode over toilet)   Adaptive Equipment Grab Bar   Therapeutic Interventions   Strengthening while in prone: gluteal squeeze, L hip extension AAROM, supine L SLR, post pelvic tilt, sitting on EOB forward flexion and [place] twist while holding 6# weighted ball for core strengthening   Flexibility Prone lying X 8 mins total   Equipment Use   NuStphylicia unable to trial today due to residual wrap needed to be re wrap. please trial tomorrow for cardiopulmonary conditioning/ endurance   Other Comments   Comments Re wrapped residual limb while pt. was in toilet. only applied ABD pad and kerlix as per nursing to not apply ace wrap for now as incision is looking somewhat red. Assessment   Treatment Assessment Pt. engaged in 90 mins session and tolerated session well. PT focused on improving hip flexibility, B LE strength , transfers SPT without AD , standing and very short distance hopping. Pt. was on RA the whole session and SpO2 were >90% with no c/o being breathless. Pt. demonstrates improved functional transfers without SB but will cont to benefit from continued practice and strengthening to inc independence and safety. No complaints reported by pt. COnt with POC as tolerated. Problem List Decreased strength;Decreased endurance; Impaired balance;Decreased mobility;Pain;Orthopedic restrictions   Barriers to Discharge Decreased caregiver support; Inaccessible home environment   PT Barriers   Functional Limitation Car transfers;Stair negotiation;Standing;Transfers; Walking; Wheelchair management   Plan   Treatment/Interventions Functional transfer training;LE strengthening/ROM; Elevations; Therapeutic exercise; Endurance training;Patient/family training;Equipment eval/education; Bed mobility;Gait training   Recommendation   PT Discharge Recommendation Home with home health rehabilitation   Equipment Recommended Wheelchair   PT Therapy Minutes   PT Time In 1000   PT Time Out 1130   PT Total Time (minutes) 90   PT Mode of treatment - Individual (minutes) 90   PT Mode of treatment - Concurrent (minutes) 0   PT Mode of treatment - Group (minutes) 0   PT Mode of treatment - Co-treat (minutes) 0   PT Mode of Treatment - Total time(minutes) 90 minutes   PT Cumulative Minutes 180   Therapy Time missed   Time missed?  No

## 2023-07-27 NOTE — PROGRESS NOTES
Internal Medicine Progress Note  Patient: Jaret Degroot  Age/sex: 68 y.o. female  Medical Record #: [id number]      ASSESSMENT/PLAN: (Interval History)  Jaret Degroot is seen and examined and management for following issues:    S/p left AKA  -Continue medical optimization with Plavix, ezetimibe and statin   -Home Crestor 20 mg daily, receiving atorvastatin 40 mg daily  -LLE wound healthy, wound care  -Pain management as per primary     Coronary artery disease  -S/p CABG in 2000  -Currently denies any angina or shortness of breath. -TTE on 1/26/2023 with EF of 60%, mild to moderate AI, mild AS, estimated PA pressure of 40 mmHg  -Nuclear pharmacological stress test on 1/26/2023 with anterior wall perfusion defect likely artifact/cannot exclude small area of ischemia, normal LV systolic function.  -Continue Plavix, statin, ezetimibe, Imdur, diltiazem, Toprol-XL     Hypertension  -Blood pressure stable as per BP log review  -Home: Diltiazem 120 mg p.o. daily, amlodipine 5 mg daily, Imdur 30 mg p.o. daily, lisinopril 10 mg daily, Toprol-XL 50 mg p.o. daily  -Continue medications as above here. Hyperlipidemia  -11/14/2022 LDL of 101 not at goal  -Continue atorvastatin and Zetia here  -Follow-up with outpatient cardiology for possible Repatha     Paroxysmal atrial fibrillation  -Currently in sinus rhythm  -S/p a 2-week Zio patch in March 2023 with 5 episodes of SVT without other significant arrhythmia  -Continue diltiazem 120 mg daily, Toprol XL 50 mg daily     DVT  - lower extremity DVT,S/p IVC filter  -DVT prophylaxis with subcutaneous heparin  -Continue to monitor     Tobacco use  -Continues to smoke 1 to 1.5 packs/day  -Did not tolerate Wellbutrin/Chantix in the past  -Counseled for smoking cessation-  -Nicotine patch offered here.  -Patient not ready to quit as yet. COPD  -CTL with evidence of emphysema  -PFT in June 2023:  Moderate obstruction, no reversibility with bronchodilator   -Was recommended Trelegy and rescue inhaler, has not initiated Trelegy as yet and does not plan to, doing albuterol as needed although patient, denies any shortness of breath at rest or exertion.  -Currently saturating 92% on 2 L of oxygen via nasal cannula. Desats into the mid 80s off O2.  -Recommend outpatient follow-up with pulmonology and smoking cessation     2.3 cm right perihilar mass as evidenced on CT imaging  -Planned for biopsy on 7/31 by thoracic surgeon.  -Continue to monitor     Stage II CKD  -Creatinine stable at 0.83  -Avoid nephrotoxic agents, hypotension  -Antihypertensives as above. Bipolar disease  -stable continue Lamictal 100 mg daily    The above assessment and plan was reviewed and updated as determined by my evaluation of the patient on 7/27/2023.     Labs:   Results from last 7 days   Lab Units 07/27/23  0606 07/24/23  0650   WBC Thousand/uL 10.38* 10.96*   HEMOGLOBIN g/dL 12.8 12.4   HEMATOCRIT % 40.6 38.5   PLATELETS Thousands/uL 262 187     Results from last 7 days   Lab Units 07/27/23  0606 07/22/23  0616   SODIUM mmol/L 140 139   POTASSIUM mmol/L 3.6 4.1   CHLORIDE mmol/L 106 110*   CO2 mmol/L 28 24   BUN mg/dL 13 9   CREATININE mg/dL 0.78 0.83   CALCIUM mg/dL 9.2 8.9                   Review of Scheduled Meds:  Current Facility-Administered Medications   Medication Dose Route Frequency Provider Last Rate   • acetaminophen  975 mg Oral Q8H [address] LEV Turner     • albuterol  2 puff Inhalation Q6H PRN LEV Turner     • amLODIPine  5 mg Oral Daily LEV Turner     • atorvastatin  40 mg Oral Daily With LEV Marcano     • bisacodyl  10 mg Rectal Daily PRN LEV Fu     • clopidogrel  75 mg Oral Daily LEV Turner     • diltiazem  120 mg Oral Daily LEV Turner     • docusate sodium  100 mg Oral BID LEV Turner     • ezetimibe  10 mg Oral Daily LEV Turner     • folic acid  1 mg Oral Daily LEV Turner     • gabapentin  300 mg Oral BID LEV Tong     • gabapentin  400 mg Oral HS Dolly Bro MD     • heparin (porcine)  5,000 Units Subcutaneous [facility] LEV Turner     • isosorbide mononitrate  30 mg Oral QAM LEV Turner     • lamoTRIgine  100 mg Oral Daily LEV Turner     • lidocaine  2 patch Topical Daily LEV Turner     • lisinopril  10 mg Oral Daily LEV Turner     • melatonin  9 mg Oral HS PRN LEV Tong     • methocarbamol  500 mg Oral Q6H [address] Dolly Bro MD     • metoprolol succinate  50 mg Oral Daily LEV Turner     • nicotine  1 patch Transdermal Daily LEV Turner     • ondansetron  4 mg Oral Q6H PRN LEV Tong     • oxyCODONE  5 mg Oral Q4H PRN Dolly Bro MD      Or   • oxyCODONE  10 mg Oral Q4H PRN Dolly Bro MD     • polyethylene glycol  17 g Oral Daily LEV Turner     • senna  1 tablet Oral BID Dolly Bro MD     • traZODone  50 mg Oral HS PRN LEV Tong         Subjective/ HPI: Patient seen and examined. Patients overnight issues or events were reviewed with nursing or staff during rounds or morning huddle session. New or overnight issues include the following:     Pt seen in her room. She states that she is doing well and denies any current complaints. ROS:   A 10 point ROS was performed; negative except as noted above.        Imaging:     No orders to display       *Labs /Radiology studies Reviewed  *Medications  reviewed and reconciled as needed  *Please refer to order section for additional ordered labs studies  *Case discussed with primary attending during morning huddle case rounds    Physical Examination:  Vitals:   Vitals:    07/27/23 0100 07/27/23 0500 07/27/23 0745 07/27/23 0748   BP:  128/60     BP Location:  Right arm     Pulse:  66     Resp:  16     Temp:  97.6 °F (36.4 °C)     TempSrc:  Oral     SpO2: 93% 94% (!) 86% 92%   Weight:       Height:         General Appearance: no distress, conversive  HEENT: PERRLA, conjuctiva normal; oropharynx clear; mucous membranes moist;   Neck:  Supple, no lymphadenopathy or thyromegaly  Lungs: CTA, normal respiratory effort, no retractions, expiratory effort normal  CV: regular rate and rhythm , PMI normal   ABD: soft non tender, no masses , no hepatic or splenomegaly  EXT: Lt AKA dressed  Skin: normal turgor, normal texture, no rash  Psych: affect normal, mood normal  Neuro: AAOx3      The above physical exam was reviewed and updated as determined by my evaluation of the patient on 7/27/2023. Invasive Devices     Drain  Duration           External Urinary Catheter 5 days                   VTE Pharmacologic Prophylaxis: Heparin  Code Status: Level 1 - Full Code  Current Length of Stay: 2 day(s)      Total time spent:  30 minutes with more than 50% spent counseling/coordinating care. Counseling includes discussion with patient re: progress  and discussion with patient of his/her current medical state/information. Coordination of patient's care was performed in conjunction with primary service. Time invested included review of patient's labs, vitals, and management of their comorbidities with continued monitoring. In addition, this patient was discussed with medical team including physician and advanced extenders. The care of the patient was extensively discussed and appropriate treatment plan was formulated unique for this patient. Medical decision making for the day was made by supervising physician unless otherwise noted in their attestation statement. ** Please Note:  voice to text software may have been used in the creation of this document.  Although proof errors in transcription or interpretation are a potential of such software**

## 2023-07-27 NOTE — CONSULTS
Consult Note - Vascular Surgery  Bea Gardner 68 y.o. female MRN: [id number]  Unit/Bed#: -01 Encounter: [id number]    Assessment:  68 y.o. female now 6 days s/p left above knee amputation. Plan:  - Diet Regular; Regular House; Sodium 4 GM (SARTHAK), Lo Cholesterol  - Pain and Nausea control PRN  - Incentive spirometry  - OOB, ambulate as tolerated  - L AKA incision site remains stable at this time, no concern for acute infection. Vascular surgery signing off at this time. Please do not hesitate to reach out if needed. - Continue statin/plavix. - Rest of care per primary team.    Subjective/Objective     Subjective: Patient is awake, alert, no acute distress. Resting comfortably in bed. She states that her L AKA bandages came off during therapy session. Reports pain to the incision upon palpation. No other complaints. Objective:   Vitals: Blood pressure 128/60, pulse 66, temperature 97.6 °F (36.4 °C), temperature source Oral, resp. rate 16, height 5' 4" (1.626 m), weight 74 kg (163 lb 2.3 oz), SpO2 90 %. ,Body mass index is 28 kg/m². I/O       07/25 0701  07/26 0700 07/26 0701  07/27 0700 07/27 0701  07/28 0700    P. O.  780     Total Intake(mL/kg)  780 (10.5)     Urine (mL/kg/hr) 1400 1695 (1)     Total Output 1400 1695     Net -1400 -915            Unmeasured Urine Occurrence 1 x 1 x           Physical Exam:  Gen: NAD, Aox3, Comfortable in bed  Chest: Normal work of breathing, no respiratory distress  Abd: Soft, ND, NT. Ext: No Edema, 5/5 MMT, L AKA  Skin: Warm, Dry, Intact. L AKA incision no dehiscence, no drainage, mild latia-incisional erythema, no fluctuance, mild ecchymosis plantarly, CRT WNL. Lab, Imaging and other studies: I have personally reviewed pertinent reports.     VTE Pharmacologic Prophylaxis: Heparin  VTE Mechanical Prophylaxis: sequential compression device        Iliana Rivera DPM  7/27/2023  1:49 PM

## 2023-07-27 NOTE — PROGRESS NOTES
Occupational Therapy Treatment Note         07/27/23 0700   Pain Assessment   Pain Assessment Tool 0-10   Pain Score 5   Pain Location/Orientation Orientation: Left; Location: Leg;Location: Back;Orientation: Lower   Hospital Pain Intervention(s) Repositioned;Relaxation technique   Restrictions/Precautions   Precautions Bed/chair alarms; Fall Risk;O2;Supervision on toilet/commode   LLE Weight Bearing Per Order NWB   Lifestyle   Autonomy "My son will build me a ramp"   Eating   Type of Assistance Needed Independent   Physical Assistance Level No physical assistance   Eating CARE Score 6   Oral Hygiene   Type of Assistance Needed Physical assistance   Physical Assistance Level Total assistance   Comment baseline standing. supervision seated in w/c at sink   Oral Hygiene CARE Score 1   Shower/Bathe Self   Type of Assistance Needed Physical assistance   Physical Assistance Level Total assistance   Comment baseline seated/standing. Completes supine/EOB with supervision and verbal cues   Shower/Bathe Self CARE Score 1   Tub/Shower Transfer   Reason Not Assessed Sponge Bath;Medical   Upper Body Dressing   Type of Assistance Needed Physical assistance   Physical Assistance Level Total assistance   Comment if baseline   Upper Body Dressing CARE Score 1   Lower Body Dressing   Type of Assistance Needed Physical assistance   Physical Assistance Level Total assistance   Comment if baseline.  supine in bed requires assist for residual limb wrapping and thread/unthread of residual limb   Lower Body Dressing CARE Score 1   Putting On/Taking Off Footwear   Type of Assistance Needed Physical assistance   Physical Assistance Level 25% or less   Comment seated at EOB   Putting On/Taking Off Footwear CARE Score 3   Lying to Sitting on Side of Bed   Type of Assistance Needed Physical assistance   Physical Assistance Level 25% or less   Lying to Sitting on Side of Bed CARE Score 3   Sit to Stand   Type of Assistance Needed Physical assistance   Physical Assistance Level 51%-75%   Comment assist x1 in front   Sit to Stand CARE Score 2   Bed-Chair Transfer   Type of Assistance Needed Physical assistance   Physical Assistance Level Total assistance   Comment if baseline of standing. modified stand pivot transfer to/from w/c using hospital bed rail and w/c, mod assist x1   Chair/Bed-to-Chair Transfer CARE Score 1   Toileting Hygiene   Type of Assistance Needed Physical assistance   Physical Assistance Level Total assistance   Comment recommend assist x2 with BSC. assist x1 stand pivot, 2nd person for clothing management   Toileting Hygiene CARE Score 1   Toilet Transfer   Type of Assistance Needed Physical assistance   Physical Assistance Level Total assistance   Comment if baseline total assist. mod assist x1 stand pivot with drop arm BSC   Toilet Transfer CARE Score 1   Cognition   Overall Cognitive Status WFL   Following Commands Follows one step commands without difficulty   Comments WFL for basic tasks. will need repetitive training on new learning   Activity Tolerance   Activity Tolerance Patient tolerated treatment well   Assessment   Treatment Assessment Pt participated in skilled OT tx session. See above for further details on functional performance. Completed modified stand pivot transfers today instead of sliding board, pt able to complete with mod assist x1 when surfaces have B/L UE support (bed rail, commode arm, w/c arm). Pt will benefit from repetitive training of new learning and anxious behaviors are noted. After 5 minutes room air with activity pt's 02 86%, on 2 l stays between 91-92%. RN present to assess incision. Loosely applied kerlix/ace wrap for skin integrity and pt reporting discomfort when tight. Educated pt on wrapping around waist but will trial over the next few days as pt is still getting used to ace wrap on residual limb.  Pt will continue to benefit from skilled OT intervention to address phase 1 amputtee education, modified stand pivot transfers, UE strengthening in order to maximize functional independence in ADLS, functional mobility/transfers, while decreasing burden of care. Pt left positioned in w/c with call bell in reach and chair alarm on. Pt has list of pictures/measurements to be obtained, reports she will give to her son in next 1-2 days. Of note for amputee [facility] website/brochure pt reports her email does not work, will need to speak with family regarding using theirs to sign up for booklet. Prognosis Good   Problem List Decreased strength;Decreased endurance; Impaired balance;Decreased mobility;Pain;Decreased skin integrity   Barriers to Discharge Decreased caregiver support; Inaccessible home environment   Plan   Treatment/Interventions ADL retraining;Functional transfer training; Therapeutic exercise; Endurance training;Patient/family training;Equipment eval/education; Bed mobility; Compensatory technique education   Progress Progressing toward goals   Recommendation   OT Discharge Recommendation   (pending progress)   OT Therapy Minutes   OT Time In 0700   OT Time Out 0830   OT Total Time (minutes) 90   OT Mode of treatment - Individual (minutes) 90   OT Mode of treatment - Concurrent (minutes) 0   OT Mode of treatment - Group (minutes) 0   OT Mode of treatment - Co-treat (minutes) 0   OT Mode of Treatment - Total time(minutes) 90 minutes   OT Cumulative Minutes 180   Therapy Time missed   Time missed?  No

## 2023-07-27 NOTE — WOUND OSTOMY CARE
Consult Note - Wound   Nedra Escort 68 y.o. female MRN: [id number]  Unit/Bed#: [facility] 457-01 Encounter: [id number]        History and Present Illness:  Patient is a 69 yo female that was admitted to [address] [place] [address] for rehab post left AKA. Patient has a PMH of CAD, hypertension, CABG, vascular bypass surgeries, PAD, aortoiliac occlusive disease, tobacco abuse (1 ppd x 60+yrs), PAF/SVT (no AC), LLE DVT s/p IVC filter, RUL lung mass, stage III CKD. Patient is a min/mod assist for turning and repositioning. Patient is continent of bowel and bladder. On assessment, patient is lying on regular mattress. Wound Care was consulted for thigh abrasion. Assessment Findings:   Right Heel dry intact and jessica with no skin loss or wounds present. Recommend preventative Hydraguard Cream and proper offloading/ repositioning. 1. B/L sacro-buttocks is dry, intact, pink in color and blanches. Scattered scabs noted. No skin loss or wounds present. Recommend preventative hydragaurd to area. 2. POA Left Medial Thigh DTPI: linear in shape area of dark purple, non-blanchable tissue. Likely from ace wrap. No skin loss or drainage noted. Julia-wound is intact. Recommend allevyn foam dressing to area- foam applied. Deep Tissue Pressure Injury wounds have the potential to evolve into unstageable, stage III or stage IV wounds. No induration, fluctuance, odor, warmth/temperature differences, redness, or purulence noted to the above noted wounds and skin areas assessed. New dressings applied per orders listed below. Patient tolerated well- no s/s of non-verbal pain or discomfort observed during the encounter. Bedside nurse aware of plan of care. See flow sheets for more detailed assessment findings. Orders listed below and wound care will continue to follow, call or tiger text with questions. Skin Care Plan:  1-Left Medial Thigh Wound: Cleanse with NS and pat dry. Apply small clover allevyn foam dressing to area.  Meagan Holman with T for Treatment and change every other day or PRN soilage/ displacement. 2-Turn/reposition q2h or when medically stable for pressure re-distribution on skin . 3-Elevate left heel to offload pressure. 4-Moisturize skin daily with skin nourishing cream  5-Ehob cushion in chair when out of bed. 6-Preventative Hydraguard to right heel and b/l sacro-buttocks BID and PRN. Wounds:  Wound 07/26/23 Pressure Injury Thigh Left;Medial (Active)   Wound Image   07/27/23 1422   Wound Description Light purple;Non-blanchable erythema 07/27/23 1600   Pressure Injury Stage DTPI 07/27/23 1600   Julia-wound Assessment Intact 07/27/23 1600   Wound Length (cm) 1 cm 07/27/23 1422   Wound Width (cm) 5 cm 07/27/23 1422   Wound Depth (cm) 0 cm 07/27/23 1422   Wound Surface Area (cm^2) 5 cm^2 07/27/23 1422   Wound Volume (cm^3) 0 cm^3 07/27/23 1422   Calculated Wound Volume (cm^3) 0 cm^3 07/27/23 1422   Wound Site Closure MORENO 07/27/23 1600   Drainage Amount None 07/27/23 1600   Non-staged Wound Description Not applicable 99/97/69 4504   Treatments Cleansed;Site care 07/27/23 1600   Dressing Foam, Silicon (eg. Allevyn, etc) 07/27/23 1600   Dressing Changed New 07/27/23 1600   Patient Tolerance Tolerated well 07/27/23 1600   Dressing Status Clean;Dry; Intact 07/27/23 1600       Wound 07/26/23 Buttocks Left (Active)   Wound Image   07/27/23 1424   Wound Description Intact 07/27/23 1600   Julia-wound Assessment Intact 07/27/23 1600   Wound Length (cm) 0 cm 07/27/23 1600   Wound Width (cm) 0 cm 07/27/23 1600   Wound Depth (cm) 0 cm 07/27/23 1600   Wound Surface Area (cm^2) 0 cm^2 07/27/23 1600   Wound Volume (cm^3) 0 cm^3 07/27/23 1600   Calculated Wound Volume (cm^3) 0 cm^3 07/27/23 1600   Wound Site Closure MORENO 07/27/23 1600   Drainage Amount None 07/27/23 1600   Non-staged Wound Description Not applicable 24/61/91 2568   Treatments Cleansed;Site care 07/27/23 1600   Dressing Moisture barrier 07/27/23 1600   Dressing Changed New 07/27/23 1600   Patient Tolerance Tolerated well 07/27/23 1600   Dressing Status Clean;Dry; Intact 07/27/23 1124 ANDRES Mcknight, Zhang [employer] Derrick

## 2023-07-27 NOTE — NURSING NOTE
Pt awake w lights on. Denies pain at present. Ace wrap off limb-- redressed. Pulse ox on ra 87%.   O2 2l nc applied and increased to 93%

## 2023-07-27 NOTE — DISCHARGE INSTR - OTHER ORDERS
Skin Care Plan:  1-Left Medial Thigh Wound: Cleanse with NS and pat dry. Apply small clover allevyn foam dressing to area. Ricky with T for Treatment and change every other day or PRN soilage/ displacement. 2-Turn/reposition q2h or when medically stable for pressure re-distribution on skin . 3-Elevate left heel to offload pressure. 4-Moisturize skin daily with skin nourishing cream  5-Ehob cushion in chair when out of bed. 6-Preventative Hydraguard to right heel and b/l sacro-buttocks BID and PRN.

## 2023-07-27 NOTE — TELEPHONE ENCOUNTER
I called and spoke with patients son, Alysha Leon, in regards to rescheduling her procedure with Dr Elli Kimbrough on Monday due to patient being in rehab. Joanne Delores know Im going to move patients procedure to 8/14/23 and if she is still in rehab than we can discuss a new date at that time. Alysha Leon agreed and had no further questions at this time.

## 2023-07-27 NOTE — PROGRESS NOTES
Physical Therapy Initial Evaluation       07/26/23 1000   Patient Data   Rehab Impairment Impairment of mobility; ADLs and Safety due to Amputation   Etiologic Diagnosis Left Lower extremity Ischemia s/p L AKA   Date of Onset 07/18/23   Support System   Name Bhavna Fields son   Able to provide 24 hour supervision No  (But DIL works from home)   Able to provide physical help? Yes   Home Setup   Type of Home Multi Level   Method of Entry Stairs   Number of Stairs 2   First Floor Setup Available Yes   Available Equipment Single Point Cane;Roller [address]   Baseline Information   Transportation    Prior Device(s) Used Single [place]   Prior Level of Function   Self-Care 3. Independent - Patient completed the activities by him/herself, with or without an assistive device, with no assistance from a helper. Indoor-Mobility (Ambulation) 3. Independent - Patient completed the activities by him/herself, with or without an assistive device, with no assistance from a helper. Stairs 3. Independent - Patient completed the activities by him/herself, with or without an assistive device, with no assistance from a helper. Functional Cognition 3. Independent - Patient completed the activities by him/herself, with or without an assistive device, with no assistance from a helper. Prior Device Used Z. None of the above   Falls in the Last Year   Number of falls in the past 12 months 0   Psychosocial   Psychosocial (WDL) WDL   Restrictions/Precautions   Precautions Bed/chair alarms; Fall Risk;Supervision on toilet/commode   LLE Weight Bearing Per Order NWB   Pain Assessment   Pain Assessment Tool 0-10   Pain Score 5   Pain Location/Orientation Orientation: Left; Location: Leg  (and phantom pain)   Putting On/Taking Off Footwear   Type of Assistance Needed Set-up / clean-up   Comment able to todd sneaker for right foot seated in w/c   Putting On/Taking Off Footwear CARE Score 5   [address], [address] Type of Assistance Needed Physical assistance   Physical Assistance Level Total assistance   Comment pt abl et manage clothing down seated on commode, clean herself but required 2 person A for clothing up for safety   Toileting Hygiene CARE Score 1   Toilet Transfer   Surface Assessed Drop Arm Commode   Transfer Technique Slide Board   Limitations Noted In Confidence;Problem Solving;UE Strength;LE Strength   Positioning Concerns Safety   Findings forgets at times that left leg in no longer there   Type of Assistance Needed Physical assistance   Physical Assistance Level 51%-75%   Comment slide board, recommend 2 people for safety with clothing assist needed   Toilet Transfer CARE Score 2   Transfer Bed/Chair/Wheelchair   Positioning Concerns Skin Integrity   Limitations Noted In LE Strength;Problem Solving   Adaptive Equipment Other  (slide board)   Findings practiced transfers with slide board mainly for safety Min A and VCs, Stand pivot with B UE contact on railings Mod A   Type of Assistance Needed Physical assistance; Adaptive equipment   Physical Assistance Level 26%-50%   Comment Slide board   Chair/Bed-to-Chair Transfer CARE Score 3   Roll Left and Right   Type of Assistance Needed Supervision;Verbal cues   Comment Attention to residual limb   Roll Left and Right CARE Score 4   Sit to Lying   Type of Assistance Needed Supervision;Verbal cues   Comment Attention to residual limb   Sit to Lying CARE Score 4   Lying to Sitting on Side of Bed   Type of Assistance Needed Supervision;Verbal cues   Comment Attention to residual limb   Lying to Sitting on Side of Bed CARE Score 4   Sit to Stand   Type of Assistance Needed Physical assistance   Physical Assistance Level 51%-75%   Comment Pt fearful, requires B UE support for safety, forgetful of no longer having left foot on ground at times   Sit to Stand CARE Score 2   Picking Up Object   Reason if not Attempted Safety concerns   Picking Up Object CARE Score 88 Car Transfer   Type of Assistance Needed Physical assistance; Adaptive equipment;Verbal cues   Physical Assistance Level 51%-75%   Comment slide board transfer, has very good potential for SPT w/ RW or no device   Car Transfer CARE Score 2   Ambulation   Primary Mode of Locomotion Prior to Admission Walk   Findings will need to build up strength of R LE and B UE to safely work on hopping in future   Walk 10 Feet   Reason if not Attempted Safety concerns   Walk 10 Feet CARE Score 88   Walk 50 Feet with Two Turns   Reason if not Attempted Safety concerns   Walk 50 Feet with Two Turns CARE Score 88   Walk 150 Feet   Reason if not Attempted Safety concerns   Walk 150 Feet CARE Score 88   Walking 10 Feet on Uneven Surfaces   Reason if not Attempted Safety concerns   Walking 10 Feet on Uneven Surfaces CARE Score 88   Wheelchair mobility   Type of Wheelchair Used 1. Manual   Wheel 50 Feet with Two Turns   Type of Assistance Needed Verbal cues; Supervision   Physical Assistance Level No physical assistance   Comment using B UE and R LE, sneaker on   Wheel 50 Feet with Two Turns CARE Score 4   Wheel 150 Feet   Type of Assistance Needed Verbal cues; Supervision   Physical Assistance Level No physical assistance   Comment using B UE and R LE, sneaker on   Wheel 150 Feet CARE Score 4   Curb or Single Stair   Comment (S)  plan to work on hopping up 1 6" step bkwd with RW in front   Reason if not Attempted Safety concerns   1 Step (Curb) CARE Score 88   4 Steps   Reason if not Attempted Safety concerns   4 Steps CARE Score 88   12 Steps   Reason if not Attempted Safety concerns   12 Steps CARE Score 88   Comprehension   QI: Comprehension 4. [place]: Clear comprehension without cues or repetitions   Expression   QI: Expression 4.  Express complex messages without difficulty and with speech that is clear and easy to [place]   RLE Assessment   RLE Assessment WFL  (grossly 4-/5)   LLE Assessment   LLE Assessment   (Hip grossly 2+/5)   RUE Assessment   RUE Assessment WFL  (grossly 4/5 strength)   LUE Assessment   LUE Assessment WFL  (grossly 4/5 strength)   Coordination   Movements are Fluid and Coordinated 1   Sensation   Light Touch No apparent deficits   Cognition   Overall Cognitive Status WFL   Comments pt reports be ing anxious and overwhelmed currently   Therapeutic Exercise   Therapeutic Exercise/Activity Practiced various transfers. Residual limb re-wrapped during session. Handout given of list of home setup for family to provide pictures and measurements   Discharge Information   Patient's Discharge Plan to return home with family   Patient's Rehab Expectations To get stronger and be able to move around on her own   Barriers to Discharge Home Decreased Strength;Decreased Endurance;Pain; Safety Considerations;Limited Family Support   Impressions Pt is a 68 y.o. female who was admitted to [address] [place] [address] on 7/25/2023. Pt's current problem list includes: L AKA 7/21. Co morbidities include - HTN, lung CA, AFIB, CKD 3, bipolar d/o. Pt's precautions include: L LE NWB. PTA pt was dealing with much pain in L LE and required help from family but pt's baseline is independent in ADL and participates in some household IADLs. Pt lives at home with her son and daughter in law. Pt presents with phantom pain and residual limb pain, weakness requiring assist for left hip flexion for tranfers. Pt open to having Psychologist come see her for difficulty coping. Very good rehab potential with goal to perform transfers w/o slide board and use w/c for locomotion. Pt deconditioned and weak limitiong her safety and ability to attempt hopping safely at this time. Pt has 2 step entry but can then live on 1st floor. Will need to further assess abilit to navigate stairs with pt and family. Residual limb care education with family to be included in [address] [place] [address]. BK 2 wks.    PT Therapy Minutes   PT Time In 1000   PT Time Out 1130   PT Total Time (minutes) 90   PT Mode of treatment - Individual (minutes) 90   PT Mode of treatment - Concurrent (minutes) 0   PT Mode of treatment - Group (minutes) 0   PT Mode of treatment - Co-treat (minutes) 0   PT Mode of Treatment - Total time(minutes) 90 minutes   PT Cumulative Minutes 90   Cumulative Minutes   Cumulative therapy minutes 180

## 2023-07-28 PROBLEM — R09.02 HYPOXIA: Status: ACTIVE | Noted: 2023-07-28

## 2023-07-28 PROBLEM — R39.198 URINARY DYSFUNCTION: Status: ACTIVE | Noted: 2023-07-26

## 2023-07-28 PROBLEM — R52 PAIN: Status: ACTIVE | Noted: 2023-07-28

## 2023-07-28 PROBLEM — Z91.89 AT RISK FOR VENOUS THROMBOEMBOLISM (VTE): Status: ACTIVE | Noted: 2023-07-28

## 2023-07-28 PROCEDURE — 99232 SBSQ HOSP IP/OBS MODERATE 35: CPT

## 2023-07-28 PROCEDURE — 97110 THERAPEUTIC EXERCISES: CPT

## 2023-07-28 PROCEDURE — 97530 THERAPEUTIC ACTIVITIES: CPT

## 2023-07-28 PROCEDURE — 99232 SBSQ HOSP IP/OBS MODERATE 35: CPT | Performed by: INTERNAL MEDICINE

## 2023-07-28 PROCEDURE — 97129 THER IVNTJ 1ST 15 MIN: CPT

## 2023-07-28 PROCEDURE — 97535 SELF CARE MNGMENT TRAINING: CPT

## 2023-07-28 RX ORDER — METHOCARBAMOL 500 MG/1
500 TABLET, FILM COATED ORAL EVERY 8 HOURS SCHEDULED
Status: DISCONTINUED | OUTPATIENT
Start: 2023-07-28 | End: 2023-08-07

## 2023-07-28 RX ADMIN — SENNOSIDES 8.6 MG: 8.6 TABLET, FILM COATED ORAL at 08:51

## 2023-07-28 RX ADMIN — LAMOTRIGINE 100 MG: 100 TABLET ORAL at 08:52

## 2023-07-28 RX ADMIN — OXYCODONE HYDROCHLORIDE 5 MG: 5 TABLET ORAL at 16:35

## 2023-07-28 RX ADMIN — EZETIMIBE 10 MG: 10 TABLET ORAL at 08:50

## 2023-07-28 RX ADMIN — GABAPENTIN 300 MG: 300 CAPSULE ORAL at 08:52

## 2023-07-28 RX ADMIN — ISOSORBIDE MONONITRATE 30 MG: 30 TABLET, EXTENDED RELEASE ORAL at 08:50

## 2023-07-28 RX ADMIN — HEPARIN SODIUM 5000 UNITS: 5000 INJECTION INTRAVENOUS; SUBCUTANEOUS at 21:33

## 2023-07-28 RX ADMIN — FOLIC ACID 1 MG: 1 TABLET ORAL at 08:50

## 2023-07-28 RX ADMIN — METHOCARBAMOL 500 MG: 500 TABLET ORAL at 01:10

## 2023-07-28 RX ADMIN — METHOCARBAMOL 500 MG: 500 TABLET ORAL at 06:15

## 2023-07-28 RX ADMIN — ATORVASTATIN CALCIUM 40 MG: 40 TABLET, FILM COATED ORAL at 16:30

## 2023-07-28 RX ADMIN — CLOPIDOGREL BISULFATE 75 MG: 75 TABLET ORAL at 08:52

## 2023-07-28 RX ADMIN — METHOCARBAMOL 500 MG: 500 TABLET ORAL at 21:33

## 2023-07-28 RX ADMIN — OXYCODONE HYDROCHLORIDE 5 MG: 5 TABLET ORAL at 08:52

## 2023-07-28 RX ADMIN — HEPARIN SODIUM 5000 UNITS: 5000 INJECTION INTRAVENOUS; SUBCUTANEOUS at 06:15

## 2023-07-28 RX ADMIN — GABAPENTIN 400 MG: 400 CAPSULE ORAL at 22:00

## 2023-07-28 RX ADMIN — LISINOPRIL 10 MG: 10 TABLET ORAL at 08:51

## 2023-07-28 RX ADMIN — SENNOSIDES 8.6 MG: 8.6 TABLET, FILM COATED ORAL at 18:04

## 2023-07-28 RX ADMIN — DILTIAZEM HYDROCHLORIDE 120 MG: 60 TABLET, FILM COATED ORAL at 08:50

## 2023-07-28 RX ADMIN — HEPARIN SODIUM 5000 UNITS: 5000 INJECTION INTRAVENOUS; SUBCUTANEOUS at 13:27

## 2023-07-28 RX ADMIN — OXYCODONE HYDROCHLORIDE 10 MG: 10 TABLET ORAL at 01:09

## 2023-07-28 RX ADMIN — GABAPENTIN 300 MG: 300 CAPSULE ORAL at 18:04

## 2023-07-28 RX ADMIN — ACETAMINOPHEN 975 MG: 325 TABLET, FILM COATED ORAL at 13:28

## 2023-07-28 RX ADMIN — DOCUSATE SODIUM 100 MG: 100 CAPSULE ORAL at 08:50

## 2023-07-28 RX ADMIN — ACETAMINOPHEN 975 MG: 325 TABLET, FILM COATED ORAL at 06:15

## 2023-07-28 RX ADMIN — NICOTINE 1 PATCH: 21 PATCH, EXTENDED RELEASE TRANSDERMAL at 08:52

## 2023-07-28 RX ADMIN — ACETAMINOPHEN 975 MG: 325 TABLET, FILM COATED ORAL at 21:33

## 2023-07-28 RX ADMIN — METHOCARBAMOL 500 MG: 500 TABLET ORAL at 11:09

## 2023-07-28 RX ADMIN — METOPROLOL SUCCINATE 50 MG: 50 TABLET, EXTENDED RELEASE ORAL at 08:51

## 2023-07-28 RX ADMIN — DOCUSATE SODIUM 100 MG: 100 CAPSULE ORAL at 18:04

## 2023-07-28 RX ADMIN — AMLODIPINE BESYLATE 5 MG: 5 TABLET ORAL at 21:33

## 2023-07-28 NOTE — ASSESSMENT & PLAN NOTE
Stable overall; slept better again overnight  - Continue gabapentin to 600 mg TID   - APAP TID  - Oxy 5-10mg Q4H PRN; Hold for oversedation, AMS, or RR<12.  - Robaxin PRN Q8H

## 2023-07-28 NOTE — PROGRESS NOTES
Internal Medicine Progress Note  Patient: Jesu Cordova  Age/sex: 68 y.o. female  Medical Record #: [id number]      ASSESSMENT/PLAN: (Interval History)  Jesu Cordova is seen and examined and management for following issues:    S/p left AKA  -Continue medical optimization with Plavix, ezetimibe and statin   -Home Crestor 20 mg daily, receiving atorvastatin 40 mg daily  -LLE wound healthy, wound care  -Pain management as per primary     Coronary artery disease  -S/p CABG in 2000  -Currently denies any angina or shortness of breath. -TTE on 1/26/2023 with EF of 60%, mild to moderate AI, mild AS, estimated PA pressure of 40 mmHg  -Nuclear pharmacological stress test on 1/26/2023 with anterior wall perfusion defect likely artifact/cannot exclude small area of ischemia, normal LV systolic function.  -Continue Plavix, statin, ezetimibe, Imdur, diltiazem, Toprol-XL     Hypertension  -Blood pressure stable as per BP log review  -Home: Diltiazem 120 mg p.o. daily, amlodipine 5 mg daily, Imdur 30 mg p.o. daily, lisinopril 10 mg daily, Toprol-XL 50 mg p.o. daily  -Continue medications as above here. Hyperlipidemia  -11/14/2022 LDL of 101 not at goal  -Continue atorvastatin and Zetia here  -Follow-up with outpatient cardiology for possible Repatha     Paroxysmal atrial fibrillation  -Currently in sinus rhythm  -S/p a 2-week Zio patch in March 2023 with 5 episodes of SVT without other significant arrhythmia  -Continue diltiazem 120 mg daily, Toprol XL 50 mg daily     DVT  - lower extremity DVT,S/p IVC filter  -DVT prophylaxis with subcutaneous heparin  -Continue to monitor     Tobacco use  -Continues to smoke 1 to 1.5 packs/day  -Did not tolerate Wellbutrin/Chantix in the past  -Counseled for smoking cessation-  -Nicotine patch offered here.  -Patient not ready to quit. COPD  -CTL with evidence of emphysema  -PFT in June 2023:  Moderate obstruction, no reversibility with bronchodilator   -Was recommended Trelegy and rescue inhaler, has not initiated Trelegy as yet and does not plan to, doing albuterol as needed although patient, denies any shortness of breath at rest or exertion.  -Off O2 today. -Recommend outpatient follow-up with pulmonology and smoking cessation     2.3 cm right perihilar mass as evidenced on CT imaging  -Planned for biopsy on 7/31 by thoracic surgeon.  -Continue to monitor     Stage II CKD  -Creatinine stable at 0.78  -Avoid nephrotoxic agents, hypotension  -Antihypertensives as above. Bipolar disease  -stable continue Lamictal 100 mg daily    The above assessment and plan was reviewed and updated as determined by my evaluation of the patient on 7/28/2023.     Labs:   Results from last 7 days   Lab Units 07/27/23  0606 07/24/23  0650   WBC Thousand/uL 10.38* 10.96*   HEMOGLOBIN g/dL 12.8 12.4   HEMATOCRIT % 40.6 38.5   PLATELETS Thousands/uL 262 187     Results from last 7 days   Lab Units 07/27/23  0606 07/22/23  0616   SODIUM mmol/L 140 139   POTASSIUM mmol/L 3.6 4.1   CHLORIDE mmol/L 106 110*   CO2 mmol/L 28 24   BUN mg/dL 13 9   CREATININE mg/dL 0.78 0.83   CALCIUM mg/dL 9.2 8.9                   Review of Scheduled Meds:  Current Facility-Administered Medications   Medication Dose Route Frequency Provider Last Rate   • acetaminophen  975 mg Oral Q8H [address]  LEV Turner     • albuterol  2 puff Inhalation Q6H PRN LEV Turner     • amLODIPine  5 mg Oral Daily LEV Turner     • atorvastatin  40 mg Oral Daily With LEV Marcano     • bisacodyl  10 mg Rectal Daily PRN LEV Begum     • clopidogrel  75 mg Oral Daily LEV Turner     • diltiazem  120 mg Oral Daily LEV Turner     • docusate sodium  100 mg Oral BID LEV Turner     • ezetimibe  10 mg Oral Daily LEV Turner     • folic acid  1 mg Oral Daily LEV Turner     • gabapentin  300 mg Oral BID LEV Turner     • gabapentin  400 mg Oral HS Carmelina Herrmann MD • heparin (porcine)  5,000 Units Subcutaneous Q8H [address] LEV Turner     • isosorbide mononitrate  30 mg Oral QAM LEV Turner     • lamoTRIgine  100 mg Oral Daily LEV Turner     • lidocaine  2 patch Topical Daily LEV Turner     • lisinopril  10 mg Oral Daily LEV Turner     • melatonin  9 mg Oral HS PRN LEV Osei     • methocarbamol  500 mg Oral Q6H [address] Emily Day MD     • metoprolol succinate  50 mg Oral Daily LEV Turner     • nicotine  1 patch Transdermal Daily LEV Turner     • ondansetron  4 mg Oral Q6H PRN LEV Osei     • oxyCODONE  5 mg Oral Q4H PRN Emily Day MD      Or   • oxyCODONE  10 mg Oral Q4H PRN Emily Day MD     • polyethylene glycol  17 g Oral Daily LEV Turner     • senna  1 tablet Oral BID Emily Day MD     • traZODone  50 mg Oral HS PRN LEV Osei         Subjective/ HPI: Patient seen and examined. Patients overnight issues or events were reviewed with nursing or staff during rounds or morning huddle session. New or overnight issues include the following:     Pt seen in OT. She reports an intermittent cough. She denies any other complaints. ROS:   A 10 point ROS was performed; negative except as noted above.        Imaging:     No orders to display       *Labs /Radiology studies Reviewed  *Medications  reviewed and reconciled as needed  *Please refer to order section for additional ordered labs studies  *Case discussed with primary attending during morning huddle case rounds    Physical Examination:  Vitals:   Vitals:    07/27/23 2120 07/28/23 0611 07/28/23 0850 07/28/23 0851   BP: 143/65 155/75 139/81    BP Location: Left arm Right arm     Pulse: 66 66  71   Resp: 18 18     Temp: 98.2 °F (36.8 °C) 98 °F (36.7 °C)     TempSrc: Oral Oral     SpO2: 92% 90%     Weight:       Height:         General Appearance: no distress, conversive  HEENT: PERRLA, conjuctiva normal; oropharynx clear; mucous membranes moist;   Neck:  Supple, no lymphadenopathy or thyromegaly  Lungs: CTA, normal respiratory effort, no retractions, expiratory effort normal  CV: regular rate and rhythm , PMI normal   ABD: soft non tender, no masses , no hepatic or splenomegaly  EXT: AKA dressed. Skin: normal turgor, normal texture, no rash  Psych: affect normal, mood normal  Neuro: AAOx3      The above physical exam was reviewed and updated as determined by my evaluation of the patient on 7/28/2023. Invasive Devices     Drain  Duration           External Urinary Catheter 6 days                   VTE Pharmacologic Prophylaxis: Heparin  Code Status: Level 1 - Full Code  Current Length of Stay: 3 day(s)      Total time spent:  30 minutes with more than 50% spent counseling/coordinating care. Counseling includes discussion with patient re: progress  and discussion with patient of his/her current medical state/information. Coordination of patient's care was performed in conjunction with primary service. Time invested included review of patient's labs, vitals, and management of their comorbidities with continued monitoring. In addition, this patient was discussed with medical team including physician and advanced extenders. The care of the patient was extensively discussed and appropriate treatment plan was formulated unique for this patient. Medical decision making for the day was made by supervising physician unless otherwise noted in their attestation statement. ** Please Note:  voice to text software may have been used in the creation of this document.  Although proof errors in transcription or interpretation are a potential of such software**

## 2023-07-28 NOTE — PROGRESS NOTES
Physical Medicine and Rehabilitation Progress Note  James Sullivan 68 y.o. female MRN: [id number]  Unit/Bed#: -01 Encounter: [id number]      Assessment & Plan:     Decline in ADLs and mobility: Functional assessment-improving         FIM  Care Score  Admit Score Recent Score    Total assist  1-100% or 2p    Tot  most ADLs    Max assist 2-51-75%    Sub   toileting hygiene   Mod assist 3- 26-50%  Par     Min assist 3- 25% or < Par     CG assist 4  TA     Sup/Setup 4-5 Sup     Mod-I/Indep 6 MI      Transfers   total  mod assist    Ambulation    total     WC mobility   Supervision    Stairs   Total assist/NT      Goal: Largely modified independent supervision for most ADLs and wheelchair mobility  Major barriers: Amputation, deconditioning, pain  Dispo: Home with [facility] 16 days from admission      * Hx of AKA (above knee amputation), [facility])  Assessment & Plan  - 7/18 pt admitted with increased foot pain over 2 days with change of color and numbness found to have critical limb ischemia   -CT angiogram: multiple inflow graft occlusions on the left. The right to left component of the right axillobifemoral graft is occluded. The left axillary to profunda graft is occluded and a segment is excised. The aorta to left common femoral and jump graft from the alex of the common femoral artery anastomosis to below-knee popliteal artery is also occluded. The deep femoral artery beyond the previous anastomosis does reconstitute but is secondary or tertiary branch that is small at less than 2 mm. There are no other named vessels that reconstitute within the upper leg.   There does appear to be reconstitution of tibial vessels beyond the trifurcation but again these vessels are less than 2 mm in diameter.  - Vascular surgery s/p AKA 7/21 by Dr. Johanna Aguilar  - Plavix, optimal BP control, statin  - NWB LLE  - Monitor CBC intermittently   - Optimal ROM to avoid/decrease risk of contractures  - Monitor incision/area for infection or dehiscence/impaired healing    - 7/27 slight latia-incisional erythema - recommended vasc sx c/s who saw patient on 7/27 and reported no concerns for infection at that    - thigh DTI possibly from overly tight ace wrap that was POA to [facility] - wound care c/s 7/27 - allevyn foam dressing; use Kerlex only for a few days and then go back to ACE but avoid overly tightening   - 7/28 slight latia-incisional erythema again today but stable without increased warmth, tenderness, or drainage - continue close monitoring    - It is normal to have some swelling or discoloration around the incision initially post-operatively. If develops increasing redness, tenderness/pain, discharge, or dehiscence develops contact surgical service   - Wound care of incision site per vasc sx   - WBC stable mildly elevated at 10 K on 7/27 > monitor intermittently  - Monitor for signs/symptoms of VTE, atelectasis, acute blood loss anemia, or other infection   - Patient (if applicable caregiver) training and education on amputation, possible phantom symptoms, recovery process  - Neuropsychology consult, supportive counseling  - Optimal pain control  - Monitor contralateral limb closely for concerning s/s   - Fall Precautions   - Tolerating ARC setting adequately   - Continue acute comprehensive interdisciplinary inpatient rehabilitation to include intensive skilled therapies (PT, OT) as outlined with oversight and management by rehabilitation physician as well as inpatient rehab level nursing, case management and weekly interdisciplinary team meetings.    - Follow-up with PMR and Vas Sx after discharge    Pain  Assessment & Plan  Stable overall; slept better again overnight  - Continue gabapentin to [phone] daily   - APAP TID  - Oxy 5-10mg Q4H PRN; Hold for oversedation, AMS, or RR<12.  - Robaxin PRN Q6H standing change to Q8H soon     PAD (peripheral artery disease) (HCC)  Assessment & Plan  - hx of PAD, carotid stenosis   - following procedures with [facility], [facility], [place] in [address]- right renal artery bypass 2000  - 8/6/2014 femoral/popliteal with stents and angioplasty, iliac arteries with stents and angioplasty and tibial peroneal artery angioplasty  -Right Fem to below-knee bypass occluded in 2010  - Fem-Fem bypass graft left to right occluded  -2/21/2017 right axillary to Bi-Fem bypass  -11/15/2022 lower extremity arterial ultrasound right lower limb shows 50-69% stenosis distal to the SFA with high-grade stenosis versus occlusion in the posterior tibial artery LUANN 0.53 (severe range), right Fem to below-knee bypass graft occluded, left lower limb patent CFA to posterior tibial bypass graft, LUANN 0.96  - 11/15/2022 abdominal aorta/iliac study showed occlusion of bilateral common and external iliac arteries, greater than 70% stenosis in the celiac artery, patent right axillo bifemoral arterial bypass graft  -11/15/2022 carotid ultrasound showed less than 50% bilateral carotid stenosis  - home: Plavix 75 mg, (previously on Xarelo 2.5 mg d/c d/t cost)  - here: plavix, statin, optimal BP control     Hypoxia  Assessment & Plan  Off of oxygen so far today  Mild occasionally requiring 2L to SpO2 goal 90% or higher   Per IM  "-CTL with evidence of emphysema  -PFT in June 2023: Moderate obstruction, no reversibility with bronchodilator   -Was recommended Trelegy and rescue inhaler, has not initiated Trelegy as yet and does not plan to, doing albuterol as needed although patient, denies any shortness of breath at rest or exertion.  -Currently saturating 92% on 2 L of oxygen via nasal cannula.   -Recommend outpatient follow-up with pulmonology and smoking cessation"    Insomnia  Assessment & Plan  Improved   - here: Traz 50mg HS PRN  - On gabapentin 400mg HS for pain at night as well now   - if needed start low dose melatonin (apparently was on high dose at home)  - gabapentin also for pain   - Recommend appropriate sleep hygiene  - Patient counselled/will be counselled on this when appropriate       Essential hypertension  Assessment & Plan  - home: amlodipine 5 mg daily, diltiazem 120 mg daily, lisinopril 10 mg daily, Toprol-XL 50 mg daily  - here: continue   - monitor BP  - monitor electrolytes  - consult IM to assist w/ management    CAD (coronary artery disease)  Assessment & Plan  [facility]  - TTE (1/26/23): EF 60%, mild dilated RV, mild-mod AI, mild AS/TR  - follows with Dr. Tamara Kuo  - Plavix, ISMN, statin       At risk for venous thromboembolism (VTE)  Assessment & Plan  Heparin     Urinary dysfunction  Assessment & Plan  - some urinary retention earlier now improved off scans but still watch for re-occurrence   - proactive toileting  - monitor for s/s of infection, retention     Nodule of upper lobe of right lung  Assessment & Plan  - newly diagnosed RUL lung mass  - PET/CT (7/7/23): marked increased metabolic activity in association with RUL nodule highly suggestive of neoplasm  - follows with hemonc Dr. Ninoska Masters  - planned navigational bronchoscopy with biopsy for 7/31/23 with thoracic surgery, Dr. Keri Webb   - discussed with family who notified sx office and they rescheduled bx appt to 8/14    Stage 3 chronic kidney disease, unspecified whether stage 3a or 3b CKD [facility])  Assessment & Plan  Lab Results   Component Value Date    EGFR 70 07/22/2023    EGFR 69 07/21/2023    EGFR 67 07/20/2023    CREATININE 0.83 07/22/2023    CREATININE 0.84 07/21/2023    CREATININE 0.86 07/20/2023     - Stable 7/27  - avoid nephrotoxic agents  - monitor BMP with routine labs  -consult IM for assist with management  - stable      PAF (paroxysmal atrial fibrillation) ([address])  Assessment & Plan  - episode during previous hospitalization  - Zio patch (3/17/23) showed 5 episodes of SVT (longest 7 beats) 2 triggered events correlated with SR, no other significant arrhythmias  - home: Toprol XL 50 mg daily, diltiazem 120 mg daily  - Not on full A/C per prior providers   - continue here  - OP Cards follow-up     Tobacco abuse  Assessment & Plan  - 1 1/2 ppd for 56 years  - nicotine patch  - tried Wellbutrin prescribed by vascular surgery for 1 week, however discontinued due to side effects  - encourage smoking cessation but is resistant     Mixed hyperlipidemia  Assessment & Plan  - home: rosuvastatin 20 mg daily, Zetia 10 mg daily  - here: Zetia 10 mg, atorvastatin 40 mg (therapeutic substitution)  - Lipid panel (04/963191) total cholesterol 165, triglycerides 155, HDL 33,       Bipolar affective disorder, depressed, severe (HCC)  Assessment & Plan  Mood stable   - home: lamictal 100 mg daily  - continue here  - neuropsychology consulted      Other Medical Issues:  • Monitor for     Follow-up providers and other issues to be followed up after discharge  PCP  Vascular surgery  Cardiology  PMR    CODE: Level 1: Full Code    Restrictions include: Fall precautions    Objective:     Allergies per EMR  Diagnostic Studies: Reviewed, no new imaging  No orders to display     See above as well    Laboratory: Labs reviewed  Results from last 7 days   Lab Units 07/27/23  0606 07/24/23  0650 07/23/23  0459   HEMOGLOBIN g/dL 12.8 12.4 11.5   HEMATOCRIT % 40.6 38.5 35.4   WBC Thousand/uL 10.38* 10.96* 13.11*     Results from last 7 days   Lab Units 07/27/23  0606 07/22/23  0616   BUN mg/dL 13 9   SODIUM mmol/L 140 139   POTASSIUM mmol/L 3.6 4.1   CHLORIDE mmol/L 106 110*   CREATININE mg/dL 0.78 0.83            Drug regimen reviewed, all potential adverse effects identified and addressed:    Scheduled Meds:  Current Facility-Administered Medications   Medication Dose Route Frequency Provider Last Rate   • acetaminophen  975 mg Oral Q8H [address] LEV Turner     • albuterol  2 puff Inhalation Q6H PRN LEV Turner     • amLODIPine  5 mg Oral Daily LEV Turner     • atorvastatin  40 mg Oral Daily With Iesha Dickey, LEV     • bisacodyl  10 mg Rectal Daily PRN LEV Mukherjee     • clopidogrel  75 mg Oral Daily LEV Turner     • diltiazem  120 mg Oral Daily LEV Turner     • docusate sodium  100 mg Oral BID LEV Turner     • ezetimibe  10 mg Oral Daily LEV Turner     • folic acid  1 mg Oral Daily LEV Turner     • gabapentin  300 mg Oral BID LEV Mukherjee     • gabapentin  400 mg Oral HS Sari Werner MD     • heparin (porcine)  5,000 Units Subcutaneous Q8H [facility] LEV Turner     • isosorbide mononitrate  30 mg Oral QAM LEV Turner     • lamoTRIgine  100 mg Oral Daily LEV Turner     • lidocaine  2 patch Topical Daily LEV Turner     • lisinopril  10 mg Oral Daily LEV Turner     • melatonin  9 mg Oral HS PRN LEV Mukherjee     • methocarbamol  500 mg Oral [facility] Sari Werner MD     • metoprolol succinate  50 mg Oral Daily LEV Turner     • nicotine  1 patch Transdermal Daily LEV Turner     • ondansetron  4 mg Oral Q6H PRN LEV Mukherjee     • oxyCODONE  5 mg Oral Q4H PRN Sari Werner MD      Or   • oxyCODONE  10 mg Oral Q4H PRN Sari Werner MD     • polyethylene glycol  17 g Oral Daily LEV Turner     • senna  1 tablet Oral BID Sari Werner MD     • traZODone  50 mg Oral HS PRN LEV Mukherjee         Chief Complaints: Status post left above-knee amputation rehab follow-up    Subjective: On eval, patient reports brief increased pain for about 15 minutes earlier today but otherwise adequately controlled. She reports sleeping well again overnight. Patient denies fever, chills, sweats, SOB, increased cough, or other new complaints. ROS: A 10 point ROS was performed; negative except as noted above.        Physical Exam:  Vitals:    07/28/23 1447   BP: 127/61   Pulse: 66   Resp: 18   Temp: 97.8 °F (36.6 °C)   SpO2: 91%   Vitals above reviewed on date of encounter    GEN:  Lying in bed in NAD   HEENT/NECK: MMM  CARDIAC: Regular rate rhythm, no murmers, no rubs, no gallops  LUNGS:  clear to auscultation, no wheezes, rales, or rhonchi  ABDOMEN: Soft, non-tender, non-distended, normal active bowel sounds  EXTREMITIES/SKIN:  Left residual limb surgical incision with slight latia-incisional erythema without increased drainage, warmth, tenderness or fluctuance; some older appearing ecchymosis under residual limb without increased warmth or erythema similar to prior photos  NEURO:   MENTAL STATUS:  Appropriate wakefulness and interaction   PSYCH:  Affect:  Euthymic       ** Please Note: Fluency Direct voice to text software may have been used in the creation of this document. **    I personally performed the required components and examined the patient myself in person on 7/28/23.

## 2023-07-28 NOTE — PROGRESS NOTES
07/28/23 1030   Pain Assessment   Pain Assessment Tool 0-10   Pain Score 7   Pain Location/Orientation Orientation: Left; Location: Leg   Restrictions/Precautions   Precautions Fall Risk;Pain;Supervision on toilet/commode;Bed/chair alarms   LLE Weight Bearing Per Order NWB   Braces or Orthoses   (LLE kerlix)   Lifestyle   Autonomy "I get some shooting pains in my leg."   Putting On/Taking Off Footwear   Type of Assistance Needed Physical assistance   Physical Assistance Level 25% or less   Comment A for L residual limb wrapping w/ kerlix. Pt able to don R shoe while seated EOB w/ S. Putting On/Taking Off Footwear CARE Score 3   Sit to Lying   Type of Assistance Needed Incidental touching   Physical Assistance Level No physical assistance   Sit to Lying CARE Score 4   Bed-Chair Transfer   Type of Assistance Needed Physical assistance   Physical Assistance Level 26%-50%   Comment Mod A modified squat pivot to R. Chair/Bed-to-Chair Transfer CARE Score 3   Exercise Tools   Other Exercise Tool 1 BUE ther ex to maximize strength and endurance for ADLs and fxnl mobility. Completed w/ 3# DB 3x10 bicep curls, 2# DB chest press, OH shoulder press, and front arm raises. Pt tolerated well w/ rest breaks between sets to manage fatigue. Other Exercise Tool 2 3x5 w/c push-ups w/ 3-4 second hold, tolerated well w/ rest breaks between sets. Spot checked SpO2 ranging 87%, w/ pursed-lip breathing pt inc >90%. HR in mid 80s. Cognition   Overall Cognitive Status WFL   Activity Tolerance   Medical Staff Made Aware RN provided medication for LLE pain. Assessment   Treatment Assessment Pt seen for skilled OT session focusing on fxnl xfers and BUE strengthening. Time spent rapport building. Pt limited by LLE pain and fatigue, req inc time for all fxnl act. Cont OT POC: endurance work, BUE/core strengthening, toileting, ADL retraining, and phase 1 amputee edu.  Pt was left resting in w/c w/ all needs within reach, alarm activated, and meal tray set-up. Prognosis Good   Problem List Decreased strength;Decreased endurance; Impaired balance;Decreased mobility;Pain;Orthopedic restrictions   Plan   Treatment/Interventions ADL retraining;Functional transfer training; Therapeutic exercise; Endurance training;Patient/family training   Progress Progressing toward goals   Recommendation   OT Discharge Recommendation   (pending progress)   OT Therapy Minutes   OT Time In 1030   OT Time Out 1130   OT Total Time (minutes) 60   OT Mode of treatment - Individual (minutes) 60   OT Mode of treatment - Concurrent (minutes) 0   OT Mode of treatment - Group (minutes) 0   OT Mode of treatment - Co-treat (minutes) 0   OT Mode of Treatment - Total time(minutes) 60 minutes   OT Cumulative Minutes 240   Therapy Time missed   Time missed?  No

## 2023-07-28 NOTE — PROGRESS NOTES
Physical Medicine and Rehabilitation Progress Note  Pastora Kee 68 y.o. female MRN: [id number]  Unit/Bed#: [facility] 457-01 Encounter: [id number]      Assessment & Plan:     Decline in ADLs and mobility: Functional assessment-improving transfers        FIM  Care Score  Admit Score Recent Score    Total assist  1-100% or 2p    Tot  most ADLs    Max assist 2-51-75%    Sub     Mod assist 3- 26-50%  Par     Min assist 3- 25% or < Par     CG assist 4  TA     Sup/Setup 4-5 Sup     Mod-I/Indep 6 MI      Transfers   total  mod to significant assist    Ambulation    total     Stairs   Total assist/NT      Goal: Largely modified independent supervision for most ADLs and wheelchair mobility  Major barriers: Amputation, deconditioning, pain  Dispo: Home with [facility] 16 days from admission      * Hx of AKA (above knee amputation), left [facility])  Assessment & Plan  - 7/18 pt admitted with increased foot pain over 2 days with change of color and numbness found to have critical limb ischemia   -CT angiogram: multiple inflow graft occlusions on the left. The right to left component of the right axillobifemoral graft is occluded. The left axillary to profunda graft is occluded and a segment is excised. The aorta to left common femoral and jump graft from the alex of the common femoral artery anastomosis to below-knee popliteal artery is also occluded. The deep femoral artery beyond the previous anastomosis does reconstitute but is secondary or tertiary branch that is small at less than 2 mm. There are no other named vessels that reconstitute within the upper leg.   There does appear to be reconstitution of tibial vessels beyond the trifurcation but again these vessels are less than 2 mm in diameter.  - Vascular surgery s/p AKA 7/21 by Dr. Carolina Schulte  - Plavix, optimal BP control, statin  - NWB LLE  - Monitor CBC intermittently   - Optimal ROM to avoid/decrease risk of contractures  - Monitor incision/area for infection or dehiscence/impaired healing    - slight latia-incisional erythema - recommend vasc sx c/s   - thigh DTI possibly from overly tight ace wrap that was POA to ARC - wound care c/s 7/27 - allevyn foam dressing; use Kerlex only for a few days and then go back to ACE but avoid overly tightening   - It is normal to have some swelling or discoloration around the incision initially post-operatively. If develops increasing redness, tenderness/pain, discharge, or dehiscence develops contact surgical service   - Wound care of incision site per vasc sx   - WBC stable mildly elevated at 10 K on 7/27   - Monitor for signs/symptoms of VTE, atelectasis, acute blood loss anemia, or other infection   - Patient (if applicable caregiver) training and education on amputation, possible phantom symptoms, recovery process  - Neuropsychology consult, supportive counseling  - Optimal pain control  - Monitor contralateral limb closely for concerning s/s   - Fall Precautions   - Tolerating ARC setting adequately   - Continue acute comprehensive interdisciplinary inpatient rehabilitation to include intensive skilled therapies (PT, OT) as outlined with oversight and management by rehabilitation physician as well as inpatient rehab level nursing, case management and weekly interdisciplinary team meetings.    - Follow-up with PMR and Vas Sx after discharge    Pain  Assessment & Plan  Improving control; slept better  - Continue gabapentin to [phone] daily   - APAP TID  - Oxy 5-10mg Q4H PRN; Hold for oversedation, AMS, or RR<12.  - Robaxin PRN Q6H standing change to Q8H soon     PAD (peripheral artery disease) (HCC)  Assessment & Plan  - hx of PAD, carotid stenosis   - [facility], and [facility]:  - Aorto- right renal artery bypass 2000  - 8/6/2014 femoral/popliteal with stents and angioplasty, iliac arteries with stents and angioplasty and tibial peroneal artery angioplasty  -Right Fem to below-knee bypass occluded in 2010  - Fem-Fem bypass graft left to right occluded  -2/21/2017 right axillary to Bi-Fem bypass  -11/15/2022 lower extremity arterial ultrasound right lower limb shows 50-69% stenosis distal to the SFA with high-grade stenosis versus occlusion in the posterior tibial artery LUANN 0.53 (severe range), right Fem to below-knee bypass graft occluded, left lower limb patent CFA to posterior tibial bypass graft, LUANN 0.96  - 11/15/2022 abdominal aorta/iliac study showed occlusion of bilateral common and external iliac arteries, greater than 70% stenosis in the celiac artery, patent right axillo bifemoral arterial bypass graft  -11/15/2022 carotid ultrasound showed less than 50% bilateral carotid stenosis  - home: Plavix 75 mg, (previously on Xarelo 2.5 mg d/c d/t cost)  - here: plavix, statin, optimal BP control     Hypoxia  Assessment & Plan  Mild occasionally requiring 2L to SpO2 goal 90% or higher   Per IM  "-CTL with evidence of emphysema  -PFT in June 2023: Moderate obstruction, no reversibility with bronchodilator   -Was recommended Trelegy and rescue inhaler, has not initiated Trelegy as yet and does not plan to, doing albuterol as needed although patient, denies any shortness of breath at rest or exertion.  -Currently saturating 92% on 2 L of oxygen via nasal cannula.   -Recommend outpatient follow-up with pulmonology and smoking cessation"    Insomnia  Assessment & Plan  - here: Traz 50mg HS PRN  - gabapentin also for pain   - Recommend appropriate sleep hygiene  - Patient counselled/will be counselled on this when appropriate       Essential hypertension  Assessment & Plan  - home: amlodipine 5 mg daily, diltiazem 120 mg daily, lisinopril 10 mg daily, Toprol-XL 50 mg daily  - here: continue   - monitor BP  - monitor electrolytes  - consult IM to assist w/ management    CAD (coronary artery disease)  Assessment & Plan  [facility]  - TTE (1/26/23): EF 60%, mild dilated RV, mild-mod AI, mild AS/TR  - follows with Dr. Duyen Subramanian  - Plavix, ISMN, statin       At risk for venous thromboembolism (VTE)  Assessment & Plan  Heparin     Urinary incontinence  Assessment & Plan  - hx of inability to urinate with urgency  - bladder training  -timed void  - bladder scan every 4 hours with PVRs  - I/O    Nodule of upper lobe of right lung  Assessment & Plan  - newly diagnosed RUL lung mass  - PET/CT (7/7/23): marked increased metabolic activity in association with RUL nodule highly suggestive of neoplasm  - follows with hemonc Dr. Dayna Newman  - planned navigational bronchoscopy with biopsy for 7/31/23 with thoracic surgery, Dr. Kimberlee Mccall   - discussed with family who notified sx office and they recommended rescheduling bx appt to 8/14    Stage 3 chronic kidney disease, unspecified whether stage 3a or 3b CKD [facility])  Assessment & Plan  Lab Results   Component Value Date    EGFR 70 07/22/2023    EGFR 69 07/21/2023    EGFR 67 07/20/2023    CREATININE 0.83 07/22/2023    CREATININE 0.84 07/21/2023    CREATININE 0.86 07/20/2023     - Stable 7/27  - avoid nephrotoxic agents  - monitor BMP with routine labs  -consult IM for assist with management  - stable      PAF (paroxysmal atrial fibrillation) (HCC)  Assessment & Plan  - episode during previous hospitalization  - Zio patch (3/17/23) showed 5 episodes of SVT (longest 7 beats) 2 triggered events correlated with SR, no other significant arrhythmias  - home: Toprol XL 50 mg daily, diltiazem 120 mg daily  - Not on full A/C per prior providers   - continue here  - OP Cards follow-up     Tobacco abuse  Assessment & Plan  - 1 1/2 ppd for 56 years  - nicotine patch  - tried Wellbutrin prescribed by vascular surgery for 1 week, however discontinued due to side effects  - encourage smoking cessation but is resistant     Mixed hyperlipidemia  Assessment & Plan  - home: rosuvastatin 20 mg daily, Zetia 10 mg daily  - here: Zetia 10 mg, atorvastatin 40 mg (therapeutic substitution)  - Lipid panel (11/226412) total cholesterol 165, triglycerides 155, HDL 33,       Bipolar affective disorder, depressed, severe (HCC)  Assessment & Plan  Mood stable   - home: lamictal 100 mg daily  - continue here  - neuropsychology consulted      Other Medical Issues:  • Monitor for     Follow-up providers and other issues to be followed up after discharge  PCP  Vascular surgery  Cardiology  PMR    CODE: Level 1: Full Code    Restrictions include: Fall precautions    Objective:     Allergies per EMR  Diagnostic Studies: Reviewed, no new imaging  No orders to display     See above as well    Laboratory: Labs reviewed  Results from last 7 days   Lab Units 07/27/23  0606 07/24/23  0650 07/23/23  0459   HEMOGLOBIN g/dL 12.8 12.4 11.5   HEMATOCRIT % 40.6 38.5 35.4   WBC Thousand/uL 10.38* 10.96* 13.11*     Results from last 7 days   Lab Units 07/27/23  0606 07/22/23  0616   BUN mg/dL 13 9   SODIUM mmol/L 140 139   POTASSIUM mmol/L 3.6 4.1   CHLORIDE mmol/L 106 110*   CREATININE mg/dL 0.78 0.83            Drug regimen reviewed, all potential adverse effects identified and addressed:    Scheduled Meds:  Current Facility-Administered Medications   Medication Dose Route Frequency Provider Last Rate   • acetaminophen  975 mg Oral Q8H [address] LEV Turner     • albuterol  2 puff Inhalation Q6H PRN LEV Turner     • amLODIPine  5 mg Oral Daily LEV Turner     • atorvastatin  40 mg Oral Daily With LEV Marcano     • bisacodyl  10 mg Rectal Daily PRN LEV Johnson     • clopidogrel  75 mg Oral Daily LEV Turner     • diltiazem  120 mg Oral Daily LEV Turner     • docusate sodium  100 mg Oral BID LEV Turner     • ezetimibe  10 mg Oral Daily LEV Turner     • folic acid  1 mg Oral Daily LEV Turner     • gabapentin  300 mg Oral BID LEV Turner     • gabapentin  400 mg Oral HS Lindsay Serrano MD     • heparin (porcine)  5,000 Units Subcutaneous Q8H [facility] LEV Turner     • isosorbide mononitrate  30 mg Oral QAM LEV Turner     • lamoTRIgine  100 mg Oral Daily LEV Turner     • lidocaine  2 patch Topical Daily LEV Turner     • lisinopril  10 mg Oral Daily LEV Turner     • melatonin  9 mg Oral HS PRN LEV Falk     • methocarbamol  500 mg Oral [facility] Nader Ann MD     • metoprolol succinate  50 mg Oral Daily LEV Turner     • nicotine  1 patch Transdermal Daily LEV Turner     • ondansetron  4 mg Oral Q6H PRN LEV Falk     • oxyCODONE  5 mg Oral Q4H PRN Nader Ann MD      Or   • oxyCODONE  10 mg Oral Q4H PRN Nader Ann MD     • polyethylene glycol  17 g Oral Daily LEV Turner     • senna  1 tablet Oral BID Nader Ann MD     • traZODone  50 mg Oral HS PRN LEV Falk         Chief Complaints: Status post left above-knee amputation rehab follow-up    Subjective: On eval, patient reports sleeping better overnight. She reports still most of her pain is at the edge of her residual limb with limited phantom limb complaints. She denies lightheadedness, shortness of breath, fever, abdominal pain, constipation, or other new complaints. ROS: A 10 point ROS was performed; negative except as noted above.        Physical Exam:  07/27/23 0500 97.6 °F (36.4 °C) 66 16 128/60 86 94 % -- -- -- None (Room air)     Vitals above reviewed on date of encounter    GEN:  Lying in bed in NAD   HEENT/NECK: MMM  CARDIAC: Regular rate rhythm, no murmers, no rubs, no gallops  LUNGS:  clear to auscultation, no wheezes, rales, or rhonchi  ABDOMEN: Soft, non-tender, non-distended, normal active bowel sounds  EXTREMITIES/SKIN:  Left residual limb surgical incision with latia-incisional erythema with trace serosanguineous drainage, left thigh deep tissue injury without signs of infection, right lower extremity without significant edema or calf tenderness to palpation  NEURO:   MENTAL STATUS: awake, oriented to person, place, time, and situation and MENTAL STATUS:  Appropriate wakefulness and interaction   PSYCH:  Affect:  Euthymic       ** Please Note: Fluency Direct voice to text software may have been used in the creation of this document. **    I personally performed the required components and examined the patient myself in person on 7/27/23.

## 2023-07-28 NOTE — PROGRESS NOTES
07/28/23 0830   Pain Assessment   Pain Assessment Tool 0-10   Pain Score 5   Pain Location/Orientation Orientation: Left; Location: Leg   Restrictions/Precautions   Precautions Fall Risk;Pain;Supervision on toilet/commode;Bed/chair alarms   LLE Weight Bearing Per Order NWB   Braces or Orthoses   (LLE kerlix)   Subjective   Subjective pt agreeable to perform skilled PT   Roll Left and Right   Type of Assistance Needed Supervision   Roll Left and Right CARE Score 4   Sit to Lying   Type of Assistance Needed Incidental touching   Sit to Lying CARE Score 4   Lying to Sitting on Side of Bed   Type of Assistance Needed Physical assistance   Physical Assistance Level 26%-50%   Lying to Sitting on Side of Bed CARE Score 3   Sit to Stand   Type of Assistance Needed Physical assistance   Physical Assistance Level 51%-75%   Comment toady perform Standing to RW vc for hand placement on WC arms rest   Sit to Stand CARE Score 2   Bed-Chair Transfer   Type of Assistance Needed Physical assistance   Physical Assistance Level 26%-50%   Comment SPT RW vc for hand and foor sequence   Chair/Bed-to-Chair Transfer CARE Score 3   Walk 10 Feet   Reason if not Attempted Safety concerns   Walk 10 Feet CARE Score 88   Walk 50 Feet with Two Turns   Reason if not Attempted Safety concerns   Walk 50 Feet with Two Turns CARE Score 88   Walk 150 Feet   Reason if not Attempted Safety concerns   Walk 150 Feet CARE Score 88   Walking 10 Feet on Uneven Surfaces   Reason if not Attempted Medical concerns   Walking 10 Feet on Uneven Surfaces CARE Score 88   Ambulation   Primary Mode of Locomotion Prior to Admission Walk   Distance Walked (feet) 8 ft   Assist Device Roller Walker   Gait Pattern Hopping  (limited hopping no more then 10 feet)   Does the patient walk? 2.  Yes   Wheel 50 Feet with Two Turns   Type of Assistance Needed Supervision;Verbal cues   Wheel 50 Feet with Two Turns CARE Score 4   Wheelchair mobility   Does the patient use a wheelchair? 1. Yes   Type of Wheelchair Used 1. Manual   Method Right upper extremity; Left upper extremity;Right lower extremity   Distance Level Surface (feet) 150 ft   Curb or Single Stair   Style negotiated Curb   Type of Assistance Needed Physical assistance   Physical Assistance Level Total assistance   Comment using RW and hoppinh BWD on 4 inch  steps and then 6 inc and then 6 inc with chair to [address] at [facility] at home using RW   1 Step (Curb) CARE Score 1   4 Steps   Reason if not Attempted Safety concerns   4 Steps CARE Score 88   12 Steps   Reason if not Attempted Safety concerns   12 Steps CARE Score 88   Toilet Transfer   Type of Assistance Needed Physical assistance   Physical Assistance Level 51%-75%   Comment in her bathroom perform w RW and SPT vc for sequence   Toilet Transfer CARE Score 2   Therapeutic Interventions   Strengthening WC push ups x10   Flexibility prone 9 min end of session in her bed   Balance standing balance RW   Other Comments   Comments re wrapped limb with Kerlix only   Assessment   Treatment Assessment pt shows good effort and progressing with amputee program and pt will cont with ed/instruction for prosthesis , Pt also advance a little better today with SPT with RW and hopping 6 inch step to mini home IMANI for DC . pt cont to need prone lying to dec hip flexion every day and monitor suregery line , pt perform in her bathroom SPT to her toilet  ModA x1 and needs A. Pt return to to her bed lvl with all needs in reach and alarm on .  Cont POC   Barriers to Discharge Inaccessible home environment;Decreased caregiver support   Plan   Progress Progressing toward goals   Recommendation   PT Discharge Recommendation Home with home health rehabilitation   PT Therapy Minutes   PT Time In 0830   PT Time Out 0930   PT Total Time (minutes) 60   PT Mode of treatment - Individual (minutes) 60   PT Mode of treatment - Concurrent (minutes) 0   PT Mode of treatment - Group (minutes) 0   PT Mode of treatment - Co-treat (minutes) 0   PT Mode of Treatment - Total time(minutes) 60 minutes   PT Cumulative Minutes 240   Therapy Time missed   Time missed?  No

## 2023-07-28 NOTE — ASSESSMENT & PLAN NOTE
Off of oxygen so far today  Mild occasionally requiring 2L to SpO2 goal 90% or higher   Per IM  "-CTL with evidence of emphysema  -PFT in June 2023: Moderate obstruction, no reversibility with bronchodilator   -Was recommended Trelegy and rescue inhaler, has not initiated Trelegy as yet and does not plan to, doing albuterol as needed although patient, denies any shortness of breath at rest or exertion.  -Previously saturating 92% on 2 L of oxygen via nasal cannula.   - on room air saturating 90-92%, continue to monitor  -Recommend outpatient follow-up with pulmonology and smoking cessation"

## 2023-07-28 NOTE — PROGRESS NOTES
07/28/23 1330   Pain Assessment   Pain Assessment Tool 0-10   Pain Score 5   Pain Location/Orientation Orientation: Left; Location: Leg   Restrictions/Precautions   Precautions Bed/chair alarms; Fall Risk;Pain;Supervision on toilet/commode   LLE Weight Bearing Per Order NWB   Braces or Orthoses   (LLE kerlix)   Lifestyle   Autonomy "I feel like am learning new things here."   Sit to Lying   Type of Assistance Needed Supervision   Physical Assistance Level No physical assistance   Sit to Lying CARE Score 4   Lying to Sitting on Side of Bed   Type of Assistance Needed Physical assistance   Physical Assistance Level 25% or less   Lying to Sitting on Side of Bed CARE Score 3   Bed-Chair Transfer   Type of Assistance Needed Physical assistance   Physical Assistance Level 26%-50%   Comment modified sit pivot   Chair/Bed-to-Chair Transfer CARE Score 3   Toileting Hygiene   Type of Assistance Needed Physical assistance   Physical Assistance Level 51%-75%   Comment Mod A to steady in partial stance w/ pt able to manage latia hygiene. vc's to partially manage clothing while seated, Mod A w/ cues for partial push up for clothing management in back. Toileting Hygiene CARE Score 2   Toilet Transfer   Type of Assistance Needed Physical assistance; Adaptive equipment;Verbal cues   Physical Assistance Level 26%-50%   Comment modified sit pivot w/c<>drop-arm BSC over toilet. vc's for proper hand placement. Toilet Transfer CARE Score 3   Cognition   Overall Cognitive Status WFL  (basic)   Arousal/Participation Cooperative; Alert   Attention Difficulty dividing attention   Orientation Level Oriented X4;Oriented to person;Oriented to place;Oriented to time;Oriented to situation   Memory Within functional limits   Following Commands Follows multistep commands with increased time or repetition   Comments Administered MoCA version 8.1 w/ pt scoring 23/30 indicating a mild cognitive impairment, details on score noted below.  Functionally noted w/ feelings of anxiety and at times difficulty dividing attention. Will benefit from repetitive new learning and to maximize skill carryover. Would add'lly benefit from assessing med management and FT closer to d/c. Additional Activities   Additional Activities   (Phase 1 Amputee Edu)   Additional Activities Comments Provided the following skilled education to patient regarding phase 1 amputation: First Step Booklet: understanding pain and types of pain, Secondary conditions, K-levels, and Importance of skin inspections. Reviewed reasons for residual limb wrapping. Pt demo G attention and engaged in discussion, will benefit from repetition to maximize carryover. Assessment   Treatment Assessment Pt seen for skilled OT session focusing on fxnl xfers, toileting, formalized cognitive assessment, and phase 1 amputee edu. Administered MoCA version 8.1 w/ pt scoring 23/30 indicating a mild cognitive impairment, details on scoring noted below. Pt cont to req vc's for proper hand placement and body mechanics, improvement noted w/ setting up fxnl xfers. Cont OT POC: w/c mobility/management, ADL retraining, repetitive safety training, fxnl xfers, clothing management for toileting, and phase 1 amputee edu. Pt requested to rest in bed w/ all needs within reach and alarm activated. Prognosis Good   Problem List Decreased strength;Decreased endurance; Impaired balance;Decreased mobility;Pain;Orthopedic restrictions   Plan   Treatment/Interventions ADL retraining;Functional transfer training; Therapeutic exercise; Endurance training;Patient/family training;Equipment eval/education   Progress Progressing toward goals   Recommendation   OT Discharge Recommendation   (pending progress)   OT Therapy Minutes   OT Time In 1330   OT Time Out 1430   OT Total Time (minutes) 60   OT Mode of treatment - Individual (minutes) 60   OT Mode of treatment - Concurrent (minutes) 0   OT Mode of treatment - Group (minutes) 0   OT Mode of treatment - Co-treat (minutes) 0   OT Mode of Treatment - Total time(minutes) 60 minutes   OT Cumulative Minutes 300   Therapy Time missed   Time missed? No     Pt completed [place] Cognitive Assessment (MOCA) version 8.1. Pt scored overall 23/30  indicating a mild cognitive deficit.      Visuospatial/executive: 3/5 (no point for cube copy or clock hands)   Naming: 3/3   Memory: (worth no points)   Attention:  2/6 (points awarded for forward number repeat and 1 correct serial 7)  Language: 3/3   Abstraction: 2/2   Delayed recall: 4/5 MIS: 14/15 (recalled 4 words without cues and 1 word w/ category cue)  Orientation: 5/6 (- date)  +1 education point    Certified Idalia Rosales clinician ID: [id number]

## 2023-07-28 NOTE — CASE MANAGEMENT
Case Management Update:  Cm met with pt at bedside to review role and complete cm open:    Pt lives in 2 story home with son, daughter in law and 5 grandchildren. Pt reports a good support system and family transports as needed. Prior to admission, pt was independent with ADLs and IADLs and ambulation. Pt does have walker and cane. Pt reports no hx of [facility] OP therapies or STR. PCP is Bernice Bush, preferred pharmacy is [facility]. The patient was educated on the rehabilitation process including therapy program, the interdisciplinary team, and weekly team meetings. Estimated length of stay was reviewed with the patient as well as expectations of discussions of discharge planning. The role of the  was reviewed including providing care coordination, discharge planning and discharge facilitation. IMM was reviewed with the patient and a copy was provided for their reference. The patient verbalized understanding of the information provided and denied any further questions at this time. CM will continue to follow and assist the patient throughout their rehabilitation stay.

## 2023-07-29 PROCEDURE — 97116 GAIT TRAINING THERAPY: CPT

## 2023-07-29 PROCEDURE — 97530 THERAPEUTIC ACTIVITIES: CPT

## 2023-07-29 PROCEDURE — 99232 SBSQ HOSP IP/OBS MODERATE 35: CPT | Performed by: PHYSICAL MEDICINE & REHABILITATION

## 2023-07-29 PROCEDURE — 97535 SELF CARE MNGMENT TRAINING: CPT

## 2023-07-29 PROCEDURE — 99232 SBSQ HOSP IP/OBS MODERATE 35: CPT | Performed by: INTERNAL MEDICINE

## 2023-07-29 PROCEDURE — 97110 THERAPEUTIC EXERCISES: CPT

## 2023-07-29 RX ORDER — LISINOPRIL 10 MG/1
10 TABLET ORAL DAILY
Status: DISCONTINUED | OUTPATIENT
Start: 2023-07-30 | End: 2023-08-09 | Stop reason: HOSPADM

## 2023-07-29 RX ADMIN — GABAPENTIN 300 MG: 300 CAPSULE ORAL at 08:52

## 2023-07-29 RX ADMIN — DOCUSATE SODIUM 100 MG: 100 CAPSULE ORAL at 08:49

## 2023-07-29 RX ADMIN — HEPARIN SODIUM 5000 UNITS: 5000 INJECTION INTRAVENOUS; SUBCUTANEOUS at 06:22

## 2023-07-29 RX ADMIN — ACETAMINOPHEN 975 MG: 325 TABLET, FILM COATED ORAL at 06:22

## 2023-07-29 RX ADMIN — DILTIAZEM HYDROCHLORIDE 120 MG: 60 TABLET, FILM COATED ORAL at 08:50

## 2023-07-29 RX ADMIN — OXYCODONE HYDROCHLORIDE 10 MG: 10 TABLET ORAL at 03:22

## 2023-07-29 RX ADMIN — SENNOSIDES 8.6 MG: 8.6 TABLET, FILM COATED ORAL at 08:52

## 2023-07-29 RX ADMIN — NICOTINE 1 PATCH: 21 PATCH, EXTENDED RELEASE TRANSDERMAL at 08:52

## 2023-07-29 RX ADMIN — HEPARIN SODIUM 5000 UNITS: 5000 INJECTION INTRAVENOUS; SUBCUTANEOUS at 21:22

## 2023-07-29 RX ADMIN — GABAPENTIN 300 MG: 300 CAPSULE ORAL at 17:32

## 2023-07-29 RX ADMIN — OXYCODONE HYDROCHLORIDE 10 MG: 10 TABLET ORAL at 14:41

## 2023-07-29 RX ADMIN — LAMOTRIGINE 100 MG: 100 TABLET ORAL at 08:51

## 2023-07-29 RX ADMIN — METHOCARBAMOL 500 MG: 500 TABLET ORAL at 06:22

## 2023-07-29 RX ADMIN — GABAPENTIN 400 MG: 400 CAPSULE ORAL at 21:22

## 2023-07-29 RX ADMIN — ATORVASTATIN CALCIUM 40 MG: 40 TABLET, FILM COATED ORAL at 16:30

## 2023-07-29 RX ADMIN — ACETAMINOPHEN 975 MG: 325 TABLET, FILM COATED ORAL at 13:13

## 2023-07-29 RX ADMIN — ISOSORBIDE MONONITRATE 30 MG: 30 TABLET, EXTENDED RELEASE ORAL at 08:51

## 2023-07-29 RX ADMIN — METOPROLOL SUCCINATE 50 MG: 50 TABLET, EXTENDED RELEASE ORAL at 08:51

## 2023-07-29 RX ADMIN — ACETAMINOPHEN 975 MG: 325 TABLET, FILM COATED ORAL at 21:22

## 2023-07-29 RX ADMIN — METHOCARBAMOL 500 MG: 500 TABLET ORAL at 21:23

## 2023-07-29 RX ADMIN — METHOCARBAMOL 500 MG: 500 TABLET ORAL at 13:13

## 2023-07-29 RX ADMIN — EZETIMIBE 10 MG: 10 TABLET ORAL at 08:50

## 2023-07-29 RX ADMIN — OXYCODONE HYDROCHLORIDE 5 MG: 5 TABLET ORAL at 08:56

## 2023-07-29 RX ADMIN — CLOPIDOGREL BISULFATE 75 MG: 75 TABLET ORAL at 08:52

## 2023-07-29 RX ADMIN — FOLIC ACID 1 MG: 1 TABLET ORAL at 08:50

## 2023-07-29 RX ADMIN — HEPARIN SODIUM 5000 UNITS: 5000 INJECTION INTRAVENOUS; SUBCUTANEOUS at 13:12

## 2023-07-29 NOTE — PROGRESS NOTES
07/29/23 1005   Pain Assessment   Pain Assessment Tool 0-10   Pain Score 3   Pain Location/Orientation Orientation: Left; Location: Leg   Pain Onset/Description Onset: Ongoing; Descriptor: Aching;Descriptor: Sore   Patient's Stated Pain Goal No pain   Hospital Pain Intervention(s) Repositioned; Rest   Restrictions/Precautions   Precautions Bed/chair alarms; Fall Risk;Pain;Supervision on toilet/commode   Weight Bearing Restrictions Yes   LLE Weight Bearing Per Order NWB   Braces or Orthoses   (LLE kerlix)   Cognition   Overall Cognitive Status WFL   Arousal/Participation Cooperative   Subjective   Subjective Pt reports receiving medication and having reduced pain, ready to participate in PT although difficulty keeping kerlix wrap on   Sit to Lying   Type of Assistance Needed Supervision   Sit to Lying CARE Score 4   Lying to Sitting on Side of Bed   Type of Assistance Needed Supervision   Lying to Sitting on Side of Bed CARE Score 4   Sit to Stand   Type of Assistance Needed Incidental touching;Physical assistance   Physical Assistance Level 25% or less   Comment cues for safety, hand placement, and locking brakes   Sit to Stand CARE Score 3   Bed-Chair Transfer   Type of Assistance Needed Physical assistance   Physical Assistance Level 25% or less   Comment modified sit pivot and SPT with RW at [facility]   Chair/Bed-to-Chair Transfer CARE Score 3   Transfer Bed/Chair/Wheelchair   Sit Pivot Minimal Assist   Stand Pivot Minimal Assist   Sit to Stand Contact Guard;Minimal Assist   Supine to Sit Supervision   Sit to Supine Supervision   Walk 10 Feet   Type of Assistance Needed Physical assistance   Physical Assistance Level 25% or less   Walk 10 Feet CARE Score 3   Walk 50 Feet with Two Turns   Reason if not Attempted Safety concerns   Walk 50 Feet with Two Turns CARE Score 88   Walk 150 Feet   Reason if not Attempted Safety concerns   Walk 150 Feet CARE Score 88   Walking 10 Feet on Uneven Surfaces   Reason if not Attempted Safety concerns   Walking 10 Feet on Uneven Surfaces CARE Score 88   Ambulation   Primary Mode of Locomotion Prior to Admission Walk   Distance Walked (feet) 10 ft  (x3)   Assist Device Roller Walker   Gait Pattern Hopping   Limitations Noted In Balance; Endurance; Safety   Walk Assist Level Contact Guard; Chair Follow   Does the patient walk? 2. Yes   Wheel 50 Feet with Two Turns   Type of Assistance Needed Supervision;Verbal cues   Wheel 50 Feet with Two Turns CARE Score 4   Wheel 150 Feet   Type of Assistance Needed Supervision; Incidental touching   Wheel 150 Feet CARE Score 4   Wheelchair mobility   Does the patient use a wheelchair? 1. Yes   Method Right upper extremity; Left upper extremity;Right lower extremity   Assistance Provided For Locking Brakes   Distance Level Surface (feet) 150 ft   Curb or Single Stair   Style negotiated Single stair   Type of Assistance Needed Physical assistance   Physical Assistance Level 26%-50%   1 Step (Curb) CARE Score 3   4 Steps   Reason if not Attempted Safety concerns   4 Steps CARE Score 88   12 Steps   Reason if not Attempted Safety concerns   12 Steps CARE Score 88   Stairs   Type Stairs   # of Steps 1   Findings Negotiated hopping bwd on 4" step x3 with RW and 6" step x3 with RW with min-modAx1   Toilet Transfer   Type of Assistance Needed Physical assistance; Adaptive equipment;Verbal cues   Physical Assistance Level 25% or less   Toilet Transfer CARE Score 3   Toilet Transfer   Findings SPT with RW from [facility] <> BSC over toilet with VC for safety.  Used surestay wipes to assist with hygiene; provided brief due to incontinence instead of underwear and notified nursing   Therapeutic Interventions   Strengthening Supine B/L LE SLR and hip abd/add   Balance Standing balance at RW supported, unilaterally supported, and unsupported at CGA level   Other Comments   Comments Rewrapped residual limb x2 with ABD pad and kerlix per nursing   Assessment   Treatment Assessment Pt seen for 90 minute PT session focusing on transfers, short distance gait with RW, and stair negotiation to simulate home environment. Pt able to perform sit pivot transfers without RW and SPT with RW at [facility] level but does require cues for safety awareness with locking brakes and hand placement. Pt performed all STS from [place] at [facility] level. Pt can perform WC mobility 150ft+ on level surfaces with b/l UE propulsion and R LE propulsion with good obstacle negotiation and sequencing. Pt able to negotiate 6" step bwd with RW to simulate negotiating 2 steps to enter home. Reviewed with pt how she could sit in chair at top of step in home. Pt ambulated 10ftx3 with RW via hopping and able to perform this task to bathroom and pivot on sit on BSC over toilet. Incontinent of bowel today; notified nursing who may adjust medication. Reviewed supine exercises in bed due to needing to rewrap residual limb with kerlix. Pt is progressing towards all goals but will require additional PT services to promote safety awareness and to continue to address mobility deficits. PT Barriers   Physical Impairment Decreased strength;Decreased endurance; Impaired balance;Decreased safety awareness;Pain;Orthopedic restrictions   Functional Limitation Car transfers;Stair negotiation;Standing;Transfers; Walking; Wheelchair management;Ramp negotiation   Plan   Treatment/Interventions Functional transfer training;LE strengthening/ROM; Elevations; Therapeutic exercise; Endurance training;Patient/family training;Bed mobility;Gait training   Progress Progressing toward goals   Recommendation   PT Discharge Recommendation Home with home health rehabilitation   PT Therapy Minutes   PT Time In 1005   PT Time Out 1135   PT Total Time (minutes) 90   PT Mode of treatment - Individual (minutes) 90   PT Mode of treatment - Concurrent (minutes) 0   PT Mode of treatment - Group (minutes) 0   PT Mode of treatment - Co-treat (minutes) 0   PT Mode of Treatment - Total time(minutes) 90 minutes   PT Cumulative Minutes 330   Therapy Time missed   Time missed?  No

## 2023-07-29 NOTE — PROGRESS NOTES
Internal Medicine Progress Note  Patient: Rubio Lewis  Age/sex: 68 y.o. female  Medical Record #: [id number]      ASSESSMENT/PLAN: (Interval History)  Rubio Lewis is seen and examined and management for following issues:    S/p left AKA 7/21/23  · Hx severe PAD with B/L SC to FA bypass in past = has TO left fem-pop BPG  · Home:  Plavix/Crestor 20mg qd/Zetia 10mg qd  · Here:  Plavix/Zetia/Lipitor 40mg qd  · Pain management as per PMR     Coronary artery disease  · S/p CABG in 2000  · TTE on 1/26/2023 with EF of 60%, mild to moderate AI, mild AS, estimated PA pressure of 40 mmHg  · Nuclear pharmacological stress test on 1/26/2023 with anterior wall perfusion defect likely artifact/cannot exclude small area of ischemia, normal LV systolic function. · Continue Plavix/Zetia 10mg qd/Imdur     Hypertension  · Home: Diltiazem 120 mg qd/Amlodipine 5 mg qd/Imdur 30 mg qd/Lisinopril 10 mg/ Toprol-XL 50 mg qd  · Here:  Diltiazem 120mg qd/Amlodipine 5mg qd/Imdur 30mg qd/Linsinopril 10mg qd (hold SBP <130)/Toprol 50mg qd  · Lisinopril held this AM 7/29/23; since on 2 CCB, will stop the Norvasc and if needed, then go up on Lisinopril or Toprol.   Have reviewed OP records and dont know who started the Norvasc   · Will change the hold parameter on the Lisinopril from 576 systolic to 688     Hyperlipidemia  · Continue atorvastatin and Zetia here  · Follow-up with outpatient cardiology for possible Repatha     Paroxysmal atrial fibrillation  · Currently in sinus rhythm  · S/p a 2-week Zio patch in March 2023 with 5 episodes of SVT without other significant arrhythmia  · Continue Diltiazem 120 mg daily, Toprol XL 50 mg daily     DVT/IVC filter  · Hx lower extremity DVT/IVC filter  · IVC filter is still present on PET scan 7/7/2023  · DVT prophylaxis with subcutaneous heparin     Tobacco use  · Continues to smoke 1 to 1.5 packs/day  · Did not tolerate Wellbutrin/Chantix in the past  · Counseled for smoking cessation but not ready to quit  · Nicotine patch here 21 mg     COPD  · CT chest with evidence of emphysema  · PFT in June 2023: Moderate obstruction, no reversibility with bronchodilator   · Was recommended Trelegy and rescue inhaler, has not initiated Trelegy as yet and does not plan to; using Albuterol prn. · Continues on RA with O2 sats 91-92%. · Recommend outpatient follow-up with pulmonology and smoking cessation     Right perihilar mass   · On CT imaging 7/18/23  · Planned for biopsy on 7/31 by thoracic surgeon.     CKD II  · Creatinine stable at 0.78  · Has likely nonfunctioning right kidney  · Avoid nephrotoxic agents, hypotension     Bipolar disease  · continue Lamictal 100 mg daily  · Stable        Discharge date:  Team    The above assessment and plan was reviewed and updated as determined by my evaluation of the patient on 7/29/2023.     Labs:   Results from last 7 days   Lab Units 07/27/23  0606 07/24/23  0650   WBC Thousand/uL 10.38* 10.96*   HEMOGLOBIN g/dL 12.8 12.4   HEMATOCRIT % 40.6 38.5   PLATELETS Thousands/uL 262 187     Results from last 7 days   Lab Units 07/27/23  0606   SODIUM mmol/L 140   POTASSIUM mmol/L 3.6   CHLORIDE mmol/L 106   CO2 mmol/L 28   BUN mg/dL 13   CREATININE mg/dL 0.78   CALCIUM mg/dL 9.2                   Review of Scheduled Meds:  Current Facility-Administered Medications   Medication Dose Route Frequency Provider Last Rate   • acetaminophen  975 mg Oral Q8H [address] LEV Turner     • albuterol  2 puff Inhalation Q6H PRN LEV Turner     • amLODIPine  5 mg Oral Daily LEV Turner     • atorvastatin  40 mg Oral Daily With LEV Marcano     • bisacodyl  10 mg Rectal Daily PRN LEV Meehan     • clopidogrel  75 mg Oral Daily LEV Turner     • diltiazem  120 mg Oral Daily LEV Turner     • docusate sodium  100 mg Oral BID LEV Turner     • ezetimibe  10 mg Oral Daily LEV Turner     • folic acid  1 mg Oral Daily Suzan LEV Plascencia     • gabapentin  300 mg Oral BID LEV Whitmore     • gabapentin  400 mg Oral HS Adam Richardson MD     • heparin (porcine)  5,000 Units Subcutaneous [facility] LEV Turner     • isosorbide mononitrate  30 mg Oral QAM LEV Turner     • lamoTRIgine  100 mg Oral Daily LEV Turner     • lidocaine  2 patch Topical Daily LEV Turner     • lisinopril  10 mg Oral Daily LEV Turner     • melatonin  9 mg Oral HS PRN LEV Whitmore     • methocarbamol  500 mg Oral [facility] Adam Richardson MD     • metoprolol succinate  50 mg Oral Daily LEV Turner     • nicotine  1 patch Transdermal Daily LEV Turner     • ondansetron  4 mg Oral Q6H PRN LEV Whitmore     • oxyCODONE  5 mg Oral Q4H PRN Adam Richardson MD      Or   • oxyCODONE  10 mg Oral Q4H PRN Adam Richadrson MD     • polyethylene glycol  17 g Oral Daily LEV Turner     • senna  1 tablet Oral BID Adam Richardson MD     • traZODone  50 mg Oral HS PRN LEV Whitmore         Subjective/ HPI: Patient seen and examined. Patients overnight issues or events were reviewed with nursing or staff during rounds or morning huddle session. New or overnight issues include the following:     No new or overnight issues. Offers no complaints    ROS:   A 10 point ROS was performed; negative except as noted above.        Imaging:     No orders to display       *Labs /Radiology studies reviewed  *Medications reviewed and reconciled as needed  *Please refer to order section for additional ordered labs studies  *Case discussed with primary attending during morning huddle case rounds    Physical Examination:  Vitals:   Vitals:    07/29/23 0500 07/29/23 0621 07/29/23 0850 07/29/23 0851   BP:  161/77 118/60    BP Location:  Left arm     Pulse:  69  67   Resp:  18     Temp:  97.8 °F (36.6 °C)     TempSrc:  Oral     SpO2: 92% 91%     Weight:       Height:           General Appearance: no distress, conversive  HEENT: PERRLA, conjuctiva normal; oropharynx clear; mucous membranes moist   Neck:  Supple, normal ROM  Lungs: CTA, normal respiratory effort, no retractions, expiratory effort normal  CV: regular rate and rhythm; no rubs/murmurs/gallops, PMI normal   ABD: soft; ND/NT; +BS  EXT: no edema  Skin: normal turgor, normal texture, no rashes  Psych: affect normal, mood normal  Neuro: AAO      The above physical exam was reviewed and updated as determined by my evaluation of the patient on 7/29/2023. Invasive Devices     Drain  Duration           External Urinary Catheter 7 days                   VTE Pharmacologic Prophylaxis: Heparin  Code Status: Level 1 - Full Code  Current Length of Stay: 4 day(s)      Total time spent:  30 minutes with more than 50% spent counseling/coordinating care. Counseling includes discussion with patient re: progress  and discussion with patient of his/her current medical state/information. Coordination of patient's care was performed in conjunction with primary service. Time invested included review of patient's labs, vitals, and management of their comorbidities with continued monitoring. In addition, this patient was discussed with medical team including physician and advanced extenders. The care of the patient was extensively discussed and appropriate treatment plan was formulated unique for this patient. Medical decision making for the day was made by supervising physician unless otherwise noted in their attestation statement. ** Please Note:  voice to text software may have been used in the creation of this document.  Although proof errors in transcription or interpretation are a potential of such software**

## 2023-07-29 NOTE — PLAN OF CARE
Problem: Prexisting or High Potential for Compromised Skin Integrity  Goal: Skin integrity is maintained or improved  Description: INTERVENTIONS:  - Identify patients at risk for skin breakdown  - Assess and monitor skin integrity  - Assess and monitor nutrition and hydration status  - Monitor labs   - Assess for incontinence   - Turn and reposition patient  - Assist with mobility/ambulation  - Relieve pressure over bony prominences  - Avoid friction and shearing  - Provide appropriate hygiene as needed including keeping skin clean and dry  - Evaluate need for skin moisturizer/barrier cream  - Collaborate with interdisciplinary team   - Patient/family teaching  - Consider wound care consult   Outcome: Progressing     Problem: MOBILITY - ADULT  Goal: Maintain or return to baseline ADL function  Description: INTERVENTIONS:  -  Assess patient's ability to carry out ADLs; assess patient's baseline for ADL function and identify physical deficits which impact ability to perform ADLs (bathing, care of mouth/teeth, toileting, grooming, dressing, etc.)  - Assess/evaluate cause of self-care deficits   - Assess range of motion  - Assess patient's mobility; develop plan if impaired  - Assess patient's need for assistive devices and provide as appropriate  - Encourage maximum independence but intervene and supervise when necessary  - Involve family in performance of ADLs  - Assess for home care needs following discharge   - Consider OT consult to assist with ADL evaluation and planning for discharge  - Provide patient education as appropriate  Outcome: Progressing  Goal: Maintains/Returns to pre admission functional level  Description: INTERVENTIONS:  - Perform BMAT or MOVE assessment daily.   - Set and communicate daily mobility goal to care team and patient/family/caregiver. - Collaborate with rehabilitation services on mobility goals if consulted  - Perform Range of Motion 3 times a day.   - Reposition patient every 2 hours.  - Dangle patient 3 times a day  - Stand patient 3 times a day  - Ambulate patient 3 times a day  - Out of bed to chair 3 times a day   - Out of bed for meals 3 times a day  - Out of bed for toileting  - Record patient progress and toleration of activity level   Outcome: Progressing     Problem: PAIN - ADULT  Goal: Verbalizes/displays adequate comfort level or baseline comfort level  Description: Interventions:  - Encourage patient to monitor pain and request assistance  - Assess pain using appropriate pain scale  - Administer analgesics based on type and severity of pain and evaluate response  - Implement non-pharmacological measures as appropriate and evaluate response  - Consider cultural and social influences on pain and pain management  - Notify physician/advanced practitioner if interventions unsuccessful or patient reports new pain  Outcome: Progressing     Problem: INFECTION - ADULT  Goal: Absence or prevention of progression during hospitalization  Description: INTERVENTIONS:  - Assess and monitor for signs and symptoms of infection  - Monitor lab/diagnostic results  - Monitor all insertion sites, i.e. indwelling lines, tubes, and drains  - Monitor endotracheal if appropriate and nasal secretions for changes in amount and color  - [place] appropriate cooling/warming therapies per order  - Administer medications as ordered  - Instruct and encourage patient and family to use good hand hygiene technique  - Identify and instruct in appropriate isolation precautions for identified infection/condition  Outcome: Progressing  Goal: Absence of fever/infection during neutropenic period  Description: INTERVENTIONS:  - Monitor WBC    Outcome: Progressing     Problem: SAFETY ADULT  Goal: Patient will remain free of falls  Description: INTERVENTIONS:  - Educate patient/family on patient safety including physical limitations  - Instruct patient to call for assistance with activity   - Consult OT/PT to assist with strengthening/mobility   - Keep Call bell within reach  - Keep bed low and locked with side rails adjusted as appropriate  - Keep care items and personal belongings within reach  - Initiate and maintain comfort rounds  - Make Fall Risk Sign visible to staff  - Offer Toileting every 2 Hours, in advance of need  - Initiate/Maintain bed alarm  - Obtain necessary fall risk management equipment: bed alarm  - Apply yellow socks and bracelet for high fall risk patients  - Consider moving patient to room near nurses station  Outcome: Progressing  Goal: Maintain or return to baseline ADL function  Description: INTERVENTIONS:  -  Assess patient's ability to carry out ADLs; assess patient's baseline for ADL function and identify physical deficits which impact ability to perform ADLs (bathing, care of mouth/teeth, toileting, grooming, dressing, etc.)  - Assess/evaluate cause of self-care deficits   - Assess range of motion  - Assess patient's mobility; develop plan if impaired  - Assess patient's need for assistive devices and provide as appropriate  - Encourage maximum independence but intervene and supervise when necessary  - Involve family in performance of ADLs  - Assess for home care needs following discharge   - Consider OT consult to assist with ADL evaluation and planning for discharge  - Provide patient education as appropriate  Outcome: Progressing  Goal: Maintains/Returns to pre admission functional level  Description: INTERVENTIONS:  - Perform BMAT or MOVE assessment daily.   - Set and communicate daily mobility goal to care team and patient/family/caregiver. - Collaborate with rehabilitation services on mobility goals if consulted  - Perform Range of Motion 3 times a day. - Reposition patient every 2 hours.   - Dangle patient 3 times a day  - Stand patient 3 times a day  - Ambulate patient 3 times a day  - Out of bed to chair 3 times a day   - Out of bed for meals 3 times a day  - Out of bed for toileting  - Record patient progress and toleration of activity level   Outcome: Progressing     Problem: DISCHARGE PLANNING  Goal: Discharge to home or other facility with appropriate resources  Description: INTERVENTIONS:  - Identify barriers to discharge w/patient and caregiver  - Arrange for needed discharge resources and transportation as appropriate  - Identify discharge learning needs (meds, wound care, etc.)  - Arrange for interpretive services to assist at discharge as needed  - Refer to Case Management Department for coordinating discharge planning if the patient needs post-hospital services based on physician/advanced practitioner order or complex needs related to functional status, cognitive ability, or social support system  Outcome: Progressing     Problem: Knowledge Deficit  Goal: Patient/family/caregiver demonstrates understanding of disease process, treatment plan, medications, and discharge instructions  Description: Complete learning assessment and assess knowledge base. Interventions:  - Provide teaching at level of understanding  - Provide teaching via preferred learning methods  Outcome: Progressing     Problem: Nutrition/Hydration-ADULT  Goal: Nutrient/Hydration intake appropriate for improving, restoring or maintaining nutritional needs  Description: Monitor and assess patient's nutrition/hydration status for malnutrition. Collaborate with interdisciplinary team and initiate plan and interventions as ordered. Monitor patient's weight and dietary intake as ordered or per policy. Utilize nutrition screening tool and intervene as necessary. Determine patient's food preferences and provide high-protein, high-caloric foods as appropriate.      INTERVENTIONS:  - Monitor oral intake, urinary output, labs, and treatment plans  - Assess nutrition and hydration status and recommend course of action  - Evaluate amount of meals eaten  - Assist patient with eating if necessary   - Allow adequate time for meals  - Recommend/ encourage appropriate diets, oral nutritional supplements, and vitamin/mineral supplements  - Order, calculate, and assess calorie counts as needed  - Recommend, monitor, and adjust tube feedings and TPN/PPN based on assessed needs  - Assess need for intravenous fluids  - Provide specific nutrition/hydration education as appropriate  - Include patient/family/caregiver in decisions related to nutrition  Outcome: Progressing

## 2023-07-29 NOTE — PLAN OF CARE
Problem: Prexisting or High Potential for Compromised Skin Integrity  Goal: Skin integrity is maintained or improved  Description: INTERVENTIONS:  - Identify patients at risk for skin breakdown  - Assess and monitor skin integrity  - Assess and monitor nutrition and hydration status  - Monitor labs   - Assess for incontinence   - Turn and reposition patient  - Assist with mobility/ambulation  - Relieve pressure over bony prominences  - Avoid friction and shearing  - Provide appropriate hygiene as needed including keeping skin clean and dry  - Evaluate need for skin moisturizer/barrier cream  - Collaborate with interdisciplinary team   - Patient/family teaching  - Consider wound care consult   Outcome: Progressing     Problem: MOBILITY - ADULT  Goal: Maintain or return to baseline ADL function  Description: INTERVENTIONS:  -  Assess patient's ability to carry out ADLs; assess patient's baseline for ADL function and identify physical deficits which impact ability to perform ADLs (bathing, care of mouth/teeth, toileting, grooming, dressing, etc.)  - Assess/evaluate cause of self-care deficits   - Assess range of motion  - Assess patient's mobility; develop plan if impaired  - Assess patient's need for assistive devices and provide as appropriate  - Encourage maximum independence but intervene and supervise when necessary  - Involve family in performance of ADLs  - Assess for home care needs following discharge   - Consider OT consult to assist with ADL evaluation and planning for discharge  - Provide patient education as appropriate  Outcome: Progressing  Goal: Maintains/Returns to pre admission functional level  Description: INTERVENTIONS:  - Perform BMAT or MOVE assessment daily.   - Set and communicate daily mobility goal to care team and patient/family/caregiver. - Collaborate with rehabilitation services on mobility goals if consulted  - Perform Range of Motion 3 times a day.   - Reposition patient every 2 hours.  - Dangle patient 3 times a day  - Stand patient 3 times a day  - Ambulate patient 3 times a day  - Out of bed to chair 3 times a day   - Out of bed for meals 3 times a day  - Out of bed for toileting  - Record patient progress and toleration of activity level   Outcome: Progressing     Problem: PAIN - ADULT  Goal: Verbalizes/displays adequate comfort level or baseline comfort level  Description: Interventions:  - Encourage patient to monitor pain and request assistance  - Assess pain using appropriate pain scale  - Administer analgesics based on type and severity of pain and evaluate response  - Implement non-pharmacological measures as appropriate and evaluate response  - Consider cultural and social influences on pain and pain management  - Notify physician/advanced practitioner if interventions unsuccessful or patient reports new pain  Outcome: Progressing     Problem: INFECTION - ADULT  Goal: Absence or prevention of progression during hospitalization  Description: INTERVENTIONS:  - Assess and monitor for signs and symptoms of infection  - Monitor lab/diagnostic results  - Monitor all insertion sites, i.e. indwelling lines, tubes, and drains  - Monitor endotracheal if appropriate and nasal secretions for changes in amount and color  - [place] appropriate cooling/warming therapies per order  - Administer medications as ordered  - Instruct and encourage patient and family to use good hand hygiene technique  - Identify and instruct in appropriate isolation precautions for identified infection/condition  Outcome: Progressing  Goal: Absence of fever/infection during neutropenic period  Description: INTERVENTIONS:  - Monitor WBC    Outcome: Progressing     Problem: SAFETY ADULT  Goal: Patient will remain free of falls  Description: INTERVENTIONS:  - Educate patient/family on patient safety including physical limitations  - Instruct patient to call for assistance with activity   - Consult OT/PT to assist with strengthening/mobility   - Keep Call bell within reach  - Keep bed low and locked with side rails adjusted as appropriate  - Keep care items and personal belongings within reach  - Initiate and maintain comfort rounds  - Make Fall Risk Sign visible to staff  - Offer Toileting every 2 Hours, in advance of need  - Initiate/Maintain bed alarm  - Obtain necessary fall risk management equipment: bed alarm  - Apply yellow socks and bracelet for high fall risk patients  - Consider moving patient to room near nurses station  Outcome: Progressing  Goal: Maintain or return to baseline ADL function  Description: INTERVENTIONS:  -  Assess patient's ability to carry out ADLs; assess patient's baseline for ADL function and identify physical deficits which impact ability to perform ADLs (bathing, care of mouth/teeth, toileting, grooming, dressing, etc.)  - Assess/evaluate cause of self-care deficits   - Assess range of motion  - Assess patient's mobility; develop plan if impaired  - Assess patient's need for assistive devices and provide as appropriate  - Encourage maximum independence but intervene and supervise when necessary  - Involve family in performance of ADLs  - Assess for home care needs following discharge   - Consider OT consult to assist with ADL evaluation and planning for discharge  - Provide patient education as appropriate  Outcome: Progressing  Goal: Maintains/Returns to pre admission functional level  Description: INTERVENTIONS:  - Perform BMAT or MOVE assessment daily.   - Set and communicate daily mobility goal to care team and patient/family/caregiver. - Collaborate with rehabilitation services on mobility goals if consulted  - Perform Range of Motion 3 times a day. - Reposition patient every 2 hours.   - Dangle patient 3 times a day  - Stand patient 3 times a day  - Ambulate patient 3 times a day  - Out of bed to chair 3 times a day   - Out of bed for meals 3 times a day  - Out of bed for toileting  - Record patient progress and toleration of activity level   Outcome: Progressing     Problem: DISCHARGE PLANNING  Goal: Discharge to home or other facility with appropriate resources  Description: INTERVENTIONS:  - Identify barriers to discharge w/patient and caregiver  - Arrange for needed discharge resources and transportation as appropriate  - Identify discharge learning needs (meds, wound care, etc.)  - Arrange for interpretive services to assist at discharge as needed  - Refer to Case Management Department for coordinating discharge planning if the patient needs post-hospital services based on physician/advanced practitioner order or complex needs related to functional status, cognitive ability, or social support system  Outcome: Progressing     Problem: Knowledge Deficit  Goal: Patient/family/caregiver demonstrates understanding of disease process, treatment plan, medications, and discharge instructions  Description: Complete learning assessment and assess knowledge base. Interventions:  - Provide teaching at level of understanding  - Provide teaching via preferred learning methods  Outcome: Progressing     Problem: Nutrition/Hydration-ADULT  Goal: Nutrient/Hydration intake appropriate for improving, restoring or maintaining nutritional needs  Description: Monitor and assess patient's nutrition/hydration status for malnutrition. Collaborate with interdisciplinary team and initiate plan and interventions as ordered. Monitor patient's weight and dietary intake as ordered or per policy. Utilize nutrition screening tool and intervene as necessary. Determine patient's food preferences and provide high-protein, high-caloric foods as appropriate.      INTERVENTIONS:  - Monitor oral intake, urinary output, labs, and treatment plans  - Assess nutrition and hydration status and recommend course of action  - Evaluate amount of meals eaten  - Assist patient with eating if necessary   - Allow adequate time for meals  - Recommend/ encourage appropriate diets, oral nutritional supplements, and vitamin/mineral supplements  - Order, calculate, and assess calorie counts as needed  - Recommend, monitor, and adjust tube feedings and TPN/PPN based on assessed needs  - Assess need for intravenous fluids  - Provide specific nutrition/hydration education as appropriate  - Include patient/family/caregiver in decisions related to nutrition  Outcome: Progressing

## 2023-07-29 NOTE — PROGRESS NOTES
07/29/23 0700   Pain Assessment   Pain Assessment Tool 0-10   Pain Score 6   Pain Location/Orientation Orientation: Left; Location: Leg;Location: Incision   Pain Onset/Description Onset: Ongoing;Frequency: Constant/Continuous   Hospital Pain Intervention(s) Repositioned; Emotional support; Rest   Restrictions/Precautions   Precautions Bed/chair alarms;Cognitive; Fall Risk;Pain;Supervision on toilet/commode   Weight Bearing Restrictions Yes   LLE Weight Bearing Per Order [facility]B   Braces or Orthoses   (LLE kerlix)   Eating   Type of Assistance Needed Independent   Physical Assistance Level No physical assistance   Comment seated fully upright in bed at end of OT session for breakfast meal   Eating CARE Score 6   Oral Hygiene   Type of Assistance Needed Supervision   Physical Assistance Level No physical assistance   Comment seated in w/c at sink to brush teeth   Oral Hygiene CARE Score 4   Grooming   Able To Initiate Tasks;Comb/Brush Hair;Wash/Dry Face;Brush/Clean Teeth;Wash/Dry Hands   Limitation Noted In Strength;Timeliness   Findings Sup while seated in w/c at sink for all grooming tasks   Shower/Bathe Self   Type of Assistance Needed Physical assistance;Verbal cues   Physical Assistance Level 25% or less   Comment Pt engaged in sponge bath while seated in w/c at sink, able to wash 8/9 parts, washing UB, B/L upper legs, and R lower leg while seated with Sup. A required to wash R foot 2* decreased fxl reach. CGA in stance with unilateral UE support on sink ledge while pt washed latia/rear, no LOB. Shower/Bathe Self CARE Score 3   Bathing   Assessed Bath Style Sponge Bath   Able to Gather/Transport No   Able to [employer] Temperature Yes   Able to Wash/Rinse/Dry (body part) Left Arm;Right Arm;L Upper Leg;R Upper Leg;Chest;Abdomen;Perineal Area; Buttocks   Limitations Noted in Balance; Endurance;ROM;Strength;Timeliness   Positioning Seated;Standing   Tub/Shower Transfer   Reason Not Assessed Sponge Bath;Medical   Upper Body Dressing   Type of Assistance Needed Set-up / clean-up   Physical Assistance Level No physical assistance   Comment seated in w/c to doff/[place] shirt   Upper Body Dressing CARE Score 5   Lower Body Dressing   Type of Assistance Needed Physical assistance   Physical Assistance Level 26%-50%   Comment Sup while seated in w/c to thread/unthread LEs, CGA in stance with unilateral UE release to manage clothing up/down over hips, no LOB. A required to wrap residual limb with kerlix over ABD pad (no ace wrapping because of bruise on posterior distal L residual limb)   Lower Body Dressing CARE Score 3   Putting On/Taking Off Footwear   Type of Assistance Needed Physical assistance   Physical Assistance Level 25% or less   Comment pt able to doff R sock using fxl reach with increased time, but required A to initiate threading of toes through R sock   Putting On/Taking Off Footwear CARE Score 3   Dressing/Undressing Clothing   Remove UB Clothes Pullover [address] LB Clothes Undergarment;Socks   [address]; Undergarment;Socks   Limitations Noted In Balance; Endurance;ROM;Strength;Timeliness   Positioning Supported Sit;Standing   Sit to Lying   Type of Assistance Needed Supervision   Physical Assistance Level No physical assistance   Sit to Lying CARE Score 4   Lying to Sitting on Side of Bed   Type of Assistance Needed Supervision   Physical Assistance Level No physical assistance   Comment increased time required for LLE mgmt   Lying to Sitting on Side of Bed CARE Score 4   Sit to Stand   Type of Assistance Needed Incidental touching;Verbal cues   Physical Assistance Level No physical assistance   Comment CGA with vc's for safety awareness, hand placement, and to ensure w/c brakes were locked prior to standing   Sit to Stand CARE Score 4   Bed-Chair Transfer   Type of Assistance Needed Physical assistance;Verbal cues   Physical Assistance Level 25% or less   Comment modified sit-pivot xfers w/ Alberto-CGA, with vc's for safety and hand placement   Chair/Bed-to-Chair Transfer CARE Score 3   Toileting Hygiene   Type of Assistance Needed Physical assistance;Verbal cues   Physical Assistance Level 26%-50%   Comment Alberto for balance in partial stance while pt completed latia hygiene and to manage clothing up in back. Pt started CM up while seated using lateral w/s technique   Toileting Hygiene CARE Score 3   Toileting   Able to [address] down yes, up yes. Manage Hygiene Bladder   Limitations Noted In Balance;ROM;UE Strength;LE Strength; Safety   Toilet Transfer   Type of Assistance Needed Physical assistance;Verbal cues; Adaptive equipment   Physical Assistance Level 25% or less   Comment Alberto for modified sit-pivot xfer, w/c<>BSC over toilet, with vc's for hand placement, with pt using GB   Toilet Transfer CARE Score 3   Toilet Transfer   Surface Assessed Drop Arm Commode   Transfer Technique Sit Pivot   Limitations Noted In Balance;Confidence;Problem Solving; Safety;UE Strength;LE Strength   Adaptive Equipment Grab Bar   Positioning Concerns Safety   Cognition   Overall Cognitive Status WFL   Arousal/Participation Alert; Cooperative   Attention Difficulty dividing attention   Orientation Level Oriented X4   Memory Within functional limits   Following Commands Follows multistep commands with increased time or repetition   Activity Tolerance   Activity Tolerance Patient tolerated treatment well   Medical Staff Made Aware barrera Carrasco present to clean residual limb incision and complete skin inspection prior to bedoya wrapping residual limb   Assessment   Treatment Assessment Pt seen for 90min skilled OT session focused on ADL retraining (sponge bath at sink), toileting, commode transfer, fxl transfer training, residual limb wrapping, and bed mobility, for increased independence w/ADLs/IADLs and decreased caregiver burden. See detailed descriptions of fxl performance above.  Pt tolerated session well, demonstrating improvement with level of assist needed for sit<>stands (CGA) and transfers (Alberto). Pt cont to be limited by pain, decreased standing balance/tolerance, mild cog impairment, and endurance. Pt cont to require vc's for safe hand placement and body mechanics during transfers. Pt would benefit from continued skilled OT focused on ADL retraining, w/c mobility/mgmt, repetitive safety training, fxl transfers, CM for toileting, and phase 1 amp edu. Prognosis Good   Problem List Decreased strength;Decreased range of motion;Decreased endurance;Decreased mobility; Impaired balance;Decreased cognition;Decreased safety awareness;Decreased skin integrity;Orthopedic restrictions;Pain   Barriers to Discharge Inaccessible home environment;Decreased caregiver support   Plan   Treatment/Interventions ADL retraining;Functional transfer training; Therapeutic exercise; Endurance training;Cognitive reorientation;Patient/family training;Equipment eval/education; Bed mobility; Compensatory technique education;Spoke to nursing   Progress Progressing toward goals   Recommendation   OT Discharge Recommendation   (pending)   OT Therapy Minutes   OT Time In 0700   OT Time Out 0830   OT Total Time (minutes) 90   OT Mode of treatment - Individual (minutes) 90   OT Mode of treatment - Concurrent (minutes) 0   OT Mode of treatment - Group (minutes) 0   OT Mode of treatment - Co-treat (minutes) 0   OT Mode of Treatment - Total time(minutes) 90 minutes   OT Cumulative Minutes 390   Therapy Time missed   Time missed?  No

## 2023-07-29 NOTE — PLAN OF CARE
Problem: INFECTION - ADULT  Goal: Absence or prevention of progression during hospitalization  Description: INTERVENTIONS:  - Assess and monitor for signs and symptoms of infection  - Monitor lab/diagnostic results  - Monitor all insertion sites, i.e. indwelling lines, tubes, and drains  - Monitor endotracheal if appropriate and nasal secretions for changes in amount and color  - [place] appropriate cooling/warming therapies per order  - Administer medications as ordered  - Instruct and encourage patient and family to use good hand hygiene technique  - Identify and instruct in appropriate isolation precautions for identified infection/condition  Outcome: Progressing

## 2023-07-30 ENCOUNTER — APPOINTMENT (OUTPATIENT)
Dept: RADIOLOGY | Facility: HOSPITAL | Age: 73
DRG: 560 | End: 2023-07-30
Payer: MEDICARE

## 2023-07-30 PROCEDURE — 73552 X-RAY EXAM OF FEMUR 2/>: CPT

## 2023-07-30 PROCEDURE — 99232 SBSQ HOSP IP/OBS MODERATE 35: CPT | Performed by: INTERNAL MEDICINE

## 2023-07-30 PROCEDURE — 99233 SBSQ HOSP IP/OBS HIGH 50: CPT | Performed by: PHYSICAL MEDICINE & REHABILITATION

## 2023-07-30 RX ORDER — CEPHALEXIN 500 MG/1
500 CAPSULE ORAL EVERY 6 HOURS SCHEDULED
Status: DISCONTINUED | OUTPATIENT
Start: 2023-07-30 | End: 2023-08-03

## 2023-07-30 RX ORDER — SACCHAROMYCES BOULARDII 250 MG
250 CAPSULE ORAL 2 TIMES DAILY
Status: DISCONTINUED | OUTPATIENT
Start: 2023-07-30 | End: 2023-08-09 | Stop reason: HOSPADM

## 2023-07-30 RX ADMIN — METHOCARBAMOL 500 MG: 500 TABLET ORAL at 05:59

## 2023-07-30 RX ADMIN — HEPARIN SODIUM 5000 UNITS: 5000 INJECTION INTRAVENOUS; SUBCUTANEOUS at 05:59

## 2023-07-30 RX ADMIN — ACETAMINOPHEN 975 MG: 325 TABLET, FILM COATED ORAL at 13:46

## 2023-07-30 RX ADMIN — ISOSORBIDE MONONITRATE 30 MG: 30 TABLET, EXTENDED RELEASE ORAL at 08:56

## 2023-07-30 RX ADMIN — METHOCARBAMOL 500 MG: 500 TABLET ORAL at 13:46

## 2023-07-30 RX ADMIN — METHOCARBAMOL 500 MG: 500 TABLET ORAL at 21:11

## 2023-07-30 RX ADMIN — HEPARIN SODIUM 5000 UNITS: 5000 INJECTION INTRAVENOUS; SUBCUTANEOUS at 13:46

## 2023-07-30 RX ADMIN — OXYCODONE HYDROCHLORIDE 10 MG: 10 TABLET ORAL at 17:09

## 2023-07-30 RX ADMIN — CLOPIDOGREL BISULFATE 75 MG: 75 TABLET ORAL at 08:56

## 2023-07-30 RX ADMIN — ATORVASTATIN CALCIUM 40 MG: 40 TABLET, FILM COATED ORAL at 17:09

## 2023-07-30 RX ADMIN — LISINOPRIL 10 MG: 10 TABLET ORAL at 08:56

## 2023-07-30 RX ADMIN — DILTIAZEM HYDROCHLORIDE 120 MG: 60 TABLET, FILM COATED ORAL at 08:56

## 2023-07-30 RX ADMIN — ACETAMINOPHEN 975 MG: 325 TABLET, FILM COATED ORAL at 21:11

## 2023-07-30 RX ADMIN — GABAPENTIN 300 MG: 300 CAPSULE ORAL at 17:09

## 2023-07-30 RX ADMIN — FOLIC ACID 1 MG: 1 TABLET ORAL at 08:56

## 2023-07-30 RX ADMIN — ACETAMINOPHEN 975 MG: 325 TABLET, FILM COATED ORAL at 05:59

## 2023-07-30 RX ADMIN — NICOTINE 1 PATCH: 21 PATCH, EXTENDED RELEASE TRANSDERMAL at 08:56

## 2023-07-30 RX ADMIN — HEPARIN SODIUM 5000 UNITS: 5000 INJECTION INTRAVENOUS; SUBCUTANEOUS at 21:11

## 2023-07-30 RX ADMIN — Medication 250 MG: at 17:08

## 2023-07-30 RX ADMIN — LAMOTRIGINE 100 MG: 100 TABLET ORAL at 08:56

## 2023-07-30 RX ADMIN — EZETIMIBE 10 MG: 10 TABLET ORAL at 08:56

## 2023-07-30 RX ADMIN — GABAPENTIN 400 MG: 400 CAPSULE ORAL at 21:11

## 2023-07-30 RX ADMIN — METOPROLOL SUCCINATE 50 MG: 50 TABLET, EXTENDED RELEASE ORAL at 08:56

## 2023-07-30 RX ADMIN — CEPHALEXIN 500 MG: 500 CAPSULE ORAL at 17:09

## 2023-07-30 RX ADMIN — OXYCODONE HYDROCHLORIDE 10 MG: 10 TABLET ORAL at 03:11

## 2023-07-30 RX ADMIN — OXYCODONE HYDROCHLORIDE 5 MG: 5 TABLET ORAL at 12:11

## 2023-07-30 RX ADMIN — GABAPENTIN 300 MG: 300 CAPSULE ORAL at 08:56

## 2023-07-30 NOTE — PLAN OF CARE
Problem: Prexisting or High Potential for Compromised Skin Integrity  Goal: Skin integrity is maintained or improved  Description: INTERVENTIONS:  - Identify patients at risk for skin breakdown  - Assess and monitor skin integrity  - Assess and monitor nutrition and hydration status  - Monitor labs   - Assess for incontinence   - Turn and reposition patient  - Assist with mobility/ambulation  - Relieve pressure over bony prominences  - Avoid friction and shearing  - Provide appropriate hygiene as needed including keeping skin clean and dry  - Evaluate need for skin moisturizer/barrier cream  - Collaborate with interdisciplinary team   - Patient/family teaching  - Consider wound care consult   Outcome: Progressing     Problem: MOBILITY - ADULT  Goal: Maintain or return to baseline ADL function  Description: INTERVENTIONS:  -  Assess patient's ability to carry out ADLs; assess patient's baseline for ADL function and identify physical deficits which impact ability to perform ADLs (bathing, care of mouth/teeth, toileting, grooming, dressing, etc.)  - Assess/evaluate cause of self-care deficits   - Assess range of motion  - Assess patient's mobility; develop plan if impaired  - Assess patient's need for assistive devices and provide as appropriate  - Encourage maximum independence but intervene and supervise when necessary  - Involve family in performance of ADLs  - Assess for home care needs following discharge   - Consider OT consult to assist with ADL evaluation and planning for discharge  - Provide patient education as appropriate  Outcome: Progressing  Goal: Maintains/Returns to pre admission functional level  Description: INTERVENTIONS:  - Perform BMAT or MOVE assessment daily.   - Set and communicate daily mobility goal to care team and patient/family/caregiver. - Collaborate with rehabilitation services on mobility goals if consulted  - Perform Range of Motion 3 times a day.   - Reposition patient every 2 hours.  - Dangle patient 3 times a day  - Stand patient 3 times a day  - Ambulate patient 3 times a day  - Out of bed to chair 3 times a day   - Out of bed for meals 3 times a day  - Out of bed for toileting  - Record patient progress and toleration of activity level   Outcome: Progressing     Problem: PAIN - ADULT  Goal: Verbalizes/displays adequate comfort level or baseline comfort level  Description: Interventions:  - Encourage patient to monitor pain and request assistance  - Assess pain using appropriate pain scale  - Administer analgesics based on type and severity of pain and evaluate response  - Implement non-pharmacological measures as appropriate and evaluate response  - Consider cultural and social influences on pain and pain management  - Notify physician/advanced practitioner if interventions unsuccessful or patient reports new pain  Outcome: Progressing     Problem: INFECTION - ADULT  Goal: Absence or prevention of progression during hospitalization  Description: INTERVENTIONS:  - Assess and monitor for signs and symptoms of infection  - Monitor lab/diagnostic results  - Monitor all insertion sites, i.e. indwelling lines, tubes, and drains  - Monitor endotracheal if appropriate and nasal secretions for changes in amount and color  - Reserve appropriate cooling/warming therapies per order  - Administer medications as ordered  - Instruct and encourage patient and family to use good hand hygiene technique  - Identify and instruct in appropriate isolation precautions for identified infection/condition  Outcome: Progressing  Goal: Absence of fever/infection during neutropenic period  Description: INTERVENTIONS:  - Monitor WBC    Outcome: Progressing     Problem: SAFETY ADULT  Goal: Patient will remain free of falls  Description: INTERVENTIONS:  - Educate patient/family on patient safety including physical limitations  - Instruct patient to call for assistance with activity   - Consult OT/PT to assist with strengthening/mobility   - Keep Call bell within reach  - Keep bed low and locked with side rails adjusted as appropriate  - Keep care items and personal belongings within reach  - Initiate and maintain comfort rounds  - Make Fall Risk Sign visible to staff  - Offer Toileting every 2 Hours, in advance of need  - Initiate/Maintain bed alarm  - Obtain necessary fall risk management equipment: bed alarm  - Apply yellow socks and bracelet for high fall risk patients  - Consider moving patient to room near nurses station  Outcome: Progressing  Goal: Maintain or return to baseline ADL function  Description: INTERVENTIONS:  -  Assess patient's ability to carry out ADLs; assess patient's baseline for ADL function and identify physical deficits which impact ability to perform ADLs (bathing, care of mouth/teeth, toileting, grooming, dressing, etc.)  - Assess/evaluate cause of self-care deficits   - Assess range of motion  - Assess patient's mobility; develop plan if impaired  - Assess patient's need for assistive devices and provide as appropriate  - Encourage maximum independence but intervene and supervise when necessary  - Involve family in performance of ADLs  - Assess for home care needs following discharge   - Consider OT consult to assist with ADL evaluation and planning for discharge  - Provide patient education as appropriate  Outcome: Progressing  Goal: Maintains/Returns to pre admission functional level  Description: INTERVENTIONS:  - Perform BMAT or MOVE assessment daily.   - Set and communicate daily mobility goal to care team and patient/family/caregiver. - Collaborate with rehabilitation services on mobility goals if consulted  - Perform Range of Motion 3 times a day. - Reposition patient every 2 hours.   - Dangle patient 3 times a day  - Stand patient 3 times a day  - Ambulate patient 3 times a day  - Out of bed to chair 3 times a day   - Out of bed for meals 3 times a day  - Out of bed for toileting  - Record patient progress and toleration of activity level   Outcome: Progressing     Problem: DISCHARGE PLANNING  Goal: Discharge to home or other facility with appropriate resources  Description: INTERVENTIONS:  - Identify barriers to discharge w/patient and caregiver  - Arrange for needed discharge resources and transportation as appropriate  - Identify discharge learning needs (meds, wound care, etc.)  - Arrange for interpretive services to assist at discharge as needed  - Refer to Case Management Department for coordinating discharge planning if the patient needs post-hospital services based on physician/advanced practitioner order or complex needs related to functional status, cognitive ability, or social support system  Outcome: Progressing     Problem: Knowledge Deficit  Goal: Patient/family/caregiver demonstrates understanding of disease process, treatment plan, medications, and discharge instructions  Description: Complete learning assessment and assess knowledge base. Interventions:  - Provide teaching at level of understanding  - Provide teaching via preferred learning methods  Outcome: Progressing     Problem: Nutrition/Hydration-ADULT  Goal: Nutrient/Hydration intake appropriate for improving, restoring or maintaining nutritional needs  Description: Monitor and assess patient's nutrition/hydration status for malnutrition. Collaborate with interdisciplinary team and initiate plan and interventions as ordered. Monitor patient's weight and dietary intake as ordered or per policy. Utilize nutrition screening tool and intervene as necessary. Determine patient's food preferences and provide high-protein, high-caloric foods as appropriate.      INTERVENTIONS:  - Monitor oral intake, urinary output, labs, and treatment plans  - Assess nutrition and hydration status and recommend course of action  - Evaluate amount of meals eaten  - Assist patient with eating if necessary   - Allow adequate time for meals  - Recommend/ encourage appropriate diets, oral nutritional supplements, and vitamin/mineral supplements  - Order, calculate, and assess calorie counts as needed  - Recommend, monitor, and adjust tube feedings and TPN/PPN based on assessed needs  - Assess need for intravenous fluids  - Provide specific nutrition/hydration education as appropriate  - Include patient/family/caregiver in decisions related to nutrition  Outcome: Progressing

## 2023-07-30 NOTE — PROGRESS NOTES
Internal Medicine Progress Note  Patient: Ghanshyam Batista  Age/sex: 68 y.o. female  Medical Record #: [id number]      ASSESSMENT/PLAN: (Interval History)  Ghanshyam Batista is seen and examined and management for following issues:    S/p left AKA 7/21/23  • Hx severe PAD with B/L SC to FA bypass in past = has TO left fem-pop BPG  • Home:  Plavix/Crestor 20mg qd/Zetia 10mg qd  • Here:  Plavix/Zetia/Lipitor 40mg qd  • Pain management as per PMR  • Pt concerned incision is more red and burns more = Dr Masood Velasquez is going to see her now     Coronary artery disease  • S/p CABG in 2000  • TTE on 1/26/2023 with EF of 60%, mild to moderate AI, mild AS, estimated PA pressure of 40 mmHg  • Nuclear pharmacological stress test on 1/26/2023 with anterior wall perfusion defect likely artifact/cannot exclude small area of ischemia, normal LV systolic function. • Continue Plavix/Zetia 10mg qd/Imdur     Hypertension  • Home: Diltiazem 120 mg qd/Amlodipine 5 mg qd/Imdur 30 mg qd/Lisinopril 10 mg/ Toprol-XL 50 mg qd  • Here:  Diltiazem 120mg qd/Amlodipine 5mg qd/Imdur 30mg qd/Linsinopril 10mg qd (hold SBP <130)/Toprol 50mg qd  • Lisinopril held AM 7/29/23; since on 2 CCB, stopped the Norvasc and if needed, then go up on Lisinopril or Toprol.   Have reviewed OP records and dont know who started the Norvasc   • Also changed the hold parameter on the Lisinopril from 431 systolic to 514     Hyperlipidemia  • Continue atorvastatin and Zetia here  • Follow-up with outpatient cardiology for possible Repatha     Paroxysmal atrial fibrillation  • Currently in sinus rhythm  • S/p a 2-week Zio patch in March 2023 with 5 episodes of SVT without other significant arrhythmia  • Continue Diltiazem 120 mg daily, Toprol XL 50 mg daily     DVT/IVC filter  • Hx lower extremity DVT/IVC filter  • IVC filter is still present on PET scan 7/7/2023     Tobacco use  • Continues to smoke 1 to 1.5 packs/day  • Did not tolerate Wellbutrin/Chantix in the past  • Counseled for smoking cessation but not ready to quit  • Nicotine patch here 21 mg     COPD  • CT chest with evidence of emphysema  • PFT in June 2023: Moderate obstruction, no reversibility with bronchodilator   • Was recommended Trelegy and rescue inhaler, but she didn't want it; using Albuterol prn. • Continues on RA with O2 sats 91-92%. • Recommend outpatient follow-up with pulmonology and smoking cessation     Right perihilar mass   • On CT imaging 7/18/23  • Planned for biopsy on 7/31 by thoracic surgeon.     CKD II  • Creatinine stable at 0.78  • Has likely nonfunctioning right kidney  • Avoid nephrotoxic agents, hypotension     Bipolar disease  • continue Lamictal 100 mg daily  • Stable        Discharge date:  Team    The above assessment and plan was reviewed and updated as determined by my evaluation of the patient on 7/30/2023.     Labs:   Results from last 7 days   Lab Units 07/27/23  0606 07/24/23  0650   WBC Thousand/uL 10.38* 10.96*   HEMOGLOBIN g/dL 12.8 12.4   HEMATOCRIT % 40.6 38.5   PLATELETS Thousands/uL 262 187     Results from last 7 days   Lab Units 07/27/23  0606   SODIUM mmol/L 140   POTASSIUM mmol/L 3.6   CHLORIDE mmol/L 106   CO2 mmol/L 28   BUN mg/dL 13   CREATININE mg/dL 0.78   CALCIUM mg/dL 9.2                   Review of Scheduled Meds:  Current Facility-Administered Medications   Medication Dose Route Frequency Provider Last Rate   • acetaminophen  975 mg Oral Q8H [facility] LEV Turner     • albuterol  2 puff Inhalation Q6H PRN LEV Turner     • atorvastatin  40 mg Oral Daily With LEV Marcano     • bisacodyl  10 mg Rectal Daily PRN LEV Weiner     • clopidogrel  75 mg Oral Daily LEV Turner     • diltiazem  120 mg Oral Daily LEV Turner     • docusate sodium  100 mg Oral BID LEV Turner     • ezetimibe  10 mg Oral Daily LEV Turner     • folic acid  1 mg Oral Daily LEV Turner     • gabapentin  300 mg Oral BID LEV Sampson     • gabapentin  400 mg Oral HS Park Armas MD     • heparin (porcine)  5,000 Units Subcutaneous [facility] LEV Turner     • isosorbide mononitrate  30 mg Oral QAM LEV Turner     • lamoTRIgine  100 mg Oral Daily LEV Turner     • lidocaine  2 patch Topical Daily LEV Turner     • lisinopril  10 mg Oral Daily ELV Arevalo     • melatonin  9 mg Oral HS PRN LEV Sampson     • methocarbamol  500 mg Oral [facility] Park Armas MD     • metoprolol succinate  50 mg Oral Daily LEV Turner     • nicotine  1 patch Transdermal Daily LEV Turner     • ondansetron  4 mg Oral Q6H PRN LEV Sampson     • oxyCODONE  5 mg Oral Q4H PRN Park Armas MD      Or   • oxyCODONE  10 mg Oral Q4H PRN Park Armas MD     • polyethylene glycol  17 g Oral Daily LEV Turner     • senna  1 tablet Oral BID Park Armas MD     • traZODone  50 mg Oral HS PRN LEV Sampson         Subjective/ HPI: Patient seen and examined. Patients overnight issues or events were reviewed with nursing or staff during rounds or morning huddle session. New or overnight issues include the following:     No new or overnight issues. C/o burning pain of incision and she is concerned more red    ROS:   A 10 point ROS was performed; negative except as noted above.        Imaging:     No orders to display       *Labs /Radiology studies reviewed  *Medications reviewed and reconciled as needed  *Please refer to order section for additional ordered labs studies  *Case discussed with primary attending during morning huddle case rounds    Physical Examination:  Vitals:   Vitals:    07/29/23 2017 07/30/23 0112 07/30/23 0556 07/30/23 0856   BP: 117/61  134/71 121/70   BP Location: Right arm  Left arm    Pulse: 57  68 72   Resp: 17  16    Temp: 97.5 °F (36.4 °C)  97.8 °F (36.6 °C)    TempSrc: Oral  Oral    SpO2: 90% 91% 92% Weight:       Height:           General Appearance: no distress, conversive  HEENT:  External ear normal.  Nose normal w/o drainage. Mucous membranes are moist. Oropharynx is clear. Conjunctiva clear w/o icterus or redness. Neck:  Supple, normal ROM  Lungs: BBS without crackles/wheeze/rhonchi; respirations unlabored with normal inspiratory/expiratory effort. No retractions noted. On RA  CV: regular rate and rhythm; no rubs/murmurs/gallops, PMI normal   ABD: Abdomen is soft. Bowel sounds all quadrants. Nontender with no distention. EXT: no edema  Skin: normal turgor, normal texture, no rashes  Psych: affect normal, mood normal  Neuro: AAO     The above physical exam was reviewed and updated as determined by my evaluation of the patient on 7/30/2023. Invasive Devices     Drain  Duration           External Urinary Catheter 8 days                   VTE Pharmacologic Prophylaxis: Heparin  Code Status: Level 1 - Full Code  Current Length of Stay: 5 day(s)      Total time spent:  30 minutes with more than 50% spent counseling/coordinating care. Counseling includes discussion with patient re: progress  and discussion with patient of his/her current medical state/information. Coordination of patient's care was performed in conjunction with primary service. Time invested included review of patient's labs, vitals, and management of their comorbidities with continued monitoring. In addition, this patient was discussed with medical team including physician and advanced extenders. The care of the patient was extensively discussed and appropriate treatment plan was formulated unique for this patient. Medical decision making for the day was made by supervising physician unless otherwise noted in their attestation statement. ** Please Note:  voice to text software may have been used in the creation of this document.  Although proof errors in transcription or interpretation are a potential of such software**

## 2023-07-30 NOTE — PROGRESS NOTES
PM&R Coverage Progress Note:    Rehabilitation Diagnosis: L AKA    ASSESSMENT: Stable      PLAN:    Rehabilitation  • Continue current rehabilitation plan of care to maximize function. • No funcitonal barriers identified    Medical issues  • Left AKA: residual limb incision medially more erythemetous, swollen, a bit warm to touch. Patient thinks Anirudh Mins pulled on a staple potentially. X-ray ordered. Started on Keflex 500 mg Q6 hours x 7 days. Probiotic Florastor also started. o See medial photos below  o Ok to resume [employer]  o DISCONTINUE ALL KERLIX   • Continue current medical plan of care. Appreciate IM consultants co-management. SUBJECTIVE: Patient seen face to face. Having increased pain at incision today on the medial side and thinks her kerlix gauze snagged on a staple. This area also more swollen. Per RN incision a bit more erythemetous. Patient agreeable to X-ray and oral Keflex. ROS:  A ten point review of systems was completed on 07/30/23 and pertinent positives are listed in subjective section. All other systems reviewed were negative. OBJECTIVE:   /58 (BP Location: Left arm)   Pulse 59   Temp 98.1 °F (36.7 °C) (Oral)   Resp 17   Ht 5' 4" (1.626 m)   Wt 74 kg (163 lb 2.3 oz)   SpO2 92%   BMI 28.00 kg/m²       Physical Exam  Vitals and nursing note reviewed. Constitutional:       General: She is not in acute distress. Appearance: She is obese. HENT:      Head: Normocephalic and atraumatic. Nose: Nose normal.      Mouth/Throat:      Mouth: Mucous membranes are moist.   Eyes:      Conjunctiva/sclera: Conjunctivae normal.   Cardiovascular:      Rate and Rhythm: Normal rate and regular rhythm. Pulses: Normal pulses. Pulmonary:      Effort: Pulmonary effort is normal.      Breath sounds: Normal breath sounds. No wheezing or rales. Abdominal:      General: Bowel sounds are normal. There is no distension. Palpations: Abdomen is soft. Tenderness: There is no abdominal tenderness. Musculoskeletal:         General: Swelling and tenderness present. Cervical back: Neck supple. Comments: Left residual limb examined  medially more erythemetous, swollen, a bit warm to touch. Skin:     General: Skin is warm. Neurological:      Mental Status: She is alert and oriented to person, place, and time.    Psychiatric:         Mood and Affect: Mood normal.                          Personally reviewed on 07/30/23:   Lab Results   Component Value Date    WBC 10.38 (H) 07/27/2023    HGB 12.8 07/27/2023    HCT 40.6 07/27/2023    MCV 94 07/27/2023     07/27/2023     Lab Results   Component Value Date    SODIUM 140 07/27/2023    K 3.6 07/27/2023     07/27/2023    CO2 28 07/27/2023    BUN 13 07/27/2023    CREATININE 0.78 07/27/2023    GLUC 99 07/27/2023    CALCIUM 9.2 07/27/2023     Lab Results   Component Value Date    INR 0.92 07/18/2023    PROTIME 12.5 07/18/2023           Current Facility-Administered Medications:   •  acetaminophen (TYLENOL) tablet 975 mg, 975 mg, Oral, Q8H [address], LEV Turner, 975 mg at 07/30/23 1346  •  albuterol (PROVENTIL HFA,VENTOLIN HFA) inhaler 2 puff, 2 puff, Inhalation, Q6H PRN, LEV Turner  •  atorvastatin (LIPITOR) tablet 40 mg, 40 mg, Oral, Daily With Dinner, LEV Turner, 40 mg at 07/29/23 1630  •  bisacodyl (DULCOLAX) rectal suppository 10 mg, 10 mg, Rectal, Daily PRN, LEV Turner  •  cephalexin (KEFLEX) capsule 500 mg, 500 mg, Oral, Q6H [address]  •  clopidogrel (PLAVIX) tablet 75 mg, 75 mg, Oral, Daily, LEV Turner, 75 mg at 07/30/23 0856  •  diltiazem (CARDIZEM) tablet 120 mg, 120 mg, Oral, Daily, LEV Turner, 120 mg at 07/30/23 0856  •  docusate sodium (COLACE) capsule 100 mg, 100 mg, Oral, BID, LEV Turner, 100 mg at 07/29/23 0849  •  ezetimibe (ZETIA) tablet 10 mg, 10 mg, Oral, Daily, LEV Turner, 10 mg at 07/30/23 0856  • folic acid (FOLVITE) tablet 1 mg, 1 mg, Oral, Daily, LEV Turner, 1 mg at 07/30/23 0856  •  gabapentin (NEURONTIN) capsule 300 mg, 300 mg, Oral, BID, LEV Turner, 300 mg at 07/30/23 1799  •  gabapentin (NEURONTIN) capsule 400 mg, 400 mg, Oral, HS, Davina Pineda MD, 400 mg at 07/29/23 2122  •  heparin (porcine) subcutaneous injection 5,000 Units, 5,000 Units, Subcutaneous, Q8H [address], LEV Turner, 5,000 Units at 07/30/23 1346  •  isosorbide mononitrate (IMDUR) 24 hr tablet 30 mg, 30 mg, Oral, QAM, LEV Turner, 30 mg at 07/30/23 1974  •  lamoTRIgine (LaMICtal) tablet 100 mg, 100 mg, Oral, Daily, LEV Turner, 100 mg at 07/30/23 0856  •  lisinopril (ZESTRIL) tablet 10 mg, 10 mg, Oral, Daily, LEV Isaac, 10 mg at 07/30/23 0856  •  melatonin tablet 9 mg, 9 mg, Oral, HS PRN, LEV Yepez  •  methocarbamol (ROBAXIN) tablet 500 mg, 500 mg, Oral, Q8H [address], Davina Pineda MD, 500 mg at 07/30/23 1346  •  metoprolol succinate (TOPROL-XL) 24 hr tablet 50 mg, 50 mg, Oral, Daily, LEV Turner, 50 mg at 07/30/23 0856  •  nicotine (NICODERM CQ) 21 mg/24 hr TD 24 hr patch 1 patch, 1 patch, Transdermal, Daily, LEV Turner, 1 patch at 07/30/23 0856  •  ondansetron (ZOFRAN-ODT) dispersible tablet 4 mg, 4 mg, Oral, Q6H PRN, LEV Turner  •  oxyCODONE (ROXICODONE) IR tablet 5 mg, 5 mg, Oral, Q4H PRN, 5 mg at 07/30/23 1211 **OR** oxyCODONE (ROXICODONE) immediate release tablet 10 mg, 10 mg, Oral, Q4H PRN, Davina Pineda MD, 10 mg at 07/30/23 0014  •  polyethylene glycol (MIRALAX) packet 17 g, 17 g, Oral, Daily, LEV Turner, 17 g at 07/25/23 1535  •  saccharomyces boulardii (FLORASTOR) capsule 250 mg, 250 mg, Oral, BID, Gaurav Lozano MD  •  senna (SENOKOT) tablet 8.6 mg, 1 tablet, Oral, BID, Davina Pineda MD, 8.6 mg at 07/29/23 3976  •  traZODone (DESYREL) tablet 50 mg, 50 mg, Oral, HS PRN, LEV Turner, 50 mg at 07/26/23 2148      Past Medical History:   Diagnosis Date   • Aorto-iliac disease ([address])    • Atrial fibrillation ([address])    • CAD (coronary artery disease)    • Carotid stenosis, bilateral    • Celiac artery stenosis (HCC)    • CKD (chronic kidney disease) stage 3, GFR 30-59 ml/min (HCC)    • DVT (deep venous thrombosis) ([facility])     LLE (CFV, popl)   • Heart attack ([address]) 02/2022   • HLD (hyperlipidemia)    • Hypertension    • Lung mass     RUL   • Myocardial infarction ([address])     2022   • COURTNEY (obstructive sleep apnea)    • PAD (peripheral artery disease) ([address])    • Stroke [facility])        Patient Active Problem List    Diagnosis Date Noted   • At risk for venous thromboembolism (VTE) 07/28/2023   • Pain 07/28/2023   • Hypoxia 07/28/2023   • Urinary dysfunction 07/26/2023   • Hx of AKA (above knee amputation), left ([address]) 07/25/2023   • Nodule of upper lobe of right lung 06/14/2023   • Insomnia 02/10/2023   • PAF (paroxysmal atrial fibrillation) ([address]) 01/09/2023   • Stage 3 chronic kidney disease, unspecified whether stage 3a or 3b CKD ([address]) 01/09/2023   • Dizziness 01/09/2023   • Tobacco abuse 01/02/2023   • Aortoiliac occlusive disease ([address]) 12/28/2022   • Mixed hyperlipidemia 12/27/2022   • Left leg swelling 11/08/2022   • Bipolar affective disorder, depressed, severe ([address]) 04/19/2017   • S/P vascular bypass 02/24/2017   • Hx of CABG 11/30/2016   • Presence of IVC filter 11/30/2016   • Essential hypertension 01/22/2016   • Thyroid nodule 01/22/2016   • CAD (coronary artery disease) 06/23/2014   • Renal artery stenosis ([address]) 06/23/2014   • PAD (peripheral artery disease) ([address]) 06/23/2014          Rg Garcia MD  PM&R      I have spent a total time of 60 minutes on 07/30/23 in caring for this patient including Impressions, Counseling / Coordination of care, Documenting in the medical record and Reviewing / ordering tests, medicine, procedures  .       ** Please Note:  voice to text software may have been used in the creation of this document.  Although proof errors in transcription or interpretation are a potential of such software**

## 2023-07-30 NOTE — PROGRESS NOTES
PM&R Coverage Progress Note:    Rehabilitation Diagnosis: L AKA    ASSESSMENT: Stable      PLAN:    Rehabilitation  • Continue current rehabilitation plan of care to maximize function. • No funcitonal barriers identified    Medical issues  • No acute concerns  • Personally visualized left residual limb - bruising improving in appearance. Still firm. Pic Below. • Phantom limb pain: Personally educated on desensitization and positional changes  • +BM today   • Continue current medical plan of care. Appreciate IM consultants co-management. SUBJECTIVE: Patient seen face to face. No acute issues. No worsening residual limb pain. +PLP. ROS:  A ten point review of systems was completed on 07/29/23 and pertinent positives are listed in subjective section. All other systems reviewed were negative. OBJECTIVE:   /61 (BP Location: Right arm)   Pulse 57   Temp 97.5 °F (36.4 °C) (Oral)   Resp 17   Ht 5' 4" (1.626 m)   Wt 74 kg (163 lb 2.3 oz)   SpO2 90%   BMI 28.00 kg/m²     Physical Exam  Vitals and nursing note reviewed. Constitutional:       General: She is not in acute distress. Appearance: She is well-developed. HENT:      Head: Normocephalic. Nose: Nose normal.   Eyes:      Conjunctiva/sclera: Conjunctivae normal.   Cardiovascular:      Rate and Rhythm: Normal rate. Pulmonary:      Effort: Pulmonary effort is normal.      Breath sounds: No wheezing. Abdominal:      General: There is no distension. Palpations: Abdomen is soft. Musculoskeletal:      Cervical back: Neck supple. Comments: L AKA    Skin:     General: Skin is warm. Neurological:      Mental Status: She is alert and oriented to person, place, and time.                   Personally reviewed on 07/29/23:   Lab Results   Component Value Date    WBC 10.38 (H) 07/27/2023    HGB 12.8 07/27/2023    HCT 40.6 07/27/2023    MCV 94 07/27/2023     07/27/2023     Lab Results   Component Value Date SODIUM 140 07/27/2023    K 3.6 07/27/2023     07/27/2023    CO2 28 07/27/2023    BUN 13 07/27/2023    CREATININE 0.78 07/27/2023    GLUC 99 07/27/2023    CALCIUM 9.2 07/27/2023     Lab Results   Component Value Date    INR 0.92 07/18/2023    PROTIME 12.5 07/18/2023           Current Facility-Administered Medications:   •  acetaminophen (TYLENOL) tablet 975 mg, 975 mg, Oral, Q8H [address], LEV Turner, 975 mg at 07/29/23 1313  •  albuterol (PROVENTIL HFA,VENTOLIN HFA) inhaler 2 puff, 2 puff, Inhalation, Q6H PRN, LEV Turner  •  atorvastatin (LIPITOR) tablet 40 mg, 40 mg, Oral, Daily With Dinner, Dion Bishop, LEV, 40 mg at 07/29/23 1630  •  bisacodyl (DULCOLAX) rectal suppository 10 mg, 10 mg, Rectal, Daily PRN, LEV Turner  •  clopidogrel (PLAVIX) tablet 75 mg, 75 mg, Oral, Daily, LEV Turner, 75 mg at 07/29/23 3344  •  diltiazem (CARDIZEM) tablet 120 mg, 120 mg, Oral, Daily, LEV Turner, 120 mg at 07/29/23 0850  •  docusate sodium (COLACE) capsule 100 mg, 100 mg, Oral, BID, LEV Turner, 100 mg at 07/29/23 0849  •  ezetimibe (ZETIA) tablet 10 mg, 10 mg, Oral, Daily, LEV Turner, 10 mg at 81/72/96 0505  •  folic acid (FOLVITE) tablet 1 mg, 1 mg, Oral, Daily, LEV Turner, 1 mg at 07/29/23 0850  •  gabapentin (NEURONTIN) capsule 300 mg, 300 mg, Oral, BID, LEV Turner, 300 mg at 07/29/23 1732  •  gabapentin (NEURONTIN) capsule 400 mg, 400 mg, Oral, HS, Sari Burdock, MD, 400 mg at 07/28/23 2200  •  heparin (porcine) subcutaneous injection 5,000 Units, 5,000 Units, Subcutaneous, Q8H [address], LEV Turner, 5,000 Units at 07/29/23 1312  •  isosorbide mononitrate (IMDUR) 24 hr tablet 30 mg, 30 mg, Oral, QAM, LEV Turner, 30 mg at 07/29/23 0851  •  lamoTRIgine (LaMICtal) tablet 100 mg, 100 mg, Oral, Daily, LEV Turner, 100 mg at 07/29/23 0851  •  lidocaine (LIDODERM) 5 % patch 2 patch, 2 patch, Topical, Daily, Dion Bishop, LEV  •  [START ON 7/30/2023] lisinopril (ZESTRIL) tablet 10 mg, 10 mg, Oral, Daily, LEV Greene  •  melatonin tablet 9 mg, 9 mg, Oral, HS PRN, LEV Nelson  •  methocarbamol (ROBAXIN) tablet 500 mg, 500 mg, Oral, Q8H [address], Marcia Hemphill MD, 500 mg at 07/29/23 1313  •  metoprolol succinate (TOPROL-XL) 24 hr tablet 50 mg, 50 mg, Oral, Daily, LEV Turner, 50 mg at 07/29/23 0851  •  nicotine (NICODERM CQ) 21 mg/24 hr TD 24 hr patch 1 patch, 1 patch, Transdermal, Daily, LEV Turner, 1 patch at 07/29/23 0852  •  ondansetron (ZOFRAN-ODT) dispersible tablet 4 mg, 4 mg, Oral, Q6H PRN, LEV Turner  •  oxyCODONE (ROXICODONE) IR tablet 5 mg, 5 mg, Oral, Q4H PRN, 5 mg at 07/29/23 0856 **OR** oxyCODONE (ROXICODONE) immediate release tablet 10 mg, 10 mg, Oral, Q4H PRN, Marcia Hemphill MD, 10 mg at 07/29/23 1441  •  polyethylene glycol (MIRALAX) packet 17 g, 17 g, Oral, Daily, LEV Turner, 17 g at 07/25/23 1535  •  senna (SENOKOT) tablet 8.6 mg, 1 tablet, Oral, BID, Marcia Hemphill MD, 8.6 mg at 07/29/23 9827  •  traZODone (DESYREL) tablet 50 mg, 50 mg, Oral, HS PRN, LEV Turner, 50 mg at 07/26/23 1238    Past Medical History:   Diagnosis Date   • Aorto-iliac disease ([address])    • Atrial fibrillation (HCC)    • CAD (coronary artery disease)    • Carotid stenosis, bilateral    • Celiac artery stenosis (HCC)    • CKD (chronic kidney disease) stage 3, GFR 30-59 ml/min (HCC)    • DVT (deep venous thrombosis) ([facility])     LLE (CFV, popl)   • Heart attack ([address]) 02/2022   • HLD (hyperlipidemia)    • Hypertension    • Lung mass     RUL   • Myocardial infarction ([address])     2022   • COURTNEY (obstructive sleep apnea)    • PAD (peripheral artery disease) ([address])    • Stroke [facility])        Patient Active Problem List    Diagnosis Date Noted   • At risk for venous thromboembolism (VTE) 07/28/2023   • Pain 07/28/2023   • Hypoxia 07/28/2023   • Urinary dysfunction 07/26/2023   • Hx of AKA (above knee amputation), left ([address]) 07/25/2023   • Nodule of upper lobe of right lung 06/14/2023   • Insomnia 02/10/2023   • PAF (paroxysmal atrial fibrillation) ([address]) 01/09/2023   • Stage 3 chronic kidney disease, unspecified whether stage 3a or 3b CKD ([address]) 01/09/2023   • Dizziness 01/09/2023   • Tobacco abuse 01/02/2023   • Aortoiliac occlusive disease ([address]) 12/28/2022   • Mixed hyperlipidemia 12/27/2022   • Left leg swelling 11/08/2022   • Bipolar affective disorder, depressed, severe ([address]) 04/19/2017   • S/P vascular bypass 02/24/2017   • Hx of CABG 11/30/2016   • Presence of IVC filter 11/30/2016   • Essential hypertension 01/22/2016   • Thyroid nodule 01/22/2016   • CAD (coronary artery disease) 06/23/2014   • Renal artery stenosis ([address]) 06/23/2014   • PAD (peripheral artery disease) ([address]) 06/23/2014          Prashanth Bird MD  PM&R      I have spent a total time of 35 minutes on 07/29/23 in caring for this patient including Instructions for management, Patient and family education, Impressions, Counseling / Coordination of care and Documenting in the medical record. ** Please Note:  voice to text software may have been used in the creation of this document.  Although proof errors in transcription or interpretation are a potential of such software**

## 2023-07-31 LAB
ANION GAP SERPL CALCULATED.3IONS-SCNC: 4 MMOL/L
BASOPHILS # BLD AUTO: 0.13 THOUSANDS/ÂΜL (ref 0–0.1)
BASOPHILS NFR BLD AUTO: 1 % (ref 0–1)
BUN SERPL-MCNC: 16 MG/DL (ref 5–25)
CALCIUM SERPL-MCNC: 9.1 MG/DL (ref 8.3–10.1)
CHLORIDE SERPL-SCNC: 112 MMOL/L (ref 96–108)
CO2 SERPL-SCNC: 25 MMOL/L (ref 21–32)
CREAT SERPL-MCNC: 0.86 MG/DL (ref 0.6–1.3)
EOSINOPHIL # BLD AUTO: 0.74 THOUSAND/ÂΜL (ref 0–0.61)
EOSINOPHIL NFR BLD AUTO: 7 % (ref 0–6)
ERYTHROCYTE [DISTWIDTH] IN BLOOD BY AUTOMATED COUNT: 13.4 % (ref 11.6–15.1)
GFR SERPL CREATININE-BSD FRML MDRD: 67 ML/MIN/1.73SQ M
GLUCOSE P FAST SERPL-MCNC: 101 MG/DL (ref 65–99)
GLUCOSE SERPL-MCNC: 101 MG/DL (ref 65–140)
HCT VFR BLD AUTO: 37.9 % (ref 34.8–46.1)
HGB BLD-MCNC: 12.2 G/DL (ref 11.5–15.4)
IMM GRANULOCYTES # BLD AUTO: 0.06 THOUSAND/UL (ref 0–0.2)
IMM GRANULOCYTES NFR BLD AUTO: 1 % (ref 0–2)
LYMPHOCYTES # BLD AUTO: 2.1 THOUSANDS/ÂΜL (ref 0.6–4.47)
LYMPHOCYTES NFR BLD AUTO: 20 % (ref 14–44)
MCH RBC QN AUTO: 29.5 PG (ref 26.8–34.3)
MCHC RBC AUTO-ENTMCNC: 32.2 G/DL (ref 31.4–37.4)
MCV RBC AUTO: 92 FL (ref 82–98)
MONOCYTES # BLD AUTO: 0.8 THOUSAND/ÂΜL (ref 0.17–1.22)
MONOCYTES NFR BLD AUTO: 8 % (ref 4–12)
NEUTROPHILS # BLD AUTO: 6.62 THOUSANDS/ÂΜL (ref 1.85–7.62)
NEUTS SEG NFR BLD AUTO: 63 % (ref 43–75)
NRBC BLD AUTO-RTO: 0 /100 WBCS
PLATELET # BLD AUTO: 281 THOUSANDS/UL (ref 149–390)
PMV BLD AUTO: 9.3 FL (ref 8.9–12.7)
POTASSIUM SERPL-SCNC: 4.5 MMOL/L (ref 3.5–5.3)
RBC # BLD AUTO: 4.13 MILLION/UL (ref 3.81–5.12)
SODIUM SERPL-SCNC: 141 MMOL/L (ref 135–147)
WBC # BLD AUTO: 10.45 THOUSAND/UL (ref 4.31–10.16)

## 2023-07-31 PROCEDURE — 97535 SELF CARE MNGMENT TRAINING: CPT

## 2023-07-31 PROCEDURE — 85025 COMPLETE CBC W/AUTO DIFF WBC: CPT | Performed by: NURSE PRACTITIONER

## 2023-07-31 PROCEDURE — 99232 SBSQ HOSP IP/OBS MODERATE 35: CPT | Performed by: INTERNAL MEDICINE

## 2023-07-31 PROCEDURE — 97530 THERAPEUTIC ACTIVITIES: CPT

## 2023-07-31 PROCEDURE — 99232 SBSQ HOSP IP/OBS MODERATE 35: CPT | Performed by: STUDENT IN AN ORGANIZED HEALTH CARE EDUCATION/TRAINING PROGRAM

## 2023-07-31 PROCEDURE — 97110 THERAPEUTIC EXERCISES: CPT

## 2023-07-31 PROCEDURE — 80048 BASIC METABOLIC PNL TOTAL CA: CPT | Performed by: NURSE PRACTITIONER

## 2023-07-31 RX ADMIN — METHOCARBAMOL 500 MG: 500 TABLET ORAL at 21:07

## 2023-07-31 RX ADMIN — ISOSORBIDE MONONITRATE 30 MG: 30 TABLET, EXTENDED RELEASE ORAL at 09:09

## 2023-07-31 RX ADMIN — FOLIC ACID 1 MG: 1 TABLET ORAL at 09:10

## 2023-07-31 RX ADMIN — OXYCODONE HYDROCHLORIDE 10 MG: 10 TABLET ORAL at 16:20

## 2023-07-31 RX ADMIN — LISINOPRIL 10 MG: 10 TABLET ORAL at 09:09

## 2023-07-31 RX ADMIN — TRAZODONE HYDROCHLORIDE 50 MG: 50 TABLET ORAL at 00:22

## 2023-07-31 RX ADMIN — HEPARIN SODIUM 5000 UNITS: 5000 INJECTION INTRAVENOUS; SUBCUTANEOUS at 21:07

## 2023-07-31 RX ADMIN — ACETAMINOPHEN 975 MG: 325 TABLET, FILM COATED ORAL at 13:02

## 2023-07-31 RX ADMIN — OXYCODONE HYDROCHLORIDE 10 MG: 10 TABLET ORAL at 09:08

## 2023-07-31 RX ADMIN — CEPHALEXIN 500 MG: 500 CAPSULE ORAL at 12:51

## 2023-07-31 RX ADMIN — Medication 250 MG: at 09:09

## 2023-07-31 RX ADMIN — CEPHALEXIN 500 MG: 500 CAPSULE ORAL at 17:16

## 2023-07-31 RX ADMIN — HEPARIN SODIUM 5000 UNITS: 5000 INJECTION INTRAVENOUS; SUBCUTANEOUS at 13:02

## 2023-07-31 RX ADMIN — NICOTINE 1 PATCH: 21 PATCH, EXTENDED RELEASE TRANSDERMAL at 09:10

## 2023-07-31 RX ADMIN — LAMOTRIGINE 100 MG: 100 TABLET ORAL at 09:09

## 2023-07-31 RX ADMIN — OXYCODONE HYDROCHLORIDE 10 MG: 10 TABLET ORAL at 01:49

## 2023-07-31 RX ADMIN — EZETIMIBE 10 MG: 10 TABLET ORAL at 09:10

## 2023-07-31 RX ADMIN — GABAPENTIN 300 MG: 300 CAPSULE ORAL at 09:09

## 2023-07-31 RX ADMIN — CEPHALEXIN 500 MG: 500 CAPSULE ORAL at 00:21

## 2023-07-31 RX ADMIN — TRAZODONE HYDROCHLORIDE 50 MG: 50 TABLET ORAL at 21:07

## 2023-07-31 RX ADMIN — Medication 250 MG: at 17:17

## 2023-07-31 RX ADMIN — METHOCARBAMOL 500 MG: 500 TABLET ORAL at 13:02

## 2023-07-31 RX ADMIN — CEPHALEXIN 500 MG: 500 CAPSULE ORAL at 23:31

## 2023-07-31 RX ADMIN — HEPARIN SODIUM 5000 UNITS: 5000 INJECTION INTRAVENOUS; SUBCUTANEOUS at 05:20

## 2023-07-31 RX ADMIN — CLOPIDOGREL BISULFATE 75 MG: 75 TABLET ORAL at 09:10

## 2023-07-31 RX ADMIN — ACETAMINOPHEN 975 MG: 325 TABLET, FILM COATED ORAL at 05:19

## 2023-07-31 RX ADMIN — CEPHALEXIN 500 MG: 500 CAPSULE ORAL at 05:19

## 2023-07-31 RX ADMIN — DILTIAZEM HYDROCHLORIDE 120 MG: 60 TABLET, FILM COATED ORAL at 09:09

## 2023-07-31 RX ADMIN — ACETAMINOPHEN 975 MG: 325 TABLET, FILM COATED ORAL at 21:06

## 2023-07-31 RX ADMIN — METOPROLOL SUCCINATE 50 MG: 50 TABLET, EXTENDED RELEASE ORAL at 09:10

## 2023-07-31 RX ADMIN — METHOCARBAMOL 500 MG: 500 TABLET ORAL at 05:20

## 2023-07-31 RX ADMIN — ATORVASTATIN CALCIUM 40 MG: 40 TABLET, FILM COATED ORAL at 16:20

## 2023-07-31 RX ADMIN — GABAPENTIN 400 MG: 400 CAPSULE ORAL at 21:07

## 2023-07-31 RX ADMIN — GABAPENTIN 300 MG: 300 CAPSULE ORAL at 17:16

## 2023-07-31 NOTE — PROGRESS NOTES
Internal Medicine Progress Note  Patient: Alberto Martines  Age/sex: 68 y.o. female  Medical Record #: [id number]      ASSESSMENT/PLAN: (Interval History)  Alberto Martines is seen and examined and management for following issues:    S/p left AKA 7/21/23  • Hx severe PAD with B/L SC to FA bypass in past = has TO left fem-pop BPG  • Home:  Plavix/Crestor 20mg qd/Zetia 10mg qd  • Here:  Plavix/Zetia/Lipitor 40mg qd  • Pain management as per PMR  • Started on Keflex for 7 days      Coronary artery disease  • S/p CABG in 2000  • TTE on 1/26/2023 with EF of 60%, mild to moderate AI, mild AS, estimated PA pressure of 40 mmHg  • Nuclear pharmacological stress test on 1/26/2023 with anterior wall perfusion defect likely artifact/cannot exclude small area of ischemia, normal LV systolic function. • Continue Plavix/Zetia 10mg qd/Imdur     Hypertension  • Home: Diltiazem 120 mg qd/Amlodipine 5 mg qd/Imdur 30 mg qd/Lisinopril 10 mg/ Toprol-XL 50 mg qd  • Here:  Diltiazem 120mg qd/Imdur 30mg qd/Linsinopril 10mg qd (hold SBP <130)/Toprol 50mg qd  • BP stable.     Hyperlipidemia  • Continue atorvastatin and Zetia here  • Follow-up with outpatient cardiology for possible Repatha     Paroxysmal atrial fibrillation  • Currently in sinus rhythm  • S/p a 2-week Zio patch in March 2023 with 5 episodes of SVT without other significant arrhythmia  • Continue Diltiazem 120 mg daily, Toprol XL 50 mg daily     DVT/IVC filter  • Hx lower extremity DVT/IVC filter  • IVC filter is still present on PET scan 7/7/2023     Tobacco use  • Continues to smoke 1 to 1.5 packs/day  • Did not tolerate Wellbutrin/Chantix in the past  • Counseled for smoking cessation but not ready to quit  • Nicotine patch here 21 mg     COPD  • CT chest with evidence of emphysema  • PFT in June 2023: Moderate obstruction, no reversibility with bronchodilator   • Was recommended Trelegy and rescue inhaler, but she didn't want it; using Albuterol prn.   • Continues on RA with O2 sats 91-92%. • Recommend outpatient follow-up with pulmonology and smoking cessation     Right perihilar mass   • On CT imaging 7/18/23  • Planned for biopsy on 7/31 by thoracic surgeon.     CKD II  • Creatinine stable at 0.86  • Has likely nonfunctioning right kidney  • Avoid nephrotoxic agents, hypotension     Bipolar disease  • continue Lamictal 100 mg daily  • Stable        Discharge date:  Team    The above assessment and plan was reviewed and updated as determined by my evaluation of the patient on 7/31/2023.     Labs:   Results from last 7 days   Lab Units 07/31/23  0516 07/27/23  0606   WBC Thousand/uL 10.45* 10.38*   HEMOGLOBIN g/dL 12.2 12.8   HEMATOCRIT % 37.9 40.6   PLATELETS Thousands/uL 281 262     Results from last 7 days   Lab Units 07/31/23  0516 07/27/23  0606   SODIUM mmol/L 141 140   POTASSIUM mmol/L 4.5 3.6   CHLORIDE mmol/L 112* 106   CO2 mmol/L 25 28   BUN mg/dL 16 13   CREATININE mg/dL 0.86 0.78   CALCIUM mg/dL 9.1 9.2                   Review of Scheduled Meds:  Current Facility-Administered Medications   Medication Dose Route Frequency Provider Last Rate   • acetaminophen  975 mg Oral Q8H [facility] LEV Turner     • albuterol  2 puff Inhalation Q6H PRN LEV Turner     • atorvastatin  40 mg Oral Daily With LEV Marcano     • bisacodyl  10 mg Rectal Daily PRN LEV Weiner     • cephalexin  500 mg Oral Q6H [facility] Any Anaya MD     • clopidogrel  75 mg Oral Daily LEV Turner     • diltiazem  120 mg Oral Daily LEV Turner     • docusate sodium  100 mg Oral BID LEV Turner     • ezetimibe  10 mg Oral Daily LEV Turner     • folic acid  1 mg Oral Daily LEV Turner     • gabapentin  300 mg Oral BID LEV Turner     • gabapentin  400 mg Oral HS Roxanne Jacobson MD     • heparin (porcine)  5,000 Units Subcutaneous Q8H [facility] LEV Turner     • isosorbide mononitrate  30 mg Oral LEV Wagner     • lamoTRIgine  100 mg Oral Daily LEV Turner     • lisinopril  10 mg Oral Daily [facility] LEV Yates     • melatonin  9 mg Oral HS PRN LEV Hunter     • methocarbamol  500 mg Oral [facility] Justice Rose MD     • metoprolol succinate  50 mg Oral Daily LEV Turner     • nicotine  1 patch Transdermal Daily LEV Turner     • ondansetron  4 mg Oral Q6H PRN LEV Hunter     • oxyCODONE  5 mg Oral Q4H PRN Justice Rose MD      Or   • oxyCODONE  10 mg Oral Q4H PRN Justice Rose MD     • polyethylene glycol  17 g Oral Daily LEV Turner     • saccharomyces boulardii  250 mg Oral BID Amari Mcguire MD     • senna  1 tablet Oral BID Justice Rose MD     • traZODone  50 mg Oral HS PRN LEV Hunter         Subjective/ HPI: Patient seen and examined. Patients overnight issues or events were reviewed with nursing or staff during rounds or morning huddle session. New or overnight issues include the following:     Pt seen in her room. She states that she is doing well. She denies any current complaints. ROS:   A 10 point ROS was performed; negative except as noted above. Imaging:     XR femur 2 vw left    (Results Pending)       *Labs /Radiology studies reviewed  *Medications reviewed and reconciled as needed  *Please refer to order section for additional ordered labs studies  *Case discussed with primary attending during morning huddle case rounds    Physical Examination:  Vitals:   Vitals:    07/30/23 1500 07/30/23 2000 07/31/23 0517 07/31/23 0908   BP: 120/58 114/58 131/62 118/58   BP Location: Left arm Right arm Left arm    Pulse: 59 61 67 73   Resp: 17 19 18    Temp: 98.1 °F (36.7 °C) 98 °F (36.7 °C) 98.1 °F (36.7 °C)    TempSrc: Oral Oral Oral    SpO2: 92% 92% 92%    Weight:       Height:           General Appearance: no distress, conversive  HEENT:  External ear normal.  Nose normal w/o drainage.  Mucous membranes are moist. Oropharynx is clear. Conjunctiva clear w/o icterus or redness. Neck:  Supple, normal ROM  Lungs: BBS without crackles/wheeze/rhonchi; respirations unlabored with normal inspiratory/expiratory effort. No retractions noted. On RA  CV: regular rate and rhythm; no rubs/murmurs/gallops, PMI normal   ABD: Abdomen is soft. Bowel sounds all quadrants. Nontender with no distention. EXT: no edema, LT AKA wrapped, incision images in media section  Skin: normal turgor, normal texture, no rashes  Psych: affect normal, mood normal  Neuro: AAO     The above physical exam was reviewed and updated as determined by my evaluation of the patient on 7/31/2023. Invasive Devices     Drain  Duration           External Urinary Catheter 9 days                   VTE Pharmacologic Prophylaxis: Heparin  Code Status: Level 1 - Full Code  Current Length of Stay: 6 day(s)      Total time spent:  30 minutes with more than 50% spent counseling/coordinating care. Counseling includes discussion with patient re: progress  and discussion with patient of his/her current medical state/information. Coordination of patient's care was performed in conjunction with primary service. Time invested included review of patient's labs, vitals, and management of their comorbidities with continued monitoring. In addition, this patient was discussed with medical team including physician and advanced extenders. The care of the patient was extensively discussed and appropriate treatment plan was formulated unique for this patient. Medical decision making for the day was made by supervising physician unless otherwise noted in their attestation statement. ** Please Note:  voice to text software may have been used in the creation of this document.  Although proof errors in transcription or interpretation are a potential of such software**

## 2023-07-31 NOTE — PROGRESS NOTES
PM&R PROGRESS NOTE:  Debbie Haddad 68 y.o. female MRN: [id number]  Unit/Bed#: -01 Encounter: [id number]      Rehabilitation Diagnosis: Impairment of mobility, safety and Activities of Daily Living (ADLs) due to Amputation:  05.3  Unilateral Lower Limb Above the Knee    Etiologic: Left lower extremity ischemia s/p L AKA  Date of Onset: 7/18/23     Date of surgery: 7/21/23    HPI:  Nixon Mcguire is a 68 y.o. female with a medical history of CAD, hypertension, CABG, vascular bypass surgeries, PAD, aortoiliac occlusive disease, tobacco abuse (1 ppd x 60+yrs), PAF/SVT (no AC), LLE DVT s/p IVC filter, RUL lung mass, stage III CKD who presented to [address] [place] [address] on 07/18 with left foot pain.  Patient states that she noticed that she had increased pain over the left foot in approximately 2 days ago and is acutely worse over the last 24 hours, accompanied by numbness, and color change of her foot.  On exam, patient's left foot is bluish discoloration, swollen, no palpable or doppler DP or PT pulses.  Patient still has motor of her foot (is able to wiggle toes, move her foot up and down) but sensation is significantly decreased, is able to feel slight pressure.  CT angiogram with multiple inflow graft occlusions on the left.  The right to left component of the right axillobifemoral graft is occluded.  The left axillary to profunda graft is occluded and a segment is excised.  The aorta to left common femoral and jump graft from the alex of the common femoral artery anastomosis to below-knee popliteal artery is also occluded.  The deep femoral artery beyond the previous anastomosis does reconstitute but is secondary or tertiary branch that is small at less than 2 mm.  There are no other named vessels that reconstitute within the upper leg.  There does appear to be reconstitution of tibial vessels beyond the trifurcation but again these vessels are less than 2 mm in diameter.  Vascular surgery was consulted, recommending L AKA. Patient was placed on heparin gtt and APS was consulted, patient now on oral analgesics. On 07/21 patient underwent a left AKA with SHIVA Jovel. Patient is non-weight bearing left lower extremity. PT/OT were consulted and recommend inpatient acute rehabilitation. PT/OT were consulted and recommend acute inpatient rehabilitation. The patient was evaluated by the Rehabilitation team and deemed an appropriate candidate for comprehensive inpatient rehabilitation and admitted to the [facility] on 7/25/2023  2:33 PM    SUBJECTIVE: Patient seen face to face. No acute issues. Progressing as expected in rehabilitation. Pain seems to be controlled at this point. And making good progress with regards to sit pivot transfers and less use of the slide board. Continue with amputation education. She denies any fever, chills, nausea, vomiting, cough, shortness of breath, diarrhea or constipation. Spoke with nursing about daily pictures for the incisional site which looks similar today compared to prior imaging which was yesterday evening. Recently started on Keflex and will need to continue following    ASSESSMENT: Stable, progressing      PLAN:    Rehabilitation  • Functional deficits: Self-care mobility  • Continue current rehabilitation plan of care to maximize function.     • Functional update:   Physical Therapy Occupational Therapy Speech Therapy   Weight Bearing Status: Non-weight bearing  Transfers: Minimal Assistance, Incidental Touching (SIT PIVOT no SB)  Bed Mobility: Minimal Assistance, Incidental Touching  Amulation Distance (ft): 10 feet  Ambulation: Minimal Assistance  Assistive Device for Ambulation: Roller Walker  Wheelchair Mobility Distance: 200 ft  Wheelchair Mobility: Supervision  Number of Stairs: 0  Ramp:  (needs to perform for home DC)  Discharge Recommendations: Home with:  [address] [place] [address] with[de-identified] Family Support   Eating: Independent  Grooming: Supervision  Bathing: Incidental Touching, Supervision  Bathing: Incidental Touching, Supervision  Upper Body Dressing: Supervision  Lower Body Dressing: Minimal Assistance  Toileting: Minimal Assistance  Tub/Shower Transfer: Minimal Assistance  Toilet Transfer: Minimal Assistance  Cognition: Within Defined Limits  Orientation: Person, Place, Time, Situation                 • Estimated Discharge: TBD in team's      Pain  • Tylenol 975 mg every 8 hours  • Gabapentin 300 mg twice a day and 4 to milligrams at night  • Robaxin 500 mg every 8 hours  • Oxycodone 5-10 mg every 4 hours as needed moderate-severe pain respectively    DVT prophylaxis  • Heparin    Bladder plan  • Continent    Bowel plan  • Continent  • Last BM 7/30      * Hx of AKA (above knee amputation), left ([address])  Assessment & Plan  - 7/18 pt admitted with increased foot pain over 2 days with change of color and numbness found to have critical limb ischemia   -CT angiogram: multiple inflow graft occlusions on the left. The right to left component of the right axillobifemoral graft is occluded. The left axillary to profunda graft is occluded and a segment is excised. The aorta to left common femoral and jump graft from the alex of the common femoral artery anastomosis to below-knee popliteal artery is also occluded. The deep femoral artery beyond the previous anastomosis does reconstitute but is secondary or tertiary branch that is small at less than 2 mm. There are no other named vessels that reconstitute within the upper leg.   There does appear to be reconstitution of tibial vessels beyond the trifurcation but again these vessels are less than 2 mm in diameter.  - Vascular surgery s/p AKA 7/21 by Dr. Susan Lipscomb  - Plavix, optimal BP control, statin  - NWB LLE  - Monitor CBC intermittently   - Optimal ROM to avoid/decrease risk of contractures  - Monitor incision/area for infection or dehiscence/impaired healing    - 7/27 slight latia-incisional erythema - recommended vasc sx c/s who saw patient on 7/27 and reported no concerns for infection at that    - thigh DTI possibly from overly tight ace wrap that was POA to [facility] - wound care c/s 7/27 - allevyn foam dressing; use Kerlex only for a few days and then go back to ACE but avoid overly tightening   - 7/28 slight latia-incisional erythema again today but stable without increased warmth, tenderness, or drainage - continue close monitoring , started on Keflex and probiotic continue to monitor with daily incisional site imaging/media. Discussed with nursing  - It is normal to have some swelling or discoloration around the incision initially post-operatively. If develops increasing redness, tenderness/pain, discharge, or dehiscence develops contact surgical service   - Wound care of incision site per vasc sx   - WBC stable mildly elevated at 10 K on 7/27 > monitor intermittently  - Monitor for signs/symptoms of VTE, atelectasis, acute blood loss anemia, or other infection   - Patient (if applicable caregiver) training and education on amputation, possible phantom symptoms, recovery process  - Neuropsychology consult, supportive counseling  - Optimal pain control  - Monitor contralateral limb closely for concerning s/s   - Fall Precautions   - Tolerating ARC setting adequately   - Continue acute comprehensive interdisciplinary inpatient rehabilitation to include intensive skilled therapies (PT, OT) as outlined with oversight and management by rehabilitation physician as well as inpatient rehab level nursing, case management and weekly interdisciplinary team meetings. - Follow-up with PMR and Vas Sx after discharge    Hypoxia  Assessment & Plan  Off of oxygen so far today  Mild occasionally requiring 2L to SpO2 goal 90% or higher   Per IM  "-CTL with evidence of emphysema  -PFT in June 2023:  Moderate obstruction, no reversibility with bronchodilator   -Was recommended Trelegy and rescue inhaler, has not initiated Trelegy as yet and does not plan to, doing albuterol as needed although patient, denies any shortness of breath at rest or exertion.  -Currently saturating 92% on 2 L of oxygen via nasal cannula.   -Recommend outpatient follow-up with pulmonology and smoking cessation"    Pain  Assessment & Plan  Stable overall; slept better again overnight  - Continue gabapentin to [phone] daily   - APAP TID  - Oxy 5-10mg Q4H PRN; Hold for oversedation, AMS, or RR<12.  - Robaxin PRN Q6H standing change to Q8H soon     At risk for venous thromboembolism (VTE)  Assessment & Plan  Heparin     Urinary dysfunction  Assessment & Plan  - some urinary retention earlier now improved off scans but still watch for re-occurrence   - proactive toileting  - monitor for s/s of infection, retention     Nodule of upper lobe of right lung  Assessment & Plan  - newly diagnosed RUL lung mass  - PET/CT (7/7/23): marked increased metabolic activity in association with RUL nodule highly suggestive of neoplasm  - follows with hemonc Dr. Rita Manuel  - planned navigational bronchoscopy with biopsy for 7/31/23 with thoracic surgery, Dr. Ping Fam   - discussed with family who notified sx office and they rescheduled bx appt to 8/14    Insomnia  Assessment & Plan  Improved   - here: Traz 50mg HS PRN  - On gabapentin 400mg HS for pain at night as well now   - if needed start low dose melatonin (apparently was on high dose at home)  - gabapentin also for pain   - Recommend appropriate sleep hygiene  - Patient counselled/will be counselled on this when appropriate       Stage 3 chronic kidney disease, unspecified whether stage 3a or 3b CKD [facility])  Assessment & Plan  Lab Results   Component Value Date    EGFR 67 07/31/2023    EGFR 76 07/27/2023    EGFR 70 07/22/2023    CREATININE 0.86 07/31/2023    CREATININE 0.78 07/27/2023    CREATININE 0.83 07/22/2023     - Stable 7/27  - avoid nephrotoxic agents  - monitor BMP with routine labs  -consult IM for assist with management  - stable      PAF (paroxysmal atrial fibrillation) ([facility])  Assessment & Plan  - episode during previous hospitalization  - Zio patch (3/17/23) showed 5 episodes of SVT (longest 7 beats) 2 triggered events correlated with SR, no other significant arrhythmias  - home: Toprol XL 50 mg daily, diltiazem 120 mg daily  - Not on full A/C per prior providers   - continue here  - OP Cards follow-up     Tobacco abuse  Assessment & Plan  - 1 1/2 ppd for 56 years  - nicotine patch  - tried Wellbutrin prescribed by vascular surgery for 1 week, however discontinued due to side effects  - encourage smoking cessation but is resistant     Mixed hyperlipidemia  Assessment & Plan  - home: rosuvastatin 20 mg daily, Zetia 10 mg daily  - here: Zetia 10 mg, atorvastatin 40 mg (therapeutic substitution)  - Lipid panel (95/357722) total cholesterol 165, triglycerides 155, HDL 33,       PAD (peripheral artery disease) ([facility])  Assessment & Plan  - hx of PAD, carotid stenosis   - following procedures with [facility], [facility], Peggy in [facility], and [facility]:  - Aorto- right renal artery bypass 2000  - 8/6/2014 femoral/popliteal with stents and angioplasty, iliac arteries with stents and angioplasty and tibial peroneal artery angioplasty  -Right Fem to below-knee bypass occluded in 2010  - Fem-Fem bypass graft left to right occluded  -2/21/2017 right axillary to Bi-Fem bypass  -11/15/2022 lower extremity arterial ultrasound right lower limb shows 50-69% stenosis distal to the SFA with high-grade stenosis versus occlusion in the posterior tibial artery LUANN 0.53 (severe range), right Fem to below-knee bypass graft occluded, left lower limb patent CFA to posterior tibial bypass graft, LUANN 0.96  - 11/15/2022 abdominal aorta/iliac study showed occlusion of bilateral common and external iliac arteries, greater than 70% stenosis in the celiac artery, patent right axillo bifemoral arterial bypass graft  -11/15/2022 carotid ultrasound showed less than 50% bilateral carotid stenosis  - home: Plavix 75 mg, (previously on Xarelo 2.5 mg d/c d/t cost)  - here: plavix, statin, optimal BP control     Essential hypertension  Assessment & Plan  - home: amlodipine 5 mg daily, diltiazem 120 mg daily, lisinopril 10 mg daily, Toprol-XL 50 mg daily  - here: continue   - monitor BP  - monitor electrolytes  - consult IM to assist w/ management    CAD (coronary artery disease)  Assessment & Plan  - s/p CABG [facility]  - TTE (1/26/23): EF 60%, mild dilated RV, mild-mod AI, mild AS/TR  - follows with Dr. Tulio Boyce  - Plavix, ISMN, statin       Bipolar affective disorder, depressed, severe ([address])  Assessment & Plan  Mood stable   - home: lamictal 100 mg daily  - continue here  - neuropsychology consulted        Appreciate IM consultants medical co-management. Labs, medications, and imaging personally reviewed. ROS:  A ten point review of systems was completed on 07/31/23 and pertinent positives are listed in subjective section. All other systems reviewed were negative. OBJECTIVE:   /56 (BP Location: Right arm)   Pulse 65   Temp 98.2 °F (36.8 °C) (Oral)   Resp 16   Ht 5' 4" (1.626 m)   Wt 74 kg (163 lb 2.3 oz)   SpO2 96%   BMI 28.00 kg/m²     Physical Exam  Vitals reviewed. Constitutional:       General: She is not in acute distress. HENT:      Head: Normocephalic and atraumatic. Right Ear: External ear normal.      Left Ear: External ear normal.      Nose: Nose normal. No rhinorrhea. Mouth/Throat:      Mouth: Mucous membranes are moist.      Pharynx: Oropharynx is clear. Eyes:      General: No scleral icterus. Cardiovascular:      Rate and Rhythm: Normal rate. Pulmonary:      Effort: Pulmonary effort is normal. No respiratory distress. Abdominal:      General: There is no distension. Palpations: Abdomen is soft. Musculoskeletal:      Cervical back: Normal range of motion and neck supple. Right lower leg: No edema. Left lower leg: Edema present. Comments: Left AKA   Skin:     General: Skin is warm and dry. Neurological:      Mental Status: She is alert and oriented to person, place, and time.    Psychiatric:         Mood and Affect: Mood normal.         Behavior: Behavior normal.          Lab Results   Component Value Date    WBC 10.45 (H) 07/31/2023    HGB 12.2 07/31/2023    HCT 37.9 07/31/2023    MCV 92 07/31/2023     07/31/2023     Lab Results   Component Value Date    SODIUM 141 07/31/2023    K 4.5 07/31/2023     (H) 07/31/2023    CO2 25 07/31/2023    BUN 16 07/31/2023    CREATININE 0.86 07/31/2023    GLUC 101 07/31/2023    CALCIUM 9.1 07/31/2023     Lab Results   Component Value Date    INR 0.92 07/18/2023    PROTIME 12.5 07/18/2023           Current Facility-Administered Medications:   •  acetaminophen (TYLENOL) tablet 975 mg, 975 mg, Oral, Q8H [facility], LEV Turner, 975 mg at 07/31/23 1302  •  albuterol (PROVENTIL HFA,VENTOLIN HFA) inhaler 2 puff, 2 puff, Inhalation, Q6H PRN, LEV Turner  •  atorvastatin (LIPITOR) tablet 40 mg, 40 mg, Oral, Daily With Dinner, LEV Turner, 40 mg at 07/30/23 1709  •  bisacodyl (DULCOLAX) rectal suppository 10 mg, 10 mg, Rectal, Daily PRN, LEV Turner  •  cephalexin (KEFLEX) capsule 500 mg, 500 mg, Oral, Q6H [facility], Lyubov Walsh MD, 500 mg at 07/31/23 1251  •  clopidogrel (PLAVIX) tablet 75 mg, 75 mg, Oral, Daily, LEV Turner, 75 mg at 07/31/23 0910  •  diltiazem (CARDIZEM) tablet 120 mg, 120 mg, Oral, Daily, LEV Turner, 120 mg at 07/31/23 0909  •  docusate sodium (COLACE) capsule 100 mg, 100 mg, Oral, BID, LEV Turner, 100 mg at 07/29/23 0849  •  ezetimibe (ZETIA) tablet 10 mg, 10 mg, Oral, Daily, LEV Turner, 10 mg at 26/45/70 1018  •  folic acid (FOLVITE) tablet 1 mg, 1 mg, Oral, Daily, LEV Turner, 1 mg at 07/31/23 0910  •  gabapentin (NEURONTIN) capsule 300 mg, 300 mg, Oral, BID, Meño Galaviz LEV, 300 mg at 07/31/23 0909  •  gabapentin (NEURONTIN) capsule 400 mg, 400 mg, Oral, HS, Shareen Boeck, MD, 400 mg at 07/30/23 2111  •  heparin (porcine) subcutaneous injection 5,000 Units, 5,000 Units, Subcutaneous, Q8H [facility], LEV Turner, 5,000 Units at 07/31/23 1302  •  isosorbide mononitrate (IMDUR) 24 hr tablet 30 mg, 30 mg, Oral, QAM, LEV Turner, 30 mg at 07/31/23 0909  •  lamoTRIgine (LaMICtal) tablet 100 mg, 100 mg, Oral, Daily, LEV Turner, 100 mg at 07/31/23 0909  •  lisinopril (ZESTRIL) tablet 10 mg, 10 mg, Oral, Daily, LEV Chen, 10 mg at 07/31/23 4490  •  melatonin tablet 9 mg, 9 mg, Oral, HS PRN, LEV Calderon  •  methocarbamol (ROBAXIN) tablet 500 mg, 500 mg, Oral, Q8H [facility], Shareen Boeck, MD, 500 mg at 07/31/23 1302  •  metoprolol succinate (TOPROL-XL) 24 hr tablet 50 mg, 50 mg, Oral, Daily, LEV Turner, 50 mg at 07/31/23 0910  •  nicotine (NICODERM CQ) 21 mg/24 hr TD 24 hr patch 1 patch, 1 patch, Transdermal, Daily, LEV Turner, 1 patch at 07/31/23 0910  •  ondansetron (ZOFRAN-ODT) dispersible tablet 4 mg, 4 mg, Oral, Q6H PRN, LEV Turner  •  oxyCODONE (ROXICODONE) IR tablet 5 mg, 5 mg, Oral, Q4H PRN, 5 mg at 07/30/23 1211 **OR** oxyCODONE (ROXICODONE) immediate release tablet 10 mg, 10 mg, Oral, Q4H PRN, Shareen Boeck, MD, 10 mg at 07/31/23 0908  •  polyethylene glycol (MIRALAX) packet 17 g, 17 g, Oral, Daily, LEV Turner, 17 g at 07/25/23 1535  •  saccharomyces boulardii (FLORASTOR) capsule 250 mg, 250 mg, Oral, BID, Jasiel Wganer MD, 250 mg at 07/31/23 9666  •  senna (SENOKOT) tablet 8.6 mg, 1 tablet, Oral, BID, Shareen Boeck, MD, 8.6 mg at 07/29/23 4548  •  traZODone (DESYREL) tablet 50 mg, 50 mg, Oral, HS PRN, LEV Turner, 50 mg at 07/31/23 0022    Past Medical History:   Diagnosis Date   • Aorto-iliac disease [facility])    • Atrial fibrillation ([address])    • CAD (coronary artery disease)    • Carotid stenosis, bilateral    • Celiac artery stenosis (HCC)    • CKD (chronic kidney disease) stage 3, GFR 30-59 ml/min (HCC)    • DVT (deep venous thrombosis) (HCC)     LLE (CFV, popl)   • Heart attack ([address]) 02/2022   • HLD (hyperlipidemia)    • Hypertension    • Lung mass     RUL   • Myocardial infarction ([address])     2022   • COURTNEY (obstructive sleep apnea)    • PAD (peripheral artery disease) ([address])    • Stroke [facility])        Patient Active Problem List    Diagnosis Date Noted   • Hx of AKA (above knee amputation), left ([address]) 07/25/2023   • At risk for venous thromboembolism (VTE) 07/28/2023   • Pain 07/28/2023   • Hypoxia 07/28/2023   • Urinary dysfunction 07/26/2023   • Nodule of upper lobe of right lung 06/14/2023   • Insomnia 02/10/2023   • PAF (paroxysmal atrial fibrillation) ([address]) 01/09/2023   • Stage 3 chronic kidney disease, unspecified whether stage 3a or 3b CKD ([address]) 01/09/2023   • Dizziness 01/09/2023   • Tobacco abuse 01/02/2023   • Aortoiliac occlusive disease ([address]) 12/28/2022   • Mixed hyperlipidemia 12/27/2022   • Left leg swelling 11/08/2022   • Bipolar affective disorder, depressed, severe ([address]) 04/19/2017   • S/P vascular bypass 02/24/2017   • Hx of CABG 11/30/2016   • Presence of IVC filter 11/30/2016   • Essential hypertension 01/22/2016   • Thyroid nodule 01/22/2016   • CAD (coronary artery disease) 06/23/2014   • Renal artery stenosis ([address]) 06/23/2014   • PAD (peripheral artery disease) ([address]) 06/23/2014          Whit Leigh DO  Physical Medicine and J Carlos MOFFETT have spent a total time of 25 minutes on 07/31/23 in caring for this patient including Counseling / Coordination of care, Documenting in the medical record, Reviewing / ordering tests, medicine, procedures   and Communicating with other healthcare professionals .

## 2023-07-31 NOTE — PROGRESS NOTES
07/31/23 0700   Pain Assessment   Pain Assessment Tool 0-10   Pain Score 4   Pain Location/Orientation Orientation: Left; Location: Leg   Pain Onset/Description Onset: Ongoing   Patient's Stated Pain Goal No pain   Hospital Pain Intervention(s) Repositioned;Relaxation technique   Restrictions/Precautions   Precautions Bed/chair alarms; Fall Risk;Supervision on toilet/commode   Weight Bearing Restrictions Yes   LLE Weight Bearing Per Order NWB   Lifestyle   Autonomy "Things have been getting easier, I want to be able to do as much as I can first before I ask for help"   Eating   Type of Assistance Needed Independent   Physical Assistance Level No physical assistance   Comment At end of session, Pt seated in recliner chair for breakfast   Eating CARE Score 6   Oral Hygiene   Type of Assistance Needed Supervision   Physical Assistance Level No physical assistance   Comment Seated in [facility] at sink   Oral Hygiene CARE Score 4   Grooming   Findings Supervision provided for completion of grooming routine while seated in    Shower/Bathe Self   Type of Assistance Needed Physical assistance   Physical Assistance Level 25% or less   Comment Pt engaging in sponge bath while seated in WC. Pt able to wash 8/9 parts with exception to R foot in which min A req due to dec functional reach. Pt then standing to perform washing of latia/groin area, CGA for steadying. No LOB noted. Shower/Bathe Self CARE Score 3   Upper Body Dressing   Type of Assistance Needed Set-up / clean-up   Physical Assistance Level No physical assistance   Comment Seated in WC, pt able to don overhead shirt and adjust around waist   Upper Body Dressing CARE Score 5   Lower Body Dressing   Type of Assistance Needed Physical assistance   Physical Assistance Level 25% or less   Comment Pt seated in WC for threading of undergarment and shorts over BLE. Pt then stand with support from sink to complete CM over hips. CGA for steadying throughout task.  A provided for residual limb wrapping. RN completing incisional care on this day, however, Pt educated on steps taken. Lower Body Dressing CARE Score 3   Putting On/Taking Off Footwear   Type of Assistance Needed Physical assistance   Physical Assistance Level 25% or less   Comment When seated upright in WC Pt require min a for threading R sock over foot due to dec func reach. However, when sitting in long sit in bed, Pt able to don sock and slide into sneaker when seated EOB. Putting On/Taking Off Footwear CARE Score 3   Sit to Lying   Type of Assistance Needed Supervision   Physical Assistance Level No physical assistance   Sit to Lying CARE Score 4   Lying to Sitting on Side of Bed   Type of Assistance Needed Supervision   Physical Assistance Level No physical assistance   Lying to Sitting on Side of Bed CARE Score 4   Sit to Stand   Type of Assistance Needed Incidental touching   Physical Assistance Level No physical assistance   Comment Pt complete with steadying assistance as needed and RW present   Sit to Stand CARE Score 4   Bed-Chair Transfer   Type of Assistance Needed Incidental touching   Physical Assistance Level No physical assistance   Comment Pt complete modified sit pivot transfers from various surface today, Pt able to appropriately set up transfer and manage WC, cues for safety at times. Chair/Bed-to-Chair Transfer CARE Score 4   Toileting Hygiene   Type of Assistance Needed Physical assistance   Physical Assistance Level 25% or less   Comment Min A provided for balance when standing to complete hygiene and CM. Recommend completing hygiene while in sitting to decrease twisting while in stance, Pt receptive. Toileting Hygiene CARE Score 3   Toilet Transfer   Type of Assistance Needed Physical assistance   Physical Assistance Level 25% or less   Comment Min A provided for modified sit pivot transfer from [facility] to toilet. Pt complete with use of grab bar for additional balance support throughout transfer. Toilet Transfer CARE Score 3   Therapeutic Excerise-Strength   UE Strength Yes   Right Upper Extremity- Strength   R Position Seated   Equipment Dumbbell  (3#)   R Weight/Reps/Sets 3x10   RUE Strength Comment Pt complete functional strength training to inc overall UE strength and endurance to inc independence in functional tasks and transfers. Pt complete bicep curls, shoulder press, chest press, and thoracic rows with 3# weight for 3x10 reps. Pt implement seated rest breaks to combat fatigue throughout exercises. Cognition   Overall Cognitive Status WFL   Arousal/Participation Alert; Cooperative   Attention Attends with cues to redirect   Orientation Level Oriented X4   Memory Decreased short term memory;Decreased recall of recent events   Following Commands Follows one step commands without difficulty   Additional Activities   Additional Activities Comments Engaged in [facility] of leisure pursuits, Pt stating that she likes to utilize computer to play word games. Pt has personal computer with her during hospital stay but has been unable to access internet to engage in tassk. Attempted to connect Pt computer to internet but unsuccessful, offered paper versions of word games and Pt receptive to playing in order to pass time. Activity Tolerance   Activity Tolerance Patient tolerated treatment well   Medical Staff Made Aware RN present for incisional care   Assessment   Treatment Assessment Pt participated in 90 minute OT session with focus on ADL retraining, functional transfers, residual limb wrapping, and therapeutic exercise. Pt tolerated treatment well and demonstrates improvided ability to safely perform ADL routine with inc independence. Pt would continue to benefit from OT servcies with focus on ADL retraining, iADL retraining, transfers, functional reach, residual linb wrapping, and amputee education. At end of session, Pt seated in recliner chair with breakfast tray, alarms engaged, and all needs met. Prognosis Good   Problem List Decreased strength;Decreased endurance; Impaired balance;Decreased mobility; Decreased cognition;Pain;Decreased skin integrity   Barriers to Discharge Inaccessible home environment;Decreased caregiver support   Plan   Treatment/Interventions ADL retraining;Functional transfer training; Therapeutic exercise; Endurance training;Cognitive reorientation;Patient/family training;Equipment eval/education; Compensatory technique education;Spoke to nursing   Progress Progressing toward goals   OT Therapy Minutes   OT Time In 0700   OT Time Out 0830   OT Total Time (minutes) 90   OT Mode of treatment - Individual (minutes) 90   OT Mode of treatment - Concurrent (minutes) 0   OT Mode of treatment - Group (minutes) 0   OT Mode of treatment - Co-treat (minutes) 0   OT Mode of Treatment - Total time(minutes) 90 minutes   OT Cumulative Minutes 480   Therapy Time missed   Time missed?  No

## 2023-07-31 NOTE — PROGRESS NOTES
Thank you for this consultation. Will be onsite tomorrow to evaluate pt. Mayito Neri.  Celi Oakley, Ph.D.

## 2023-07-31 NOTE — PROGRESS NOTES
07/31/23 1000   Pain Assessment   Pain Assessment Tool 0-10   Pain Score 5   Pain Location/Orientation Orientation: Left; Location: Leg   Restrictions/Precautions   Precautions Bed/chair alarms; Fall Risk;Supervision on toilet/commode   LLE Weight Bearing Per Order NWB   General   Change In Medical/Functional Status cont with ace wrapped at this time   Subjective   Subjective pt agreeable to perform skilled PT   Roll Left and Right   Type of Assistance Needed Set-up / clean-up   Roll Left and Right CARE Score 5   Sit to Lying   Type of Assistance Needed Supervision   Sit to Lying CARE Score 4   Lying to Sitting on Side of Bed   Type of Assistance Needed Supervision   Lying to Sitting on Side of Bed CARE Score 4   Sit to Stand   Type of Assistance Needed Incidental touching   Comment to RW for SPT   Sit to Stand CARE Score 4   Bed-Chair Transfer   Type of Assistance Needed Incidental touching; Adaptive equipment   Comment sit pivot and with RW for SPT xfers   Chair/Bed-to-Chair Transfer CARE Score 4   Transfer Bed/Chair/Wheelchair   Adaptive Equipment (S)  Roller Walker  (and working on sit pivot)   Walk 10 Feet   Type of Assistance Needed Physical assistance   Physical Assistance Level 25% or less   Comment WC follow   Walk 10 Feet CARE Score 3   Walk 50 Feet with Two Turns   Reason if not Attempted Safety concerns   Walk 50 Feet with Two Turns CARE Score 88   Walk 150 Feet   Reason if not Attempted Safety concerns   Walk 150 Feet CARE Score 88   Walking 10 Feet on Uneven Surfaces   Reason if not Attempted Safety concerns   Walking 10 Feet on Uneven Surfaces CARE Score 88   Ambulation   Primary Mode of Locomotion Prior to Admission Walk   Distance Walked (feet) 10 ft   Assist Device Roller Walker   Gait Pattern Hopping   Walk Assist Level Contact Guard;Minimum Assist   Findings WC follow   Does the patient walk? 2.  Yes   Wheel 50 Feet with Two Turns   Type of Assistance Needed Set-up / clean-up   Wheel 50 Feet with Two Turns CARE Score 5   Wheel 150 Feet   Type of Assistance Needed Set-up / clean-up   Wheel 150 Feet CARE Score 5   Wheelchair mobility   Does the patient use a wheelchair? 1. Yes   Type of Wheelchair Used 1. Manual   Method Right upper extremity; Left upper extremity;Right lower extremity   Distance Level Surface (feet) 200 ft   Curb or Single Stair   Style negotiated Curb   Type of Assistance Needed Physical assistance   Physical Assistance Level 26%-50%   Comment using RW hopping BWD 6 inch on  steps to mimic IMANI for home DC '   1 Step (Curb) CARE Score 3   4 Steps   Reason if not Attempted Safety concerns   4 Steps CARE Score 88   12 Steps   Reason if not Attempted Safety concerns   12 Steps CARE Score 88   Stairs   Type Curb   # of Steps 1   Weight Bearing Precautions LLE;Fall Risk   Assist Devices Roller Walker   Findings mimic to enter home   Picking Up Object   Reason if not Attempted Activity not applicable   Picking Up Object CARE Score 9   Therapeutic Interventions   Strengthening Bridgies , SLR gluts , side lying hip abd, 20- reps   Flexibility prone lying 8 min with left hip elevated to inc hip ectension   Other core strengthening with 2 # dowel nikolay and ball toss   Equipment Use   NuStep lvl 1 for 10 min   Other Comments   Comments rewarpping AKA to orders . Assessment   Treatment Assessment pt focus on amputee program for AKA , cont working on hand book and ROM strengthneing and conditioning . Pt overall progressing at this time with program cont toward functional mobility and safety and dec fall risk .  Cont POC   Barriers to Discharge Inaccessible home environment;Decreased caregiver support   Plan   Progress Progressing toward goals   Recommendation   PT Discharge Recommendation Home with home health rehabilitation   Equipment Recommended Wheelchair   PT Therapy Minutes   PT Time In 1000   PT Time Out 1130   PT Total Time (minutes) 90   PT Mode of treatment - Individual (minutes) 90   PT Mode of treatment - Concurrent (minutes) 0   PT Mode of treatment - Group (minutes) 0   PT Mode of treatment - Co-treat (minutes) 0   PT Mode of Treatment - Total time(minutes) 90 minutes   PT Cumulative Minutes 420   Therapy Time missed   Time missed?  No

## 2023-07-31 NOTE — PROGRESS NOTES
Pastoral Care Progress Note    2023  Patient: Madhuri Tavera : 1950  Admission Date & Time: 2023 1433  MRN: [id number] CSN: [id number]      Made pastoral care introduction with Pt. She was friendly, but did not seem to want to talk, so I kept the visit short and assured her that I was available.

## 2023-07-31 NOTE — PCC OCCUPATIONAL THERAPY
Pt continues to present with impairments in activity tolerance, endurance, and memory. Additional functional barriers include pain. Pt is functioning at overall set-up to independent for ADLs and fxnl xfers. FT completed w/ pt's grandsons and ramp was installed, plan for pt's son to install grab bar in bathroom. OT D/C recommendation is for home w/ family support on Wednesday and recommendation for HHOT to maximize fxnl indep and ease transition to home context.

## 2023-08-01 LAB

## 2023-08-01 PROCEDURE — 99232 SBSQ HOSP IP/OBS MODERATE 35: CPT | Performed by: INTERNAL MEDICINE

## 2023-08-01 PROCEDURE — 99232 SBSQ HOSP IP/OBS MODERATE 35: CPT | Performed by: STUDENT IN AN ORGANIZED HEALTH CARE EDUCATION/TRAINING PROGRAM

## 2023-08-01 PROCEDURE — 97530 THERAPEUTIC ACTIVITIES: CPT

## 2023-08-01 PROCEDURE — 97535 SELF CARE MNGMENT TRAINING: CPT

## 2023-08-01 PROCEDURE — 97110 THERAPEUTIC EXERCISES: CPT

## 2023-08-01 RX ORDER — ROPINIROLE 0.25 MG/1
0.5 TABLET, FILM COATED ORAL
Status: DISCONTINUED | OUTPATIENT
Start: 2023-08-01 | End: 2023-08-09 | Stop reason: HOSPADM

## 2023-08-01 RX ORDER — POLYETHYLENE GLYCOL 3350 17 G/17G
17 POWDER, FOR SOLUTION ORAL DAILY PRN
Status: DISCONTINUED | OUTPATIENT
Start: 2023-08-01 | End: 2023-08-09 | Stop reason: HOSPADM

## 2023-08-01 RX ORDER — LANOLIN ALCOHOL/MO/W.PET/CERES
9 CREAM (GRAM) TOPICAL
Status: DISCONTINUED | OUTPATIENT
Start: 2023-08-01 | End: 2023-08-09 | Stop reason: HOSPADM

## 2023-08-01 RX ORDER — GABAPENTIN 300 MG/1
300 CAPSULE ORAL 3 TIMES DAILY
Status: DISCONTINUED | OUTPATIENT
Start: 2023-08-01 | End: 2023-08-02

## 2023-08-01 RX ORDER — LANOLIN ALCOHOL/MO/W.PET/CERES
9 CREAM (GRAM) TOPICAL
Status: DISCONTINUED | OUTPATIENT
Start: 2023-08-01 | End: 2023-08-01

## 2023-08-01 RX ADMIN — ISOSORBIDE MONONITRATE 30 MG: 30 TABLET, EXTENDED RELEASE ORAL at 09:07

## 2023-08-01 RX ADMIN — METHOCARBAMOL 500 MG: 500 TABLET ORAL at 21:12

## 2023-08-01 RX ADMIN — TRAZODONE HYDROCHLORIDE 50 MG: 50 TABLET ORAL at 23:14

## 2023-08-01 RX ADMIN — CEPHALEXIN 500 MG: 500 CAPSULE ORAL at 11:27

## 2023-08-01 RX ADMIN — ACETAMINOPHEN 975 MG: 325 TABLET, FILM COATED ORAL at 14:48

## 2023-08-01 RX ADMIN — NICOTINE 1 PATCH: 21 PATCH, EXTENDED RELEASE TRANSDERMAL at 09:07

## 2023-08-01 RX ADMIN — EZETIMIBE 10 MG: 10 TABLET ORAL at 09:07

## 2023-08-01 RX ADMIN — OXYCODONE HYDROCHLORIDE 10 MG: 10 TABLET ORAL at 14:50

## 2023-08-01 RX ADMIN — CEPHALEXIN 500 MG: 500 CAPSULE ORAL at 23:14

## 2023-08-01 RX ADMIN — GABAPENTIN 300 MG: 300 CAPSULE ORAL at 09:08

## 2023-08-01 RX ADMIN — Medication 250 MG: at 17:24

## 2023-08-01 RX ADMIN — OXYCODONE HYDROCHLORIDE 5 MG: 5 TABLET ORAL at 02:57

## 2023-08-01 RX ADMIN — METOPROLOL SUCCINATE 50 MG: 50 TABLET, EXTENDED RELEASE ORAL at 09:08

## 2023-08-01 RX ADMIN — GABAPENTIN 400 MG: 400 CAPSULE ORAL at 21:12

## 2023-08-01 RX ADMIN — ROPINIROLE 0.5 MG: 0.25 TABLET, FILM COATED ORAL at 21:12

## 2023-08-01 RX ADMIN — ACETAMINOPHEN 975 MG: 325 TABLET, FILM COATED ORAL at 21:12

## 2023-08-01 RX ADMIN — ACETAMINOPHEN 975 MG: 325 TABLET, FILM COATED ORAL at 05:36

## 2023-08-01 RX ADMIN — LAMOTRIGINE 100 MG: 100 TABLET ORAL at 09:08

## 2023-08-01 RX ADMIN — CEPHALEXIN 500 MG: 500 CAPSULE ORAL at 05:36

## 2023-08-01 RX ADMIN — CEPHALEXIN 500 MG: 500 CAPSULE ORAL at 17:24

## 2023-08-01 RX ADMIN — Medication 250 MG: at 09:09

## 2023-08-01 RX ADMIN — METHOCARBAMOL 500 MG: 500 TABLET ORAL at 05:36

## 2023-08-01 RX ADMIN — ATORVASTATIN CALCIUM 40 MG: 40 TABLET, FILM COATED ORAL at 17:24

## 2023-08-01 RX ADMIN — CLOPIDOGREL BISULFATE 75 MG: 75 TABLET ORAL at 09:08

## 2023-08-01 RX ADMIN — HEPARIN SODIUM 5000 UNITS: 5000 INJECTION INTRAVENOUS; SUBCUTANEOUS at 14:48

## 2023-08-01 RX ADMIN — MELATONIN 9 MG: at 21:12

## 2023-08-01 RX ADMIN — HEPARIN SODIUM 5000 UNITS: 5000 INJECTION INTRAVENOUS; SUBCUTANEOUS at 05:36

## 2023-08-01 RX ADMIN — DILTIAZEM HYDROCHLORIDE 120 MG: 60 TABLET, FILM COATED ORAL at 09:07

## 2023-08-01 RX ADMIN — LISINOPRIL 10 MG: 10 TABLET ORAL at 09:08

## 2023-08-01 RX ADMIN — GABAPENTIN 300 MG: 300 CAPSULE ORAL at 17:24

## 2023-08-01 RX ADMIN — HEPARIN SODIUM 5000 UNITS: 5000 INJECTION INTRAVENOUS; SUBCUTANEOUS at 21:13

## 2023-08-01 RX ADMIN — METHOCARBAMOL 500 MG: 500 TABLET ORAL at 14:48

## 2023-08-01 RX ADMIN — FOLIC ACID 1 MG: 1 TABLET ORAL at 09:07

## 2023-08-01 RX ADMIN — OXYCODONE HYDROCHLORIDE 10 MG: 10 TABLET ORAL at 09:11

## 2023-08-01 NOTE — TEAM CONFERENCE
Acute RehabilitationTeam Conference Note  Date: 8/1/2023   Time: 8:33 AM       Patient Name:  Deep Tian       Medical Record Number: [id number]   YOB: 1950  Sex: Female          Room/Bed:  [place] [address]/[facility] 457-01  Payor Info:  Payor: Jaylin McBride / Plan: MEDICARE A AND B / Product Type: Medicare A & B Fee for Service /      Admitting Diagnosis: Hx of AKA (above knee amputation), left ([address]) [E10.007]   Admit Date/Time:  7/25/2023  2:33 PM  Admission Comments: No comment available     Primary Diagnosis:  Hx of AKA (above knee amputation), left ([address])  Principal Problem: Hx of AKA (above knee amputation), left [facility]    Patient Active Problem List    Diagnosis Date Noted   • At risk for venous thromboembolism (VTE) 07/28/2023   • Pain 07/28/2023   • Hypoxia 07/28/2023   • Urinary dysfunction 07/26/2023   • Hx of AKA (above knee amputation), left ([address]) 07/25/2023   • Nodule of upper lobe of right lung 06/14/2023   • Insomnia 02/10/2023   • PAF (paroxysmal atrial fibrillation) ([address]) 01/09/2023   • Stage 3 chronic kidney disease, unspecified whether stage 3a or 3b CKD ([address]) 01/09/2023   • Dizziness 01/09/2023   • Tobacco abuse 01/02/2023   • Aortoiliac occlusive disease ([address]) 12/28/2022   • Mixed hyperlipidemia 12/27/2022   • Left leg swelling 11/08/2022   • Bipolar affective disorder, depressed, severe ([address]) 04/19/2017   • S/P vascular bypass 02/24/2017   • Hx of CABG 11/30/2016   • Presence of IVC filter 11/30/2016   • Essential hypertension 01/22/2016   • Thyroid nodule 01/22/2016   • CAD (coronary artery disease) 06/23/2014   • Renal artery stenosis ([address]) 06/23/2014   • PAD (peripheral artery disease) ([address]) 06/23/2014       Physical Therapy:    Weight Bearing Status: Non-weight bearing  Transfers: Minimal Assistance, Incidental Touching (SIT PIVOT no SB)  Bed Mobility: Minimal Assistance, Incidental Touching  Amulation Distance (ft): 10 feet  Ambulation: Minimal Assistance  Assistive Device for Ambulation: Roller Walker  Wheelchair Mobility Distance: 200 ft  Wheelchair Mobility: Supervision  Number of Stairs: 0  Ramp:  (needs to perform for home DC)  Discharge Recommendations: Home with:  [address] [facility] [address] with[de-identified] Family Support    Pt barriers cont with pain lvl 5-8 10 pain , wrapping limb, fall risk nees WC for home DC and ramp for entering home . Plus family members need to be updated for home DC and overall functional lvl. Pt needs to chose amputee vendor and working on handout booklet also pt needs to learn to wrap limb and skin checks. Pt will cont to benefit with skilled PT to meet DC goals       Occupational Therapy:  Eating: Independent  Grooming: Supervision  Bathing: Incidental Touching, Supervision  Bathing: Incidental Touching, Supervision  Upper Body Dressing: Supervision  Lower Body Dressing: Minimal Assistance  Toileting: Minimal Assistance  Tub/Shower Transfer: Minimal Assistance  Toilet Transfer: Minimal Assistance  Cognition: Within Defined Limits  Orientation: Person, Place, Time, Situation  Discharge Recommendations: Home with:  [address] [facility] with[de-identified] Home Occupational Therapy (anticipate DC next week (week of Aug 7))       Pt is a 68 y.o. female who was admitted to [facility] s/p AKA of LLE. Pt current deficits include dec strength, endurance, balance, and safety in independent performance of ADLs/iADLs. Pt is currently performing ADL routine with min A to supervision, and transfers with min A/CGA. Pt would continue to benefit from intensive OT treatment to focus on increasing overall independence in daily tasks, safety with transfers, and appropriate use fo AE as needed. Plan to DC patient home with home health rehabilitation next week. Speech Therapy:           No notes on file    Nursing Notes:  Appetite: Good  Diet Type: Regular/House                      Diet Patient/Family Education Complete: Yes    Type of Wound (LDA):  Wound                    Type of Wound Patient/Family Education: No (ongoing)  Bladder: Continent     Bladder Patient/Family Education: Yes  Bowel: Continent     Bowel Patient/Family Education: Yes  Pain Location/Orientation: Orientation: Left, Location: Leg  Pain Score: 0                       Hospital Pain Intervention(s): Medication (See MAR)  Pain Patient/Family Education: Yes  Medication Management/Safety  Injectable:  (heparin sq- will not need at home)  Safe Administration: Yes  Medication Patient/Family Education Complete: No (ongoing)    Pt admitted S/p left AKA 7/21/23 Hx severe PAD with B/L SC to FA bypass in past = has TO left fem-pop BPG Home:  Plavix/Crestor 20mg qd/Zetia 10mg qd Here:  Plavix/Zetia/Lipitor 40mg qd Started on Keflex for 7 days for incisional redness. Pain control with scheduled tylenol and gabapentin. Oxycodone prn. Coronary artery disease S/p CABG in 2000 TTE on 1/26/2023 with EF of 60%, mild to moderate AI, mild AS, estimated PA pressure of 40 mmHg Nuclear pharmacological stress test on 1/26/2023 with anterior wall perfusion defect likely artifact/cannot exclude small area of ischemia, normal LV systolic function. Continue Plavix/Zetia 10mg qd/Imdur Hypertension- Home: Diltiazem 120 mg qd/Amlodipine 5 mg qd/Imdur 30 mg qd/Lisinopril 10 mg/ Toprol-XL 50 mg qd Here:  Diltiazem 120mg qd/Imdur 30mg qd/Linsinopril 10mg qd (hold SBP <130)/Toprol 50mg qd BP stable. Hyperlipidemia- Continue atorvastatin and Zetia here Paroxysmal atrial fibrillation Currently in sinus rhythm S/p a 2-week Zio patch in March 2023 with 5 episodes of SVT without other significant arrhythmia Continue Diltiazem 120 mg daily, Toprol XL 50 mg daily DVT/IVC filter- Hx lower extremity DVT/IVC filter IVC filter is still present on PET scan 7/7/2023 Tobacco use- Continues to smoke 1 to 1.5 packs/day Did not tolerate Wellbutrin/Chantix in the past Counseled for smoking cessation but not ready to quit Nicotine patch here 21 mg COPD- CT chest with evidence of emphysema PFT in June 2023:  Moderate obstruction, no reversibility with bronchodilator   Was recommended Trelegy and rescue inhaler, but she didn't want it; using Albuterol prn. Continues on RA with O2 sats 91-92%. Recommend outpatient follow-up with pulmonology and smoking cessation Right perihilar mass -On CT imaging 7/18/23 Planned for biopsy in future. CKD II Creatinine stable at 0.86 Has likely nonfunctioning right kidney Avoid nephrotoxic agents, hypotension Bipolar disease-  Lamictal 100 mg daily. Pt is continent of bowel and bladder. Pt requires alarms for safety. This week we will encourage independence with ADLs. We will monitor labs and vital signs. WE will educate pt/family about repositioning to prevent skin breakdown. We will assist w repositioning and perform routine skin checks. We will monitor for adequate pain control. We will monitor for constipation and medicate pt as ordered. We will increase safety awareness and keep pt free from falls. Case Management:     Discharge Planning  Living Arrangements: Lives w/ Children  Support Systems: Self  Assistance Needed: none  Type of Current Residence: Private residence  Current Home Care Services: No  8/1- Pt lives in 2 story home with son, daughter in law and 5 grandchildren. Pt reports a good support system and family transports as needed. Prior to admission, pt was independent with ADLs and IADLs and ambulation. Pt does have walker and cane. Pt reports no hx of [facility] OP therapies or STR. PCP is Jaz Dhaliwal, preferred pharmacy is [facility]. Is the patient actively participating in therapies?  yes  List any modifications to the treatment plan: None    Barriers Interventions   Left aka There ex   Hypoxia Resolved   Pain Med managememt   Skin abrasion Monitoring, IV abx   Insomnia Med management   Anxiety/depression Neuro psych   IMANI Comp strategies, son might possibly have ramp   DC dynamic balance in single leg stance Balance training, wc and sit pivots for home   Mild cog - MOCA 23 Repeitive training on new learning                  Is the patient making expected progress toward goals? yes  List any update or changes to goals: None    Medical Goals: Patient will be medically stable for discharge to [facility] restrictive envrionment upon completion of rehab program and Patient will be able to manage medical conditions and comorbid conditions with medications and follow up upon completion of rehab program    Weekly Team Goals:   Rehab Team Goals  ADL Team Goal: Patient will require supervision with ADLs with least restrictive device upon completion of rehab program  Transfer Team Goal: Patient will be independent with transfers with least restrictive device upon completion of rehab program  Locomotion Team Goal: Patient will be independent with locomotion with least restrictive device upon completion of rehab program    Discussion: Pt participating in therapies and making progress in rehab program. Pt functioning at min assist with walker, min assist sit pivots. Min assist with ADLs IADLs. Team recommending dc date of 8/9 with home RN PT OT. Anticipated Discharge Date:  Dc 8/9 with home rn pt ot   [facility] Team Members Present: The following team members are supervising care for this patient and were present during this Weekly Team Conference.     Physician: Dr. Maria De Jesus Clark, DO  : UGO Coombs  Registered Nurse: Tova Weaver, RN, BSN, CRRN  Physical Therapist: Martir Molina DPT  Occupational Therapist: Francesco Nuñez MS, OTR/L, CBIS  Speech Therapist:

## 2023-08-01 NOTE — PROGRESS NOTES
PM&R PROGRESS NOTE:  Jaret Degroot 68 y.o. female MRN: [id number]  Unit/Bed#: -01 Encounter: [id number]    Rehabilitation Diagnosis: Impairment of mobility, safety and Activities of Daily Living (ADLs) due to Amputation:  05.3  Unilateral Lower Limb Above the Knee    HPI: Ray Luevano is a 68 y.o. female with a medical history of CAD, hypertension, CABG, vascular bypass surgeries, PAD, aortoiliac occlusive disease, tobacco abuse (1 ppd x 60+yrs), PAF/SVT (no AC), LLE DVT s/p IVC filter, RUL lung mass, stage III CKD who presented to [address] [place] [address] on 07/18 with left foot pain. Patient states that she noticed that she had increased pain over the left foot in approximately 2 days ago and is acutely worse over the last 24 hours, accompanied by numbness, and color change of her foot. On exam, patient's left foot is bluish discoloration, swollen, no palpable or doppler DP or PT pulses. Patient still has motor of her foot (is able to wiggle toes, move her foot up and down) but sensation is significantly decreased, is able to feel slight pressure. CT angiogram with multiple inflow graft occlusions on the left. The right to left component of the right axillobifemoral graft is occluded. The left axillary to profunda graft is occluded and a segment is excised. The aorta to left common femoral and jump graft from the alex of the common femoral artery anastomosis to below-knee popliteal artery is also occluded. The deep femoral artery beyond the previous anastomosis does reconstitute but is secondary or tertiary branch that is small at less than 2 mm. There are no other named vessels that reconstitute within the upper leg. There does appear to be reconstitution of tibial vessels beyond the trifurcation but again these vessels are less than 2 mm in diameter. Vascular surgery was consulted, recommending L AKA.  Patient was placed on heparin gtt and APS was consulted, patient now on oral analgesics. On 07/21 patient underwent a left AKA with Dr. Tg Tripathi, EBL minimal. Patient is non-weight bearing left lower extremity. PT/OT were consulted and recommend inpatient acute rehabilitation. PT/OT were consulted and recommend acute inpatient rehabilitation. The patient was evaluated by the Rehabilitation team and deemed an appropriate candidate for comprehensive inpatient rehabilitation and admitted to the [facility] on 7/25/2023  2:33 PM       SUBJECTIVE: Patient seen face to face. No acute issues. Patient had difficulty falling asleep overnight d/t pain, prn trazodone and oxycodone administered. Patient does feel rested today. Discussed taking Melatonin early in the evening and the trazodone if the Melatonin does not work before bed. Pain has increased since yesterday, 6/10; left residual limb, described as burning throughout leg, phantom pain, and left lateral leg feels tight, like a knot. Discussed pain medication regimen and plan. Good appetite, voiding, LBM 7/30. Denies chest pain, shortness of breath, fever, chills, N/V, abdominal pain. ASSESSMENT: Stable, progressing    PLAN:  - hypoxia: improved, on room air SpO2 90-92%  - skin: left thigh DTI improving with Allevyn, continue with light ace wrapping; buttocks excoriation improving, continue Calazime cream,   - pain: burning pain throughout the left residual limb, lateral aspect of thigh feels tight and throbbing. Increased Gabapentin to 300 mg TID, 400 mg qhs  - insomnia: scheduled Melatonin 9 mg qhs; Trazodone 50 mg qhs prn, monitor     Rehabilitation  Team conference: The team discussed patient's functional status, goals, and barriers. • Functional deficits:  LLE NWB, mobility, self-care  • Continue current rehabilitation plan of care to maximize function.     • Functional update:   Physical Therapy Occupational Therapy Speech Therapy   Weight Bearing Status: Non-weight bearing  Transfers: Minimal Assistance, Incidental Touching (SIT PIVOT no SB)  Bed Mobility: Minimal Assistance, Incidental Touching  Amulation Distance (ft): 10 feet  Ambulation: Minimal Assistance  Assistive Device for Ambulation: Roller Walker  Wheelchair Mobility Distance: 200 ft  Wheelchair Mobility: Supervision  Number of Stairs: 0  Ramp:  (needs to perform for home DC)  Discharge Recommendations: Home with:  [facility] with[de-identified] Family Support   Eating: Independent  Grooming: Supervision  Bathing: Incidental Touching, Supervision  Bathing: Incidental Touching, Supervision  Upper Body Dressing: Supervision  Lower Body Dressing: Minimal Assistance  Toileting: Minimal Assistance  Tub/Shower Transfer: Minimal Assistance  Toilet Transfer: Minimal Assistance  Cognition: Within Defined Limits  Orientation: Person, Place, Time, Situation               • Estimated Discharge: anticipated 10-14 days    Pain  • Left leg  • Tylenol 975 mg every 8 hours   • Gabapentin 300 mg BID, 400 mg hs  • Robaxin 500 mg every 6 hours  • Oxycodone 5-10 mg every 4 hours prn    DVT prophylaxis  • Heparin sc    Bladder plan  • incontinent    Bowel plan  • Continent  • Colace 100 mg BID  • Miralax daily prn  • Bisacodyl suppository daily prn      * Hx of AKA (above knee amputation), left ([address])  Assessment & Plan  - 7/18 pt admitted with increased foot pain over 2 days with change of color and numbness found to have critical limb ischemia   -CT angiogram: multiple inflow graft occlusions on the left. The right to left component of the right axillobifemoral graft is occluded. The left axillary to profunda graft is occluded and a segment is excised. The aorta to left common femoral and jump graft from the alex of the common femoral artery anastomosis to below-knee popliteal artery is also occluded. The deep femoral artery beyond the previous anastomosis does reconstitute but is secondary or tertiary branch that is small at less than 2 mm. There are no other named vessels that reconstitute within the upper leg.   There does appear to be reconstitution of tibial vessels beyond the trifurcation but again these vessels are less than 2 mm in diameter.  - Vascular surgery s/p AKA 7/21 by Dr. Nelia Arnold  - Plavix, optimal BP control, statin  - NWB LLE  - Monitor CBC intermittently   - Optimal ROM to avoid/decrease risk of contractures  - Monitor incision/area for infection or dehiscence/impaired healing    - 7/27 slight latia-incisional erythema - recommended vasc sx c/s who saw patient on 7/27 and reported no concerns for infection at that    - thigh DTI possibly from overly tight ace wrap that was POA to [facility] - wound care c/s 7/27 - allevyn foam dressing; use Kerlex only for a few days and then go back to ACE but avoid overly tightening   - 7/28 slight latia-incisional erythema again today but stable without increased warmth, tenderness, or drainage - continue close monitoring , started on Keflex and probiotic continue to monitor with daily incisional site imaging/media. Discussed with nursing  - It is normal to have some swelling or discoloration around the incision initially post-operatively.  If develops increasing redness, tenderness/pain, discharge, or dehiscence develops contact surgical service   - Wound care of incision site per vasc sx   - WBC stable mildly elevated at 10 K on 7/27 > monitor intermittently  - Monitor for signs/symptoms of VTE, atelectasis, acute blood loss anemia, or other infection   - Patient (if applicable caregiver) training and education on amputation, possible phantom symptoms, recovery process  - Neuropsychology consult, supportive counseling  - Optimal pain control  - Monitor contralateral limb closely for concerning s/s   - Fall Precautions   - Tolerating ARC setting adequately   - Continue acute comprehensive interdisciplinary inpatient rehabilitation to include intensive skilled therapies (PT, OT) as outlined with oversight and management by rehabilitation physician as well as inpatient rehab level nursing, case management and weekly interdisciplinary team meetings.    - Follow-up with PMR and Vas Sx after discharge    PAD (peripheral artery disease) ([address])  Assessment & Plan  - hx of PAD, carotid stenosis   - following procedures with [facility], [facility] [place] [facility] in [address]- right renal artery bypass 2000  - 8/6/2014 femoral/popliteal with stents and angioplasty, iliac arteries with stents and angioplasty and tibial peroneal artery angioplasty  -Right Fem to below-knee bypass occluded in 2010  - Fem-Fem bypass graft left to right occluded  -2/21/2017 right axillary to Bi-Fem bypass  -11/15/2022 lower extremity arterial ultrasound right lower limb shows 50-69% stenosis distal to the SFA with high-grade stenosis versus occlusion in the posterior tibial artery LUANN 0.53 (severe range), right Fem to below-knee bypass graft occluded, left lower limb patent CFA to posterior tibial bypass graft, LUANN 0.96  - 11/15/2022 abdominal aorta/iliac study showed occlusion of bilateral common and external iliac arteries, greater than 70% stenosis in the celiac artery, patent right axillo bifemoral arterial bypass graft  -11/15/2022 carotid ultrasound showed less than 50% bilateral carotid stenosis  - home: Plavix 75 mg, (previously on Xarelo 2.5 mg d/c d/t cost)  - here: plavix, statin, optimal BP control     Tobacco abuse  Assessment & Plan  - 1 1/2 ppd for 56 years  - nicotine patch  - tried Wellbutrin prescribed by vascular surgery for 1 week, however discontinued due to side effects  - encourage smoking cessation but is resistant     Nodule of upper lobe of right lung  Assessment & Plan  - newly diagnosed RUL lung mass  - PET/CT (7/7/23): marked increased metabolic activity in association with RUL nodule highly suggestive of neoplasm  - follows with hemonc Dr. Timothy Clark  - planned navigational bronchoscopy with biopsy for 7/31/23 with thoracic surgery, Dr. Roque Varela   - discussed with family who notified sx office and they rescheduled bx appt to 8/14    CAD (coronary artery disease)  Assessment & Plan  - s/p CABG 2020 [facility]  - TTE (1/26/23): EF 60%, mild dilated RV, mild-mod AI, mild AS/TR  - follows with Dr. Lawanda Guy  - Plavix, ISMN, statin       Stage 3 chronic kidney disease, unspecified whether stage 3a or 3b CKD [facility])  Assessment & Plan  Lab Results   Component Value Date    EGFR 67 07/31/2023    EGFR 76 07/27/2023    EGFR 70 07/22/2023    CREATININE 0.86 07/31/2023    CREATININE 0.78 07/27/2023    CREATININE 0.83 07/22/2023     - Stable 7/27  - avoid nephrotoxic agents  - monitor BMP with routine labs  -consult IM for assist with management  - stable      PAF (paroxysmal atrial fibrillation) ([facility])  Assessment & Plan  - episode during previous hospitalization  - Zio patch (3/17/23) showed 5 episodes of SVT (longest 7 beats) 2 triggered events correlated with SR, no other significant arrhythmias  - home: Toprol XL 50 mg daily, diltiazem 120 mg daily  - Not on full A/C per prior providers   - continue here  - OP Cards follow-up     Essential hypertension  Assessment & Plan  - home: amlodipine 5 mg daily, diltiazem 120 mg daily, lisinopril 10 mg daily, Toprol-XL 50 mg daily  - here: continue   - monitor BP  - monitor electrolytes  - consult IM to assist w/ management    Mixed hyperlipidemia  Assessment & Plan  - home: rosuvastatin 20 mg daily, Zetia 10 mg daily  - here: Zetia 10 mg, atorvastatin 40 mg (therapeutic substitution)  - Lipid panel (93/199231) total cholesterol 165, triglycerides 155, HDL 33,       Insomnia  Assessment & Plan  Improved   - here: Traz 50mg HS PRN  - On gabapentin 400mg HS for pain at night as well now   - if needed start low dose melatonin (apparently was on high dose at home)  - gabapentin also for pain   - Recommend appropriate sleep hygiene  - Patient counselled/will be counselled on this when appropriate       Hypoxia  Assessment & Plan  Off of oxygen so far today  Mild occasionally requiring 2L to SpO2 goal 90% or higher   Per IM  "-CTL with evidence of emphysema  -PFT in June 2023: Moderate obstruction, no reversibility with bronchodilator   -Was recommended Trelegy and rescue inhaler, has not initiated Trelegy as yet and does not plan to, doing albuterol as needed although patient, denies any shortness of breath at rest or exertion.  -Currently saturating 92% on 2 L of oxygen via nasal cannula. -Recommend outpatient follow-up with pulmonology and smoking cessation"    Pain  Assessment & Plan  Stable overall; slept better again overnight  - Continue gabapentin to [phone] daily   - APAP TID  - Oxy 5-10mg Q4H PRN; Hold for oversedation, AMS, or RR<12.  - Robaxin PRN Q6H standing change to Q8H soon     At risk for venous thromboembolism (VTE)  Assessment & Plan  Heparin     Urinary dysfunction  Assessment & Plan  - some urinary retention earlier now improved off scans but still watch for re-occurrence   - proactive toileting  - monitor for s/s of infection, retention     Bipolar affective disorder, depressed, severe (HCC)  Assessment & Plan  Mood stable   - home: lamictal 100 mg daily  - continue here  - neuropsychology consulted      Appreciate IM consultants medical co-management. Personally reviewed labs, medications, and imaging. ROS:  Review of Systems   A 10 point review of systems was negative except for what is noted in the HPI. OBJECTIVE:   /65 (BP Location: Right arm)   Pulse 67   Temp 98.1 °F (36.7 °C) (Oral)   Resp 16   Ht 5' 4" (1.626 m)   Wt 74 kg (163 lb 2.3 oz)   SpO2 91%   BMI 28.00 kg/m²     Physical Exam  Constitutional:       Appearance: Normal appearance. HENT:      Head: Normocephalic and atraumatic. Nose: Nose normal.      Mouth/Throat:      Mouth: Mucous membranes are moist.   Cardiovascular:      Rate and Rhythm: Normal rate and regular rhythm. Pulses: Normal pulses. Heart sounds: Normal heart sounds. Pulmonary:      Effort: Pulmonary effort is normal.      Breath sounds: Normal breath sounds. Abdominal:      General: Bowel sounds are normal.      Palpations: Abdomen is soft. Musculoskeletal:         General: Normal range of motion. Cervical back: Normal range of motion. Left lower leg: Edema present. Comments: +NWB LLE   Skin:     General: Skin is warm and dry. Capillary Refill: Capillary refill takes less than 2 seconds. Findings: Erythema (incision) present. Neurological:      Mental Status: She is alert and oriented to person, place, and time.    Psychiatric:         Mood and Affect: Mood normal.         Judgment: Judgment normal.          Lab Results   Component Value Date    WBC 10.45 (H) 07/31/2023    HGB 12.2 07/31/2023    HCT 37.9 07/31/2023    MCV 92 07/31/2023     07/31/2023     Lab Results   Component Value Date    SODIUM 141 07/31/2023    K 4.5 07/31/2023     (H) 07/31/2023    CO2 25 07/31/2023    BUN 16 07/31/2023    CREATININE 0.86 07/31/2023    GLUC 101 07/31/2023    CALCIUM 9.1 07/31/2023     Lab Results   Component Value Date    INR 0.92 07/18/2023    PROTIME 12.5 07/18/2023       Current Facility-Administered Medications:   •  acetaminophen (TYLENOL) tablet 975 mg, 975 mg, Oral, Q8H [address], LEV Turner, 975 mg at 08/01/23 0536  •  albuterol (PROVENTIL HFA,VENTOLIN HFA) inhaler 2 puff, 2 puff, Inhalation, Q6H PRN, LEV Turner  •  atorvastatin (LIPITOR) tablet 40 mg, 40 mg, Oral, Daily With Dinner, LEV Turner, 40 mg at 07/31/23 1620  •  bisacodyl (DULCOLAX) rectal suppository 10 mg, 10 mg, Rectal, Daily PRN, LEV Turner  •  cephalexin (KEFLEX) capsule 500 mg, 500 mg, Oral, Q6H [address], Gaurav Lozano MD, 500 mg at 08/01/23 0536  •  clopidogrel (PLAVIX) tablet 75 mg, 75 mg, Oral, Daily, LEV Turner, 75 mg at 08/01/23 0908  •  diltiazem (CARDIZEM) tablet 120 mg, 120 mg, Oral, Daily, LEV Turner, 120 mg at 08/01/23 0907  •  docusate sodium (COLACE) capsule 100 mg, 100 mg, Oral, BID, LEV Turner, 100 mg at 07/29/23 0849  •  ezetimibe (ZETIA) tablet 10 mg, 10 mg, Oral, Daily, LEV Turner, 10 mg at 67/76/27 6366  •  folic acid (FOLVITE) tablet 1 mg, 1 mg, Oral, Daily, LEV Turner, 1 mg at 08/01/23 0907  •  gabapentin (NEURONTIN) capsule 300 mg, 300 mg, Oral, BID, LEV Turner, 300 mg at 08/01/23 0908  •  gabapentin (NEURONTIN) capsule 400 mg, 400 mg, Oral, HS, Shareen Boeck, MD, 400 mg at 07/31/23 2107  •  heparin (porcine) subcutaneous injection 5,000 Units, 5,000 Units, Subcutaneous, Q8H [facility], LEV Turner, 5,000 Units at 08/01/23 0536  •  isosorbide mononitrate (IMDUR) 24 hr tablet 30 mg, 30 mg, Oral, QAM, LEV Turner, 30 mg at 08/01/23 0907  •  lamoTRIgine (LaMICtal) tablet 100 mg, 100 mg, Oral, Daily, LEV Turner, 100 mg at 08/01/23 0908  •  lisinopril (ZESTRIL) tablet 10 mg, 10 mg, Oral, Daily, LEV Chen, 10 mg at 08/01/23 0908  •  melatonin tablet 9 mg, 9 mg, Oral, HS PRN, LEV Calderon  •  methocarbamol (ROBAXIN) tablet 500 mg, 500 mg, Oral, Q8H [facility], Shareen Boeck, MD, 500 mg at 08/01/23 0536  •  metoprolol succinate (TOPROL-XL) 24 hr tablet 50 mg, 50 mg, Oral, Daily, LEV Turner, 50 mg at 08/01/23 0908  •  nicotine (NICODERM CQ) 21 mg/24 hr TD 24 hr patch 1 patch, 1 patch, Transdermal, Daily, LEV Turner, 1 patch at 08/01/23 0907  •  ondansetron (ZOFRAN-ODT) dispersible tablet 4 mg, 4 mg, Oral, Q6H PRN, LEV Turner  •  oxyCODONE (ROXICODONE) IR tablet 5 mg, 5 mg, Oral, Q4H PRN, 5 mg at 08/01/23 0257 **OR** oxyCODONE (ROXICODONE) immediate release tablet 10 mg, 10 mg, Oral, Q4H PRN, Shareen Boeck, MD, 10 mg at 08/01/23 0911  •  polyethylene glycol (MIRALAX) packet 17 g, 17 g, Oral, Daily PRN, LEV Turner  •  saccharomyces boulardii (FLORASTOR) capsule 250 mg, 250 mg, Oral, BID, Mitcheal Queen Ibis Guadarrama MD, 250 mg at 08/01/23 0783  •  traZODone (DESYREL) tablet 50 mg, 50 mg, Oral, HS PRN, LEV Osei, 50 mg at 07/31/23 2107    Past Medical History:   Diagnosis Date   • Aorto-iliac disease ([address])    • Atrial fibrillation ([address])    • CAD (coronary artery disease)    • Carotid stenosis, bilateral    • Celiac artery stenosis (HCC)    • CKD (chronic kidney disease) stage 3, GFR 30-59 ml/min (HCC)    • DVT (deep venous thrombosis) (HCC)     LLE (CFV, popl)   • Heart attack ([address]) 02/2022   • HLD (hyperlipidemia)    • Hypertension    • Lung mass     RUL   • Myocardial infarction ([address])     2022   • COURTNEY (obstructive sleep apnea)    • PAD (peripheral artery disease) ([address])    • Stroke [facility])        Patient Active Problem List    Diagnosis Date Noted   • Hx of AKA (above knee amputation), left ([address]) 07/25/2023   • PAD (peripheral artery disease) ([address]) 06/23/2014   • Tobacco abuse 01/02/2023   • Nodule of upper lobe of right lung 06/14/2023   • CAD (coronary artery disease) 06/23/2014   • Stage 3 chronic kidney disease, unspecified whether stage 3a or 3b CKD ([address]) 01/09/2023   • PAF (paroxysmal atrial fibrillation) ([address]) 01/09/2023   • Essential hypertension 01/22/2016   • Mixed hyperlipidemia 12/27/2022   • Insomnia 02/10/2023   • At risk for venous thromboembolism (VTE) 07/28/2023   • Pain 07/28/2023   • Hypoxia 07/28/2023   • Urinary dysfunction 07/26/2023   • Dizziness 01/09/2023   • Aortoiliac occlusive disease ([address]) 12/28/2022   • Left leg swelling 11/08/2022   • Bipolar affective disorder, depressed, severe ([address]) 04/19/2017   • S/P vascular bypass 02/24/2017   • Hx of CABG 11/30/2016   • Presence of IVC filter 11/30/2016   • Thyroid nodule 01/22/2016   • Renal artery stenosis ([address]) 06/23/2014          LEV Osei  Physical Medicine and [place]

## 2023-08-01 NOTE — PROGRESS NOTES
08/01/23 1300   Pain Assessment   Pain Assessment Tool 0-10   Pain Score 8   Pain Location/Orientation Orientation: Left; Location: Leg   Pain Onset/Description Onset: Ongoing;Frequency: Constant/Continuous   Patient's Stated Pain Goal No pain   Hospital Pain Intervention(s) Repositioned; Emotional support   Restrictions/Precautions   Precautions Bed/chair alarms;Cognitive; Fall Risk;Pain;Supervision on toilet/commode   LLE Weight Bearing Per Order NWB   Cognition   Overall Cognitive Status WFL   Arousal/Participation Alert; Cooperative   Attention Attends with cues to redirect   Orientation Level Oriented X4   Memory Decreased short term memory   Following Commands Follows one step commands without difficulty   Sit to Lying   Type of Assistance Needed Supervision   Sit to Lying CARE Score 4   Lying to Sitting on Side of Bed   Type of Assistance Needed Supervision   Lying to Sitting on Side of Bed CARE Score 4   Sit to Stand   Type of Assistance Needed Supervision; Incidental touching   Comment CS/CGA with RW   Sit to Stand CARE Score 4   Bed-Chair Transfer   Type of Assistance Needed Incidental touching; Adaptive equipment   Comment CGA with RW and with sit pivot transfers, VC's for safety and set up of WC at times. Chair/Bed-to-Chair Transfer CARE Score 4   Transfer Bed/Chair/Wheelchair   Adaptive Equipment Roller Walker;None   Walk 10 Feet   Comment not focus on session   Walk 50 Feet with Two Turns   Reason if not Attempted Safety concerns   Walk 50 Feet with Two Turns CARE Score 88   Walk 150 Feet   Reason if not Attempted Safety concerns   Walk 150 Feet CARE Score 88   Walking 10 Feet on Uneven Surfaces   Reason if not Attempted Safety concerns   Walking 10 Feet on Uneven Surfaces CARE Score 88   Ambulation   Primary Mode of Locomotion Prior to Admission Walk   Does the patient walk? 2.  Yes   Wheel 50 Feet with Two Turns   Type of Assistance Needed Set-up / clean-up   Wheel 50 Feet with Two Turns CARE Score 5 Wheel 150 Feet   Type of Assistance Needed Set-up / clean-up   Wheel 150 Feet CARE Score 5   Wheelchair mobility   Does the patient use a wheelchair? 1. Yes   Type of Wheelchair Used 1. Manual   Method Right upper extremity; Left upper extremity;Right lower extremity   Assistance Provided For Remove Leg Rest;Replace Leg Rest;Remove armrests;Replace armrests; Locking Brakes   Distance Level Surface (feet) 250 ft   Curb or Single Stair   Comment per pt's son she will have ramp built at home. Son can also bump pt up on WC. Cont practice SPT to [facility]   4 Steps   Reason if not Attempted Safety concerns   4 Steps CARE Score 88   12 Steps   Reason if not Attempted Safety concerns   12 Steps CARE Score 88   Picking Up Object   Reason if not Attempted Activity not applicable   Picking Up Object CARE Score 9   Toilet Transfer   Type of Assistance Needed Incidental touching; Adaptive equipment;Verbal cues   Comment CGA with SPT, use of grab bar. A with clothing management   Toilet Transfer CARE Score 4   Toilet Transfer   Surface Assessed Standard Toilet   Limitations Noted In Safety   Adaptive Equipment Grab Bar   Therapeutic Interventions   Strengthening prone L hip ext, prone hip ABD, prone R knee flex, side lying L hip ext, hip ABD 3 x10 reps. Standing with RW LLE hip ABD/ hip ext x15 reps   Flexibility prone lying x15 min   Equipment Use   NuStep L3 x10 min RLE, BUE   Other Comments   Comments x3 rewrapping to L, ABD pad replaced on initial rewrap. Assessment   Treatment Assessment Pt participated in skilled PT session with increased focus on functional transfers, increased LE strengthening, increased L hip flexibility and ace wrapping. Pt trialed ace wrap x1 with increased VC's for placement and X pattern. Pt will require increased education as she had difficulty wrapping her limb today. Pt also noted increased L hip tightness with decreased ROM in all planes.  Pt is anxious at times and requires increased reassurance and emotional support throughout session. Pt will cont POC as tolerated with cont focus on sit pivot/SPT with RW transfers, increased car transfers, improved ace wrapping, increased LE strengthening and WC mobility. Pt wants to have meet and greet with [facility] and [facility]. [facility] contacted and will be in contact to set appointment up. Joseph Patel will need to be contacted next session. Problem List Decreased strength;Decreased endurance; Impaired balance;Decreased mobility; Decreased cognition;Orthopedic restrictions;Pain   Barriers to Discharge Inaccessible home environment   PT Barriers   Physical Impairment Decreased strength;Decreased endurance; Impaired balance;Decreased safety awareness;Pain;Orthopedic restrictions   Functional Limitation Car transfers;Stair negotiation;Standing;Transfers; Walking; Wheelchair management;Ramp negotiation   Plan   Treatment/Interventions Functional transfer training;LE strengthening/ROM; Therapeutic exercise; Endurance training;Cognitive reorientation;Patient/family training;Bed mobility;Gait training   Progress Progressing toward goals   Recommendation   PT Discharge Recommendation Home with home health rehabilitation   Equipment Recommended Wheelchair   PT Therapy Minutes   PT Time In 1300   PT Time Out 1430   PT Total Time (minutes) 90   PT Mode of treatment - Individual (minutes) 90   PT Mode of treatment - Concurrent (minutes) 0   PT Mode of treatment - Group (minutes) 0   PT Mode of treatment - Co-treat (minutes) 0   PT Mode of Treatment - Total time(minutes) 90 minutes   PT Cumulative Minutes 510   Therapy Time missed   Time missed?  No

## 2023-08-01 NOTE — CASE MANAGEMENT
Team Update:  Cm met with pt to review team recommendation of dc of Wednesday 8/9 with home RN PT OT, pt agreeable to [facility] referral. Pt had no further questions or concerns.

## 2023-08-01 NOTE — PCC CARE MANAGEMENT
8/1- Pt lives in 2 story home with son, daughter in law and 5 grandchildren. Pt reports a good support system and family transports as needed. Prior to admission, pt was independent with ADLs and IADLs and ambulation. Pt does have walker and cane. Pt reports no hx of [facility] OP therapies or STR. PCP is Shanell Nielsen, preferred pharmacy is [facility]. 8/7- Pt dc Wednesday 8/9 with home RN PT, DME.

## 2023-08-01 NOTE — PROGRESS NOTES
Internal Medicine Progress Note  Patient: Steph Castaneda  Age/sex: 68 y.o. female  Medical Record #: [id number]      ASSESSMENT/PLAN: (Interval History)  Steph Castaneda is seen and examined and management for following issues:    S/p left AKA 7/21/23  • Hx severe PAD with B/L SC to FA bypass in past = has TO left fem-pop BPG  • Home:  Plavix/Crestor 20mg qd/Zetia 10mg qd  • Here:  Plavix/Zetia/Lipitor 40mg qd  • Pain/phantom pain management as per PMR  • Started 7/30/23 on Keflex for 7 days d/t erythema     Coronary artery disease  • S/p CABG in 2000  • TTE on 1/26/2023 with EF of 60%, mild to moderate AI, mild AS, estimated PA pressure of 40 mmHg  • Nuclear pharmacological stress test on 1/26/2023 with anterior wall perfusion defect likely artifact/cannot exclude small area of ischemia, normal LV systolic function. • Continue Plavix/Zetia 10mg qd/Imdur     Hypertension  • Home: Diltiazem 120 mg qd/Amlodipine 5 mg qd/Imdur 30 mg qd/Lisinopril 10 mg/ Toprol-XL 50 mg qd  • Here:  Diltiazem 120mg qd/Imdur 30mg qd/Linsinopril 10mg qd (hold SBP <130)/Toprol 50mg qd  • BP stable.     Hyperlipidemia  • Continue atorvastatin and Zetia here  • Follow-up with outpatient cardiology for possible Repatha     Paroxysmal atrial fibrillation  • Currently in sinus rhythm  • S/p a 2-week Zio patch in March 2023 with 5 episodes of SVT without other significant arrhythmia  • Continue Diltiazem 120 mg daily, Toprol XL 50 mg daily     DVT/IVC filter  • Hx lower extremity DVT/IVC filter  • IVC filter is still present on PET scan 7/7/2023     Tobacco use  • Continues to smoke 1 to 1.5 packs/day  • Did not tolerate Wellbutrin/Chantix in the past  • Counseled for smoking cessation but not ready to quit  • Nicotine patch here 21 mg     COPD  • CT chest with evidence of emphysema  • PFT in June 2023:  Moderate obstruction, no reversibility with bronchodilator   • Was recommended Trelegy and rescue inhaler, but she didn't want it; using Albuterol prn. • Continues on RA with O2 sats 91-92%. • Recommend outpatient follow-up with pulmonology and smoking cessation     Right perihilar mass   • On CT imaging 7/18/23  • Planned for biopsy on 8/14 by thoracic surgeon    Restless leg  · Will try requip this evening  · Not new for her she has taken OTC things in the past     CKD II  • Creatinine stable at 0.86  • Has likely nonfunctioning right kidney  • Avoid nephrotoxic agents, hypotension     Bipolar disease  • continue Lamictal 100 mg daily  • Stable        Discharge date:  Team    The above assessment and plan was reviewed and updated as determined by my evaluation of the patient on 8/1/2023.     Labs:   Results from last 7 days   Lab Units 07/31/23  0516 07/27/23  0606   WBC Thousand/uL 10.45* 10.38*   HEMOGLOBIN g/dL 12.2 12.8   HEMATOCRIT % 37.9 40.6   PLATELETS Thousands/uL 281 262     Results from last 7 days   Lab Units 07/31/23  0516 07/27/23  0606   SODIUM mmol/L 141 140   POTASSIUM mmol/L 4.5 3.6   CHLORIDE mmol/L 112* 106   CO2 mmol/L 25 28   BUN mg/dL 16 13   CREATININE mg/dL 0.86 0.78   CALCIUM mg/dL 9.1 9.2                   Review of Scheduled Meds:  Current Facility-Administered Medications   Medication Dose Route Frequency Provider Last Rate   • acetaminophen  975 mg Oral Q8H [address] LEV Turner     • albuterol  2 puff Inhalation Q6H PRN LEV Turner     • atorvastatin  40 mg Oral Daily With LEV Marcano     • bisacodyl  10 mg Rectal Daily PRN LEV Johnson     • cephalexin  500 mg Oral Q6H [address] Anyblane Lewis MD     • clopidogrel  75 mg Oral Daily LEV Turner     • diltiazem  120 mg Oral Daily LEV Turner     • docusate sodium  100 mg Oral BID LEV Turner     • ezetimibe  10 mg Oral Daily LEV Turner     • folic acid  1 mg Oral Daily LEV Turner     • gabapentin  300 mg Oral BID LEV Turner     • gabapentin  400 mg Oral HS Lindsya Serrano MD     • heparin (porcine)  5,000 Units Subcutaneous Q8H [address] LEV Turner     • isosorbide mononitrate  30 mg Oral QAM LEV Turner     • lamoTRIgine  100 mg Oral Daily LEV Turner     • lisinopril  10 mg Oral Daily Hyun FanLEV velásquez     • melatonin  9 mg Oral HS LEV Turner     • methocarbamol  500 mg Oral [facility] Lorie Martinez MD     • metoprolol succinate  50 mg Oral Daily LEV Turner     • nicotine  1 patch Transdermal Daily LEV Turner     • ondansetron  4 mg Oral Q6H PRN Major Mary Beth, LEV     • oxyCODONE  5 mg Oral Q4H PRN Lorie Martinez MD      Or   • oxyCODONE  10 mg Oral Q4H PRN Lorie Martinez MD     • polyethylene glycol  17 g Oral Daily PRN LEV Turner     • saccharomyces boulardii  250 mg Oral BID Royal Shviani MD     • traZODone  50 mg Oral HS PRN Major Mary Beth, LEV         Subjective/ HPI: Patient seen and examined. Patients overnight issues or events were reviewed with nursing or staff during rounds or morning huddle session. New or overnight issues include the following:     Pt seen in her room. She states that her phantom pain is almost unbearable. She is requesting something for restless leg as well for the evening. She has take OTC stuff in the past.        ROS:   A 10 point ROS was performed; negative except as noted above. Imaging:     XR femur 2 vw left   Final Result by Anu Washburn MD (07/31 1550)      Postsurgical changes in left above-the-knee amputation. No x-ray findings suggesting osteomyelitis at this time. Resident: Vladislav Lawler, the attending radiologist, have reviewed the images and agree with the final report above.       Workstation performed: [facility]  /Radiology studies reviewed  *Medications reviewed and reconciled as needed  *Please refer to order section for additional ordered labs studies  *Case discussed with primary attending during morning huddle case rounds    Physical Examination:  Vitals:   Vitals:    07/31/23 0908 07/31/23 1445 07/31/23 2105 08/01/23 0529   BP: 118/58 100/56 100/60 136/65   BP Location:  Right arm Right arm Right arm   Pulse: 73 65 60 67   Resp:  16 16 16   Temp:  98.2 °F (36.8 °C) 97.8 °F (36.6 °C) 98.1 °F (36.7 °C)   TempSrc:  Oral Oral Oral   SpO2:  96% 91% 91%   Weight:       Height:           GEN: NAD; pleasant  NEURO: Alert and oriented x4; appropriate  HEENT: Pupils are equal/reactive; normocephalic, face is symmetrical, hearing is normal  CV: S1 S2 regular, no murmur/rub/gallops, 1/4 pedal pulse RLE, no LE edema present  RESP: Lungs are clear bilaterally, no wheezes rales or rhonchi, on room air, no distress, respirations are easy and non labored  GI: Abdomen is obese, soft, non tender, +BS x4; non distended  : Voiding without issues  MUSC: Moves all extremities except LLE amputation  SKIN: pink, warm, normal turgor, L stump dressing intact; refer to images       The above physical exam was reviewed and updated as determined by my evaluation of the patient on 8/1/2023. Invasive Devices     Drain  Duration           External Urinary Catheter 10 days                   VTE Pharmacologic Prophylaxis: Heparin  Code Status: Level 1 - Full Code  Current Length of Stay: 7 day(s)      Total time spent:  30 minutes with more than 50% spent counseling/coordinating care. Counseling includes discussion with patient re: progress  and discussion with patient of his/her current medical state/information. Coordination of patient's care was performed in conjunction with primary service. Time invested included review of patient's labs, vitals, and management of their comorbidities with continued monitoring. In addition, this patient was discussed with medical team including physician and advanced extenders. The care of the patient was extensively discussed and appropriate treatment plan was formulated unique for this patient.  Medical decision making for the day was made by supervising physician unless otherwise noted in their attestation statement. ** Please Note:  voice to text software may have been used in the creation of this document.  Although proof errors in transcription or interpretation are a potential of such software**

## 2023-08-01 NOTE — PCC NURSING
Pt admitted S/p left AKA 7/21/23 Hx severe PAD with B/L SC to FA bypass in past = has TO left fem-pop BPG Home:  Plavix/Crestor 20mg qd/Zetia 10mg qd Here:  Plavix/Zetia/Lipitor 40mg qd. Worsening erythema of the surgical wound. Vascular surgery started IV Vanco and Cefepime. D/w surgery we will finish 10 day course which ends up being Wednesday. Close f/u with vascular surgery on dc. Improved=refer to images. Pain control with scheduled tylenol and gabapentin. Oxycodone prn. Coronary artery disease S/p CABG in 2000 TTE on 1/26/2023 with EF of 60%, mild to moderate AI, mild AS, estimated PA pressure of 40 mmHg Nuclear pharmacological stress test on 1/26/2023 with anterior wall perfusion defect likely artifact/cannot exclude small area of ischemia, normal LV systolic function. Continue Plavix/Zetia 10mg qd/Imdur. Hypertension- Home: Diltiazem 120 mg qd/Amlodipine 5 mg qd/Imdur 30 mg qd/Lisinopril 10 mg/ Toprol-XL 50 mg qd Here:  Diltiazem 120mg qd/Imdur 30mg qd/Linsinopril 10mg qd (hold SBP <130)/Toprol 50mg qd BP stable. Hyperlipidemia- Continue atorvastatin and Zetia here, Follow-up with outpatient cardiology for possible Repatha. Paroxysmal atrial fibrillation Currently in sinus rhythm S/p a 2-week Zio patch in March 2023 with 5 episodes of SVT without other significant arrhythmia Continue Diltiazem 120 mg daily, Toprol XL 50 mg daily. DVT/IVC filter- Hx lower extremity DVT/IVC filter IVC filter is still present on PET scan 7/7/2023. Tobacco use- Continues to smoke 1 to 1.5 packs/day Did not tolerate Wellbutrin/Chantix in the past Counseled for smoking cessation but not ready to quit Nicotine patch here 21 mg COPD- CT chest with evidence of emphysema PFT in June 2023: Moderate obstruction, no reversibility with bronchodilator. Was recommended Trelegy and rescue inhaler, but she didn't want it; using Albuterol prn. Continues on RA with O2 sats 91-92%.  Recommend outpatient follow-up with pulmonology and smoking cessation Right perihilar mass -On CT imaging 7/18/23 Planned for biopsy on 8/14 by thoracic surgeon. CKD II Creatinine stable at 0.86 Has likely nonfunctioning right kidney Avoid nephrotoxic agents, hypotension. Bipolar disease-  Lamictal 100 mg daily. Restless leg Started ropinirole 8/1/23, Not new for her she has taken OTC things in the past; Improved. 8/8- Pt is AAOx4, pt with control of B&B and pain well controlled with current regimen. Pt with IRP during the day and requires alarms and Supervision with transfers at nighttime for safety. Pt performing her own dressing changes and Ace wraps to her LLE incision. Last day for IV abx set for 8/9. This week we will encourage independence with ADLs. We will monitor labs and vital signs. WE will educate pt/family about repositioning to prevent skin breakdown. We will assist w repositioning and perform routine skin checks. We will monitor for adequate pain control. We will monitor for constipation and medicate pt as ordered. We will increase safety awareness and keep pt free from falls.

## 2023-08-01 NOTE — PROGRESS NOTES
Occupational Therapy Treatment Note         08/01/23 5153   Pain Assessment   Pain Assessment Tool 0-10   Pain Score 8   Pain Location/Orientation Orientation: Left; Location: Leg   Hospital Pain Intervention(s) Repositioned;Relaxation technique   Restrictions/Precautions   Precautions Bed/chair alarms;Cognitive; Fall Risk;Supervision on toilet/commode   LLE Weight Bearing Per Order NWB   Lifestyle   Autonomy "I have a lot going on"   Eating   Type of Assistance Needed Independent   Physical Assistance Level No physical assistance   Eating CARE Score 6   Upper Body Dressing   Type of Assistance Needed Set-up / clean-up   Physical Assistance Level No physical assistance   Comment seated in w/c   Upper Body Dressing CARE Score 5   Lower Body Dressing   Type of Assistance Needed Physical assistance   Physical Assistance Level 25% or less   Comment assist for L residual limb wrapping. Plan next session to trial ace wrapping while seated in w/c. At this time pain limits pt's ability to fully ace wrap.  Seated at EOB pt able to weight shift for clothing management of underwear and shorts with supervision   Lower Body Dressing CARE Score 3   Putting On/Taking Off Footwear   Type of Assistance Needed Supervision   Physical Assistance Level No physical assistance   Putting On/Taking Off Footwear CARE Score 4   Roll Left and Right   Type of Assistance Needed Set-up / clean-up   Physical Assistance Level No physical assistance   Roll Left and Right CARE Score 5   Sit to Lying   Type of Assistance Needed Supervision   Physical Assistance Level No physical assistance   Sit to Lying CARE Score 4   Lying to Sitting on Side of Bed   Type of Assistance Needed Supervision   Physical Assistance Level No physical assistance   Lying to Sitting on Side of Bed CARE Score 4   Sit to Stand   Type of Assistance Needed Incidental touching   Physical Assistance Level No physical assistance   Comment from w/c using grab bar in bathroom   Sit to Stand CARE Score 4   Bed-Chair Transfer   Type of Assistance Needed Incidental touching   Physical Assistance Level No physical assistance   Comment stand pivot transfer using B/L UE supprt on w/c arm rest and bed rail   Chair/Bed-to-Chair Transfer CARE Score 4   Toileting Hygiene   Type of Assistance Needed Incidental touching; Adaptive equipment   Physical Assistance Level No physical assistance   Comment pt stands with unilateral UE support on grab bar to perform hygiene and clothing management with incidential touching   [address] CARE Score 4   Toilet Transfer   Type of Assistance Needed Incidental touching; Adaptive equipment   Physical Assistance Level No physical assistance   Comment stand pivot to/from w/c and toilet using UE support on grab bar. pt and pt's  believe grab bar could be installed next to toilet, so can continue to practice this method as pt's goal is to [employer] instead of BSC. Toilet Transfer CARE Score 4   Cognition   Arousal/Participation Alert; Cooperative   Attention Attends with cues to redirect   Orientation Level Oriented to person;Oriented to place;Oriented to situation   Memory Decreased short term memory   Following Commands Follows one step commands without difficulty   Comments mild cognitive deficits. benefits from repetitive training on new learning   Additional Activities   Additional Activities Comments Provided the following skilled education to patient regarding phase 1 amputation: prosthetic vendor list options and how to choose prosthetist , provided "questions for prosthetic vendor" list, discharge / DME planning , family training and education , home versus outpatient therapy  and amputee support groups (local/online/[facility]). Pt would like to have [facility] contacted for prosthetic company. Spoke with CHANTELLE William who will try to call for meet and greet this afternoon.  Also wrote pt timeline for her regarding date of sx and admission to rehab, and general timeline of outpatient plans/sx follow up and beginning prosthetic training/device. Activity Tolerance   Activity Tolerance Patient tolerated treatment well   Assessment   Treatment Assessment Pt participated in skilled OT tx session. See above for further details on functional performance. OT called pt's son Toshia Copeland, he works in [place] and is not able to be present during the week for family training but his adult son will be present Thursday for family training at 12:30. He will also obtain pictures/measurements of the house and have to pt by Wednesday. Informed him of anticipated d/c next week and he reports he plans to install ramp prior to d/c and stated he can also install grab bars. He believes doorways will all fit w/c but will confirm with measurements. Pending how family training goes Thursday, will attempt to setup family training with pt's son or dtr in law for over weekend. Pt expressed information this session regarding her history of alcohol abuse and how she is recently 1 year sober, discussed history of depression for many years and how she had a good counselor in [place] but when she moved to [place] she did not find a counselor she enjoyed as much. Pt admitted to sometimes "lying" to counselors, and stated that since being sober she has had difficulty coping with things from her past, such as her 2 dtrs deaths from drugs. In addition she has been dealing with chronic L LE pain and feels that she has no control over anything in her life at this time now s/p amputation. OT provided pt with emotional support, educated on virtual support groups as option as well (pt does know of an AA meeting virtually which she has accessed in the past and will consider doing again upon d/c); educated on amputee support groups. Pt declines feelings of self harm, stating she would not harm herself because she cares about her son.  Pt is ordered for neuropsych during [facility] admission and would benefit from their services and outpatient counseling services as well, which pt is open to. Pt will continue to benefit from skilled OT intervention to address standing for toileting and stand pivot transfers to toilet using grab bar, sponge bathing/dressing at w/c level, phase 1 amputee education, administration of Buckner Depression Inventory as part of phase 1 education; stand pivot transfers with and without RW in order to maximize functional independence in ADLS, functional mobility/transfers, while decreasing burden of care. Pt left positioned in w/c with call bell in reach and chair alarm on. Prognosis Good   Problem List Decreased strength;Decreased endurance; Impaired balance;Decreased mobility; Decreased cognition;Orthopedic restrictions;Pain   Barriers to Discharge Inaccessible home environment   Plan   Treatment/Interventions ADL retraining;Functional transfer training; Therapeutic exercise; Endurance training;Patient/family training;Cognitive reorientation;Equipment eval/education; Bed mobility; Compensatory technique education   Progress Progressing toward goals   Recommendation   OT Discharge Recommendation Home with home health rehabilitation   OT Therapy Minutes   OT Time In 0830   OT Time Out 1030   OT Total Time (minutes) 120   OT Mode of treatment - Individual (minutes) 120   OT Mode of treatment - Concurrent (minutes) 0   OT Mode of treatment - Group (minutes) 0   OT Mode of treatment - Co-treat (minutes) 0   OT Mode of Treatment - Total time(minutes) 120 minutes   OT Cumulative Minutes 600   Therapy Time missed   Time missed?  No

## 2023-08-02 PROCEDURE — 99232 SBSQ HOSP IP/OBS MODERATE 35: CPT | Performed by: STUDENT IN AN ORGANIZED HEALTH CARE EDUCATION/TRAINING PROGRAM

## 2023-08-02 PROCEDURE — 99232 SBSQ HOSP IP/OBS MODERATE 35: CPT | Performed by: INTERNAL MEDICINE

## 2023-08-02 PROCEDURE — 97535 SELF CARE MNGMENT TRAINING: CPT

## 2023-08-02 PROCEDURE — 97530 THERAPEUTIC ACTIVITIES: CPT

## 2023-08-02 PROCEDURE — 97110 THERAPEUTIC EXERCISES: CPT

## 2023-08-02 RX ORDER — GABAPENTIN 300 MG/1
600 CAPSULE ORAL 3 TIMES DAILY
Status: DISCONTINUED | OUTPATIENT
Start: 2023-08-02 | End: 2023-08-09 | Stop reason: HOSPADM

## 2023-08-02 RX ADMIN — LAMOTRIGINE 100 MG: 100 TABLET ORAL at 08:27

## 2023-08-02 RX ADMIN — OXYCODONE HYDROCHLORIDE 5 MG: 5 TABLET ORAL at 02:06

## 2023-08-02 RX ADMIN — GABAPENTIN 300 MG: 300 CAPSULE ORAL at 05:50

## 2023-08-02 RX ADMIN — NICOTINE 1 PATCH: 21 PATCH, EXTENDED RELEASE TRANSDERMAL at 08:28

## 2023-08-02 RX ADMIN — GABAPENTIN 600 MG: 300 CAPSULE ORAL at 20:48

## 2023-08-02 RX ADMIN — DOCUSATE SODIUM 100 MG: 100 CAPSULE ORAL at 08:27

## 2023-08-02 RX ADMIN — GABAPENTIN 600 MG: 300 CAPSULE ORAL at 17:20

## 2023-08-02 RX ADMIN — GABAPENTIN 300 MG: 300 CAPSULE ORAL at 11:40

## 2023-08-02 RX ADMIN — LISINOPRIL 10 MG: 10 TABLET ORAL at 08:27

## 2023-08-02 RX ADMIN — METHOCARBAMOL 500 MG: 500 TABLET ORAL at 14:21

## 2023-08-02 RX ADMIN — OXYCODONE HYDROCHLORIDE 5 MG: 5 TABLET ORAL at 17:20

## 2023-08-02 RX ADMIN — ROPINIROLE 0.5 MG: 0.25 TABLET, FILM COATED ORAL at 21:01

## 2023-08-02 RX ADMIN — DILTIAZEM HYDROCHLORIDE 120 MG: 60 TABLET, FILM COATED ORAL at 08:27

## 2023-08-02 RX ADMIN — FOLIC ACID 1 MG: 1 TABLET ORAL at 08:27

## 2023-08-02 RX ADMIN — METOPROLOL SUCCINATE 50 MG: 50 TABLET, EXTENDED RELEASE ORAL at 08:27

## 2023-08-02 RX ADMIN — CEPHALEXIN 500 MG: 500 CAPSULE ORAL at 11:40

## 2023-08-02 RX ADMIN — MELATONIN 9 MG: at 20:48

## 2023-08-02 RX ADMIN — CEPHALEXIN 500 MG: 500 CAPSULE ORAL at 05:50

## 2023-08-02 RX ADMIN — ISOSORBIDE MONONITRATE 30 MG: 30 TABLET, EXTENDED RELEASE ORAL at 08:27

## 2023-08-02 RX ADMIN — HEPARIN SODIUM 5000 UNITS: 5000 INJECTION INTRAVENOUS; SUBCUTANEOUS at 21:01

## 2023-08-02 RX ADMIN — Medication 250 MG: at 17:21

## 2023-08-02 RX ADMIN — ACETAMINOPHEN 975 MG: 325 TABLET, FILM COATED ORAL at 14:21

## 2023-08-02 RX ADMIN — EZETIMIBE 10 MG: 10 TABLET ORAL at 08:27

## 2023-08-02 RX ADMIN — HEPARIN SODIUM 5000 UNITS: 5000 INJECTION INTRAVENOUS; SUBCUTANEOUS at 05:50

## 2023-08-02 RX ADMIN — CLOPIDOGREL BISULFATE 75 MG: 75 TABLET ORAL at 08:27

## 2023-08-02 RX ADMIN — ACETAMINOPHEN 975 MG: 325 TABLET, FILM COATED ORAL at 21:00

## 2023-08-02 RX ADMIN — CEPHALEXIN 500 MG: 500 CAPSULE ORAL at 17:21

## 2023-08-02 RX ADMIN — OXYCODONE HYDROCHLORIDE 5 MG: 5 TABLET ORAL at 08:32

## 2023-08-02 RX ADMIN — ATORVASTATIN CALCIUM 40 MG: 40 TABLET, FILM COATED ORAL at 17:20

## 2023-08-02 RX ADMIN — ACETAMINOPHEN 975 MG: 325 TABLET, FILM COATED ORAL at 05:50

## 2023-08-02 RX ADMIN — METHOCARBAMOL 500 MG: 500 TABLET ORAL at 21:06

## 2023-08-02 RX ADMIN — Medication 250 MG: at 08:27

## 2023-08-02 RX ADMIN — METHOCARBAMOL 500 MG: 500 TABLET ORAL at 05:50

## 2023-08-02 RX ADMIN — HEPARIN SODIUM 5000 UNITS: 5000 INJECTION INTRAVENOUS; SUBCUTANEOUS at 14:21

## 2023-08-02 RX ADMIN — DOCUSATE SODIUM 100 MG: 100 CAPSULE ORAL at 17:21

## 2023-08-02 RX ADMIN — OXYCODONE HYDROCHLORIDE 5 MG: 5 TABLET ORAL at 12:38

## 2023-08-02 NOTE — PLAN OF CARE
Problem: Prexisting or High Potential for Compromised Skin Integrity  Goal: Skin integrity is maintained or improved  Description: INTERVENTIONS:  - Identify patients at risk for skin breakdown  - Assess and monitor skin integrity  - Assess and monitor nutrition and hydration status  - Monitor labs   - Assess for incontinence   - Turn and reposition patient  - Assist with mobility/ambulation  - Relieve pressure over bony prominences  - Avoid friction and shearing  - Provide appropriate hygiene as needed including keeping skin clean and dry  - Evaluate need for skin moisturizer/barrier cream  - Collaborate with interdisciplinary team   - Patient/family teaching  - Consider wound care consult   Outcome: Progressing     Problem: MOBILITY - ADULT  Goal: Maintain or return to baseline ADL function  Description: INTERVENTIONS:  -  Assess patient's ability to carry out ADLs; assess patient's baseline for ADL function and identify physical deficits which impact ability to perform ADLs (bathing, care of mouth/teeth, toileting, grooming, dressing, etc.)  - Assess/evaluate cause of self-care deficits   - Assess range of motion  - Assess patient's mobility; develop plan if impaired  - Assess patient's need for assistive devices and provide as appropriate  - Encourage maximum independence but intervene and supervise when necessary  - Involve family in performance of ADLs  - Assess for home care needs following discharge   - Consider OT consult to assist with ADL evaluation and planning for discharge  - Provide patient education as appropriate  Outcome: Progressing  Goal: Maintains/Returns to pre admission functional level  Description: INTERVENTIONS:  - Perform BMAT or MOVE assessment daily.   - Set and communicate daily mobility goal to care team and patient/family/caregiver. - Collaborate with rehabilitation services on mobility goals if consulted  - Perform Range of Motion  times a day.   - Reposition patient every hours.  - Dangle patient  times a day  - Stand patient  times a day  - Ambulate patient  times a day  - Out of bed to chair times a day   - Out of bed for meals times a day  - Out of bed for toileting  - Record patient progress and toleration of activity level   Outcome: Progressing     Problem: PAIN - ADULT  Goal: Verbalizes/displays adequate comfort level or baseline comfort level  Description: Interventions:  - Encourage patient to monitor pain and request assistance  - Assess pain using appropriate pain scale  - Administer analgesics based on type and severity of pain and evaluate response  - Implement non-pharmacological measures as appropriate and evaluate response  - Consider cultural and social influences on pain and pain management  - Notify physician/advanced practitioner if interventions unsuccessful or patient reports new pain  Outcome: Progressing     Problem: INFECTION - ADULT  Goal: Absence or prevention of progression during hospitalization  Description: INTERVENTIONS:  - Assess and monitor for signs and symptoms of infection  - Monitor lab/diagnostic results  - Monitor all insertion sites, i.e. indwelling lines, tubes, and drains  - Monitor endotracheal if appropriate and nasal secretions for changes in amount and color  - Cochrane appropriate cooling/warming therapies per order  - Administer medications as ordered  - Instruct and encourage patient and family to use good hand hygiene technique  - Identify and instruct in appropriate isolation precautions for identified infection/condition  Outcome: Progressing  Goal: Absence of fever/infection during neutropenic period  Description: INTERVENTIONS:  - Monitor WBC    Outcome: Progressing     Problem: SAFETY ADULT  Goal: Patient will remain free of falls  Description: INTERVENTIONS:  - Educate patient/family on patient safety including physical limitations  - Instruct patient to call for assistance with activity   - Consult OT/PT to assist with strengthening/mobility   - Keep Call bell within reach  - Keep bed low and locked with side rails adjusted as appropriate  - Keep care items and personal belongings within reach  - Initiate and maintain comfort rounds  - Make Fall Risk Sign visible to staff  - Offer Toileting every Hours, in advance of need  - Initiate/Maintain alarm  - Obtain necessary fall risk management equipment:   - Apply yellow socks and bracelet for high fall risk patients  - Consider moving patient to room near nurses station  Outcome: Progressing  Goal: Maintain or return to baseline ADL function  Description: INTERVENTIONS:  -  Assess patient's ability to carry out ADLs; assess patient's baseline for ADL function and identify physical deficits which impact ability to perform ADLs (bathing, care of mouth/teeth, toileting, grooming, dressing, etc.)  - Assess/evaluate cause of self-care deficits   - Assess range of motion  - Assess patient's mobility; develop plan if impaired  - Assess patient's need for assistive devices and provide as appropriate  - Encourage maximum independence but intervene and supervise when necessary  - Involve family in performance of ADLs  - Assess for home care needs following discharge   - Consider OT consult to assist with ADL evaluation and planning for discharge  - Provide patient education as appropriate  Outcome: Progressing  Goal: Maintains/Returns to pre admission functional level  Description: INTERVENTIONS:  - Perform BMAT or MOVE assessment daily.   - Set and communicate daily mobility goal to care team and patient/family/caregiver. - Collaborate with rehabilitation services on mobility goals if consulted  - Perform Range of Motion times a day. - Reposition patient every  hours.   - Dangle patient  times a day  - Stand patient times a day  - Ambulate patient times a day  - Out of bed to chair times a day   - Out of bed for meals  times a day  - Out of bed for toileting  - Record patient progress and toleration of activity level   Outcome: Progressing     Problem: DISCHARGE PLANNING  Goal: Discharge to home or other facility with appropriate resources  Description: INTERVENTIONS:  - Identify barriers to discharge w/patient and caregiver  - Arrange for needed discharge resources and transportation as appropriate  - Identify discharge learning needs (meds, wound care, etc.)  - Arrange for interpretive services to assist at discharge as needed  - Refer to Case Management Department for coordinating discharge planning if the patient needs post-hospital services based on physician/advanced practitioner order or complex needs related to functional status, cognitive ability, or social support system  Outcome: Progressing     Problem: Knowledge Deficit  Goal: Patient/family/caregiver demonstrates understanding of disease process, treatment plan, medications, and discharge instructions  Description: Complete learning assessment and assess knowledge base. Interventions:  - Provide teaching at level of understanding  - Provide teaching via preferred learning methods  Outcome: Progressing     Problem: Nutrition/Hydration-ADULT  Goal: Nutrient/Hydration intake appropriate for improving, restoring or maintaining nutritional needs  Description: Monitor and assess patient's nutrition/hydration status for malnutrition. Collaborate with interdisciplinary team and initiate plan and interventions as ordered. Monitor patient's weight and dietary intake as ordered or per policy. Utilize nutrition screening tool and intervene as necessary. Determine patient's food preferences and provide high-protein, high-caloric foods as appropriate.      INTERVENTIONS:  - Monitor oral intake, urinary output, labs, and treatment plans  - Assess nutrition and hydration status and recommend course of action  - Evaluate amount of meals eaten  - Assist patient with eating if necessary   - Allow adequate time for meals  - Recommend/ encourage appropriate diets, oral nutritional supplements, and vitamin/mineral supplements  - Order, calculate, and assess calorie counts as needed  - Recommend, monitor, and adjust tube feedings and TPN/PPN based on assessed needs  - Assess need for intravenous fluids  - Provide specific nutrition/hydration education as appropriate  - Include patient/family/caregiver in decisions related to nutrition  Outcome: Progressing

## 2023-08-02 NOTE — PROGRESS NOTES
Occupational Therapy Treatment Note         08/02/23 1315   Pain Assessment   Pain Assessment Tool 0-10   Pain Score 6   Pain Location/Orientation Orientation: Left; Location: Leg   Hospital Pain Intervention(s) Repositioned;Relaxation technique   Restrictions/Precautions   Precautions Bed/chair alarms;Cognitive; Fall Risk;Supervision on toilet/commode;Pain   LLE Weight Bearing Per Order NWB   Sit to Lying   Type of Assistance Needed Supervision   Physical Assistance Level No physical assistance   Sit to Lying CARE Score 4   Lying to Sitting on Side of Bed   Type of Assistance Needed Supervision   Physical Assistance Level No physical assistance   Lying to Sitting on Side of Bed CARE Score 4   Sit to Stand   Type of Assistance Needed Supervision   Physical Assistance Level No physical assistance   Comment no device, stand pivot with UE support on w/c   Sit to Stand CARE Score 4   Bed-Chair Transfer   Type of Assistance Needed Supervision   Physical Assistance Level No physical assistance   Comment stand pivot transfer with UE support on w/c or bed   Chair/Bed-to-Chair Transfer CARE Score 4   Toileting Hygiene   Type of Assistance Needed Supervision   Physical Assistance Level No physical assistance   Comment close supervision in stance with UE support on grab bar for clothing management and hygiene   Toileting Hygiene CARE Score 4   Toilet Transfer   Type of Assistance Needed Supervision; Adaptive equipment   Physical Assistance Level No physical assistance   Comment stand pivot transfer from w/c to [facility] over toilet with use of grab bar. BSC ordered for at home, additionally pt to put in grab bar   Toilet Transfer CARE Score 4   Cognition   Overall Cognitive Status Impaired   Arousal/Participation Alert; Cooperative   Attention Within functional limits   Memory Decreased short term memory   Following Commands Follows one step commands without difficulty   Comments decreased short term memory, benefits from repetitive training on new learning   Additional Activities   Additional Activities Comments Provided the following skilled education to patient regarding phase 1 amputation: First Step Booklet: stages of new journey, clinical care team, after surgery and what to expect during rehabilitation , First Step Booklet: coming home from the hospital, First Step Booklet: understanding pain and types of pain, discharge / DME planning , family training and education  and residual limb care and wrapping      Pt engaged in UE strengthening using red theraband to increase performance during functional transfers, ADLS/IADLS and improve activity tolerance. While seated in w/c pt completed 2 sets of 10 of the following exercises: chest press, horizontal shoulder ab/adduction. Will benefit from continued UE strengthening with red theraband and establishment of UE HEP for home. Activity Tolerance   Activity Tolerance Patient tolerated treatment well   Assessment   Treatment Assessment Pt participated in skilled OT tx session. See above for further details on functional performance. Pt has pictures in a text from "[employer]" of house and measurements, narrowest doorway appears to be bathroom at 27" from frame (anticipate door will have to be removed and replaced with shower curtain). Pt's w/c measures about 26" wide. Ordered drop arm BSC that could be used next to bed if needed, but plan will be for over toilet but pt would additionally benefit from grab bar at side of toilet which appears possible from pictures. Plan for sponge bathing at pedestal sink from w/c. Pt able to stand at sink with unilateral UE release to simulate clothing management and front/rear hygiene for bathing. When pt is cleared to shower, pt is interested in bumping up/down stairs to the 2nd floor and accessing tub. Verbally educated pt on tub transfer bench, pt will be receiving home therapy and this can be assessed further at that time.  Tomorrow during family training with pt's grandsons, plan to send video messages via pt's cell phone to pt's son (who cant attend because of work) on toilet transfer and where to install grab bar as well as likely need to remove door from bathroom and replace with curtain. Can review ace wrapping with grandsons, pt was hands on today while seated upright in bed but requires minimal assist to complete. Will benefit from continued practice. Pt left positioned in bed with call bell in reach and bed alarm on. Of note pt met with [facility] this morning and preferred this company, she would like to utilize them upon d/c. Plan for therapy to follow up this week with company to inform them of selection. Of note hospital staff found pt's RW in hospital, but pt would benefit from application of skis on back as 1 small tennis ball has fallen off and therefore is now uneven. Prognosis Good   Problem List Decreased strength;Decreased endurance; Impaired balance;Decreased mobility;Orthopedic restrictions;Decreased skin integrity;Pain   Barriers to Discharge Decreased caregiver support; Inaccessible home environment   Plan   Treatment/Interventions ADL retraining;Functional transfer training; Therapeutic exercise; Endurance training;Patient/family training;Equipment eval/education; Bed mobility; Compensatory technique education   Progress Progressing toward goals   Recommendation   OT Discharge Recommendation Home with home health rehabilitation   OT Therapy Minutes   OT Time In 1315   OT Time Out 1445   OT Total Time (minutes) 90   OT Mode of treatment - Individual (minutes) 90   OT Mode of treatment - Concurrent (minutes) 0   OT Mode of treatment - Group (minutes) 0   OT Mode of treatment - Co-treat (minutes) 0   OT Mode of Treatment - Total time(minutes) 90 minutes   OT Cumulative Minutes 690   Therapy Time missed   Time missed?  No

## 2023-08-02 NOTE — PROGRESS NOTES
Internal Medicine Progress Note  Patient: Marquis Nation  Age/sex: 68 y.o. female  Medical Record #: [id number]      ASSESSMENT/PLAN: (Interval History)  Marquis Nation is seen and examined and management for following issues:    S/p left AKA 7/21/23  • Hx severe PAD with B/L SC to FA bypass in past = has TO left fem-pop BPG  • Home:  Plavix/Crestor 20mg qd/Zetia 10mg qd  • Here:  Plavix/Zetia/Lipitor 40mg qd  • Pain/phantom pain management as per PMR  • Started 7/30/23 on Keflex for 7 days d/t erythema     Coronary artery disease  • S/p CABG in 2000  • TTE on 1/26/2023 with EF of 60%, mild to moderate AI, mild AS, estimated PA pressure of 40 mmHg  • Nuclear pharmacological stress test on 1/26/2023 with anterior wall perfusion defect likely artifact/cannot exclude small area of ischemia, normal LV systolic function. • Continue Plavix/Zetia 10mg qd/Imdur     Hypertension  • Home: Diltiazem 120 mg qd/Amlodipine 5 mg qd/Imdur 30 mg qd/Lisinopril 10 mg/ Toprol-XL 50 mg qd  • Here:  Diltiazem 120mg qd/Imdur 30mg qd/Linsinopril 10mg qd (hold SBP <130)/Toprol 50mg qd  • BP stable.     Hyperlipidemia  • Continue atorvastatin and Zetia here  • Follow-up with outpatient cardiology for possible Repatha     Paroxysmal atrial fibrillation  • Currently in sinus rhythm  • S/p a 2-week Zio patch in March 2023 with 5 episodes of SVT without other significant arrhythmia  • Continue Diltiazem 120 mg daily, Toprol XL 50 mg daily     DVT/IVC filter  • Hx lower extremity DVT/IVC filter  • IVC filter is still present on PET scan 7/7/2023     Tobacco use  • Continues to smoke 1 to 1.5 packs/day  • Did not tolerate Wellbutrin/Chantix in the past  • Counseled for smoking cessation but not ready to quit  • Nicotine patch here 21 mg     COPD  • CT chest with evidence of emphysema  • PFT in June 2023:  Moderate obstruction, no reversibility with bronchodilator   • Was recommended Trelegy and rescue inhaler, but she didn't want it; using Albuterol prn. • Continues on RA with O2 sats 91-92%. • Recommend outpatient follow-up with pulmonology and smoking cessation     Right perihilar mass   • On CT imaging 7/18/23  • Planned for biopsy on 8/14 by thoracic surgeon    Restless leg  · Started ropinirole 8/1/23  · Not new for her she has taken OTC things in the past  · Improved will monitor whether or not it needs to be titrated     CKD II  • Creatinine stable at 0.86  • Has likely nonfunctioning right kidney  • Avoid nephrotoxic agents, hypotension     Bipolar disease  • continue Lamictal 100 mg daily  • Stable        Discharge date:  Team    The above assessment and plan was reviewed and updated as determined by my evaluation of the patient on 8/2/2023.     Labs:   Results from last 7 days   Lab Units 07/31/23  0516 07/27/23  0606   WBC Thousand/uL 10.45* 10.38*   HEMOGLOBIN g/dL 12.2 12.8   HEMATOCRIT % 37.9 40.6   PLATELETS Thousands/uL 281 262     Results from last 7 days   Lab Units 07/31/23  0516 07/27/23  0606   SODIUM mmol/L 141 140   POTASSIUM mmol/L 4.5 3.6   CHLORIDE mmol/L 112* 106   CO2 mmol/L 25 28   BUN mg/dL 16 13   CREATININE mg/dL 0.86 0.78   CALCIUM mg/dL 9.1 9.2                   Review of Scheduled Meds:  Current Facility-Administered Medications   Medication Dose Route Frequency Provider Last Rate   • acetaminophen  975 mg Oral Q8H [facility] LEV Turner     • albuterol  2 puff Inhalation Q6H PRN LEV Turner     • atorvastatin  40 mg Oral Daily With LEV Marcano     • bisacodyl  10 mg Rectal Daily PRN LEV Calderon     • cephalexin  500 mg Oral Q6H [facility] Any Hemphill MD     • clopidogrel  75 mg Oral Daily LEV Turner     • diltiazem  120 mg Oral Daily LEV Turner     • docusate sodium  100 mg Oral BID LEV Turner     • ezetimibe  10 mg Oral Daily LEV Turner     • folic acid  1 mg Oral Daily LEV Turner     • gabapentin  300 mg Oral TID LEV Calderon • gabapentin  400 mg Oral HS Lynsey Mishra MD     • heparin (porcine)  5,000 Units Subcutaneous [facility] LEV Turner     • isosorbide mononitrate  30 mg Oral QAM LEV Turner     • lamoTRIgine  100 mg Oral Daily LEV Turner     • lisinopril  10 mg Oral Daily LEV Sanchez     • melatonin  9 mg Oral HS LEV Turner     • methocarbamol  500 mg Oral [facility] Lynsey Mishra MD     • metoprolol succinate  50 mg Oral Daily LEV Turner     • nicotine  1 patch Transdermal Daily LEV Turner     • ondansetron  4 mg Oral Q6H PRN LEV Conde     • oxyCODONE  5 mg Oral Q4H PRN Lynsey Mishra MD      Or   • oxyCODONE  10 mg Oral Q4H PRN Lynsey Mishra MD     • polyethylene glycol  17 g Oral Daily PRN LEV Turner     • rOPINIRole  0.5 mg Oral HS LEV Galdamez     • saccharomyces boulardii  250 mg Oral BID Susana Aguayo MD     • traZODone  50 mg Oral HS PRN LEV Conde         Subjective/ HPI: Patient seen and examined. Patients overnight issues or events were reviewed with nursing or staff during rounds or morning huddle session. New or overnight issues include the following:     Pt seen in her room. ROS:   A 10 point ROS was performed; negative except as noted above. Imaging:     XR femur 2 vw left   Final Result by Jamal Flores MD (07/31 1550)      Postsurgical changes in left above-the-knee amputation. No x-ray findings suggesting osteomyelitis at this time. Resident: Aristides Prince, the attending radiologist, have reviewed the images and agree with the final report above.       Workstation performed: [id number] /Radiology studies reviewed  *Medications reviewed and reconciled as needed  *Please refer to order section for additional ordered labs studies  *Case discussed with primary attending during morning huddle case rounds    Physical Examination:  Vitals:   Vitals:    08/01/23 0529 08/01/23 1419 08/01/23 1936 08/02/23 0546   BP: 136/65 127/58 129/63 130/63   BP Location: Right arm Right arm Right arm Right arm   Pulse: 67 72 68 65   Resp: 16 17 18 18   Temp: 98.1 °F (36.7 °C) 97.9 °F (36.6 °C) 98.5 °F (36.9 °C) 98.9 °F (37.2 °C)   TempSrc: Oral Oral Oral Oral   SpO2: 91% 91% 90% 92%   Weight:    74.2 kg (163 lb 9.3 oz)   Height:           GEN: NAD; pleasant  NEURO: Alert and oriented x4; appropriate  HEENT: Pupils are equal/reactive; normocephalic, face is symmetrical, hearing is normal  CV: S1 S2 regular, no murmur/rub/gallops, 1/4 pedal pulse RLE, no LE edema present  RESP: Lungs are clear bilaterally, no wheezes rales or rhonchi, on room air, no distress, respirations are easy and non labored  GI: Abdomen is obese, soft, non tender, +BS x4; non distended  : Voiding without issues  MUSC: Moves all extremities except LLE amputation  SKIN: pink, warm, normal turgor, L stump dressing intact; refer to images       The above physical exam was reviewed and updated as determined by my evaluation of the patient on 8/2/2023. Invasive Devices     None                    VTE Pharmacologic Prophylaxis: Heparin  Code Status: Level 1 - Full Code  Current Length of Stay: 8 day(s)      Total time spent:  30 minutes with more than 50% spent counseling/coordinating care. Counseling includes discussion with patient re: progress  and discussion with patient of his/her current medical state/information. Coordination of patient's care was performed in conjunction with primary service. Time invested included review of patient's labs, vitals, and management of their comorbidities with continued monitoring. In addition, this patient was discussed with medical team including physician and advanced extenders. The care of the patient was extensively discussed and appropriate treatment plan was formulated unique for this patient.  Medical decision making for the day was made by supervising physician unless otherwise noted in their attestation statement. ** Please Note:  voice to text software may have been used in the creation of this document.  Although proof errors in transcription or interpretation are a potential of such software**

## 2023-08-02 NOTE — PROGRESS NOTES
08/02/23 1000   Pain Assessment   Pain Assessment Tool 0-10   Pain Score 5   Pain Location/Orientation Orientation: Left; Location: Leg   Restrictions/Precautions   Precautions Bed/chair alarms;Cognitive; Fall Risk;Impulsive;Supervision on toilet/commode;Pain   LLE Weight Bearing Per Order NWB   Braces or Orthoses   (AB dress and ace wrapping)   Subjective   Subjective pt agreeable to perform skilled PT   Roll Left and Right   Type of Assistance Needed Independent   Roll Left and Right CARE Score 6   Sit to Lying   Type of Assistance Needed Supervision   Sit to Lying CARE Score 4   Lying to Sitting on Side of Bed   Type of Assistance Needed Supervision   Lying to Sitting on Side of Bed CARE Score 4   Sit to Stand   Type of Assistance Needed Incidental touching   Comment CgA sit pivot   Sit to Stand CARE Score 4   Bed-Chair Transfer   Type of Assistance Needed Incidental touching   Comment CgA sit pivot   Chair/Bed-to-Chair Transfer CARE Score 4   Transfer Bed/Chair/Wheelchair   Adaptive Equipment None   Sit Pivot Contact Guard   Walk 10 Feet   Type of Assistance Needed Incidental touching; Adaptive equipment   Comment vcfor hop sequence   Walk 10 Feet CARE Score 4   Walk 50 Feet with Two Turns   Reason if not Attempted Safety concerns   Walk 50 Feet with Two Turns CARE Score 88   Walk 150 Feet   Reason if not Attempted Safety concerns   Walk 150 Feet CARE Score 88   Walking 10 Feet on Uneven Surfaces   Reason if not Attempted Safety concerns   Walking 10 Feet on Uneven Surfaces CARE Score 88   Ambulation   Primary Mode of Locomotion Prior to Admission Walk   Distance Walked (feet) 10 ft   Does the patient walk? 2. Yes   Wheel 50 Feet with Two Turns   Type of Assistance Needed Set-up / clean-up   Wheel 50 Feet with Two Turns CARE Score 5   Wheel 150 Feet   Type of Assistance Needed Set-up / clean-up   Wheel 150 Feet CARE Score 5   Wheelchair mobility   Does the patient use a wheelchair? 1.  Yes   Type of Wheelchair Used 1. Manual   Method Right upper extremity; Left upper extremity;Right lower extremity   Assistance Provided For Remove Leg Rest;Replace Leg Rest;Remove armrests;Replace armrests   Distance Level Surface (feet) 999 ft   Findings outside up and down ramp   Curb or Single Stair   Style negotiated Curb   Type of Assistance Needed Physical assistance   Physical Assistance Level 26%-50%   Comment (S)  please cont to work on BWD hopping up curb step or 4-6 inch curb step with RW to mimic home IMANI but son check on ramp   1 Step (Curb) CARE Score 3   4 Steps   Reason if not Attempted Safety concerns   4 Steps CARE Score 88   12 Steps   Reason if not Attempted Safety concerns   12 Steps CARE Score 88   Stairs   Type Curb   # of Steps 2   Assist Devices Roller Walker   Findings mimic to enter home BWD   Picking Up Object   Reason if not Attempted Activity not applicable   Picking Up Object CARE Score 9   Therapeutic Interventions   Strengthening mat program TE amputee and instructed and ed. book amputee given by [facility] prosthesis   Flexibility prone stretch hip flexion 8-10 min   Balance standing balance   Equipment Use   NuStep L3 for 10 min   Other Comments   Comments (S)  pt needs to cont to lear how to wrap or limib ,please cont rhina wrapping   Assessment   Treatment Assessment pt focus on amputee book and low K lvl, and care for amputee limb , also pt perform propl WC outside up and down ramp d/t mimic home and 4 inch step curb hopping BWD with chair on step for IMANI if ramp not in by DC date next week ., Pt on mat for TE and seated Core strengthening . Cont POC , pt coont to implusve at times with sit pivot xfers . , Keep alarm on . Barriers to Discharge Inaccessible home environment   Plan   Treatment/Interventions Functional transfer training;LE strengthening/ROM; Therapeutic exercise; Endurance training;Cognitive reorientation;Patient/family training;Bed mobility;Gait training   Progress Progressing toward goals Recommendation   PT Discharge Recommendation Home with home health rehabilitation   Equipment Recommended Wheelchair   PT Therapy Minutes   PT Time In 1000   PT Time Out 1130   PT Total Time (minutes) 90   PT Mode of treatment - Individual (minutes) 90   PT Mode of treatment - Concurrent (minutes) 0   PT Mode of treatment - Group (minutes) 0   PT Mode of treatment - Co-treat (minutes) 0   PT Mode of Treatment - Total time(minutes) 90 minutes   PT Cumulative Minutes 600   Therapy Time missed   Time missed?  No

## 2023-08-02 NOTE — PROGRESS NOTES
[address] INITIAL CONSULT  NOTE  Alyse Delgado 68 y.o. :1950 female MRN: [id number]  DOS:23  Unit/Bed#: -01 Encounter: [id number]      Requested by (Physician/Service): Dacia Constantino DO  Reason for Consultation: Evaluate and treat impact of mood and coping on progress in rehabilitation. HPI:   Kale Maldonado. Kathy Mcginnis is a 68year old female with PMH of CAD/CABG, HTN, PAD, aortoiliac occlusive d/c, LLE DVT vascular sx bypass sx's, s/p IVC filter, RUL CA, CKD3, PAD/SVT not on A/c,  tobacco abuse (1 ppd x 60+yrs), PAF/SVT (no AC), LLE DVT s/p IVC filter, RUL lung mass, stage III CKD who presented to [address] [place] [address] on 23 with left foot pain.  Patient states that she noticed that she had increased pain over the left foot in approximately 2 days ago and is acutely worse over the last 24 hours, accompanied by numbness, and color change of her foot.  On exam, patient's left foot is bluish discoloration, swollen, no palpable or doppler DP or PT pulses. Patient still has motion of her foot (is able to wiggle toes, move her foot up and down) but sensation is significantly decreased, is able to feel slight pressure.  CT angiogram with multiple inflow graft occlusions on the left.  The right to left component of the right axillobifemoral graft is occluded. The left axillary to profunda graft is occluded and a segment is excised.  The aorta to left common femoral and jump graft from the alex of the common femoral artery anastomosis to below-knee popliteal artery is also occluded.  The deep femoral artery beyond the previous anastomosis does reconstitute but is secondary or tertiary branch that is small at less than 2 mm.  There are no other named vessels that reconstitute within the upper leg.  There does appear to be reconstitution of tibial vessels beyond the trifurcation but again these vessels are less than 2 mm in diameter.  Vascular surgery was consulted, recommending L AKA. Patient was placed on heparin gtt and APS was consulted, patient now on oral analgesics. On 07/21 patient underwent a left AKA with Dr. Richter Failing, EBL minimal. Patient is non-weight bearing left lower extremity. PT/OT were consulted and recommend inpatient acute rehabilitation. PT/OT were consulted and recommend acute inpatient rehabilitation. The patient was evaluated by the Rehabilitation team and deemed an appropriate candidate for comprehensive inpatient rehabilitation and admitted to the [facility] on 7/25/2023  2:33 PM.  Functional deficits: impaired mobility, self care. PT/OT initiated for Rehabilitation goals to achieve a supervision level with mobility and self care. Prognosis is good. ELOS is 10-14 days. Estimated discharge is home.      HISTORY:     Patient Active Problem List    Diagnosis Date Noted   • At risk for venous thromboembolism (VTE) 07/28/2023   • Pain 07/28/2023   • Hypoxia 07/28/2023   • Urinary dysfunction 07/26/2023   • Hx of AKA (above knee amputation), left ([address]) 07/25/2023   • Nodule of upper lobe of right lung 06/14/2023   • Insomnia 02/10/2023   • PAF (paroxysmal atrial fibrillation) ([address]) 01/09/2023   • Stage 3 chronic kidney disease, unspecified whether stage 3a or 3b CKD ([address]) 01/09/2023   • Dizziness 01/09/2023   • Tobacco abuse 01/02/2023   • Aortoiliac occlusive disease ([address]) 12/28/2022   • Mixed hyperlipidemia 12/27/2022   • Left leg swelling 11/08/2022   • Bipolar affective disorder, depressed, severe ([address]) 04/19/2017   • S/P vascular bypass 02/24/2017   • Hx of CABG 11/30/2016   • Presence of IVC filter 11/30/2016   • Essential hypertension 01/22/2016   • Thyroid nodule 01/22/2016   • CAD (coronary artery disease) 06/23/2014   • Renal artery stenosis ([address]) 06/23/2014   • PAD (peripheral artery disease) ([address]) 06/23/2014       Body mass index is 28.08 kg/m².     Past Medical History:     Past Medical History:   Diagnosis Date   • Aorto-iliac disease ([address])    • Atrial fibrillation ([address])    • CAD (coronary artery disease)    • Carotid stenosis, bilateral    • Celiac artery stenosis ([facility])    • CKD (chronic kidney disease) stage 3, GFR 30-59 ml/min ([facility])    • DVT (deep venous thrombosis) ([facility])     LLE (CFV, popl)   • Heart attack ([address]) 02/2022   • HLD (hyperlipidemia)    • Hypertension    • Lung mass     RUL   • Myocardial infarction ([address])     2022   • COURTNEY (obstructive sleep apnea)    • PAD (peripheral artery disease) ([facility])    • Stroke [facility])         Past Surgical History:     Past Surgical History:   Procedure Laterality Date   • AORTA - FEMORAL ARTERY BYPASS GRAFT Left    • AXILLO-FEMORAL BYASS GRAFT Bilateral    • BYPASS FEMORAL-FEMORAL     • CORONARY ARTERY BYPASS GRAFT  2000   • FEMORAL ARTERY - POPLITEAL ARTERY BYPASS GRAFT Bilateral    • IVC FILTER INSERTION     • IL AMPUTATION THIGH THROUGH FEMUR ANY LEVEL Left 7/21/2023    Procedure: AMPUTATION ABOVE KNEE (AKA); Surgeon: Franny Hubbard MD;  Location: [facility];  Service: Vascular   • TOTAL ABDOMINAL HYSTERECTOMY W/ BILATERAL SALPINGOOPHORECTOMY     • TOTAL HIP ARTHROPLASTY Left          Allergies:     No Known Allergies      Social History:    Social History     Socioeconomic History   • Marital status:       Spouse name: Not on file   • Number of children: Not on file   • Years of education: Not on file   • Highest education level: Not on file   Occupational History   • Not on file   Tobacco Use   • Smoking status: Every Day     Packs/day: 1.50     Types: Cigarettes     Start date: 5   • Smokeless tobacco: Never   Vaping Use   • Vaping Use: Never used   Substance and Sexual Activity   • Alcohol use: Not Currently     Alcohol/week: 50.0 standard drinks of alcohol     Types: 50 Cans of beer per week     Comment: has not had a drink in 243 days (7/18/23)  h/o at least 6 beers per day   • Drug use: Never   • Sexual activity: Not Currently     Partners: Male   Other Topics Concern   • Not on file   Social History Narrative   • Not on file     Social Determinants of Health     Financial Resource Strain: Not on file   Food Insecurity: No Food Insecurity (7/19/2023)    Hunger Vital Sign    • Worried About Running Out of Food in the Last Year: Never true    • Ran Out of Food in the Last Year: Never true   Transportation Needs: No Transportation Needs (7/19/2023)    PRAPARE - Transportation    • Lack of Transportation (Medical): No    • Lack of Transportation (Non-Medical):  No   Physical Activity: Not on file   Stress: Not on file   Social Connections: Not on file   Intimate Partner Violence: Not on file   Housing Stability: Low Risk  (7/19/2023)    Housing Stability Vital Sign    • Unable to Pay for Housing in the Last Year: No    • Number of Places Lived in the Last Year: 1    • Unstable Housing in the Last Year: No        Family History:    Family History   Problem Relation Age of Onset   • Stomach cancer Mother        Medications:     Current Facility-Administered Medications:   •  acetaminophen (TYLENOL) tablet 975 mg, 975 mg, Oral, Q8H [address], LEV Turner, 975 mg at 08/02/23 0550  •  albuterol (PROVENTIL HFA,VENTOLIN HFA) inhaler 2 puff, 2 puff, Inhalation, Q6H PRN, LEV Turner  •  atorvastatin (LIPITOR) tablet 40 mg, 40 mg, Oral, Daily With Dinner, LEV Nelson, 40 mg at 08/01/23 1724  •  bisacodyl (DULCOLAX) rectal suppository 10 mg, 10 mg, Rectal, Daily PRN, LEV Turner  •  cephalexin (KEFLEX) capsule 500 mg, 500 mg, Oral, Q6H [address], Rowdy Vega MD, 500 mg at 08/02/23 1140  •  clopidogrel (PLAVIX) tablet 75 mg, 75 mg, Oral, Daily, LEV Turner, 75 mg at 08/02/23 0827  •  diltiazem (CARDIZEM) tablet 120 mg, 120 mg, Oral, Daily, LEV Turner, 120 mg at 08/02/23 0827  •  docusate sodium (COLACE) capsule 100 mg, 100 mg, Oral, BID, LEV Turner, 100 mg at 08/02/23 0827  •  ezetimibe (ZETIA) tablet 10 mg, 10 mg, Oral, Daily, LEV Turner, 10 mg at 08/02/23 0827  • folic acid (FOLVITE) tablet 1 mg, 1 mg, Oral, Daily, LEV Turner, 1 mg at 08/02/23 0827  •  gabapentin (NEURONTIN) capsule 600 mg, 600 mg, Oral, TID, LEV Turner  •  heparin (porcine) subcutaneous injection 5,000 Units, 5,000 Units, Subcutaneous, Q8H [facility], LEV Turner, 5,000 Units at 08/02/23 0550  •  isosorbide mononitrate (IMDUR) 24 hr tablet 30 mg, 30 mg, Oral, QAM, LEV Turner, 30 mg at 08/02/23 0827  •  lamoTRIgine (LaMICtal) tablet 100 mg, 100 mg, Oral, Daily, LEV Turner, 100 mg at 08/02/23 0827  •  lisinopril (ZESTRIL) tablet 10 mg, 10 mg, Oral, Daily, LEV Main, 10 mg at 08/02/23 0827  •  melatonin tablet 9 mg, 9 mg, Oral, HS, LEV Turner, 9 mg at 08/01/23 2112  •  methocarbamol (ROBAXIN) tablet 500 mg, 500 mg, Oral, Q8H [facility], Jojo Gates MD, 500 mg at 08/02/23 0550  •  metoprolol succinate (TOPROL-XL) 24 hr tablet 50 mg, 50 mg, Oral, Daily, LEV Turner, 50 mg at 08/02/23 0827  •  nicotine (NICODERM CQ) 21 mg/24 hr TD 24 hr patch 1 patch, 1 patch, Transdermal, Daily, LEV Turner, 1 patch at 08/02/23 3597  •  ondansetron (ZOFRAN-ODT) dispersible tablet 4 mg, 4 mg, Oral, Q6H PRN, LEV Turner  •  oxyCODONE (ROXICODONE) IR tablet 5 mg, 5 mg, Oral, Q4H PRN, 5 mg at 08/02/23 1238 **OR** oxyCODONE (ROXICODONE) immediate release tablet 10 mg, 10 mg, Oral, Q4H PRN, Jojo Gates MD, 10 mg at 08/01/23 1450  •  polyethylene glycol (MIRALAX) packet 17 g, 17 g, Oral, Daily PRN, LEV Turner  •  rOPINIRole (REQUIP) tablet 0.5 mg, 0.5 mg, Oral, HS, LEV Galdamez, 0.5 mg at 08/01/23 2112  •  saccharomyces boulardii (FLORASTOR) capsule 250 mg, 250 mg, Oral, BID, Niki Bernardo MD, 250 mg at 08/02/23 0837  •  traZODone (DESYREL) tablet 50 mg, 50 mg, Oral, HS PRN, LEV Turner, 50 mg at 08/01/23 2317    ASSESSMENT:   Crayton Ganser.  Elissa Trimble is a 68year old  female who was admitted to [facility] on 7/25/23 to regain strength and functioning s/p above knee amputation. Pt states she has dealt with "a lot of pain for many years" and figured an amputation was coming but still feels upset, agitated and partially in shock and denial.  She describes a history of troubled relationships in which she does not assert her needs/wishes in order to avoid conflict/confrontation and "keep the peace."  However, these choices result in her feeling more angry, resentful and deprived. A recent issue for pt is her inner conflict about her home in [place]. Apparently, pt lived with her spouse for 20 in her home, which is on a double lot in a [facility] in [place]. After her spouse passed away pt had difficulty taking care of the house on her own and pt allowed her sister and her  moved in to help. She charged them a very low rent ($700/mo) for full run of the house and property but kept the cost low because her sister had financial problems. However, pt does not get along with the brother-in-law (sees him as "bossy) and in order to avoid arguments, she left her home and moved in with her daughter's family in [place]. Pt's wish is to have her own home and begin to enjoy life. Her understanding with her sister was she was to purchase the home and pt would live on proceeds from the sale. However, after 4 years the sale has not come to fruition and the agreed upon price is well under market value. Pt feels torn by wanting to get a fair price so she has financial security and wanting to keep peace with her sister. Since the amputation she feels increasingly more trapped by her decreased mobility and worries about her future. Pt  maintained good eye contact and engaged appropriately throughout the interview. She was seen in her room at [facility], presented as cooperative and established rapport without difficulty. Mood was dysphoric and agitated with no evidence of euphoria or emotional lability.    Pt reports feeling agitated about many things. She was upset she did not receive her meals and even more agitated when she spoke with dietary and they offered to bring her food items she does not want. Rather than argue, she stated. ...fine. .. I will just send it back."  Pt denies suicidal/homicidal intent, ideation or plan. She reports she was able to sleep prior to the amputation with melatonin and sleeping medication. She denies disturbances in appetite. Medical records indicate a history of chronic alcohol and nicotine abuse  (50 beers/week, 1.50 packs of cigarettes/day) until more recently when she reportedly quit. No evidence of poor boundaries was present. Pt. denies incidents of losing time or flash backs. No pressured speech, repetitive or perseverative behavior is present. No obsessive-compulsive or associated rituals. Pt's conversational speech was fluent and articulate with no evidence of word finding difficulty. Pt manifests difficulty adjusting to her recent AKA and exhibits signs of depression and anxiety. Medication to improve mood and decrease anxiety may help ease current agitation. Pt also appears to have a premorbid history of addiction, as well as difficulty with assertive communication in relationships. Pt may benefit by supportive psychotherapy during rehab to improve coping and mood, as well as assertive communication. DIAGNOSIS:  Adjustment Disorder with Anxiety and Depression  History of Alcohol Abuse  History of Tobacco Abuse      RECOMMENDATIONS:   I will follow Ms. Dee Dee Alvarez during her stay to provide the following interventions:    · Supportive psychotherapy, utilizing CBT and mindfulness strategies  · DBT distress tolerance techniques  to improve coping and mood  · Meditation and relaxation training as tolerated          Thank you for the opportunity to participate in Ms. Steve's care. Alf Purvis.  Randall Ash, Ph.D.  Licensed Psychologist

## 2023-08-02 NOTE — PROGRESS NOTES
PM&R PROGRESS NOTE:  Jesu Cordova 68 y.o. female MRN: [id number]  Unit/Bed#: -01 Encounter: [id number]    Rehabilitation Diagnosis: Impairment of mobility, safety and Activities of Daily Living (ADLs) due to Amputation:  05.3  Unilateral Lower Limb Above the Knee    HPI: Joana Guy is a 68 y.o. female with a medical history of CAD, hypertension, CABG, vascular bypass surgeries, PAD, aortoiliac occlusive disease, tobacco abuse (1 ppd x 60+yrs), PAF/SVT (no AC), LLE DVT s/p IVC filter, RUL lung mass, stage III CKD who presented to [address] [place] [address] on 07/18 with left foot pain. Patient states that she noticed that she had increased pain over the left foot in approximately 2 days ago and is acutely worse over the last 24 hours, accompanied by numbness, and color change of her foot. On exam, patient's left foot is bluish discoloration, swollen, no palpable or doppler DP or PT pulses. Patient still has motor of her foot (is able to wiggle toes, move her foot up and down) but sensation is significantly decreased, is able to feel slight pressure. CT angiogram with multiple inflow graft occlusions on the left. The right to left component of the right axillobifemoral graft is occluded. The left axillary to profunda graft is occluded and a segment is excised. The aorta to left common femoral and jump graft from the alex of the common femoral artery anastomosis to below-knee popliteal artery is also occluded. The deep femoral artery beyond the previous anastomosis does reconstitute but is secondary or tertiary branch that is small at less than 2 mm. There are no other named vessels that reconstitute within the upper leg. There does appear to be reconstitution of tibial vessels beyond the trifurcation but again these vessels are less than 2 mm in diameter. Vascular surgery was consulted, recommending L AKA.  Patient was placed on heparin gtt and APS was consulted, patient now on oral analgesics. On 07/21 patient underwent a left AKA with Dr. Hilton Mendoza, EBL minimal. Patient is non-weight bearing left lower extremity. PT/OT were consulted and recommend inpatient acute rehabilitation. PT/OT were consulted and recommend acute inpatient rehabilitation. The patient was evaluated by the Rehabilitation team and deemed an appropriate candidate for comprehensive inpatient rehabilitation and admitted to the [facility] on 7/25/2023  2:33 PM       SUBJECTIVE: Patient seen face to face. No acute issues overnight. Patient in good spirits today, sleep overnight improved; patient feels restless leg symptoms improved with start of Requip. Pain improving, continues with burning pain in residual limb and phantom pain, discussed nonpharmacologic interventions with patient return demonstration. Good appetite, voiding, LBM  7/30, colace given, Miralax if no BM this afternoon. Denies chest pain, shortness of breath, fever, chills, N/V, abdominal pain. ASSESSMENT: Stable, progressing    PLAN:  - restless leg- IM added requip 0.5 mg hs 8/1; sleep improved  - pain: adjusted gabapentin to 600 mg TID, continue oxycodone 5-10 mg q4h  - insomnia: improving with current regimen, monitor  - left leg wound: continue localized wound care; monitor with amputation dressing changes  - residual limb dressing changes- unable to complete d/t pain; pain now improving, discuss with patient/therapies to address tomorrow. - constipation: LBM 7/30, previous refused bowel medications, discussed with patient, took colace this am and will take Miralax this pm    Rehabilitation  • Functional deficits:  LLE NWB, mobility, self-care  • Continue current rehabilitation plan of care to maximize function.     • Functional update:   • PT: mobility- min A, incidental touching, transfers- min A- incidental touching, ambulation- RW 10', wheelchair mobility- supervision 200'  • OT: ADL: bathing- incidental touching-supervision, dressing- UB supervision LB min A, toileting- min A  • Estimated Discharge: anticipated 8/9 with home RN, PT, OT    Pain  • Left leg  • Tylenol 975 mg every 8 hours   • Gabapentin 300 mg BID, 400 mg hs  • Robaxin 500 mg every 6 hours  • Oxycodone 5-10 mg every 4 hours prn    DVT prophylaxis  • Heparin sc    Bladder plan  • incontinent    Bowel plan  • Continent  • Colace 100 mg BID  • Miralax daily prn  • Bisacodyl suppository daily prn      * Hx of AKA (above knee amputation), left [facility])  Assessment & Plan  - 7/18 pt admitted with increased foot pain over 2 days with change of color and numbness found to have critical limb ischemia   -CT angiogram: multiple inflow graft occlusions on the left. The right to left component of the right axillobifemoral graft is occluded. The left axillary to profunda graft is occluded and a segment is excised. The aorta to left common femoral and jump graft from the alex of the common femoral artery anastomosis to below-knee popliteal artery is also occluded. The deep femoral artery beyond the previous anastomosis does reconstitute but is secondary or tertiary branch that is small at less than 2 mm. There are no other named vessels that reconstitute within the upper leg.   There does appear to be reconstitution of tibial vessels beyond the trifurcation but again these vessels are less than 2 mm in diameter.  - Vascular surgery s/p AKA 7/21 by Dr. Audrey Pringle  - Plavix, optimal BP control, statin  - NWB LLE  - Monitor CBC intermittently   - Optimal ROM to avoid/decrease risk of contractures  - Monitor incision/area for infection or dehiscence/impaired healing    - 7/27 slight latia-incisional erythema - recommended vasc sx c/s who saw patient on 7/27 and reported no concerns for infection at that    - thigh DTI possibly from overly tight ace wrap that was POA to [facility] - wound care c/s 7/27 - allevyn foam dressing; use Kerlex only for a few days and then go back to ACE but avoid overly tightening   - 7/28 slight latia-incisional erythema again today but stable without increased warmth, tenderness, or drainage - continue close monitoring , started on Keflex and probiotic continue to monitor with daily incisional site imaging/media. Discussed with nursing  - It is normal to have some swelling or discoloration around the incision initially post-operatively. If develops increasing redness, tenderness/pain, discharge, or dehiscence develops contact surgical service   - Wound care of incision site per vasc sx   - WBC stable mildly elevated at 10 K on 7/27 > monitor intermittently  - Monitor for signs/symptoms of VTE, atelectasis, acute blood loss anemia, or other infection   - Patient (if applicable caregiver) training and education on amputation, possible phantom symptoms, recovery process  - Neuropsychology consult, supportive counseling  - Optimal pain control  - Monitor contralateral limb closely for concerning s/s   - Fall Precautions   - Tolerating ARC setting adequately   - Continue acute comprehensive interdisciplinary inpatient rehabilitation to include intensive skilled therapies (PT, OT) as outlined with oversight and management by rehabilitation physician as well as inpatient rehab level nursing, case management and weekly interdisciplinary team meetings.    - Follow-up with PMR and Vas Sx after discharge    PAD (peripheral artery disease) ([address])  Assessment & Plan  - hx of PAD, carotid stenosis   - [facility] [address]- right renal artery bypass 2000  - 8/6/2014 femoral/popliteal with stents and angioplasty, iliac arteries with stents and angioplasty and tibial peroneal artery angioplasty  -Right Fem to below-knee bypass occluded in 2010  - Fem-Fem bypass graft left to right occluded  -2/21/2017 right axillary to Bi-Fem bypass  -11/15/2022 lower extremity arterial ultrasound right lower limb shows 50-69% stenosis distal to the SFA with high-grade stenosis versus occlusion in the posterior tibial artery LUANN 0.53 (severe range), right Fem to below-knee bypass graft occluded, left lower limb patent CFA to posterior tibial bypass graft, LUANN 0.96  - 11/15/2022 abdominal aorta/iliac study showed occlusion of bilateral common and external iliac arteries, greater than 70% stenosis in the celiac artery, patent right axillo bifemoral arterial bypass graft  -11/15/2022 carotid ultrasound showed less than 50% bilateral carotid stenosis  - home: Plavix 75 mg, (previously on Xarelo 2.5 mg d/c d/t cost)  - here: plavix, statin, optimal BP control     Tobacco abuse  Assessment & Plan  - 1 1/2 ppd for 56 years  - nicotine patch  - tried Wellbutrin prescribed by vascular surgery for 1 week, however discontinued due to side effects  - encourage smoking cessation but is resistant     Nodule of upper lobe of right lung  Assessment & Plan  - newly diagnosed RUL lung mass  - PET/CT (7/7/23): marked increased metabolic activity in association with RUL nodule highly suggestive of neoplasm  - follows with hemonc Dr. Michelle Cuevas  - planned navigational bronchoscopy with biopsy for 7/31/23 with thoracic surgery, Dr. Vicenta Bowles   - discussed with family who notified sx office and they rescheduled bx appt to 8/14    CAD (coronary artery disease)  Assessment & Plan  - s/p CABG 2020 [facility]  - TTE (1/26/23): EF 60%, mild dilated RV, mild-mod AI, mild AS/TR  - follows with Dr. Pretty Guy  - Plavix, ISMN, statin       Stage 3 chronic kidney disease, unspecified whether stage 3a or 3b CKD [facility])  Assessment & Plan  Lab Results   Component Value Date    EGFR 67 07/31/2023    EGFR 76 07/27/2023    EGFR 70 07/22/2023    CREATININE 0.86 07/31/2023    CREATININE 0.78 07/27/2023    CREATININE 0.83 07/22/2023     - Stable 7/27  - avoid nephrotoxic agents  - monitor BMP with routine labs  -consult IM for assist with management  - stable      PAF (paroxysmal atrial fibrillation) [facility])  Assessment & Plan  - episode during previous hospitalization  - Zio patch (3/17/23) showed 5 episodes of SVT (longest 7 beats) 2 triggered events correlated with SR, no other significant arrhythmias  - home: Toprol XL 50 mg daily, diltiazem 120 mg daily  - Not on full A/C per prior providers   - continue here  - OP Cards follow-up     Essential hypertension  Assessment & Plan  - home: amlodipine 5 mg daily, diltiazem 120 mg daily, lisinopril 10 mg daily, Toprol-XL 50 mg daily  - here: continue   - monitor BP  - monitor electrolytes  - consult IM to assist w/ management    Mixed hyperlipidemia  Assessment & Plan  - home: rosuvastatin 20 mg daily, Zetia 10 mg daily  - here: Zetia 10 mg, atorvastatin 40 mg (therapeutic substitution)  - Lipid panel (73/480527) total cholesterol 165, triglycerides 155, HDL 33,       Insomnia  Assessment & Plan  Improved   - here: Traz 50mg HS PRN  - On gabapentin 400mg HS for pain at night as well now   - if needed start low dose melatonin (apparently was on high dose at home)  - gabapentin also for pain   - Recommend appropriate sleep hygiene  - Patient counselled/will be counselled on this when appropriate       Hypoxia  Assessment & Plan  Off of oxygen so far today  Mild occasionally requiring 2L to SpO2 goal 90% or higher   Per IM  "-CTL with evidence of emphysema  -PFT in June 2023: Moderate obstruction, no reversibility with bronchodilator   -Was recommended Trelegy and rescue inhaler, has not initiated Trelegy as yet and does not plan to, doing albuterol as needed although patient, denies any shortness of breath at rest or exertion.  -Previously saturating 92% on 2 L of oxygen via nasal cannula.   - on room air saturating 90-92%, continue to monitor  -Recommend outpatient follow-up with pulmonology and smoking cessation"    Pain  Assessment & Plan  Stable overall; slept better again overnight  - Continue gabapentin to [phone] daily   - APAP TID  - Oxy 5-10mg Q4H PRN; Hold for oversedation, AMS, or RR<12.  - Robaxin PRN Q6H standing change to Q8H soon     At risk for venous thromboembolism (VTE)  Assessment & Plan  Heparin     Urinary dysfunction  Assessment & Plan  - some urinary retention earlier now improved off scans but still watch for re-occurrence   - proactive toileting  - monitor for s/s of infection, retention     Bipolar affective disorder, depressed, severe (HCC)  Assessment & Plan  Mood stable   - home: lamictal 100 mg daily  - continue here  - neuropsychology consulted      Appreciate IM consultants medical co-management. Labs, medications, and imaging personally reviewed. ROS:  Review of Systems   A 10 point review of systems was negative except for what is noted in the HPI. OBJECTIVE:   /63 (BP Location: Right arm)   Pulse 65   Temp 98.9 °F (37.2 °C) (Oral)   Resp 18   Ht 5' 4" (1.626 m)   Wt 74.2 kg (163 lb 9.3 oz)   SpO2 92% Comment: starts at 88- comes up to 92  BMI 28.08 kg/m²     Physical Exam  Constitutional:       Appearance: Normal appearance. HENT:      Head: Normocephalic and atraumatic. Nose: Nose normal.      Mouth/Throat:      Mouth: Mucous membranes are moist.   Cardiovascular:      Rate and Rhythm: Normal rate and regular rhythm. Pulses: Normal pulses. Heart sounds: Normal heart sounds. Pulmonary:      Effort: Pulmonary effort is normal.      Breath sounds: Normal breath sounds. Abdominal:      General: Bowel sounds are normal.      Palpations: Abdomen is soft. Musculoskeletal:         General: Normal range of motion. Cervical back: Normal range of motion. Left lower leg: Edema present. Comments: NWB LLE   Skin:     General: Skin is warm and dry. Capillary Refill: Capillary refill takes less than 2 seconds. Findings: Lesion present. Comments: -left thigh lesion   Neurological:      Mental Status: She is alert and oriented to person, place, and time.       Motor: Weakness present.    Psychiatric:         Mood and Affect: Mood normal.         Judgment: Judgment normal.          Lab Results   Component Value Date    WBC 10.45 (H) 07/31/2023    HGB 12.2 07/31/2023    HCT 37.9 07/31/2023    MCV 92 07/31/2023     07/31/2023     Lab Results   Component Value Date    SODIUM 141 07/31/2023    K 4.5 07/31/2023     (H) 07/31/2023    CO2 25 07/31/2023    BUN 16 07/31/2023    CREATININE 0.86 07/31/2023    GLUC 101 07/31/2023    CALCIUM 9.1 07/31/2023     Lab Results   Component Value Date    INR 0.92 07/18/2023    PROTIME 12.5 07/18/2023       Current Facility-Administered Medications:   •  acetaminophen (TYLENOL) tablet 975 mg, 975 mg, Oral, Q8H [address] [facility], LEV Turner, 975 mg at 08/02/23 0550  •  albuterol (PROVENTIL HFA,VENTOLIN HFA) inhaler 2 puff, 2 puff, Inhalation, Q6H PRN, LEV Turner  •  atorvastatin (LIPITOR) tablet 40 mg, 40 mg, Oral, Daily With Dinner, LEV Turner, 40 mg at 08/01/23 1724  •  bisacodyl (DULCOLAX) rectal suppository 10 mg, 10 mg, Rectal, Daily PRN, LEV Turner  •  cephalexin (KEFLEX) capsule 500 mg, 500 mg, Oral, Q6H [address], Niki Bernardo MD, 500 mg at 08/02/23 1140  •  clopidogrel (PLAVIX) tablet 75 mg, 75 mg, Oral, Daily, LEV Turner, 75 mg at 08/02/23 0827  •  diltiazem (CARDIZEM) tablet 120 mg, 120 mg, Oral, Daily, LEV Turner, 120 mg at 08/02/23 0827  •  docusate sodium (COLACE) capsule 100 mg, 100 mg, Oral, BID, LEV Turner, 100 mg at 08/02/23 0827  •  ezetimibe (ZETIA) tablet 10 mg, 10 mg, Oral, Daily, LEV Turner, 10 mg at 40/51/68 3935  •  folic acid (FOLVITE) tablet 1 mg, 1 mg, Oral, Daily, LEV Turner, 1 mg at 08/02/23 0827  •  gabapentin (NEURONTIN) capsule 600 mg, 600 mg, Oral, TID, LEV Turner  •  heparin (porcine) subcutaneous injection 5,000 Units, 5,000 Units, Subcutaneous, Q8H [address] [facility], LEV Turner, 5,000 Units at 08/02/23 0550  •  isosorbide mononitrate (IMDUR) 24 hr tablet 30 mg, 30 mg, Oral, QAM, LEV Turner, 30 mg at 08/02/23 0827  •  lamoTRIgine (LaMICtal) tablet 100 mg, 100 mg, Oral, Daily, LEV Turner, 100 mg at 08/02/23 0827  •  lisinopril (ZESTRIL) tablet 10 mg, 10 mg, Oral, Daily, LEV Lacey, 10 mg at 08/02/23 0827  •  melatonin tablet 9 mg, 9 mg, Oral, HS, LEV Turner, 9 mg at 08/01/23 2112  •  methocarbamol (ROBAXIN) tablet 500 mg, 500 mg, Oral, Q8H [facility], Rufus Villatoro MD, 500 mg at 08/02/23 0550  •  metoprolol succinate (TOPROL-XL) 24 hr tablet 50 mg, 50 mg, Oral, Daily, LEV Turner, 50 mg at 08/02/23 0827  •  nicotine (NICODERM CQ) 21 mg/24 hr TD 24 hr patch 1 patch, 1 patch, Transdermal, Daily, LEV Turner, 1 patch at 08/02/23 2841  •  ondansetron (ZOFRAN-ODT) dispersible tablet 4 mg, 4 mg, Oral, Q6H PRN, LEV Turner  •  oxyCODONE (ROXICODONE) IR tablet 5 mg, 5 mg, Oral, Q4H PRN, 5 mg at 08/02/23 1238 **OR** oxyCODONE (ROXICODONE) immediate release tablet 10 mg, 10 mg, Oral, Q4H PRN, Rufus Villatoro MD, 10 mg at 08/01/23 1450  •  polyethylene glycol (MIRALAX) packet 17 g, 17 g, Oral, Daily PRN, LEV Turner  •  rOPINIRole (REQUIP) tablet 0.5 mg, 0.5 mg, Oral, HS, Gracy Rian, LEV, 0.5 mg at 08/01/23 2112  •  saccharomyces boulardii (FLORASTOR) capsule 250 mg, 250 mg, Oral, BID, Herve Hams, MD, 250 mg at 08/02/23 0827  •  traZODone (DESYREL) tablet 50 mg, 50 mg, Oral, HS PRN, LEV Turner, 50 mg at 08/01/23 2311    Past Medical History:   Diagnosis Date   • Aorto-iliac disease ([address])    • Atrial fibrillation (HCC)    • CAD (coronary artery disease)    • Carotid stenosis, bilateral    • Celiac artery stenosis ([facility])    • CKD (chronic kidney disease) stage 3, GFR 30-59 ml/min ([facility])    • DVT (deep venous thrombosis) ([facility])     LLE (CFV, popl)   • Heart attack ([address]) 02/2022   • HLD (hyperlipidemia)    • Hypertension    • Lung mass     RUL   • Myocardial infarction ([address])     2022   • COURTNEY (obstructive sleep apnea)    • PAD (peripheral artery disease) ([address])    • Stroke [facility])        Patient Active Problem List    Diagnosis Date Noted   • Hx of AKA (above knee amputation), left ([address]) 07/25/2023   • PAD (peripheral artery disease) ([address]) 06/23/2014   • Tobacco abuse 01/02/2023   • Nodule of upper lobe of right lung 06/14/2023   • CAD (coronary artery disease) 06/23/2014   • Stage 3 chronic kidney disease, unspecified whether stage 3a or 3b CKD ([address]) 01/09/2023   • PAF (paroxysmal atrial fibrillation) ([address]) 01/09/2023   • Essential hypertension 01/22/2016   • Mixed hyperlipidemia 12/27/2022   • Insomnia 02/10/2023   • At risk for venous thromboembolism (VTE) 07/28/2023   • Pain 07/28/2023   • Hypoxia 07/28/2023   • Urinary dysfunction 07/26/2023   • Dizziness 01/09/2023   • Aortoiliac occlusive disease ([address]) 12/28/2022   • Left leg swelling 11/08/2022   • Bipolar affective disorder, depressed, severe ([address]) 04/19/2017   • S/P vascular bypass 02/24/2017   • Hx of CABG 11/30/2016   • Presence of IVC filter 11/30/2016   • Thyroid nodule 01/22/2016   • Renal artery stenosis ([address]) 06/23/2014          LEV Mukherjee  Physical Medicine and [place]

## 2023-08-03 LAB
ANION GAP SERPL CALCULATED.3IONS-SCNC: 5 MMOL/L
BASOPHILS # BLD AUTO: 0.12 THOUSANDS/ÂΜL (ref 0–0.1)
BASOPHILS NFR BLD AUTO: 1 % (ref 0–1)
BUN SERPL-MCNC: 18 MG/DL (ref 5–25)
CALCIUM SERPL-MCNC: 9.3 MG/DL (ref 8.3–10.1)
CHLORIDE SERPL-SCNC: 109 MMOL/L (ref 96–108)
CO2 SERPL-SCNC: 26 MMOL/L (ref 21–32)
CREAT SERPL-MCNC: 0.93 MG/DL (ref 0.6–1.3)
EOSINOPHIL # BLD AUTO: 0.47 THOUSAND/ÂΜL (ref 0–0.61)
EOSINOPHIL NFR BLD AUTO: 5 % (ref 0–6)
ERYTHROCYTE [DISTWIDTH] IN BLOOD BY AUTOMATED COUNT: 13.5 % (ref 11.6–15.1)
GFR SERPL CREATININE-BSD FRML MDRD: 61 ML/MIN/1.73SQ M
GLUCOSE SERPL-MCNC: 103 MG/DL (ref 65–140)
HCT VFR BLD AUTO: 38.8 % (ref 34.8–46.1)
HGB BLD-MCNC: 12.5 G/DL (ref 11.5–15.4)
IMM GRANULOCYTES # BLD AUTO: 0.07 THOUSAND/UL (ref 0–0.2)
IMM GRANULOCYTES NFR BLD AUTO: 1 % (ref 0–2)
LYMPHOCYTES # BLD AUTO: 1.66 THOUSANDS/ÂΜL (ref 0.6–4.47)
LYMPHOCYTES NFR BLD AUTO: 18 % (ref 14–44)
MCH RBC QN AUTO: 30 PG (ref 26.8–34.3)
MCHC RBC AUTO-ENTMCNC: 32.2 G/DL (ref 31.4–37.4)
MCV RBC AUTO: 93 FL (ref 82–98)
MONOCYTES # BLD AUTO: 0.58 THOUSAND/ÂΜL (ref 0.17–1.22)
MONOCYTES NFR BLD AUTO: 6 % (ref 4–12)
NEUTROPHILS # BLD AUTO: 6.27 THOUSANDS/ÂΜL (ref 1.85–7.62)
NEUTS SEG NFR BLD AUTO: 69 % (ref 43–75)
NRBC BLD AUTO-RTO: 0 /100 WBCS
PLATELET # BLD AUTO: 304 THOUSANDS/UL (ref 149–390)
PMV BLD AUTO: 9.4 FL (ref 8.9–12.7)
POTASSIUM SERPL-SCNC: 4.9 MMOL/L (ref 3.5–5.3)
RBC # BLD AUTO: 4.17 MILLION/UL (ref 3.81–5.12)
SODIUM SERPL-SCNC: 140 MMOL/L (ref 135–147)
WBC # BLD AUTO: 9.17 THOUSAND/UL (ref 4.31–10.16)

## 2023-08-03 PROCEDURE — 97530 THERAPEUTIC ACTIVITIES: CPT

## 2023-08-03 PROCEDURE — 85025 COMPLETE CBC W/AUTO DIFF WBC: CPT | Performed by: NURSE PRACTITIONER

## 2023-08-03 PROCEDURE — 99024 POSTOP FOLLOW-UP VISIT: CPT | Performed by: PHYSICIAN ASSISTANT

## 2023-08-03 PROCEDURE — 99232 SBSQ HOSP IP/OBS MODERATE 35: CPT | Performed by: STUDENT IN AN ORGANIZED HEALTH CARE EDUCATION/TRAINING PROGRAM

## 2023-08-03 PROCEDURE — 80048 BASIC METABOLIC PNL TOTAL CA: CPT | Performed by: NURSE PRACTITIONER

## 2023-08-03 PROCEDURE — 99232 SBSQ HOSP IP/OBS MODERATE 35: CPT | Performed by: INTERNAL MEDICINE

## 2023-08-03 PROCEDURE — 97535 SELF CARE MNGMENT TRAINING: CPT

## 2023-08-03 PROCEDURE — 97110 THERAPEUTIC EXERCISES: CPT

## 2023-08-03 RX ADMIN — HEPARIN SODIUM 5000 UNITS: 5000 INJECTION INTRAVENOUS; SUBCUTANEOUS at 21:00

## 2023-08-03 RX ADMIN — OXYCODONE HYDROCHLORIDE 5 MG: 5 TABLET ORAL at 12:23

## 2023-08-03 RX ADMIN — CEPHALEXIN 500 MG: 500 CAPSULE ORAL at 05:05

## 2023-08-03 RX ADMIN — OXYCODONE HYDROCHLORIDE 5 MG: 5 TABLET ORAL at 21:01

## 2023-08-03 RX ADMIN — TRAZODONE HYDROCHLORIDE 50 MG: 50 TABLET ORAL at 00:38

## 2023-08-03 RX ADMIN — ROPINIROLE 0.5 MG: 0.25 TABLET, FILM COATED ORAL at 21:01

## 2023-08-03 RX ADMIN — GABAPENTIN 600 MG: 300 CAPSULE ORAL at 21:01

## 2023-08-03 RX ADMIN — CEPHALEXIN 500 MG: 500 CAPSULE ORAL at 18:09

## 2023-08-03 RX ADMIN — GABAPENTIN 600 MG: 300 CAPSULE ORAL at 09:05

## 2023-08-03 RX ADMIN — ACETAMINOPHEN 975 MG: 325 TABLET, FILM COATED ORAL at 05:04

## 2023-08-03 RX ADMIN — OXYCODONE HYDROCHLORIDE 5 MG: 5 TABLET ORAL at 17:14

## 2023-08-03 RX ADMIN — Medication 250 MG: at 09:05

## 2023-08-03 RX ADMIN — FOLIC ACID 1 MG: 1 TABLET ORAL at 09:05

## 2023-08-03 RX ADMIN — CEPHALEXIN 500 MG: 500 CAPSULE ORAL at 12:21

## 2023-08-03 RX ADMIN — ATORVASTATIN CALCIUM 40 MG: 40 TABLET, FILM COATED ORAL at 17:11

## 2023-08-03 RX ADMIN — VANCOMYCIN HYDROCHLORIDE 750 MG: 750 INJECTION, SOLUTION INTRAVENOUS at 23:25

## 2023-08-03 RX ADMIN — CEFEPIME 2000 MG: 2 INJECTION, POWDER, FOR SOLUTION INTRAVENOUS at 22:22

## 2023-08-03 RX ADMIN — CLOPIDOGREL BISULFATE 75 MG: 75 TABLET ORAL at 09:05

## 2023-08-03 RX ADMIN — METHOCARBAMOL 500 MG: 500 TABLET ORAL at 13:13

## 2023-08-03 RX ADMIN — MELATONIN 9 MG: at 21:01

## 2023-08-03 RX ADMIN — GABAPENTIN 600 MG: 300 CAPSULE ORAL at 17:10

## 2023-08-03 RX ADMIN — METHOCARBAMOL 500 MG: 500 TABLET ORAL at 21:02

## 2023-08-03 RX ADMIN — METHOCARBAMOL 500 MG: 500 TABLET ORAL at 05:05

## 2023-08-03 RX ADMIN — TRAZODONE HYDROCHLORIDE 50 MG: 50 TABLET ORAL at 22:26

## 2023-08-03 RX ADMIN — CEPHALEXIN 500 MG: 500 CAPSULE ORAL at 00:38

## 2023-08-03 RX ADMIN — DOCUSATE SODIUM 100 MG: 100 CAPSULE ORAL at 09:05

## 2023-08-03 RX ADMIN — OXYCODONE HYDROCHLORIDE 5 MG: 5 TABLET ORAL at 00:38

## 2023-08-03 RX ADMIN — Medication 250 MG: at 18:09

## 2023-08-03 RX ADMIN — HEPARIN SODIUM 5000 UNITS: 5000 INJECTION INTRAVENOUS; SUBCUTANEOUS at 05:05

## 2023-08-03 RX ADMIN — LAMOTRIGINE 100 MG: 100 TABLET ORAL at 09:05

## 2023-08-03 RX ADMIN — DOCUSATE SODIUM 100 MG: 100 CAPSULE ORAL at 18:09

## 2023-08-03 RX ADMIN — ACETAMINOPHEN 975 MG: 325 TABLET, FILM COATED ORAL at 21:01

## 2023-08-03 RX ADMIN — EZETIMIBE 10 MG: 10 TABLET ORAL at 09:05

## 2023-08-03 RX ADMIN — OXYCODONE HYDROCHLORIDE 10 MG: 10 TABLET ORAL at 05:04

## 2023-08-03 RX ADMIN — ACETAMINOPHEN 975 MG: 325 TABLET, FILM COATED ORAL at 13:13

## 2023-08-03 RX ADMIN — HEPARIN SODIUM 5000 UNITS: 5000 INJECTION INTRAVENOUS; SUBCUTANEOUS at 13:13

## 2023-08-03 NOTE — PROGRESS NOTES
Steph Castaneda is a 68 y.o. female who is currently ordered Vancomycin IV with management by the Pharmacy Consult service. Relevant clinical data and objective / subjective history reviewed. Vancomycin Assessment:  Indication and Goal AUC/Trough: Soft tissue (goal -600, trough >10), -600, trough >10  Clinical Status: stable  Micro: No current results  Renal Function:  SCr: 0.93 mg/dL  CrCl: 54 mL/min  Renal replacement: Not on dialysis  Days of Therapy: 1  Current Dose: 750 mg IV Q12H  Vancomycin Plan:  New Dosing:    Estimated AUC: 509 mcg*hr/mL  Estimated Trough: 17.2 mcg/mL  Next Level: 8-5-2023 at 0600  Renal Function Monitoring: Daily BMP and [place] will continue to follow closely for s/sx of nephrotoxicity, infusion reactions and appropriateness of therapy. BMP and CBC will be ordered per protocol. We will continue to follow the patient’s culture results and clinical progress daily.     Monie Navas, Pharmacist

## 2023-08-03 NOTE — PROGRESS NOTES
08/03/23 1330   Pain Assessment   Pain Assessment Tool 0-10   Pain Score 6   Pain Location/Orientation Orientation: Left; Location: Leg   Restrictions/Precautions   Precautions Bed/chair alarms; Fall Risk;Pain;Supervision on toilet/commode   LLE Weight Bearing Per Order NWB   Braces or Orthoses   (residual limb wrapping)   Lifestyle   Autonomy "I couldn't believe how much my leg hurt today."   Lower Body Dressing   Type of Assistance Needed Physical assistance   Physical Assistance Level 25% or less   Comment A for residual limb wrapping, trial w/ waist anchor. Pt completed skin check w/ LH mirror, noted w/ inc redness at medial incision. Team made aware of same. Lower Body Dressing CARE Score 3   Putting On/Taking Off Footwear   Type of Assistance Needed Supervision   Physical Assistance Level No physical assistance   Putting On/Taking Off Footwear CARE Score 4   Sit to Lying   Type of Assistance Needed Independent   Physical Assistance Level No physical assistance   Sit to Lying CARE Score 6   Lying to Sitting on Side of Bed   Type of Assistance Needed Independent   Physical Assistance Level No physical assistance   Lying to Sitting on Side of Bed CARE Score 6   Sit to Stand   Type of Assistance Needed Supervision   Physical Assistance Level No physical assistance   Sit to Stand CARE Score 4   Bed-Chair Transfer   Type of Assistance Needed Supervision   Physical Assistance Level No physical assistance   Comment no AD, modified stand pivot steadying w/ w/c armrest.   Chair/Bed-to-Chair Transfer CARE Score 4   Toileting Hygiene   Type of Assistance Needed Supervision   Physical Assistance Level No physical assistance   Comment w/ unilateral support of grab bar for hygiene and clothing management. Toileting Hygiene CARE Score 4   Toilet Transfer   Type of Assistance Needed Supervision; Adaptive equipment   Physical Assistance Level No physical assistance   Comment BSC over toilet, prefers unilateral support of horizontal grab bar. Toilet Transfer CARE Score 4   Exercise Tools   Other Exercise Tool 1 BUE ther ex to maximize strength and endurance for ADLs and fxnl mobility. Completed w/ 3# DB 3x10 bicep curls, 2# DB chest press, OH shoulder press, and front arm raises. Pt tolerated well w/ rest breaks between sets to manage fatigue. Other Exercise Tool 2 3x5 w/c push-ups, ability to fully clear buttocks and hold for 3-4 seconds. Tolerated well w/ rest breaks between sets. Cognition   Overall Cognitive Status Impaired   Comments WFL for basic. impaired STM   Assessment   Treatment Assessment Pt seen for skilled OT session focusing on FT w/ pt's grandsons, BUE/core strengthening, toileting, and residual limb wrapping. Details on FT noted below, receptive to all recommendations and plan for pt's son to install grab bar in bathroom. Cont OT POC: residual limb wrapping, repetitive safety training, establish BUE HEP, endurance work, and ongoing phase 1 amputee edu. From OT standpoint, pt on track for d/c home w/ family support on Wednesday w/ recommendation for [facility]. Plan to trial off of bed/chair alarms tomorrow if pt cont to demo G carryover of using call bell. OT Family training done with: pt's grandsons: Xuan   Assessment of family training FT w/ pt's 2 grandsons, reviewed role of OT and pt's current LOF. Reviewed recommendation for sponge bathing initially. Pt demo fxnl xfers from EOB<>w/c and toilet xfer. Discussed options for built-in grab bar, pt prefers horizontal grab bar, 15" would meet pt's needs. Offered to take pictures of grab bar and video of pt's transfers, however pt declined at this time as pt's son will visit this afternoon. Pt reports bathroom door can be taken off to allow for w/c to fit into bathroom if necessary. Toby Malik, will relay reviewed info w/ pt's son/his dad this evening.  Pt aware of recommendation to remove throw rugs in bathroom and bedroom, grandson agreeable to same. Pt hopeful to be independent w/ residual limb wrapping at d/c, offered to review w/ grandson however pt declined. No other questions or concerns at this time. Sherman Reynolds will likely be picking pt up at time of d/c, made aware of approximate time. Prognosis Good   Problem List Decreased strength;Decreased endurance; Impaired balance;Decreased mobility;Orthopedic restrictions;Decreased skin integrity;Pain   Plan   Treatment/Interventions ADL retraining;Functional transfer training; Therapeutic exercise; Endurance training;Patient/family training   Progress Progressing toward goals   Recommendation   OT Discharge Recommendation Home with home health rehabilitation   OT Therapy Minutes   OT Time In 1330   OT Time Out 1430   OT Total Time (minutes) 60   OT Mode of treatment - Individual (minutes) 60   OT Mode of treatment - Concurrent (minutes) 0   OT Mode of treatment - Group (minutes) 0   OT Mode of treatment - Co-treat (minutes) 0   OT Mode of Treatment - Total time(minutes) 60 minutes   OT Cumulative Minutes 750   Therapy Time missed   Time missed?  No

## 2023-08-03 NOTE — PLAN OF CARE
Problem: Prexisting or High Potential for Compromised Skin Integrity  Goal: Skin integrity is maintained or improved  Description: INTERVENTIONS:  - Identify patients at risk for skin breakdown  - Assess and monitor skin integrity  - Assess and monitor nutrition and hydration status  - Monitor labs   - Assess for incontinence   - Turn and reposition patient  - Assist with mobility/ambulation  - Relieve pressure over bony prominences  - Avoid friction and shearing  - Provide appropriate hygiene as needed including keeping skin clean and dry  - Evaluate need for skin moisturizer/barrier cream  - Collaborate with interdisciplinary team   - Patient/family teaching  - Consider wound care consult   Outcome: Progressing     Problem: MOBILITY - ADULT  Goal: Maintain or return to baseline ADL function  Description: INTERVENTIONS:  -  Assess patient's ability to carry out ADLs; assess patient's baseline for ADL function and identify physical deficits which impact ability to perform ADLs (bathing, care of mouth/teeth, toileting, grooming, dressing, etc.)  - Assess/evaluate cause of self-care deficits   - Assess range of motion  - Assess patient's mobility; develop plan if impaired  - Assess patient's need for assistive devices and provide as appropriate  - Encourage maximum independence but intervene and supervise when necessary  - Involve family in performance of ADLs  - Assess for home care needs following discharge   - Consider OT consult to assist with ADL evaluation and planning for discharge  - Provide patient education as appropriate  Outcome: Progressing  Goal: Maintains/Returns to pre admission functional level  Description: INTERVENTIONS:  - Perform BMAT or MOVE assessment daily.   - Set and communicate daily mobility goal to care team and patient/family/caregiver. - Collaborate with rehabilitation services on mobility goals if consulted  - Perform Range of Motion 3 times a day.   - Reposition patient every 2 hours.  - Dangle patient 3 times a day  - Stand patient 3 times a day  - Ambulate patient 3 times a day  - Out of bed to chair 3 times a day   - Out of bed for meals 3 times a day  - Out of bed for toileting  - Record patient progress and toleration of activity level   Outcome: Progressing     Problem: PAIN - ADULT  Goal: Verbalizes/displays adequate comfort level or baseline comfort level  Description: Interventions:  - Encourage patient to monitor pain and request assistance  - Assess pain using appropriate pain scale  - Administer analgesics based on type and severity of pain and evaluate response  - Implement non-pharmacological measures as appropriate and evaluate response  - Consider cultural and social influences on pain and pain management  - Notify physician/advanced practitioner if interventions unsuccessful or patient reports new pain  Outcome: Progressing     Problem: INFECTION - ADULT  Goal: Absence or prevention of progression during hospitalization  Description: INTERVENTIONS:  - Assess and monitor for signs and symptoms of infection  - Monitor lab/diagnostic results  - Monitor all insertion sites, i.e. indwelling lines, tubes, and drains  - Monitor endotracheal if appropriate and nasal secretions for changes in amount and color  - [place] appropriate cooling/warming therapies per order  - Administer medications as ordered  - Instruct and encourage patient and family to use good hand hygiene technique  - Identify and instruct in appropriate isolation precautions for identified infection/condition  Outcome: Progressing  Goal: Absence of fever/infection during neutropenic period  Description: INTERVENTIONS:  - Monitor WBC    Outcome: Progressing     Problem: SAFETY ADULT  Goal: Patient will remain free of falls  Description: INTERVENTIONS:  - Educate patient/family on patient safety including physical limitations  - Instruct patient to call for assistance with activity   - Consult OT/PT to assist with strengthening/mobility   - Keep Call bell within reach  - Keep bed low and locked with side rails adjusted as appropriate  - Keep care items and personal belongings within reach  - Initiate and maintain comfort rounds  - Make Fall Risk Sign visible to staff  - Offer Toileting every 2 Hours, in advance of need  - Initiate/Maintain alarm  - Obtain necessary fall risk management equipment:   - Apply yellow socks and bracelet for high fall risk patients  - Consider moving patient to room near nurses station  Outcome: Progressing  Goal: Maintain or return to baseline ADL function  Description: INTERVENTIONS:  -  Assess patient's ability to carry out ADLs; assess patient's baseline for ADL function and identify physical deficits which impact ability to perform ADLs (bathing, care of mouth/teeth, toileting, grooming, dressing, etc.)  - Assess/evaluate cause of self-care deficits   - Assess range of motion  - Assess patient's mobility; develop plan if impaired  - Assess patient's need for assistive devices and provide as appropriate  - Encourage maximum independence but intervene and supervise when necessary  - Involve family in performance of ADLs  - Assess for home care needs following discharge   - Consider OT consult to assist with ADL evaluation and planning for discharge  - Provide patient education as appropriate  Outcome: Progressing  Goal: Maintains/Returns to pre admission functional level  Description: INTERVENTIONS:  - Perform BMAT or MOVE assessment daily.   - Set and communicate daily mobility goal to care team and patient/family/caregiver. - Collaborate with rehabilitation services on mobility goals if consulted  - Perform Range of Motion  times a day. - Reposition patient every 2 hours.   - Dangle patient 3 times a day  - Stand patient 3 times a day  - Ambulate patient 3 times a day  - Out of bed to chair 3 times a day   - Out of bed for meals 3 times a day  - Out of bed for toileting  - Record patient progress and toleration of activity level   Outcome: Progressing     Problem: DISCHARGE PLANNING  Goal: Discharge to home or other facility with appropriate resources  Description: INTERVENTIONS:  - Identify barriers to discharge w/patient and caregiver  - Arrange for needed discharge resources and transportation as appropriate  - Identify discharge learning needs (meds, wound care, etc.)  - Arrange for interpretive services to assist at discharge as needed  - Refer to Case Management Department for coordinating discharge planning if the patient needs post-hospital services based on physician/advanced practitioner order or complex needs related to functional status, cognitive ability, or social support system  Outcome: Progressing     Problem: Knowledge Deficit  Goal: Patient/family/caregiver demonstrates understanding of disease process, treatment plan, medications, and discharge instructions  Description: Complete learning assessment and assess knowledge base. Interventions:  - Provide teaching at level of understanding  - Provide teaching via preferred learning methods  Outcome: Progressing     Problem: Nutrition/Hydration-ADULT  Goal: Nutrient/Hydration intake appropriate for improving, restoring or maintaining nutritional needs  Description: Monitor and assess patient's nutrition/hydration status for malnutrition. Collaborate with interdisciplinary team and initiate plan and interventions as ordered. Monitor patient's weight and dietary intake as ordered or per policy. Utilize nutrition screening tool and intervene as necessary. Determine patient's food preferences and provide high-protein, high-caloric foods as appropriate.      INTERVENTIONS:  - Monitor oral intake, urinary output, labs, and treatment plans  - Assess nutrition and hydration status and recommend course of action  - Evaluate amount of meals eaten  - Assist patient with eating if necessary   - Allow adequate time for meals  - Recommend/ encourage appropriate diets, oral nutritional supplements, and vitamin/mineral supplements  - Order, calculate, and assess calorie counts as needed  - Recommend, monitor, and adjust tube feedings and TPN/PPN based on assessed needs  - Assess need for intravenous fluids  - Provide specific nutrition/hydration education as appropriate  - Include patient/family/caregiver in decisions related to nutrition  Outcome: Progressing

## 2023-08-03 NOTE — PROGRESS NOTES
08/03/23 0831   Pain Assessment   Pain Assessment Tool 0-10   Pain Score 4   Pain Location/Orientation Orientation: Left; Location: Leg   Restrictions/Precautions   Precautions Fall Risk;Bed/chair alarms;Pain   LLE Weight Bearing Per Order NWB   Subjective   Subjective Reporting she continues to have phantom pain   Roll Left and Right   Type of Assistance Needed Independent   Roll Left and Right CARE Score 6   Sit to Lying   Type of Assistance Needed Independent   Sit to Lying CARE Score 6   Lying to Sitting on Side of Bed   Type of Assistance Needed Independent   Lying to Sitting on Side of Bed CARE Score 6   Sit to Stand   Type of Assistance Needed Supervision   Sit to Stand CARE Score 4   Bed-Chair Transfer   Type of Assistance Needed Supervision; Adaptive equipment   Comment performed x 5 to left and right with and w/o RW for stand pivot transfers   Chair/Bed-to-Chair Transfer CARE Score 4   Car Transfer   Type of Assistance Needed Supervision; Adaptive equipment   Comment performed x 2, Stand pivot each time with and w/o RW   Car Transfer CARE Score 4   Walk 10 Feet   Type of Assistance Needed Supervision; Adaptive equipment   Comment CS with RW, sneaker on right   Walk 10 Feet CARE Score 4   Walk 50 Feet with Two Turns   Reason if not Attempted Safety concerns   Walk 50 Feet with Two Turns CARE Score 88   Walk 150 Feet   Reason if not Attempted Safety concerns   Walk 150 Feet CARE Score 88   Walking 10 Feet on Uneven Surfaces   Reason if not Attempted Safety concerns   Walking 10 Feet on Uneven Surfaces CARE Score 88   Ambulation   Primary Mode of Locomotion Prior to Admission Walk   Distance Walked (feet) 10 ft   Assist Device Roller Walker   Gait Pattern Hopping   Does the patient walk? 2.  Yes   Wheel 50 Feet with Two Turns   Type of Assistance Needed Independent   Wheel 50 Feet with Two Turns CARE Score 6   Wheel 150 Feet   Type of Assistance Needed Independent   Wheel 150 Feet CARE Score 6   Wheelchair mobility   Does the patient use a wheelchair? 1. Yes   Type of Wheelchair Used 1. Manual   Picking Up Object   Comment performing with her reacher from w/c   Equipment Use   NuStep 10 min lvl 3 for general conditioning   Other Comments   Comments residual limb re-wrapped at beginning and end of session   Assessment   Treatment Assessment Pt reports ace wrapping continues to slide off and she has been working on re-wrapping on her own. Transfers contine to be safe and very consistent. Discussed with pt keeping w/c at foot of bed to allow her room for Supine<> sit transfers. Also practiced during session with bed elevated to simulate height of her bed at home and she was able to safely perfrm SPT. Grandsons will be in this afternoon for training. Recommendation   PT Discharge Recommendation Home with home health rehabilitation   Equipment Recommended Wheelchair   PT Therapy Minutes   PT Time In 0830   PT Time Out 0930   PT Total Time (minutes) 60   PT Mode of treatment - Individual (minutes) 60   PT Mode of treatment - Concurrent (minutes) 0   PT Mode of treatment - Group (minutes) 0   PT Mode of treatment - Co-treat (minutes) 0   PT Mode of Treatment - Total time(minutes) 60 minutes   PT Cumulative Minutes 660   Therapy Time missed   Time missed?  No

## 2023-08-03 NOTE — PROGRESS NOTES
PM&R PROGRESS NOTE:  Madhuri Tavera 68 y.o. female MRN: [id number]  Unit/Bed#: -01 Encounter: [id number]    Rehabilitation Diagnosis: Impairment of mobility, safety and Activities of Daily Living (ADLs) due to Amputation:  05.3  Unilateral Lower Limb Above the Knee    HPI: Richard Hall is a 68 y.o. female with a medical history of CAD, hypertension, CABG, vascular bypass surgeries, PAD, aortoiliac occlusive disease, tobacco abuse (1 ppd x 60+yrs), PAF/SVT (no AC), LLE DVT s/p IVC filter, RUL lung mass, stage III CKD who presented to [address] [place] [address] on 07/18 with left foot pain. Patient states that she noticed that she had increased pain over the left foot in approximately 2 days ago and is acutely worse over the last 24 hours, accompanied by numbness, and color change of her foot. On exam, patient's left foot is bluish discoloration, swollen, no palpable or doppler DP or PT pulses. Patient still has motor of her foot (is able to wiggle toes, move her foot up and down) but sensation is significantly decreased, is able to feel slight pressure. CT angiogram with multiple inflow graft occlusions on the left. The right to left component of the right axillobifemoral graft is occluded. The left axillary to profunda graft is occluded and a segment is excised. The aorta to left common femoral and jump graft from the alex of the common femoral artery anastomosis to below-knee popliteal artery is also occluded. The deep femoral artery beyond the previous anastomosis does reconstitute but is secondary or tertiary branch that is small at less than 2 mm. There are no other named vessels that reconstitute within the upper leg. There does appear to be reconstitution of tibial vessels beyond the trifurcation but again these vessels are less than 2 mm in diameter. Vascular surgery was consulted, recommending L AKA.  Patient was placed on heparin gtt and APS was consulted, patient now on oral analgesics. On 07/21 patient underwent a left AKA with Dr. Mary Ellen Iglesias, EBL minimal. Patient is non-weight bearing left lower extremity. PT/OT were consulted and recommend inpatient acute rehabilitation. PT/OT were consulted and recommend acute inpatient rehabilitation. The patient was evaluated by the Rehabilitation team and deemed an appropriate candidate for comprehensive inpatient rehabilitation and admitted to the [facility] on 7/25/2023  2:33 PM       SUBJECTIVE: Patient seen face to face. No acute issues overnight. Patient slept well, pain tolerable with current medication regimen. Visualized left thigh DTI, improving, intact. Good appetite, voiding, LBM 7/31 (per patient). Denies chest pain, shortness of breath, fever, chills, N/V, abdominal pain. ASSESSMENT: Stable, progressing    PLAN:  - amputee dressing change, patient able to perform today, continue education  - pain- tolerable, continue with current regimen  - insomnia- improved, monitor  - left thigh DTI, wound care following, intact, improved; continue Allevyn foam dressing  - constipation: LBM 7/31 (per patient) discussed bowel medications, agreeable to Colace at this time, will discuss again tomorrow    Rehabilitation  • Functional deficits:  LLE NWB, mobility, self-care  • Continue current rehabilitation plan of care to maximize function.     • Functional update:   • PT: mobility- min A, incidental touching, transfers- min A- incidental touching, ambulation- RW 10', wheelchair mobility- supervision 200'  • OT: ADL: bathing- incidental touching-supervision, dressing- UB supervision LB min A, toileting- min A  • Estimated Discharge: anticipated 8/9 with home RN, PT, OT    Pain  • Left leg  • Tylenol 975 mg every 8 hours   • Gabapentin 300 mg BID, 400 mg hs  • Robaxin 500 mg every 6 hours  • Oxycodone 5-10 mg every 4 hours prn    DVT prophylaxis  • Heparin sc    Bladder plan  • incontinent    Bowel plan  • Continent  • Colace 100 mg BID  • Miralax daily prn  • Bisacodyl suppository daily prn      * Hx of AKA (above knee amputation), left [facility])  Assessment & Plan  - 7/18 pt admitted with increased foot pain over 2 days with change of color and numbness found to have critical limb ischemia   -CT angiogram: multiple inflow graft occlusions on the left. The right to left component of the right axillobifemoral graft is occluded. The left axillary to profunda graft is occluded and a segment is excised. The aorta to left common femoral and jump graft from the alex of the common femoral artery anastomosis to below-knee popliteal artery is also occluded. The deep femoral artery beyond the previous anastomosis does reconstitute but is secondary or tertiary branch that is small at less than 2 mm. There are no other named vessels that reconstitute within the upper leg. There does appear to be reconstitution of tibial vessels beyond the trifurcation but again these vessels are less than 2 mm in diameter.  - Vascular surgery s/p AKA 7/21 by Dr. Inés Day  - Plavix, optimal BP control, statin  - NWB LLE  - Monitor CBC intermittently   - Optimal ROM to avoid/decrease risk of contractures  - Monitor incision/area for infection or dehiscence/impaired healing    - 7/27 slight latia-incisional erythema - recommended vasc sx c/s who saw patient on 7/27 and reported no concerns for infection at that    - thigh DTI possibly from overly tight ace wrap that was POA to [facility] - wound care c/s 7/27 - allevyn foam dressing; use Kerlex only for a few days and then go back to ACE but avoid overly tightening   - 7/28 slight latia-incisional erythema again today but stable without increased warmth, tenderness, or drainage - continue close monitoring , started on Keflex and probiotic continue to monitor with daily incisional site imaging/media. Discussed with nursing  - It is normal to have some swelling or discoloration around the incision initially post-operatively.  If develops increasing redness, tenderness/pain, discharge, or dehiscence develops contact surgical service   - Wound care of incision site per vasc sx   - WBC stable mildly elevated at 10 K on 7/27 > monitor intermittently  - Monitor for signs/symptoms of VTE, atelectasis, acute blood loss anemia, or other infection   - Patient (if applicable caregiver) training and education on amputation, possible phantom symptoms, recovery process  - Neuropsychology consult, supportive counseling  - Optimal pain control  - Monitor contralateral limb closely for concerning s/s   - Fall Precautions   - Tolerating ARC setting adequately   - Continue acute comprehensive interdisciplinary inpatient rehabilitation to include intensive skilled therapies (PT, OT) as outlined with oversight and management by rehabilitation physician as well as inpatient rehab level nursing, case management and weekly interdisciplinary team meetings.    - Follow-up with PMR and Vas Sx after discharge    PAD (peripheral artery disease) ([address])  Assessment & Plan  - hx of PAD, carotid stenosis   - following procedures with [facility], [place] in [address]- right renal artery bypass 2000  - 8/6/2014 femoral/popliteal with stents and angioplasty, iliac arteries with stents and angioplasty and tibial peroneal artery angioplasty  -Right Fem to below-knee bypass occluded in 2010  - Fem-Fem bypass graft left to right occluded  -2/21/2017 right axillary to Bi-Fem bypass  -11/15/2022 lower extremity arterial ultrasound right lower limb shows 50-69% stenosis distal to the SFA with high-grade stenosis versus occlusion in the posterior tibial artery LUANN 0.53 (severe range), right Fem to below-knee bypass graft occluded, left lower limb patent CFA to posterior tibial bypass graft, LUANN 0.96  - 11/15/2022 abdominal aorta/iliac study showed occlusion of bilateral common and external iliac arteries, greater than 70% stenosis in the celiac artery, patent right axillo bifemoral arterial bypass graft  -11/15/2022 carotid ultrasound showed less than 50% bilateral carotid stenosis  - home: Plavix 75 mg, (previously on Xarelo 2.5 mg d/c d/t cost)  - here: plavix, statin, optimal BP control     Tobacco abuse  Assessment & Plan  - 1 1/2 ppd for 56 years  - nicotine patch  - tried Wellbutrin prescribed by vascular surgery for 1 week, however discontinued due to side effects  - encourage smoking cessation but is resistant     Nodule of upper lobe of right lung  Assessment & Plan  - newly diagnosed RUL lung mass  - PET/CT (7/7/23): marked increased metabolic activity in association with RUL nodule highly suggestive of neoplasm  - follows with hemonc Dr. Karissa Hall  - planned navigational bronchoscopy with biopsy for 7/31/23 with thoracic surgery, Dr. Minda Solis   - discussed with family who notified sx office and they rescheduled bx appt to 8/14    CAD (coronary artery disease)  Assessment & Plan  [facility]  - TTE (1/26/23): EF 60%, mild dilated RV, mild-mod AI, mild AS/TR  - follows with Dr. Gonzalo Eid  - Plavix, ISMN, statin       Stage 3 chronic kidney disease, unspecified whether stage 3a or 3b CKD [facility])  Assessment & Plan  Lab Results   Component Value Date    EGFR 61 08/03/2023    EGFR 67 07/31/2023    EGFR 76 07/27/2023    CREATININE 0.93 08/03/2023    CREATININE 0.86 07/31/2023    CREATININE 0.78 07/27/2023     - Stable 7/27  - avoid nephrotoxic agents  - monitor BMP with routine labs  -consult IM for assist with management  - stable      PAF (paroxysmal atrial fibrillation) (HCC)  Assessment & Plan  - episode during previous hospitalization  - Zio patch (3/17/23) showed 5 episodes of SVT (longest 7 beats) 2 triggered events correlated with SR, no other significant arrhythmias  - home: Toprol XL 50 mg daily, diltiazem 120 mg daily  - Not on full A/C per prior providers   - continue here  - OP Cards follow-up     Essential hypertension  Assessment & Plan  - home: amlodipine 5 mg daily, diltiazem 120 mg daily, lisinopril 10 mg daily, Toprol-XL 50 mg daily  - here: continue   - monitor BP  - monitor electrolytes  - consult IM to assist w/ management    Mixed hyperlipidemia  Assessment & Plan  - home: rosuvastatin 20 mg daily, Zetia 10 mg daily  - here: Zetia 10 mg, atorvastatin 40 mg (therapeutic substitution)  - Lipid panel (42/818506) total cholesterol 165, triglycerides 155, HDL 33,       Insomnia  Assessment & Plan  Improved   - here: Traz 50mg HS PRN  - On gabapentin 400mg HS for pain at night as well now   - if needed start low dose melatonin (apparently was on high dose at home)  - gabapentin also for pain   - Recommend appropriate sleep hygiene  - Patient counselled/will be counselled on this when appropriate       Hypoxia  Assessment & Plan  Off of oxygen so far today  Mild occasionally requiring 2L to SpO2 goal 90% or higher   Per IM  "-CTL with evidence of emphysema  -PFT in June 2023: Moderate obstruction, no reversibility with bronchodilator   -Was recommended Trelegy and rescue inhaler, has not initiated Trelegy as yet and does not plan to, doing albuterol as needed although patient, denies any shortness of breath at rest or exertion.  -Previously saturating 92% on 2 L of oxygen via nasal cannula.   - on room air saturating 90-92%, continue to monitor  -Recommend outpatient follow-up with pulmonology and smoking cessation"    Pain  Assessment & Plan  Stable overall; slept better again overnight  - Continue gabapentin to 600 mg TID   - APAP TID  - Oxy 5-10mg Q4H PRN; Hold for oversedation, AMS, or RR<12.  - Robaxin PRN Q8H      At risk for venous thromboembolism (VTE)  Assessment & Plan  Heparin     Urinary dysfunction  Assessment & Plan  - some urinary retention earlier now improved off scans but still watch for re-occurrence   - proactive toileting  - monitor for s/s of infection, retention     Bipolar affective disorder, depressed, severe [facility])  Assessment & Plan  Mood stable   - home: lamictal 100 mg daily  - continue here  - neuropsychology consulted      Appreciate IM consultants medical co-management. Personally reviewed labs, medications, and imaging. ROS:  Review of Systems   A 10 point review of systems was negative except for what is noted in the HPI. OBJECTIVE:   /60 (BP Location: Right arm)   Pulse 61   Temp 98.1 °F (36.7 °C) (Oral)   Resp 18   Ht 5' 4" (1.626 m)   Wt 74.2 kg (163 lb 9.3 oz)   SpO2 91%   BMI 28.08 kg/m²     Physical Exam  Constitutional:       Appearance: Normal appearance. HENT:      Head: Normocephalic and atraumatic. Nose: Nose normal.      Mouth/Throat:      Mouth: Mucous membranes are moist.   Cardiovascular:      Rate and Rhythm: Normal rate and regular rhythm. Pulses: Normal pulses. Heart sounds: Normal heart sounds. Pulmonary:      Effort: Pulmonary effort is normal.      Breath sounds: Normal breath sounds. Comments: -crackle left base  Abdominal:      General: Bowel sounds are normal.      Palpations: Abdomen is soft. Musculoskeletal:         General: Normal range of motion. Cervical back: Normal range of motion. Left lower leg: Edema present. Skin:     General: Skin is warm and dry. Capillary Refill: Capillary refill takes less than 2 seconds. Findings: Bruising present. Comments: + left thigh DTI   Neurological:      Mental Status: She is alert and oriented to person, place, and time.    Psychiatric:         Mood and Affect: Mood normal.         Judgment: Judgment normal.          Lab Results   Component Value Date    WBC 9.17 08/03/2023    HGB 12.5 08/03/2023    HCT 38.8 08/03/2023    MCV 93 08/03/2023     08/03/2023     Lab Results   Component Value Date    SODIUM 140 08/03/2023    K 4.9 08/03/2023     (H) 08/03/2023    CO2 26 08/03/2023    BUN 18 08/03/2023    CREATININE 0.93 08/03/2023    GLUC 103 08/03/2023    CALCIUM 9.3 08/03/2023     Lab Results   Component Value Date    INR 0.92 07/18/2023    PROTIME 12.5 07/18/2023       Current Facility-Administered Medications:   •  acetaminophen (TYLENOL) tablet 975 mg, 975 mg, Oral, Q8H [facility], LEV Turner, 975 mg at 08/03/23 0504  •  albuterol (PROVENTIL HFA,VENTOLIN HFA) inhaler 2 puff, 2 puff, Inhalation, Q6H PRN, LEV Turner  •  atorvastatin (LIPITOR) tablet 40 mg, 40 mg, Oral, Daily With Dinner, LEV Hunter, 40 mg at 08/02/23 1720  •  bisacodyl (DULCOLAX) rectal suppository 10 mg, 10 mg, Rectal, Daily PRN, LEV Turner  •  cephalexin (KEFLEX) capsule 500 mg, 500 mg, Oral, Q6H [facility], Amari Mcguire MD, 500 mg at 08/03/23 0505  •  clopidogrel (PLAVIX) tablet 75 mg, 75 mg, Oral, Daily, LEV Turner, 75 mg at 08/03/23 0905  •  diltiazem (CARDIZEM) tablet 120 mg, 120 mg, Oral, Daily, LEV Turner, 120 mg at 08/02/23 0827  •  docusate sodium (COLACE) capsule 100 mg, 100 mg, Oral, BID, LEV Turner, 100 mg at 08/03/23 4866  •  ezetimibe (ZETIA) tablet 10 mg, 10 mg, Oral, Daily, LEV Turner, 10 mg at 98/84/54 0089  •  folic acid (FOLVITE) tablet 1 mg, 1 mg, Oral, Daily, LEV Turner, 1 mg at 08/03/23 7663  •  gabapentin (NEURONTIN) capsule 600 mg, 600 mg, Oral, TID, LEV Turner, 600 mg at 08/03/23 2716  •  heparin (porcine) subcutaneous injection 5,000 Units, 5,000 Units, Subcutaneous, Q8H [facility], LEV Turner, 5,000 Units at 08/03/23 0505  •  isosorbide mononitrate (IMDUR) 24 hr tablet 30 mg, 30 mg, Oral, QA, LEV Turner, 30 mg at 08/02/23 0827  •  lamoTRIgine (LaMICtal) tablet 100 mg, 100 mg, Oral, Daily, LEV Turner, 100 mg at 08/03/23 9343  •  lisinopril (ZESTRIL) tablet 10 mg, 10 mg, Oral, Daily, [facility], LEV, 10 mg at 08/02/23 0827  •  melatonin tablet 9 mg, 9 mg, Oral, HS, LEV Turner, 9 mg at 08/02/23 2048  •  methocarbamol (ROBAXIN) tablet 500 mg, 500 mg, Oral, Q8H [address], Inge Headley MD, 500 mg at 08/03/23 0505  •  metoprolol succinate (TOPROL-XL) 24 hr tablet 50 mg, 50 mg, Oral, Daily, LEV Turner, 50 mg at 08/02/23 0827  •  nicotine (NICODERM CQ) 21 mg/24 hr TD 24 hr patch 1 patch, 1 patch, Transdermal, Daily, LEV Turner, 1 patch at 08/02/23 1011  •  ondansetron (ZOFRAN-ODT) dispersible tablet 4 mg, 4 mg, Oral, Q6H PRN, LEV Turner  •  oxyCODONE (ROXICODONE) IR tablet 5 mg, 5 mg, Oral, Q4H PRN, 5 mg at 08/03/23 0038 **OR** oxyCODONE (ROXICODONE) immediate release tablet 10 mg, 10 mg, Oral, Q4H PRN, Inge Headley MD, 10 mg at 08/03/23 3133  •  polyethylene glycol (MIRALAX) packet 17 g, 17 g, Oral, Daily PRN, LEV Turner  •  rOPINIRole (REQUIP) tablet 0.5 mg, 0.5 mg, Oral, HS, Татьяна Newer, LEV, 0.5 mg at 08/02/23 2101  •  saccharomyces boulardii (FLORASTOR) capsule 250 mg, 250 mg, Oral, BID, Michelle Duke MD, 250 mg at 08/03/23 6668  •  traZODone (DESYREL) tablet 50 mg, 50 mg, Oral, HS PRN, LEV Turner, 50 mg at 08/03/23 0038    Past Medical History:   Diagnosis Date   • Aorto-iliac disease ([address])    • Atrial fibrillation ([address])    • CAD (coronary artery disease)    • Carotid stenosis, bilateral    • Celiac artery stenosis (HCC)    • CKD (chronic kidney disease) stage 3, GFR 30-59 ml/min ([facility])    • DVT (deep venous thrombosis) ([facility])     LLE (CFV, popl)   • Heart attack ([address]) 02/2022   • HLD (hyperlipidemia)    • Hypertension    • Lung mass     RUL   • Myocardial infarction ([address])     2022   • COURTNEY (obstructive sleep apnea)    • PAD (peripheral artery disease) ([address])    • Stroke [facility])        Patient Active Problem List    Diagnosis Date Noted   • Hx of AKA (above knee amputation), left ([address]) 07/25/2023   • PAD (peripheral artery disease) ([address]) 06/23/2014   • Tobacco abuse 01/02/2023   • Nodule of upper lobe of right lung 06/14/2023   • CAD (coronary artery disease) 06/23/2014   • Stage 3 chronic kidney disease, unspecified whether stage 3a or 3b CKD ([address]) 01/09/2023   • PAF (paroxysmal atrial fibrillation) ([address]) 01/09/2023   • Essential hypertension 01/22/2016   • Mixed hyperlipidemia 12/27/2022   • Insomnia 02/10/2023   • At risk for venous thromboembolism (VTE) 07/28/2023   • Pain 07/28/2023   • Hypoxia 07/28/2023   • Urinary dysfunction 07/26/2023   • Dizziness 01/09/2023   • Aortoiliac occlusive disease ([address]) 12/28/2022   • Left leg swelling 11/08/2022   • Bipolar affective disorder, depressed, severe ([address]) 04/19/2017   • S/P vascular bypass 02/24/2017   • Hx of CABG 11/30/2016   • Presence of IVC filter 11/30/2016   • Thyroid nodule 01/22/2016   • Renal artery stenosis ([address]) 06/23/2014          LVE Osei  Physical Medicine and [place]

## 2023-08-03 NOTE — PROGRESS NOTES
08/03/23 1230   Pain Assessment   Pain Assessment Tool 0-10   Pain Score 8   Pain Location/Orientation Orientation: Left; Location: Leg   Hospital Pain Intervention(s) Rest;Elevated  (spoke with RN, pt requesting medication)   Restrictions/Precautions   Precautions Fall Risk;Bed/chair alarms;Pain;Supervision on toilet/commode   LLE Weight Bearing Per Order NWB   Subjective   Subjective stating left leg feels "weak and heavy this afternoon" with pain much worse. Frustrated at end of session with ace wrap coming off again. will ask OT next session to attempt anchoring around waist   Roll Left and Right   Type of Assistance Needed Independent   Roll Left and Right CARE Score 6   Sit to Lying   Type of Assistance Needed Independent   Sit to Lying CARE Score 6   Lying to Sitting on Side of Bed   Type of Assistance Needed Independent   Lying to Sitting on Side of Bed CARE Score 6   Sit to Stand   Type of Assistance Needed Supervision   Sit to Stand CARE Score 4   Bed-Chair Transfer   Type of Assistance Needed Supervision; Adaptive equipment   Comment with and w/o RW performing SPT   Chair/Bed-to-Chair Transfer CARE Score 4   Car Transfer   Type of Assistance Needed Supervision; Adaptive equipment   Physical Assistance Level No physical assistance   Comment SPT with and W/o RW   Car Transfer CARE Score 4   Wheel 50 Feet with Two Turns   Type of Assistance Needed Independent   Wheel 50 Feet with Two Turns CARE Score 6   Wheel 150 Feet   Type of Assistance Needed Independent   Wheel 150 Feet CARE Score 6   Curb or Single Stair   Style negotiated Single stair   Type of Assistance Needed Physical assistance; Adaptive equipment   Physical Assistance Level 25% or less   Comment hopped up 1 step bkwd with RW, stabilizing RW and guardig for balance, then sat in chair on 2nd step   1 Step (Curb) CARE Score 3   4 Steps   Reason if not Attempted Safety concerns   4 Steps CARE Score 88   12 Steps   Reason if not Attempted Safety concerns   12 Steps CARE Score 80   Stairs   Findings Practiced with 2 grandsons present, chair on 2nd step and pt hopping bkwd onto 1st step with RW. She could enter through her garage at home. Also practiced bumping in w/c with two person assist.   Therapeutic Interventions   Flexibility prone lying x 15 min with MiN A to attain position   Other Comments   Comments residual limb wrapping performed again during session, plan to try anchoring around waist next sesson with OTR   Assessment   Treatment Assessment Pt seen for training with her grandsons, see below for further details. Pt doing very well with all transfers, however noted with increased pain during session she was more impulsive, anxious with decreased focus/safety. Family made aware. Educated pt and family that w/c is how she should get around house and only hopping when necessary for now. Progressing well overall. Family/Caregiver Present yes   PT Family training done with: GrandpedroAntonio present for training. They will be home during the day to assist as needed. Reviewed safety; making sure brakes are locked on chair with transfers, asisting on steps to eneter house, how to manage w/c with folding/unfolding, brakes, armrests and anti-tippers if they need to bump her up. They acknowledge understanding and are physically able to assist.   Plan   Progress Progressing toward goals   PT Therapy Minutes   PT Time In 1230   PT Time Out 1330   PT Total Time (minutes) 60   PT Mode of treatment - Individual (minutes) 60   PT Mode of treatment - Concurrent (minutes) 0   PT Mode of treatment - Group (minutes) 0   PT Mode of treatment - Co-treat (minutes) 0   PT Mode of Treatment - Total time(minutes) 60 minutes   PT Cumulative Minutes 720   Therapy Time missed   Time missed?  No

## 2023-08-03 NOTE — PROGRESS NOTES
Progress Note - Vascular Surgery     Assessment/Plan:  68year old female s/p left AKA. Escalate antibiotics to cefepime and vanco. No appreciable fluid collection for cultures. Remainder of care per primary team.    Subjective:  68year old female s/p left AKA 7/21/23, with PMH of CAD, HTN, p. A. Fib, DVT/IVC filter, tobacco usage, COPD, CKD II, R perihilar mass, bipolar disease. Patient states her leg hurts worse today and she is having some difficulty moving it due to pain. Vitals:  BP (!) 175/81 (BP Location: Left arm)   Pulse 67   Temp 98.2 °F (36.8 °C) (Oral)   Resp 16   Ht 5' 4" (1.626 m)   Wt 74.2 kg (163 lb 9.3 oz)   SpO2 95%   BMI 28.08 kg/m²     I/Os:  I/O last 3 completed shifts: In: 240 [P.O.:240]  Out: 4100 [Urine:4100]  I/O this shift:   In: 480 [P.O.:480]  Out: 600 [Urine:600]    Lab Results and Cultures:   Lab Results   Component Value Date    WBC 9.17 08/03/2023    HGB 12.5 08/03/2023    HCT 38.8 08/03/2023    MCV 93 08/03/2023     08/03/2023     Lab Results   Component Value Date    CALCIUM 9.3 08/03/2023    K 4.9 08/03/2023    CO2 26 08/03/2023     (H) 08/03/2023    BUN 18 08/03/2023    CREATININE 0.93 08/03/2023     Lab Results   Component Value Date    INR 0.92 07/18/2023    PROTIME 12.5 07/18/2023        Blood Culture: No results found for: "BLOODCX",   Urinalysis: No results found for: "COLORU", "CLARITYU", "SPECGRAV", "PHUR", "LEUKOCYTESUR", "NITRITE", "PROTEINUA", "GLUCOSEU", "Clotilda Loud", "Coye Balding", "BLOODU",   Urine Culture: No results found for: "URINECX",   Wound Culure: No results found for: "WOUNDCULT"    Medications:  Current Facility-Administered Medications   Medication Dose Route Frequency   • acetaminophen (TYLENOL) tablet 975 mg  975 mg Oral Q8H [address]   • albuterol (PROVENTIL HFA,VENTOLIN HFA) inhaler 2 puff  2 puff Inhalation Q6H PRN   • atorvastatin (LIPITOR) tablet 40 mg  40 mg Oral Daily With Dinner   • bisacodyl (DULCOLAX) rectal suppository 10 mg  10 mg Rectal Daily PRN   • cefepime (MAXIPIME) 2 g/50 mL dextrose IVPB  2,000 mg Intravenous Q12H   • clopidogrel (PLAVIX) tablet 75 mg  75 mg Oral Daily   • diltiazem (CARDIZEM) tablet 120 mg  120 mg Oral Daily   • docusate sodium (COLACE) capsule 100 mg  100 mg Oral BID   • ezetimibe (ZETIA) tablet 10 mg  10 mg Oral Daily   • folic acid (FOLVITE) tablet 1 mg  1 mg Oral Daily   • gabapentin (NEURONTIN) capsule 600 mg  600 mg Oral TID   • heparin (porcine) subcutaneous injection 5,000 Units  5,000 Units Subcutaneous Q8H [address]   • isosorbide mononitrate (IMDUR) 24 hr tablet 30 mg  30 mg Oral QAM   • lamoTRIgine (LaMICtal) tablet 100 mg  100 mg Oral Daily   • lisinopril (ZESTRIL) tablet 10 mg  10 mg Oral Daily   • melatonin tablet 9 mg  9 mg Oral HS   • methocarbamol (ROBAXIN) tablet 500 mg  500 mg Oral Q8H [address]   • metoprolol succinate (TOPROL-XL) 24 hr tablet 50 mg  50 mg Oral Daily   • nicotine (NICODERM CQ) 21 mg/24 hr TD 24 hr patch 1 patch  1 patch Transdermal Daily   • ondansetron (ZOFRAN-ODT) dispersible tablet 4 mg  4 mg Oral Q6H PRN   • oxyCODONE (ROXICODONE) IR tablet 5 mg  5 mg Oral Q4H PRN    Or   • oxyCODONE (ROXICODONE) immediate release tablet 10 mg  10 mg Oral Q4H PRN   • polyethylene glycol (MIRALAX) packet 17 g  17 g Oral Daily PRN   • rOPINIRole (REQUIP) tablet 0.5 mg  0.5 mg Oral HS   • saccharomyces boulardii (FLORASTOR) capsule 250 mg  250 mg Oral BID   • traZODone (DESYREL) tablet 50 mg  50 mg Oral HS PRN   • vancomycin (VANCOCIN) IVPB (premix in dextrose) 1,000 mg 200 mL  15 mg/kg Intravenous Q12H       Imaging:  n/a  Physical Exam:    General appearance: alert and oriented, in no acute distress  Skin: incision erythematous as noted in pic  Neurologic: Grossly normal  Head: Normocephalic, without obvious abnormality, atraumatic  Eyes: EOMI  Lungs: No resp distress  Heart: No edema  Abdomen: soft, NT  Extremities: LLE AKA with erythema at surgical site    Wound/Incision:  See media tab for today's pictures     Lauro Kate PA-C  8/3/2023

## 2023-08-03 NOTE — WOUND OSTOMY CARE
Progress Note - Wound   Jesu Spray 68 y.o. female MRN: [id number]  Unit/Bed#: -01 Encounter: [id number]        Assessment:   Patient seen today for wound care follow up assessment. Patient in bed, continent of bowel and bladder. Patient is min assist with turning from side to side. Patient is independent with meals. 1. POA DTI left medial thigh- purple, intact, nonblanchable erythema, no drainage present. Improved with this week's assessment. DTI's have the potential to evolve into full thickness unstageable, stage III, or stage IV wounds. No induration, fluctuance, odor, warmth/temperature differences, redness, or purulence noted to the above noted wounds and skin areas assessed. New dressings applied per orders listed below. Patient tolerated well- no s/s of non-verbal pain or discomfort observed during the encounter. Bedside nurse aware of plan of care. See flow sheets for more detailed assessment findings. Wound care will continue to follow. Skin Care Plan:  1-Left Medial Thigh Wound: Cleanse with NS and pat dry. Apply small clover allevyn foam dressing to area. Ricky with T for Treatment and change every other day or PRN soilage/ displacement. 2-Turn/reposition q2h or when medically stable for pressure re-distribution on skin . 3-Elevate left heel to offload pressure. 4-Moisturize skin daily with skin nourishing cream  5-Ehob cushion in chair when out of bed. 6-Preventative Hydraguard to right heel and b/l sacro-buttocks BID and PRN. Wound 07/26/23 Pressure Injury Thigh Left;Medial (Active)   Wound Image   08/03/23 1141   Wound Description Non-blanchable erythema 08/03/23 1141   Pressure Injury Stage DTPI 08/03/23 1141   Julia-wound Assessment Clean;Dry; Intact 08/03/23 1141   Wound Length (cm) 0.4 cm 08/03/23 1141   Wound Width (cm) 1.5 cm 08/03/23 1141   Wound Depth (cm) 0 cm 08/03/23 1141   Wound Surface Area (cm^2) 0.6 cm^2 08/03/23 1141   Wound Volume (cm^3) 0 cm^3 08/03/23 1141   Calculated Wound Volume (cm^3) 0 cm^3 08/03/23 1141   Tunneling 0 cm 08/03/23 1141   Tunneling in depth located at 0 08/03/23 1141   Undermining 0 08/03/23 1141   Undermining is depth extending from 0 08/03/23 1141   Wound Site Closure MORENO 08/03/23 1141   Drainage Amount None 08/03/23 1141   Non-staged Wound Description Not applicable 73/79/96 1657   Treatments Cleansed 08/03/23 1141   Dressing Foam, Silicon (eg. Allevyn, etc) 08/03/23 1141   Wound packed? No 08/03/23 1141   Packing- # removed 0 08/03/23 1141   Packing- # inserted 0 08/03/23 1141   Dressing Changed New 08/03/23 1141   Patient Tolerance Tolerated well 08/03/23 1141   Dressing Status Clean;Dry; Intact 08/03/23 1141       Veronica ADRIANN, RN, [facility] [employer]

## 2023-08-03 NOTE — PROGRESS NOTES
Internal Medicine Progress Note  Patient: Madhuri Tavera  Age/sex: 68 y.o. female  Medical Record #: [id number]      ASSESSMENT/PLAN: (Interval History)  Madhuri Tavera is seen and examined and management for following issues:    S/p left AKA 7/21/23  • Hx severe PAD with B/L SC to FA bypass in past = has TO left fem-pop BPG  • Home:  Plavix/Crestor 20mg qd/Zetia 10mg qd  • Here:  Plavix/Zetia/Lipitor 40mg qd  • Pain/phantom pain management as per PMR  • Started 7/30/23 on Keflex for 7 days d/t erythema     Coronary artery disease  • S/p CABG in 2000  • TTE on 1/26/2023 with EF of 60%, mild to moderate AI, mild AS, estimated PA pressure of 40 mmHg  • Nuclear pharmacological stress test on 1/26/2023 with anterior wall perfusion defect likely artifact/cannot exclude small area of ischemia, normal LV systolic function. • Continue Plavix/Zetia 10mg qd/Imdur     Hypertension  • Home: Diltiazem 120 mg qd/Amlodipine 5 mg qd/Imdur 30 mg qd/Lisinopril 10 mg/ Toprol-XL 50 mg qd  • Here:  Diltiazem 120mg qd/Imdur 30mg qd/Linsinopril 10mg qd (hold SBP <130)/Toprol 50mg qd  • BP stable.     Hyperlipidemia  • Continue atorvastatin and Zetia here  • Follow-up with outpatient cardiology for possible Repatha     Paroxysmal atrial fibrillation  • Currently in sinus rhythm  • S/p a 2-week Zio patch in March 2023 with 5 episodes of SVT without other significant arrhythmia  • Continue Diltiazem 120 mg daily, Toprol XL 50 mg daily     DVT/IVC filter  • Hx lower extremity DVT/IVC filter  • IVC filter is still present on PET scan 7/7/2023     Tobacco use  • Continues to smoke 1 to 1.5 packs/day  • Did not tolerate Wellbutrin/Chantix in the past  • Counseled for smoking cessation but not ready to quit  • Nicotine patch here 21 mg     COPD  • CT chest with evidence of emphysema  • PFT in June 2023:  Moderate obstruction, no reversibility with bronchodilator   • Was recommended Trelegy and rescue inhaler, but she didn't want it; using Albuterol prn. • Continues on RA with O2 sats 91-92%. • Recommend outpatient follow-up with pulmonology and smoking cessation     Right perihilar mass   • On CT imaging 7/18/23  • Planned for biopsy on 8/14 by thoracic surgeon    Restless leg  · Started ropinirole 8/1/23  · Not new for her she has taken OTC things in the past  · Improved will monitor whether or not it needs to be titrated     CKD II  • Creatinine stable at 0.86  • Has likely nonfunctioning right kidney  • Avoid nephrotoxic agents, hypotension     Bipolar disease  • continue Lamictal 100 mg daily  • Stable        Discharge date:  Team    The above assessment and plan was reviewed and updated as determined by my evaluation of the patient on 8/3/2023.     Labs:   Results from last 7 days   Lab Units 08/03/23  0602 07/31/23  0516   WBC Thousand/uL 9.17 10.45*   HEMOGLOBIN g/dL 12.5 12.2   HEMATOCRIT % 38.8 37.9   PLATELETS Thousands/uL 304 281     Results from last 7 days   Lab Units 08/03/23  0602 07/31/23  0516   SODIUM mmol/L 140 141   POTASSIUM mmol/L 4.9 4.5   CHLORIDE mmol/L 109* 112*   CO2 mmol/L 26 25   BUN mg/dL 18 16   CREATININE mg/dL 0.93 0.86   CALCIUM mg/dL 9.3 9.1                   Review of Scheduled Meds:  Current Facility-Administered Medications   Medication Dose Route Frequency Provider Last Rate   • acetaminophen  975 mg Oral Q8H [facility] & half-way LEV Turner     • albuterol  2 puff Inhalation Q6H PRN LEV Turner     • atorvastatin  40 mg Oral Daily With LEV Marcano     • bisacodyl  10 mg Rectal Daily PRN LEV Alford     • cephalexin  500 mg Oral Q6H [facility] Any Moreno MD     • clopidogrel  75 mg Oral Daily LEV Turner     • diltiazem  120 mg Oral Daily LEV Turner     • docusate sodium  100 mg Oral BID LEV Turner     • ezetimibe  10 mg Oral Daily LEV Turner     • folic acid  1 mg Oral Daily LEV Turner     • gabapentin  600 mg Oral TID LEV Alford • heparin (porcine)  5,000 Units Subcutaneous Q8H [address] LEV Turner     • isosorbide mononitrate  30 mg Oral QAM LEV Turner     • lamoTRIgine  100 mg Oral Daily LEV Turner     • lisinopril  10 mg Oral Daily LEV Giles     • melatonin  9 mg Oral HS LEV Turner     • methocarbamol  500 mg Oral [facility] Paras Ruiz MD     • metoprolol succinate  50 mg Oral Daily LEV Turner     • nicotine  1 patch Transdermal Daily LEV Turner     • ondansetron  4 mg Oral Q6H PRN LEV Gallegos     • oxyCODONE  5 mg Oral Q4H PRN Paras Ruiz MD      Or   • oxyCODONE  10 mg Oral Q4H PRN Paras Ruiz MD     • polyethylene glycol  17 g Oral Daily PRN LEV Turner     • rOPINIRole  0.5 mg Oral HS LEV Galdamez     • saccharomyces boulardii  250 mg Oral BID Emerita Garcia MD     • traZODone  50 mg Oral HS PRN LEV Gallegos         Subjective/ HPI: Patient seen and examined. Patients overnight issues or events were reviewed with nursing or staff during rounds or morning huddle session. New or overnight issues include the following:     Pt seen in her room. She states that she is doing well. She denies any current complaints. ROS:   A 10 point ROS was performed; negative except as noted above. Imaging:     XR femur 2 vw left   Final Result by Anderson Thompson MD (07/31 1550)      Postsurgical changes in left above-the-knee amputation. No x-ray findings suggesting osteomyelitis at this time. Resident: Angel Rao, the attending radiologist, have reviewed the images and agree with the final report above.       Workstation performed: [employer]  /Radiology studies reviewed  *Medications reviewed and reconciled as needed  *Please refer to order section for additional ordered labs studies  *Case discussed with primary attending during morning huddle case rounds    Physical Examination:  Vitals:   Vitals:    08/02/23 1457 08/02/23 1530 08/02/23 2020 08/03/23 0620   BP: 106/57 111/51 101/58 104/60   BP Location: Left arm  Right arm Right arm   Pulse: 58 59 60 61   Resp: 17 20 18   Temp: 98.2 °F (36.8 °C) 97.7 °F (36.5 °C) 97.8 °F (36.6 °C) 98.1 °F (36.7 °C)   TempSrc: Oral Oral Tympanic Oral   SpO2: 90% 94% 95% 91%   Weight:       Height:           GEN: NAD; pleasant, interactive  NEURO: Alert and oriented x4; appropriate  HEENT: Pupils are equal/reactive; normocephalic, face is symmetrical, hearing is normal  CV: S1 S2 regular, no murmur/rub/gallops, 1/4 pedal pulse RLE, no LE edema present  RESP: Lungs are clear bilaterally, no wheezes rales or rhonchi, on room air, no distress, respirations are easy and non labored  GI: Abdomen is obese, soft, non tender, +BS x4; non distended  : Voiding without issues  MUSC: Moves all extremities except LLE amputation  SKIN: pink, warm, normal turgor, L stump dressing intact; refer to images       The above physical exam was reviewed and updated as determined by my evaluation of the patient on 8/3/2023. Invasive Devices     None                    VTE Pharmacologic Prophylaxis: Heparin  Code Status: Level 1 - Full Code  Current Length of Stay: 9 day(s)      Total time spent:  30 minutes with more than 50% spent counseling/coordinating care. Counseling includes discussion with patient re: progress  and discussion with patient of his/her current medical state/information. Coordination of patient's care was performed in conjunction with primary service. Time invested included review of patient's labs, vitals, and management of their comorbidities with continued monitoring. In addition, this patient was discussed with medical team including physician and advanced extenders. The care of the patient was extensively discussed and appropriate treatment plan was formulated unique for this patient.  Medical decision making for the day was made by supervising physician unless otherwise noted in their attestation statement. ** Please Note:  voice to text software may have been used in the creation of this document.  Although proof errors in transcription or interpretation are a potential of such software**

## 2023-08-04 PROCEDURE — 97530 THERAPEUTIC ACTIVITIES: CPT

## 2023-08-04 PROCEDURE — 97535 SELF CARE MNGMENT TRAINING: CPT

## 2023-08-04 PROCEDURE — 99232 SBSQ HOSP IP/OBS MODERATE 35: CPT | Performed by: INTERNAL MEDICINE

## 2023-08-04 PROCEDURE — 97110 THERAPEUTIC EXERCISES: CPT

## 2023-08-04 PROCEDURE — 99232 SBSQ HOSP IP/OBS MODERATE 35: CPT | Performed by: STUDENT IN AN ORGANIZED HEALTH CARE EDUCATION/TRAINING PROGRAM

## 2023-08-04 RX ADMIN — HEPARIN SODIUM 5000 UNITS: 5000 INJECTION INTRAVENOUS; SUBCUTANEOUS at 21:46

## 2023-08-04 RX ADMIN — HEPARIN SODIUM 5000 UNITS: 5000 INJECTION INTRAVENOUS; SUBCUTANEOUS at 13:47

## 2023-08-04 RX ADMIN — OXYCODONE HYDROCHLORIDE 5 MG: 5 TABLET ORAL at 01:49

## 2023-08-04 RX ADMIN — Medication 250 MG: at 09:06

## 2023-08-04 RX ADMIN — EZETIMIBE 10 MG: 10 TABLET ORAL at 09:05

## 2023-08-04 RX ADMIN — GABAPENTIN 600 MG: 300 CAPSULE ORAL at 15:02

## 2023-08-04 RX ADMIN — ACETAMINOPHEN 975 MG: 325 TABLET, FILM COATED ORAL at 06:00

## 2023-08-04 RX ADMIN — NICOTINE 1 PATCH: 21 PATCH, EXTENDED RELEASE TRANSDERMAL at 09:07

## 2023-08-04 RX ADMIN — OXYCODONE HYDROCHLORIDE 10 MG: 10 TABLET ORAL at 15:02

## 2023-08-04 RX ADMIN — DOCUSATE SODIUM 100 MG: 100 CAPSULE ORAL at 17:04

## 2023-08-04 RX ADMIN — ROPINIROLE 0.5 MG: 0.25 TABLET, FILM COATED ORAL at 21:45

## 2023-08-04 RX ADMIN — OXYCODONE HYDROCHLORIDE 10 MG: 10 TABLET ORAL at 20:03

## 2023-08-04 RX ADMIN — OXYCODONE HYDROCHLORIDE 5 MG: 5 TABLET ORAL at 05:59

## 2023-08-04 RX ADMIN — LAMOTRIGINE 100 MG: 100 TABLET ORAL at 09:06

## 2023-08-04 RX ADMIN — CLOPIDOGREL BISULFATE 75 MG: 75 TABLET ORAL at 09:06

## 2023-08-04 RX ADMIN — METHOCARBAMOL 500 MG: 500 TABLET ORAL at 05:59

## 2023-08-04 RX ADMIN — LISINOPRIL 10 MG: 10 TABLET ORAL at 09:05

## 2023-08-04 RX ADMIN — METHOCARBAMOL 500 MG: 500 TABLET ORAL at 13:48

## 2023-08-04 RX ADMIN — GABAPENTIN 600 MG: 300 CAPSULE ORAL at 21:46

## 2023-08-04 RX ADMIN — GABAPENTIN 600 MG: 300 CAPSULE ORAL at 09:06

## 2023-08-04 RX ADMIN — Medication 250 MG: at 17:04

## 2023-08-04 RX ADMIN — ISOSORBIDE MONONITRATE 30 MG: 30 TABLET, EXTENDED RELEASE ORAL at 09:06

## 2023-08-04 RX ADMIN — DILTIAZEM HYDROCHLORIDE 120 MG: 60 TABLET, FILM COATED ORAL at 09:06

## 2023-08-04 RX ADMIN — ACETAMINOPHEN 975 MG: 325 TABLET, FILM COATED ORAL at 21:46

## 2023-08-04 RX ADMIN — CEFEPIME 2000 MG: 2 INJECTION, POWDER, FOR SOLUTION INTRAVENOUS at 09:04

## 2023-08-04 RX ADMIN — HEPARIN SODIUM 5000 UNITS: 5000 INJECTION INTRAVENOUS; SUBCUTANEOUS at 05:59

## 2023-08-04 RX ADMIN — ATORVASTATIN CALCIUM 40 MG: 40 TABLET, FILM COATED ORAL at 15:03

## 2023-08-04 RX ADMIN — FOLIC ACID 1 MG: 1 TABLET ORAL at 09:05

## 2023-08-04 RX ADMIN — VANCOMYCIN HYDROCHLORIDE 750 MG: 750 INJECTION, SOLUTION INTRAVENOUS at 09:15

## 2023-08-04 RX ADMIN — VANCOMYCIN HYDROCHLORIDE 750 MG: 750 INJECTION, SOLUTION INTRAVENOUS at 22:21

## 2023-08-04 RX ADMIN — ACETAMINOPHEN 975 MG: 325 TABLET, FILM COATED ORAL at 13:47

## 2023-08-04 RX ADMIN — MELATONIN 9 MG: at 21:46

## 2023-08-04 RX ADMIN — CEFEPIME 2000 MG: 2 INJECTION, POWDER, FOR SOLUTION INTRAVENOUS at 21:34

## 2023-08-04 RX ADMIN — METHOCARBAMOL 500 MG: 500 TABLET ORAL at 21:45

## 2023-08-04 RX ADMIN — METOPROLOL SUCCINATE 50 MG: 50 TABLET, EXTENDED RELEASE ORAL at 09:06

## 2023-08-04 NOTE — PROGRESS NOTES
Kelly Abrams is a 68 y.o. female who is currently ordered Vancomycin IV with management by the Pharmacy Consult service. Relevant clinical data and objective / subjective history reviewed. Vancomycin Assessment:  Indication and Goal AUC/Trough: Soft tissue (goal -600, trough >10), -600, trough >10  Clinical Status: stable  Micro: No current results  Renal Function:  SCr: 0.93 mg/dL  CrCl: 54 mL/min  Renal replacement: Not on dialysis  Days of Therapy: 2  Current Dose: 750 mg IV Q12H  Vancomycin Plan:  Estimated AUC: 521 mcg*hr/mL  Estimated Trough: 17.6 mcg/mL  Next Level: 8-5-23 at 0600  Renal Function Monitoring: Daily BMP and [place] will continue to follow closely for s/sx of nephrotoxicity, infusion reactions and appropriateness of therapy. BMP and CBC will be ordered per protocol. We will continue to follow the patient’s culture results and clinical progress daily.     Rajwinder Aldana, Pharmacist

## 2023-08-04 NOTE — PROGRESS NOTES
PM&R PROGRESS NOTE:  Alyse Delgado 68 y.o. female MRN: [id number]  Unit/Bed#: -01 Encounter: [id number]    Rehabilitation Diagnosis: Impairment of mobility, safety and Activities of Daily Living (ADLs) due to Amputation:  05.3  Unilateral Lower Limb Above the Knee    HPI: Rolando Gonzalez is a 68 y.o. female with a medical history of CAD, hypertension, CABG, vascular bypass surgeries, PAD, aortoiliac occlusive disease, tobacco abuse (1 ppd x 60+yrs), PAF/SVT (no AC), LLE DVT s/p IVC filter, RUL lung mass, stage III CKD who presented to [address] [place] [address] on 07/18 with left foot pain. Patient states that she noticed that she had increased pain over the left foot in approximately 2 days ago and is acutely worse over the last 24 hours, accompanied by numbness, and color change of her foot. On exam, patient's left foot is bluish discoloration, swollen, no palpable or doppler DP or PT pulses. Patient still has motor of her foot (is able to wiggle toes, move her foot up and down) but sensation is significantly decreased, is able to feel slight pressure. CT angiogram with multiple inflow graft occlusions on the left. The right to left component of the right axillobifemoral graft is occluded. The left axillary to profunda graft is occluded and a segment is excised. The aorta to left common femoral and jump graft from the alex of the common femoral artery anastomosis to below-knee popliteal artery is also occluded. The deep femoral artery beyond the previous anastomosis does reconstitute but is secondary or tertiary branch that is small at less than 2 mm. There are no other named vessels that reconstitute within the upper leg. There does appear to be reconstitution of tibial vessels beyond the trifurcation but again these vessels are less than 2 mm in diameter. Vascular surgery was consulted, recommending L AKA.  Patient was placed on heparin gtt and APS was consulted, patient now on oral analgesics. On 07/21 patient underwent a left AKA with Dr. Elpidio Gaston, EBL minimal. Patient is non-weight bearing left lower extremity. PT/OT were consulted and recommend inpatient acute rehabilitation. PT/OT were consulted and recommend acute inpatient rehabilitation. The patient was evaluated by the Rehabilitation team and deemed an appropriate candidate for comprehensive inpatient rehabilitation and admitted to the [address] [place] [address] on 7/25/2023  2:33 PM       SUBJECTIVE: Patient seen face to face. No acute issues overnight. Slept well, pain well controlled today. Antibiotics escalated, discussed plan per vascular surgery to watch and make decision after a few days. She feels less anxious today knowing there is a plan and she is receiving treatment. Visualized incision/performed dressing change. Good appetite, voiding, LBM 8/3. Denies chest pain, shortness of breath, fever, chills, N/V, abdominal pain. ASSESSMENT: Stable, progressing    PLAN:  -surgical incision: erythema, small amount bloody drainage on dressing from medial aspect of incision. VS escalated antibiotics to vancomycin/cefepime, plan to watch for few days before final plan. - pain: well managed today, continue with current regimen    Rehabilitation  • Functional deficits:  LLE NWB, mobility, self-care  • Continue current rehabilitation plan of care to maximize function.     • Functional update:   • PT: mobility- independent, transfers- supervision, ambulation- RW 10', wheelchair mobility- independent 150', stairs- min A 1   • OT: ADL: bathing- incidental touching-supervision, dressing- UB supervision LB min A, footwear- supervision, toileting- supervision  • Estimated Discharge: anticipated 8/9 with home RN, PT, OT    Pain  • Left leg  • Tylenol 975 mg every 8 hours   • Gabapentin 300 mg BID, 400 mg hs  • Robaxin 500 mg every 6 hours  • Oxycodone 5-10 mg every 4 hours prn    DVT prophylaxis  • Heparin sc    Bladder plan  • incontinent    Bowel plan  • Continent  • Colace 100 mg BID  • Miralax daily prn  • Bisacodyl suppository daily prn      * Hx of AKA (above knee amputation), left [facility])  Assessment & Plan  - 7/18 pt admitted with increased foot pain over 2 days with change of color and numbness found to have critical limb ischemia   -CT angiogram: multiple inflow graft occlusions on the left. The right to left component of the right axillobifemoral graft is occluded. The left axillary to profunda graft is occluded and a segment is excised. The aorta to left common femoral and jump graft from the alex of the common femoral artery anastomosis to below-knee popliteal artery is also occluded. The deep femoral artery beyond the previous anastomosis does reconstitute but is secondary or tertiary branch that is small at less than 2 mm. There are no other named vessels that reconstitute within the upper leg. There does appear to be reconstitution of tibial vessels beyond the trifurcation but again these vessels are less than 2 mm in diameter.  - Vascular surgery s/p AKA 7/21 by Dr. Hilton Mendoza  - Plavix, optimal BP control, statin  - NWB LLE  - Monitor CBC intermittently   - Optimal ROM to avoid/decrease risk of contractures  - Monitor incision/area for infection or dehiscence/impaired healing    - 7/27 slight latia-incisional erythema - recommended [facility] sx c/s who saw patient on 7/27 and reported no concerns for infection at that    - thigh DTI possibly from overly tight ace wrap that was POA to [facility] - wound care c/s 7/27 - allevyn foam dressing; use Kerlex only for a few days and then go back to ACE but avoid overly tightening   - 7/28 slight latia-incisional erythema again today but stable without increased warmth, tenderness, or drainage - continue close monitoring , started on Keflex and probiotic continue to monitor with daily incisional site imaging/media. Discussed with nursing   - 8/3 increased latia-incisional erythema, Vascular surgery made aware. Antibiotics escalated to vancomycin and cefepime. VS monitoring for few days before final decision on discharge plan. Continue daily dressing changes. - It is normal to have some swelling or discoloration around the incision initially post-operatively. If develops increasing redness, tenderness/pain, discharge, or dehiscence develops contact surgical service   - Wound care of incision site per vasc sx   - WBC stable mildly elevated at 10 K on 7/27 > monitor intermittently  - Monitor for signs/symptoms of VTE, atelectasis, acute blood loss anemia, or other infection   - Patient (if applicable caregiver) training and education on amputation, possible phantom symptoms, recovery process  - Neuropsychology consult, supportive counseling  - Optimal pain control  - Monitor contralateral limb closely for concerning s/s   - Fall Precautions   - Tolerating ARC setting adequately   - Continue acute comprehensive interdisciplinary inpatient rehabilitation to include intensive skilled therapies (PT, OT) as outlined with oversight and management by rehabilitation physician as well as inpatient rehab level nursing, case management and weekly interdisciplinary team meetings.    - Follow-up with PMR and Vas Sx after discharge    PAD (peripheral artery disease) ([address])  Assessment & Plan  - hx of PAD, carotid stenosis   - [facility] [address]- right renal artery bypass 2000  - 8/6/2014 femoral/popliteal with stents and angioplasty, iliac arteries with stents and angioplasty and tibial peroneal artery angioplasty  -Right Fem to below-knee bypass occluded in 2010  - Fem-Fem bypass graft left to right occluded  -2/21/2017 right axillary to Bi-Fem bypass  -11/15/2022 lower extremity arterial ultrasound right lower limb shows 50-69% stenosis distal to the SFA with high-grade stenosis versus occlusion in the posterior tibial artery LUANN 0.53 (severe range), right Fem to below-knee bypass graft occluded, left lower limb patent CFA to posterior tibial bypass graft, LUANN 0.96  - 11/15/2022 abdominal aorta/iliac study showed occlusion of bilateral common and external iliac arteries, greater than 70% stenosis in the celiac artery, patent right axillo bifemoral arterial bypass graft  -11/15/2022 carotid ultrasound showed less than 50% bilateral carotid stenosis  - home: Plavix 75 mg, (previously on Xarelo 2.5 mg d/c d/t cost)  - here: plavix, statin, optimal BP control     Tobacco abuse  Assessment & Plan  - 1 1/2 ppd for 56 years  - nicotine patch  - tried Wellbutrin prescribed by vascular surgery for 1 week, however discontinued due to side effects  - encourage smoking cessation but is resistant     Nodule of upper lobe of right lung  Assessment & Plan  - newly diagnosed RUL lung mass  - PET/CT (7/7/23): marked increased metabolic activity in association with RUL nodule highly suggestive of neoplasm  - follows with hemonc Dr. Karissa Hall  - planned navigational bronchoscopy with biopsy for 7/31/23 with thoracic surgery, Dr. Minda Solis   - discussed with family who notified sx office and they rescheduled bx appt to 8/14    CAD (coronary artery disease)  Assessment & Plan  - s/p CABG 2020 [facility]  - TTE (1/26/23): EF 60%, mild dilated RV, mild-mod AI, mild AS/TR  - follows with Dr. Gonzalo Eid  - Plavix, ISMN, statin       Stage 3 chronic kidney disease, unspecified whether stage 3a or 3b CKD [facility])  Assessment & Plan  Lab Results   Component Value Date    EGFR 61 08/03/2023    EGFR 67 07/31/2023    EGFR 76 07/27/2023    CREATININE 0.93 08/03/2023    CREATININE 0.86 07/31/2023    CREATININE 0.78 07/27/2023     - Stable 7/27  - avoid nephrotoxic agents  - monitor BMP with routine labs  -consult IM for assist with management  - stable      PAF (paroxysmal atrial fibrillation) (HCC)  Assessment & Plan  - episode during previous hospitalization  - Zio patch (3/17/23) showed 5 episodes of SVT (longest 7 beats) 2 triggered events correlated with SR, no other significant arrhythmias  - home: Toprol XL 50 mg daily, diltiazem 120 mg daily  - Not on full A/C per prior providers   - continue here  - OP Cards follow-up     Essential hypertension  Assessment & Plan  - home: amlodipine 5 mg daily, diltiazem 120 mg daily, lisinopril 10 mg daily, Toprol-XL 50 mg daily  - here: continue   - monitor BP  - monitor electrolytes  - consult IM to assist w/ management    Mixed hyperlipidemia  Assessment & Plan  - home: rosuvastatin 20 mg daily, Zetia 10 mg daily  - here: Zetia 10 mg, atorvastatin 40 mg (therapeutic substitution)  - Lipid panel ([id number]/[id number]) total cholesterol 165, triglycerides 155, HDL 33,       Insomnia  Assessment & Plan  Improved   - here: Melatonin 9 mg 2000, Traz 50mg HS PRN  - On gabapentin 400mg HS for pain at night as well now   - if needed start low dose melatonin (apparently was on high dose at home)  - gabapentin also for pain   - Recommend appropriate sleep hygiene  - Patient counselled/will be counselled on this when appropriate       Hypoxia  Assessment & Plan  Off of oxygen so far today  Mild occasionally requiring 2L to SpO2 goal 90% or higher   Per IM  "-CTL with evidence of emphysema  -PFT in June 2023: Moderate obstruction, no reversibility with bronchodilator   -Was recommended Trelegy and rescue inhaler, has not initiated Trelegy as yet and does not plan to, doing albuterol as needed although patient, denies any shortness of breath at rest or exertion.  -Previously saturating 92% on 2 L of oxygen via nasal cannula.   - on room air saturating 90-92%, continue to monitor  -Recommend outpatient follow-up with pulmonology and smoking cessation"    Pain  Assessment & Plan  Stable overall; slept better again overnight  - Continue gabapentin to 600 mg TID   - APAP TID  - Oxy 5-10mg Q4H PRN; Hold for oversedation, AMS, or RR<12.  - Robaxin PRN Q8H      At risk for venous thromboembolism (VTE)  Assessment & Plan  Heparin     Urinary dysfunction  Assessment & Plan  - some urinary retention earlier now improved off scans but still watch for re-occurrence   - proactive toileting  - monitor for s/s of infection, retention     Bipolar affective disorder, depressed, severe (HCC)  Assessment & Plan  Mood stable   - home: lamictal 100 mg daily  - continue here  - neuropsychology consulted      Appreciate IM consultants medical co-management. Labs, medications, and imaging personally reviewed. ROS:  Review of Systems   A 10 point review of systems was negative except for what is noted in the HPI. OBJECTIVE:   /70 (BP Location: Right arm)   Pulse 63   Temp 98.7 °F (37.1 °C) (Oral)   Resp 20   Ht 5' 4" (1.626 m)   Wt 74.2 kg (163 lb 9.3 oz)   SpO2 92%   BMI 28.08 kg/m²     Physical Exam  Constitutional:       Appearance: Normal appearance. HENT:      Head: Normocephalic and atraumatic. Nose: Nose normal.      Mouth/Throat:      Mouth: Mucous membranes are moist.   Cardiovascular:      Rate and Rhythm: Normal rate and regular rhythm. Pulses: Normal pulses. Heart sounds: Normal heart sounds. Pulmonary:      Effort: Pulmonary effort is normal.      Breath sounds: Normal breath sounds. Abdominal:      General: Bowel sounds are normal.      Palpations: Abdomen is soft. Musculoskeletal:         General: Normal range of motion. Cervical back: Normal range of motion. Left lower leg: Edema present. Skin:     General: Skin is warm and dry. Capillary Refill: Capillary refill takes less than 2 seconds. Findings: Bruising and erythema (incision) present. Comments: + left thigh DTI   Neurological:      Mental Status: She is alert and oriented to person, place, and time.    Psychiatric:         Mood and Affect: Mood normal.         Judgment: Judgment normal.          Lab Results   Component Value Date    WBC 9.17 08/03/2023    HGB 12.5 08/03/2023    HCT 38.8 08/03/2023    MCV 93 08/03/2023     08/03/2023     Lab Results   Component Value Date    SODIUM 140 08/03/2023    K 4.9 08/03/2023     (H) 08/03/2023    CO2 26 08/03/2023    BUN 18 08/03/2023    CREATININE 0.93 08/03/2023    GLUC 103 08/03/2023    CALCIUM 9.3 08/03/2023     Lab Results   Component Value Date    INR 0.92 07/18/2023    PROTIME 12.5 07/18/2023       Current Facility-Administered Medications:   •  acetaminophen (TYLENOL) tablet 975 mg, 975 mg, Oral, Q8H [address], LEV Turner, 975 mg at 08/04/23 0600  •  albuterol (PROVENTIL HFA,VENTOLIN HFA) inhaler 2 puff, 2 puff, Inhalation, Q6H PRN, LEV Turner  •  atorvastatin (LIPITOR) tablet 40 mg, 40 mg, Oral, Daily With DinnerFernando CRNP, 40 mg at 08/03/23 1711  •  bisacodyl (DULCOLAX) rectal suppository 10 mg, 10 mg, Rectal, Daily PRN, LEV Turner  •  cefepime (MAXIPIME) 2 g/50 mL dextrose IVPB, 2,000 mg, Intravenous, Q12H, Prashant Ashton PA-C, Stopped at 08/04/23 1018  •  clopidogrel (PLAVIX) tablet 75 mg, 75 mg, Oral, Daily, LEV Turner, 75 mg at 08/04/23 0906  •  diltiazem (CARDIZEM) tablet 120 mg, 120 mg, Oral, Daily, LEV Turner, 120 mg at 08/04/23 0906  •  docusate sodium (COLACE) capsule 100 mg, 100 mg, Oral, BID, LEV Turner, 100 mg at 08/03/23 1809  •  ezetimibe (ZETIA) tablet 10 mg, 10 mg, Oral, Daily, LEV Turner, 10 mg at 62/17/20 3999  •  folic acid (FOLVITE) tablet 1 mg, 1 mg, Oral, Daily, LEV Turner, 1 mg at 08/04/23 5473  •  gabapentin (NEURONTIN) capsule 600 mg, 600 mg, Oral, TID, LEV Turner, 600 mg at 08/04/23 2831  •  heparin (porcine) subcutaneous injection 5,000 Units, 5,000 Units, Subcutaneous, Q8H [address] [facility], LEV Turner, 5,000 Units at 08/04/23 0559  •  isosorbide mononitrate (IMDUR) 24 hr tablet 30 mg, 30 mg, Oral, QAM, LEV Turner, 30 mg at 08/04/23 0906  •  lamoTRIgine (LaMICtal) tablet 100 mg, 100 mg, Oral, Daily, LEV Turner, 100 mg at 08/04/23 5492  •  lisinopril (ZESTRIL) tablet 10 mg, 10 mg, Oral, Daily, LEV Andres, 10 mg at 08/04/23 5281  •  melatonin tablet 9 mg, 9 mg, Oral, HS, LEV Turner, 9 mg at 08/03/23 2101  •  methocarbamol (ROBAXIN) tablet 500 mg, 500 mg, Oral, Q8H [address], Nader Ann MD, 500 mg at 08/04/23 0559  •  metoprolol succinate (TOPROL-XL) 24 hr tablet 50 mg, 50 mg, Oral, Daily, LEV Turner, 50 mg at 08/04/23 0906  •  nicotine (NICODERM CQ) 21 mg/24 hr TD 24 hr patch 1 patch, 1 patch, Transdermal, Daily, LEV Turner, 1 patch at 08/04/23 0907  •  ondansetron (ZOFRAN-ODT) dispersible tablet 4 mg, 4 mg, Oral, Q6H PRN, LEV Turner  •  oxyCODONE (ROXICODONE) IR tablet 5 mg, 5 mg, Oral, Q4H PRN, 5 mg at 08/04/23 0559 **OR** oxyCODONE (ROXICODONE) immediate release tablet 10 mg, 10 mg, Oral, Q4H PRN, Nader Ann MD, 10 mg at 08/03/23 0367  •  polyethylene glycol (MIRALAX) packet 17 g, 17 g, Oral, Daily PRN, LEV Turner  •  rOPINIRole (REQUIP) tablet 0.5 mg, 0.5 mg, Oral, HS, LEV Galdamez, 0.5 mg at 08/03/23 2101  •  saccharomyces boulardii (FLORASTOR) capsule 250 mg, 250 mg, Oral, BID, Nedra Bernardo MD, 250 mg at 08/04/23 0906  •  traZODone (DESYREL) tablet 50 mg, 50 mg, Oral, HS PRN, LEV Turner, 50 mg at 08/03/23 2226  •  vancomycin (VANCOCIN) IVPB (premix in dextrose) 750 mg 150 mL, 750 mg, Intravenous, Q12H, Rell Stewart PA-C, Last Rate: 150 mL/hr at 08/04/23 0915, 750 mg at 08/04/23 0915    Past Medical History:   Diagnosis Date   • Aorto-iliac disease ([address])    • Atrial fibrillation (HCC)    • CAD (coronary artery disease)    • Carotid stenosis, bilateral    • Celiac artery stenosis ([facility])    • CKD (chronic kidney disease) stage 3, GFR 30-59 ml/min ([facility])    • DVT (deep venous thrombosis) ([facility])     LLE (CFV, popl)   • Heart attack ([address]) 02/2022   • HLD (hyperlipidemia)    • Hypertension    • Lung mass     RUL   • Myocardial infarction [facility])     2022   • COURTNEY (obstructive sleep apnea)    • PAD (peripheral artery disease) ([address])    • Stroke [facility])        Patient Active Problem List    Diagnosis Date Noted   • Hx of AKA (above knee amputation), left ([address]) 07/25/2023   • PAD (peripheral artery disease) ([address]) 06/23/2014   • Tobacco abuse 01/02/2023   • Nodule of upper lobe of right lung 06/14/2023   • CAD (coronary artery disease) 06/23/2014   • Stage 3 chronic kidney disease, unspecified whether stage 3a or 3b CKD ([address]) 01/09/2023   • PAF (paroxysmal atrial fibrillation) ([address]) 01/09/2023   • Essential hypertension 01/22/2016   • Mixed hyperlipidemia 12/27/2022   • Insomnia 02/10/2023   • At risk for venous thromboembolism (VTE) 07/28/2023   • Pain 07/28/2023   • Hypoxia 07/28/2023   • Urinary dysfunction 07/26/2023   • Dizziness 01/09/2023   • Aortoiliac occlusive disease ([address]) 12/28/2022   • Left leg swelling 11/08/2022   • Bipolar affective disorder, depressed, severe ([address]) 04/19/2017   • S/P vascular bypass 02/24/2017   • Hx of CABG 11/30/2016   • Presence of IVC filter 11/30/2016   • Thyroid nodule 01/22/2016   • Renal artery stenosis ([address]) 06/23/2014          LEV Meehan  Physical Medicine and [place]

## 2023-08-04 NOTE — PROGRESS NOTES
08/04/23 1230   Pain Assessment   Pain Assessment Tool 0-10   Pain Score 4   Pain Location/Orientation Orientation: Left; Location: Leg   Restrictions/Precautions   Precautions Bed/chair alarms;Cognitive; Fall Risk;Spinal precautions;Supervision on toilet/commode   LLE Weight Bearing Per Order NWB   General   Change In Medical/Functional Status rewrapping limb and instructed pt   Subjective   Subjective pt agreeable to perform skilled PT   Sit to Lying   Type of Assistance Needed Independent   Sit to Lying CARE Score 6   Lying to Sitting on Side of Bed   Type of Assistance Needed Independent   Lying to Sitting on Side of Bed CARE Score 6   Sit to Stand   Type of Assistance Needed Supervision   Sit to Stand CARE Score 4   Bed-Chair Transfer   Type of Assistance Needed Supervision   Comment sit pivot   Chair/Bed-to-Chair Transfer CARE Score 4   Transfer Bed/Chair/Wheelchair   Adaptive Equipment None   Walk 50 Feet with Two Turns   Reason if not Attempted Safety concerns   Walk 50 Feet with Two Turns CARE Score 88   Walk 150 Feet   Reason if not Attempted Safety concerns   Walk 150 Feet CARE Score 88   Walking 10 Feet on Uneven Surfaces   Reason if not Attempted Safety concerns   Walking 10 Feet on Uneven Surfaces CARE Score 88   Ambulation   Primary Mode of Locomotion Prior to Admission Walk   Does the patient walk? 2. Yes   Wheel 50 Feet with Two Turns   Type of Assistance Needed Independent   Wheel 50 Feet with Two Turns CARE Score 6   Wheel 150 Feet   Type of Assistance Needed Independent   Wheel 150 Feet CARE Score 6   Wheelchair mobility   Does the patient use a wheelchair? 1. Yes   Type of Wheelchair Used 1. Manual   Method Right upper extremity; Left upper extremity;Right lower extremity   Distance Level Surface (feet) 1000 ft   Findings outside for ramp   4 Steps   Reason if not Attempted Safety concerns   4 Steps CARE Score 88   12 Steps   Reason if not Attempted Safety concerns   12 Steps CARE Score 88 Therapeutic Interventions   Strengthening map program with ed/ , TE 20 - 30 reps for LLE   Flexibility prone lying 8 min   Balance core strengthening EO MAT   Equipment Use   NuStep 10min lvl 3   Assessment   Treatment Assessment pt focus on propl WC outside with ramp and working on hip flexion and strengthening . Pt instructed on left limb wrapping and skin care / IV for limb , Pt cont POC and progressing for DC next week   Barriers to Discharge Inaccessible home environment;Decreased caregiver support   Plan   Progress Progressing toward goals   Recommendation   PT Discharge Recommendation Home with home health rehabilitation   Equipment Recommended Wheelchair   PT Therapy Minutes   PT Time In 1230   PT Time Out 1400   PT Total Time (minutes) 90   PT Mode of treatment - Individual (minutes) 90   PT Mode of treatment - Concurrent (minutes) 0   PT Mode of treatment - Group (minutes) 0   PT Mode of treatment - Co-treat (minutes) 0   PT Mode of Treatment - Total time(minutes) 90 minutes   PT Cumulative Minutes 810   Therapy Time missed   Time missed?  No

## 2023-08-04 NOTE — PLAN OF CARE
Problem: Prexisting or High Potential for Compromised Skin Integrity  Goal: Skin integrity is maintained or improved  Description: INTERVENTIONS:  - Identify patients at risk for skin breakdown  - Assess and monitor skin integrity  - Assess and monitor nutrition and hydration status  - Monitor labs   - Assess for incontinence   - Turn and reposition patient  - Assist with mobility/ambulation  - Relieve pressure over bony prominences  - Avoid friction and shearing  - Provide appropriate hygiene as needed including keeping skin clean and dry  - Evaluate need for skin moisturizer/barrier cream  - Collaborate with interdisciplinary team   - Patient/family teaching  - Consider wound care consult   Outcome: Progressing     Problem: MOBILITY - ADULT  Goal: Maintain or return to baseline ADL function  Description: INTERVENTIONS:  -  Assess patient's ability to carry out ADLs; assess patient's baseline for ADL function and identify physical deficits which impact ability to perform ADLs (bathing, care of mouth/teeth, toileting, grooming, dressing, etc.)  - Assess/evaluate cause of self-care deficits   - Assess range of motion  - Assess patient's mobility; develop plan if impaired  - Assess patient's need for assistive devices and provide as appropriate  - Encourage maximum independence but intervene and supervise when necessary  - Involve family in performance of ADLs  - Assess for home care needs following discharge   - Consider OT consult to assist with ADL evaluation and planning for discharge  - Provide patient education as appropriate  Outcome: Progressing  Goal: Maintains/Returns to pre admission functional level  Description: INTERVENTIONS:  - Perform BMAT or MOVE assessment daily.   - Set and communicate daily mobility goal to care team and patient/family/caregiver. - Collaborate with rehabilitation services on mobility goals if consulted  - Perform Range of Motion  times a day.   - Reposition patient every hours.  - Dangle patient times a day  - Stand patient  times a day  - Ambulate patient  times a day  - Out of bed to chair  times a day   - Out of bed for meals  times a day  - Out of bed for toileting  - Record patient progress and toleration of activity level   Outcome: Progressing     Problem: PAIN - ADULT  Goal: Verbalizes/displays adequate comfort level or baseline comfort level  Description: Interventions:  - Encourage patient to monitor pain and request assistance  - Assess pain using appropriate pain scale  - Administer analgesics based on type and severity of pain and evaluate response  - Implement non-pharmacological measures as appropriate and evaluate response  - Consider cultural and social influences on pain and pain management  - Notify physician/advanced practitioner if interventions unsuccessful or patient reports new pain  Outcome: Progressing     Problem: INFECTION - ADULT  Goal: Absence or prevention of progression during hospitalization  Description: INTERVENTIONS:  - Assess and monitor for signs and symptoms of infection  - Monitor lab/diagnostic results  - Monitor all insertion sites, i.e. indwelling lines, tubes, and drains  - Monitor endotracheal if appropriate and nasal secretions for changes in amount and color  - [place] appropriate cooling/warming therapies per order  - Administer medications as ordered  - Instruct and encourage patient and family to use good hand hygiene technique  - Identify and instruct in appropriate isolation precautions for identified infection/condition  Outcome: Progressing  Goal: Absence of fever/infection during neutropenic period  Description: INTERVENTIONS:  - Monitor WBC    Outcome: Progressing     Problem: SAFETY ADULT  Goal: Patient will remain free of falls  Description: INTERVENTIONS:  - Educate patient/family on patient safety including physical limitations  - Instruct patient to call for assistance with activity   - Consult OT/PT to assist with strengthening/mobility   - Keep Call bell within reach  - Keep bed low and locked with side rails adjusted as appropriate  - Keep care items and personal belongings within reach  - Initiate and maintain comfort rounds  - Make Fall Risk Sign visible to staff  - Offer Toileting every  Hours, in advance of need  - Initiate/Maintain alarm  - Obtain necessary fall risk management equipment:   - Apply yellow socks and bracelet for high fall risk patients  - Consider moving patient to room near nurses station  Outcome: Progressing  Goal: Maintain or return to baseline ADL function  Description: INTERVENTIONS:  -  Assess patient's ability to carry out ADLs; assess patient's baseline for ADL function and identify physical deficits which impact ability to perform ADLs (bathing, care of mouth/teeth, toileting, grooming, dressing, etc.)  - Assess/evaluate cause of self-care deficits   - Assess range of motion  - Assess patient's mobility; develop plan if impaired  - Assess patient's need for assistive devices and provide as appropriate  - Encourage maximum independence but intervene and supervise when necessary  - Involve family in performance of ADLs  - Assess for home care needs following discharge   - Consider OT consult to assist with ADL evaluation and planning for discharge  - Provide patient education as appropriate  Outcome: Progressing  Goal: Maintains/Returns to pre admission functional level  Description: INTERVENTIONS:  - Perform BMAT or MOVE assessment daily.   - Set and communicate daily mobility goal to care team and patient/family/caregiver. - Collaborate with rehabilitation services on mobility goals if consulted  - Perform Range of Motion 3 times a day. - Reposition patient every 2 hours.   - Dangle patient 3 times a day  - Stand patient 3 times a day  - Ambulate patient 3 times a day  - Out of bed to chair 3 times a day   - Out of bed for meals 3times a day  - Out of bed for toileting  - Record patient progress and toleration of activity level   Outcome: Progressing     Problem: DISCHARGE PLANNING  Goal: Discharge to home or other facility with appropriate resources  Description: INTERVENTIONS:  - Identify barriers to discharge w/patient and caregiver  - Arrange for needed discharge resources and transportation as appropriate  - Identify discharge learning needs (meds, wound care, etc.)  - Arrange for interpretive services to assist at discharge as needed  - Refer to Case Management Department for coordinating discharge planning if the patient needs post-hospital services based on physician/advanced practitioner order or complex needs related to functional status, cognitive ability, or social support system  Outcome: Progressing     Problem: Knowledge Deficit  Goal: Patient/family/caregiver demonstrates understanding of disease process, treatment plan, medications, and discharge instructions  Description: Complete learning assessment and assess knowledge base. Interventions:  - Provide teaching at level of understanding  - Provide teaching via preferred learning methods  Outcome: Progressing     Problem: Nutrition/Hydration-ADULT  Goal: Nutrient/Hydration intake appropriate for improving, restoring or maintaining nutritional needs  Description: Monitor and assess patient's nutrition/hydration status for malnutrition. Collaborate with interdisciplinary team and initiate plan and interventions as ordered. Monitor patient's weight and dietary intake as ordered or per policy. Utilize nutrition screening tool and intervene as necessary. Determine patient's food preferences and provide high-protein, high-caloric foods as appropriate.      INTERVENTIONS:  - Monitor oral intake, urinary output, labs, and treatment plans  - Assess nutrition and hydration status and recommend course of action  - Evaluate amount of meals eaten  - Assist patient with eating if necessary   - Allow adequate time for meals  - Recommend/ encourage appropriate diets, oral nutritional supplements, and vitamin/mineral supplements  - Order, calculate, and assess calorie counts as needed  - Recommend, monitor, and adjust tube feedings and TPN/PPN based on assessed needs  - Assess need for intravenous fluids  - Provide specific nutrition/hydration education as appropriate  - Include patient/family/caregiver in decisions related to nutrition  Outcome: Progressing

## 2023-08-04 NOTE — PLAN OF CARE
Problem: Prexisting or High Potential for Compromised Skin Integrity  Goal: Skin integrity is maintained or improved  Description: INTERVENTIONS:  - Identify patients at risk for skin breakdown  - Assess and monitor skin integrity  - Assess and monitor nutrition and hydration status  - Monitor labs   - Assess for incontinence   - Turn and reposition patient  - Assist with mobility/ambulation  - Relieve pressure over bony prominences  - Avoid friction and shearing  - Provide appropriate hygiene as needed including keeping skin clean and dry  - Evaluate need for skin moisturizer/barrier cream  - Collaborate with interdisciplinary team   - Patient/family teaching  - Consider wound care consult   Outcome: Progressing     Problem: MOBILITY - ADULT  Goal: Maintain or return to baseline ADL function  Description: INTERVENTIONS:  -  Assess patient's ability to carry out ADLs; assess patient's baseline for ADL function and identify physical deficits which impact ability to perform ADLs (bathing, care of mouth/teeth, toileting, grooming, dressing, etc.)  - Assess/evaluate cause of self-care deficits   - Assess range of motion  - Assess patient's mobility; develop plan if impaired  - Assess patient's need for assistive devices and provide as appropriate  - Encourage maximum independence but intervene and supervise when necessary  - Involve family in performance of ADLs  - Assess for home care needs following discharge   - Consider OT consult to assist with ADL evaluation and planning for discharge  - Provide patient education as appropriate  Outcome: Progressing  Goal: Maintains/Returns to pre admission functional level  Description: INTERVENTIONS:  - Perform BMAT or MOVE assessment daily.   - Set and communicate daily mobility goal to care team and patient/family/caregiver. - Collaborate with rehabilitation services on mobility goals if consulted  - Perform Range of Motion 2 times a day.   - Reposition patient every 2 hours.  - Dangle patient 3 times a day  - Stand patient 3 times a day  - Ambulate patient 3 times a day  - Out of bed to chair 3 times a day   - Out of bed for meals 3 times a day  - Out of bed for toileting  - Record patient progress and toleration of activity level   Outcome: Progressing     Problem: PAIN - ADULT  Goal: Verbalizes/displays adequate comfort level or baseline comfort level  Description: Interventions:  - Encourage patient to monitor pain and request assistance  - Assess pain using appropriate pain scale  - Administer analgesics based on type and severity of pain and evaluate response  - Implement non-pharmacological measures as appropriate and evaluate response  - Consider cultural and social influences on pain and pain management  - Notify physician/advanced practitioner if interventions unsuccessful or patient reports new pain  Outcome: Progressing     Problem: INFECTION - ADULT  Goal: Absence or prevention of progression during hospitalization  Description: INTERVENTIONS:  - Assess and monitor for signs and symptoms of infection  - Monitor lab/diagnostic results  - Monitor all insertion sites, i.e. indwelling lines, tubes, and drains  - Monitor endotracheal if appropriate and nasal secretions for changes in amount and color  - [place] appropriate cooling/warming therapies per order  - Administer medications as ordered  - Instruct and encourage patient and family to use good hand hygiene technique  - Identify and instruct in appropriate isolation precautions for identified infection/condition  Outcome: Progressing  Goal: Absence of fever/infection during neutropenic period  Description: INTERVENTIONS:  - Monitor WBC    Outcome: Progressing     Problem: SAFETY ADULT  Goal: Patient will remain free of falls  Description: INTERVENTIONS:  - Educate patient/family on patient safety including physical limitations  - Instruct patient to call for assistance with activity   - Consult OT/PT to assist with strengthening/mobility   - Keep Call bell within reach  - Keep bed low and locked with side rails adjusted as appropriate  - Keep care items and personal belongings within reach  - Initiate and maintain comfort rounds  - Make Fall Risk Sign visible to staff  - Offer Toileting every 2 Hours, in advance of need  - Initiate/Maintain bed alarm  - Obtain necessary fall risk management equipment: bed alarm  - Apply yellow socks and bracelet for high fall risk patients  - Consider moving patient to room near nurses station  Outcome: Progressing  Goal: Maintain or return to baseline ADL function  Description: INTERVENTIONS:  -  Assess patient's ability to carry out ADLs; assess patient's baseline for ADL function and identify physical deficits which impact ability to perform ADLs (bathing, care of mouth/teeth, toileting, grooming, dressing, etc.)  - Assess/evaluate cause of self-care deficits   - Assess range of motion  - Assess patient's mobility; develop plan if impaired  - Assess patient's need for assistive devices and provide as appropriate  - Encourage maximum independence but intervene and supervise when necessary  - Involve family in performance of ADLs  - Assess for home care needs following discharge   - Consider OT consult to assist with ADL evaluation and planning for discharge  - Provide patient education as appropriate  Outcome: Progressing  Goal: Maintains/Returns to pre admission functional level  Description: INTERVENTIONS:  - Perform BMAT or MOVE assessment daily.   - Set and communicate daily mobility goal to care team and patient/family/caregiver. - Collaborate with rehabilitation services on mobility goals if consulted  - Perform Range of Motion 3 times a day. - Reposition patient every 2 hours.   - Dangle patient 3 times a day  - Stand patient 3 times a day  - Ambulate patient 3 times a day  - Out of bed to chair 3 times a day   - Out of bed for meals 3 times a day  - Out of bed for toileting  - Record patient progress and toleration of activity level   Outcome: Progressing     Problem: DISCHARGE PLANNING  Goal: Discharge to home or other facility with appropriate resources  Description: INTERVENTIONS:  - Identify barriers to discharge w/patient and caregiver  - Arrange for needed discharge resources and transportation as appropriate  - Identify discharge learning needs (meds, wound care, etc.)  - Arrange for interpretive services to assist at discharge as needed  - Refer to Case Management Department for coordinating discharge planning if the patient needs post-hospital services based on physician/advanced practitioner order or complex needs related to functional status, cognitive ability, or social support system  Outcome: Progressing     Problem: Knowledge Deficit  Goal: Patient/family/caregiver demonstrates understanding of disease process, treatment plan, medications, and discharge instructions  Description: Complete learning assessment and assess knowledge base. Interventions:  - Provide teaching at level of understanding  - Provide teaching via preferred learning methods  Outcome: Progressing     Problem: Nutrition/Hydration-ADULT  Goal: Nutrient/Hydration intake appropriate for improving, restoring or maintaining nutritional needs  Description: Monitor and assess patient's nutrition/hydration status for malnutrition. Collaborate with interdisciplinary team and initiate plan and interventions as ordered. Monitor patient's weight and dietary intake as ordered or per policy. Utilize nutrition screening tool and intervene as necessary. Determine patient's food preferences and provide high-protein, high-caloric foods as appropriate.      INTERVENTIONS:  - Monitor oral intake, urinary output, labs, and treatment plans  - Assess nutrition and hydration status and recommend course of action  - Evaluate amount of meals eaten  - Assist patient with eating if necessary   - Allow adequate time for meals  - Recommend/ encourage appropriate diets, oral nutritional supplements, and vitamin/mineral supplements  - Order, calculate, and assess calorie counts as needed  - Recommend, monitor, and adjust tube feedings and TPN/PPN based on assessed needs  - Assess need for intravenous fluids  - Provide specific nutrition/hydration education as appropriate  - Include patient/family/caregiver in decisions related to nutrition  Outcome: Progressing

## 2023-08-04 NOTE — PROGRESS NOTES
08/04/23 0700   Pain Assessment   Pain Assessment Tool 0-10   Pain Score 4   Pain Location/Orientation Orientation: Left; Location: Leg   Restrictions/Precautions   Precautions Bed/chair alarms; Fall Risk;Pain;Supervision on toilet/commode   LLE Weight Bearing Per Order NWB   Braces or Orthoses   (residual limb wrapping)   Lifestyle   Autonomy "I have an IV now, I didn't know I was getting this."   Eating   Type of Assistance Needed Independent   Physical Assistance Level No physical assistance   Eating CARE Score 6   Oral Hygiene   Type of Assistance Needed Independent   Physical Assistance Level No physical assistance   Comment seated in w/c at sink. Oral Hygiene CARE Score 6   Shower/Bathe Self   Type of Assistance Needed Incidental touching   Physical Assistance Level No physical assistance   Comment bathed UB and LLE while seated. CS-CGA in stance at sink to bathe latia and buttocks. Shower/Bathe Self CARE Score 4   Tub/Shower Transfer   Reason Not Assessed Sponge Bath;Medical   Upper Body Dressing   Type of Assistance Needed Independent   Physical Assistance Level No physical assistance   Comment retrieved items at w/c level. donned while seated   Upper Body Dressing CARE Score 6   Lower Body Dressing   Type of Assistance Needed Physical assistance   Physical Assistance Level 25% or less   Comment Partial A for residual limb wrapping w/ waist anchor. threaded BLE while seated in w/c, CGA in stance at sink for clothing management. Lower Body Dressing CARE Score 3   Putting On/Taking Off Footwear   Type of Assistance Needed Set-up / clean-up   Physical Assistance Level No physical assistance   Comment able to L don/doff sock and shoe at seated level.    Putting On/Taking Off Footwear CARE Score 5   Sit to Stand   Type of Assistance Needed Supervision   Physical Assistance Level No physical assistance   Sit to Stand CARE Score 4   Bed-Chair Transfer   Type of Assistance Needed Supervision   Physical Assistance Level No physical assistance   Comment no AD   Chair/Bed-to-Chair Transfer CARE Score 4   Toileting Hygiene   Type of Assistance Needed Supervision   Physical Assistance Level No physical assistance   Comment w/ unilateral support of grab bar for hygiene and clothing management. Toileting Hygiene CARE Score 4   Toilet Transfer   Type of Assistance Needed Supervision; Adaptive equipment   Physical Assistance Level No physical assistance   Comment BSC over toilet, prefers unilateral support of horizontal grab bar. Toilet Transfer CARE Score 4   Exercise Tools   Other Exercise Tool 1 BUE ther ex to maximize strength and endurance for ADLs and fxnl mobility. Completed w/ 3# DB, 3x10 bicep curls, 2# DB chest press, OH shoulder press, and front arm raises. Pt tolerated well w/ rest breaks between sets to manage fatigue. Cognition   Overall Cognitive Status Impaired   Comments WFL for basic cognition, impaired STM. Assessment   Treatment Assessment Pt seen for skilled OT session focusing on self-care management and BUE strengthening. Details on ADL noted above, cont to req A for for residual limb wrapping w/ waist anchor. Of note, upon arrival pt's residual limb w/ guaze dressing taped to skin without ace wrap; notified team on same. Time spent carefully removing dressing, extended edu to request no tape to residual limb w/ pt demo G understanding. Cont OT POC: residual limb wrapping, repetitive safety training, establish BUE theraband HEP, endurance work, and ongoing phase 1 amputee edu. From OT standpoint, pt on track for d/c home w/ family support on Wednesday w/ recommendation for [facility]. Prognosis Good   Problem List Decreased strength;Decreased endurance; Impaired balance;Decreased mobility;Orthopedic restrictions;Decreased skin integrity;Pain   Plan   Treatment/Interventions ADL retraining;Functional transfer training; Therapeutic exercise; Endurance training;Patient/family training   Progress Progressing toward goals   Recommendation   OT Discharge Recommendation Home with home health rehabilitation   OT Therapy Minutes   OT Time In 0700   OT Time Out 0830   OT Total Time (minutes) 90   OT Mode of treatment - Individual (minutes) 90   OT Mode of treatment - Concurrent (minutes) 0   OT Mode of treatment - Group (minutes) 0   OT Mode of treatment - Co-treat (minutes) 0   OT Mode of Treatment - Total time(minutes) 90 minutes   OT Cumulative Minutes 840   Therapy Time missed   Time missed?  No

## 2023-08-04 NOTE — PROGRESS NOTES
Internal Medicine Progress Note  Patient: Nat Kirk  Age/sex: 68 y.o. female  Medical Record #: [id number]      ASSESSMENT/PLAN: (Interval History)  Nat Kirk is seen and examined and management for following issues:    S/p left AKA 7/21/23  • Hx severe PAD with B/L SC to FA bypass in past = has TO left fem-pop BPG  • Home:  Plavix/Crestor 20mg qd/Zetia 10mg qd  • Here:  Plavix/Zetia/Lipitor 40mg qd  • Pain/phantom pain management as per PMR  • Worsening erythema of the surgical wound. Vascular surgery started IV Vanco and Cefepime.     Coronary artery disease  • S/p CABG in 2000  • TTE on 1/26/2023 with EF of 60%, mild to moderate AI, mild AS, estimated PA pressure of 40 mmHg  • Nuclear pharmacological stress test on 1/26/2023 with anterior wall perfusion defect likely artifact/cannot exclude small area of ischemia, normal LV systolic function. • Continue Plavix/Zetia 10mg qd/Imdur     Hypertension  • Home: Diltiazem 120 mg qd/Amlodipine 5 mg qd/Imdur 30 mg qd/Lisinopril 10 mg/ Toprol-XL 50 mg qd  • Here:  Diltiazem 120mg qd/Imdur 30mg qd/Linsinopril 10mg qd (hold SBP <130)/Toprol 50mg qd  • BP stable.     Hyperlipidemia  • Continue atorvastatin and Zetia here  • Follow-up with outpatient cardiology for possible Repatha     Paroxysmal atrial fibrillation  • Currently in sinus rhythm  • S/p a 2-week Zio patch in March 2023 with 5 episodes of SVT without other significant arrhythmia  • Continue Diltiazem 120 mg daily, Toprol XL 50 mg daily     DVT/IVC filter  • Hx lower extremity DVT/IVC filter  • IVC filter is still present on PET scan 7/7/2023     Tobacco use  • Continues to smoke 1 to 1.5 packs/day  • Did not tolerate Wellbutrin/Chantix in the past  • Counseled for smoking cessation but not ready to quit  • Nicotine patch here 21 mg     COPD  • CT chest with evidence of emphysema  • PFT in June 2023:  Moderate obstruction, no reversibility with bronchodilator   • Was recommended Trelegy and rescue inhaler, but she didn't want it; using Albuterol prn. • Continues on RA with O2 sats 91-92%. • Recommend outpatient follow-up with pulmonology and smoking cessation     Right perihilar mass   • On CT imaging 7/18/23  • Planned for biopsy on 8/14 by thoracic surgeon    Restless leg  · Started ropinirole 8/1/23  · Not new for her she has taken OTC things in the past  · Improved will monitor whether or not it needs to be titrated     CKD II  • Creatinine stable at 0.86  • Has likely nonfunctioning right kidney  • Avoid nephrotoxic agents, hypotension     Bipolar disease  • continue Lamictal 100 mg daily  • Stable        Discharge date: Tentative DC 8/9/23 pending abx plan. The above assessment and plan was reviewed and updated as determined by my evaluation of the patient on 8/4/2023.     Labs:   Results from last 7 days   Lab Units 08/03/23  0602 07/31/23  0516   WBC Thousand/uL 9.17 10.45*   HEMOGLOBIN g/dL 12.5 12.2   HEMATOCRIT % 38.8 37.9   PLATELETS Thousands/uL 304 281     Results from last 7 days   Lab Units 08/03/23  0602 07/31/23  0516   SODIUM mmol/L 140 141   POTASSIUM mmol/L 4.9 4.5   CHLORIDE mmol/L 109* 112*   CO2 mmol/L 26 25   BUN mg/dL 18 16   CREATININE mg/dL 0.93 0.86   CALCIUM mg/dL 9.3 9.1                   Review of Scheduled Meds:  Current Facility-Administered Medications   Medication Dose Route Frequency Provider Last Rate   • acetaminophen  975 mg Oral Q8H [address] LEV Turner     • albuterol  2 puff Inhalation Q6H PRN LEV Turner     • atorvastatin  40 mg Oral Daily With LEV Marcano     • bisacodyl  10 mg Rectal Daily PRN LEV Yepez     • cefepime  2,000 mg Intravenous Q12H Darion Augustin PA-C Stopped (08/04/23 1018)   • clopidogrel  75 mg Oral Daily LEV Turner     • diltiazem  120 mg Oral Daily LEV Turner     • docusate sodium  100 mg Oral BID LEV Turner     • ezetimibe  10 mg Oral Daily LEV Turner     • folic acid  1 mg Oral Daily LEV Turner     • gabapentin  600 mg Oral TID LEV Duran     • heparin (porcine)  5,000 Units Subcutaneous Q8H [address] LEV Turner     • isosorbide mononitrate  30 mg Oral QAM LEV Turner     • lamoTRIgine  100 mg Oral Daily LEV Turner     • lisinopril  10 mg Oral Daily LEV Dougherty     • melatonin  9 mg Oral HS LEV Turner     • methocarbamol  500 mg Oral [facility] Nolen Sandifer, MD     • metoprolol succinate  50 mg Oral Daily LEV Turner     • nicotine  1 patch Transdermal Daily LEV Turner     • ondansetron  4 mg Oral Q6H PRN LEV Duran     • oxyCODONE  5 mg Oral Q4H PRN Nolen Sandifer, MD      Or   • oxyCODONE  10 mg Oral Q4H PRN Nolen Sandifer, MD     • polyethylene glycol  17 g Oral Daily PRN LEV Turner     • rOPINIRole  0.5 mg Oral HS LEV Galdamez     • saccharomyces boulardii  250 mg Oral BID Evette Márquez MD     • traZODone  50 mg Oral HS PRN LEV Duran     • vancomycin  750 mg Intravenous Q12H Harvey Diamodn PA-C 750 mg (08/04/23 0915)       Subjective/ HPI: Patient seen and examined. Patients overnight issues or events were reviewed with nursing or staff during rounds or morning huddle session. New or overnight issues include the following:     Pt seen in her room. She denies any current complaints. She is tolerating the IV abx. ROS:   A 10 point ROS was performed; negative except as noted above. Imaging:     XR femur 2 vw left   Final Result by Dom Valdez MD (07/31 1550)      Postsurgical changes in left above-the-knee amputation. No x-ray findings suggesting osteomyelitis at this time. Resident: Erasmo Merrill, the attending radiologist, have reviewed the images and agree with the final report above.       Workstation performed: [id number] [facility] /Radiology studies reviewed  *Medications reviewed and reconciled as needed  *Please refer to order section for additional ordered labs studies  *Case discussed with primary attending during morning huddle case rounds    Physical Examination:  Vitals:   Vitals:    08/03/23 1925 08/03/23 1930 08/03/23 2058 08/04/23 0550   BP: 170/74 (!) 172/68 169/74 152/70   BP Location: Right arm Right arm Right arm Right arm   Pulse: 58  63 63   Resp: 18  16 20   Temp: 97.7 °F (36.5 °C)  97.8 °F (36.6 °C) 98.7 °F (37.1 °C)   TempSrc: Oral  Oral Oral   SpO2: 97%  97% 92%   Weight:       Height:           GEN: NAD; pleasant, interactive  NEURO: Alert and oriented x4; appropriate  HEENT: Pupils are equal/reactive; normocephalic, face is symmetrical, hearing is normal  CV: S1 S2 regular, no murmur/rub/gallops, 1/4 pedal pulse RLE, no LE edema present  RESP: Lungs are clear bilaterally, no wheezes rales or rhonchi, on room air, no distress, respirations are easy and non labored  GI: Abdomen is obese, soft, non tender, +BS x4; non distended  : Voiding without issues  MUSC: Moves all extremities except LLE amputation  SKIN: pink, warm, normal turgor, L stump dressing intact; updated images in media section       The above physical exam was reviewed and updated as determined by my evaluation of the patient on 8/4/2023. Invasive Devices     Peripheral Intravenous Line  Duration           Peripheral IV 08/03/23 Dorsal (posterior); Right Forearm <1 day                   VTE Pharmacologic Prophylaxis: Heparin  Code Status: Level 1 - Full Code  Current Length of Stay: 10 day(s)      Total time spent:  30 minutes with more than 50% spent counseling/coordinating care. Counseling includes discussion with patient re: progress  and discussion with patient of his/her current medical state/information. Coordination of patient's care was performed in conjunction with primary service.  Time invested included review of patient's labs, vitals, and management of their comorbidities with continued monitoring. In addition, this patient was discussed with medical team including physician and advanced extenders. The care of the patient was extensively discussed and appropriate treatment plan was formulated unique for this patient. Medical decision making for the day was made by supervising physician unless otherwise noted in their attestation statement. ** Please Note:  voice to text software may have been used in the creation of this document.  Although proof errors in transcription or interpretation are a potential of such software**

## 2023-08-05 LAB
ANION GAP SERPL CALCULATED.3IONS-SCNC: 5 MMOL/L
BUN SERPL-MCNC: 17 MG/DL (ref 5–25)
CALCIUM SERPL-MCNC: 9.2 MG/DL (ref 8.3–10.1)
CHLORIDE SERPL-SCNC: 110 MMOL/L (ref 96–108)
CO2 SERPL-SCNC: 21 MMOL/L (ref 21–32)
CREAT SERPL-MCNC: 0.84 MG/DL (ref 0.6–1.3)
GFR SERPL CREATININE-BSD FRML MDRD: 69 ML/MIN/1.73SQ M
GLUCOSE P FAST SERPL-MCNC: 91 MG/DL (ref 65–99)
GLUCOSE SERPL-MCNC: 91 MG/DL (ref 65–140)
POTASSIUM SERPL-SCNC: 4.6 MMOL/L (ref 3.5–5.3)
SODIUM SERPL-SCNC: 136 MMOL/L (ref 135–147)
VANCOMYCIN TROUGH SERPL-MCNC: 16 UG/ML (ref 10–20)

## 2023-08-05 PROCEDURE — 80202 ASSAY OF VANCOMYCIN: CPT | Performed by: PHYSICIAN ASSISTANT

## 2023-08-05 PROCEDURE — 80048 BASIC METABOLIC PNL TOTAL CA: CPT | Performed by: PHYSICIAN ASSISTANT

## 2023-08-05 PROCEDURE — 99232 SBSQ HOSP IP/OBS MODERATE 35: CPT | Performed by: INTERNAL MEDICINE

## 2023-08-05 RX ADMIN — GABAPENTIN 600 MG: 300 CAPSULE ORAL at 16:57

## 2023-08-05 RX ADMIN — FOLIC ACID 1 MG: 1 TABLET ORAL at 09:44

## 2023-08-05 RX ADMIN — CEFEPIME 2000 MG: 2 INJECTION, POWDER, FOR SOLUTION INTRAVENOUS at 09:52

## 2023-08-05 RX ADMIN — CLOPIDOGREL BISULFATE 75 MG: 75 TABLET ORAL at 09:44

## 2023-08-05 RX ADMIN — OXYCODONE HYDROCHLORIDE 5 MG: 5 TABLET ORAL at 09:45

## 2023-08-05 RX ADMIN — NICOTINE 1 PATCH: 21 PATCH, EXTENDED RELEASE TRANSDERMAL at 09:42

## 2023-08-05 RX ADMIN — ISOSORBIDE MONONITRATE 30 MG: 30 TABLET, EXTENDED RELEASE ORAL at 09:43

## 2023-08-05 RX ADMIN — MELATONIN 9 MG: at 21:32

## 2023-08-05 RX ADMIN — EZETIMIBE 10 MG: 10 TABLET ORAL at 09:43

## 2023-08-05 RX ADMIN — GABAPENTIN 600 MG: 300 CAPSULE ORAL at 09:44

## 2023-08-05 RX ADMIN — LAMOTRIGINE 100 MG: 100 TABLET ORAL at 09:44

## 2023-08-05 RX ADMIN — METHOCARBAMOL 500 MG: 500 TABLET ORAL at 13:32

## 2023-08-05 RX ADMIN — ROPINIROLE 0.5 MG: 0.25 TABLET, FILM COATED ORAL at 21:31

## 2023-08-05 RX ADMIN — ATORVASTATIN CALCIUM 40 MG: 40 TABLET, FILM COATED ORAL at 16:57

## 2023-08-05 RX ADMIN — METHOCARBAMOL 500 MG: 500 TABLET ORAL at 21:32

## 2023-08-05 RX ADMIN — Medication 250 MG: at 09:44

## 2023-08-05 RX ADMIN — METHOCARBAMOL 500 MG: 500 TABLET ORAL at 05:40

## 2023-08-05 RX ADMIN — CEFEPIME 2000 MG: 2 INJECTION, POWDER, FOR SOLUTION INTRAVENOUS at 21:37

## 2023-08-05 RX ADMIN — HEPARIN SODIUM 5000 UNITS: 5000 INJECTION INTRAVENOUS; SUBCUTANEOUS at 21:31

## 2023-08-05 RX ADMIN — GABAPENTIN 600 MG: 300 CAPSULE ORAL at 21:31

## 2023-08-05 RX ADMIN — OXYCODONE HYDROCHLORIDE 5 MG: 5 TABLET ORAL at 14:26

## 2023-08-05 RX ADMIN — ACETAMINOPHEN 975 MG: 325 TABLET, FILM COATED ORAL at 05:40

## 2023-08-05 RX ADMIN — VANCOMYCIN HYDROCHLORIDE 1250 MG: 10 INJECTION, POWDER, LYOPHILIZED, FOR SOLUTION INTRAVENOUS at 12:02

## 2023-08-05 RX ADMIN — ACETAMINOPHEN 975 MG: 325 TABLET, FILM COATED ORAL at 21:32

## 2023-08-05 RX ADMIN — HEPARIN SODIUM 5000 UNITS: 5000 INJECTION INTRAVENOUS; SUBCUTANEOUS at 13:32

## 2023-08-05 RX ADMIN — DILTIAZEM HYDROCHLORIDE 120 MG: 60 TABLET, FILM COATED ORAL at 09:44

## 2023-08-05 RX ADMIN — METOPROLOL SUCCINATE 50 MG: 50 TABLET, EXTENDED RELEASE ORAL at 09:43

## 2023-08-05 RX ADMIN — ACETAMINOPHEN 975 MG: 325 TABLET, FILM COATED ORAL at 13:32

## 2023-08-05 RX ADMIN — Medication 250 MG: at 17:01

## 2023-08-05 RX ADMIN — LISINOPRIL 10 MG: 10 TABLET ORAL at 09:44

## 2023-08-05 RX ADMIN — HEPARIN SODIUM 5000 UNITS: 5000 INJECTION INTRAVENOUS; SUBCUTANEOUS at 05:40

## 2023-08-05 NOTE — PLAN OF CARE
Problem: Prexisting or High Potential for Compromised Skin Integrity  Goal: Skin integrity is maintained or improved  Description: INTERVENTIONS:  - Identify patients at risk for skin breakdown  - Assess and monitor skin integrity  - Assess and monitor nutrition and hydration status  - Monitor labs   - Assess for incontinence   - Turn and reposition patient  - Assist with mobility/ambulation  - Relieve pressure over bony prominences  - Avoid friction and shearing  - Provide appropriate hygiene as needed including keeping skin clean and dry  - Evaluate need for skin moisturizer/barrier cream  - Collaborate with interdisciplinary team   - Patient/family teaching  - Consider wound care consult   Outcome: Progressing     Problem: MOBILITY - ADULT  Goal: Maintain or return to baseline ADL function  Description: INTERVENTIONS:  -  Assess patient's ability to carry out ADLs; assess patient's baseline for ADL function and identify physical deficits which impact ability to perform ADLs (bathing, care of mouth/teeth, toileting, grooming, dressing, etc.)  - Assess/evaluate cause of self-care deficits   - Assess range of motion  - Assess patient's mobility; develop plan if impaired  - Assess patient's need for assistive devices and provide as appropriate  - Encourage maximum independence but intervene and supervise when necessary  - Involve family in performance of ADLs  - Assess for home care needs following discharge   - Consider OT consult to assist with ADL evaluation and planning for discharge  - Provide patient education as appropriate  Outcome: Progressing  Goal: Maintains/Returns to pre admission functional level  Description: INTERVENTIONS:  - Perform BMAT or MOVE assessment daily.   - Set and communicate daily mobility goal to care team and patient/family/caregiver. - Collaborate with rehabilitation services on mobility goals if consulted  - Perform Range of Motion 3 times a day.   - Reposition patient every 2 hours.  - Dangle patient 3 times a day  - Stand patient 3 times a day  - Ambulate patient 3 times a day  - Out of bed to chair 3 times a day   - Out of bed for meals 3 times a day  - Out of bed for toileting  - Record patient progress and toleration of activity level   Outcome: Progressing     Problem: PAIN - ADULT  Goal: Verbalizes/displays adequate comfort level or baseline comfort level  Description: Interventions:  - Encourage patient to monitor pain and request assistance  - Assess pain using appropriate pain scale  - Administer analgesics based on type and severity of pain and evaluate response  - Implement non-pharmacological measures as appropriate and evaluate response  - Consider cultural and social influences on pain and pain management  - Notify physician/advanced practitioner if interventions unsuccessful or patient reports new pain  Outcome: Progressing     Problem: INFECTION - ADULT  Goal: Absence or prevention of progression during hospitalization  Description: INTERVENTIONS:  - Assess and monitor for signs and symptoms of infection  - Monitor lab/diagnostic results  - Monitor all insertion sites, i.e. indwelling lines, tubes, and drains  - Monitor endotracheal if appropriate and nasal secretions for changes in amount and color  - [place] appropriate cooling/warming therapies per order  - Administer medications as ordered  - Instruct and encourage patient and family to use good hand hygiene technique  - Identify and instruct in appropriate isolation precautions for identified infection/condition  Outcome: Progressing  Goal: Absence of fever/infection during neutropenic period  Description: INTERVENTIONS:  - Monitor WBC    Outcome: Progressing     Problem: SAFETY ADULT  Goal: Patient will remain free of falls  Description: INTERVENTIONS:  - Educate patient/family on patient safety including physical limitations  - Instruct patient to call for assistance with activity   - Consult OT/PT to assist with strengthening/mobility   - Keep Call bell within reach  - Keep bed low and locked with side rails adjusted as appropriate  - Keep care items and personal belongings within reach  - Initiate and maintain comfort rounds  - Make Fall Risk Sign visible to staff  - Offer Toileting every 2 Hours, in advance of need  - Initiate/Maintain bed alarm  - Obtain necessary fall risk management equipment: bed alarm  - Apply yellow socks and bracelet for high fall risk patients  - Consider moving patient to room near nurses station  Outcome: Progressing  Goal: Maintain or return to baseline ADL function  Description: INTERVENTIONS:  -  Assess patient's ability to carry out ADLs; assess patient's baseline for ADL function and identify physical deficits which impact ability to perform ADLs (bathing, care of mouth/teeth, toileting, grooming, dressing, etc.)  - Assess/evaluate cause of self-care deficits   - Assess range of motion  - Assess patient's mobility; develop plan if impaired  - Assess patient's need for assistive devices and provide as appropriate  - Encourage maximum independence but intervene and supervise when necessary  - Involve family in performance of ADLs  - Assess for home care needs following discharge   - Consider OT consult to assist with ADL evaluation and planning for discharge  - Provide patient education as appropriate  Outcome: Progressing  Goal: Maintains/Returns to pre admission functional level  Description: INTERVENTIONS:  - Perform BMAT or MOVE assessment daily.   - Set and communicate daily mobility goal to care team and patient/family/caregiver. - Collaborate with rehabilitation services on mobility goals if consulted  - Perform Range of Motion 3 times a day. - Reposition patient every 2 hours.   - Dangle patient 3 times a day  - Stand patient 3 times a day  - Ambulate patient 3 times a day  - Out of bed to chair 3 times a day   - Out of bed for meals 3 times a day  - Out of bed for toileting  - Record patient progress and toleration of activity level   Outcome: Progressing     Problem: DISCHARGE PLANNING  Goal: Discharge to home or other facility with appropriate resources  Description: INTERVENTIONS:  - Identify barriers to discharge w/patient and caregiver  - Arrange for needed discharge resources and transportation as appropriate  - Identify discharge learning needs (meds, wound care, etc.)  - Arrange for interpretive services to assist at discharge as needed  - Refer to Case Management Department for coordinating discharge planning if the patient needs post-hospital services based on physician/advanced practitioner order or complex needs related to functional status, cognitive ability, or social support system  Outcome: Progressing     Problem: Knowledge Deficit  Goal: Patient/family/caregiver demonstrates understanding of disease process, treatment plan, medications, and discharge instructions  Description: Complete learning assessment and assess knowledge base. Interventions:  - Provide teaching at level of understanding  - Provide teaching via preferred learning methods  Outcome: Progressing     Problem: Nutrition/Hydration-ADULT  Goal: Nutrient/Hydration intake appropriate for improving, restoring or maintaining nutritional needs  Description: Monitor and assess patient's nutrition/hydration status for malnutrition. Collaborate with interdisciplinary team and initiate plan and interventions as ordered. Monitor patient's weight and dietary intake as ordered or per policy. Utilize nutrition screening tool and intervene as necessary. Determine patient's food preferences and provide high-protein, high-caloric foods as appropriate.      INTERVENTIONS:  - Monitor oral intake, urinary output, labs, and treatment plans  - Assess nutrition and hydration status and recommend course of action  - Evaluate amount of meals eaten  - Assist patient with eating if necessary   - Allow adequate time for meals  - Recommend/ encourage appropriate diets, oral nutritional supplements, and vitamin/mineral supplements  - Order, calculate, and assess calorie counts as needed  - Recommend, monitor, and adjust tube feedings and TPN/PPN based on assessed needs  - Assess need for intravenous fluids  - Provide specific nutrition/hydration education as appropriate  - Include patient/family/caregiver in decisions related to nutrition  Outcome: Progressing

## 2023-08-05 NOTE — PROGRESS NOTES
Internal Medicine Progress Note  Patient: Sam Cardenas  Age/sex: 68 y.o. female  Medical Record #: [id number]      ASSESSMENT/PLAN: (Interval History)  Sam Cardenas is seen and examined and management for following issues:    S/p left AKA 7/21/23  • Hx severe PAD with B/L SC to FA bypass in past = has TO left fem-pop BPG  • Home:  Plavix/Crestor 20mg qd/Zetia 10mg qd  • Here:  Plavix/Zetia/Lipitor 40mg qd  • Pain/phantom pain management as per PMR  • Worsening erythema of the surgical wound  • Vascular surgery started IV Vanco and Cefepime  • improved     Coronary artery disease  • S/p CABG in 2000  • TTE on 1/26/2023 with EF of 60%, mild to moderate AI, mild AS, estimated PA pressure of 40 mmHg  • Nuclear pharmacological stress test on 1/26/2023 with anterior wall perfusion defect likely artifact/cannot exclude small area of ischemia, normal LV systolic function. • Continue Plavix/Zetia 10mg qd/Imdur     Hypertension  • Home: Diltiazem 120 mg qd/Amlodipine 5 mg qd/Imdur 30 mg qd/Lisinopril 10 mg/ Toprol-XL 50 mg qd  • Here:  Diltiazem 120mg qd/Imdur 30mg qd/Lisinopril 10mg qd (hold SBP <130)/Toprol 50mg qd  • BP stable.     Hyperlipidemia  • Continue atorvastatin and Zetia here  • Follow-up with outpatient cardiology for possible Repatha     Paroxysmal atrial fibrillation  • Currently in sinus rhythm  • S/p a 2-week Zio patch in March 2023 with 5 episodes of SVT without other significant arrhythmia  • Continue Diltiazem 120 mg daily, Toprol XL 50 mg daily     DVT/IVC filter  • Hx lower extremity DVT/IVC filter  • IVC filter is still present on PET scan 7/7/2023     Tobacco use  • Continues to smoke 1 to 1.5 packs/day  • Did not tolerate Wellbutrin/Chantix in the past  • Counseled for smoking cessation but not ready to quit  • Nicotine patch here 21 mg     COPD  • CT chest with evidence of emphysema  • PFT in June 2023:  Moderate obstruction, no reversibility with bronchodilator   • Was recommended Trelegy and rescue inhaler, but she didn't want it; using Albuterol prn. • Continues on RA with O2 sats 91-92%. • Recommend outpatient follow-up with pulmonology and smoking cessation     Right perihilar mass   • On CT imaging 7/18/23  • Planned for biopsy on 8/14 by thoracic surgeon    Restless leg  · Started ropinirole 8/1/23  · Not new for her she has taken OTC things in the past  · Improved will monitor whether or not it needs to be titrated     CKD II  • Creatinine stable at 0.86  • Has likely nonfunctioning right kidney  • Avoid nephrotoxic agents, hypotension     Bipolar disease  • continue Lamictal 100 mg daily  • Stable        Discharge date: Tentative DC 8/9/23 pending abx plan. The above assessment and plan was reviewed and updated as determined by my evaluation of the patient on 8/5/2023.     Labs:   Results from last 7 days   Lab Units 08/03/23  0602 07/31/23  0516   WBC Thousand/uL 9.17 10.45*   HEMOGLOBIN g/dL 12.5 12.2   HEMATOCRIT % 38.8 37.9   PLATELETS Thousands/uL 304 281     Results from last 7 days   Lab Units 08/05/23  0538 08/03/23  0602   SODIUM mmol/L 136 140   POTASSIUM mmol/L 4.6 4.9   CHLORIDE mmol/L 110* 109*   CO2 mmol/L 21 26   BUN mg/dL 17 18   CREATININE mg/dL 0.84 0.93   CALCIUM mg/dL 9.2 9.3                   Review of Scheduled Meds:  Current Facility-Administered Medications   Medication Dose Route Frequency Provider Last Rate   • acetaminophen  975 mg Oral Q8H [facility] LEV Turner     • albuterol  2 puff Inhalation Q6H PRN LEV Turner     • atorvastatin  40 mg Oral Daily With LEV Marcano     • bisacodyl  10 mg Rectal Daily PRN LEV Allison     • cefepime  2,000 mg Intravenous Q12H Flo Lerner PA-C 2,000 mg (08/05/23 6159)   • clopidogrel  75 mg Oral Daily LEV Turner     • diltiazem  120 mg Oral Daily LEV Turner     • docusate sodium  100 mg Oral BID LEV Turner     • ezetimibe  10 mg Oral Daily LEV Allison     • folic acid  1 mg Oral Daily LEV Turner     • gabapentin  600 mg Oral TID LEV Yepez     • heparin (porcine)  5,000 Units Subcutaneous Q8H [address] LEV Turner     • isosorbide mononitrate  30 mg Oral QAM LEV Turner     • lamoTRIgine  100 mg Oral Daily LEV Turner     • lisinopril  10 mg Oral Daily LEV Isaac     • melatonin  9 mg Oral HS LEV Turner     • methocarbamol  500 mg Oral [facility] Davina Pineda MD     • metoprolol succinate  50 mg Oral Daily LEV Turner     • nicotine  1 patch Transdermal Daily LEV Turner     • ondansetron  4 mg Oral Q6H PRN LEV Yepez     • oxyCODONE  5 mg Oral Q4H PRN Davina Pineda MD      Or   • oxyCODONE  10 mg Oral Q4H PRN Davina Pineda MD     • polyethylene glycol  17 g Oral Daily PRN LEV Turner     • rOPINIRole  0.5 mg Oral HS ShonnaLEV Crespo     • saccharomyces boulardii  250 mg Oral BID Gaurav Lozano MD     • traZODone  50 mg Oral HS PRN LEV Yepez     • vancomycin  1,250 mg Intravenous Q24H Darion Augustin PA-C         Subjective/ HPI: Patient seen and examined. Patients overnight issues or events were reviewed with nursing or staff during rounds or morning huddle session. New or overnight issues include the following:     Pt seen in her room. No overnight complaints    ROS:   A 10 point ROS was performed; negative except as noted above. Imaging:     XR femur 2 vw left   Final Result by Ricky Mcbride MD (07/31 1550)      Postsurgical changes in left above-the-knee amputation. No x-ray findings suggesting osteomyelitis at this time. Resident: Nikky Mcdaniel, the attending radiologist, have reviewed the images and agree with the final report above.       Workstation performed: [id number] [facility] /Radiology studies reviewed  *Medications reviewed and reconciled as needed  *Please refer to order section for additional ordered labs studies  *Case discussed with primary attending during morning huddle case rounds    Physical Examination:  Vitals:   Vitals:    08/04/23 1439 08/04/23 2100 08/05/23 0525 08/05/23 0943   BP: 110/56 109/52 117/68 148/62   BP Location: Right arm Right arm Right arm    Pulse: 71 66 60 65   Resp: 18 18 20    Temp: 97.9 °F (36.6 °C) 98.1 °F (36.7 °C) 98.3 °F (36.8 °C)    TempSrc: Oral Oral Oral    SpO2: 91% 95% 94%    Weight:       Height:           GEN: NAD; pleasant  NEURO: Alert and oriented x4; appropriate  HEENT: Pupils are equal/reactive; normocephalic, face is symmetrical, hearing is normal  CV: S1 S2 regular, no murmur/rub/gallops, 1/4 pedal pulse RLE, no LE edema present  RESP: Lungs are clear bilaterally, no wheezes rales or rhonchi, on room air, no distress, respirations are easy and non labored  GI: Abdomen is obese, soft, non tender, +BS x4; non distended  : Voiding without issues  MUSC: Moves all extremities except LLE amputation  SKIN: pink, warm, normal turgor, L stump dressing intact; updated images in media section       The above physical exam was reviewed and updated as determined by my evaluation of the patient on 8/5/2023. Invasive Devices     Peripheral Intravenous Line  Duration           Peripheral IV 08/03/23 Dorsal (posterior); Right Forearm 1 day                   VTE Pharmacologic Prophylaxis: Heparin  Code Status: Level 1 - Full Code  Current Length of Stay: 11 day(s)      Total time spent:  30 minutes with more than 50% spent counseling/coordinating care. Counseling includes discussion with patient re: progress  and discussion with patient of his/her current medical state/information. Coordination of patient's care was performed in conjunction with primary service. Time invested included review of patient's labs, vitals, and management of their comorbidities with continued monitoring.  In addition, this patient was discussed with medical team including physician and advanced extenders. The care of the patient was extensively discussed and appropriate treatment plan was formulated unique for this patient. Medical decision making for the day was made by supervising physician unless otherwise noted in their attestation statement. ** Please Note:  voice to text software may have been used in the creation of this document.  Although proof errors in transcription or interpretation are a potential of such software**

## 2023-08-05 NOTE — PROGRESS NOTES
Yisel Merrill is a 68 y.o. female who is currently ordered Vancomycin IV with management by the Pharmacy Consult service. Relevant clinical data and objective / subjective history reviewed. Vancomycin Assessment:  Indication and Goal AUC/Trough: Soft tissue (goal -600, trough >10), -600, trough >10  Clinical Status: stable  Micro: no current results    Renal Function:  SCr: 0.84 mg/dL  CrCl: 57.1 mL/min  Renal replacement: Not on dialysis  Days of Therapy: 3  Current Dose: vancomycin 750 mg IV q 12 h  Vancomycin Plan:  New Dosing: vancomycin 1250 mg IV q 24 h  Estimated AUC: 453 mcg/hr/mL  Estimated Trough: 12.5 mcg/mL  Next Level: 08/08/2023 @ 0600  Renal Function Monitoring: Daily BMP and [place] will continue to follow closely for s/sx of nephrotoxicity, infusion reactions and appropriateness of therapy. BMP and CBC will be ordered per protocol. We will continue to follow the patient’s culture results and clinical progress daily.     Julio Duke, Pharmacist

## 2023-08-05 NOTE — PLAN OF CARE
Problem: Prexisting or High Potential for Compromised Skin Integrity  Goal: Skin integrity is maintained or improved  Description: INTERVENTIONS:  - Identify patients at risk for skin breakdown  - Assess and monitor skin integrity  - Assess and monitor nutrition and hydration status  - Monitor labs   - Assess for incontinence   - Turn and reposition patient  - Assist with mobility/ambulation  - Relieve pressure over bony prominences  - Avoid friction and shearing  - Provide appropriate hygiene as needed including keeping skin clean and dry  - Evaluate need for skin moisturizer/barrier cream  - Collaborate with interdisciplinary team   - Patient/family teaching  - Consider wound care consult   Outcome: Progressing     Problem: MOBILITY - ADULT  Goal: Maintain or return to baseline ADL function  Description: INTERVENTIONS:  -  Assess patient's ability to carry out ADLs; assess patient's baseline for ADL function and identify physical deficits which impact ability to perform ADLs (bathing, care of mouth/teeth, toileting, grooming, dressing, etc.)  - Assess/evaluate cause of self-care deficits   - Assess range of motion  - Assess patient's mobility; develop plan if impaired  - Assess patient's need for assistive devices and provide as appropriate  - Encourage maximum independence but intervene and supervise when necessary  - Involve family in performance of ADLs  - Assess for home care needs following discharge   - Consider OT consult to assist with ADL evaluation and planning for discharge  - Provide patient education as appropriate  Outcome: Progressing  Goal: Maintains/Returns to pre admission functional level  Description: INTERVENTIONS:  - Perform BMAT or MOVE assessment daily.   - Set and communicate daily mobility goal to care team and patient/family/caregiver. - Collaborate with rehabilitation services on mobility goals if consulted  - Perform Range of Motion 3 times a day.   - Reposition patient every 2 hours.  - Dangle patient 3 times a day  - Stand patient 3 times a day  - Ambulate patient 3 times a day  - Out of bed to chair 3 times a day   - Out of bed for meals 3 times a day  - Out of bed for toileting  - Record patient progress and toleration of activity level   Outcome: Progressing     Problem: PAIN - ADULT  Goal: Verbalizes/displays adequate comfort level or baseline comfort level  Description: Interventions:  - Encourage patient to monitor pain and request assistance  - Assess pain using appropriate pain scale  - Administer analgesics based on type and severity of pain and evaluate response  - Implement non-pharmacological measures as appropriate and evaluate response  - Consider cultural and social influences on pain and pain management  - Notify physician/advanced practitioner if interventions unsuccessful or patient reports new pain  Outcome: Progressing     Problem: INFECTION - ADULT  Goal: Absence or prevention of progression during hospitalization  Description: INTERVENTIONS:  - Assess and monitor for signs and symptoms of infection  - Monitor lab/diagnostic results  - Monitor all insertion sites, i.e. indwelling lines, tubes, and drains  - Monitor endotracheal if appropriate and nasal secretions for changes in amount and color  - [place] appropriate cooling/warming therapies per order  - Administer medications as ordered  - Instruct and encourage patient and family to use good hand hygiene technique  - Identify and instruct in appropriate isolation precautions for identified infection/condition  Outcome: Progressing  Goal: Absence of fever/infection during neutropenic period  Description: INTERVENTIONS:  - Monitor WBC    Outcome: Progressing     Problem: SAFETY ADULT  Goal: Patient will remain free of falls  Description: INTERVENTIONS:  - Educate patient/family on patient safety including physical limitations  - Instruct patient to call for assistance with activity   - Consult OT/PT to assist with strengthening/mobility   - Keep Call bell within reach  - Keep bed low and locked with side rails adjusted as appropriate  - Keep care items and personal belongings within reach  - Initiate and maintain comfort rounds  - Make Fall Risk Sign visible to staff  - Offer Toileting every 2 Hours, in advance of need  - Initiate/Maintain bed/chair alarm  - Obtain necessary fall risk management equipment: alarms  - Apply yellow socks and bracelet for high fall risk patients  - Consider moving patient to room near nurses station  Outcome: Progressing  Goal: Maintain or return to baseline ADL function  Description: INTERVENTIONS:  -  Assess patient's ability to carry out ADLs; assess patient's baseline for ADL function and identify physical deficits which impact ability to perform ADLs (bathing, care of mouth/teeth, toileting, grooming, dressing, etc.)  - Assess/evaluate cause of self-care deficits   - Assess range of motion  - Assess patient's mobility; develop plan if impaired  - Assess patient's need for assistive devices and provide as appropriate  - Encourage maximum independence but intervene and supervise when necessary  - Involve family in performance of ADLs  - Assess for home care needs following discharge   - Consider OT consult to assist with ADL evaluation and planning for discharge  - Provide patient education as appropriate  Outcome: Progressing     Problem: DISCHARGE PLANNING  Goal: Discharge to home or other facility with appropriate resources  Description: INTERVENTIONS:  - Identify barriers to discharge w/patient and caregiver  - Arrange for needed discharge resources and transportation as appropriate  - Identify discharge learning needs (meds, wound care, etc.)  - Arrange for interpretive services to assist at discharge as needed  - Refer to Case Management Department for coordinating discharge planning if the patient needs post-hospital services based on physician/advanced practitioner order or complex needs related to functional status, cognitive ability, or social support system  Outcome: Progressing     Problem: Knowledge Deficit  Goal: Patient/family/caregiver demonstrates understanding of disease process, treatment plan, medications, and discharge instructions  Description: Complete learning assessment and assess knowledge base. Interventions:  - Provide teaching at level of understanding  - Provide teaching via preferred learning methods  Outcome: Progressing     Problem: Nutrition/Hydration-ADULT  Goal: Nutrient/Hydration intake appropriate for improving, restoring or maintaining nutritional needs  Description: Monitor and assess patient's nutrition/hydration status for malnutrition. Collaborate with interdisciplinary team and initiate plan and interventions as ordered. Monitor patient's weight and dietary intake as ordered or per policy. Utilize nutrition screening tool and intervene as necessary. Determine patient's food preferences and provide high-protein, high-caloric foods as appropriate.      INTERVENTIONS:  - Monitor oral intake, urinary output, labs, and treatment plans  - Assess nutrition and hydration status and recommend course of action  - Evaluate amount of meals eaten  - Assist patient with eating if necessary   - Allow adequate time for meals  - Recommend/ encourage appropriate diets, oral nutritional supplements, and vitamin/mineral supplements  - Order, calculate, and assess calorie counts as needed  - Recommend, monitor, and adjust tube feedings and TPN/PPN based on assessed needs  - Assess need for intravenous fluids  - Provide specific nutrition/hydration education as appropriate  - Include patient/family/caregiver in decisions related to nutrition  Outcome: Progressing

## 2023-08-06 PROCEDURE — 99232 SBSQ HOSP IP/OBS MODERATE 35: CPT | Performed by: INTERNAL MEDICINE

## 2023-08-06 PROCEDURE — 97530 THERAPEUTIC ACTIVITIES: CPT

## 2023-08-06 PROCEDURE — 97110 THERAPEUTIC EXERCISES: CPT

## 2023-08-06 PROCEDURE — 97535 SELF CARE MNGMENT TRAINING: CPT

## 2023-08-06 RX ADMIN — LAMOTRIGINE 100 MG: 100 TABLET ORAL at 08:01

## 2023-08-06 RX ADMIN — EZETIMIBE 10 MG: 10 TABLET ORAL at 08:01

## 2023-08-06 RX ADMIN — OXYCODONE HYDROCHLORIDE 10 MG: 10 TABLET ORAL at 06:16

## 2023-08-06 RX ADMIN — GABAPENTIN 600 MG: 300 CAPSULE ORAL at 08:00

## 2023-08-06 RX ADMIN — ISOSORBIDE MONONITRATE 30 MG: 30 TABLET, EXTENDED RELEASE ORAL at 08:01

## 2023-08-06 RX ADMIN — GABAPENTIN 600 MG: 300 CAPSULE ORAL at 15:48

## 2023-08-06 RX ADMIN — ACETAMINOPHEN 975 MG: 325 TABLET, FILM COATED ORAL at 06:16

## 2023-08-06 RX ADMIN — LISINOPRIL 10 MG: 10 TABLET ORAL at 08:01

## 2023-08-06 RX ADMIN — CLOPIDOGREL BISULFATE 75 MG: 75 TABLET ORAL at 08:01

## 2023-08-06 RX ADMIN — GABAPENTIN 600 MG: 300 CAPSULE ORAL at 21:30

## 2023-08-06 RX ADMIN — OXYCODONE HYDROCHLORIDE 5 MG: 5 TABLET ORAL at 15:51

## 2023-08-06 RX ADMIN — CEFEPIME 2000 MG: 2 INJECTION, POWDER, FOR SOLUTION INTRAVENOUS at 10:10

## 2023-08-06 RX ADMIN — OXYCODONE HYDROCHLORIDE 5 MG: 5 TABLET ORAL at 10:41

## 2023-08-06 RX ADMIN — ROPINIROLE 0.5 MG: 0.25 TABLET, FILM COATED ORAL at 21:30

## 2023-08-06 RX ADMIN — NICOTINE 1 PATCH: 21 PATCH, EXTENDED RELEASE TRANSDERMAL at 08:01

## 2023-08-06 RX ADMIN — HEPARIN SODIUM 5000 UNITS: 5000 INJECTION INTRAVENOUS; SUBCUTANEOUS at 13:58

## 2023-08-06 RX ADMIN — TRAZODONE HYDROCHLORIDE 50 MG: 50 TABLET ORAL at 02:16

## 2023-08-06 RX ADMIN — CEFEPIME 2000 MG: 2 INJECTION, POWDER, FOR SOLUTION INTRAVENOUS at 21:37

## 2023-08-06 RX ADMIN — HEPARIN SODIUM 5000 UNITS: 5000 INJECTION INTRAVENOUS; SUBCUTANEOUS at 06:16

## 2023-08-06 RX ADMIN — MELATONIN 9 MG: at 21:30

## 2023-08-06 RX ADMIN — METHOCARBAMOL 500 MG: 500 TABLET ORAL at 06:16

## 2023-08-06 RX ADMIN — HEPARIN SODIUM 5000 UNITS: 5000 INJECTION INTRAVENOUS; SUBCUTANEOUS at 21:30

## 2023-08-06 RX ADMIN — METHOCARBAMOL 500 MG: 500 TABLET ORAL at 13:58

## 2023-08-06 RX ADMIN — ACETAMINOPHEN 975 MG: 325 TABLET, FILM COATED ORAL at 21:30

## 2023-08-06 RX ADMIN — METOPROLOL SUCCINATE 50 MG: 50 TABLET, EXTENDED RELEASE ORAL at 08:01

## 2023-08-06 RX ADMIN — FOLIC ACID 1 MG: 1 TABLET ORAL at 08:01

## 2023-08-06 RX ADMIN — VANCOMYCIN HYDROCHLORIDE 1250 MG: 10 INJECTION, POWDER, LYOPHILIZED, FOR SOLUTION INTRAVENOUS at 11:08

## 2023-08-06 RX ADMIN — Medication 250 MG: at 08:01

## 2023-08-06 RX ADMIN — ACETAMINOPHEN 975 MG: 325 TABLET, FILM COATED ORAL at 13:58

## 2023-08-06 RX ADMIN — DILTIAZEM HYDROCHLORIDE 120 MG: 60 TABLET, FILM COATED ORAL at 08:00

## 2023-08-06 RX ADMIN — Medication 250 MG: at 17:15

## 2023-08-06 RX ADMIN — ATORVASTATIN CALCIUM 40 MG: 40 TABLET, FILM COATED ORAL at 15:48

## 2023-08-06 RX ADMIN — METHOCARBAMOL 500 MG: 500 TABLET ORAL at 21:30

## 2023-08-06 NOTE — PROGRESS NOTES
Bea Gardner is a 68 y.o. female who is currently ordered Vancomycin IV with management by the Pharmacy Consult service. Relevant clinical data and objective / subjective history reviewed. Vancomycin Assessment:  Indication and Goal AUC/Trough: Soft tissue (goal -600, trough >10), -600, trough >10  Micro:   none  Renal Function:  SCr: 0.84 mg/dL  CrCl: 59.2 mL/min  Renal replacement: Not on dialysis  Days of Therapy: 4  Current Dose: vancomycin 1250mg iv q24h  Vancomycin Plan:  New Dosing: continue vancomycin 1250 mg IV q 24 h  Estimated AUC: 459 mcg*hr/mL  Estimated Trough: 12.7 mcg/mL  Next Level: 8/8 @0600  Renal Function Monitoring: Daily BMP and [place] will continue to follow closely for s/sx of nephrotoxicity, infusion reactions and appropriateness of therapy. BMP and CBC will be ordered per protocol. We will continue to follow the patient’s culture results and clinical progress daily.     Jacinto Jones, Pharmacist

## 2023-08-06 NOTE — PROGRESS NOTES
Occupational Therapy Treatment Note         08/06/23 9978   Pain Assessment   Pain Assessment Tool 0-10   Pain Score 7   Pain Location/Orientation Orientation: Left; Location: Leg   Hospital Pain Intervention(s) Repositioned;Relaxation technique   Restrictions/Precautions   Precautions Fall Risk;Supervision on toilet/commode  (trialing off alarms)   LLE Weight Bearing Per Order NWB   Lifestyle   Autonomy "can I get my alarms off?"   Eating   Type of Assistance Needed Independent   Physical Assistance Level No physical assistance   Eating CARE Score 6   Oral Hygiene   Type of Assistance Needed Independent   Physical Assistance Level No physical assistance   Comment seated in w/c at sink   Oral Hygiene CARE Score 6   Shower/Bathe Self   Type of Assistance Needed Supervision   Physical Assistance Level No physical assistance   Comment OT reviewed with pt to have pt stand at sink first to bathe groin/buttock because R footwear will be on. Then sit to complete bathing of UB and LE. Pt able to carryover. Stands with L UE support on sink and/or pelvic support on sink to bathe buttock/groin with supervision while in stance. Sits for rest of bathing. OT educated pt to not bathe incision as specific incisional care is done. Shower/Bathe Self CARE Score 4   Upper Body Dressing   Type of Assistance Needed Independent   Physical Assistance Level No physical assistance   Comment seated   Upper Body Dressing CARE Score 6   Lower Body Dressing   Type of Assistance Needed Physical assistance   Physical Assistance Level 25% or less   Comment Pt able to thread/unthread R LE while seated in w/c, OT educated pt on always having appropriate footwear on R LE prior to standing to pull pants up at sink. Therefore pt threaded R LE into clothing, then donned sock/sneaker and then stood at sink to manage underwear/shorts up at sink.  Trialed pt completing residual limb wrapping in w/c, but not enough room for pt to be able to lean back/move L LE. Therefore pt reporting she is okay to get back into bed for residual limb wrapping. Pt reporting she has trialed wrapping around the waist but it always comes down when she has to go to the bathroom and she does have urinary frequency, due to this she does not want to wrap around waist. But would rather just wrap L residual limb, knowing it will still come off, but it will be easier for her to rewrap without having to undress herself multiple times for day. Completed residual limb wrapping in bed, pt requires min/mod assist for angles of ace wrapping. Pt asking to trial small (less than 1/2") piece of tape over ABD pad to keep it in place, and then remove tape once the ace wrap anchors the ABD pad in place - trialed this method and it worked best for pt (who will be doing this usually by herself at home). Utilized 3 4" ace wraps together and started with anchor around top of thigh and then come down to anchor each corner of ABD pad in place (then remove small piece of tape that was keeping ABD pad in place). Then to secure this, once the 4" ace wrap was complete, utilized 1, 6" ace wrap over top. Pt reporting this feels more secure to her and she prefers this method.    Lower Body Dressing CARE Score 3   Putting On/Taking Off Footwear   Type of Assistance Needed Set-up / clean-up   Physical Assistance Level No physical assistance   Comment R footwear seated in w/c   Putting On/Taking Off Footwear CARE Score 5   Roll Left and Right   Type of Assistance Needed Independent   Physical Assistance Level No physical assistance   Roll Left and Right CARE Score 6   Sit to Lying   Type of Assistance Needed Independent   Physical Assistance Level No physical assistance   Sit to Lying CARE Score 6   Lying to Sitting on Side of Bed   Type of Assistance Needed Independent   Physical Assistance Level No physical assistance   Lying to Sitting on Side of Bed CARE Score 6   Sit to Stand   Type of Assistance Needed Set-up / clean-up   Physical Assistance Level No physical assistance   Sit to Stand CARE Score 5   Bed-Chair Transfer   Type of Assistance Needed Set-up / clean-up   Physical Assistance Level No physical assistance   Comment stand pivot transfers with UE support   Chair/Bed-to-Chair Transfer CARE Score 5   Toileting Hygiene   Type of Assistance Needed Supervision; Adaptive equipment   Physical Assistance Level No physical assistance   Comment +grab bar. pt reports son is obtaining one for home but will wait to install until pt is home so he puts it in correct place for her   [place] Score 4   Toilet Transfer   Type of Assistance Needed Supervision; Adaptive equipment   Physical Assistance Level No physical assistance   Comment stand pivot transfer with grab bar to toilet or to [facility]   Toilet Transfer CARE Score 4   Cognition   Overall Cognitive Status Impaired   Arousal/Participation Alert; Cooperative   Attention Within functional limits   Memory Decreased short term memory   Following Commands Follows one step commands without difficulty   Comments benefits from repetitive training on new learning   Activity Tolerance   Activity Tolerance Patient tolerated treatment well   Assessment   Treatment Assessment Pt participated in skilled OT tx session. See above for further details on functional performance. Extensive time spent/trialing methods for residual limb wrapping (see details above) - overall pt still requires assist for this and will benefit from more practice. Trialing pt off alarms during day time for today (still bed alarm at night), but she is to still call for supervision with all transfers/ADLs. Will assess for in room privileges at w/c level tomorrow (when not connected to IV antibiotics). While pt does not lock brakes every time she stops wheeling the chair, she does always lock before transferring.  Tomorrow staff to assess pt's carryover with education on locking brakes when reaching far for items, always having appropriate footwear when standing at the sink during ADL routine (don't  bare foot). Can progress pt if appropriate during day, but will keep alarm on overnight initially. Pt will continue to benefit from skilled OT intervention to address residual limb wrapping, phase 1 amputee education (including administer beltran depression inventory), w/c safety/training/kitchen mobility in order to maximize functional independence in ADLS, functional mobility/transfers, while decreasing burden of care. Pt left positioned in w/c with call bell in reach. Pt reporting it will likely be her grandsons who pick her up for d/c, that her son won't be able to take off work to come in for family training. Any further training can be reviewed with pt's son over the phone. Prognosis Good   Problem List Decreased endurance; Impaired balance;Decreased mobility; Decreased cognition   Barriers to Discharge None   Plan   Treatment/Interventions ADL retraining;Functional transfer training; Therapeutic exercise; Endurance training;Cognitive reorientation;Patient/family training;Equipment eval/education; Bed mobility; Compensatory technique education   Progress Progressing toward goals   Recommendation   OT Discharge Recommendation Home with home health rehabilitation   OT Therapy Minutes   OT Time In 0830   OT Time Out 1010   OT Total Time (minutes) 100   OT Mode of treatment - Individual (minutes) 100   OT Mode of treatment - Concurrent (minutes) 0   OT Mode of treatment - Group (minutes) 0   OT Mode of treatment - Co-treat (minutes) 0   OT Mode of Treatment - Total time(minutes) 100 minutes   OT Cumulative Minutes 940   Therapy Time missed   Time missed?  No

## 2023-08-06 NOTE — PROGRESS NOTES
Internal Medicine Progress Note  Patient: [facility]  Age/sex: 68 y.o. female  Medical Record #: [id number]      ASSESSMENT/PLAN: (Interval History)  [facility] is seen and examined and management for following issues:    S/p left AKA 7/21/23  • Hx severe PAD with B/L SC to FA bypass in past = has TO left fem-pop BPG  • Home:  Plavix/Crestor 20mg qd/Zetia 10mg qd  • Here:  Plavix/Zetia/Lipitor 40mg qd  • Pain/phantom pain management as per PMR  • Worsening erythema of the surgical wound  • Vascular surgery started IV Vanco and Cefepime  • Length per vascular surgery  • Improved=refer to images     Coronary artery disease  • S/p CABG in 2000  • TTE on 1/26/2023 with EF of 60%, mild to moderate AI, mild AS, estimated PA pressure of 40 mmHg  • Nuclear pharmacological stress test on 1/26/2023 with anterior wall perfusion defect likely artifact/cannot exclude small area of ischemia, normal LV systolic function. • Continue Plavix/Zetia 10mg qd/Imdur     Hypertension  • Home: Diltiazem 120 mg qd/Amlodipine 5 mg qd/Imdur 30 mg qd/Lisinopril 10 mg/ Toprol-XL 50 mg qd  • Here:  Diltiazem 120mg qd/Imdur 30mg qd/Lisinopril 10mg qd (hold SBP <130)/Toprol 50mg qd  • BP stable.     Hyperlipidemia  • Continue atorvastatin and Zetia here  • Follow-up with outpatient cardiology for possible Repatha     Paroxysmal atrial fibrillation  • Currently in sinus rhythm  • S/p a 2-week Zio patch in March 2023 with 5 episodes of SVT without other significant arrhythmia  • Continue Diltiazem 120 mg daily, Toprol XL 50 mg daily     DVT/IVC filter  • Hx lower extremity DVT/IVC filter  • IVC filter is still present on PET scan 7/7/2023     Tobacco use  • Continues to smoke 1 to 1.5 packs/day  • Did not tolerate Wellbutrin/Chantix in the past  • Counseled for smoking cessation but not ready to quit  • Nicotine patch here 21 mg     COPD  • CT chest with evidence of emphysema  • PFT in June 2023:  Moderate obstruction, no reversibility with bronchodilator   • Was recommended Trelegy and rescue inhaler, but she didn't want it; using Albuterol prn. • Continues on RA with O2 sats 91-92%. • Recommend outpatient follow-up with pulmonology and smoking cessation     Right perihilar mass   • On CT imaging 7/18/23  • Planned for biopsy on 8/14 by thoracic surgeon    Restless leg  · Started ropinirole 8/1/23  · Not new for her she has taken OTC things in the past  · Improved      CKD II  • Creatinine stable at 0.86  • Has likely nonfunctioning right kidney  • Avoid nephrotoxic agents, hypotension     Bipolar disease  • continue Lamictal 100 mg daily  • Stable        Discharge date: Tentative DC 8/9/23 pending abx plan. The above assessment and plan was reviewed and updated as determined by my evaluation of the patient on 8/6/2023.     Labs:   Results from last 7 days   Lab Units 08/03/23  0602 07/31/23  0516   WBC Thousand/uL 9.17 10.45*   HEMOGLOBIN g/dL 12.5 12.2   HEMATOCRIT % 38.8 37.9   PLATELETS Thousands/uL 304 281     Results from last 7 days   Lab Units 08/05/23  0538 08/03/23  0602   SODIUM mmol/L 136 140   POTASSIUM mmol/L 4.6 4.9   CHLORIDE mmol/L 110* 109*   CO2 mmol/L 21 26   BUN mg/dL 17 18   CREATININE mg/dL 0.84 0.93   CALCIUM mg/dL 9.2 9.3                   Review of Scheduled Meds:  Current Facility-Administered Medications   Medication Dose Route Frequency Provider Last Rate   • acetaminophen  975 mg Oral Q8H [address] LEV Turner     • albuterol  2 puff Inhalation Q6H PRN LEV Turner     • atorvastatin  40 mg Oral Daily With LEV Marcano     • bisacodyl  10 mg Rectal Daily PRN LEV Mello     • cefepime  2,000 mg Intravenous Q12H Cora Pryor PA-C Stopped (08/05/23 2210)   • clopidogrel  75 mg Oral Daily LEV Turner     • diltiazem  120 mg Oral Daily LEV Turner     • docusate sodium  100 mg Oral BID LEV Turner     • ezetimibe  10 mg Oral Daily LEV Mello     • folic acid  1 mg Oral Daily LEV Turner     • gabapentin  600 mg Oral TID LEV Palacios     • heparin (porcine)  5,000 Units Subcutaneous Q8H [address] LEV Turner     • isosorbide mononitrate  30 mg Oral QAM LEV Turner     • lamoTRIgine  100 mg Oral Daily LEV Turner     • lisinopril  10 mg Oral Daily LEV Gonzales     • melatonin  9 mg Oral HS LEV Turner     • methocarbamol  500 mg Oral [facility] De Mendiola MD     • metoprolol succinate  50 mg Oral Daily LEV Turner     • nicotine  1 patch Transdermal Daily LEV Turner     • ondansetron  4 mg Oral Q6H PRN LEV Palacios     • oxyCODONE  5 mg Oral Q4H PRN De Mendiola MD      Or   • oxyCODONE  10 mg Oral Q4H PRN De Mendiola MD     • polyethylene glycol  17 g Oral Daily PRN LEV Turner     • rOPINIRole  0.5 mg Oral HS LEV Galdamez     • saccharomyces boulardii  250 mg Oral BID Nicole Alejandre MD     • traZODone  50 mg Oral HS PRN LEV Palacios     • vancomycin  1,250 mg Intravenous Q24H Stevie Carbajal PA-C Stopped (08/05/23 1350)       Subjective/ HPI: Patient seen and examined. Patients overnight issues or events were reviewed with nursing or staff during rounds or morning huddle session. New or overnight issues include the following:     Pt seen in bed. Medial part of incision with small amount of oozing. Dressing fell off overnight and it appears staples were getting caught on blanket. Nursing to redress and take pics this am. Pt asking about length of abx. Will d/w vascular surgery this wk    ROS:   A 10 point ROS was performed; negative except as noted above. Imaging:     XR femur 2 vw left   Final Result by Sarah Fenton MD (07/31 9810)      Postsurgical changes in left above-the-knee amputation. No x-ray findings suggesting osteomyelitis at this time.             Resident: Edi Delgado, the attending radiologist, have reviewed the images and agree with the final report above. Workstation performed: [id number]             *Labs /Radiology studies reviewed  *Medications reviewed and reconciled as needed  *Please refer to order section for additional ordered labs studies  *Case discussed with primary attending during morning huddle case rounds    Physical Examination:  Vitals:   Vitals:    08/05/23 1544 08/05/23 2015 08/06/23 0616 08/06/23 0800   BP:  106/59 116/54 122/60   BP Location:  Right arm Right arm Right arm   Pulse: 67 61 62 72   Resp:  18 18    Temp:  98.3 °F (36.8 °C) 97.7 °F (36.5 °C)    TempSrc:  Oral Oral    SpO2:  94% 93%    Weight:       Height:           GEN: NAD; pleasant  NEURO: Alert and oriented x4; appropriate  HEENT: Pupils are equal/reactive; normocephalic, face is symmetrical, hearing is normal  CV: S1 S2 regular, no murmur/rub/gallops, 1/4 pedal pulse RLE, no LE edema present  RESP: Lungs are clear bilaterally, no wheezes rales or rhonchi, on room air, no distress, respirations are easy and non labored  GI: Abdomen is obese, soft, non tender, +BS x4; non distended  : Voiding without issues  MUSC: Moves all extremities except LLE amputation  SKIN: pink, warm, normal turgor, L stump staples intact, small amount of oozing noted on the medial portion of the wound, erythema is improving     The above physical exam was reviewed and updated as determined by my evaluation of the patient on 8/6/2023. Invasive Devices     Peripheral Intravenous Line  Duration           Peripheral IV 08/03/23 Dorsal (posterior); Right Forearm 2 days                   VTE Pharmacologic Prophylaxis: Heparin  Code Status: Level 1 - Full Code  Current Length of Stay: 12 day(s)      Total time spent:  30 minutes with more than 50% spent counseling/coordinating care. Counseling includes discussion with patient re: progress  and discussion with patient of his/her current medical state/information.  Coordination of patient's care was performed in conjunction with primary service. Time invested included review of patient's labs, vitals, and management of their comorbidities with continued monitoring. In addition, this patient was discussed with medical team including physician and advanced extenders. The care of the patient was extensively discussed and appropriate treatment plan was formulated unique for this patient. Medical decision making for the day was made by supervising physician unless otherwise noted in their attestation statement. ** Please Note:  voice to text software may have been used in the creation of this document.  Although proof errors in transcription or interpretation are a potential of such software**

## 2023-08-06 NOTE — PROGRESS NOTES
08/06/23 1230   Pain Assessment   Pain Assessment Tool 0-10   Pain Score 5   Pain Location/Orientation Orientation: Left; Location: Leg   Patient's Stated Pain Goal No pain   Hospital Pain Intervention(s) Repositioned; Rest   Restrictions/Precautions   Precautions Fall Risk;Supervision on toilet/commode   Weight Bearing Restrictions Yes   LLE Weight Bearing Per Order NWB   Cognition   Arousal/Participation Cooperative   Subjective   Subjective Pt/nursing reporting finishing IV antibiotics for redness/draining along incision then pt was ready for PT. Roll Left and Right   Type of Assistance Needed Independent   Roll Left and Right CARE Score 6   Sit to Lying   Type of Assistance Needed Independent   Sit to Lying CARE Score 6   Lying to Sitting on Side of Bed   Type of Assistance Needed Independent   Lying to Sitting on Side of Bed CARE Score 6   Sit to Stand   Type of Assistance Needed Set-up / clean-up   Sit to Stand CARE Score 5   Bed-Chair Transfer   Type of Assistance Needed Set-up / clean-up   Chair/Bed-to-Chair Transfer CARE Score 5   Wheel 50 Feet with Two Turns   Type of Assistance Needed Independent   Wheel 50 Feet with Two Turns CARE Score 6   Wheel 150 Feet   Type of Assistance Needed Independent   Wheel 150 Feet CARE Score 6   Wheelchair mobility   Does the patient use a wheelchair? 1. Yes   Type of Wheelchair Used 1. Manual   Method Right upper extremity; Left upper extremity;Right lower extremity   Distance Level Surface (feet) 600 ft   Distance Wheeled 3% Grade 150 ft   Findings indoor and outdoor ramp at supervision-independent level. Difficulty negotiating outdoor ramp due to high grade but easily able to negotiate indoor ramp. Pt able to safely control descent on outdoor ramp.    Therapeutic Interventions   Other Pt demonstrated ability to wrap residual limb with ace wrap with 10% cues   Equipment Use   NuStep 6 min lvl 0 with b/l UE and R LE for conditioning/endurance   Assessment   Treatment Assessment Pt able to perform transfers at set up supervision level, demonstrating safety locking/unlocking brakes appropriately and sequencing transfers. Per pt report, pt's son installed ramp to enter garage and will not have to perform step. Focused on WC mobility on ramp surfaces this session. Pt performed WC mobility on level and uneven surfaces at supervision-independent level. Pt able to negotiate indoor ramp at independent level but did require assistance on uneven ramp ascending due to high grade of ramp but demonstrated safety controlling descent. Pt may be able to upgrade to [facility] this week to prepare for d/c home. PT Barriers   Physical Impairment Decreased strength;Decreased endurance; Impaired balance;Decreased mobility;Pain;Orthopedic restrictions; Impaired sensation   Functional Limitation Car transfers; Ramp negotiation;Stair negotiation;Standing; Wheelchair management; Walking;Transfers   Plan   Treatment/Interventions Functional transfer training;LE strengthening/ROM; Elevations; Therapeutic exercise; Endurance training;Patient/family training;Bed mobility;Gait training   Progress Progressing toward goals   Recommendation   PT Discharge Recommendation Home with home health rehabilitation   Equipment Recommended Wheelchair   PT Therapy Minutes   PT Time In 1230   PT Time Out 1330   PT Total Time (minutes) 60   PT Mode of treatment - Individual (minutes) 60   PT Mode of treatment - Concurrent (minutes) 0   PT Mode of treatment - Group (minutes) 0   PT Mode of treatment - Co-treat (minutes) 0   PT Mode of Treatment - Total time(minutes) 60 minutes   PT Cumulative Minutes 870   Therapy Time missed   Time missed?  No

## 2023-08-06 NOTE — PLAN OF CARE
Problem: Prexisting or High Potential for Compromised Skin Integrity  Goal: Skin integrity is maintained or improved  Description: INTERVENTIONS:  - Identify patients at risk for skin breakdown  - Assess and monitor skin integrity  - Assess and monitor nutrition and hydration status  - Monitor labs   - Assess for incontinence   - Turn and reposition patient  - Assist with mobility/ambulation  - Relieve pressure over bony prominences  - Avoid friction and shearing  - Provide appropriate hygiene as needed including keeping skin clean and dry  - Evaluate need for skin moisturizer/barrier cream  - Collaborate with interdisciplinary team   - Patient/family teaching  - Consider wound care consult   Outcome: Progressing     Problem: MOBILITY - ADULT  Goal: Maintain or return to baseline ADL function  Description: INTERVENTIONS:  -  Assess patient's ability to carry out ADLs; assess patient's baseline for ADL function and identify physical deficits which impact ability to perform ADLs (bathing, care of mouth/teeth, toileting, grooming, dressing, etc.)  - Assess/evaluate cause of self-care deficits   - Assess range of motion  - Assess patient's mobility; develop plan if impaired  - Assess patient's need for assistive devices and provide as appropriate  - Encourage maximum independence but intervene and supervise when necessary  - Involve family in performance of ADLs  - Assess for home care needs following discharge   - Consider OT consult to assist with ADL evaluation and planning for discharge  - Provide patient education as appropriate  Outcome: Progressing  Goal: Maintains/Returns to pre admission functional level  Description: INTERVENTIONS:  - Perform BMAT or MOVE assessment daily.   - Set and communicate daily mobility goal to care team and patient/family/caregiver. - Collaborate with rehabilitation services on mobility goals if consulted  - Perform Range of Motion 3 times a day.   - Reposition patient every 2 hours.  - Dangle patient 3 times a day  - Stand patient 3 times a day  - Ambulate patient 3 times a day  - Out of bed to chair 3 times a day   - Out of bed for meals 3 times a day  - Out of bed for toileting  - Record patient progress and toleration of activity level   Outcome: Progressing     Problem: PAIN - ADULT  Goal: Verbalizes/displays adequate comfort level or baseline comfort level  Description: Interventions:  - Encourage patient to monitor pain and request assistance  - Assess pain using appropriate pain scale  - Administer analgesics based on type and severity of pain and evaluate response  - Implement non-pharmacological measures as appropriate and evaluate response  - Consider cultural and social influences on pain and pain management  - Notify physician/advanced practitioner if interventions unsuccessful or patient reports new pain  Outcome: Progressing     Problem: INFECTION - ADULT  Goal: Absence or prevention of progression during hospitalization  Description: INTERVENTIONS:  - Assess and monitor for signs and symptoms of infection  - Monitor lab/diagnostic results  - Monitor all insertion sites, i.e. indwelling lines, tubes, and drains  - Monitor endotracheal if appropriate and nasal secretions for changes in amount and color  - [place] appropriate cooling/warming therapies per order  - Administer medications as ordered  - Instruct and encourage patient and family to use good hand hygiene technique  - Identify and instruct in appropriate isolation precautions for identified infection/condition  Outcome: Progressing  Goal: Absence of fever/infection during neutropenic period  Description: INTERVENTIONS:  - Monitor WBC    Outcome: Progressing     Problem: SAFETY ADULT  Goal: Patient will remain free of falls  Description: INTERVENTIONS:  - Educate patient/family on patient safety including physical limitations  - Instruct patient to call for assistance with activity   - Consult OT/PT to assist with strengthening/mobility   - Keep Call bell within reach  - Keep bed low and locked with side rails adjusted as appropriate  - Keep care items and personal belongings within reach  - Initiate and maintain comfort rounds  - Make Fall Risk Sign visible to staff  - Offer Toileting every 3 Hours, in advance of need  - Initiate/Maintain bed alarm  - Obtain necessary fall risk management equipment: bed alarm  - Apply yellow socks and bracelet for high fall risk patients  - Consider moving patient to room near nurses station  Outcome: Progressing  Goal: Maintain or return to baseline ADL function  Description: INTERVENTIONS:  -  Assess patient's ability to carry out ADLs; assess patient's baseline for ADL function and identify physical deficits which impact ability to perform ADLs (bathing, care of mouth/teeth, toileting, grooming, dressing, etc.)  - Assess/evaluate cause of self-care deficits   - Assess range of motion  - Assess patient's mobility; develop plan if impaired  - Assess patient's need for assistive devices and provide as appropriate  - Encourage maximum independence but intervene and supervise when necessary  - Involve family in performance of ADLs  - Assess for home care needs following discharge   - Consider OT consult to assist with ADL evaluation and planning for discharge  - Provide patient education as appropriate  Outcome: Progressing  Goal: Maintains/Returns to pre admission functional level  Description: INTERVENTIONS:  - Perform BMAT or MOVE assessment daily.   - Set and communicate daily mobility goal to care team and patient/family/caregiver. - Collaborate with rehabilitation services on mobility goals if consulted  - Perform Range of Motion 3 times a day. - Reposition patient every 2 hours.   - Dangle patient 3 times a day  - Stand patient 3 times a day  - Ambulate patient 2 times a day  - Out of bed to chair 3 times a day   - Out of bed for meals 3 times a day  - Out of bed for toileting  - Record patient progress and toleration of activity level   Outcome: Progressing     Problem: DISCHARGE PLANNING  Goal: Discharge to home or other facility with appropriate resources  Description: INTERVENTIONS:  - Identify barriers to discharge w/patient and caregiver  - Arrange for needed discharge resources and transportation as appropriate  - Identify discharge learning needs (meds, wound care, etc.)  - Arrange for interpretive services to assist at discharge as needed  - Refer to Case Management Department for coordinating discharge planning if the patient needs post-hospital services based on physician/advanced practitioner order or complex needs related to functional status, cognitive ability, or social support system  Outcome: Progressing     Problem: Knowledge Deficit  Goal: Patient/family/caregiver demonstrates understanding of disease process, treatment plan, medications, and discharge instructions  Description: Complete learning assessment and assess knowledge base. Interventions:  - Provide teaching at level of understanding  - Provide teaching via preferred learning methods  Outcome: Progressing     Problem: Nutrition/Hydration-ADULT  Goal: Nutrient/Hydration intake appropriate for improving, restoring or maintaining nutritional needs  Description: Monitor and assess patient's nutrition/hydration status for malnutrition. Collaborate with interdisciplinary team and initiate plan and interventions as ordered. Monitor patient's weight and dietary intake as ordered or per policy. Utilize nutrition screening tool and intervene as necessary. Determine patient's food preferences and provide high-protein, high-caloric foods as appropriate.      INTERVENTIONS:  - Monitor oral intake, urinary output, labs, and treatment plans  - Assess nutrition and hydration status and recommend course of action  - Evaluate amount of meals eaten  - Assist patient with eating if necessary   - Allow adequate time for meals  - Recommend/ encourage appropriate diets, oral nutritional supplements, and vitamin/mineral supplements  - Order, calculate, and assess calorie counts as needed  - Recommend, monitor, and adjust tube feedings and TPN/PPN based on assessed needs  - Assess need for intravenous fluids  - Provide specific nutrition/hydration education as appropriate  - Include patient/family/caregiver in decisions related to nutrition  Outcome: Progressing

## 2023-08-06 NOTE — PLAN OF CARE
Problem: Prexisting or High Potential for Compromised Skin Integrity  Goal: Skin integrity is maintained or improved  Description: INTERVENTIONS:  - Identify patients at risk for skin breakdown  - Assess and monitor skin integrity  - Assess and monitor nutrition and hydration status  - Monitor labs   - Assess for incontinence   - Turn and reposition patient  - Assist with mobility/ambulation  - Relieve pressure over bony prominences  - Avoid friction and shearing  - Provide appropriate hygiene as needed including keeping skin clean and dry  - Evaluate need for skin moisturizer/barrier cream  - Collaborate with interdisciplinary team   - Patient/family teaching  - Consider wound care consult   Outcome: Progressing     Problem: MOBILITY - ADULT  Goal: Maintain or return to baseline ADL function  Description: INTERVENTIONS:  -  Assess patient's ability to carry out ADLs; assess patient's baseline for ADL function and identify physical deficits which impact ability to perform ADLs (bathing, care of mouth/teeth, toileting, grooming, dressing, etc.)  - Assess/evaluate cause of self-care deficits   - Assess range of motion  - Assess patient's mobility; develop plan if impaired  - Assess patient's need for assistive devices and provide as appropriate  - Encourage maximum independence but intervene and supervise when necessary  - Involve family in performance of ADLs  - Assess for home care needs following discharge   - Consider OT consult to assist with ADL evaluation and planning for discharge  - Provide patient education as appropriate  Outcome: Progressing  Goal: Maintains/Returns to pre admission functional level  Description: INTERVENTIONS:  - Perform BMAT or MOVE assessment daily.   - Set and communicate daily mobility goal to care team and patient/family/caregiver. - Collaborate with rehabilitation services on mobility goals if consulted  - Perform Range of Motion 3 times a day.   - Reposition patient every 2 hours.  - Dangle patient 3 times a day  - Stand patient 3 times a day  - Ambulate patient 3 times a day  - Out of bed to chair 3 times a day   - Out of bed for meals 3 times a day  - Out of bed for toileting  - Record patient progress and toleration of activity level   Outcome: Progressing     Problem: PAIN - ADULT  Goal: Verbalizes/displays adequate comfort level or baseline comfort level  Description: Interventions:  - Encourage patient to monitor pain and request assistance  - Assess pain using appropriate pain scale  - Administer analgesics based on type and severity of pain and evaluate response  - Implement non-pharmacological measures as appropriate and evaluate response  - Consider cultural and social influences on pain and pain management  - Notify physician/advanced practitioner if interventions unsuccessful or patient reports new pain  Outcome: Progressing     Problem: INFECTION - ADULT  Goal: Absence or prevention of progression during hospitalization  Description: INTERVENTIONS:  - Assess and monitor for signs and symptoms of infection  - Monitor lab/diagnostic results  - Monitor all insertion sites, i.e. indwelling lines, tubes, and drains  - Monitor endotracheal if appropriate and nasal secretions for changes in amount and color  - [place] appropriate cooling/warming therapies per order  - Administer medications as ordered  - Instruct and encourage patient and family to use good hand hygiene technique  - Identify and instruct in appropriate isolation precautions for identified infection/condition  Outcome: Progressing  Goal: Absence of fever/infection during neutropenic period  Description: INTERVENTIONS:  - Monitor WBC    Outcome: Progressing     Problem: SAFETY ADULT  Goal: Patient will remain free of falls  Description: INTERVENTIONS:  - Educate patient/family on patient safety including physical limitations  - Instruct patient to call for assistance with activity   - Consult OT/PT to assist with strengthening/mobility   - Keep Call bell within reach  - Keep bed low and locked with side rails adjusted as appropriate  - Keep care items and personal belongings within reach  - Initiate and maintain comfort rounds  - Make Fall Risk Sign visible to staff  - Offer Toileting every 2 Hours, in advance of need  - Initiate/Maintain bed/chair alarm  - Obtain necessary fall risk management equipment: alarms  - Apply yellow socks and bracelet for high fall risk patients  - Consider moving patient to room near nurses station  Outcome: Progressing  Goal: Maintain or return to baseline ADL function  Description: INTERVENTIONS:  -  Assess patient's ability to carry out ADLs; assess patient's baseline for ADL function and identify physical deficits which impact ability to perform ADLs (bathing, care of mouth/teeth, toileting, grooming, dressing, etc.)  - Assess/evaluate cause of self-care deficits   - Assess range of motion  - Assess patient's mobility; develop plan if impaired  - Assess patient's need for assistive devices and provide as appropriate  - Encourage maximum independence but intervene and supervise when necessary  - Involve family in performance of ADLs  - Assess for home care needs following discharge   - Consider OT consult to assist with ADL evaluation and planning for discharge  - Provide patient education as appropriate  Outcome: Progressing     Problem: DISCHARGE PLANNING  Goal: Discharge to home or other facility with appropriate resources  Description: INTERVENTIONS:  - Identify barriers to discharge w/patient and caregiver  - Arrange for needed discharge resources and transportation as appropriate  - Identify discharge learning needs (meds, wound care, etc.)  - Arrange for interpretive services to assist at discharge as needed  - Refer to Case Management Department for coordinating discharge planning if the patient needs post-hospital services based on physician/advanced practitioner order or complex needs related to functional status, cognitive ability, or social support system  Outcome: Progressing     Problem: Knowledge Deficit  Goal: Patient/family/caregiver demonstrates understanding of disease process, treatment plan, medications, and discharge instructions  Description: Complete learning assessment and assess knowledge base. Interventions:  - Provide teaching at level of understanding  - Provide teaching via preferred learning methods  Outcome: Progressing     Problem: Nutrition/Hydration-ADULT  Goal: Nutrient/Hydration intake appropriate for improving, restoring or maintaining nutritional needs  Description: Monitor and assess patient's nutrition/hydration status for malnutrition. Collaborate with interdisciplinary team and initiate plan and interventions as ordered. Monitor patient's weight and dietary intake as ordered or per policy. Utilize nutrition screening tool and intervene as necessary. Determine patient's food preferences and provide high-protein, high-caloric foods as appropriate.      INTERVENTIONS:  - Monitor oral intake, urinary output, labs, and treatment plans  - Assess nutrition and hydration status and recommend course of action  - Evaluate amount of meals eaten  - Assist patient with eating if necessary   - Allow adequate time for meals  - Recommend/ encourage appropriate diets, oral nutritional supplements, and vitamin/mineral supplements  - Order, calculate, and assess calorie counts as needed  - Recommend, monitor, and adjust tube feedings and TPN/PPN based on assessed needs  - Assess need for intravenous fluids  - Provide specific nutrition/hydration education as appropriate  - Include patient/family/caregiver in decisions related to nutrition  Outcome: Progressing

## 2023-08-07 ENCOUNTER — HOME HEALTH ADMISSION (OUTPATIENT)
Dept: HOME HEALTH SERVICES | Facility: HOME HEALTHCARE | Age: 73
End: 2023-08-07
Payer: MEDICARE

## 2023-08-07 LAB
ANION GAP SERPL CALCULATED.3IONS-SCNC: 2 MMOL/L
BASOPHILS # BLD AUTO: 0.11 THOUSANDS/ÂΜL (ref 0–0.1)
BASOPHILS NFR BLD AUTO: 1 % (ref 0–1)
BUN SERPL-MCNC: 17 MG/DL (ref 5–25)
CALCIUM SERPL-MCNC: 9.3 MG/DL (ref 8.3–10.1)
CHLORIDE SERPL-SCNC: 114 MMOL/L (ref 96–108)
CO2 SERPL-SCNC: 25 MMOL/L (ref 21–32)
CREAT SERPL-MCNC: 0.9 MG/DL (ref 0.6–1.3)
EOSINOPHIL # BLD AUTO: 0.66 THOUSAND/ÂΜL (ref 0–0.61)
EOSINOPHIL NFR BLD AUTO: 7 % (ref 0–6)
ERYTHROCYTE [DISTWIDTH] IN BLOOD BY AUTOMATED COUNT: 13.6 % (ref 11.6–15.1)
GFR SERPL CREATININE-BSD FRML MDRD: 64 ML/MIN/1.73SQ M
GLUCOSE SERPL-MCNC: 99 MG/DL (ref 65–140)
HCT VFR BLD AUTO: 36.8 % (ref 34.8–46.1)
HGB BLD-MCNC: 12 G/DL (ref 11.5–15.4)
IMM GRANULOCYTES # BLD AUTO: 0.05 THOUSAND/UL (ref 0–0.2)
IMM GRANULOCYTES NFR BLD AUTO: 1 % (ref 0–2)
LYMPHOCYTES # BLD AUTO: 1.71 THOUSANDS/ÂΜL (ref 0.6–4.47)
LYMPHOCYTES NFR BLD AUTO: 19 % (ref 14–44)
MCH RBC QN AUTO: 30.2 PG (ref 26.8–34.3)
MCHC RBC AUTO-ENTMCNC: 32.6 G/DL (ref 31.4–37.4)
MCV RBC AUTO: 93 FL (ref 82–98)
MONOCYTES # BLD AUTO: 0.7 THOUSAND/ÂΜL (ref 0.17–1.22)
MONOCYTES NFR BLD AUTO: 8 % (ref 4–12)
NEUTROPHILS # BLD AUTO: 5.65 THOUSANDS/ÂΜL (ref 1.85–7.62)
NEUTS SEG NFR BLD AUTO: 64 % (ref 43–75)
NRBC BLD AUTO-RTO: 0 /100 WBCS
PLATELET # BLD AUTO: 264 THOUSANDS/UL (ref 149–390)
PMV BLD AUTO: 9.5 FL (ref 8.9–12.7)
POTASSIUM SERPL-SCNC: 5 MMOL/L (ref 3.5–5.3)
RBC # BLD AUTO: 3.98 MILLION/UL (ref 3.81–5.12)
SODIUM SERPL-SCNC: 141 MMOL/L (ref 135–147)
WBC # BLD AUTO: 8.88 THOUSAND/UL (ref 4.31–10.16)

## 2023-08-07 PROCEDURE — 80048 BASIC METABOLIC PNL TOTAL CA: CPT | Performed by: NURSE PRACTITIONER

## 2023-08-07 PROCEDURE — 99232 SBSQ HOSP IP/OBS MODERATE 35: CPT

## 2023-08-07 PROCEDURE — 85025 COMPLETE CBC W/AUTO DIFF WBC: CPT | Performed by: NURSE PRACTITIONER

## 2023-08-07 PROCEDURE — 97110 THERAPEUTIC EXERCISES: CPT

## 2023-08-07 PROCEDURE — 99232 SBSQ HOSP IP/OBS MODERATE 35: CPT | Performed by: INTERNAL MEDICINE

## 2023-08-07 PROCEDURE — 97530 THERAPEUTIC ACTIVITIES: CPT

## 2023-08-07 PROCEDURE — 97535 SELF CARE MNGMENT TRAINING: CPT

## 2023-08-07 PROCEDURE — NC001 PR NO CHARGE: Performed by: SURGERY

## 2023-08-07 RX ORDER — METHOCARBAMOL 500 MG/1
500 TABLET, FILM COATED ORAL EVERY 8 HOURS PRN
Status: DISCONTINUED | OUTPATIENT
Start: 2023-08-07 | End: 2023-08-09 | Stop reason: HOSPADM

## 2023-08-07 RX ADMIN — ACETAMINOPHEN 975 MG: 325 TABLET, FILM COATED ORAL at 05:56

## 2023-08-07 RX ADMIN — LISINOPRIL 10 MG: 10 TABLET ORAL at 08:32

## 2023-08-07 RX ADMIN — MELATONIN 9 MG: at 21:21

## 2023-08-07 RX ADMIN — NICOTINE 1 PATCH: 21 PATCH, EXTENDED RELEASE TRANSDERMAL at 08:34

## 2023-08-07 RX ADMIN — CLOPIDOGREL BISULFATE 75 MG: 75 TABLET ORAL at 08:32

## 2023-08-07 RX ADMIN — Medication 250 MG: at 08:32

## 2023-08-07 RX ADMIN — VANCOMYCIN HYDROCHLORIDE 1250 MG: 10 INJECTION, POWDER, LYOPHILIZED, FOR SOLUTION INTRAVENOUS at 11:40

## 2023-08-07 RX ADMIN — CEFEPIME 2000 MG: 2 INJECTION, POWDER, FOR SOLUTION INTRAVENOUS at 22:37

## 2023-08-07 RX ADMIN — ATORVASTATIN CALCIUM 40 MG: 40 TABLET, FILM COATED ORAL at 17:06

## 2023-08-07 RX ADMIN — ACETAMINOPHEN 975 MG: 325 TABLET, FILM COATED ORAL at 21:22

## 2023-08-07 RX ADMIN — EZETIMIBE 10 MG: 10 TABLET ORAL at 08:32

## 2023-08-07 RX ADMIN — OXYCODONE HYDROCHLORIDE 5 MG: 5 TABLET ORAL at 05:56

## 2023-08-07 RX ADMIN — OXYCODONE HYDROCHLORIDE 5 MG: 5 TABLET ORAL at 17:06

## 2023-08-07 RX ADMIN — GABAPENTIN 600 MG: 300 CAPSULE ORAL at 21:22

## 2023-08-07 RX ADMIN — GABAPENTIN 600 MG: 300 CAPSULE ORAL at 17:09

## 2023-08-07 RX ADMIN — HEPARIN SODIUM 5000 UNITS: 5000 INJECTION INTRAVENOUS; SUBCUTANEOUS at 13:58

## 2023-08-07 RX ADMIN — OXYCODONE HYDROCHLORIDE 10 MG: 10 TABLET ORAL at 11:26

## 2023-08-07 RX ADMIN — ISOSORBIDE MONONITRATE 30 MG: 30 TABLET, EXTENDED RELEASE ORAL at 08:32

## 2023-08-07 RX ADMIN — METHOCARBAMOL 500 MG: 500 TABLET ORAL at 05:57

## 2023-08-07 RX ADMIN — Medication 250 MG: at 17:06

## 2023-08-07 RX ADMIN — METOPROLOL SUCCINATE 50 MG: 50 TABLET, EXTENDED RELEASE ORAL at 08:32

## 2023-08-07 RX ADMIN — LAMOTRIGINE 100 MG: 100 TABLET ORAL at 08:32

## 2023-08-07 RX ADMIN — DILTIAZEM HYDROCHLORIDE 120 MG: 60 TABLET, FILM COATED ORAL at 08:32

## 2023-08-07 RX ADMIN — CEFEPIME 2000 MG: 2 INJECTION, POWDER, FOR SOLUTION INTRAVENOUS at 10:32

## 2023-08-07 RX ADMIN — FOLIC ACID 1 MG: 1 TABLET ORAL at 08:32

## 2023-08-07 RX ADMIN — ROPINIROLE 0.5 MG: 0.25 TABLET, FILM COATED ORAL at 21:22

## 2023-08-07 RX ADMIN — HEPARIN SODIUM 5000 UNITS: 5000 INJECTION INTRAVENOUS; SUBCUTANEOUS at 05:56

## 2023-08-07 RX ADMIN — GABAPENTIN 600 MG: 300 CAPSULE ORAL at 08:32

## 2023-08-07 RX ADMIN — ACETAMINOPHEN 975 MG: 325 TABLET, FILM COATED ORAL at 13:58

## 2023-08-07 RX ADMIN — HEPARIN SODIUM 5000 UNITS: 5000 INJECTION INTRAVENOUS; SUBCUTANEOUS at 21:22

## 2023-08-07 NOTE — PROGRESS NOTES
08/07/23 1230   Pain Assessment   Pain Assessment Tool 0-10   Pain Location/Orientation Orientation: Left; Location: Leg   Restrictions/Precautions   Precautions Fall Risk   LLE Weight Bearing Per Order NWB   Braces or Orthoses   (residual limb wrapping)   Putting On/Taking Off Footwear   Type of Assistance Needed Independent   Physical Assistance Level No physical assistance   Putting On/Taking Off Footwear CARE Score 6   Sit to Lying   Type of Assistance Needed Independent   Physical Assistance Level No physical assistance   Sit to Lying CARE Score 6   Lying to Sitting on Side of Bed   Type of Assistance Needed Independent   Physical Assistance Level No physical assistance   Lying to Sitting on Side of Bed CARE Score 6   Sit to Stand   Type of Assistance Needed Independent   Physical Assistance Level No physical assistance   Sit to Stand CARE Score 6   Bed-Chair Transfer   Type of Assistance Needed Independent   Physical Assistance Level No physical assistance   Chair/Bed-to-Chair Transfer CARE Score 6   Toileting Hygiene   Type of Assistance Needed Independent   Physical Assistance Level No physical assistance   Comment w/ grab bar, BSC over toilet. Toileting Hygiene CARE Score 6   Toilet Transfer   Type of Assistance Needed Independent   Physical Assistance Level No physical assistance   Comment w/ grab bar   Toilet Transfer CARE Score 6   Exercise Tools   Theraband To maximize BUE strength for ADLs and fnxl mobility provided w/ red theraband and pt completed elbow flexion/extension, horizontal ABD/ADD, and diagonal flies to target shoulder ABD. Tolerated 3x10 well, plan to provide w/ [facility] HEP tomorrow. Cognition   Overall Cognitive Status Impaired   Comments benefits from repetition. Additional Activities   Additional Activities Comments Administered Buckner Depression Inventory w/ pt scoring 35 indicating severe depression, RN and MD made aware of same.  Pt selected 'I am disgusted with myself' and 'I blame myself for everything bad that happens.' Provided w/ encouragement and emotional support. Pt made aware of [facility] amputee support group and to notify family and MD if feelings persist.   Assessment   Treatment Assessment Pt seen for skilled OT session focusing on administration of Buckner Depression Inventory and assessing for daytime IRPs. Pt questioning admission date, sx date, and [facility] admission date to write in calendar book; provided w/ same and answered questions regarding process of [facility]. Pt progressed to daytime IRPs at w/c level when already in w/c and when disconnected from IV. Due to room set-up, pt to continue to call for staff to provide set-up for xfer EOB<>w/c and for times when connected to IV, pt agreeable to same. Plan for bed alarm overnight w/ set-up from staff PRN. Plan for ADL tomorrow and to provide BUE theraband HEP handout to A w/ carryover. From OT standpoint, pt is on track to d/c home w/ family support and HHOT to maximize fxnl indep and ease transition to home context. Pt requested to rest in bed, all needs within reach. Prognosis Good   Problem List Decreased endurance;Decreased range of motion; Impaired balance;Decreased mobility;Pain;Orthopedic restrictions   Recommendation   OT Discharge Recommendation Home with home health rehabilitation   OT Therapy Minutes   OT Time In 1230   OT Time Out 1330   OT Total Time (minutes) 60   OT Mode of treatment - Individual (minutes) 60   OT Mode of treatment - Concurrent (minutes) 0   OT Mode of treatment - Group (minutes) 0   OT Mode of treatment - Co-treat (minutes) 0   OT Mode of Treatment - Total time(minutes) 60 minutes   OT Cumulative Minutes 1000   Therapy Time missed   Time missed?  No

## 2023-08-07 NOTE — PROGRESS NOTES
08/07/23 0830   Pain Assessment   Pain Score 6   Pain Location/Orientation Orientation: Left; Location: Leg  (residual limb)   Hospital Pain Intervention(s) Repositioned; Rest   Restrictions/Precautions   Precautions Fall Risk;Pain;Supervision on toilet/commode   LLE Weight Bearing Per Order NWB   Cognition   Arousal/Participation Alert; Cooperative   Attention Within functional limits   Memory Decreased short term memory   Following Commands Follows one step commands without difficulty   Subjective   Subjective No new complaints reported. Worried that she has an appointment tomorrow to have a biopsy but don't know what to do because she is here. Nurse made aware and to let Dr. Car Pole aware   Roll Left and Right   Type of Assistance Needed Independent   Roll Left and Right CARE Score 6   Sit to Lying   Type of Assistance Needed Independent   Sit to Lying CARE Score 6   Lying to Sitting on Side of Bed   Type of Assistance Needed Independent   Lying to Sitting on Side of Bed CARE Score 6   Sit to Stand   Type of Assistance Needed Set-up / clean-up   Sit to Stand CARE Score 5   Bed-Chair Transfer   Type of Assistance Needed Independent   Comment modified SPT without AD  from bed<> chair. Needed verbal cues and assist to position w/c against nu step   Chair/Bed-to-Chair Transfer CARE Score 6   Transfer Bed/Chair/Wheelchair   Findings practiced SPT from w/c to regualr chair X 2 reps with patient demonstrating good safety with proper positioning of w/c   Walk 10 Feet   Reason if not Attempted Safety concerns   Walk 10 Feet CARE Score 88   Walk 50 Feet with Two Turns   Reason if not Attempted Safety concerns   Walk 50 Feet with Two Turns CARE Score 88   Walk 150 Feet   Reason if not Attempted Safety concerns   Walk 150 Feet CARE Score 88   Ambulation   Findings not focused today.  Limited to preserve R LE integrity   Stairs   Findings Pt. showed this therapist Ramp already installed by her son in their garage   Toilet Transfer   Type of Assistance Needed Supervision;Verbal cues   Comment Pt. forgot to put breaks on prior to transfers from w/c needing verbal cues   Toilet Transfer CARE Score 4   Therapeutic Interventions   Strengthening standing by window sill L hip abd and hip hip extension   Equipment Use   NuStep Level 2 for endurance /conditioning X 10 mins   Assessment   Treatment Assessment Trialed pt. on mod I w/c level this session from w/c<> bed and w/c<> toilet. Pt. demonstrated good safety except when she transferred from w/c to toilet, where she did not put her L breaks on. Will trial again next session and see if patient will demonstrates inc safety. Pt. unable to focus well this session due to being worried about her appointment scheduled for tomorrow for Oncology out patient ( biopsy) . Nurse made aware of this and Also made Dr. Cher Yepez made aware. Will cont with POC next session and will print patient HEP handout . Problem List Decreased endurance;Decreased range of motion; Impaired balance;Decreased mobility;Pain;Orthopedic restrictions   Barriers to Discharge None   Barriers to Discharge Comments Ramp already installed   PT Barriers   Functional Limitation Car transfers;Standing;Transfers; Walking; Wheelchair management;Ramp negotiation   Plan   Treatment/Interventions Functional transfer training;LE strengthening/ROM; Elevations; Therapeutic exercise; Endurance training;Patient/family training;Equipment eval/education; Bed mobility;Gait training   Recommendation   PT Discharge Recommendation Home with home health rehabilitation   Equipment Recommended Wheelchair   PT Therapy Minutes   PT Time In 0830   PT Time Out 0930   PT Total Time (minutes) 60   PT Mode of treatment - Individual (minutes) 60   PT Mode of treatment - Concurrent (minutes) 0   PT Mode of treatment - Group (minutes) 0   PT Mode of treatment - Co-treat (minutes) 0   PT Mode of Treatment - Total time(minutes) 60 minutes   PT Cumulative Minutes 930 Therapy Time missed   Time missed?  No

## 2023-08-07 NOTE — PROGRESS NOTES
Internal Medicine Progress Note  Patient: Marco A Rojas  Age/sex: 68 y.o. female  Medical Record #: [id number]      ASSESSMENT/PLAN: (Interval History)  Marco A Rojas is seen and examined and management for following issues:    S/p left AKA 7/21/23  • Hx severe PAD with B/L SC to FA bypass in past = has TO left fem-pop BPG  • Home:  Plavix/Crestor 20mg qd/Zetia 10mg qd  • Here:  Plavix/Zetia/Lipitor 40mg qd  • Pain/phantom pain management as per PMR  • Worsening erythema of the surgical wound  • Vascular surgery started IV Vanco and Cefepime  • D/w surgery this am we will finish 10 day course which ends up being Wednesday  • Close f/u with vascular surgery on dc  • Improved=refer to images     Coronary artery disease  • S/p CABG in 2000  • TTE on 1/26/2023 with EF of 60%, mild to moderate AI, mild AS, estimated PA pressure of 40 mmHg  • Nuclear pharmacological stress test on 1/26/2023 with anterior wall perfusion defect likely artifact/cannot exclude small area of ischemia, normal LV systolic function.   • Continue Plavix/Zetia 10mg qd/Imdur     Hypertension  • Home: Diltiazem 120 mg qd/Amlodipine 5 mg qd/Imdur 30 mg qd/Lisinopril 10 mg/ Toprol-XL 50 mg qd  • Here:  Diltiazem 120mg qd/Imdur 30mg qd/Lisinopril 10mg qd (hold SBP <130)/Toprol 50mg qd  • BP stable.     Hyperlipidemia  • Continue atorvastatin and Zetia here  • Follow-up with outpatient cardiology for possible Repatha     Paroxysmal atrial fibrillation  • Currently in sinus rhythm  • S/p a 2-week Zio patch in March 2023 with 5 episodes of SVT without other significant arrhythmia  • Continue Diltiazem 120 mg daily, Toprol XL 50 mg daily     DVT/IVC filter  • Hx lower extremity DVT/IVC filter  • IVC filter is still present on PET scan 7/7/2023     Tobacco use  • Continues to smoke 1 to 1.5 packs/day  • Did not tolerate Wellbutrin/Chantix in the past  • Counseled for smoking cessation but not ready to quit  • Nicotine patch here 21 mg     COPD  • CT chest with evidence of emphysema  • PFT in June 2023: Moderate obstruction, no reversibility with bronchodilator   • Was recommended Trelegy and rescue inhaler, but she didn't want it; using Albuterol prn. • Continues on RA with O2 sats 91-92%. • Recommend outpatient follow-up with pulmonology and smoking cessation     Right perihilar mass   • On CT imaging 7/18/23  • Planned for biopsy on 8/14 by thoracic surgeon    Restless leg  · Started ropinirole 8/1/23  · Not new for her she has taken OTC things in the past  · Improved      CKD II  • Creatinine stable at 0.86  • Has likely nonfunctioning right kidney  • Avoid nephrotoxic agents, hypotension     Bipolar disease  • continue Lamictal 100 mg daily  • Stable        Discharge date: Tentative DC 8/9/23 pending abx plan. The above assessment and plan was reviewed and updated as determined by my evaluation of the patient on 8/7/2023.     Labs:   Results from last 7 days   Lab Units 08/07/23  0532 08/03/23  0602   WBC Thousand/uL 8.88 9.17   HEMOGLOBIN g/dL 12.0 12.5   HEMATOCRIT % 36.8 38.8   PLATELETS Thousands/uL 264 304     Results from last 7 days   Lab Units 08/07/23  0532 08/05/23  0538   SODIUM mmol/L 141 136   POTASSIUM mmol/L 5.0 4.6   CHLORIDE mmol/L 114* 110*   CO2 mmol/L 25 21   BUN mg/dL 17 17   CREATININE mg/dL 0.90 0.84   CALCIUM mg/dL 9.3 9.2                   Review of Scheduled Meds:  Current Facility-Administered Medications   Medication Dose Route Frequency Provider Last Rate   • acetaminophen  975 mg Oral Q8H [address] LEV Turner     • albuterol  2 puff Inhalation Q6H PRN LEV Turner     • atorvastatin  40 mg Oral Daily With LEV Marcano     • bisacodyl  10 mg Rectal Daily PRN LEV Gillette     • cefepime  2,000 mg Intravenous Q12H Hoda Castillo PA-C 100 mL/hr at 08/06/23 2329   • clopidogrel  75 mg Oral Daily LEV Turner     • diltiazem  120 mg Oral Daily LEV Turner     • docusate sodium  100 mg Oral BID LEV Fortune     • ezetimibe  10 mg Oral Daily LEV Turner     • folic acid  1 mg Oral Daily LEV Turner     • gabapentin  600 mg Oral TID LEV Fortune     • heparin (porcine)  5,000 Units Subcutaneous Q8H [address] LEV Turner     • isosorbide mononitrate  30 mg Oral QAM LEV Turner     • lamoTRIgine  100 mg Oral Daily LEV Turner     • lisinopril  10 mg Oral Daily [place] LEV Ricks     • melatonin  9 mg Oral HS LEV Turner     • methocarbamol  500 mg Oral [facility] Rose Garza MD     • metoprolol succinate  50 mg Oral Daily LEV Turner     • nicotine  1 patch Transdermal Daily LEV Turner     • ondansetron  4 mg Oral Q6H PRN LEV Fortune     • oxyCODONE  5 mg Oral Q4H PRN Rose Garza MD      Or   • oxyCODONE  10 mg Oral Q4H PRN Rsoe Garza MD     • polyethylene glycol  17 g Oral Daily PRN LEV Turner     • rOPINIRole  0.5 mg Oral HS LEV Galdamez     • saccharomyces boulardii  250 mg Oral BID Carolyn Akhtar MD     • traZODone  50 mg Oral HS PRN LEV Fortune     • vancomycin  1,250 mg Intravenous Q24H Julian Ibarra PA-C Stopped (08/06/23 1250)       Subjective/ HPI: Patient seen and examined. Patients overnight issues or events were reviewed with nursing or staff during rounds or morning huddle session. New or overnight issues include the following:     Pt seen in bed. Currently undergoing dressing change with surgery who feels she is fine for dc on Wednesday with completing the abx at that time as well for a 10 day course      ROS:   A 10 point ROS was performed; negative except as noted above. Imaging:     XR femur 2 vw left   Final Result by Nicholas Tierney MD (07/31 1550)      Postsurgical changes in left above-the-knee amputation. No x-ray findings suggesting osteomyelitis at this time.             Resident: Clayton Heranndez, the attending radiologist, have reviewed the images and agree with the final report above. Workstation performed: [id number]             *Labs /Radiology studies reviewed  *Medications reviewed and reconciled as needed  *Please refer to order section for additional ordered labs studies  *Case discussed with primary attending during morning huddle case rounds    Physical Examination:  Vitals:   Vitals:    08/06/23 1555 08/06/23 2148 08/07/23 0531 08/07/23 0832   BP: 110/56 112/61 147/68 126/59   BP Location: Left arm Left arm Right arm    Pulse: 58 59 60 72   Resp: 16 18 18    Temp: 97.7 °F (36.5 °C) 97.7 °F (36.5 °C) 98.9 °F (37.2 °C)    TempSrc: Oral Oral Oral    SpO2: 94% 93% 93%    Weight:       Height:           GEN: NAD; pleasant  NEURO: Alert and oriented x4; appropriate  HEENT: Pupils are equal/reactive; normocephalic, face is symmetrical, hearing is normal  CV: S1 S2 regular, no murmur/rub/gallops, 1/4 pedal pulse RLE, no LE edema present  RESP: Lungs are clear bilaterally, no wheezes rales or rhonchi, on room air, no distress, respirations are easy and non labored  GI: Abdomen is obese, soft, non tender, +BS x4; non distended  : Voiding without issues  MUSC: Moves all extremities except LLE amputation  SKIN: pink, warm, normal turgor, L stump staples intact, ongoing mild erythema as well as some mild bloody drainage on medial aspect      The above physical exam was reviewed and updated as determined by my evaluation of the patient on 8/7/2023. Invasive Devices     Peripheral Intravenous Line  Duration           Peripheral IV 08/06/23 Dorsal (posterior); Left Forearm <1 day                   VTE Pharmacologic Prophylaxis: Heparin  Code Status: Level 1 - Full Code  Current Length of Stay: 13 day(s)      Total time spent:  30 minutes with more than 50% spent counseling/coordinating care.  Counseling includes discussion with patient re: progress  and discussion with patient of his/her current medical state/information. Coordination of patient's care was performed in conjunction with primary service. Time invested included review of patient's labs, vitals, and management of their comorbidities with continued monitoring. In addition, this patient was discussed with medical team including physician and advanced extenders. The care of the patient was extensively discussed and appropriate treatment plan was formulated unique for this patient. Medical decision making for the day was made by supervising physician unless otherwise noted in their attestation statement. ** Please Note:  voice to text software may have been used in the creation of this document.  Although proof errors in transcription or interpretation are a potential of such software**

## 2023-08-07 NOTE — PROGRESS NOTES
08/07/23 1030   Pain Assessment   Pain Score 7   Pain Location/Orientation Orientation: Left; Location: Leg   Hospital Pain Intervention(s) Repositioned; Rest  (nurse made aware)   Restrictions/Precautions   Precautions Fall Risk  (IV for Abx  ongoing  this session)   LLE Weight Bearing Per Order NWB   Cognition   Arousal/Participation Alert; Cooperative   Attention Within functional limits   Memory Decreased short term memory   Following Commands Follows one step commands without difficulty   Subjective   Subjective Pt. c/o inc pain this session, otherwise no new complaints   Roll Left and Right   Type of Assistance Needed Independent   Roll Left and Right CARE Score 6   Sit to Lying   Type of Assistance Needed Independent   Sit to Lying CARE Score 6   Lying to Sitting on Side of Bed   Type of Assistance Needed Independent   Lying to Sitting on Side of Bed CARE Score 6   Sit to Stand   Type of Assistance Needed Independent   Sit to Stand CARE Score 6   Bed-Chair Transfer   Type of Assistance Needed Independent   Comment modified SPT with no AD   Chair/Bed-to-Chair Transfer CARE Score 6   Transfer Bed/Chair/Wheelchair   Findings (S)  Trialed IRP this session and did a lot better  with good safety. WIll communicate with OT see if they are in agreement on progressing to IRP today   Toilet Transfer   Type of Assistance Needed Independent   Comment using grab bar   Toilet Transfer CARE Score 6   Therapeutic Interventions   Strengthening Instructed with HEP with printed handout for mat program including prone gluteal squeezes, hip extension , prone push ups, R side lying hip abduction , abdominal curnches, post pelvic tilt, bridging, hip adduction squeeze, R heel cord stretching and R hamstring stretching. Pt. did good return demo of all exercises 2-3 sets of 10   Other Comments   Comments Re wrapped residual limb wrap again this session .    Assessment   Treatment Assessment Trialed again being IRP w/c level this session as pt. wanted to use bathroom prior to starting IV. Pt. did much better this time with good safety and no cueing needed for set up or breaks management. Will have OT check off again patient on their session but otherwise pt. can be progressed to IRP during the day. Session also focused on instructing HEP as above with pt. able to tolerate all exercises with rest prn. Pt. though c/o inc pain on residula limb after session with nurse made aware. Educated patient about importance of doing these exercises and limb wrapping  at home in preparation for prosthetic training in the near future. Pt. stayed in sitting EOB at end of session with all needs in place. Problem List Decreased endurance;Decreased range of motion; Impaired balance;Decreased mobility;Pain;Orthopedic restrictions   Barriers to Discharge None   PT Barriers   Functional Limitation Car transfers;Standing;Transfers; Walking; Wheelchair management;Ramp negotiation   Plan   Treatment/Interventions Functional transfer training;LE strengthening/ROM; Endurance training; Therapeutic exercise;Elevations; Patient/family training;Gait training;Bed mobility; Equipment eval/education   Recommendation   PT Discharge Recommendation Home with home health rehabilitation   PT Therapy Minutes   PT Time In 1030   PT Time Out 1130   PT Total Time (minutes) 60   PT Mode of treatment - Individual (minutes) 60   PT Mode of treatment - Concurrent (minutes) 0   PT Mode of treatment - Group (minutes) 0   PT Mode of treatment - Co-treat (minutes) 0   PT Mode of Treatment - Total time(minutes) 60 minutes   PT Cumulative Minutes 990   Therapy Time missed   Time missed?  No

## 2023-08-07 NOTE — PROGRESS NOTES
08/07/23 0830   Pain Assessment   Pain Score 6   Pain Location/Orientation Orientation: Left; Location: Leg  (residual limb)   Hospital Pain Intervention(s) Repositioned; Rest   Restrictions/Precautions   Precautions Fall Risk;Pain;Supervision on toilet/commode   LLE Weight Bearing Per Order NWB   Cognition   Arousal/Participation Alert; Cooperative   Attention Within functional limits   Memory Decreased short term memory   Following Commands Follows one step commands without difficulty   Subjective   Subjective No new complaints reported. Worried that she has an appointment tomorrow to have a biopsy but don't know what to do because she is here. Nurse made aware and to let Dr. Maki Solano aware   Roll Left and Right   Type of Assistance Needed Independent   Roll Left and Right CARE Score 6   Sit to Lying   Type of Assistance Needed Independent   Sit to Lying CARE Score 6   Lying to Sitting on Side of Bed   Type of Assistance Needed Independent   Lying to Sitting on Side of Bed CARE Score 6   Sit to Stand   Type of Assistance Needed Set-up / clean-up   Sit to Stand CARE Score 5   Bed-Chair Transfer   Type of Assistance Needed Independent   Comment modified SPT without AD  from bed<> chair. Needed verbal cues and assist to position w/c against nu step   Chair/Bed-to-Chair Transfer CARE Score 6   Transfer Bed/Chair/Wheelchair   Findings practiced SPT from w/c to regualr chair X 2 reps with patient demonstrating good safety with proper positioning of w/c   Walk 10 Feet   Reason if not Attempted Safety concerns   Walk 10 Feet CARE Score 88   Walk 50 Feet with Two Turns   Reason if not Attempted Safety concerns   Walk 50 Feet with Two Turns CARE Score 88   Walk 150 Feet   Reason if not Attempted Safety concerns   Walk 150 Feet CARE Score 88   Ambulation   Findings not focused today.  Limited to preserve R LE integrity   Wheel 50 Feet with Two Turns   Type of Assistance Needed Independent   Wheel 50 Feet with Two Turns CARE Score 6   Wheel 150 Feet   Type of Assistance Needed Independent   Wheel 150 Feet CARE Score 6   Wheelchair mobility   Does the patient use a wheelchair? 1. Yes   Type of Wheelchair Used 1. Manual   Method Right upper extremity; Left upper extremity   Distance Level Surface (feet) 400 ft   Stairs   Findings Pt. showed this therapist Ramp already installed by her son in their garage   Toilet Transfer   Type of Assistance Needed Supervision;Verbal cues   Comment Pt. forgot to put breaks on prior to transfers from w/c needing verbal cues   Toilet Transfer CARE Score 4   Therapeutic Interventions   Strengthening standing by window sill L hip abd and hip hip extension   Other re wrapped ace wrap for residual limb this session   Equipment Use   NuStep Level 2 for endurance /conditioning X 10 mins   Assessment   Treatment Assessment Trialed pt. on mod I w/c level this session from w/c<> bed and w/c<> toilet. Pt. demonstrated good safety except when she transferred from w/c to toilet, where she did not put her L breaks on. Will trial again next session and see if patient will demonstrates inc safety. Pt. unable to focus well this session due to being worried about her appointment scheduled for tomorrow for Oncology out patient ( biopsy) . Nurse made aware of this and Also made Dr. Javier Dobson made aware. Will cont with POC next session and will print patient HEP handout . Problem List Decreased endurance;Decreased range of motion; Impaired balance;Decreased mobility;Pain;Orthopedic restrictions   Barriers to Discharge None   Barriers to Discharge Comments Ramp already installed   PT Barriers   Functional Limitation Car transfers;Standing;Transfers; Walking; Wheelchair management;Ramp negotiation   Plan   Treatment/Interventions Functional transfer training;LE strengthening/ROM; Elevations; Therapeutic exercise; Endurance training;Patient/family training;Equipment eval/education; Bed mobility;Gait training   Recommendation   PT Discharge Recommendation Home with home health rehabilitation   Equipment Recommended Wheelchair   PT Therapy Minutes   PT Time In 0830   PT Time Out 0930   PT Total Time (minutes) 60   PT Mode of treatment - Individual (minutes) 60   PT Mode of treatment - Concurrent (minutes) 0   PT Mode of treatment - Group (minutes) 0   PT Mode of treatment - Co-treat (minutes) 0   PT Mode of Treatment - Total time(minutes) 60 minutes   PT Cumulative Minutes 930   Therapy Time missed   Time missed?  No

## 2023-08-07 NOTE — PROGRESS NOTES
Progress Note - Vascular Surgery  : [facility] Vascular Surgery on Kelley Lees 68 y.o. female MRN: [id number]  Unit/Bed#: [facility] 457-01 Encounter: [id number]    Assessment:  68 y.o. female s/p L AKA 7/21/2023, escalated abx to cefepime/vancomycin on 8/3 due to incision erythema    Stump check today, stable mild erythema      Plan:  Continue IV abx  Daily nursing dressing changes  Will discuss benefits of removing several staples today to evaluate for drainage    Subjective/Objective     Subjective: No complaints      Objective:       Physical Exam:  GEN: NAD  HEENT: MMM  CV: RRR  Lung: Normal effort  Ab: Soft, ND/NT   Neuro: A+Ox3     L AKA stump CDI, mild erythema                  Vitals:  /56 (BP Location: Left arm)   Pulse 58   Temp 97.7 °F (36.5 °C) (Oral)   Resp 16   Ht 5' 4" (1.626 m)   Wt 74.2 kg (163 lb 9.3 oz)   SpO2 94%   BMI 28.08 kg/m²     I/Os:  I/O last 3 completed shifts: In: 6574 [P.O.:1620]  Out: -   No intake/output data recorded.     Lab Results and Cultures:   Lab Results   Component Value Date    WBC 9.17 08/03/2023    HGB 12.5 08/03/2023    HCT 38.8 08/03/2023    MCV 93 08/03/2023     08/03/2023     Lab Results   Component Value Date    CALCIUM 9.2 08/05/2023    K 4.6 08/05/2023    CO2 21 08/05/2023     (H) 08/05/2023    BUN 17 08/05/2023    CREATININE 0.84 08/05/2023     Lab Results   Component Value Date    INR 0.92 07/18/2023    PROTIME 12.5 07/18/2023        Blood Culture: No results found for: "Orourke Blayne",   Urinalysis: No results found for: "COLORU", "CLARITYU", "Lanice Allie", "PHUR", "LEUKOCYTESUR", "NITRITE", "Jhonnie Siad", "GLUCOSEU", "Nancy Ruffing", "Asmita Sands", "BLOODU",   Urine Culture: No results found for: "Lc Patricio",   Wound Culure: No results found for: "WOUNDCULT"    Medications:  Current Facility-Administered Medications   Medication Dose Route Frequency   • acetaminophen (TYLENOL) tablet 975 mg  975 mg Oral Q8H [address]   • albuterol (PROVENTIL HFA,VENTOLIN HFA) inhaler 2 puff  2 puff Inhalation Q6H PRN   • atorvastatin (LIPITOR) tablet 40 mg  40 mg Oral Daily With Dinner   • bisacodyl (DULCOLAX) rectal suppository 10 mg  10 mg Rectal Daily PRN   • cefepime (MAXIPIME) 2 g/50 mL dextrose IVPB  2,000 mg Intravenous Q12H   • clopidogrel (PLAVIX) tablet 75 mg  75 mg Oral Daily   • diltiazem (CARDIZEM) tablet 120 mg  120 mg Oral Daily   • docusate sodium (COLACE) capsule 100 mg  100 mg Oral BID   • ezetimibe (ZETIA) tablet 10 mg  10 mg Oral Daily   • folic acid (FOLVITE) tablet 1 mg  1 mg Oral Daily   • gabapentin (NEURONTIN) capsule 600 mg  600 mg Oral TID   • heparin (porcine) subcutaneous injection 5,000 Units  5,000 Units Subcutaneous Q8H [address]   • isosorbide mononitrate (IMDUR) 24 hr tablet 30 mg  30 mg Oral QAM   • lamoTRIgine (LaMICtal) tablet 100 mg  100 mg Oral Daily   • lisinopril (ZESTRIL) tablet 10 mg  10 mg Oral Daily   • melatonin tablet 9 mg  9 mg Oral HS   • methocarbamol (ROBAXIN) tablet 500 mg  500 mg Oral Q8H [address]   • metoprolol succinate (TOPROL-XL) 24 hr tablet 50 mg  50 mg Oral Daily   • nicotine (NICODERM CQ) 21 mg/24 hr TD 24 hr patch 1 patch  1 patch Transdermal Daily   • ondansetron (ZOFRAN-ODT) dispersible tablet 4 mg  4 mg Oral Q6H PRN   • oxyCODONE (ROXICODONE) IR tablet 5 mg  5 mg Oral Q4H PRN    Or   • oxyCODONE (ROXICODONE) immediate release tablet 10 mg  10 mg Oral Q4H PRN   • polyethylene glycol (MIRALAX) packet 17 g  17 g Oral Daily PRN   • rOPINIRole (REQUIP) tablet 0.5 mg  0.5 mg Oral HS   • saccharomyces boulardii (FLORASTOR) capsule 250 mg  250 mg Oral BID   • traZODone (DESYREL) tablet 50 mg  50 mg Oral HS PRN   • vancomycin (VANCOCIN) 1,250 mg in sodium chloride 0.9 % 250 mL IVPB  1,250 mg Intravenous Q24H           Marvin Broussard MD  8/6/2023

## 2023-08-07 NOTE — PLAN OF CARE
Problem: Prexisting or High Potential for Compromised Skin Integrity  Goal: Skin integrity is maintained or improved  Description: INTERVENTIONS:  - Identify patients at risk for skin breakdown  - Assess and monitor skin integrity  - Assess and monitor nutrition and hydration status  - Monitor labs   - Assess for incontinence   - Turn and reposition patient  - Assist with mobility/ambulation  - Relieve pressure over bony prominences  - Avoid friction and shearing  - Provide appropriate hygiene as needed including keeping skin clean and dry  - Evaluate need for skin moisturizer/barrier cream  - Collaborate with interdisciplinary team   - Patient/family teaching  - Consider wound care consult   Outcome: Progressing     Problem: MOBILITY - ADULT  Goal: Maintain or return to baseline ADL function  Description: INTERVENTIONS:  -  Assess patient's ability to carry out ADLs; assess patient's baseline for ADL function and identify physical deficits which impact ability to perform ADLs (bathing, care of mouth/teeth, toileting, grooming, dressing, etc.)  - Assess/evaluate cause of self-care deficits   - Assess range of motion  - Assess patient's mobility; develop plan if impaired  - Assess patient's need for assistive devices and provide as appropriate  - Encourage maximum independence but intervene and supervise when necessary  - Involve family in performance of ADLs  - Assess for home care needs following discharge   - Consider OT consult to assist with ADL evaluation and planning for discharge  - Provide patient education as appropriate  Outcome: Progressing  Goal: Maintains/Returns to pre admission functional level  Description: INTERVENTIONS:  - Set and communicate daily mobility goal to care team and patient/family/caregiver.    - Collaborate with rehabilitation services on mobility goals if consulted  - Out of bed for toileting  - Record patient progress and toleration of activity level   Outcome: Progressing Problem: PAIN - ADULT  Goal: Verbalizes/displays adequate comfort level or baseline comfort level  Description: Interventions:  - Encourage patient to monitor pain and request assistance  - Assess pain using appropriate pain scale  - Administer analgesics based on type and severity of pain and evaluate response  - Implement non-pharmacological measures as appropriate and evaluate response  - Consider cultural and social influences on pain and pain management  - Notify physician/advanced practitioner if interventions unsuccessful or patient reports new pain  Outcome: Progressing     Problem: INFECTION - ADULT  Goal: Absence or prevention of progression during hospitalization  Description: INTERVENTIONS:  - Assess and monitor for signs and symptoms of infection  - Monitor lab/diagnostic results  - Monitor all insertion sites, i.e. indwelling lines, tubes, and drains  - Monitor endotracheal if appropriate and nasal secretions for changes in amount and color  - [place] appropriate cooling/warming therapies per order  - Administer medications as ordered  - Instruct and encourage patient and family to use good hand hygiene technique  - Identify and instruct in appropriate isolation precautions for identified infection/condition  Outcome: Progressing  Goal: Absence of fever/infection during neutropenic period  Description: INTERVENTIONS:  - Monitor WBC    Outcome: Progressing     Problem: SAFETY ADULT  Goal: Patient will remain free of falls  Description: INTERVENTIONS:  - Educate patient/family on patient safety including physical limitations  - Instruct patient to call for assistance with activity   - Consult OT/PT to assist with strengthening/mobility   - Keep Call bell within reach  - Keep bed low and locked with side rails adjusted as appropriate  - Keep care items and personal belongings within reach  - Initiate and maintain comfort rounds  - Make Fall Risk Sign visible to staff  - Offer Toileting every 4 Hours, in advance of need  - Initiate/Maintain bed and chair alarm  - Apply yellow socks and bracelet for high fall risk patients  - Consider moving patient to room near nurses station  Outcome: Progressing  Goal: Maintain or return to baseline ADL function  Description: INTERVENTIONS:  -  Assess patient's ability to carry out ADLs; assess patient's baseline for ADL function and identify physical deficits which impact ability to perform ADLs (bathing, care of mouth/teeth, toileting, grooming, dressing, etc.)  - Assess/evaluate cause of self-care deficits   - Assess range of motion  - Assess patient's mobility; develop plan if impaired  - Assess patient's need for assistive devices and provide as appropriate  - Encourage maximum independence but intervene and supervise when necessary  - Involve family in performance of ADLs  - Assess for home care needs following discharge   - Consider OT consult to assist with ADL evaluation and planning for discharge  - Provide patient education as appropriate  Outcome: Progressing  Goal: Maintains/Returns to pre admission functional level  Description: INTERVENTIONS:  - Set and communicate daily mobility goal to care team and patient/family/caregiver.    - Collaborate with rehabilitation services on mobility goals if consulted  - Out of bed for toileting  - Record patient progress and toleration of activity level   Outcome: Progressing     Problem: DISCHARGE PLANNING  Goal: Discharge to home or other facility with appropriate resources  Description: INTERVENTIONS:  - Identify barriers to discharge w/patient and caregiver  - Arrange for needed discharge resources and transportation as appropriate  - Identify discharge learning needs (meds, wound care, etc.)  - Arrange for interpretive services to assist at discharge as needed  - Refer to Case Management Department for coordinating discharge planning if the patient needs post-hospital services based on physician/advanced practitioner order or complex needs related to functional status, cognitive ability, or social support system  Outcome: Progressing     Problem: Knowledge Deficit  Goal: Patient/family/caregiver demonstrates understanding of disease process, treatment plan, medications, and discharge instructions  Description: Complete learning assessment and assess knowledge base. Interventions:  - Provide teaching at level of understanding  - Provide teaching via preferred learning methods  Outcome: Progressing     Problem: Nutrition/Hydration-ADULT  Goal: Nutrient/Hydration intake appropriate for improving, restoring or maintaining nutritional needs  Description: Monitor and assess patient's nutrition/hydration status for malnutrition. Collaborate with interdisciplinary team and initiate plan and interventions as ordered. Monitor patient's weight and dietary intake as ordered or per policy. Utilize nutrition screening tool and intervene as necessary. Determine patient's food preferences and provide high-protein, high-caloric foods as appropriate.      INTERVENTIONS:  - Monitor oral intake, urinary output, labs, and treatment plans  - Assess nutrition and hydration status and recommend course of action  - Evaluate amount of meals eaten  - Assist patient with eating if necessary   - Allow adequate time for meals  - Recommend/ encourage appropriate diets, oral nutritional supplements, and vitamin/mineral supplements  - Order, calculate, and assess calorie counts as needed  - Recommend, monitor, and adjust tube feedings and TPN/PPN based on assessed needs  - Assess need for intravenous fluids  - Provide specific nutrition/hydration education as appropriate  - Include patient/family/caregiver in decisions related to nutrition  Outcome: Progressing

## 2023-08-07 NOTE — PROGRESS NOTES
PM&R PROGRESS NOTE:  Debbie Haddad 68 y.o. female MRN: [id number]  Unit/Bed#: -01 Encounter: [id number]    Rehabilitation Diagnosis: Impairment of mobility, safety and Activities of Daily Living (ADLs) due to Amputation:  05.3  Unilateral Lower Limb Above the Knee    HPI: Nixon Mcguire is a 68 y.o. female with a medical history of CAD, hypertension, CABG, vascular bypass surgeries, PAD, aortoiliac occlusive disease, tobacco abuse (1 ppd x 60+yrs), PAF/SVT (no AC), LLE DVT s/p IVC filter, RUL lung mass, stage III CKD who presented to [address] [place] [address] on 07/18 with left foot pain. Patient states that she noticed that she had increased pain over the left foot in approximately 2 days ago and is acutely worse over the last 24 hours, accompanied by numbness, and color change of her foot. On exam, patient's left foot is bluish discoloration, swollen, no palpable or doppler DP or PT pulses. Patient still has motor of her foot (is able to wiggle toes, move her foot up and down) but sensation is significantly decreased, is able to feel slight pressure. CT angiogram with multiple inflow graft occlusions on the left. The right to left component of the right axillobifemoral graft is occluded. The left axillary to profunda graft is occluded and a segment is excised. The aorta to left common femoral and jump graft from the alex of the common femoral artery anastomosis to below-knee popliteal artery is also occluded. The deep femoral artery beyond the previous anastomosis does reconstitute but is secondary or tertiary branch that is small at less than 2 mm. There are no other named vessels that reconstitute within the upper leg. There does appear to be reconstitution of tibial vessels beyond the trifurcation but again these vessels are less than 2 mm in diameter. Vascular surgery was consulted, recommending L AKA.  Patient was placed on heparin gtt and APS was consulted, patient now on oral analgesics. On 07/21 patient underwent a left AKA with Dr. Ghada Kelly, EBL minimal. Patient is non-weight bearing left lower extremity. PT/OT were consulted and recommend inpatient acute rehabilitation. PT/OT were consulted and recommend acute inpatient rehabilitation. The patient was evaluated by the Rehabilitation team and deemed an appropriate candidate for comprehensive inpatient rehabilitation and admitted to the [facility] on 7/25/2023  2:33 PM       SUBJECTIVE: Patient seen face to face. No acute issues overnight. Patient is in good spirits, happy with progress in therapy. Concerned about incisional infection. Discussed IV antibiotics until discharged, then oral antibiotics for home with close follow-up with vascular surgery. Good appetite, voiding, LBM 8/6. Denies chest pain, shortness of breath, fever, chills, N/V, abdominal pain. ASSESSMENT: Stable, progressing    PLAN:  - incisional redness: Antibiotic plan to continue IV through discharge, then transition to oral for home with close follow-up with vascular  - pain well controlled  - review medications for discharge 8/8  - continue medical and rehabilitation plan of care    Rehabilitation  • Functional deficits:  LLE NWB, mobility, self-care  • Continue current rehabilitation plan of care to maximize function.     • Functional update:   • PT: mobility- independent, transfers- set-up, ambulation- RW 10', wheelchair mobility- independent 600', stairs- min A 1   • OT: ADL: bathing- supervision, dressing- UB independent LB min A, footwear- set up, toileting- supervision  • Estimated Discharge: anticipated 8/9 with home RN, PT, OT    Pain  • Left leg  • Tylenol 975 mg every 8 hours   • Gabapentin 300 mg BID, 400 mg hs  • Robaxin 500 mg every 6 hours  • Oxycodone 5-10 mg every 4 hours prn    DVT prophylaxis  • Heparin sc    Bladder plan  • incontinent    Bowel plan  • Continent  • Colace 100 mg BID  • Miralax daily prn  • Bisacodyl suppository daily prn      * Hx of AKA (above knee amputation), left [facility])  Assessment & Plan  - 7/18 pt admitted with increased foot pain over 2 days with change of color and numbness found to have critical limb ischemia   -CT angiogram: multiple inflow graft occlusions on the left. The right to left component of the right axillobifemoral graft is occluded. The left axillary to profunda graft is occluded and a segment is excised. The aorta to left common femoral and jump graft from the alex of the common femoral artery anastomosis to below-knee popliteal artery is also occluded. The deep femoral artery beyond the previous anastomosis does reconstitute but is secondary or tertiary branch that is small at less than 2 mm. There are no other named vessels that reconstitute within the upper leg. There does appear to be reconstitution of tibial vessels beyond the trifurcation but again these vessels are less than 2 mm in diameter.  - Vascular surgery s/p AKA 7/21 by Dr. Zeina Owens  - Plavix, optimal BP control, statin  - NWB LLE  - Monitor CBC intermittently   - Optimal ROM to avoid/decrease risk of contractures  - Monitor incision/area for infection or dehiscence/impaired healing    - 7/27 slight latia-incisional erythema - recommended [facility] sx c/s who saw patient on 7/27 and reported no concerns for infection at that    - thigh DTI possibly from overly tight ace wrap that was POA to [facility] - wound care c/s 7/27 - allevyn foam dressing; use Kerlex only for a few days and then go back to ACE but avoid overly tightening   - 7/28 slight latia-incisional erythema again today but stable without increased warmth, tenderness, or drainage - continue close monitoring , started on Keflex and probiotic continue to monitor with daily incisional site imaging/media. Discussed with nursing   - 8/3 increased latia-incisional erythema, Vascular surgery made aware. Antibiotics escalated to vancomycin and cefepime.  VS monitoring for few days before final decision on discharge plan. Continue daily dressing changes. - It is normal to have some swelling or discoloration around the incision initially post-operatively. If develops increasing redness, tenderness/pain, discharge, or dehiscence develops contact surgical service   - Wound care of incision site per vasc sx   - WBC stable mildly elevated at 10 K on 7/27 > monitor intermittently  - Monitor for signs/symptoms of VTE, atelectasis, acute blood loss anemia, or other infection   - Patient (if applicable caregiver) training and education on amputation, possible phantom symptoms, recovery process  - Neuropsychology consult, supportive counseling  - Optimal pain control  - Monitor contralateral limb closely for concerning s/s   - Fall Precautions   - Tolerating ARC setting adequately   - Continue acute comprehensive interdisciplinary inpatient rehabilitation to include intensive skilled therapies (PT, OT) as outlined with oversight and management by rehabilitation physician as well as inpatient rehab level nursing, case management and weekly interdisciplinary team meetings.    - Follow-up with PMR and Vas Sx after discharge    PAD (peripheral artery disease) ([address])  Assessment & Plan  - hx of PAD, carotid stenosis   - [facility] in [address]- right renal artery bypass 2000  - 8/6/2014 femoral/popliteal with stents and angioplasty, iliac arteries with stents and angioplasty and tibial peroneal artery angioplasty  -Right Fem to below-knee bypass occluded in 2010  - Fem-Fem bypass graft left to right occluded  -2/21/2017 right axillary to Bi-Fem bypass  -11/15/2022 lower extremity arterial ultrasound right lower limb shows 50-69% stenosis distal to the SFA with high-grade stenosis versus occlusion in the posterior tibial artery LUANN 0.53 (severe range), right Fem to below-knee bypass graft occluded, left lower limb patent CFA to posterior tibial bypass graft, LUANN 0.96  - 11/15/2022 abdominal aorta/iliac study showed occlusion of bilateral common and external iliac arteries, greater than 70% stenosis in the celiac artery, patent right axillo bifemoral arterial bypass graft  -11/15/2022 carotid ultrasound showed less than 50% bilateral carotid stenosis  - home: Plavix 75 mg, (previously on Xarelo 2.5 mg d/c d/t cost)  - here: plavix, statin, optimal BP control     Tobacco abuse  Assessment & Plan  - 1 1/2 ppd for 56 years  - nicotine patch  - tried Wellbutrin prescribed by vascular surgery for 1 week, however discontinued due to side effects  - encourage smoking cessation but is resistant     Nodule of upper lobe of right lung  Assessment & Plan  - newly diagnosed RUL lung mass  - PET/CT (7/7/23): marked increased metabolic activity in association with RUL nodule highly suggestive of neoplasm  - follows with hemonc Dr. Rita Manuel  - planned navigational bronchoscopy with biopsy for 7/31/23 with thoracic surgery, Dr. Ping Fam   - discussed with family who notified sx office and they rescheduled bx appt to 8/14    CAD (coronary artery disease)  Assessment & Plan  - s/p CABG 2020 [facility]  - TTE (1/26/23): EF 60%, mild dilated RV, mild-mod AI, mild AS/TR  - follows with Dr. Vidya Bailon  - Plavix, ISMN, statin       Stage 3 chronic kidney disease, unspecified whether stage 3a or 3b CKD [facility])  Assessment & Plan  Lab Results   Component Value Date    EGFR 64 08/07/2023    EGFR 69 08/05/2023    EGFR 61 08/03/2023    CREATININE 0.90 08/07/2023    CREATININE 0.84 08/05/2023    CREATININE 0.93 08/03/2023     - Stable 7/27  - avoid nephrotoxic agents  - monitor BMP with routine labs  -consult IM for assist with management  - stable      PAF (paroxysmal atrial fibrillation) (HCC)  Assessment & Plan  - episode during previous hospitalization  - Zio patch (3/17/23) showed 5 episodes of SVT (longest 7 beats) 2 triggered events correlated with SR, no other significant arrhythmias  - home: Toprol XL 50 mg daily, diltiazem 120 mg daily  - Not on full A/C per prior providers   - continue here  - OP Cards follow-up     Essential hypertension  Assessment & Plan  - home: amlodipine 5 mg daily, diltiazem 120 mg daily, lisinopril 10 mg daily, Toprol-XL 50 mg daily  - here: continue   - monitor BP  - monitor electrolytes  - consult IM to assist w/ management    Mixed hyperlipidemia  Assessment & Plan  - home: rosuvastatin 20 mg daily, Zetia 10 mg daily  - here: Zetia 10 mg, atorvastatin 40 mg (therapeutic substitution)  - Lipid panel (16/187785) total cholesterol 165, triglycerides 155, HDL 33,       Insomnia  Assessment & Plan  Improved   - here: Melatonin 9 mg 2000, Traz 50mg HS PRN  - On gabapentin 400mg HS for pain at night as well now   - if needed start low dose melatonin (apparently was on high dose at home)  - gabapentin also for pain   - Recommend appropriate sleep hygiene  - Patient counselled/will be counselled on this when appropriate       Hypoxia  Assessment & Plan  Off of oxygen so far today  Mild occasionally requiring 2L to SpO2 goal 90% or higher   Per IM  "-CTL with evidence of emphysema  -PFT in June 2023: Moderate obstruction, no reversibility with bronchodilator   -Was recommended Trelegy and rescue inhaler, has not initiated Trelegy as yet and does not plan to, doing albuterol as needed although patient, denies any shortness of breath at rest or exertion.  -Previously saturating 92% on 2 L of oxygen via nasal cannula.   - on room air saturating 90-92%, continue to monitor  -Recommend outpatient follow-up with pulmonology and smoking cessation"    Pain  Assessment & Plan  Stable overall; slept better again overnight  - Continue gabapentin to 600 mg TID   - APAP TID  - Oxy 5-10mg Q4H PRN; Hold for oversedation, AMS, or RR<12.  - Robaxin PRN Q8H      At risk for venous thromboembolism (VTE)  Assessment & Plan  Heparin     Urinary dysfunction  Assessment & Plan  - some urinary retention earlier now improved off scans but still watch for re-occurrence   - proactive toileting  - monitor for s/s of infection, retention     Bipolar affective disorder, depressed, severe ([address])  Assessment & Plan  Mood stable   - home: lamictal 100 mg daily  - continue here  - neuropsychology consulted      Appreciate IM consultants medical co-management. Personally reviewed labs, medications, and imaging. ROS:  Review of Systems   A 10 point review of systems was negative except for what is noted in the HPI. OBJECTIVE:   /59   Pulse 72   Temp 98.9 °F (37.2 °C) (Oral)   Resp 18   Ht 5' 4" (1.626 m)   Wt 74.2 kg (163 lb 9.3 oz)   SpO2 93%   BMI 28.08 kg/m²     Physical Exam  Constitutional:       Appearance: Normal appearance. HENT:      Head: Normocephalic and atraumatic. Nose: Nose normal.      Mouth/Throat:      Mouth: Mucous membranes are moist.   Cardiovascular:      Rate and Rhythm: Normal rate and regular rhythm. Pulses: Normal pulses. Heart sounds: Normal heart sounds. Pulmonary:      Effort: Pulmonary effort is normal.      Breath sounds: Normal breath sounds. Abdominal:      General: Bowel sounds are normal.      Palpations: Abdomen is soft. Musculoskeletal:         General: Normal range of motion. Cervical back: Normal range of motion. Left lower leg: Edema present. Skin:     General: Skin is warm and dry. Capillary Refill: Capillary refill takes less than 2 seconds. Findings: Erythema (incisional-improving) present. Neurological:      Mental Status: She is alert and oriented to person, place, and time. Sensory: Sensory deficit present.    Psychiatric:         Mood and Affect: Mood normal.         Judgment: Judgment normal.          Lab Results   Component Value Date    WBC 8.88 08/07/2023    HGB 12.0 08/07/2023    HCT 36.8 08/07/2023    MCV 93 08/07/2023     08/07/2023     Lab Results   Component Value Date    SODIUM 141 08/07/2023    K 5.0 08/07/2023     (H) 08/07/2023    CO2 25 08/07/2023    BUN 17 08/07/2023    CREATININE 0.90 08/07/2023    GLUC 99 08/07/2023    CALCIUM 9.3 08/07/2023     Lab Results   Component Value Date    INR 0.92 07/18/2023    PROTIME 12.5 07/18/2023       Current Facility-Administered Medications:   •  acetaminophen (TYLENOL) tablet 975 mg, 975 mg, Oral, Q8H [address], LEV Turner, 975 mg at 08/07/23 1358  •  albuterol (PROVENTIL HFA,VENTOLIN HFA) inhaler 2 puff, 2 puff, Inhalation, Q6H PRN, LEV Turner  •  atorvastatin (LIPITOR) tablet 40 mg, 40 mg, Oral, Daily With Dinner, LEV Gallegos, 40 mg at 08/06/23 1548  •  bisacodyl (DULCOLAX) rectal suppository 10 mg, 10 mg, Rectal, Daily PRN, LEV Turner  •  cefepime (MAXIPIME) 2 g/50 mL dextrose IVPB, 2,000 mg, Intravenous, Q12H, LEV Hagan, Stopped at 08/07/23 1136  •  clopidogrel (PLAVIX) tablet 75 mg, 75 mg, Oral, Daily, LEV Turner, 75 mg at 08/07/23 9437  •  diltiazem (CARDIZEM) tablet 120 mg, 120 mg, Oral, Daily, LEV Turner, 120 mg at 08/07/23 9220  •  docusate sodium (COLACE) capsule 100 mg, 100 mg, Oral, BID, LEV Turner, 100 mg at 08/04/23 1704  •  ezetimibe (ZETIA) tablet 10 mg, 10 mg, Oral, Daily, LEV Turner, 10 mg at 75/95/46 3253  •  folic acid (FOLVITE) tablet 1 mg, 1 mg, Oral, Daily, LEV Turner, 1 mg at 08/07/23 4821  •  gabapentin (NEURONTIN) capsule 600 mg, 600 mg, Oral, TID, LEV Turner, 600 mg at 08/07/23 5402  •  heparin (porcine) subcutaneous injection 5,000 Units, 5,000 Units, Subcutaneous, Q8H [address], LEV Turner, 5,000 Units at 08/07/23 1358  •  isosorbide mononitrate (IMDUR) 24 hr tablet 30 mg, 30 mg, Oral, QAM, LEV Turner, 30 mg at 08/07/23 1195  •  lamoTRIgine (LaMICtal) tablet 100 mg, 100 mg, Oral, Daily, LEV Turner, 100 mg at 08/07/23 3120  •  lisinopril (ZESTRIL) tablet 10 mg, 10 mg, Oral, Daily, Kayce Fagan, LEV, 10 mg at 08/07/23 6917  •  melatonin tablet 9 mg, 9 mg, Oral, HS, LEV Turner, 9 mg at 08/06/23 2130  •  methocarbamol (ROBAXIN) tablet 500 mg, 500 mg, Oral, Q8H PRN, Carmelina Herrmann MD  •  metoprolol succinate (TOPROL-XL) 24 hr tablet 50 mg, 50 mg, Oral, Daily, LEV Turner, 50 mg at 08/07/23 8758  •  nicotine (NICODERM CQ) 21 mg/24 hr TD 24 hr patch 1 patch, 1 patch, Transdermal, Daily, LEV Turner, 1 patch at 08/07/23 0834  •  ondansetron (ZOFRAN-ODT) dispersible tablet 4 mg, 4 mg, Oral, Q6H PRN, LEV Turner  •  oxyCODONE (ROXICODONE) IR tablet 5 mg, 5 mg, Oral, Q4H PRN, 5 mg at 08/07/23 0556 **OR** oxyCODONE (ROXICODONE) immediate release tablet 10 mg, 10 mg, Oral, Q4H PRN, Carmelina Herrmann MD, 10 mg at 08/07/23 1126  •  polyethylene glycol (MIRALAX) packet 17 g, 17 g, Oral, Daily PRN, LEV Turner  •  rOPINIRole (REQUIP) tablet 0.5 mg, 0.5 mg, Oral, HS, LEV Vines, 0.5 mg at 08/06/23 2130  •  saccharomyces boulardii (FLORASTOR) capsule 250 mg, 250 mg, Oral, BID, Anastasiya Terrazas MD, 250 mg at 08/07/23 9502  •  traZODone (DESYREL) tablet 50 mg, 50 mg, Oral, HS PRN, LEV Turner, 50 mg at 08/06/23 0216  •  vancomycin (VANCOCIN) 1,250 mg in sodium chloride 0.9 % 250 mL IVPB, 1,250 mg, Intravenous, Q24H, LEV Vines, Stopped at 08/07/23 1355    Past Medical History:   Diagnosis Date   • Aorto-iliac disease ([address])    • Atrial fibrillation ([address])    • CAD (coronary artery disease)    • Carotid stenosis, bilateral    • Celiac artery stenosis ([facility])    • CKD (chronic kidney disease) stage 3, GFR 30-59 ml/min ([facility])    • DVT (deep venous thrombosis) ([facility])     LLE (CFV, popl)   • Heart attack ([address]) 02/2022   • HLD (hyperlipidemia)    • Hypertension    • Lung mass     RUL   • Myocardial infarction ([address])     2022   • COURTNEY (obstructive sleep apnea)    • PAD (peripheral artery disease) ([address])    • Stroke [facility])        Patient Active Problem List Diagnosis Date Noted   • Hx of AKA (above knee amputation), left ([address]) 07/25/2023   • PAD (peripheral artery disease) ([address]) 06/23/2014   • Tobacco abuse 01/02/2023   • Nodule of upper lobe of right lung 06/14/2023   • CAD (coronary artery disease) 06/23/2014   • Stage 3 chronic kidney disease, unspecified whether stage 3a or 3b CKD ([address]) 01/09/2023   • PAF (paroxysmal atrial fibrillation) ([address]) 01/09/2023   • Essential hypertension 01/22/2016   • Mixed hyperlipidemia 12/27/2022   • Insomnia 02/10/2023   • At risk for venous thromboembolism (VTE) 07/28/2023   • Pain 07/28/2023   • Hypoxia 07/28/2023   • Urinary dysfunction 07/26/2023   • Dizziness 01/09/2023   • Aortoiliac occlusive disease ([address]) 12/28/2022   • Left leg swelling 11/08/2022   • Bipolar affective disorder, depressed, severe ([address]) 04/19/2017   • S/P vascular bypass 02/24/2017   • Hx of CABG 11/30/2016   • Presence of IVC filter 11/30/2016   • Thyroid nodule 01/22/2016   • Renal artery stenosis ([address]) 06/23/2014          Becka Sportsman, LEV  Physical Medicine and [place]

## 2023-08-07 NOTE — PROGRESS NOTES
Bartolome Lang is a 68 y.o. female who is currently ordered Vancomycin IV with management by the Pharmacy Consult service. Relevant clinical data and objective / subjective history reviewed. Vancomycin Assessment:  Indication and Goal AUC/Trough: Soft tissue (goal -600, trough >10), -600, trough >10  Micro:   none  Renal Function:  SCr: 0.9 mg/dL  CrCl: 55.7 mL/min  Renal replacement: Not on dialysis  Days of Therapy: 5  Previous Dose: vancomycin 750 mg IV q 12 h  Vancomycin Plan:  Continue Dosing: vancomycin 1250 mg IV q 24 h  Estimated AUC: 478 mcg*hr/mL  Estimated Trough: 13.5 mcg/mL  Next Level: 8/8 @0600  Renal Function Monitoring: Daily BMP and [place] will continue to follow closely for s/sx of nephrotoxicity, infusion reactions and appropriateness of therapy. BMP and CBC will be ordered per protocol. We will continue to follow the patient’s culture results and clinical progress daily.     Cristiana Jacques, Pharmacist

## 2023-08-08 PROBLEM — R39.198 URINARY DYSFUNCTION: Status: RESOLVED | Noted: 2023-07-26 | Resolved: 2023-08-08

## 2023-08-08 PROBLEM — R09.02 HYPOXIA: Status: RESOLVED | Noted: 2023-07-28 | Resolved: 2023-08-08

## 2023-08-08 PROBLEM — M79.89 LEFT LEG SWELLING: Status: RESOLVED | Noted: 2022-11-08 | Resolved: 2023-08-08

## 2023-08-08 LAB — VANCOMYCIN SERPL-MCNC: 14.7 UG/ML (ref 10–20)

## 2023-08-08 PROCEDURE — 80202 ASSAY OF VANCOMYCIN: CPT | Performed by: PHYSICIAN ASSISTANT

## 2023-08-08 PROCEDURE — 97110 THERAPEUTIC EXERCISES: CPT

## 2023-08-08 PROCEDURE — 97535 SELF CARE MNGMENT TRAINING: CPT

## 2023-08-08 PROCEDURE — 97530 THERAPEUTIC ACTIVITIES: CPT

## 2023-08-08 PROCEDURE — 99233 SBSQ HOSP IP/OBS HIGH 50: CPT

## 2023-08-08 PROCEDURE — 97116 GAIT TRAINING THERAPY: CPT

## 2023-08-08 PROCEDURE — 99232 SBSQ HOSP IP/OBS MODERATE 35: CPT | Performed by: INTERNAL MEDICINE

## 2023-08-08 RX ORDER — ROPINIROLE 0.5 MG/1
0.5 TABLET, FILM COATED ORAL
Qty: 30 TABLET | Refills: 0 | Status: SHIPPED | OUTPATIENT
Start: 2023-08-08

## 2023-08-08 RX ORDER — OXYCODONE HYDROCHLORIDE 10 MG/1
TABLET ORAL
Qty: 20 TABLET | Refills: 0 | Status: SHIPPED | OUTPATIENT
Start: 2023-08-08

## 2023-08-08 RX ORDER — SACCHAROMYCES BOULARDII 250 MG
250 CAPSULE ORAL 2 TIMES DAILY
Qty: 60 CAPSULE | Refills: 0 | Status: SHIPPED | OUTPATIENT
Start: 2023-08-08

## 2023-08-08 RX ORDER — GABAPENTIN 300 MG/1
600 CAPSULE ORAL 3 TIMES DAILY
Qty: 90 CAPSULE | Refills: 0 | Status: SHIPPED | OUTPATIENT
Start: 2023-08-08

## 2023-08-08 RX ADMIN — METOPROLOL SUCCINATE 50 MG: 50 TABLET, EXTENDED RELEASE ORAL at 08:54

## 2023-08-08 RX ADMIN — GABAPENTIN 600 MG: 300 CAPSULE ORAL at 08:54

## 2023-08-08 RX ADMIN — ISOSORBIDE MONONITRATE 30 MG: 30 TABLET, EXTENDED RELEASE ORAL at 08:53

## 2023-08-08 RX ADMIN — MELATONIN 9 MG: at 20:35

## 2023-08-08 RX ADMIN — ROPINIROLE 0.5 MG: 0.25 TABLET, FILM COATED ORAL at 22:02

## 2023-08-08 RX ADMIN — VANCOMYCIN HYDROCHLORIDE 1250 MG: 10 INJECTION, POWDER, LYOPHILIZED, FOR SOLUTION INTRAVENOUS at 13:30

## 2023-08-08 RX ADMIN — FOLIC ACID 1 MG: 1 TABLET ORAL at 08:53

## 2023-08-08 RX ADMIN — LAMOTRIGINE 100 MG: 100 TABLET ORAL at 08:54

## 2023-08-08 RX ADMIN — NICOTINE 1 PATCH: 21 PATCH, EXTENDED RELEASE TRANSDERMAL at 08:54

## 2023-08-08 RX ADMIN — CLOPIDOGREL BISULFATE 75 MG: 75 TABLET ORAL at 08:54

## 2023-08-08 RX ADMIN — Medication 250 MG: at 08:53

## 2023-08-08 RX ADMIN — ATORVASTATIN CALCIUM 40 MG: 40 TABLET, FILM COATED ORAL at 17:42

## 2023-08-08 RX ADMIN — CEFEPIME 2000 MG: 2 INJECTION, POWDER, FOR SOLUTION INTRAVENOUS at 12:10

## 2023-08-08 RX ADMIN — DILTIAZEM HYDROCHLORIDE 120 MG: 60 TABLET, FILM COATED ORAL at 08:54

## 2023-08-08 RX ADMIN — EZETIMIBE 10 MG: 10 TABLET ORAL at 08:53

## 2023-08-08 RX ADMIN — OXYCODONE HYDROCHLORIDE 5 MG: 5 TABLET ORAL at 07:19

## 2023-08-08 RX ADMIN — GABAPENTIN 600 MG: 300 CAPSULE ORAL at 17:42

## 2023-08-08 RX ADMIN — ACETAMINOPHEN 975 MG: 325 TABLET, FILM COATED ORAL at 13:30

## 2023-08-08 RX ADMIN — OXYCODONE HYDROCHLORIDE 5 MG: 5 TABLET ORAL at 20:35

## 2023-08-08 RX ADMIN — OXYCODONE HYDROCHLORIDE 5 MG: 5 TABLET ORAL at 02:42

## 2023-08-08 RX ADMIN — HEPARIN SODIUM 5000 UNITS: 5000 INJECTION INTRAVENOUS; SUBCUTANEOUS at 22:02

## 2023-08-08 RX ADMIN — HEPARIN SODIUM 5000 UNITS: 5000 INJECTION INTRAVENOUS; SUBCUTANEOUS at 13:30

## 2023-08-08 RX ADMIN — Medication 250 MG: at 17:42

## 2023-08-08 RX ADMIN — CEFEPIME 2000 MG: 2 INJECTION, POWDER, FOR SOLUTION INTRAVENOUS at 23:12

## 2023-08-08 RX ADMIN — GABAPENTIN 600 MG: 300 CAPSULE ORAL at 20:35

## 2023-08-08 RX ADMIN — ACETAMINOPHEN 975 MG: 325 TABLET, FILM COATED ORAL at 22:02

## 2023-08-08 RX ADMIN — ACETAMINOPHEN 975 MG: 325 TABLET, FILM COATED ORAL at 05:43

## 2023-08-08 RX ADMIN — HEPARIN SODIUM 5000 UNITS: 5000 INJECTION INTRAVENOUS; SUBCUTANEOUS at 05:43

## 2023-08-08 RX ADMIN — LISINOPRIL 10 MG: 10 TABLET ORAL at 08:54

## 2023-08-08 RX ADMIN — METHOCARBAMOL 500 MG: 500 TABLET ORAL at 20:36

## 2023-08-08 NOTE — PROGRESS NOTES
Internal Medicine Progress Note  Patient: Yisel Merrill  Age/sex: 68 y.o. female  Medical Record #: [id number]      ASSESSMENT/PLAN: (Interval History)  Yisel Merrill is seen and examined and management for following issues:    S/p left AKA 7/21/23  • Hx severe PAD with B/L SC to FA bypass in past = has TO left fem-pop BPG  • Home:  Plavix/Crestor 20mg qd/Zetia 10mg qd  • Here:  Plavix/Zetia/Lipitor 40mg qd  • Pain/phantom pain management as per PMR  • Worsening erythema of the surgical wound  • Vascular surgery started IV Vanco and Cefepime  • D/w surgery this am we will finish 10 day course which ends up being Wednesday  • Close f/u with vascular surgery on dc  • Improved=refer to images     Coronary artery disease  • S/p CABG in 2000  • TTE on 1/26/2023 with EF of 60%, mild to moderate AI, mild AS, estimated PA pressure of 40 mmHg  • Nuclear pharmacological stress test on 1/26/2023 with anterior wall perfusion defect likely artifact/cannot exclude small area of ischemia, normal LV systolic function.   • Continue Plavix/Zetia 10mg qd/Imdur     Hypertension  • Home: Diltiazem 120 mg qd/Amlodipine 5 mg qd/Imdur 30 mg qd/Lisinopril 10 mg/ Toprol-XL 50 mg qd  • Here:  Diltiazem 120mg qd/Imdur 30mg qd/Lisinopril 10mg qd (hold SBP <130)/Toprol 50mg qd  • BP stable.     Hyperlipidemia  • Continue atorvastatin and Zetia here  • Follow-up with outpatient cardiology for possible Repatha     Paroxysmal atrial fibrillation  • Currently in sinus rhythm  • S/p a 2-week Zio patch in March 2023 with 5 episodes of SVT without other significant arrhythmia  • Continue Diltiazem 120 mg daily, Toprol XL 50 mg daily     DVT/IVC filter  • Hx lower extremity DVT/IVC filter  • IVC filter is still present on PET scan 7/7/2023     Tobacco use  • Continues to smoke 1 to 1.5 packs/day  • Did not tolerate Wellbutrin/Chantix in the past  • Counseled for smoking cessation but not ready to quit  • Nicotine patch here 21 mg     COPD  • CT chest with evidence of emphysema  • PFT in June 2023: Moderate obstruction, no reversibility with bronchodilator   • Was recommended Trelegy and rescue inhaler, but she didn't want it; using Albuterol prn. • Continues on RA with O2 sats 91-92%. • Recommend outpatient follow-up with pulmonology and smoking cessation     Right perihilar mass   • On CT imaging 7/18/23 highly suggestive for neoplasm  • Planned for biopsy on 8/14 by thoracic surgeon  • If she remains Stage 1A2 she will not require any adjuvant therapy  • Recommend they obtain EGFR to help with therapy    Restless leg  · Started ropinirole 8/1/23  · Not new for her she has taken OTC things in the past  · Improved      CKD II  • Creatinine stable at 0.86  • Has likely nonfunctioning right kidney  • Avoid nephrotoxic agents, hypotension     Bipolar disease  • continue Lamictal 100 mg daily  • Stable        Discharge date: Tentative DC 8/9/23     The above assessment and plan was reviewed and updated as determined by my evaluation of the patient on 8/8/2023.     Labs:   Results from last 7 days   Lab Units 08/07/23  0532 08/03/23  0602   WBC Thousand/uL 8.88 9.17   HEMOGLOBIN g/dL 12.0 12.5   HEMATOCRIT % 36.8 38.8   PLATELETS Thousands/uL 264 304     Results from last 7 days   Lab Units 08/07/23  0532 08/05/23  0538   SODIUM mmol/L 141 136   POTASSIUM mmol/L 5.0 4.6   CHLORIDE mmol/L 114* 110*   CO2 mmol/L 25 21   BUN mg/dL 17 17   CREATININE mg/dL 0.90 0.84   CALCIUM mg/dL 9.3 9.2                   Review of Scheduled Meds:  Current Facility-Administered Medications   Medication Dose Route Frequency Provider Last Rate   • acetaminophen  975 mg Oral Q8H [facility] term LEV Turner     • albuterol  2 puff Inhalation Q6H PRN LEV Turner     • atorvastatin  40 mg Oral Daily With LEV Marcano     • bisacodyl  10 mg Rectal Daily PRN LEV Fowler     • cefepime  2,000 mg Intravenous Q12H LEV Corbett Stopped (08/07/23 5946)   • clopidogrel  75 mg Oral Daily LEV Turner     • diltiazem  120 mg Oral Daily LEV Turner     • docusate sodium  100 mg Oral BID LEV Turner     • ezetimibe  10 mg Oral Daily LEV Turner     • folic acid  1 mg Oral Daily LEV Turner     • gabapentin  600 mg Oral TID LEV Fortune     • heparin (porcine)  5,000 Units Subcutaneous Q8H [address] LEV Turner     • isosorbide mononitrate  30 mg Oral QAM LEV Turner     • lamoTRIgine  100 mg Oral Daily LEV Turner     • lisinopril  10 mg Oral Daily LEV Guaman     • melatonin  9 mg Oral HS LEV Turner     • methocarbamol  500 mg Oral Q8H PRN Rose Garza MD     • metoprolol succinate  50 mg Oral Daily LEV Turner     • nicotine  1 patch Transdermal Daily LEV Turner     • ondansetron  4 mg Oral Q6H PRN LEV Fortune     • oxyCODONE  5 mg Oral Q4H PRN Rose Garza MD      Or   • oxyCODONE  10 mg Oral Q4H PRN Rose Garza MD     • polyethylene glycol  17 g Oral Daily PRN LEV Turner     • rOPINIRole  0.5 mg Oral HS LEV Galdamez     • saccharomyces boulardii  250 mg Oral BID Carolyn Akhtar MD     • traZODone  50 mg Oral HS PRN LEV Fortune     • vancomycin  1,250 mg Intravenous Q24H LEV Perez Stopped (08/07/23 1355)       Subjective/ HPI: Patient seen and examined. Patients overnight issues or events were reviewed with nursing or staff during rounds or morning huddle session. New or overnight issues include the following:     Pt seen in therapy. States she's nervous regarding dc otherwise no complaints. Pain is improving       ROS:   A 10 point ROS was performed; negative except as noted above. Imaging:     XR femur 2 vw left   Final Result by Nicholas Tierney MD (07/31 2330)      Postsurgical changes in left above-the-knee amputation. No x-ray findings suggesting osteomyelitis at this time. Resident: Celina Alvarenga, the attending radiologist, have reviewed the images and agree with the final report above. Workstation performed: [id number]             *Labs /Radiology studies reviewed  *Medications reviewed and reconciled as needed  *Please refer to order section for additional ordered labs studies  *Case discussed with primary attending during morning huddle case rounds    Physical Examination:  Vitals:   Vitals:    08/07/23 1706 08/07/23 2144 08/08/23 0500 08/08/23 0854   BP: 143/65 123/60 138/66 121/71   BP Location: Left arm Right arm Left arm Left arm   Pulse: 57 60 59 65   Resp: 18 18 18    Temp: 97.7 °F (36.5 °C) 97.9 °F (36.6 °C) 98 °F (36.7 °C)    TempSrc: Oral Oral Oral    SpO2: 96% 97% 94%    Weight:       Height:           GEN: NAD; pleasant, anxious regarding dc  NEURO: Alert and oriented x4; appropriate  HEENT: Pupils are equal/reactive; normocephalic, face is symmetrical, hearing is normal  CV: S1 S2 regular, no murmur/rub/gallops, 1/4 pedal pulse RLE, no LE edema present  RESP: Lungs are clear bilaterally, no wheezes rales or rhonchi, on room air, no distress, respirations are easy and non labored  GI: Abdomen is obese, soft, non tender, +BS x4; non distended  : Voiding without issues  MUSC: Moves all extremities except LLE amputation  SKIN: pink, warm, normal turgor, L stump dressing in place      The above physical exam was reviewed and updated as determined by my evaluation of the patient on 8/8/2023. Invasive Devices     Peripheral Intravenous Line  Duration           Peripheral IV 08/06/23 Dorsal (posterior); Left Forearm 1 day                   VTE Pharmacologic Prophylaxis: Heparin  Code Status: Level 1 - Full Code  Current Length of Stay: 14 day(s)      Total time spent:  30 minutes with more than 50% spent counseling/coordinating care.  Counseling includes discussion with patient re: progress  and discussion with patient of his/her current medical state/information. Coordination of patient's care was performed in conjunction with primary service. Time invested included review of patient's labs, vitals, and management of their comorbidities with continued monitoring. In addition, this patient was discussed with medical team including physician and advanced extenders. The care of the patient was extensively discussed and appropriate treatment plan was formulated unique for this patient. Medical decision making for the day was made by supervising physician unless otherwise noted in their attestation statement. ** Please Note:  voice to text software may have been used in the creation of this document.  Although proof errors in transcription or interpretation are a potential of such software**

## 2023-08-08 NOTE — PLAN OF CARE
Problem: Nutrition/Hydration-ADULT  Goal: Nutrient/Hydration intake appropriate for improving, restoring or maintaining nutritional needs  Description: Monitor and assess patient's nutrition/hydration status for malnutrition. Collaborate with interdisciplinary team and initiate plan and interventions as ordered. Monitor patient's weight and dietary intake as ordered or per policy. Utilize nutrition screening tool and intervene as necessary. Determine patient's food preferences and provide high-protein, high-caloric foods as appropriate.      INTERVENTIONS:  - Monitor oral intake, urinary output, labs, and treatment plans  - Assess nutrition and hydration status and recommend course of action  - Evaluate amount of meals eaten  - Assist patient with eating if necessary   - Allow adequate time for meals  - Recommend/ encourage appropriate diets, oral nutritional supplements, and vitamin/mineral supplements  - Order, calculate, and assess calorie counts as needed  - Recommend, monitor, and adjust tube feedings and TPN/PPN based on assessed needs  - Assess need for intravenous fluids  - Provide specific nutrition/hydration education as appropriate  - Include patient/family/caregiver in decisions related to nutrition  8/8/2023 1440 by John Lyons RD  Outcome: Progressing  8/8/2023 1439 by John Lyons RD  Outcome: Progressing

## 2023-08-08 NOTE — PROGRESS NOTES
Kimberly Phipps is a 68 y.o. female who is currently ordered Vancomycin IV with management by the Pharmacy Consult service. Relevant clinical data and objective / subjective history reviewed. Vancomycin Assessment:  Indication and Goal AUC/Trough: Soft tissue (goal -600, trough >10), -600, trough >10  Clinical Status: stable  Micro:   none  Renal Function:  SCr: 0.9 mg/dL  CrCl: 55.7 mL/min  Renal replacement: Not on dialysis  Days of Therapy: 6  Current Dose: 1250 mg IV q24h  Vancomycin Plan:  New Dosing: continue current dose  Estimated AUC: 462 mcg*hr/mL  Estimated Trough: 12.9 mcg/mL  Next Level: no level ordered at this time. Therapy scheduled to be completed on 8/9  Renal Function Monitoring: Daily BMP and UOP  Pharmacy will continue to follow closely for s/sx of nephrotoxicity, infusion reactions and appropriateness of therapy. BMP and CBC will be ordered per protocol. We will continue to follow the patient’s culture results and clinical progress daily.     Hi Hernandez, Pharmacist

## 2023-08-08 NOTE — PROGRESS NOTES
08/08/23 0700   Pain Assessment   Pain Assessment Tool 0-10   Pain Score 8   Pain Location/Orientation Orientation: Left; Location: Leg   Restrictions/Precautions   Precautions Fall Risk;Pain   LLE Weight Bearing Per Order NWB   Braces or Orthoses   (residual limb wrapping)   Lifestyle   Autonomy "I had a lot of pain last night."   Eating   Type of Assistance Needed Independent   Eating CARE Score 6   Oral Hygiene   Type of Assistance Needed Independent   Comment seated in w/c at sink. Oral Hygiene CARE Score 6   Shower/Bathe Self   Type of Assistance Needed Set-up / clean-up   Physical Assistance Level No physical assistance   Comment seated to bathe BUE and RLE. in stance w/ LUE and pelvic support of sink to bathe groin and buttocks. Shower/Bathe Self CARE Score 5   Tub/Shower Transfer   Reason Not Assessed Sponge Bath;Medical   Upper Body Dressing   Type of Assistance Needed Independent   Comment seted in w/c   Upper Body Dressing CARE Score 6   Lower Body Dressing   Type of Assistance Needed Set-up / clean-up   Physical Assistance Level No physical assistance   Comment seated to thread BLE, in stance w/ unilateral support for clothing management. pt returned to bed for LLE residual limb wrapping, able to complete w/ inc time req set-up for incision care.    Lower Body Dressing CARE Score 5   Putting On/Taking Off Footwear   Type of Assistance Needed Independent   Physical Assistance Level No physical assistance   Comment seated   Putting On/Taking Off Footwear CARE Score 6   Sit to Lying   Type of Assistance Needed Independent   Sit to Lying CARE Score 6   Lying to Sitting on Side of Bed   Type of Assistance Needed Independent   Lying to Sitting on Side of Bed CARE Score 6   Sit to Stand   Type of Assistance Needed Independent   Physical Assistance Level No physical assistance   Comment no AD   Sit to Stand CARE Score 6   Bed-Chair Transfer   Type of Assistance Needed Independent   Physical Assistance Level No physical assistance   Comment modified stand pivot xfer without AD   Chair/Bed-to-Chair Transfer CARE Score 6   Toileting Hygiene   Type of Assistance Needed Independent   Physical Assistance Level No physical assistance   Comment w/ grab bar, BSC over toilet. Toileting Hygiene CARE Score 6   Toilet Transfer   Type of Assistance Needed Independent; Adaptive equipment   Physical Assistance Level No physical assistance   Comment w/ grab bar. Toilet Transfer CARE Score 6   Exercise Tools   Theraband Provided w/ happnBridge red theraband HEP, reviewed 3x10 shoulder horizontal ABD, elbow flexion w/ self-anchor, shoulder ABD w/ self-anchor, and lat pull down. Pt tolerated well w/ rest breaks PRN to manage fatigue, able to carryover w/ handout. Encouraged to establish BUE TE routine at d/c, pt receptive and agreeable to same. Cognition   Overall Cognitive Status Impaired  [facility] for basic)   Comments benefits from repetitive training. Assessment   Treatment Assessment Pt seen for skilled OT session focusing on self-care management and BUE strengthening. Details on ADL noted above, completed at overall independent level. Provided w/ BUE theraband HEP, encouraged to complete daily and HHOT to progress further. DME ordered: BSC and manual w/c. From OT standpoint, okay to d/c home w/ family support tomorrow w/ recommendation for HHOT to maximize fxnl indep and ease transition to home context. Pt was left resting in w/c w/ all needs within reach and meal tray set-up. Prognosis Good   Problem List Decreased endurance;Decreased range of motion; Impaired balance;Decreased mobility;Pain;Orthopedic restrictions   Recommendation   OT Discharge Recommendation Home with home health rehabilitation   OT Therapy Minutes   OT Time In 0700   OT Time Out 0830   OT Total Time (minutes) 90   OT Mode of treatment - Individual (minutes) 90   OT Mode of treatment - Concurrent (minutes) 0   OT Mode of treatment - Group (minutes) 0   OT Mode of treatment - Co-treat (minutes) 0   OT Mode of Treatment - Total time(minutes) 90 minutes   OT Cumulative Minutes 1090   Therapy Time missed   Time missed?  No

## 2023-08-08 NOTE — TEAM CONFERENCE
Acute RehabilitationTe Conference Note  Date: 8/8/2023   Time: 8:13 AM       Patient Name:  Marquita Gomez       Medical Record Number: [id number]   YOB: 1950  Sex: Female          Room/Bed:  [place] [address]/[facility] 457-01  Payor Info:  Payor: Brit Ladi / Plan: MEDICARE A AND B / Product Type: Medicare A & B Fee for Service /      Admitting Diagnosis: Hx of AKA (above knee amputation), left ([address]) [E81.581]   Admit Date/Time:  7/25/2023  2:33 PM  Admission Comments: No comment available     Primary Diagnosis:  Hx of AKA (above knee amputation), left ([address])  Principal Problem: Hx of AKA (above knee amputation), left [facility]    Patient Active Problem List    Diagnosis Date Noted   • At risk for venous thromboembolism (VTE) 07/28/2023   • Pain 07/28/2023   • Hypoxia 07/28/2023   • Urinary dysfunction 07/26/2023   • Hx of AKA (above knee amputation), left ([address]) 07/25/2023   • Nodule of upper lobe of right lung 06/14/2023   • Insomnia 02/10/2023   • PAF (paroxysmal atrial fibrillation) ([address]) 01/09/2023   • Stage 3 chronic kidney disease, unspecified whether stage 3a or 3b CKD ([address]) 01/09/2023   • Dizziness 01/09/2023   • Tobacco abuse 01/02/2023   • Aortoiliac occlusive disease ([address]) 12/28/2022   • Mixed hyperlipidemia 12/27/2022   • Left leg swelling 11/08/2022   • Bipolar affective disorder, depressed, severe ([address]) 04/19/2017   • S/P vascular bypass 02/24/2017   • Hx of CABG 11/30/2016   • Presence of IVC filter 11/30/2016   • Essential hypertension 01/22/2016   • Thyroid nodule 01/22/2016   • CAD (coronary artery disease) 06/23/2014   • Renal artery stenosis ([address]) 06/23/2014   • PAD (peripheral artery disease) ([address]) 06/23/2014       Physical Therapy:    Weight Bearing Status: Non-weight bearing (L LE)  Transfers: Independent (w/c level)  Bed Mobility: Independent  Amulation Distance (ft): 10 feet (not focued to preserve integrity of R LE)  Ambulation: Incidental Touching  Assistive Device for Ambulation: Roller Walker  Wheelchair Mobility Distance: 400 ft  Wheelchair Mobility: Independent  Number of Stairs: 0  Assistive Device for Stairs:  (N/A pt. will have ramp for IMANI)  Ramp: Supervision  Assistive Device for Ramp: Wheelchair  Discharge Recommendations: Home with:  [address] [place] [address] with[de-identified] Family Support, First Floor Setup, Home Physical Therapy    Pt. Demonstrates good progress since last week and able to achieve independent level in bed mobility, transfers w/c level and w/c mobility. Pt. to be progressed today to IRP after her OT session. Pt. Cont to be limited by pain at times and still currently on Abx. Pt. Had chosen [facility] [employer] for her prosthesis vendor. Ramp also had already been installed for their IMANI and w/c already been ordered and FT also had been completed with pt's son and grandson's. Pt. Anticipated to be d/c to home  on 8/9 with family support and continued PT through home health services . Will cont to benefit from skilled PT to maximized independence and safety until d/c. Adam Nicholas PT. 8/7      Pt barriers cont with pain lvl 5-8 10 pain , wrapping limb, fall risk nees WC for home DC and ramp for entering home . Plus family members need to be updated for home DC and overall functional lvl. Pt needs to chose amputee vendor and working on handout booklet also pt needs to learn to wrap limb and skin checks.  Pt will cont to benefit with skilled PT to meet DC goals       Occupational Therapy:  Eating: Independent  Grooming: Independent  Bathing: Independent  Bathing: Independent  Upper Body Dressing: Independent  Lower Body Dressing: Minimal Assistance  Toileting: Independent  Tub/Shower Transfer: Minimal Assistance  Toilet Transfer: Independent  Cognition: Exceptions to WNL  Cognition: Decreased Memory, Decreased Executive Functions  Orientation: Person, Place, Time, Situation  Discharge Recommendations: Home with:  [address] [place] [address] with[de-identified] Family Support, First Floor Setup, Home Occupational Therapy       Pt continues to present with impairments in activity tolerance, endurance, and memory. Additional functional barriers include pain. Pt is functioning at overall set-up to independent for ADLs and fxnl xfers. FT completed w/ pt's grandsons and ramp was installed, plan for pt's son to install grab bar in bathroom. OT D/C recommendation is for home w/ family support on Wednesday and recommendation for HHOT to maximize fxnl indep and ease transition to home context. Speech Therapy:           No notes on file    Nursing Notes:  Appetite: Good  Diet Type: Regular/House, No added salt, Other (Specify) (low cholesterol)                      Diet Patient/Family Education Complete: Yes    Type of Wound (LDA): Wound (LLE residual limb incision and allevyn to left medial thigh abrasion)                    Type of Wound Patient/Family Education: Yes (pt performing LLE incision dressing changes and Ace wraps independently)  Bladder: Continent     Bladder Patient/Family Education: Yes  Bowel: Continent     Bowel Patient/Family Education: Yes  Pain Location/Orientation: Orientation: Left, Location: Leg  Pain Score: 8                       Hospital Pain Intervention(s): Medication (See MAR)  Pain Patient/Family Education: Yes  Medication Management/Safety  Injectable:  (Heparin SubQ- will not need at home)  IV Antibiotics (add duration): IV cefepime and IV vanco, to complete on Wednesday 8/8  Safe Administration: Yes  Medication Patient/Family Education Complete: No (ongoing)    Pt admitted S/p left AKA 7/21/23 Hx severe PAD with B/L SC to FA bypass in past = has TO left fem-pop BPG Home:  Plavix/Crestor 20mg qd/Zetia 10mg qd Here:  Plavix/Zetia/Lipitor 40mg qd. Worsening erythema of the surgical wound. Vascular surgery started IV Vanco and Cefepime. D/w surgery we will finish 10 day course which ends up being Wednesday. Close f/u with vascular surgery on dc. Improved=refer to images.  Pain control with scheduled tylenol and gabapentin. Oxycodone prn. Coronary artery disease S/p CABG in 2000 TTE on 1/26/2023 with EF of 60%, mild to moderate AI, mild AS, estimated PA pressure of 40 mmHg Nuclear pharmacological stress test on 1/26/2023 with anterior wall perfusion defect likely artifact/cannot exclude small area of ischemia, normal LV systolic function. Continue Plavix/Zetia 10mg qd/Imdur. Hypertension- Home: Diltiazem 120 mg qd/Amlodipine 5 mg qd/Imdur 30 mg qd/Lisinopril 10 mg/ Toprol-XL 50 mg qd Here:  Diltiazem 120mg qd/Imdur 30mg qd/Linsinopril 10mg qd (hold SBP <130)/Toprol 50mg qd BP stable. Hyperlipidemia- Continue atorvastatin and Zetia here, Follow-up with outpatient cardiology for possible Repatha. Paroxysmal atrial fibrillation Currently in sinus rhythm S/p a 2-week Zio patch in March 2023 with 5 episodes of SVT without other significant arrhythmia Continue Diltiazem 120 mg daily, Toprol XL 50 mg daily. DVT/IVC filter- Hx lower extremity DVT/IVC filter IVC filter is still present on PET scan 7/7/2023. Tobacco use- Continues to smoke 1 to 1.5 packs/day Did not tolerate Wellbutrin/Chantix in the past Counseled for smoking cessation but not ready to quit Nicotine patch here 21 mg COPD- CT chest with evidence of emphysema PFT in June 2023: Moderate obstruction, no reversibility with bronchodilator. Was recommended Trelegy and rescue inhaler, but she didn't want it; using Albuterol prn. Continues on RA with O2 sats 91-92%. Recommend outpatient follow-up with pulmonology and smoking cessation Right perihilar mass -On CT imaging 7/18/23 Planned for biopsy on 8/14 by thoracic surgeon. CKD II Creatinine stable at 0.86 Has likely nonfunctioning right kidney Avoid nephrotoxic agents, hypotension. Bipolar disease-  Lamictal 100 mg daily. Restless leg Started ropinirole 8/1/23, Not new for her she has taken OTC things in the past; Improved. 8/8- Pt is AAOx4, pt with control of B&B and pain well controlled with current regimen.  Pt with IRP during the day and requires alarms and Supervision with transfers at nighttime for safety. Pt performing her own dressing changes and Ace wraps to her LLE incision. Last day for IV abx set for 8/9. This week we will encourage independence with ADLs. We will monitor labs and vital signs. WE will educate pt/family about repositioning to prevent skin breakdown. We will assist w repositioning and perform routine skin checks. We will monitor for adequate pain control. We will monitor for constipation and medicate pt as ordered. We will increase safety awareness and keep pt free from falls. Case Management:     Discharge Planning  Living Arrangements: Lives w/ Children  Support Systems: Self  Assistance Needed: none  Type of Current Residence: Private residence  Current Home Care Services: No  8/1- Pt lives in 2 story home with son, daughter in law and 5 grandchildren. Pt reports a good support system and family transports as needed. Prior to admission, pt was independent with ADLs and IADLs and ambulation. Pt does have walker and cane. Pt reports no hx of [facility] OP therapies or STR. PCP is Felecia Crain, preferred pharmacy is [facility] 8/7- Pt dc Wednesday 8/9 with home RN PT, DME. Is the patient actively participating in therapies? yes  List any modifications to the treatment plan: None    Barriers Interventions   Incision IV [email or web address]   Anxiety Resources to be given                 Is the patient making expected progress toward goals?  yes  List any update or changes to goals: None    Medical Goals: Patient will be medically stable for discharge to [facility] upon completion of rehab program and Patient will be able to manage medical conditions and comorbid conditions with medications and follow up upon completion of rehab program    Weekly Team Goals:   Rehab Team Goals  ADL Team Goal: Patient will require supervision with ADLs with least restrictive device upon completion of rehab program  Transfer Team Goal: Patient will be independent with transfers with least restrictive device upon completion of rehab program  Locomotion Team Goal: Patient will be independent with locomotion with least restrictive device upon completion of rehab program    Discussion: Barriers resolved. Pt to dc tomorrow 89, DME to be delivered. Anticipated Discharge Date:    [facility] Team Members Present: The following team members are supervising care for this patient and were present during this Weekly Team Conference.     Physician: Dr. Magda Chao DO  : UGO Gorman  Registered Nurse: Uriah Friend RN  Physical Therapist: GABE Rivera  Occupational Therapist: Pepe Harris MS, OTR/L, CBIS  Speech Therapist:

## 2023-08-08 NOTE — PROGRESS NOTES
08/08/23 0930   Pain Assessment   Pain Assessment Tool 0-10   Pain Score 4   Pain Location/Orientation Orientation: Left; Location: Leg   Hospital Pain Intervention(s) Guided imagery   Restrictions/Precautions   Precautions Fall Risk;Pain   Cognition   Overall Cognitive Status WFL   Roll Left and Right   Type of Assistance Needed Independent   Roll Left and Right CARE Score 6   Sit to Lying   Type of Assistance Needed Independent   Sit to Lying CARE Score 6   Lying to Sitting on Side of Bed   Type of Assistance Needed Independent   Lying to Sitting on Side of Bed CARE Score 6   Sit to Stand   Type of Assistance Needed Independent   Sit to Stand CARE Score 6   Bed-Chair Transfer   Type of Assistance Needed Independent   Chair/Bed-to-Chair Transfer CARE Score 6   Car Transfer   Type of Assistance Needed Set-up / clean-up   Car Transfer CARE Score 5   Walk 10 Feet   Type of Assistance Needed Supervision; Adaptive equipment   Physical Assistance Level No physical assistance   Comment RW   Walk 10 Feet CARE Score 4   Walk 50 Feet with Two Turns   Reason if not Attempted Safety concerns   Walk 50 Feet with Two Turns CARE Score 88   Walk 150 Feet   Reason if not Attempted Safety concerns   Walk 150 Feet CARE Score 88   Walking 10 Feet on Uneven Surfaces   Reason if not Attempted Safety concerns   Walking 10 Feet on Uneven Surfaces CARE Score 88   Ambulation   Primary Mode of Locomotion Prior to Admission Walk   Distance Walked (feet) 10 ft  (x3)   Assist Device Roller Walker   Gait Pattern Hopping   Does the patient walk? 2. Yes   Wheel 50 Feet with Two Turns   Type of Assistance Needed Independent   Wheel 50 Feet with Two Turns CARE Score 6   Wheel 150 Feet   Type of Assistance Needed Independent   Wheel 150 Feet CARE Score 6   Wheelchair mobility   Does the patient use a wheelchair? 1. Yes   Type of Wheelchair Used 1.  Manual   Curb or Single Stair   Style negotiated Single stair   Type of Assistance Needed Incidental touching; Adaptive equipment   Physical Assistance Level No physical assistance   Comment hopping up bkwd with RW in front onto 1 6" step   1 Step (Curb) CARE Score 4   4 Steps   Reason if not Attempted Safety concerns   4 Steps CARE Score 88   12 Steps   Reason if not Attempted Safety concerns   12 Steps CARE Score 80   Stairs   Findings pt reports family also has ramp in place if needed   Picking Up Object   Type of Assistance Needed Supervision; Adaptive equipment   Comment able to perform with RW from standing to  bean bag   Picking Up Object CARE Score 4   Toilet Transfer   Type of Assistance Needed Independent   Toilet Transfer CARE Score 6   Therapeutic Interventions   Other pt returned to bed to perform self ace wrapping of residual limb. Equipment Use   NuStep 10 min lvl 2   Other Comments   Comments AmpNoPro performed with pt scoring 24/43, K2 level at this time. Progress from 14/43 initially. Very good potential to be functional community ambulator in future   Assessment   Treatment Assessment Pt performing all functional tasks to safely return home at w/c level. Will only use RW as needed at home with family guarding. Pt will d/c home tomorrow and continue with [facility]. faxed AmpNoPro resukts to . Plan   Progress Improving as expected   Recommendation   PT Discharge Recommendation Home with home health rehabilitation   Equipment Recommended Wheelchair   PT Therapy Minutes   PT Time In 0930   PT Time Out 1030   PT Total Time (minutes) 60   PT Mode of treatment - Individual (minutes) 60   PT Mode of treatment - Concurrent (minutes) 0   PT Mode of treatment - Group (minutes) 0   PT Mode of treatment - Co-treat (minutes) 0   PT Mode of Treatment - Total time(minutes) 60 minutes   PT Cumulative Minutes 1050   Therapy Time missed   Time missed?  No

## 2023-08-08 NOTE — DISCHARGE INSTR - AVS FIRST PAGE
DISCHARGE INSTRUCTIONS: [address] [employer]    Bring these instructions with you to your Outpatient Physician appointments so they can order and follow-up any additional lab work or imaging recommended at time of discharge. Resume follow-up with all your prior providers that you have established care prior to this hospitalization. Discuss with primary care physician (PCP) if you have additional questions. It  is you or your caregivers responsibility to obtain follow-up MEDICATION REFILLS  As indicated through your Primary Care Physician (PCP) and other outpatient specialty provider(s) after discharge. Please follow-up with your PCP as soon as possible after discharge to set-up follow-up management and when appropriate refills. You remain a fall and injury risk which could be severe. - Your risk of fall has decreased however since admission to acute rehab. Caregiver training has been completed with our staff. - Appropriate supervision +/- assistance as instructed during your rehab course is recommended to decrease risk of fall and injury. - Continue skilled therapy as discussed after discharge to further decrease this risk    If you (or your health care proxy) have any questions or concerns regarding your acute rehabilitation stay including issues with medications, rehabilitation, and follow-up plan, please call:          [address] [place], [place] [address]e Unit in [facility] at [phone] or [phone]. Should you develop fevers, chills, new weakness, changes in sensation, difficulty speaking, facial weakness, confusion, shortness of breath, chest pain, or other concerning symptoms please call 911 and/or obtain transportation to nearest ER immediately. Should you develop worsening pain, swelling, or drainage notify your surgeon right away or obtain transportation to nearest ER for evaluation.     Should you develop feelings of significant hopelessness, severe depression, severe anxiety, or suicide you should call 911 or obtain transportation to nearest ER. 24-7 [employer] suicide and crisis lifeline call "65"  Herman Gasca is [phone] and is available 24 hrs/7 days a week. [facility] [phone] is available 24 hrs/7 days for mental health  [facility] ([facility]) AT [place] [phone] is available 24 hrs/7 days for mental health   [facility], [address] [place] [address] [address] [facility] [address] [phone]    PHYSICIANS to see:  Please see your doctors listed in the follow up providers section of your discharge paperwork, and take the discharge paperwork with you to your appointments. Home health has been ordered for you: Your home health agency should reach out to you or your family soon to arrange follow-up. (If [facility] home health is your provider their phone number is [phone])    If you are unable to get in touch with home health you may contact your  at [phone]. [place]      LAB WORK recommended after discharge: Follow-up lab work at discretion of your outpatient physicians to be determined at time of your future appointments. CBC and BMP to monitor hemoglobin, white blood cell count, electrolytes and kidney function at next visit within 1-2 weeks       IMAGING, ADDITIONAL FINDINGS and ISSUES to follow-up:  Check under the "DISCHARGE PROVIDER" section of these DISCHARGE INSTRUCTIONS for provider contact information and specific issues as well. Right lung nodule - follow-up with thoracic surgery for biopsy next week and oncology after discharge    Episode of atrial fibrillation - follow-up with cardiology after discharge     SKIN CARE INSTRUCTIONS to follow:  Monitor incision(s) for increased redness, swelling, pain/tenderness, discharge/pus and promptly notify your surgeon should these develop.   - Should you develop significant pain, swelling, or drainage obtain transportation to nearest emergency room for immediate evaluation if unable to reach your surgeon promptly   - Should you develop uncontrolled pain, fever, chills, sweats, changes in strength, sensation, or color of this area obtain transportation to nearest emergency room for immediate evaluation. Monitor skin for increased redness or breakdown and promptly notify your physician should these develop    WOUND CARE INSTRUCTIONS to follow:  Home health nursing will assist you with wound management. You may contact them with issues as well once this service is set-up. If [facility] is your provider their phone number is [phone]. If you are unable to get in touch with [facility] you may contact your  at [phone]. Left leg dressing:  Cleanse incision with soap and water, pat dry, paint with betadine, cover with ABD dressing, wrap with ace wrap. WEIGHTBEARING/ACTIVITY PRECAUTIONS to follow:    Maintain NONWEIGHTBEARING in left leg/lower extremity  Follow post-surgical restrictions as outlined by your surgeon. Driving restrictions: You are recommended against driving until cleared by an outpatient physician. *You should NOT operate a motor vehicle while under the influence of an opiate medication, muscle relaxant, or other sedative. Doing so may lead to an accident resulting in serious injury or death to yourself or others. You have agreed to avoid driving when under the influence of this medication. Work restrictions: You should NOT return to work (if working) until cleared by an outpatient physician. You should not operate heavy machinery (if applicable) until cleared by an outpatient physician. Alcohol restrictions: You are recommended to not drink alcohol at this time unless cleared by an outpatient physician. Drinking alcohol in your current functional condition can increase your risk of injury which could be severe.   Drinking alcohol given your current health problems can lead to increased medical complications which could be severe. Combining alcohol with your current medications can increase your risk of injury which could be severe. Smoking restrictions: You are recommended to not smoke nicotine. Smoking increases your risk of heart attack, stroke, emphysema/COPD, and lung cancer. Cannabis restrictions: You are recommended to not smoke/ingest/consume/use cannabis/marijuana at this time unless cleared by an outpatient physician. Cannabis use/intoxication in your current functional condition can increase your risk of injury which could be severe. Cannabis use/intoxication given your current health problems may lead to increased medical complications and sub-optimal functional recovery      MEDICATIONS:  Please see a full list of your medications outlined in the After Visit Summary that is attached to these Discharge Instructions. Please note changes may have been made to your medications please refer to your discharge paperwork for your current medications and take this list with you to all your doctors appointments for your doctors to review. Please do not resume a home medication unless the medication reconciliation sheet indicates to do so, please do not assume that a medication that you were given a prescription for is the same as a medication you have at home based on both medications having the same name as dosages and frequency may have changed. Unless specifically noted in your medication list provided to you in your discharge paper work do not resume prior vitamins, minerals, or supplements you may have been taking prior to your hospitalization unless instructed by an outpatient physician in the future. Discuss with your primary care at next visit if applicable.       NSAID Warning:  Please avoid NSAID (including but not limited to advil, aleve, motrin, naproxen, ibuprofen, mobic, meloxicam, diclofenac etc) medications as NSAID medications may increase your risk of bleeding (which can be life-threatening), kidney disease, impair healing. Blood thinners prescribed to optimize long and short term health and decrease risk of complications but with risks related to bleeding and other complications. Plavix (clopidogrel) instructions  Take Plavix (clopidogrel) daily unless instructed otherwise by a doctor. This medication will need to be managed by your outpatient physician(s) after discharge. Follow-up with the appropriate provider as soon as possible to ensure appropriate use. This is a blood thinner. It is being recommended for use by you (or your family) to decrease the risk of clotting which can be severely disabling and even life-threatening. Even when provided as recommended it can cause severe disabling and even life-threatening bleeding. Too much or too little of this blood thinner further increases your risk of this medication causing serious and even life-threatening complications such as severe bleeding, clots, strokes, and death. With that said, at this time based on best available evidence and consensus agreement, your physicians recommend you take clopidogrel (Plavix) based on your overall risks and benefits in your specific medical situation. If you (or your family/caregiver) notice black stools, bloody stools, vomit blood, develop new weakness, slurred speech, confusion or have any other concerning symptoms call 911 or obtain transportation to nearest emergency room immediately. Sedating Medications with increased risk of complications:    This(these) medication(s) was(were) started prior to your acute rehabilitation course as recommended by your prior physicians. It was continued during your acute rehabilitation course. This(these) medication(s) will need to be managed by your outpatient physician(s) after discharge.   Follow-up with the appropriate provider as soon as possible to ensure appropriate use.    Your goal will be to wean off of opiate medications and then onto acetaminophen only and then off of acetaminophen as well if possible. There are risks associated with opioid medications, including dependence, addiction and tolerance. The patient understands and agrees to use these medications only as prescribed. Potential side effects of the medications include, but are not limited to, constipation, drowsiness, addiction, impaired judgment and risk of fatal overdose if not taken as prescribed. You should not drive after taking this medication. The patient was warned against driving while taking sedation medications. Sharing medications is a felony. At this point in time, the patient is showing no signs of addiction, abuse, diversion or suicidal ideation. Oxycodone (opiate) pain medication has been used to help your acute pain. You tolerated this medication adequately during your recent hospital stay. You will be given a limited supply of opiate pain medications on discharge; should you require additional refills/medications, your PCP and/or surgeon may prescribe at his/her discretion. This can be quite helpful particularly for severe acute pain. There are risks associated with opioid medications. They can increase your risk of falling, injury, and breathing difficulties which can be severe and even life-threatening. Note it is advisable to limit use of opiate pain medications as they can become habit forming and lead to addiction. Other potential side effects of the medication include, but are not limited to, constipation, drowsiness, impaired judgment and risk of fatal overdose if not taken as prescribed. You should not drive while taking this medication  The patient was warned against driving while taking sedation medications. Sharing medications is a felony. At this point in time, the patient is showing no signs of addiction, abuse, diversion or suicidal ideation.   The patient (you/or relevant caregiver) understands and agrees to use this medication only as prescribed. This medication is habit forming and can lead to addiction and dependence. This medication increases your risk of fall with injury which can be severe and even life-threatening. Discuss with your outpatient prescriber possibility of weaning off this medication at their discretion. Gabapentin has been used to help pain. You tolerated this medication adequately during your recent hospital stay. - Take 600 mg 3 times per day. - Do not stop this medication abruptly as this can cause seizures. - If decreasing this medication, you should decrease by max 300mg every approximately 5 days. For example, decrease to 600mg in morning, 300mg in afternoon, and 600mg at bedtime followed in 5 days by 300mg in morning, 300mg in afternoon and 600mg at bedtime, followed in 5 days to 300mg 3 times per day; etc.    - Do not take with alcohol (or marijuana/cannabis) while on this medication as this can cause increased confusion, breathing problems, falls, and severe injury. - This medication can increase risk of confusion, fall, and injury although you did tolerate adequately during rehab course. - Follow-up with your primary care physician within 1 week for additional management of your medical conditions, potential refills, or adjustments of this medication. Trazodone has been used to help you sleep. You tolerated this medication adequately during your recent hospital stay. Nevertheless, the medication can increase your risk of confusion and falls (injury) which we have discussed with your prior to discharge. If you developing any concerning symptoms stop medication and notify your physician. - Take 50 mg at bedtime as needed for sleep. - Do not take with alcohol (or marijuana/cannabis) while on this medication as this can cause increased confusion, breathing problems, falls, and severe injury.     Each of these sedating medications (Gabapentin, oxycodone and Trazodone) can and have been helpful to manage your symptoms BUT can increase your risk of fall and serious injury. Taking them together further increases your risk of fall and injury. If possible try to limit use particularly of oxycodone. You have been discussed these risks and verbalized understanding.    - Follow-up with your primary care physician within 1 week for additional management of your medical conditions, potential refills, or adjustments of this medication.        - If pain not adequately controlled with primary providers after discharge you may request referral or request appointment to outpatient pain management specialist such as:     [facility]   [email or web address]  [id number] [phone] [facility] specific to you:    Hypertension/hypotension Management:  Only take the medications prescribed for you at time of discharge - overly high or low blood pressure increases your risk for health complications    Follow-up with PCP/family doctor regularly to ensure blood pressure remains adequately controlled. Please check your blood pressure prior to taking your blood pressure medications and keep a log that you will bring with you to your follow-up doctors' appointments. >If your lightheadedness is more than usual or you have different symptoms such as increased shortness of breath, chest pain, confusion, difficulty staying awake, or other concerning signs or symptoms obtain transport to nearest emergency room as soon as possible. COPD Management:  Do not smoke. Take your respiratory medications as prescribed. Take your as needed respiratory medications as prescribed. If you develop increased difficulty breathing, fever, chills, sweats, or confusion call 911 or obtain transport for family/other to hospital right away.   If you have mild worsening symptoms notify your PCP and/or pulmonologist as soon as possible. For pain - try to use over the counter topicals (creams/gels) as discussed or acetaminophen 1-2 regular or extra-strength Tylenol up to 2-3 times per day. Could consider heat or ice as well maximum 20 minutes at a time. Do not use heat or ice if using topicals at the same time. Notify primary care and/or orthopedics for poorly controlled pain for additional recommendations. Please note a summary of your hospital stay with relevant information for your doctors will try to be sent to them. Please confirm with your doctors at your follow up visits that they have received this summary and have them contact [facility] [address] if they have not received them along with any other medical records they may require.      [place] Phone Number:  [phone]

## 2023-08-08 NOTE — PROGRESS NOTES
PM&R PROGRESS NOTE:  Kelly Abrams 68 y.o. female MRN: [id number]  Unit/Bed#: -01 Encounter: [id number]    Rehabilitation Diagnosis: Impairment of mobility, safety and Activities of Daily Living (ADLs) due to Amputation:  05.3  Unilateral Lower Limb Above the Knee    HPI: Elaina Melo is a 68 y.o. female with a medical history of CAD, hypertension, CABG, vascular bypass surgeries, PAD, aortoiliac occlusive disease, tobacco abuse (1 ppd x 60+yrs), PAF/SVT (no AC), LLE DVT s/p IVC filter, RUL lung mass, stage III CKD who presented to [address] [place] [address] on 07/18 with left foot pain. Patient states that she noticed that she had increased pain over the left foot in approximately 2 days ago and is acutely worse over the last 24 hours, accompanied by numbness, and color change of her foot. On exam, patient's left foot is bluish discoloration, swollen, no palpable or doppler DP or PT pulses. Patient still has motor of her foot (is able to wiggle toes, move her foot up and down) but sensation is significantly decreased, is able to feel slight pressure. CT angiogram with multiple inflow graft occlusions on the left. The right to left component of the right axillobifemoral graft is occluded. The left axillary to profunda graft is occluded and a segment is excised. The aorta to left common femoral and jump graft from the alex of the common femoral artery anastomosis to below-knee popliteal artery is also occluded. The deep femoral artery beyond the previous anastomosis does reconstitute but is secondary or tertiary branch that is small at less than 2 mm. There are no other named vessels that reconstitute within the upper leg. There does appear to be reconstitution of tibial vessels beyond the trifurcation but again these vessels are less than 2 mm in diameter. Vascular surgery was consulted, recommending L AKA.  Patient was placed on heparin gtt and APS was consulted, patient now on oral analgesics. On 07/21 patient underwent a left AKA with Dr. Shaila King, EBL minimal. Patient is non-weight bearing left lower extremity. PT/OT were consulted and recommend inpatient acute rehabilitation. PT/OT were consulted and recommend acute inpatient rehabilitation. The patient was evaluated by the Rehabilitation team and deemed an appropriate candidate for comprehensive inpatient rehabilitation and admitted to the [facility] on 7/25/2023  2:33 PM     SUBJECTIVE: Patient seen face to face. No acute issues overnight. Reviewed discharge medications, no questions at this time. Pain is well controlled, incisional erythema improved. Discussed depression and finding an outpatient counselor/therapist for patient to follow. Good appetite, voiding, LBM 8/7. Denies chest pain, shortness of breath, fever, chills, N/V, abdominal pain. ASSESSMENT: Stable, progressing    PLAN:  - incisional erythema improved with IV abx, continue until discharge, no antibiotic therapy for home, close follow-up with vascular  - pain well controlled  - depression: OT performed the Buckner Depression Inventory, patient scored 35 indicating severe depression. Discussed with patient, does not wish to increase current psyciatric medications at this time, does not have SI/HI. She feels depressed but plans to deal with it after next weeks appointments. Called behavior health to discuss process to establish care for patient. She became upset and stated she had a list of providers, but the issue is she only has Medicare for insurance and the providers she contacted will not take unless she has a supplemental plan. Neuropsychology is following inpatient, Dr. Aiden Bolaños will see patient today. - reviewed [facility] [address] [place] [address]  Team conference: The team discussed patient's functional status, goals, and barriers.      • Functional deficits:  LLE NWB, mobility, self-care  • Continue current rehabilitation plan of care to maximize function. • Functional update:   Physical Therapy Occupational Therapy Speech Therapy   Weight Bearing Status: Non-weight bearing (L LE)  Transfers: Independent (w/c level)  Bed Mobility: Independent  Amulation Distance (ft): 10 feet (not focued to preserve integrity of R LE)  Ambulation: Incidental Touching  Assistive Device for Ambulation: Roller Walker  Wheelchair Mobility Distance: 400 ft  Wheelchair Mobility: Independent  Number of Stairs: 0  Assistive Device for Stairs:  (N/A pt. will have ramp for IMANI)  Ramp: Supervision  Assistive Device for Ramp: Wheelchair  Discharge Recommendations: Home with:  [address] [place] [address] with[de-identified] Family Support, First Floor Setup, Home Physical Therapy   Eating: Independent  Grooming: Independent  Bathing: Independent  Bathing: Independent  Upper Body Dressing: Independent  Lower Body Dressing: Minimal Assistance  Toileting: Independent  Tub/Shower Transfer: Minimal Assistance  Toilet Transfer: Independent  Cognition: Exceptions to WNL  Cognition: Decreased Memory, Decreased Executive Functions  Orientation: Person, Place, Time, Situation             • Estimated Discharge: anticipated 8/9 with home RN, PT, OT    Pain  • Left leg  • Tylenol 975 mg every 8 hours   • Gabapentin 300 mg BID, 400 mg hs  • Robaxin 500 mg every 6 hours  • Oxycodone 5-10 mg every 4 hours prn    DVT prophylaxis  • Heparin sc    Bladder plan  • incontinent    Bowel plan  • Continent  • Colace 100 mg BID  • Miralax daily prn  • Bisacodyl suppository daily prn      * Hx of AKA (above knee amputation), left ([address])  Assessment & Plan  - 7/18 pt admitted with increased foot pain over 2 days with change of color and numbness found to have critical limb ischemia   -CT angiogram: multiple inflow graft occlusions on the left. The right to left component of the right axillobifemoral graft is occluded. The left axillary to profunda graft is occluded and a segment is excised.   The aorta to left common femoral and jump graft from the alex of the common femoral artery anastomosis to below-knee popliteal artery is also occluded. The deep femoral artery beyond the previous anastomosis does reconstitute but is secondary or tertiary branch that is small at less than 2 mm. There are no other named vessels that reconstitute within the upper leg. There does appear to be reconstitution of tibial vessels beyond the trifurcation but again these vessels are less than 2 mm in diameter.  - Vascular surgery s/p AKA 7/21 by Dr. Mary Delcid  - Plavix, optimal BP control, statin  - NWB LLE  - Monitor CBC intermittently   - Optimal ROM to avoid/decrease risk of contractures  - Monitor incision/area for infection or dehiscence/impaired healing    - 7/27 slight latia-incisional erythema - recommended [facility] sx c/s who saw patient on 7/27 and reported no concerns for infection at that    - thigh DTI possibly from overly tight ace wrap that was POA to [facility] - wound care c/s 7/27 - allevyn foam dressing; use Kerlex only for a few days and then go back to ACE but avoid overly tightening   - 7/28 slight latia-incisional erythema again today but stable without increased warmth, tenderness, or drainage - continue close monitoring , started on Keflex and probiotic continue to monitor with daily incisional site imaging/media. Discussed with nursing   - 8/3 increased latia-incisional erythema, Vascular surgery made aware. Antibiotics escalated to vancomycin and cefepime. VS monitoring for few days before final decision on discharge plan. Continue daily dressing changes. - 8/8 IV abx through discharge, no antibiotics for home  - It is normal to have some swelling or discoloration around the incision initially post-operatively.  If develops increasing redness, tenderness/pain, discharge, or dehiscence develops contact surgical service   - Wound care of incision site per [facility] sx   - WBC stable mildly elevated at 10 K on 7/27 > monitor intermittently  - Monitor for signs/symptoms of VTE, atelectasis, acute blood loss anemia, or other infection   - Patient (if applicable caregiver) training and education on amputation, possible phantom symptoms, recovery process  - Neuropsychology consult, supportive counseling  - Optimal pain control  - Monitor contralateral limb closely for concerning s/s   - Fall Precautions   - Tolerating ARC setting adequately   - Continue acute comprehensive interdisciplinary inpatient rehabilitation to include intensive skilled therapies (PT, OT) as outlined with oversight and management by rehabilitation physician as well as inpatient rehab level nursing, case management and weekly interdisciplinary team meetings.    - Follow-up with PMR and Vas Sx after discharge    PAD (peripheral artery disease) ([address])  Assessment & Plan  - hx of PAD, carotid stenosis   - following procedures with [facility], [facility] [place] Hosp in [address]- right renal artery bypass 2000  - 8/6/2014 femoral/popliteal with stents and angioplasty, iliac arteries with stents and angioplasty and tibial peroneal artery angioplasty  -Right Fem to below-knee bypass occluded in 2010  - Fem-Fem bypass graft left to right occluded  -2/21/2017 right axillary to Bi-Fem bypass  -11/15/2022 lower extremity arterial ultrasound right lower limb shows 50-69% stenosis distal to the SFA with high-grade stenosis versus occlusion in the posterior tibial artery LUANN 0.53 (severe range), right Fem to below-knee bypass graft occluded, left lower limb patent CFA to posterior tibial bypass graft, LUANN 0.96  - 11/15/2022 abdominal aorta/iliac study showed occlusion of bilateral common and external iliac arteries, greater than 70% stenosis in the celiac artery, patent right axillo bifemoral arterial bypass graft  -11/15/2022 carotid ultrasound showed less than 50% bilateral carotid stenosis  - home: Plavix 75 mg, (previously on Xarelo 2.5 mg d/c d/t cost)  - here: plavix, statin, optimal BP control     Tobacco abuse  Assessment & Plan  - 1 1/2 ppd for 56 years  - nicotine patch  - tried Wellbutrin prescribed by vascular surgery for 1 week, however discontinued due to side effects  - encourage smoking cessation but is resistant     Nodule of upper lobe of right lung  Assessment & Plan  - newly diagnosed RUL lung mass  - PET/CT (7/7/23): marked increased metabolic activity in association with RUL nodule highly suggestive of neoplasm  - if remains stage 1A2 , she will not require adjuvant therapy    - follows with hemonc Dr. Davina Merino  - planned navigational bronchoscopy with biopsy for 7/31/23 with thoracic surgery, Dr. Brooks Padilla   - discussed with family who notified sx office and they rescheduled bx appt to 8/14    CAD (coronary artery disease)  Assessment & Plan  - s/p CABG 2020 [facility]  - TTE (1/26/23): EF 60%, mild dilated RV, mild-mod AI, mild AS/TR  - follows with Dr. Stephanie Salinas  - Plavix, ISMN, statin       Stage 3 chronic kidney disease, unspecified whether stage 3a or 3b CKD [facility])  Assessment & Plan  Lab Results   Component Value Date    EGFR 64 08/07/2023    EGFR 69 08/05/2023    EGFR 61 08/03/2023    CREATININE 0.90 08/07/2023    CREATININE 0.84 08/05/2023    CREATININE 0.93 08/03/2023     - Stable 7/27  - avoid nephrotoxic agents  - monitor BMP with routine labs  -consult IM for assist with management  - stable      PAF (paroxysmal atrial fibrillation) (HCC)  Assessment & Plan  - episode during previous hospitalization  - Zio patch (3/17/23) showed 5 episodes of SVT (longest 7 beats) 2 triggered events correlated with SR, no other significant arrhythmias  - home: Toprol XL 50 mg daily, diltiazem 120 mg daily  - Not on full A/C per prior providers   - continue here  - OP Cards follow-up     Essential hypertension  Assessment & Plan  - home: amlodipine 5 mg daily, diltiazem 120 mg daily, lisinopril 10 mg daily, Toprol-XL 50 mg daily  - here: continue   - monitor BP  - monitor electrolytes  - consult IM to assist w/ management    Mixed hyperlipidemia  Assessment & Plan  - home: rosuvastatin 20 mg daily, Zetia 10 mg daily  - here: Zetia 10 mg, atorvastatin 40 mg (therapeutic substitution)  - Lipid panel (43/240133) total cholesterol 165, triglycerides 155, HDL 33,       Insomnia  Assessment & Plan  Improved   - here: Melatonin 9 mg 2000, Traz 50mg HS PRN  - On gabapentin 400mg HS for pain at night as well now   - if needed start low dose melatonin (apparently was on high dose at home)  - gabapentin also for pain   - Recommend appropriate sleep hygiene  - Patient counselled/will be counselled on this when appropriate       Hypoxia  Assessment & Plan  Off of oxygen so far today  Mild occasionally requiring 2L to SpO2 goal 90% or higher   Per IM  "-CTL with evidence of emphysema  -PFT in June 2023: Moderate obstruction, no reversibility with bronchodilator   -Was recommended Trelegy and rescue inhaler, has not initiated Trelegy as yet and does not plan to, doing albuterol as needed although patient, denies any shortness of breath at rest or exertion.  -Previously saturating 92% on 2 L of oxygen via nasal cannula.   - on room air saturating 90-92%, continue to monitor  -Recommend outpatient follow-up with pulmonology and smoking cessation"    Pain  Assessment & Plan  Stable overall; slept better again overnight  - Continue gabapentin to 600 mg TID   - APAP TID  - Oxy 5-10mg Q4H PRN; Hold for oversedation, AMS, or RR<12.  - Robaxin PRN Q8H      At risk for venous thromboembolism (VTE)  Assessment & Plan  Heparin     Urinary dysfunction  Assessment & Plan  - some urinary retention earlier now improved off scans but still watch for re-occurrence   - proactive toileting  - monitor for s/s of infection, retention     Bipolar affective disorder, depressed, severe (HCC)  Assessment & Plan  Mood stable   - home: lamictal 100 mg daily  - continue here  - neuropsychology consulted      Appreciate IM consultants medical co-management. Labs, medications, and imaging personally reviewed. ROS:  Review of Systems   A 10 point review of systems was negative except for what is noted in the HPI. OBJECTIVE:   /71 (BP Location: Left arm)   Pulse 65   Temp 98 °F (36.7 °C) (Oral)   Resp 18   Ht 5' 4" (1.626 m)   Wt 74.2 kg (163 lb 9.3 oz)   SpO2 94%   BMI 28.08 kg/m²     Physical Exam  Constitutional:       Appearance: Normal appearance. HENT:      Head: Normocephalic and atraumatic. Nose: Nose normal.      Mouth/Throat:      Mouth: Mucous membranes are moist.   Cardiovascular:      Rate and Rhythm: Normal rate and regular rhythm. Pulses: Normal pulses. Heart sounds: Normal heart sounds. Pulmonary:      Effort: Pulmonary effort is normal.      Breath sounds: Normal breath sounds. Abdominal:      General: Bowel sounds are normal.      Palpations: Abdomen is soft. Musculoskeletal:         General: Tenderness (left AKA) present. Normal range of motion. Cervical back: Normal range of motion. Left lower leg: Edema present. Skin:     General: Skin is warm and dry. Capillary Refill: Capillary refill takes less than 2 seconds. Findings: Bruising and erythema (improving) present. Neurological:      Mental Status: She is alert and oriented to person, place, and time.    Psychiatric:         Mood and Affect: Mood normal.         Judgment: Judgment normal.          Lab Results   Component Value Date    WBC 8.88 08/07/2023    HGB 12.0 08/07/2023    HCT 36.8 08/07/2023    MCV 93 08/07/2023     08/07/2023     Lab Results   Component Value Date    SODIUM 141 08/07/2023    K 5.0 08/07/2023     (H) 08/07/2023    CO2 25 08/07/2023    BUN 17 08/07/2023    CREATININE 0.90 08/07/2023    GLUC 99 08/07/2023    CALCIUM 9.3 08/07/2023     Lab Results   Component Value Date    INR 0.92 07/18/2023    PROTIME 12.5 07/18/2023       Current Facility-Administered Medications:   •  acetaminophen (TYLENOL) tablet 975 mg, 975 mg, Oral, Q8H [facility], LEV Turner, 975 mg at 08/08/23 0543  •  albuterol (PROVENTIL HFA,VENTOLIN HFA) inhaler 2 puff, 2 puff, Inhalation, Q6H PRN, LEV Turner  •  atorvastatin (LIPITOR) tablet 40 mg, 40 mg, Oral, Daily With Dinner, LEV Tong, 40 mg at 08/07/23 1706  •  bisacodyl (DULCOLAX) rectal suppository 10 mg, 10 mg, Rectal, Daily PRN, LEV Turner  •  cefepime (MAXIPIME) 2 g/50 mL dextrose IVPB, 2,000 mg, Intravenous, Q12H, LEV Galdamez, Last Rate: 100 mL/hr at 08/08/23 1210, 2,000 mg at 08/08/23 1210  •  clopidogrel (PLAVIX) tablet 75 mg, 75 mg, Oral, Daily, LEV Turner, 75 mg at 08/08/23 0854  •  diltiazem (CARDIZEM) tablet 120 mg, 120 mg, Oral, Daily, LEV Turner, 120 mg at 08/08/23 0854  •  docusate sodium (COLACE) capsule 100 mg, 100 mg, Oral, BID, LEV Turner, 100 mg at 08/04/23 1704  •  ezetimibe (ZETIA) tablet 10 mg, 10 mg, Oral, Daily, LEV Turner, 10 mg at 87/79/65 3502  •  folic acid (FOLVITE) tablet 1 mg, 1 mg, Oral, Daily, LEV Turner, 1 mg at 08/08/23 7592  •  gabapentin (NEURONTIN) capsule 600 mg, 600 mg, Oral, TID, LEV Turner, 600 mg at 08/08/23 0854  •  heparin (porcine) subcutaneous injection 5,000 Units, 5,000 Units, Subcutaneous, Q8H [facility], LEV Turner, 5,000 Units at 08/08/23 0543  •  isosorbide mononitrate (IMDUR) 24 hr tablet 30 mg, 30 mg, Oral, QAM, LEV Turner, 30 mg at 08/08/23 4039  •  lamoTRIgine (LaMICtal) tablet 100 mg, 100 mg, Oral, Daily, LEV Turner, 100 mg at 08/08/23 0854  •  lisinopril (ZESTRIL) tablet 10 mg, 10 mg, Oral, Daily, LEV Parisi, 10 mg at 08/08/23 0854  •  melatonin tablet 9 mg, 9 mg, Oral, HS, LEV Turner, 9 mg at 08/07/23 2121  •  methocarbamol (ROBAXIN) tablet 500 mg, 500 mg, Oral, Q8H PRN, Dolly Bro MD  •  metoprolol succinate (TOPROL-XL) 24 hr tablet 50 mg, 50 mg, Oral, Daily, LEV Turner, 50 mg at 08/08/23 0854  •  nicotine (NICODERM CQ) 21 mg/24 hr TD 24 hr patch 1 patch, 1 patch, Transdermal, Daily, LEV Turner, 1 patch at 08/08/23 0854  •  ondansetron (ZOFRAN-ODT) dispersible tablet 4 mg, 4 mg, Oral, Q6H PRN, LEV Turner  •  oxyCODONE (ROXICODONE) IR tablet 5 mg, 5 mg, Oral, Q4H PRN, 5 mg at 08/08/23 0719 **OR** oxyCODONE (ROXICODONE) immediate release tablet 10 mg, 10 mg, Oral, Q4H PRN, Joshua Bernheim, MD, 10 mg at 08/07/23 1126  •  polyethylene glycol (MIRALAX) packet 17 g, 17 g, Oral, Daily PRN, LEV Turner  •  rOPINIRole (REQUIP) tablet 0.5 mg, 0.5 mg, Oral, HS, LEV Maldonado, 0.5 mg at 08/07/23 2122  •  saccharomyces boulardii (FLORASTOR) capsule 250 mg, 250 mg, Oral, BID, Jacey Mcdermott MD, 250 mg at 08/08/23 6674  •  traZODone (DESYREL) tablet 50 mg, 50 mg, Oral, HS PRN, LEV Turner, 50 mg at 08/06/23 0216  •  vancomycin (VANCOCIN) 1,250 mg in sodium chloride 0.9 % 250 mL IVPB, 1,250 mg, Intravenous, Q24H, LEV Maldonado, Stopped at 08/07/23 1355    Past Medical History:   Diagnosis Date   • Aorto-iliac disease ([address])    • Atrial fibrillation (HCC)    • CAD (coronary artery disease)    • Carotid stenosis, bilateral    • Celiac artery stenosis (HCC)    • CKD (chronic kidney disease) stage 3, GFR 30-59 ml/min (HCC)    • DVT (deep venous thrombosis) ([facility])     LLE (CFV, popl)   • Heart attack ([address]) 02/2022   • HLD (hyperlipidemia)    • Hypertension    • Lung mass     RUL   • Myocardial infarction ([address])     2022   • COURTNEY (obstructive sleep apnea)    • PAD (peripheral artery disease) (HCC)    • Stroke [facility])        Patient Active Problem List    Diagnosis Date Noted   • Hx of AKA (above knee amputation), left ([address]) 07/25/2023   • PAD (peripheral artery disease) ([address]) 06/23/2014   • Tobacco abuse 01/02/2023   • Nodule of upper lobe of right lung 06/14/2023   • CAD (coronary artery disease) 06/23/2014   • Stage 3 chronic kidney disease, unspecified whether stage 3a or 3b CKD ([address]) 01/09/2023   • PAF (paroxysmal atrial fibrillation) ([address]) 01/09/2023   • Essential hypertension 01/22/2016   • Mixed hyperlipidemia 12/27/2022   • Insomnia 02/10/2023   • At risk for venous thromboembolism (VTE) 07/28/2023   • Pain 07/28/2023   • Hypoxia 07/28/2023   • Urinary dysfunction 07/26/2023   • Dizziness 01/09/2023   • Aortoiliac occlusive disease ([address]) 12/28/2022   • Left leg swelling 11/08/2022   • Bipolar affective disorder, depressed, severe ([address]) 04/19/2017   • S/P vascular bypass 02/24/2017   • Hx of CABG 11/30/2016   • Presence of IVC filter 11/30/2016   • Thyroid nodule 01/22/2016   • Renal artery stenosis ([address]) 06/23/2014          LEV Allison  Physical Medicine and [facility]

## 2023-08-08 NOTE — PROGRESS NOTES
08/08/23 1230   Pain Assessment   Pain Assessment Tool 0-10   Pain Score 6   Pain Location/Orientation Orientation: Left; Location: Leg   Restrictions/Precautions   Precautions Fall Risk;Pain   Weight Bearing Restrictions Yes   LLE Weight Bearing Per Order NWB   Braces or Orthoses   (residual limb wrapping)   Lifestyle   Autonomy "I just feel so tired."   Lower Body Dressing   Type of Assistance Needed Set-up / clean-up   Physical Assistance Level No physical assistance   Comment LLE residual limb wrapping at bed level, req inc time. Lower Body Dressing CARE Score 5   Cognition   Overall Cognitive Status Impaired  [facility] basic)   Comments benefits from repetition   Additional Activities   Additional Activities   (phase 1 amputee edu)   Additional Activities Comments Provided the following skilled education to patient regarding phase 1 amputation: HHC, JESSA Morales, Advocacy, amputee support groups (local/online/[facility]), residual limb care and wrapping, and pressure relief techniques. Reviewed importance of skin inspections and pt able to recall rationale for residual limb wrapping. Add'lly reiterated process of  following staple/suture removal and then fitting for prosthetic, edu on possibility of rehab stay for prosthetic training once prosthetic obtained. Assessment   Treatment Assessment Pt seen for skilled OT session focusing on phase 1 amputee edu and residual limb wrapping. Pt questioning DME delivery, TT CM and plan for DME to be delivered to pt's hospital room tomorrow morning. Pt without further questions/concerns regarding d/c. From OT standpoint, pt is okay to d/c home w/ family support tomorrow and recommendation for HHOT to maximize fxnl indep and ease transition to home context. Prognosis Good   Problem List Decreased endurance;Decreased range of motion; Impaired balance;Decreased mobility;Pain;Orthopedic restrictions   Recommendation   OT Discharge Recommendation Home with home health rehabilitation   OT Therapy Minutes   OT Time In 1230   OT Time Out 1300   OT Total Time (minutes) 30   OT Mode of treatment - Individual (minutes) 30   OT Mode of treatment - Concurrent (minutes) 0   OT Mode of treatment - Group (minutes) 0   OT Mode of treatment - Co-treat (minutes) 0   OT Mode of Treatment - Total time(minutes) 30 minutes   OT Cumulative Minutes 1120   Therapy Time missed   Time missed?  No

## 2023-08-08 NOTE — PLAN OF CARE
Problem: Prexisting or High Potential for Compromised Skin Integrity  Goal: Skin integrity is maintained or improved  Description: INTERVENTIONS:  - Identify patients at risk for skin breakdown  - Assess and monitor skin integrity  - Assess and monitor nutrition and hydration status  - Monitor labs   - Assess for incontinence   - Turn and reposition patient  - Assist with mobility/ambulation  - Relieve pressure over bony prominences  - Avoid friction and shearing  - Provide appropriate hygiene as needed including keeping skin clean and dry  - Evaluate need for skin moisturizer/barrier cream  - Collaborate with interdisciplinary team   - Patient/family teaching  - Consider wound care consult   Outcome: Progressing     Problem: MOBILITY - ADULT  Goal: Maintain or return to baseline ADL function  Description: INTERVENTIONS:  -  Assess patient's ability to carry out ADLs; assess patient's baseline for ADL function and identify physical deficits which impact ability to perform ADLs (bathing, care of mouth/teeth, toileting, grooming, dressing, etc.)  - Assess/evaluate cause of self-care deficits   - Assess range of motion  - Assess patient's mobility; develop plan if impaired  - Assess patient's need for assistive devices and provide as appropriate  - Encourage maximum independence but intervene and supervise when necessary  - Involve family in performance of ADLs  - Assess for home care needs following discharge   - Consider OT consult to assist with ADL evaluation and planning for discharge  - Provide patient education as appropriate  Outcome: Progressing  Goal: Maintains/Returns to pre admission functional level  Description: INTERVENTIONS:  - Perform BMAT or MOVE assessment daily.   - Set and communicate daily mobility goal to care team and patient/family/caregiver. - Collaborate with rehabilitation services on mobility goals if consulted  - Perform Range of Motion 3 times a day.   - Reposition patient every 2 hours.  - Dangle patient 3 times a day  - Stand patient 3 times a day  - Ambulate patient 3 times a day  - Out of bed to chair 3 times a day   - Out of bed for meals 3 times a day  - Out of bed for toileting  - Record patient progress and toleration of activity level   Outcome: Progressing     Problem: PAIN - ADULT  Goal: Verbalizes/displays adequate comfort level or baseline comfort level  Description: Interventions:  - Encourage patient to monitor pain and request assistance  - Assess pain using appropriate pain scale  - Administer analgesics based on type and severity of pain and evaluate response  - Implement non-pharmacological measures as appropriate and evaluate response  - Consider cultural and social influences on pain and pain management  - Notify physician/advanced practitioner if interventions unsuccessful or patient reports new pain  Outcome: Progressing     Problem: INFECTION - ADULT  Goal: Absence or prevention of progression during hospitalization  Description: INTERVENTIONS:  - Assess and monitor for signs and symptoms of infection  - Monitor lab/diagnostic results  - Monitor all insertion sites, i.e. indwelling lines, tubes, and drains  - Monitor endotracheal if appropriate and nasal secretions for changes in amount and color  - [place] appropriate cooling/warming therapies per order  - Administer medications as ordered  - Instruct and encourage patient and family to use good hand hygiene technique  - Identify and instruct in appropriate isolation precautions for identified infection/condition  Outcome: Progressing  Goal: Absence of fever/infection during neutropenic period  Description: INTERVENTIONS:  - Monitor WBC    Outcome: Progressing     Problem: SAFETY ADULT  Goal: Patient will remain free of falls  Description: INTERVENTIONS:  - Educate patient/family on patient safety including physical limitations  - Instruct patient to call for assistance with activity   - Consult OT/PT to assist with strengthening/mobility   - Keep Call bell within reach  - Keep bed low and locked with side rails adjusted as appropriate  - Keep care items and personal belongings within reach  - Initiate and maintain comfort rounds  - Make Fall Risk Sign visible to staff  - Apply yellow socks and bracelet for high fall risk patients  - Consider moving patient to room near nurses station  Outcome: Progressing  Goal: Maintain or return to baseline ADL function  Description: INTERVENTIONS:  -  Assess patient's ability to carry out ADLs; assess patient's baseline for ADL function and identify physical deficits which impact ability to perform ADLs (bathing, care of mouth/teeth, toileting, grooming, dressing, etc.)  - Assess/evaluate cause of self-care deficits   - Assess range of motion  - Assess patient's mobility; develop plan if impaired  - Assess patient's need for assistive devices and provide as appropriate  - Encourage maximum independence but intervene and supervise when necessary  - Involve family in performance of ADLs  - Assess for home care needs following discharge   - Consider OT consult to assist with ADL evaluation and planning for discharge  - Provide patient education as appropriate  Outcome: Progressing     Problem: DISCHARGE PLANNING  Goal: Discharge to home or other facility with appropriate resources  Description: INTERVENTIONS:  - Identify barriers to discharge w/patient and caregiver  - Arrange for needed discharge resources and transportation as appropriate  - Identify discharge learning needs (meds, wound care, etc.)  - Arrange for interpretive services to assist at discharge as needed  - Refer to Case Management Department for coordinating discharge planning if the patient needs post-hospital services based on physician/advanced practitioner order or complex needs related to functional status, cognitive ability, or social support system  Outcome: Progressing     Problem: Knowledge Deficit  Goal: Patient/family/caregiver demonstrates understanding of disease process, treatment plan, medications, and discharge instructions  Description: Complete learning assessment and assess knowledge base. Interventions:  - Provide teaching at level of understanding  - Provide teaching via preferred learning methods  Outcome: Progressing     Problem: Nutrition/Hydration-ADULT  Goal: Nutrient/Hydration intake appropriate for improving, restoring or maintaining nutritional needs  Description: Monitor and assess patient's nutrition/hydration status for malnutrition. Collaborate with interdisciplinary team and initiate plan and interventions as ordered. Monitor patient's weight and dietary intake as ordered or per policy. Utilize nutrition screening tool and intervene as necessary. Determine patient's food preferences and provide high-protein, high-caloric foods as appropriate.      INTERVENTIONS:  - Monitor oral intake, urinary output, labs, and treatment plans  - Assess nutrition and hydration status and recommend course of action  - Evaluate amount of meals eaten  - Assist patient with eating if necessary   - Allow adequate time for meals  - Recommend/ encourage appropriate diets, oral nutritional supplements, and vitamin/mineral supplements  - Order, calculate, and assess calorie counts as needed  - Recommend, monitor, and adjust tube feedings and TPN/PPN based on assessed needs  - Assess need for intravenous fluids  - Provide specific nutrition/hydration education as appropriate  - Include patient/family/caregiver in decisions related to nutrition  Outcome: Progressing

## 2023-08-08 NOTE — DISCHARGE SUMMARY
Discharge Summary - PMR   Kelly Abrams 68 y.o. female MRN: [id number]  Unit/Bed#: -01 Encounter: [id number]    Admission Date: 7/25/2023     Discharge Date: 8/9/23    Etiologic/Rehabilitation Diagnosis: Impairment of mobility, safety and Activities of Daily Living (ADLs) due to Amputation:  05.3  Unilateral Lower Limb Above the Knee    HPI:  Elaina Melo is a 68 y.o. female with a medical history of CAD, hypertension, CABG, vascular bypass surgeries, PAD, aortoiliac occlusive disease, tobacco abuse (1 ppd x 60+yrs), PAF/SVT (no AC), LLE DVT s/p IVC filter, RUL lung mass, stage III CKD who presented to [address] [place] [address] on 07/18 with left foot pain.  Patient states that she noticed that she had increased pain over the left foot in approximately 2 days ago and is acutely worse over the last 24 hours, accompanied by numbness, and color change of her foot.  On exam, patient's left foot is bluish discoloration, swollen, no palpable or doppler DP or PT pulses.  Patient still has motor of her foot (is able to wiggle toes, move her foot up and down) but sensation is significantly decreased, is able to feel slight pressure.  CT angiogram with multiple inflow graft occlusions on the left.  The right to left component of the right axillobifemoral graft is occluded.  The left axillary to profunda graft is occluded and a segment is excised.  The aorta to left common femoral and jump graft from the alex of the common femoral artery anastomosis to below-knee popliteal artery is also occluded.  The deep femoral artery beyond the previous anastomosis does reconstitute but is secondary or tertiary branch that is small at less than 2 mm.  There are no other named vessels that reconstitute within the upper leg.  There does appear to be reconstitution of tibial vessels beyond the trifurcation but again these vessels are less than 2 mm in diameter.  Vascular surgery was consulted, recommending L AKA. Patient was placed on heparin gtt and APS was consulted, patient now on oral analgesics. On 07/21 patient underwent a left AKA with Dr. Manuel Aschoff, EBL minimal. Patient is non-weight bearing left lower extremity. PT/OT were consulted and recommend inpatient acute rehabilitation. PT/OT were consulted and recommend acute inpatient rehabilitation. The patient was evaluated by the Rehabilitation team and deemed an appropriate candidate for comprehensive inpatient rehabilitation and admitted to the [facility] on 7/25/2023  2:98 PM    Complications: Patient sustained a fall to floor with left residual limb strike. Right foot got caught on wheel of wheelchair when making a turn and the wheelchair tipped forward. Patient landed on left elbow and left residual limb. There was serosanguineous drainage noted. X-ray showed no cortical irregularities or destructive findings. No lytic or blastic osseous lesion. Patient was assessed by therapy staff (PT/OT) and Dr. Christina Silva (PMR); ultimately it was decided patient is still appropriate for discharge. Prophylactic Bactrim was prescribed for 4 days. Procedures Performed During ARC Admission: None    Acute Rehabilitation Center Course: Patient participated in a comprehensive interdisciplinary inpatient rehabilitation program which included involvment of MD, therapies (PT, OT, and/or SLP), RN, CM, SW, dietary, and psychology services. She was able to be advanced to a modified independent level of assist and considered safe for discharge home. Please see below for patient's day to day management of rehabilitation needs. Please refer to Internal Medicine notes during [facility] stay for day to day management of patient's medical co-morbidities. * Hx of AKA (above knee amputation), left [facility])  Assessment & Plan  - 7/18 pt admitted with increased foot pain over 2 days with change of color and numbness found to have critical limb ischemia   -CT angiogram: multiple inflow graft occlusions on the left. The right to left component of the right axillobifemoral graft is occluded. The left axillary to profunda graft is occluded and a segment is excised. The aorta to left common femoral and jump graft from the alex of the common femoral artery anastomosis to below-knee popliteal artery is also occluded. The deep femoral artery beyond the previous anastomosis does reconstitute but is secondary or tertiary branch that is small at less than 2 mm. There are no other named vessels that reconstitute within the upper leg. There does appear to be reconstitution of tibial vessels beyond the trifurcation but again these vessels are less than 2 mm in diameter.  - Vascular surgery s/p AKA 7/21 by Dr. Audrey Pringle  - Plavix, optimal BP control, statin  - NWB LLE  - Monitor CBC intermittently   - Optimal ROM to avoid/decrease risk of contractures  - Monitor incision/area for infection or dehiscence/impaired healing    - 7/27 slight latia-incisional erythema - recommended vasc sx c/s who saw patient on 7/27 and reported no concerns for infection at that    - thigh DTI possibly from overly tight ace wrap that was POA to [facility] - wound care c/s 7/27 - allevyn foam dressing; use Kerlex only for a few days and then go back to ACE but avoid overly tightening   - 7/28 slight latia-incisional erythema again today but stable without increased warmth, tenderness, or drainage - continue close monitoring , started on Keflex and probiotic continue to monitor with daily incisional site imaging/media. Discussed with nursing   - 8/3 increased latia-incisional erythema, Vascular surgery made aware. Antibiotics escalated to vancomycin and cefepime. VS monitoring for few days before final decision on discharge plan. Continue daily dressing changes. - 8/8 IV abx through discharge, PO Bactrim for 4 days  - It is normal to have some swelling or discoloration around the incision initially post-operatively.  If develops increasing redness, tenderness/pain, discharge, or dehiscence develops contact surgical service   - Wound care of incision site per vasc sx   - WBC stable mildly elevated at 10 K on 7/27 > monitor intermittently  - Monitor for signs/symptoms of VTE, atelectasis, acute blood loss anemia, or other infection   - Patient (if applicable caregiver) training and education on amputation, possible phantom symptoms, recovery process  - Neuropsychology consult, supportive counseling  - Optimal pain control  - Monitor contralateral limb closely for concerning s/s   - Fall Precautions   - Tolerating ARC setting adequately   - Continue acute comprehensive interdisciplinary inpatient rehabilitation to include intensive skilled therapies (PT, OT) as outlined with oversight and management by rehabilitation physician as well as inpatient rehab level nursing, case management and weekly interdisciplinary team meetings.    - Follow-up with PMR and Vas Sx after discharge    PAD (peripheral artery disease) ([address])  Assessment & Plan  - hx of PAD, carotid stenosis   - following procedures with [facility] [place] in [address]- right renal artery bypass 2000  - 8/6/2014 femoral/popliteal with stents and angioplasty, iliac arteries with stents and angioplasty and tibial peroneal artery angioplasty  -Right Fem to below-knee bypass occluded in 2010  - Fem-Fem bypass graft left to right occluded  -2/21/2017 right axillary to Bi-Fem bypass  -11/15/2022 lower extremity arterial ultrasound right lower limb shows 50-69% stenosis distal to the SFA with high-grade stenosis versus occlusion in the posterior tibial artery LUANN 0.53 (severe range), right Fem to below-knee bypass graft occluded, left lower limb patent CFA to posterior tibial bypass graft, LUANN 0.96  - 11/15/2022 abdominal aorta/iliac study showed occlusion of bilateral common and external iliac arteries, greater than 70% stenosis in the celiac artery, patent right axillo bifemoral arterial bypass graft  -11/15/2022 carotid ultrasound showed less than 50% bilateral carotid stenosis  - home: Plavix 75 mg, (previously on Xarelo 2.5 mg d/c d/t cost)  - here: plavix, statin, optimal BP control     Tobacco abuse  Assessment & Plan  - 1 1/2 ppd for 56 years  - nicotine patch  - tried Wellbutrin prescribed by vascular surgery for 1 week, however discontinued due to side effects  - encourage smoking cessation but is resistant     Nodule of upper lobe of right lung  Assessment & Plan  - newly diagnosed RUL lung mass  - PET/CT (7/7/23): marked increased metabolic activity in association with RUL nodule highly suggestive of neoplasm  - if remains stage 1A2 , she will not require adjuvant therapy    - follows with hemonc Dr. Jesus Greenberg  - planned navigational bronchoscopy with biopsy for 7/31/23 with thoracic surgery, Dr. Bobby Mehta   - discussed with family who notified sx office and they rescheduled bx appt to 8/14    CAD (coronary artery disease)  Assessment & Plan  [facility]  - TTE (1/26/23): EF 60%, mild dilated RV, mild-mod AI, mild AS/TR  - follows with Dr. Star Andre  - Plavix, ISMN, statin       Stage 3 chronic kidney disease, unspecified whether stage 3a or 3b CKD [facility])  Assessment & Plan  Lab Results   Component Value Date    EGFR 64 08/07/2023    EGFR 69 08/05/2023    EGFR 61 08/03/2023    CREATININE 0.90 08/07/2023    CREATININE 0.84 08/05/2023    CREATININE 0.93 08/03/2023     - Stable 7/27  - avoid nephrotoxic agents  - monitor BMP with routine labs  -consult IM for assist with management  - stable      PAF (paroxysmal atrial fibrillation) (HCC)  Assessment & Plan  - episode during previous hospitalization  - Zio patch (3/17/23) showed 5 episodes of SVT (longest 7 beats) 2 triggered events correlated with SR, no other significant arrhythmias  - home: Toprol XL 50 mg daily, diltiazem 120 mg daily  - Not on full A/C per prior providers   - continue here  - OP Cards follow-up Essential hypertension  Assessment & Plan  - home: amlodipine 5 mg daily, diltiazem 120 mg daily, lisinopril 10 mg daily, Toprol-XL 50 mg daily  - here: diltiazem 120 mg daily, lisinopril 10 mg daily, toprol xl 50 mg daily (amlodipine held since 7/29)  - monitor BP  - monitor electrolytes  - consult IM to assist w/ management  - follow-up with cardiology as outpatient    Mixed hyperlipidemia  Assessment & Plan  - home: rosuvastatin 20 mg daily, Zetia 10 mg daily  - here: Zetia 10 mg, atorvastatin 40 mg (therapeutic substitution)  - Lipid panel (38/140060) total cholesterol 165, triglycerides 155, HDL 33,       Insomnia  Assessment & Plan  Improved   - here: Melatonin 9 mg 2000, Traz 50mg HS PRN  - On gabapentin 400mg HS for pain at night as well now   - if needed start low dose melatonin (apparently was on high dose at home)  - gabapentin also for pain   - Recommend appropriate sleep hygiene  - Patient counselled/will be counselled on this when appropriate       Pain  Assessment & Plan  Stable overall; slept better again overnight  - Continue gabapentin to 600 mg TID   - APAP TID  - Oxy 5-10mg Q4H PRN; Hold for oversedation, AMS, or RR<12.  - Robaxin PRN Q8H      At risk for venous thromboembolism (VTE)  Assessment & Plan  Heparin     Bipolar affective disorder, depressed, severe (HCC)  Assessment & Plan  Mood stable   - home: lamictal 100 mg daily  - continue here  - neuropsychology consulted  - refer to [facility] for outpatient follow-up    Hypoxia-resolved as of 8/8/2023  Assessment & Plan  Off of oxygen so far today  Mild occasionally requiring 2L to SpO2 goal 90% or higher   Per IM  "-CTL with evidence of emphysema  -PFT in June 2023:  Moderate obstruction, no reversibility with bronchodilator   -Was recommended Trelegy and rescue inhaler, has not initiated Trelegy as yet and does not plan to, doing albuterol as needed although patient, denies any shortness of breath at rest or exertion.  -Previously saturating 92% on 2 L of oxygen via nasal cannula. - on room air saturating 90-92%, continue to monitor  -Recommend outpatient follow-up with pulmonology and smoking cessation"    Urinary dysfunction-resolved as of 8/8/2023  Assessment & Plan  - some urinary retention earlier now improved off scans but still watch for re-occurrence   - proactive toileting  - monitor for s/s of infection, retention     Discharge Physical Examination:    /72 (BP Location: Left arm)   Pulse 62   Temp 97.7 °F (36.5 °C) (Oral)   Resp 18   Ht 5' 4" (1.626 m)   Wt 72.6 kg (160 lb 0.9 oz)   SpO2 93%   BMI 27.47 kg/m²     Gen: No acute distress, Well-nourished, well-appearing. HEENT: Moist mucus membranes, Normocephalic/Atraumatic  Cardiovascular: Regular rate, rhythm, S1/S2. Distal pulses palpable  Heme/Extr: No edema/clubbing/cyanosis  Pulmonary: Non-labored breathing. Lungs CTAB  : No ward, voiding, continent  GI: Soft, non-tender, non-distended. BS+, LBM 8/8  MSK: ROM is WFL in all extremities. No effusions or deformities. Bulk is symmetric. See below for MMT scores. Integumentary: Skin is warm, dry. No rashes or ulcers. Neuro: AAOx3, CN 2-12 intact. Sensation intact to light touch throughout. Speech is intact. Appropriate to questioning. Tone is normal.   MMT: Strength:   Right  Left  Site  Right  Left  Site    5 5  S Ab: Shoulder Abductors  5  5  HF: Hip Flexors    5 5  EF: Elbow Flexors  5  NT KF: Knee Flexors    5  5  EE: Elbow Extensors  5  NT KE: Knee Extensors    5  5  WE: Wrist Extensors  5  NT  DR: Dorsi Flexors    5  5  FF: Finger Flexors  5  NT  PF: Plantar Flexors    5  5  HI: Hand Intrinsics  5  NT  EHL: Extensor Hallucis Longus   Psych: Normal mood and affect.        Significant Findings, Care, Treatment and Services Provided: Acute comprehensive interdisciplinary inpatient rehabilitation including PT, OT, SLP, RN, CM, SW, dietary, psychology, etc.    Functional Status Upon Admission to [facility]:  Physical therapy: mobility- mod A, transfers- min A,   Occupational therapy: ADL: bathing- not assessed, dressing- UB supervision LB min A, toileting- not assessed  Speech therapy: dysphagia level 2/HTL liquids, aphasia, cognitive impairment    Functional Status Upon Discharge from [facility]:   Physical therapy: mobility- independent, transfer- independent, ambulation- RW hopping 10', wheelchair mobility independent 150'   Occupational therapy: ADL: bathing- set-up, dressing- UB independent, LB set-up, toileting- independent     Discharge Diagnosis: Impairment of mobility, safety and Activities of Daily Living (ADLs) due to Amputation:  05.3  Unilateral Lower Limb Above the Knee    Discharge Medications:   See after visit summary for reconciled discharge medications provided to patient and family. Condition at Discharge: good     Discharge instructions/Information to patient and family:   See after visit summary for information provided to patient and family. Provisions for Follow-Up Care:  See after visit summary for information related to follow-up care and any pertinent home health orders. Future Appointments   Date Time Provider [facility] [address]   8/14/2023 To Be Determined Janusz Doe OT [facility]   8/15/2023 To Be Determined Kendal Dhillon PT [facility]   8/16/2023  8:00 AM DO RANDEE Dewey [facility]   8/18/2023  4:00 PM LEV Junior ALL [facility]   9/6/2023  8:30 AM JENNIFER Campos MACUN [facility]   12/1/2023  2:30 PM Fran Banks MD NEURO MON [facility]       Disposition: Home    Planned Readmission: No    Discharge Statement   I spent 45 minutes discharging the patient. This time was spent on the day of discharge. I had direct contact with the patient on the day of discharge.  Greater than 50% of the total time was spent examining patient, answering all patient questions, arranging and discussing plan of care with patient as well as directly providing post-discharge instructions. Additional time then spent on discharge activities. Discharge Medications:  See after visit summary for reconciled discharge medications provided to patient and family.       Facility Administered Medications Prior to Discharge:      Gay Vogel, [address]

## 2023-08-09 ENCOUNTER — APPOINTMENT (OUTPATIENT)
Dept: RADIOLOGY | Facility: HOSPITAL | Age: 73
DRG: 560 | End: 2023-08-09
Attending: PHYSICAL MEDICINE & REHABILITATION
Payer: MEDICARE

## 2023-08-09 ENCOUNTER — TELEPHONE (OUTPATIENT)
Dept: CARDIAC SURGERY | Facility: CLINIC | Age: 73
End: 2023-08-09

## 2023-08-09 ENCOUNTER — RA CDI HCC (OUTPATIENT)
Dept: OTHER | Facility: HOSPITAL | Age: 73
End: 2023-08-09

## 2023-08-09 VITALS
RESPIRATION RATE: 18 BRPM | BODY MASS INDEX: 27.33 KG/M2 | SYSTOLIC BLOOD PRESSURE: 150 MMHG | DIASTOLIC BLOOD PRESSURE: 72 MMHG | OXYGEN SATURATION: 93 % | WEIGHT: 160.05 LBS | HEART RATE: 62 BPM | TEMPERATURE: 97.7 F | HEIGHT: 64 IN

## 2023-08-09 PROCEDURE — 99232 SBSQ HOSP IP/OBS MODERATE 35: CPT | Performed by: INTERNAL MEDICINE

## 2023-08-09 PROCEDURE — 99239 HOSP IP/OBS DSCHRG MGMT >30: CPT

## 2023-08-09 PROCEDURE — 97535 SELF CARE MNGMENT TRAINING: CPT

## 2023-08-09 PROCEDURE — 97530 THERAPEUTIC ACTIVITIES: CPT

## 2023-08-09 PROCEDURE — 73551 X-RAY EXAM OF FEMUR 1: CPT

## 2023-08-09 RX ORDER — SULFAMETHOXAZOLE AND TRIMETHOPRIM 800; 160 MG/1; MG/1
1 TABLET ORAL EVERY 12 HOURS SCHEDULED
Qty: 8 TABLET | Refills: 0 | Status: SHIPPED | OUTPATIENT
Start: 2023-08-09 | End: 2023-08-14

## 2023-08-09 RX ORDER — SULFAMETHOXAZOLE AND TRIMETHOPRIM 800; 160 MG/1; MG/1
1 TABLET ORAL EVERY 12 HOURS SCHEDULED
Status: DISCONTINUED | OUTPATIENT
Start: 2023-08-09 | End: 2023-08-09 | Stop reason: HOSPADM

## 2023-08-09 RX ADMIN — ISOSORBIDE MONONITRATE 30 MG: 30 TABLET, EXTENDED RELEASE ORAL at 10:08

## 2023-08-09 RX ADMIN — OXYCODONE HYDROCHLORIDE 5 MG: 5 TABLET ORAL at 10:08

## 2023-08-09 RX ADMIN — LISINOPRIL 10 MG: 10 TABLET ORAL at 10:08

## 2023-08-09 RX ADMIN — NICOTINE 1 PATCH: 21 PATCH, EXTENDED RELEASE TRANSDERMAL at 10:09

## 2023-08-09 RX ADMIN — HEPARIN SODIUM 5000 UNITS: 5000 INJECTION INTRAVENOUS; SUBCUTANEOUS at 05:51

## 2023-08-09 RX ADMIN — ACETAMINOPHEN 975 MG: 325 TABLET, FILM COATED ORAL at 05:51

## 2023-08-09 RX ADMIN — METOPROLOL SUCCINATE 50 MG: 50 TABLET, EXTENDED RELEASE ORAL at 10:08

## 2023-08-09 RX ADMIN — FOLIC ACID 1 MG: 1 TABLET ORAL at 10:08

## 2023-08-09 RX ADMIN — Medication 250 MG: at 10:08

## 2023-08-09 RX ADMIN — GABAPENTIN 600 MG: 300 CAPSULE ORAL at 10:09

## 2023-08-09 RX ADMIN — EZETIMIBE 10 MG: 10 TABLET ORAL at 10:10

## 2023-08-09 RX ADMIN — LAMOTRIGINE 100 MG: 100 TABLET ORAL at 10:09

## 2023-08-09 RX ADMIN — DILTIAZEM HYDROCHLORIDE 120 MG: 60 TABLET, FILM COATED ORAL at 10:08

## 2023-08-09 RX ADMIN — OXYCODONE HYDROCHLORIDE 5 MG: 5 TABLET ORAL at 04:24

## 2023-08-09 RX ADMIN — CLOPIDOGREL BISULFATE 75 MG: 75 TABLET ORAL at 10:09

## 2023-08-09 NOTE — NURSING NOTE
Discharge instructions reviewed with and given to patient, no questions or concerns at this time. Patient's vitals signs stable, is IRP w/c level. Patient is discharging home with home health nursing, PT, and OT services. Patient was provided wheelchair and bedside commode for home. Patient escorted out via wheelchair accompanied by LPN and two sons.

## 2023-08-09 NOTE — PROGRESS NOTES
[address] [facility] coding opportunities       Chart reviewed, no opportunity found: CHART REVIEWED, NO OPPORTUNITY FOUND        Patients Insurance     Medicare Insurance: Medicare

## 2023-08-09 NOTE — PHYSICAL THERAPY NOTE
Pt seen by primary Physical Therapist to assess safety and functional transfers s/p fall earlier this morning. Pt reports she had caught her right foot under the wheel of the cahir while pushing herslef back to the bed from the bathroom and fell forward out of the chair. It was not while transferring. Pt demo ability to perform all transfers and w/c mobility at an Independent level with good awareness  and is safe to discharge home as planned.

## 2023-08-09 NOTE — TELEPHONE ENCOUNTER
I spoke with pt's son. His mom is scheduled to be released from the rehab hospital today. We will keep her biopsy scheduled for 8/14.  If for whatever reason pt ends up staying longer her son will call me to r/s her biopsy

## 2023-08-09 NOTE — QUICK NOTE
PMR On Call    Contacted by nursing about 4am, after being brought to the bathroom, patient was wheeled back, and as she went to get up from the wheelchair, her foot got caught and it tipped forward. She landed on her R knee and also bumped her L residual limb with some discomfort with some sanguinous oozing from incision. All staples were intact and no dehiscence. Vitals were stable. Her R knee felt fine, and they brought her to bed. Ordered L femur XR to be done given her recent surgery and discomfort after her fall. Had nursing hold on IRP this AM until seen by her primary care team. Updated her son.      Delores Resendez MD  Physical Medicine and Rehabilitation

## 2023-08-09 NOTE — CONSULTS
Kimberly Phipps is a 68 y.o. female who was receiving Vancomycin IV with management by the Pharmacy Consult service for treatment of Soft tissue (goal -600, trough >10)  . The patient's Vancomycin therapy has been completed / discontinued. Thank you for allowing us to take part in this patient's care. Pharmacy will sign-off now; please call or re-consult if there are any questions. Tiffany Carrera, PharmD.  [id number] [facility]

## 2023-08-09 NOTE — PLAN OF CARE
Problem: Prexisting or High Potential for Compromised Skin Integrity  Goal: Skin integrity is maintained or improved  Description: INTERVENTIONS:  - Identify patients at risk for skin breakdown  - Assess and monitor skin integrity  - Assess and monitor nutrition and hydration status  - Monitor labs   - Assess for incontinence   - Turn and reposition patient  - Assist with mobility/ambulation  - Relieve pressure over bony prominences  - Avoid friction and shearing  - Provide appropriate hygiene as needed including keeping skin clean and dry  - Evaluate need for skin moisturizer/barrier cream  - Collaborate with interdisciplinary team   - Patient/family teaching  - Consider wound care consult   Outcome: Progressing     Problem: MOBILITY - ADULT  Goal: Maintain or return to baseline ADL function  Description: INTERVENTIONS:  -  Assess patient's ability to carry out ADLs; assess patient's baseline for ADL function and identify physical deficits which impact ability to perform ADLs (bathing, care of mouth/teeth, toileting, grooming, dressing, etc.)  - Assess/evaluate cause of self-care deficits   - Assess range of motion  - Assess patient's mobility; develop plan if impaired  - Assess patient's need for assistive devices and provide as appropriate  - Encourage maximum independence but intervene and supervise when necessary  - Involve family in performance of ADLs  - Assess for home care needs following discharge   - Consider OT consult to assist with ADL evaluation and planning for discharge  - Provide patient education as appropriate  Outcome: Progressing  Goal: Maintains/Returns to pre admission functional level  Description: INTERVENTIONS:  - Perform BMAT or MOVE assessment daily.   - Set and communicate daily mobility goal to care team and patient/family/caregiver. - Collaborate with rehabilitation services on mobility goals if consulted  - Perform Range of Motion 3 times a day.   - Reposition patient every 2 hours.  - Dangle patient 3 times a day  - Stand patient 3 times a day  - Ambulate patient 3 times a day  - Out of bed to chair 3 times a day   - Out of bed for meals 3 times a day  - Out of bed for toileting  - Record patient progress and toleration of activity level   Outcome: Progressing     Problem: PAIN - ADULT  Goal: Verbalizes/displays adequate comfort level or baseline comfort level  Description: Interventions:  - Encourage patient to monitor pain and request assistance  - Assess pain using appropriate pain scale  - Administer analgesics based on type and severity of pain and evaluate response  - Implement non-pharmacological measures as appropriate and evaluate response  - Consider cultural and social influences on pain and pain management  - Notify physician/advanced practitioner if interventions unsuccessful or patient reports new pain  Outcome: Progressing     Problem: INFECTION - ADULT  Goal: Absence or prevention of progression during hospitalization  Description: INTERVENTIONS:  - Assess and monitor for signs and symptoms of infection  - Monitor lab/diagnostic results  - Monitor all insertion sites, i.e. indwelling lines, tubes, and drains  - Monitor endotracheal if appropriate and nasal secretions for changes in amount and color  - [place] appropriate cooling/warming therapies per order  - Administer medications as ordered  - Instruct and encourage patient and family to use good hand hygiene technique  - Identify and instruct in appropriate isolation precautions for identified infection/condition  Outcome: Progressing  Goal: Absence of fever/infection during neutropenic period  Description: INTERVENTIONS:  - Monitor WBC    Outcome: Progressing     Problem: SAFETY ADULT  Goal: Patient will remain free of falls  Description: INTERVENTIONS:  - Educate patient/family on patient safety including physical limitations  - Instruct patient to call for assistance with activity   - Consult OT/PT to assist with strengthening/mobility   - Keep Call bell within reach  - Keep bed low and locked with side rails adjusted as appropriate  - Keep care items and personal belongings within reach  - Initiate and maintain comfort rounds  - Make Fall Risk Sign visible to staff  - Apply yellow socks and bracelet for high fall risk patients  - Consider moving patient to room near nurses station  Outcome: Progressing  Goal: Maintain or return to baseline ADL function  Description: INTERVENTIONS:  -  Assess patient's ability to carry out ADLs; assess patient's baseline for ADL function and identify physical deficits which impact ability to perform ADLs (bathing, care of mouth/teeth, toileting, grooming, dressing, etc.)  - Assess/evaluate cause of self-care deficits   - Assess range of motion  - Assess patient's mobility; develop plan if impaired  - Assess patient's need for assistive devices and provide as appropriate  - Encourage maximum independence but intervene and supervise when necessary  - Involve family in performance of ADLs  - Assess for home care needs following discharge   - Consider OT consult to assist with ADL evaluation and planning for discharge  - Provide patient education as appropriate  Outcome: Progressing     Problem: DISCHARGE PLANNING  Goal: Discharge to home or other facility with appropriate resources  Description: INTERVENTIONS:  - Identify barriers to discharge w/patient and caregiver  - Arrange for needed discharge resources and transportation as appropriate  - Identify discharge learning needs (meds, wound care, etc.)  - Arrange for interpretive services to assist at discharge as needed  - Refer to Case Management Department for coordinating discharge planning if the patient needs post-hospital services based on physician/advanced practitioner order or complex needs related to functional status, cognitive ability, or social support system  Outcome: Progressing     Problem: Knowledge Deficit  Goal: Patient/family/caregiver demonstrates understanding of disease process, treatment plan, medications, and discharge instructions  Description: Complete learning assessment and assess knowledge base. Interventions:  - Provide teaching at level of understanding  - Provide teaching via preferred learning methods  Outcome: Progressing     Problem: Nutrition/Hydration-ADULT  Goal: Nutrient/Hydration intake appropriate for improving, restoring or maintaining nutritional needs  Description: Monitor and assess patient's nutrition/hydration status for malnutrition. Collaborate with interdisciplinary team and initiate plan and interventions as ordered. Monitor patient's weight and dietary intake as ordered or per policy. Utilize nutrition screening tool and intervene as necessary. Determine patient's food preferences and provide high-protein, high-caloric foods as appropriate.      INTERVENTIONS:  - Monitor oral intake, urinary output, labs, and treatment plans  - Assess nutrition and hydration status and recommend course of action  - Evaluate amount of meals eaten  - Assist patient with eating if necessary   - Allow adequate time for meals  - Recommend/ encourage appropriate diets, oral nutritional supplements, and vitamin/mineral supplements  - Order, calculate, and assess calorie counts as needed  - Recommend, monitor, and adjust tube feedings and TPN/PPN based on assessed needs  - Assess need for intravenous fluids  - Provide specific nutrition/hydration education as appropriate  - Include patient/family/caregiver in decisions related to nutrition  Outcome: Progressing

## 2023-08-09 NOTE — CASE MANAGEMENT
Team dc summary: Pt made progress in rehab program and participated in therapies. Pt dc with wc and commode, home PT OT through [facility]. Pt accompanied by family for DCI.

## 2023-08-09 NOTE — PROGRESS NOTES
Internal Medicine Progress Note  Patient: Bea Gardner  Age/sex: 68 y.o. female  Medical Record #: [id number]      ASSESSMENT/PLAN: (Interval History)  Bea Gardner is seen and examined and management for following issues:    S/p left AKA 7/21/23  • Hx severe PAD with B/L SC to FA bypass in past = has TO left fem-pop BPG  • Home:  Plavix/Crestor 20mg qd/Zetia 10mg qd  • Here:  Plavix/Zetia/Lipitor 40mg qd  • Pain/phantom pain management as per PMR  • Worsening erythema of the surgical wound  • Vascular surgery started IV Vanco and Cefepime  • Switched to bactrim DS bid x 4 additional days as outpt for 14 days total abx course  • Close f/u with vascular surgery on dc  • Improved=refer to images    Fall  · Coming back from bathroom her leg got caught and her W/C tipped and she fell onto her R knee and L stump a/t charting  · Xrays L femur negative     Coronary artery disease  • S/p CABG in 2000  • TTE on 1/26/2023 with EF of 60%, mild to moderate AI, mild AS, estimated PA pressure of 40 mmHg  • Nuclear pharmacological stress test on 1/26/2023 with anterior wall perfusion defect likely artifact/cannot exclude small area of ischemia, normal LV systolic function.   • Continue Plavix/Zetia 10mg qd/Imdur     Hypertension  • Home: Diltiazem 120 mg qd/Amlodipine 5 mg qd/Imdur 30 mg qd/Lisinopril 10 mg/ Toprol-XL 50 mg qd  • Here:  Diltiazem 120mg qd/Imdur 30mg qd/Lisinopril 10mg qd (hold SBP <130)/Toprol 50mg qd  • BP stable.     Hyperlipidemia  • Continue atorvastatin and Zetia here  • Follow-up with outpatient cardiology for possible Repatha     Paroxysmal atrial fibrillation  • Currently in sinus rhythm  • S/p a 2-week Zio patch in March 2023 with 5 episodes of SVT without other significant arrhythmia  • Continue Diltiazem 120 mg daily, Toprol XL 50 mg daily     DVT/IVC filter  • Hx lower extremity DVT/IVC filter  • IVC filter is still present on PET scan 7/7/2023     Tobacco use  • Continues to smoke 1 to 1.5 packs/day  • Did not tolerate Wellbutrin/Chantix in the past  • Counseled for smoking cessation but not ready to quit  • Nicotine patch here 21 mg     COPD  • CT chest with evidence of emphysema  • PFT in June 2023: Moderate obstruction, no reversibility with bronchodilator   • Was recommended Trelegy and rescue inhaler, but she didn't want it; using Albuterol prn. • Continues on RA with O2 sats 91-92%. • Recommend outpatient follow-up with pulmonology and smoking cessation     Right perihilar mass   • On CT imaging 7/18/23 highly suggestive for neoplasm  • Planned for biopsy on 8/14 by thoracic surgeon  • If she remains Stage 1A2 she will not require any adjuvant therapy  • Recommend they obtain EGFR to help with therapy    Restless leg  · Started ropinirole 8/1/23  · Not new for her she has taken OTC things in the past  · Improved      CKD II  • Creatinine stable at 0.86  • Has likely nonfunctioning right kidney  • Avoid nephrotoxic agents, hypotension     Bipolar disease  • continue Lamictal 100 mg daily  • Stable        Discharge date: OK for dc from medical standpoint     The above assessment and plan was reviewed and updated as determined by my evaluation of the patient on 8/9/2023.     Labs:   Results from last 7 days   Lab Units 08/07/23  0532 08/03/23  0602   WBC Thousand/uL 8.88 9.17   HEMOGLOBIN g/dL 12.0 12.5   HEMATOCRIT % 36.8 38.8   PLATELETS Thousands/uL 264 304     Results from last 7 days   Lab Units 08/07/23  0532 08/05/23  0538   SODIUM mmol/L 141 136   POTASSIUM mmol/L 5.0 4.6   CHLORIDE mmol/L 114* 110*   CO2 mmol/L 25 21   BUN mg/dL 17 17   CREATININE mg/dL 0.90 0.84   CALCIUM mg/dL 9.3 9.2                   Review of Scheduled Meds:  Current Facility-Administered Medications   Medication Dose Route Frequency Provider Last Rate   • acetaminophen  975 mg Oral Q8H [facility] LEV Turner     • albuterol  2 puff Inhalation Q6H PRN LEV Turner     • atorvastatin  40 mg Oral Daily With Dinner LEV Allison     • bisacodyl  10 mg Rectal Daily PRN LEV Allison     • clopidogrel  75 mg Oral Daily LEV Turner     • diltiazem  120 mg Oral Daily LEV Turner     • docusate sodium  100 mg Oral BID LEV Turner     • ezetimibe  10 mg Oral Daily LEV Turner     • folic acid  1 mg Oral Daily LEV Turner     • gabapentin  600 mg Oral TID LEV Allison     • heparin (porcine)  5,000 Units Subcutaneous Q8H [facility] LEV Turner     • isosorbide mononitrate  30 mg Oral QAM LEV Turner     • lamoTRIgine  100 mg Oral Daily LEV Turner     • lisinopril  10 mg Oral Daily LEV Mahan     • melatonin  9 mg Oral HS LEV Turner     • methocarbamol  500 mg Oral Q8H PRN Jean-Claude Pavon MD     • metoprolol succinate  50 mg Oral Daily LEV Turner     • nicotine  1 patch Transdermal Daily LEV Turner     • ondansetron  4 mg Oral Q6H PRN LEV Allison     • oxyCODONE  5 mg Oral Q4H PRN Jean-Claude Pavon MD      Or   • oxyCODONE  10 mg Oral Q4H PRN Jean-Claude Pavon MD     • polyethylene glycol  17 g Oral Daily PRN LEV Turner     • rOPINIRole  0.5 mg Oral HS LEV Galdamez     • saccharomyces boulardii  250 mg Oral BID Arbie Bloch, MD     • sulfamethoxazole-trimethoprim  1 tablet Oral Q12H [facility] LEV Rashid     • traZODone  50 mg Oral HS PRN LEV Allison         Subjective/ HPI: Patient seen and examined. Patients overnight issues or events were reviewed with nursing or staff during rounds or morning huddle session. New or overnight issues include the following:     Pt seen in her room, suffered a fall last evening. Denies any pain or discomfort this am. Ready for NM home      ROS:   A 10 point ROS was performed; negative except as noted above.        Imaging:     XR femur 1 vw left   Final Result by Sam Dasilva MD (40/10 1818)      Postsurgical changes in left above-the-knee amputation. No x-ray findings suggesting osteomyelitis at this time. Workstation performed: [id number]         XR femur 2 vw left   Final Result by Brent Alvarez MD (07/31 1550)      Postsurgical changes in left above-the-knee amputation. No x-ray findings suggesting osteomyelitis at this time. Resident: Margarita Miller, the attending radiologist, have reviewed the images and agree with the final report above. Workstation performed: [id number]             *Labs /Radiology studies reviewed  *Medications reviewed and reconciled as needed  *Please refer to order section for additional ordered labs studies  *Case discussed with primary attending during morning huddle case rounds    Physical Examination:  Vitals:   Vitals:    08/08/23 2129 08/09/23 0300 08/09/23 0419 08/09/23 0525   BP: 116/73 170/81 151/67 150/72   BP Location: Right arm   Left arm   Pulse: 61 66 61 62   Resp: 16   18   Temp: 98 °F (36.7 °C)   97.7 °F (36.5 °C)   TempSrc: Oral   Oral   SpO2: 94% 93% 93% 93%   Weight:    72.6 kg (160 lb 0.9 oz)   Height:           GEN: NAD; cooperative  NEURO: Alert and oriented x4; appropriate  HEENT: Pupils are equal/reactive; normocephalic, face is symmetrical, hearing is normal  CV: S1 S2 regular, no murmur/rub/gallops, 1/4 pedal pulse RLE, no LE edema present  RESP: Lungs are clear bilaterally, no wheezes rales or rhonchi, on room air, no distress, respirations are easy and non labored  GI: Abdomen is flat, soft, non tender, +BS x4; non distended  : Voiding without issues  MUSC: Moves all extremities except LLE amputation  SKIN: pink, warm, normal turgor, L stump dressing in place, refer to images      The above physical exam was reviewed and updated as determined by my evaluation of the patient on 8/9/2023. Invasive Devices     Peripheral Intravenous Line  Duration           Peripheral IV 08/06/23 Dorsal (posterior); Left Forearm 2 days VTE Pharmacologic Prophylaxis: Heparin  Code Status: Level 1 - Full Code  Current Length of Stay: 15 day(s)      Total time spent:  30 minutes with more than 50% spent counseling/coordinating care. Counseling includes discussion with patient re: progress  and discussion with patient of his/her current medical state/information. Coordination of patient's care was performed in conjunction with primary service. Time invested included review of patient's labs, vitals, and management of their comorbidities with continued monitoring. In addition, this patient was discussed with medical team including physician and advanced extenders. The care of the patient was extensively discussed and appropriate treatment plan was formulated unique for this patient. Medical decision making for the day was made by supervising physician unless otherwise noted in their attestation statement. ** Please Note:  voice to text software may have been used in the creation of this document.  Although proof errors in transcription or interpretation are a potential of such software**

## 2023-08-09 NOTE — PROGRESS NOTES
Occupational Therapy Treatment Note         08/09/23 0900   Lower Body Dressing   Type of Assistance Needed Set-up / clean-up   Physical Assistance Level No physical assistance   Comment residual limb wrapping in bed   Lower Body Dressing CARE Score 5   Roll Left and Right   Type of Assistance Needed Independent   Physical Assistance Level No physical assistance   Roll Left and Right CARE Score 6   Sit to Lying   Type of Assistance Needed Independent   Physical Assistance Level No physical assistance   Sit to Lying CARE Score 6   Lying to Sitting on Side of Bed   Type of Assistance Needed Independent   Physical Assistance Level No physical assistance   Lying to Sitting on Side of Bed CARE Score 6   Sit to Stand   Type of Assistance Needed Independent   Physical Assistance Level No physical assistance   Comment no device   Sit to Stand CARE Score 6   Bed-Chair Transfer   Type of Assistance Needed Independent   Physical Assistance Level No physical assistance   Comment stand pivot using UE support on w/c   Chair/Bed-to-Chair Transfer CARE Score 6   Toileting Hygiene   Type of Assistance Needed Independent   Physical Assistance Level No physical assistance   Comment +grab bar and BSC over toilet   [place] Score 6   Toilet Transfer   Type of Assistance Needed Independent   Physical Assistance Level No physical assistance   Comment w/ grab bar and BSC over toilet   Toilet Transfer CARE Score 6   Assessment   Treatment Assessment Pt seen this AM s/p fall last evening with nursing staff. Per pt, her R foot became stuck on side of wheel when she was going around the corner and it tipped the w/c. Pt is able to demonstrate how to independently transfer herself in w/c this morning and reports this was just an accident. OT educated pt on need to be aware of wheels and where they are compared to R LE, as wheels will turn when pt is turning/going around corners.  Also reviewed that [facility] can be placed next to bed at night to stand pivot on/off of, if pt is tired. OT educated pt on use of bobbi litter/trash bag in [facility] if using next to bedside. pt reports she feels comfortable with d/c home today. OT educated pt on amputee support group and pt is aware of AA support group online. Pt report she will look into additional psychological support upon d/c. Recommendation   OT Discharge Recommendation Home with home health rehabilitation   OT Therapy Minutes   OT Time In 0900   OT Time Out 0925   OT Total Time (minutes) 25   OT Mode of treatment - Individual (minutes) 25   OT Mode of treatment - Concurrent (minutes) 0   OT Mode of treatment - Group (minutes) 0   OT Mode of treatment - Co-treat (minutes) 0   OT Mode of Treatment - Total time(minutes) 25 minutes   OT Cumulative Minutes 1145   Therapy Time missed   Time missed?  No

## 2023-08-09 NOTE — POST FALL NOTE
Post Fall Note    Date of Fall: 08/09/23  Observer/Reported time of Fall: 0410  Name of Provider Notified: Dr Dean Jara  Time Provider Notified: [id number]  Assessment of Patient Injury: Pain  Post Fall Interventions: Physician notified; Circumstances of fall reviewed and documented; Fall risk precautions implemented/maitained; Comforts rounds continued; Other (comment) (will have  therapy eval IRP status per MD)  Family/Next of kin notified?: Yes (Dr Dean Jara to notify)      Brief Description of Events  Pt being supervised back to bed after trip to BR via wc. Pts R foot got caught on wheel of wc and wc tipped forward. Pt landed on R knee and L residual limb. L limb w slight bloody oozing. All staples intact. No open areas noted. No pain R knee. L limb redressed and ice applied. Last Recorded Vitals  Blood pressure 150/72, pulse 62, temperature 97.7 °F (36.5 °C), temperature source Oral, resp. rate 18, height 5' 4" (1.626 m), weight 72.6 kg (160 lb 0.9 oz), SpO2 93 %.       Principal Problem:    Hx of AKA (above knee amputation), left ([facility])  Active Problems:    Bipolar affective disorder, depressed, severe ([facility])    CAD (coronary artery disease)    Essential hypertension    PAD (peripheral artery disease) ([facility])    Mixed hyperlipidemia    Tobacco abuse    PAF (paroxysmal atrial fibrillation) ([facility])    Stage 3 chronic kidney disease, unspecified whether stage 3a or 3b CKD ([facility])    Insomnia    Nodule of upper lobe of right lung    At risk for venous thromboembolism (VTE)    Pain

## 2023-08-10 ENCOUNTER — TRANSITIONAL CARE MANAGEMENT (OUTPATIENT)
Dept: FAMILY MEDICINE CLINIC | Facility: CLINIC | Age: 73
End: 2023-08-10

## 2023-08-10 ENCOUNTER — HOME CARE VISIT (OUTPATIENT)
Dept: HOME HEALTH SERVICES | Facility: HOME HEALTHCARE | Age: 73
End: 2023-08-10

## 2023-08-10 NOTE — CASE COMMUNICATION
[facility] and no other SN agencies in home:  yes. Declined visit for today. Requesting first visit this weekend so she can finish getting situated at home. Communication to the physician regarding delay: yes    Insurance, copays, HB status reviewed: yes    Verified address and phone number: yes    Requested that patient secure pets: yes.   has dogs and cats    Medication bottles and DC instructions in home: yes    Woun d care/IV supplies in home:     CG present to learn:     Other concerns patient/family would like to address at visit:

## 2023-08-10 NOTE — PROGRESS NOTES
08/10/23 1534   Hello, [Guardian’s Name / Patient’s Senait Coppola, this is [Caller Senait Coppola from [facility], and our clinical care team wanted to check on you / your child after your recent visit to the hospital. It will only take 3-5 minutes. Is this a good time? Discharge Call Type/ Specific Diagnosis: General Call   General Discharge Phone Call   Were your/your child's discharge instructions clear and understandable? Please tell me in your own words how to care for yourself/your child now that you're home Yes;Patient understood instructions   Have you filled your/your child's new prescriptions yet? Yes   What questions do you have about those medications? No questions   Are you/your child having any unusual symptoms or problems? (Specific to problem- i.e., dressing, pain, bruising or swelling, procedure, etc.) No reported symptoms/problems   Do you have follow up appointment with your/your child's physician? Yes   Is there anything preventing you from keeping that appointment? No;Patient able to keep appointment   Are there any physicians, nurses, or hospital staff you would like us to recognize for doing a very good job? Nurse;PCA/Tech;PT/OT/RT/SLP  (Everyone was great!)   Thank you for taking the time to share with me about your care and recovery. Do you have any suggestions for us? No   This call resulted in: No interventions needed   Call Complete   Attempted Number of Calls 1   Discharge phone call complete?  Complete

## 2023-08-10 NOTE — PRE-PROCEDURE INSTRUCTIONS
Pre-Surgery Instructions:   Medication Instructions   • acetaminophen (TYLENOL) 325 mg tablet Uses PRN- OK to take day of surgery   • albuterol (ProAir HFA) 90 mcg/act inhaler Uses PRN- OK to take day of surgery   • clopidogrel (PLAVIX) 75 mg tablet Instructions provided by MD verbalized understanding   • diltiazem (CARDIZEM) 120 MG tablet Take day of surgery. • ezetimibe (ZETIA) 10 mg tablet Take day of surgery. • Folic Acid 0.8 MG CAPS Hold day of surgery. • gabapentin (Neurontin) 300 mg capsule Take day of surgery. • isosorbide mononitrate (IMDUR) 30 mg 24 hr tablet Take day of surgery. • lamoTRIgine (LaMICtal) 100 mg tablet Take day of surgery. • lisinopril (ZESTRIL) 10 mg tablet Hold day of surgery. • Melatonin 5 MG TABS stop taking   • metoprolol succinate (TOPROL-XL) 50 mg 24 hr tablet Take day of surgery. • oxyCODONE (ROXICODONE) 10 MG TABS Uses PRN- OK to take day of surgery   • rOPINIRole (REQUIP) 0.5 mg tablet Take night before surgery   • rosuvastatin (CRESTOR) 20 MG tablet Take night before surgery   • saccharomyces boulardii (FLORASTOR) 250 mg capsule Hold day of surgery. • sulfamethoxazole-trimethoprim (BACTRIM DS) 800-160 mg per tablet Take day of surgery. • traZODone (DESYREL) 50 mg tablet Take night before surgery     Review with patient via phone medications and showering instructions. Advised don't take NSAID's, ok tylenol products. Advised ASC call with surgery schedule time, nothing eat or drink after midnight. Verbalized understanding.

## 2023-08-11 ENCOUNTER — TELEPHONE (OUTPATIENT)
Dept: VASCULAR SURGERY | Facility: CLINIC | Age: 73
End: 2023-08-11

## 2023-08-11 NOTE — TELEPHONE ENCOUNTER
Vascular Nurse Navigator Post Op Phone Call    Post-Discharge phone call attempted to assess patient recovery after vascular surgery I left a message on answering machine. Will attempt to contact at later date. Pt's chart was reviewed prior to call and leaving message. Procedure:  Left - AMPUTATION ABOVE KNEE (AKA)    Date of Procedure:  7/21/23    Surgeon:     Mat Leiva MD - Primary     * Candy Mancera MD - DO South - Fellow    Discharge Date:  7/25/23    Discharge Disposition: Rehab - [address] [place] [address] Acute Rehab    Discharge Date from [address] [place] [address]:  8/9/23 [facility]      Anticoagulation pt was discharged on post op?: Clopidogrel (Plavix)    Statin pt was discharged on post op?: Rosuvastatin (Crestor)    Reminded pt of the following in message:    NEXT OFFICE VISIT SCHEDULED:  8/18/23 at 4 pm with LEV Anderson at The [address] [place] [address]    To contact [facility] with any concerns.

## 2023-08-12 ENCOUNTER — HOME CARE VISIT (OUTPATIENT)
Dept: HOME HEALTH SERVICES | Facility: HOME HEALTHCARE | Age: 73
End: 2023-08-12
Payer: MEDICARE

## 2023-08-12 VITALS
TEMPERATURE: 98.1 F | OXYGEN SATURATION: 94 % | RESPIRATION RATE: 20 BRPM | DIASTOLIC BLOOD PRESSURE: 74 MMHG | SYSTOLIC BLOOD PRESSURE: 138 MMHG | HEART RATE: 72 BPM

## 2023-08-12 PROCEDURE — 400013 VN SOC

## 2023-08-12 PROCEDURE — G0299 HHS/HOSPICE OF RN EA 15 MIN: HCPCS

## 2023-08-12 PROCEDURE — 10330081 VN NO-PAY CLAIM PROCEDURE

## 2023-08-13 ENCOUNTER — ANESTHESIA EVENT (OUTPATIENT)
Dept: PERIOP | Facility: HOSPITAL | Age: 73
End: 2023-08-13
Payer: MEDICARE

## 2023-08-13 NOTE — ANESTHESIA PREPROCEDURE EVALUATION
HPI  "56-year-old female with significant peripheral vascular disease status post numerous lower extremity bypass and endovascular procedures and a 60-pack-year smoking history who we are seeing for a 1.2 cm right upper lobe posterior segmental lung mass. She had an incidentally found right upper lobe lung mass. She denies any major respiratory symptoms. No cough or hemoptysis. Minor shortness of breath."    Procedure:  ENDOBRONCHIAL ULTRASOUND (EBUS) tissue biopsy ([facility])  BRONCHOSCOPY FLEXIBLE ([facility])  possible BRONCHOSCOPY NAVIGATIONAL ([facility])    Relevant Problems   ANESTHESIA   (-) History of anesthesia complications      CARDIO  PVD, s/p AKA 7/2023   (+) Aortoiliac occlusive disease (HCC)   (+) CAD (coronary artery disease)   (+) Essential hypertension   (+) Mixed hyperlipidemia   (+) PAF (paroxysmal atrial fibrillation) (HCC)      /RENAL   (+) Stage 3 chronic kidney disease, unspecified whether stage 3a or 3b CKD (HCC)      HEMATOLOGY  Plavix, last dose 8/9/23      NEURO/PSYCH   (+) Bipolar affective disorder, depressed, severe (HCC)      PULMONARY  per HPI        Physical Exam    Airway    Mallampati score: III  TM Distance: >3 FB  Neck ROM: full     Dental   upper dentures and lower dentures,     Cardiovascular  Rhythm: regular, No murmur,     Pulmonary  No rhonchi, No wheezes,     Other Findings        Anesthesia Plan  ASA Score- 3     Anesthesia Type- general with ASA Monitors. Additional Monitors:   Airway Plan: LMA. Plan Factors-    Chart reviewed. Patient is a current smoker. Patient did not smoke on day of surgery. Induction- intravenous. Postoperative Plan- Plan for postoperative opioid use. Informed Consent- Anesthetic plan and risks discussed with patient. I personally reviewed this patient with the CRNA. Discussed and agreed on the Anesthesia Plan with the CRNA. Carlo Purvis         Pt received pre procedure albuterol neb

## 2023-08-14 ENCOUNTER — PATIENT OUTREACH (OUTPATIENT)
Dept: HEMATOLOGY ONCOLOGY | Facility: CLINIC | Age: 73
End: 2023-08-14

## 2023-08-14 ENCOUNTER — ANESTHESIA (OUTPATIENT)
Dept: PERIOP | Facility: HOSPITAL | Age: 73
End: 2023-08-14
Payer: MEDICARE

## 2023-08-14 ENCOUNTER — HOSPITAL ENCOUNTER (OUTPATIENT)
Facility: HOSPITAL | Age: 73
Setting detail: OUTPATIENT SURGERY
Discharge: HOME | End: 2023-08-14
Attending: THORACIC SURGERY (CARDIOTHORACIC VASCULAR SURGERY) | Admitting: THORACIC SURGERY (CARDIOTHORACIC VASCULAR SURGERY)
Payer: MEDICARE

## 2023-08-14 ENCOUNTER — APPOINTMENT (OUTPATIENT)
Dept: RADIOLOGY | Facility: HOSPITAL | Age: 73
End: 2023-08-14
Attending: THORACIC SURGERY (CARDIOTHORACIC VASCULAR SURGERY)
Payer: MEDICARE

## 2023-08-14 ENCOUNTER — APPOINTMENT (OUTPATIENT)
Dept: RADIOLOGY | Facility: HOSPITAL | Age: 73
End: 2023-08-14
Payer: MEDICARE

## 2023-08-14 ENCOUNTER — HOME CARE VISIT (OUTPATIENT)
Dept: HOME HEALTH SERVICES | Facility: HOME HEALTHCARE | Age: 73
End: 2023-08-14
Payer: MEDICARE

## 2023-08-14 VITALS
WEIGHT: 159 LBS | TEMPERATURE: 98.4 F | SYSTOLIC BLOOD PRESSURE: 117 MMHG | HEIGHT: 64 IN | RESPIRATION RATE: 18 BRPM | DIASTOLIC BLOOD PRESSURE: 61 MMHG | HEART RATE: 69 BPM | OXYGEN SATURATION: 91 % | BODY MASS INDEX: 27.14 KG/M2

## 2023-08-14 DIAGNOSIS — I25.810 CORONARY ARTERY DISEASE INVOLVING CORONARY BYPASS GRAFT OF NATIVE HEART WITHOUT ANGINA PECTORIS: ICD-10-CM

## 2023-08-14 DIAGNOSIS — C34.91 NSCLC OF RIGHT LUNG (HCC): Primary | ICD-10-CM

## 2023-08-14 DIAGNOSIS — R91.8 LUNG MASS: ICD-10-CM

## 2023-08-14 PROCEDURE — 31625 BRONCHOSCOPY W/BIOPSY(S): CPT | Performed by: THORACIC SURGERY (CARDIOTHORACIC VASCULAR SURGERY)

## 2023-08-14 PROCEDURE — 88341 IMHCHEM/IMCYTCHM EA ADD ANTB: CPT | Performed by: PATHOLOGY

## 2023-08-14 PROCEDURE — 71045 X-RAY EXAM CHEST 1 VIEW: CPT

## 2023-08-14 PROCEDURE — 88172 CYTP DX EVAL FNA 1ST EA SITE: CPT | Performed by: PATHOLOGY

## 2023-08-14 PROCEDURE — 99024 POSTOP FOLLOW-UP VISIT: CPT | Performed by: THORACIC SURGERY (CARDIOTHORACIC VASCULAR SURGERY)

## 2023-08-14 PROCEDURE — 88333 PATH CONSLTJ SURG CYTO XM 1: CPT | Performed by: PATHOLOGY

## 2023-08-14 PROCEDURE — 88173 CYTOPATH EVAL FNA REPORT: CPT | Performed by: PATHOLOGY

## 2023-08-14 PROCEDURE — 88360 TUMOR IMMUNOHISTOCHEM/MANUAL: CPT | Performed by: PATHOLOGY

## 2023-08-14 PROCEDURE — 31653 BRONCH EBUS SAMPLNG 3/> NODE: CPT | Performed by: THORACIC SURGERY (CARDIOTHORACIC VASCULAR SURGERY)

## 2023-08-14 PROCEDURE — 88305 TISSUE EXAM BY PATHOLOGIST: CPT | Performed by: PATHOLOGY

## 2023-08-14 PROCEDURE — 88342 IMHCHEM/IMCYTCHM 1ST ANTB: CPT | Performed by: PATHOLOGY

## 2023-08-14 RX ORDER — ALBUTEROL SULFATE 2.5 MG/3ML
2.5 SOLUTION RESPIRATORY (INHALATION) ONCE
Status: COMPLETED | OUTPATIENT
Start: 2023-08-14 | End: 2023-08-14

## 2023-08-14 RX ORDER — ROCURONIUM BROMIDE 10 MG/ML
INJECTION, SOLUTION INTRAVENOUS AS NEEDED
Status: DISCONTINUED | OUTPATIENT
Start: 2023-08-14 | End: 2023-08-14

## 2023-08-14 RX ORDER — EPINEPHRINE NASAL SOLUTION 1 MG/ML
SOLUTION NASAL AS NEEDED
Status: DISCONTINUED | OUTPATIENT
Start: 2023-08-14 | End: 2023-08-14 | Stop reason: HOSPADM

## 2023-08-14 RX ORDER — ONDANSETRON 2 MG/ML
4 INJECTION INTRAMUSCULAR; INTRAVENOUS ONCE AS NEEDED
Status: DISCONTINUED | OUTPATIENT
Start: 2023-08-14 | End: 2023-08-14 | Stop reason: HOSPADM

## 2023-08-14 RX ORDER — SODIUM CHLORIDE, SODIUM LACTATE, POTASSIUM CHLORIDE, CALCIUM CHLORIDE 600; 310; 30; 20 MG/100ML; MG/100ML; MG/100ML; MG/100ML
INJECTION, SOLUTION INTRAVENOUS CONTINUOUS PRN
Status: DISCONTINUED | OUTPATIENT
Start: 2023-08-14 | End: 2023-08-14

## 2023-08-14 RX ORDER — FENTANYL CITRATE/PF 50 MCG/ML
25 SYRINGE (ML) INJECTION
Status: DISCONTINUED | OUTPATIENT
Start: 2023-08-14 | End: 2023-08-14 | Stop reason: HOSPADM

## 2023-08-14 RX ORDER — HYDROMORPHONE HCL IN WATER/PF 6 MG/30 ML
0.2 PATIENT CONTROLLED ANALGESIA SYRINGE INTRAVENOUS
Status: DISCONTINUED | OUTPATIENT
Start: 2023-08-14 | End: 2023-08-14 | Stop reason: HOSPADM

## 2023-08-14 RX ORDER — ONDANSETRON 2 MG/ML
INJECTION INTRAMUSCULAR; INTRAVENOUS AS NEEDED
Status: DISCONTINUED | OUTPATIENT
Start: 2023-08-14 | End: 2023-08-14

## 2023-08-14 RX ORDER — SODIUM CHLORIDE FOR INHALATION 0.9 %
VIAL, NEBULIZER (ML) INHALATION
Status: COMPLETED
Start: 2023-08-14 | End: 2023-08-14

## 2023-08-14 RX ORDER — PROPOFOL 10 MG/ML
INJECTION, EMULSION INTRAVENOUS AS NEEDED
Status: DISCONTINUED | OUTPATIENT
Start: 2023-08-14 | End: 2023-08-14

## 2023-08-14 RX ORDER — DEXAMETHASONE SODIUM PHOSPHATE 10 MG/ML
INJECTION, SOLUTION INTRAMUSCULAR; INTRAVENOUS AS NEEDED
Status: DISCONTINUED | OUTPATIENT
Start: 2023-08-14 | End: 2023-08-14

## 2023-08-14 RX ORDER — CLOPIDOGREL BISULFATE 75 MG/1
75 TABLET ORAL DAILY
Qty: 90 TABLET | Refills: 0 | Status: SHIPPED | OUTPATIENT
Start: 2023-08-15

## 2023-08-14 RX ORDER — LIDOCAINE HYDROCHLORIDE 10 MG/ML
INJECTION, SOLUTION EPIDURAL; INFILTRATION; INTRACAUDAL; PERINEURAL AS NEEDED
Status: DISCONTINUED | OUTPATIENT
Start: 2023-08-14 | End: 2023-08-14

## 2023-08-14 RX ORDER — DIPHENHYDRAMINE HYDROCHLORIDE 50 MG/ML
12.5 INJECTION INTRAMUSCULAR; INTRAVENOUS ONCE AS NEEDED
Status: DISCONTINUED | OUTPATIENT
Start: 2023-08-14 | End: 2023-08-14 | Stop reason: HOSPADM

## 2023-08-14 RX ORDER — FENTANYL CITRATE 50 UG/ML
INJECTION, SOLUTION INTRAMUSCULAR; INTRAVENOUS AS NEEDED
Status: DISCONTINUED | OUTPATIENT
Start: 2023-08-14 | End: 2023-08-14

## 2023-08-14 RX ADMIN — ALBUTEROL SULFATE 2.5 MG: 2.5 SOLUTION RESPIRATORY (INHALATION) at 06:58

## 2023-08-14 RX ADMIN — ISODIUM CHLORIDE 3 ML: 0.03 SOLUTION RESPIRATORY (INHALATION) at 06:58

## 2023-08-14 RX ADMIN — SUGAMMADEX 200 MG: 100 INJECTION, SOLUTION INTRAVENOUS at 08:28

## 2023-08-14 RX ADMIN — SODIUM CHLORIDE, SODIUM LACTATE, POTASSIUM CHLORIDE, AND CALCIUM CHLORIDE: .6; .31; .03; .02 INJECTION, SOLUTION INTRAVENOUS at 07:31

## 2023-08-14 RX ADMIN — DEXAMETHASONE SODIUM PHOSPHATE 5 MG: 10 INJECTION, SOLUTION INTRAMUSCULAR; INTRAVENOUS at 07:42

## 2023-08-14 RX ADMIN — FENTANYL CITRATE 100 MCG: 50 INJECTION, SOLUTION INTRAMUSCULAR; INTRAVENOUS at 07:33

## 2023-08-14 RX ADMIN — PROPOFOL 150 MG: 10 INJECTION, EMULSION INTRAVENOUS at 07:40

## 2023-08-14 RX ADMIN — PROPOFOL 100 MCG/KG/MIN: 10 INJECTION, EMULSION INTRAVENOUS at 07:41

## 2023-08-14 RX ADMIN — PHENYLEPHRINE HYDROCHLORIDE 30 MCG/MIN: 10 INJECTION INTRAVENOUS at 07:40

## 2023-08-14 RX ADMIN — ROCURONIUM BROMIDE 10 MG: 10 INJECTION, SOLUTION INTRAVENOUS at 08:00

## 2023-08-14 RX ADMIN — PROPOFOL 150 MG: 10 INJECTION, EMULSION INTRAVENOUS at 07:33

## 2023-08-14 RX ADMIN — ONDANSETRON 4 MG: 2 INJECTION INTRAMUSCULAR; INTRAVENOUS at 08:20

## 2023-08-14 RX ADMIN — ROCURONIUM BROMIDE 40 MG: 10 INJECTION, SOLUTION INTRAVENOUS at 07:33

## 2023-08-14 RX ADMIN — LIDOCAINE HYDROCHLORIDE 60 MG: 10 INJECTION, SOLUTION EPIDURAL; INFILTRATION; INTRACAUDAL; PERINEURAL at 07:40

## 2023-08-14 NOTE — OP NOTE
OPERATIVE REPORT  PATIENT NAME: Aide Luu    :  1950  MRN: [id number]  Pt Location: [facility] OR ROOM 08    SURGERY DATE: 2023    Surgeon(s) and Role:     * Odette Jacinto MD - Primary     * Jeffery Ibarra MD - Assisting    Preop Diagnosis:  Lung mass [R91.8]    Post-Op Diagnosis Codes:     * Lung mass [R91.8]    Procedure(s):  1. EBUS with biopsy of 3 lymph nodes  2. Bronchoscopy with forceps biopsy    Specimen(s):  ID Type Source Tests Collected by Time Destination   1 : RUL posterior segment endobronchial biopsy Tissue Lung, Right Upper Lobe TISSUE EXAM Odette Jacinto MD 2023 0758    2 : level 7 FNA Lymph Node FINE NEEDLE ASPIRATION Odette Jacinto MD 2023 8529    3 : level 11L FNA Lymph Node FINE NEEDLE ASPIRATION Odette Jacinto MD 2023 0818        Estimated Blood Loss:   Minimal    Drains:  * No LDAs found *    Anesthesia Type:   General    Operative Indications:  Lung mass [R808]  67-year-old female with significant vascular disease tobacco abuse and a PET avid right hilar mass suspicious for malignancy    Operative Findings:  evidence of cancer in the right upper lobe posterior segmental mass. Favor non-small cell lung cancer    Complications:   None    Procedure and Technique:  After obtaining informed consent from the patient, they were transported to the operating room and placed supine on the OR table. An endotracheal tube was placed without incident. A formal time-out was performed at this time including patient, date of birth, intended procedure, antibiotic usage as appropriate, beta-blocker usage as appropriate and plans for specimen handling. An adult bronchoscope was inserted into the endotracheal tube and a thorough bronchoscopy was then performed examining the trachea, mainstem bronchi, sigmoid segmental and subsegmental bronchi. All mucus was suctioned out to improve visualization.   There is no suspicion or identified risk for TB or other air board infectious disease. This bronchoscopy was being performed for diagnostic purposes only. There was clearly an endobronchial mass obstructing the entirety of the right upper lobe posterior segment. This was not invading into the right mainstem bronchus. We then performed multiple forceps biopsies of this right upper lobe posterior segmental mass. This was immediately analyzed to be a touch prep and found to be consistent with carcinoma. Final subtype pending. We sent multiple additional biopsies for cell block    The Olympus EBUS scope was then inserted and used to identify mediastinal lymph node stations. Mediastinal stations 10 R, 7, 4R and 4L, and 11 L were visualized using ultrasound guidance. Color Doppler was used as needed to identify and avoid surrounding vasculature. A 21 gauge EBUS needle was used to obtain node biopsies under direct visualization. Biopsies were obtained from stations 10 R, 7, 10 R was surrounded with vasculature and I did not get significant tissue here. This was a small 8 mm lymph node that was not PET avid. Given its risks I decided not to perform more biopsy here. These were immediately analyzed by cytopathology showing lymphocytes but no malignancy. There was no significant bleeding seen from the airway. This portion of the procedure was completed at this time. Adult bronchoscope was reinserted and the air was fully suctioned out. Again no bleeding was seen. Patient awoke extubated was transferred to the PACU in stable condition     I was present for the entire procedure.     Patient Disposition:  PACU         SIGNATURE: Yousif Tirado MD  DATE: August 14, 2023  TIME: 8:33 AM

## 2023-08-14 NOTE — PROGRESS NOTES
Received staff message from Dr Gill Falling post EBUS for orders to be placed ( Rad/Onc referral). Referral placed and I was able to reach out to [place] at [facility] [place]. We were ankit to schedule patient on 8/28 at [facility]. Was able to meet patient and her two grandchildren at [place] [address]. Introduced myself and explained the role of a Care Coordinator along with HUGO Adame. Pt is also to be presented at thoracic tumor board on 8/21 or 8/28, depending on availability. Message has already been sent to thoracic tumor pool. Provided my direct phone number for questions or concerns moving forward. Patient was appreciative.

## 2023-08-14 NOTE — DISCHARGE INSTR - AVS FIRST PAGE
[address] [place] [address] Surgical Discharge Instructions    Please follow-up as scheduled. If you do not already have a follow-up appointment, please call our office ([phone]) when you leave to schedule follow-up appointment within 1-2 weeks. Medications:    - Do not start your Plavix until tomorrow (Tuesday, 8/15/23). - Continue your pre-hospital medication regimen after discharge unless you were instructed otherwise. Please refer to your discharge medication list for further details.   - For the first few days following your procedure, take Tylenol to provide a solid baseline pain control and use the stronger pain medications (if provided) for more severe pain    Activity:  - Regular activity (working around the house, walking up/down stairs, etc.) is ok and encouraged  - No driving until you are no longer using narcotic pain medications    Diet:    - You may resume your normal diet    Additional Instructions:  - If you have any questions or concerns after discharge please do not hesitate to contact our office, we would be happy to provide clarification      Call our office or return to the Emergency Room if you develop a fever greater than 101F, chills, persistent nausea/vomiting, worsening/uncontrollable pain, and/or increasing redness or purulent/foul smelling drainage from your incision(s)

## 2023-08-14 NOTE — H&P
H&P - Thoracic Surgery  Marino Yang 68 y.o. female MRN: [id number]  Unit/Bed#: OR BENNIE Encounter: [id number]      Assessment/Plan      Assessment:  36-year-old female with significant tobacco abuse and a PET avid right upper lobe mass suspicious for malignancy    Plan:  The patient was met in the preoperative area and today's plan was discussed at that time. We discussed that they have a concerning lung nodule and the goal of today's procedure was to biopsy the mass as well as evaluate/biopsies any concerning lymph nodes. Technically this procedure will be a robotic navigational bronchoscopy with endobronchial biopsy, endobronchial brushing, and endobronchial forceps biopsy as well as Bal.  Afterwards we would do EBUS with biopsy to evaluate the lymph nodes. We discussed risks, benefits and alternatives of the procedure. Specifically, we discussed there is a 3-4% risk of pneumothorax and 1% risk of significant bleeding related to the procedure. Additional potential risks include COPD exacerbation, atelectasis and pneumonia. They understand that diagnostic yield is generally 80-85%. In the event of a nondiagnostic procedure, patient understands additional testing and follow-up may be necessary. Patient verbalizes understanding and consent has been signed. We will plan to perform postprocedure chest x-ray prior to discharge. Proceed with this procedure as scheduled. Libby Solorzano MD        History of Present Illness     HPI: Marino Yang is a 68y.o. year old female who presents with a suspicious right upper lobe mass. She has significant tobacco abuse smoking at least a pack per day per greater than 30 years. She has significant coronary artery disease, carotid stenosis, peripheral artery disease status post recent amputation, stage III CKD, and significant vascular disease. We saw her previously in the office and discussed risk, benefits and alternatives of this.   In the interim she unfortunately underwent a left lower extremity amputation. She has recovered from this. She got out of rehab. Review of Systems   Constitutional: Negative for activity change, appetite change, chills, diaphoresis, fatigue, fever and unexpected weight change. HENT: Negative. Eyes: Negative. Respiratory: Negative for apnea, cough, chest tightness, shortness of breath, wheezing and stridor. Cardiovascular: Negative for chest pain, palpitations and leg swelling. Gastrointestinal: Negative for abdominal distention, abdominal pain, blood in stool, constipation, diarrhea, nausea and vomiting. Endocrine: Negative. Genitourinary: Negative. Musculoskeletal: Positive for arthralgias and gait problem. Skin: Negative. Allergic/Immunologic: Negative. Hematological: Negative. Psychiatric/Behavioral: Negative. Historical Information   Past Medical History:   Diagnosis Date   • Aorto-iliac disease ([address] [place] [address])    • Atrial fibrillation ([address] [place] [address])    • CAD (coronary artery disease)    • Carotid stenosis, bilateral    • Celiac artery stenosis ([facility])    • CKD (chronic kidney disease) stage 3, GFR 30-59 ml/min ([facility])    • DVT (deep venous thrombosis) ([facility])     LLE (CFV, popl)   • Heart attack ([facility]) 02/2022   • HLD (hyperlipidemia)    • Hypertension    • Lung mass     RUL   • Myocardial infarction [facility])     2022   • PAD (peripheral artery disease) ([address] [place] [address])    • Stroke [facility])      Past Surgical History:   Procedure Laterality Date   • AORTA - FEMORAL ARTERY BYPASS GRAFT Left    • AXILLO-FEMORAL BYASS GRAFT Bilateral    • BYPASS FEMORAL-FEMORAL     • COLONOSCOPY     • CORONARY ARTERY BYPASS GRAFT  2000   • FEMORAL ARTERY - POPLITEAL ARTERY BYPASS GRAFT Bilateral    • IVC FILTER INSERTION     • KS AMPUTATION THIGH THROUGH FEMUR ANY LEVEL Left 07/21/2023    Procedure: AMPUTATION ABOVE KNEE (AKA);   Surgeon: Garry Perry MD;  Location: [facility] MAIN OR;  Service: Vascular   • TOTAL ABDOMINAL HYSTERECTOMY W/ BILATERAL SALPINGOOPHORECTOMY     • TOTAL HIP ARTHROPLASTY Left      Social History   Social History     Substance and Sexual Activity   Alcohol Use Not Currently   • Alcohol/week: 50.0 standard drinks of alcohol   • Types: 50 Cans of beer per week    Comment: has not had a drink in 243 days (7/18/23)  h/o at least 6 beers per day     Social History     Substance and Sexual Activity   Drug Use Never     Social History     Tobacco Use   Smoking Status Every Day   • Packs/day: 0.25   • Types: Cigarettes   • Start date: 1967   Smokeless Tobacco Never     E-Cigarette/Vaping   • E-Cigarette Use Never User      E-Cigarette/Vaping Substances   • Nicotine No    • THC No    • CBD No    • Flavoring No    • Other No    • Unknown No      Family History   Problem Relation Age of Onset   • Stomach cancer Mother        Meds/Allergies      Current Meds:   No current facility-administered medications for this encounter. No Known Allergies    Objective   No intake or output data in the 24 hours ending 08/14/23 0729    Invasive Devices     None                 Physical Exam  Vitals and nursing note reviewed. Constitutional:       General: She is not in acute distress. Appearance: Normal appearance. She is well-developed. HENT:      Head: Normocephalic and atraumatic. Eyes:      Conjunctiva/sclera: Conjunctivae normal.   Cardiovascular:      Rate and Rhythm: Normal rate and regular rhythm. Heart sounds: No murmur heard. Pulmonary:      Effort: Pulmonary effort is normal. No respiratory distress. Breath sounds: Normal breath sounds. Abdominal:      General: Abdomen is flat. Palpations: Abdomen is soft. Tenderness: There is no abdominal tenderness. Musculoskeletal:      Cervical back: Neck supple. Comments: Left lower extremity amputation. Skin:     General: Skin is warm and dry. Capillary Refill: Capillary refill takes less than 2 seconds. Neurological:      General: No focal deficit present. Mental Status: She is alert and oriented to person, place, and time. Psychiatric:         Mood and Affect: Mood normal.         Lab Results: I have personally reviewed pertinent reports. Imaging Studies: I have personally reviewed pertinent films in PACS  Pathology, and Other Studies:     Code Status: Prior  Advance Directive and Living Will:      Power of :    POLST:      Counseling / Coordination of Care  Total floor / unit time spent today 15 minutes. Greater than 50% of total time was spent with the patient and / or family counseling and / or coordination of care.  A description of the counseling / coordination of care: Discussion of risk, benefits alternatives of procedure

## 2023-08-14 NOTE — ANESTHESIA POSTPROCEDURE EVALUATION
Post-Op Assessment Note    CV Status:  Stable  Pain Score: 0    Pain management: adequate  Multimodal analgesia used between 6 hours prior to anesthesia start to PACU discharge    Mental Status:  Alert and awake   Hydration Status:  Euvolemic and stable   PONV Controlled:  Controlled   Airway Patency:  Patent   Two or more mitigation strategies used for obstructive sleep apnea   Post Op Vitals Reviewed: Yes      Staff: CRNA         No notable events documented.     BP   85/53   Temp   98   Pulse  76   Resp   12   SpO2   95

## 2023-08-14 NOTE — PROGRESS NOTES
[address] and I with patient and two teenage grandson's Spike Bulls and [employer] Books) in [facility] after bronchoscopy. for initial outreach from nurse navigator. Introduced ourselves and my roles Advised referral was placed for Radiation Oncology - they will call her to set up an appointment for SBRT. Assessed barriers of care. Patient reports smoking history of 1 ppd for 50+ years. She does not want to quit. Advised when she is ready we have do a smoking cessation program that can help. She reports brown productive cough at times. Denies any SOB, fatigue, decreased appetite or weight loss. She has a significant medical history including  PAD, s/p vascular bypass, aortoiliac occlusive disease. She also has an IVC filter placed. Reports she does has some discomfort in left leg at recent AKA (7/21/23) site. Patient uses wheelchair due to amputation. Patient lives with her son and 4 grandchildren. She denies any transportation issues, as grandchildren drive her around. OSW referral placed. Patient was given my direct contact information, business card. She does not use [employer]. Patient is aware she can reach out with any questions. General assessment complete.

## 2023-08-15 ENCOUNTER — HOME CARE VISIT (OUTPATIENT)
Dept: HOME HEALTH SERVICES | Facility: HOME HEALTHCARE | Age: 73
End: 2023-08-15
Payer: MEDICARE

## 2023-08-15 ENCOUNTER — PATIENT OUTREACH (OUTPATIENT)
Dept: CASE MANAGEMENT | Facility: HOSPITAL | Age: 73
End: 2023-08-15

## 2023-08-15 ENCOUNTER — TELEPHONE (OUTPATIENT)
Dept: PSYCHIATRY | Facility: CLINIC | Age: 73
End: 2023-08-15

## 2023-08-15 ENCOUNTER — DOCUMENTATION (OUTPATIENT)
Dept: HEMATOLOGY ONCOLOGY | Facility: CLINIC | Age: 73
End: 2023-08-15

## 2023-08-15 VITALS — HEART RATE: 63 BPM | SYSTOLIC BLOOD PRESSURE: 110 MMHG | OXYGEN SATURATION: 93 % | DIASTOLIC BLOOD PRESSURE: 50 MMHG

## 2023-08-15 VITALS
DIASTOLIC BLOOD PRESSURE: 50 MMHG | OXYGEN SATURATION: 93 % | SYSTOLIC BLOOD PRESSURE: 110 MMHG | HEART RATE: 63 BPM | TEMPERATURE: 97.6 F | RESPIRATION RATE: 20 BRPM

## 2023-08-15 PROCEDURE — G0152 HHCP-SERV OF OT,EA 15 MIN: HCPCS

## 2023-08-15 PROCEDURE — G0299 HHS/HOSPICE OF RN EA 15 MIN: HCPCS

## 2023-08-15 PROCEDURE — G0151 HHCP-SERV OF PT,EA 15 MIN: HCPCS

## 2023-08-15 NOTE — OCCUPATIONAL THERAPY NOTE
Occupational Therapy Discharge Summary    Pt has made G progress during course of OT functioning at overall set-up to independent for ADLs, independent for fxnl squat pivot xfers and w/c mobility. DME including manual w/c and standard BSC ordered to maximize fxnl indep and dec fall risk. FT completed w/ pt's grandsons and pt provided w/ BUE theraband HEP. From OT standpoint, pt is okay to d/c home w/ family support and recommendation for HHOT to maximize fxnl indep and ease transition to home context. D/C IPOT.      Lisseth Marino MS, OTR/L

## 2023-08-15 NOTE — TELEPHONE ENCOUNTER
Vascular Nurse Navigator Post Op Call    Procedure:  Left - AMPUTATION ABOVE KNEE (AKA)     Date of Procedure:  7/21/23     Surgeon:  Juarez Dotson* Buzz Ugalde MD - Primary     * Noemi Linda MD - Assisting     * Claudene Cluster, DO - Fellow     Discharge Date:  7/25/23     Discharge Disposition: Rehab - [address] [place] [address] Acute Rehab     [facility]    Chest Pain?:No    Shortness of Breath?:No     Increased Pain, Swelling, Warmth, or Redness? :No    Purulent Drainage?:No    Foul- smelling drainage?: No    Signs of dehiscence?:No    Fever/Chills? :No    Bleeding?:No    Anticoagulation pt was discharged on post op?: Clopidogrel (Plavix)    Statin pt was discharged on post op?:  Rosuvastatin (Crestor)    Uncontrolled Pain?:No    Plan for Prosthetic in Future?: Yes    Prosthetic Provider?:  [facility]      Reviewed discharge instructions and incision care with patient. NEXT OFFICE VISIT SCHEDULED:  8/18/23 at 4 pm with LEV Johnson at The [address] [place] [address]    Transportation:  Yes      Any further questions/concerns? Patient stated that she is doing good since discharge from rehab. She stated that her incision looks good and without any signs of infection. Reviewed discharge medications - Plavix. All questions answered. No concerns expressed at this time.

## 2023-08-15 NOTE — TELEPHONE ENCOUNTER
Contacted pt in regards to routine referral and to get placed on proper wait list.     Pt prefers [facility] as it is hard for them to leave house.

## 2023-08-15 NOTE — PROGRESS NOTES
In-basket message received from Dr. Minda Solis to add patient to thoracic [facility] 8/21/2023. Chart reviewed and prep started. Pending final pathology results from 8/14/23. I will follow.

## 2023-08-15 NOTE — PHYSICAL THERAPY NOTE
Physical Therapy Discharge Summary    Pt discharged home and will continue with home health therapy services to maximize her strength, function and safety while she continues to heal and awaits her prosthetic limb. Pt made very good functional progress, achieving Ind with bed mobility, transfers and w/c mobility. Required CGA for hopping up to 10 ft with RW and Min A to hop up one step bkwd with RW. She reports also having ramp available now for entry to home. Family training completed with grandsons.  Education and HEP reviewed with patient as part of phase 1 amputee program.

## 2023-08-15 NOTE — PROGRESS NOTES
OSW received referral. Patient had biopsy completed yesterday, with Dr. Lindsay Mcfadedn. Will await results of pathology for dx.

## 2023-08-16 ENCOUNTER — TELEPHONE (OUTPATIENT)
Dept: HEMATOLOGY ONCOLOGY | Facility: CLINIC | Age: 73
End: 2023-08-16

## 2023-08-16 ENCOUNTER — OFFICE VISIT (OUTPATIENT)
Dept: FAMILY MEDICINE CLINIC | Facility: CLINIC | Age: 73
End: 2023-08-16
Payer: MEDICARE

## 2023-08-16 VITALS — DIASTOLIC BLOOD PRESSURE: 66 MMHG | OXYGEN SATURATION: 93 % | HEART RATE: 71 BPM | SYSTOLIC BLOOD PRESSURE: 122 MMHG

## 2023-08-16 VITALS
WEIGHT: 160 LBS | BODY MASS INDEX: 27.46 KG/M2 | DIASTOLIC BLOOD PRESSURE: 60 MMHG | SYSTOLIC BLOOD PRESSURE: 116 MMHG | HEART RATE: 53 BPM | TEMPERATURE: 97.8 F | OXYGEN SATURATION: 92 % | RESPIRATION RATE: 20 BRPM

## 2023-08-16 DIAGNOSIS — Z89.612 HX OF AKA (ABOVE KNEE AMPUTATION), LEFT (HCC): Primary | ICD-10-CM

## 2023-08-16 DIAGNOSIS — I10 ESSENTIAL HYPERTENSION: ICD-10-CM

## 2023-08-16 DIAGNOSIS — N18.30 STAGE 3 CHRONIC KIDNEY DISEASE, UNSPECIFIED WHETHER STAGE 3A OR 3B CKD (HCC): ICD-10-CM

## 2023-08-16 DIAGNOSIS — F31.4 BIPOLAR AFFECTIVE DISORDER, DEPRESSED, SEVERE (HCC): ICD-10-CM

## 2023-08-16 DIAGNOSIS — I25.810 CORONARY ARTERY DISEASE INVOLVING CORONARY BYPASS GRAFT OF NATIVE HEART WITHOUT ANGINA PECTORIS: ICD-10-CM

## 2023-08-16 DIAGNOSIS — R91.8 LUNG MASS: ICD-10-CM

## 2023-08-16 PROCEDURE — 99496 TRANSJ CARE MGMT HIGH F2F 7D: CPT | Performed by: FAMILY MEDICINE

## 2023-08-16 PROCEDURE — 88173 CYTOPATH EVAL FNA REPORT: CPT | Performed by: PATHOLOGY

## 2023-08-16 PROCEDURE — 88305 TISSUE EXAM BY PATHOLOGIST: CPT | Performed by: PATHOLOGY

## 2023-08-16 PROCEDURE — 88172 CYTP DX EVAL FNA 1ST EA SITE: CPT | Performed by: PATHOLOGY

## 2023-08-16 PROCEDURE — 88333 PATH CONSLTJ SURG CYTO XM 1: CPT | Performed by: PATHOLOGY

## 2023-08-16 PROCEDURE — 88360 TUMOR IMMUNOHISTOCHEM/MANUAL: CPT | Performed by: PATHOLOGY

## 2023-08-16 PROCEDURE — 88341 IMHCHEM/IMCYTCHM EA ADD ANTB: CPT | Performed by: PATHOLOGY

## 2023-08-16 PROCEDURE — 88342 IMHCHEM/IMCYTCHM 1ST ANTB: CPT | Performed by: PATHOLOGY

## 2023-08-16 RX ORDER — LAMOTRIGINE 100 MG/1
100 TABLET ORAL DAILY
Qty: 90 TABLET | Refills: 1 | Status: SHIPPED | OUTPATIENT
Start: 2023-08-16

## 2023-08-16 NOTE — PROGRESS NOTES
Assessment/Plan:     1. Hx of AKA (above knee amputation), left [facility])  Assessment & Plan:  S/p aka; doing well; has home care; c/w fall precautions; has upcoming appt with vascular surgery; c/w PT/OT      2. Essential hypertension  Assessment & Plan:  Stable c/w current tx      3. Lung mass  Assessment & Plan:  Awaiting bx results; has upcoming appt with oncology      4. Coronary artery disease involving coronary bypass graft of native heart without angina pectoris    5. Stage 3 chronic kidney disease, unspecified whether stage 3a or 3b CKD ([address])    6. Bipolar affective disorder, depressed, severe ([address])  Assessment & Plan:  Advised on counseling which she will start soon; would benefit from psychiatry; given resources; c/w current tx at this time as she is overall stable    Orders:  -     lamoTRIgine (LaMICtal) 100 mg tablet; Take 1 tablet (100 mg total) by mouth daily        Subjective:      Patient ID: Yisel Merrill is a 68 y.o. female. Patient is here for hospital follow up for hospital admission from 7/18-7/25 with transition to [facility] 7/25-8/9 s/p AKA due to PVD. She has been doing well since she has been home. Has good support. Depression: Patient admits her depression has not been well controlled. No SI. Dealing with all her medical conditions. Was offered inpt psych evaluation but declined. Feels just overwhelmed. Will be starting counseling. AKA: Patient has follow up with vascular surgery on 8/18. Doing well. Has home PT/OT and nursing. Feels overwhelmed at times with home visits. Has good support at home. Lung mass: Had biopsy on Monday. Results pending. Pt is anxious about results. Hospital Discharge Summary:    "Carey Stone a 68 y. o. year old female w/ HTN, HLD, CAD/ CABG, h/o CVA, CKD 3, COURTNEY, PAF/ SVT (not on a/c), h/o LLE DVT s/p IVC filter, ongoing tobacco abuse (1ppd x 60+ years), and newly diagnosed RUL lung mass for which she is planned Navigational bronchoscopy w/ biopsy on 7/31/2023 w/ Thoracic surgery, Dr. Natha Kanner, and an extensive vascular history to include b/l carotid stenosis and AIOD/ PAD having previously undergone the following at [facility], [facility], [facility], and [facility]:  ? B/L Ax-fem bypass  ? Fem-Fem bypass  ? B/L Fem-BK popliteal bypass  ? Ao- L fem bypass  ? Ao- RRA bypass ? She presents to the [address] [place] [address] emergency department with c/o L leg pain & numbness. Symptoms have been intermittently present for a few months with L calf claudication but has worsened over 2 days w/ worsening rest pain and numbness. She describes pain as "burning like it's on fire".  She has some relief at times w/ the limb in a dependent position.   She is able to move the foot.  She affirms the extensive history of her vascular procedures stating that her last surgeon told her that "no one else will be able to get through there again" following her Ao- L fem surgical procedure. There is no tissue loss.  Patient does affirm baseline swelling of LLE from prior ankle fracture.  Patient is able to ambulate w/ assistance of a cane.     The patient was admitted to the hospital on 7/19/2023 at which time she was started on anticoagulation in the form of heparin drip. Was performed to further delineate anatomy. With radiologic results reviewed, it was felt that she had exhausted options for left lower extremity revascularization, therefore the plan was for admission for pain control and eventual amputation. Consultation was placed to cardiology for risk stratification who deemed her high but not prohibitive cardiac risk. Consultation was placed to our acute pain service for assistance in overall pain management.   Physical medicine and rehab were consulted for assistance with disposition planning.     On 7/21/2023, the patient was taken to the operating room at which time she underwent left AKA by Dr. Mat Leiva.     Postoperatively, she was maintained in the PACU then transferred to a medical surgical/telemetry/stepdown floor where she continued to convalesce for the remainder of her hospitalization. Her pain control was assisted by her acute pain service with a multimodal regimen to include oral and IV narcotic, gabapentin which she was previously on as a home med, along with ATC Tylenol and Robaxin. Operative dressing was removed on POD #2 with a well appearing incision.     Hospital course was complicated by the following:  ? Tobacco abuse and COPD  Given his significant history and active use of tobacco with known COPD, the patient was oxygen requiring during the hospitalization. With mobilization and respiratory protocol, oxygen was able to be weaned off to keep sat >88%.    By the date of discharge, the patient remained neurovascularly intact with dopplerable right femoral, popliteal, DP and PT pulse. The left AKA incision was healing well. Her pain was controlled with a multimodal regimen to include narcotic, Tylenol, Neurontin, and Robaxin. She was tolerating a diet. She was evaluated by physical therapy and Occupational Therapy who deemed her appropriate for postacute rehab. Physical medicine and rehab recommendations were for acute rehab. With the assistance of our case management colleagues, arrangements were made for the patient to transition to the [address] [place] [address] for continued convalescence. Preadmission screening COVID testing was negative. Insurance authorization was obtained. The patient was deemed appropriate and stable for discharge and was discharged on 7/25/2023.     Prior to discharge, the patient was given instructions for outpatient care and follow-up.   The patient has been instructed to call w/ any questions, changes, or concerns for the medical condition.     For further details of the hospitalization, please refer to the medical record."    "S/P L AKA, PAD  - Function much improved overall and upon re-eval by PT and MD patient still appears to be functionally ready for discharge              - Fall 8/9 - patient seems to have been rushing some when using WC when getting ready this morning and R sided fernie turned inward and her foot caught on the edge of it causing her to fall and report of bumping her L residual limb               - Per report she had initial trace drainage but upon re-eval by myself and AP no significant drainage or clear trauma. We will extend Abx for 4 days with 4 additional days of Bactrim for recent concern for residual limb infection which has now improved to complete 14 day course. She will continue LWC/skin care as previously outlined and monitor closely. Vas Sx saw patient recently and felt limb improved and recommended routine OP follow-up. L femur XR was unremarkable s/p AKA and patient now denies increased pain. She also denies any other significant trauma or pain. She has full head/neck ROM, major jt ROM without any pain and strength intact.                 - Patient counseled at length on slowing down and careful monitoring when mobilizing in [facility] and she appears to have adequate understanding at this time   - Continue  PT, OT and RN   - Follow-up with PMR and Sx after discharge     - Plavix, optimal BP control, stsatin  - NWB LLE  - Developed post-op erythema at incisional site initially worsening even with PO abx than switched to IV Abx per [facility] now completed - extended for 4 additional days with PO Bactrim given minor trauma to site  - Patient educated at length on monitoring residual limb for increasing redness, drainage and monitoring for increased pain, fever and notifying surgeon on seeking med care right away   - Neuropsychology consulted during ARC course, supportive counseling  - Optimal pain control  - Monitor contralateral limb closely for concerning s/s   - Completed acute comprehensive interdisciplinary inpatient rehabilitation to include intensive skilled therapies (PT, OT) as outlined with oversight and management by rehabilitation physician as well as inpatient rehab level nursing, case management and weekly interdisciplinary team meetings.      Pain - improving overall   - Gabapentin uptitrated earlier to 600mg TID              - Patient can wean down 300mg every 5 days if pain continues to improve in near future   - APAP TID > transition to PRN   - Limited Rx for Oxy 5-10mg TID PRN provided by AP  - Patient did not have any signs of oversedation, AMS, resp depression during course; she was made aware of fall risks of sedating meds and avoidance of driving      Constipation  - Remains improved       HTN, CAD, PAD, HL  Plavix, statin, ISMN, Zetia, optimal BP control   Internal medicine consulted and co-management with their service  Current meds:  Lisinopril, MTP per IM team recs  Follow-up with PCP, vasc sx       Mood d/o - Adjust d/o c/ anx and depression per neuropsych who is following; per hx depressed bipolar d/c  On Lamictal at home and here  Additional Supportive counseling provided  Buckner score 35 - severe depression - patient denies suicidal ideation though, and declined IP psych eval or adjustments in meds at this time; resources provided to patient and referral to   OP BH  On gabapentin 600mg TID for pain as well > may trial weaning down 300mg at a time about every 5-7 days when pain controlled but patient is aware she may need some gabapentin for while s/p amputation   Patient denied SI or uncontrolled mood prior to and day of discharge. She was nevertheless offered IP psych consultation prior to discharge but she declined.   We have sent referral in to [facility] - she was provide resource lines and counseled on when to seek emergent attention for uncontrolled mood/depression in the interim   Also follow-up closely with PCP in interim      RUL lung nodule  - Bx rescheduled for 8/14 - still on track for procedure      Other med mgmt  - As outlined "         The following portions of the patient's history were reviewed and updated as appropriate: allergies, current medications, past family history, past medical history, past social history, past surgical history, and problem list.    Review of Systems   Constitutional: Negative for chills and fever. Respiratory: Negative for shortness of breath. Cardiovascular: Negative for chest pain. Psychiatric/Behavioral: Negative for suicidal ideas. Objective:      /60 (BP Location: Right arm, Patient Position: Sitting, Cuff Size: Standard)   Pulse (!) 53   Temp 97.8 °F (36.6 °C) (Temporal)   Resp 20   Wt 72.6 kg (160 lb)   SpO2 92%   BMI 27.46 kg/m²          Physical Exam  Vitals reviewed. Constitutional:       General: She is not in acute distress. Appearance: Normal appearance. She is not ill-appearing, toxic-appearing or diaphoretic. HENT:      Head: Normocephalic and atraumatic. Eyes:      General: No scleral icterus. Right eye: No discharge. Left eye: No discharge. Conjunctiva/sclera: Conjunctivae normal.   Cardiovascular:      Rate and Rhythm: Normal rate and regular rhythm. Pulses: Normal pulses. Heart sounds: Normal heart sounds. No murmur heard. No gallop. Pulmonary:      Effort: Pulmonary effort is normal. No respiratory distress. Breath sounds: Normal breath sounds. No stridor. No wheezing, rhonchi or rales. Neurological:      General: No focal deficit present. Mental Status: She is alert and oriented to person, place, and time. Psychiatric:         Mood and Affect: Mood normal.         Behavior: Behavior normal.         Thought Content:  Thought content normal.         Judgment: Judgment normal.

## 2023-08-17 ENCOUNTER — HOME CARE VISIT (OUTPATIENT)
Dept: HOME HEALTH SERVICES | Facility: HOME HEALTHCARE | Age: 73
End: 2023-08-17
Payer: MEDICARE

## 2023-08-17 NOTE — ASSESSMENT & PLAN NOTE
Advised on counseling which she will start soon; would benefit from psychiatry; given resources; c/w current tx at this time as she is overall stable

## 2023-08-17 NOTE — ASSESSMENT & PLAN NOTE
S/p aka; doing well; has home care; c/w fall precautions; has upcoming appt with vascular surgery; c/w PT/OT

## 2023-08-18 ENCOUNTER — HOME CARE VISIT (OUTPATIENT)
Dept: HOME HEALTH SERVICES | Facility: HOME HEALTHCARE | Age: 73
End: 2023-08-18
Payer: MEDICARE

## 2023-08-18 ENCOUNTER — OFFICE VISIT (OUTPATIENT)
Dept: VASCULAR SURGERY | Facility: CLINIC | Age: 73
End: 2023-08-18

## 2023-08-18 VITALS
OXYGEN SATURATION: 90 % | HEART RATE: 72 BPM | SYSTOLIC BLOOD PRESSURE: 112 MMHG | HEIGHT: 64 IN | DIASTOLIC BLOOD PRESSURE: 52 MMHG | BODY MASS INDEX: 27.46 KG/M2

## 2023-08-18 DIAGNOSIS — Z89.612 STATUS POST ABOVE-KNEE AMPUTATION OF LEFT LOWER EXTREMITY (HCC): Primary | ICD-10-CM

## 2023-08-18 DIAGNOSIS — I73.9 PAD (PERIPHERAL ARTERY DISEASE) (HCC): ICD-10-CM

## 2023-08-18 LAB
DME PARACHUTE DELIVERY DATE ACTUAL: NORMAL
DME PARACHUTE DELIVERY DATE EXPECTED: NORMAL
DME PARACHUTE DELIVERY DATE REQUESTED: NORMAL
DME PARACHUTE ITEM DESCRIPTION: NORMAL
DME PARACHUTE ORDER STATUS: NORMAL
DME PARACHUTE SUPPLIER NAME: NORMAL
DME PARACHUTE SUPPLIER PHONE: NORMAL

## 2023-08-18 PROCEDURE — 99024 POSTOP FOLLOW-UP VISIT: CPT | Performed by: NURSE PRACTITIONER

## 2023-08-18 RX ORDER — CEPHALEXIN 500 MG/1
500 CAPSULE ORAL EVERY 12 HOURS SCHEDULED
Qty: 14 CAPSULE | Refills: 0 | Status: SHIPPED | OUTPATIENT
Start: 2023-08-18 | End: 2023-08-24

## 2023-08-18 NOTE — ASSESSMENT & PLAN NOTE
6/28/23 LEAD demonstrates  Impression:  The patient has an Axillary Bi-Fem bypass graft. The graft is patent from the proximal anastomosis to the right limb  anastomosis. Right LL:  Severe Diff dx with occlusion vs high grade stenosis proximal and middle SFA and distal popliteal artery. There is a known occlusion of a fem to below knee bypass graft. LUANN: 0.68  (Prior: 0.53)/ MP -/ GTP -    -Reviewed with pt that she is at high risk for limb loss of R foot/ leg if she were to get a wound and educated the importance of daily foot checks and follow up with podiatry.   -Pt noted to have small scabbed area to the R foot 2nd toe and large callus to the R heel. See clinical photos.

## 2023-08-18 NOTE — PROGRESS NOTES
Assessment/Plan:    Hx of AKA (above knee amputation), left [facility])  71-year-old female complex vascular surgical history. She has had numerous vascular procedures all performed at outside facilities [facility], [facility], [place], as well as [address]). She has had bilateral axis to bifemoral bypasses subsequently has had bilateral lower extremity femoral to popliteal bypasses. Most recently she reported in 2020/2021 she underwent an aorto bifemoral bypass at [address]. Patient is recently presented to the ED with chronic left lower extremity limb threatening ischemia. Pt is now s/p L AKA 7/21/23 by Delaney Marshall in the office for her first post-op appointment. -Reports minimal surgical incision pain, admits to phantom limb pain. States she is taking tylenol, gabapentin and oxycodone at night for pain. Advised to transition to over the counter pain medication for pain control. -L AKA incision site is noted to be red surrounding the incision with swelling mostly at the medial surgical site. Pt states she has not been covering the surgical site, reports she has noticed increased redness starting yesterday. All sutures left in place. No drainage, no dehiscence, no fever/ chills. See Clinical photo. - L AKA surgical site cleaned in the office today with sterile saline, painted with betadine, covered with ABD and wrap.   -Pt continues to smoke 1/2 ppd. Recommendations  -Start taking antibiotic today as directed and return to the office in one week for review of surgical incision site and staple removal.   -Wash L AKA site daily with soap and water, pat dry and paint with betadine, cover with clean dry dressing.  -Call the office with any increase incisional pain, discoloration, drainage or fever/ chills.  -Smoking cessation.  -Continue to take Plavix daily as per PCP. -Continue to take Zetia and Rosuvastatin daily as per PCP.         Mixed hyperlipidemia  -stable  -continue statin medication  -continue routine follow-up with family doctor      Aortoiliac occlusive disease ([address])  6/28/23 LEAD demonstrates  Impression:  The patient has an Axillary Bi-Fem bypass graft. The graft is patent from the proximal anastomosis to the right limb  anastomosis. Right LL:  Severe Diff dx with occlusion vs high grade stenosis proximal and middle SFA and distal popliteal artery. There is a known occlusion of a fem to below knee bypass graft. LUANN: 0.68  (Prior: 0.53)/ MP -/ GTP -    -Reviewed with pt that she is at high risk for limb loss of R foot/ leg if she were to get a wound and educated the importance of daily foot checks and follow up with podiatry.   -Pt noted to have small scabbed area to the R foot 2nd toe and large callus to the R heel. See clinical photos. Essential hypertension  -BP well controlled  -continue current medical regimen  -management per PCP         Diagnoses and all orders for this visit:    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)  -     cephalexin (KEFLEX) 500 mg capsule; Take 1 capsule (500 mg total) by mouth every 12 (twelve) hours for 7 days  -     Motorized Scooter    PAD (peripheral artery disease) [facility])  -     Ambulatory Referral to Vascular Surgery          Subjective:      Patient ID: Sharath Flores is a 68 y.o. female     Patient is here today s/p a Left AKA done 7/21/2023 by Dr. Nicole Dobbins. Her incision ins clean and dry. Patient c/o phantom pain and pain to touch at the incision site. She denies any home falls. She denies having any fevers or chills. She is taking Plavix, Zetia and Rosuvastatin. Patient is a current smoker. 71-year-old female with PMHx CAD, HTN, renal artery stenosis, PAD, PAF, CKD stage 3, CABG, HLD, tobacco abuse s/p L AKA on 7/21/23 Dr. Nicole Dobbins. She went to [facility] with rehab after surgery and went home about 1.5 weeks ago.   Today she is in the wheelchair in the office with no cover on her incision site. She report she has been keeping her incision site open to air       The following portions of the patient's history were reviewed and updated as appropriate: allergies, current medications, past family history, past medical history, past social history, past surgical history and problem list.    Review of Systems   Constitutional: Negative. HENT: Negative. Eyes: Negative. Respiratory: Negative. Cardiovascular: Negative. Gastrointestinal: Negative. Endocrine: Negative. Genitourinary: Negative. Musculoskeletal: Negative. Skin: Negative. Allergic/Immunologic: Negative. Neurological: Negative. Hematological: Negative. Psychiatric/Behavioral: Negative. Objective:      /52 (BP Location: Left arm, Patient Position: Sitting, Cuff Size: Standard)   Pulse 72   Ht 5' 4" (1.626 m)   SpO2 90%   BMI 27.46 kg/m²          Physical Exam      I have reviewed and made appropriate changes to the review of systems input by the medical assistant.             Vitals:    08/18/23 1520   BP: 112/52   BP Location: Left arm   Patient Position: Sitting   Cuff Size: Standard   Pulse: 72   SpO2: 90%   Height: 5' 4" (1.626 m)       Patient Active Problem List   Diagnosis   • Bipolar affective disorder, depressed, severe ([facility])   • CAD (coronary artery disease)   • Essential hypertension   • Hx of CABG   • S/P vascular bypass   • Thyroid nodule   • Renal artery stenosis ([facility])   • Presence of IVC filter   • PAD (peripheral artery disease) ([facility])   • Mixed hyperlipidemia   • Aortoiliac occlusive disease ([facility])   • Tobacco abuse   • PAF (paroxysmal atrial fibrillation) ([facility])   • Stage 3 chronic kidney disease, unspecified whether stage 3a or 3b CKD ([facility])   • Dizziness   • Insomnia   • Lung mass   • Hx of AKA (above knee amputation), left ([facility])   • At risk for venous thromboembolism (VTE)   • Pain   • Status post above-knee amputation of left lower extremity [facility])       Past Surgical History:   Procedure Laterality Date   • AORTA - FEMORAL ARTERY BYPASS GRAFT Left    • AXILLO-FEMORAL BYASS GRAFT Bilateral    • BYPASS FEMORAL-FEMORAL     • COLONOSCOPY     • CORONARY ARTERY BYPASS GRAFT  2000   • ENDOBRONCHIAL ULTRASOUND (EBUS) N/A 8/14/2023    Procedure: ENDOBRONCHIAL ULTRASOUND (EBUS) tissue biopsy;  Surgeon: Carlos Jimenez MD;  Location: [facility];  Service: Thoracic   • FEMORAL ARTERY - POPLITEAL ARTERY BYPASS GRAFT Bilateral    • IVC FILTER INSERTION     • CT AMPUTATION THIGH THROUGH FEMUR ANY LEVEL Left 07/21/2023    Procedure: AMPUTATION ABOVE KNEE (AKA); Surgeon: Garry Perry MD;  Location: [facility];  Service: Vascular   •  [address] INCL FLUOR GDNCE DX W/CELL WASHG [address] [facility] N/A 8/14/2023    Procedure: Lindsay Milner;  Surgeon: Carlos Jimenez MD;  Location: [facility];  Service: Thoracic   • TOTAL ABDOMINAL HYSTERECTOMY W/ BILATERAL SALPINGOOPHORECTOMY     • TOTAL HIP ARTHROPLASTY Left        Family History   Problem Relation Age of Onset   • Stomach cancer Mother        Social History     Socioeconomic History   • Marital status:       Spouse name: Not on file   • Number of children: Not on file   • Years of education: Not on file   • Highest education level: Not on file   Occupational History   • Not on file   Tobacco Use   • Smoking status: Every Day     Packs/day: 1.00     Types: Cigarettes     Start date: 5   • Smokeless tobacco: Never   Vaping Use   • Vaping Use: Never used   Substance and Sexual Activity   • Alcohol use: Not Currently     Alcohol/week: 50.0 standard drinks of alcohol     Types: 50 Cans of beer per week     Comment: has not had a drink in 243 days (7/18/23)  h/o at least 6 beers per day   • Drug use: Never   • Sexual activity: Not Currently     Partners: Male   Other Topics Concern   • Not on file   Social History Narrative   • Not on file     Social Determinants of Health     Financial Resource Strain: Not on file Food Insecurity: No Food Insecurity (7/19/2023)    Hunger Vital Sign    • Worried About Running Out of Food in the Last Year: Never true    • Ran Out of Food in the Last Year: Never true   Transportation Needs: No Transportation Needs (7/19/2023)    PRAPARE - Transportation    • Lack of Transportation (Medical): No    • Lack of Transportation (Non-Medical):  No   Physical Activity: Not on file   Stress: Not on file   Social Connections: Not on file   Intimate Partner Violence: Not on file   Housing Stability: Low Risk  (7/19/2023)    Housing Stability Vital Sign    • Unable to Pay for Housing in the Last Year: No    • Number of Places Lived in the Last Year: 1    • Unstable Housing in the Last Year: No       No Known Allergies      Current Outpatient Medications:   •  acetaminophen (TYLENOL) 325 mg tablet, Take 3 tablets (975 mg total) by mouth every 8 (eight) hours (Patient taking differently: Take 975 mg by mouth if needed), Disp: , Rfl:   •  cephalexin (KEFLEX) 500 mg capsule, Take 1 capsule (500 mg total) by mouth every 12 (twelve) hours for 7 days, Disp: 14 capsule, Rfl: 0  •  clopidogrel (PLAVIX) 75 mg tablet, Take 1 tablet (75 mg total) by mouth in the morning Do not start before August 15, 2023., Disp: 90 tablet, Rfl: 0  •  diltiazem (CARDIZEM) 120 MG tablet, Take 1 tablet (120 mg total) by mouth in the morning, Disp: 30 tablet, Rfl: 3  •  ezetimibe (ZETIA) 10 mg tablet, Take 1 tablet (10 mg total) by mouth daily, Disp: 30 tablet, Rfl: 5  •  Folic Acid 0.8 MG CAPS, Take 1 capsule (0.8 mg total) by mouth in the morning (Patient taking differently: Take by mouth in the morning), Disp: 90 capsule, Rfl: 1  •  gabapentin (Neurontin) 300 mg capsule, Take 2 capsules (600 mg total) by mouth 3 (three) times a day, Disp: 90 capsule, Rfl: 0  •  isosorbide mononitrate (IMDUR) 30 mg 24 hr tablet, Take 30 mg by mouth every morning, Disp: , Rfl:   •  lamoTRIgine (LaMICtal) 100 mg tablet, Take 1 tablet (100 mg total) by mouth daily, Disp: 90 tablet, Rfl: 1  •  lisinopril (ZESTRIL) 10 mg tablet, take 1 tablet by mouth once daily, Disp: 30 tablet, Rfl: 5  •  Melatonin 5 MG TABS, Take 4 tablets (20 mg total) by mouth daily at bedtime as needed (insomnia) Patient states she takes 20MG, Disp: , Rfl:   •  metoprolol succinate (TOPROL-XL) 50 mg 24 hr tablet, Take 1 tablet (50 mg total) by mouth daily, Disp: 30 tablet, Rfl: 3  •  oxyCODONE (ROXICODONE) 10 MG TABS, Take 1/2 tablet (5 mg) for moderate pain (4-6/10) or take 1 tablet (10 mg) for severe pain (7-10/10) every 8 hours as needed. , Disp: 20 tablet, Rfl: 0  •  rOPINIRole (REQUIP) 0.5 mg tablet, Take 1 tablet (0.5 mg total) by mouth daily at bedtime, Disp: 30 tablet, Rfl: 0  •  rosuvastatin (CRESTOR) 20 MG tablet, Take 20 mg by mouth daily at bedtime, Disp: , Rfl:   •  saccharomyces boulardii (FLORASTOR) 250 mg capsule, Take 1 capsule (250 mg total) by mouth 2 (two) times a day, Disp: 60 capsule, Rfl: 0  •  traZODone (DESYREL) 50 mg tablet, Take 1 tablet (50 mg total) by mouth daily at bedtime as needed for sleep, Disp: , Rfl:   •  albuterol (ProAir HFA) 90 mcg/act inhaler, Inhale 2 puffs every 6 (six) hours as needed for wheezing (Patient not taking: Reported on 8/18/2023), Disp: 8.5 g, Rfl: 2   I have spent a total time of 40 minutes on 08/18/23 in caring for this patient including Diagnostic results, Prognosis, Risks and benefits of tx options, Instructions for management, Patient and family education, Importance of tx compliance, Risk factor reductions, Impressions, Counseling / Coordination of care, Documenting in the medical record, Reviewing / ordering tests, medicine, procedures   and Obtaining or reviewing history  .

## 2023-08-18 NOTE — ASSESSMENT & PLAN NOTE
67-year-old female complex vascular surgical history. She has had numerous vascular procedures all performed at outside facilities [facility], [facility], [place], as well as [address]). She has had bilateral axis to bifemoral bypasses subsequently has had bilateral lower extremity femoral to popliteal bypasses. Most recently she reported in 2020/2021 she underwent an aorto bifemoral bypass at [address]. Patient is recently presented to the ED with chronic left lower extremity limb threatening ischemia. Pt is now s/p L AKA 7/21/23 by Braden Calle in the office for her first post-op appointment. -Reports minimal surgical incision pain, admits to phantom limb pain. States she is taking tylenol, gabapentin and oxycodone at night for pain. Advised to transition to over the counter pain medication for pain control. -L AKA incision site is noted to be red surrounding the incision with swelling mostly at the medial surgical site. Pt states she has not been covering the surgical site, reports she has noticed increased redness starting yesterday. All sutures left in place. No drainage, no dehiscence, no fever/ chills. See Clinical photo. - L AKA surgical site cleaned in the office today with sterile saline, painted with betadine, covered with ABD and wrap.   -Pt continues to smoke 1/2 ppd. Recommendations  -Start taking antibiotic today as directed and return to the office in one week for review of surgical incision site and staple removal.   -Wash L AKA site daily with soap and water, pat dry and paint with betadine, cover with clean dry dressing.  -Call the office with any increase incisional pain, discoloration, drainage or fever/ chills.  -Smoking cessation.  -Continue to take Plavix daily as per PCP. -Continue to take Zetia and Rosuvastatin daily as per PCP.

## 2023-08-18 NOTE — PATIENT INSTRUCTIONS
-Wash L AKA site daily with soap and water, pat dry and paint with betadine, cover with clean dry dressing.    -Start taking antibiotic today as directed and return to the office in one week for review of surgical incision site and staple removal.     -Call the office with any increase incisional pain, discoloration, drainage or fever/ chills.

## 2023-08-21 ENCOUNTER — PATIENT OUTREACH (OUTPATIENT)
Dept: CASE MANAGEMENT | Facility: HOSPITAL | Age: 73
End: 2023-08-21

## 2023-08-21 ENCOUNTER — HOME CARE VISIT (OUTPATIENT)
Dept: HOME HEALTH SERVICES | Facility: HOME HEALTHCARE | Age: 73
End: 2023-08-21
Payer: MEDICARE

## 2023-08-21 ENCOUNTER — DOCUMENTATION (OUTPATIENT)
Dept: HEMATOLOGY ONCOLOGY | Facility: CLINIC | Age: 73
End: 2023-08-21

## 2023-08-21 VITALS — DIASTOLIC BLOOD PRESSURE: 60 MMHG | HEART RATE: 56 BPM | SYSTOLIC BLOOD PRESSURE: 110 MMHG | OXYGEN SATURATION: 95 %

## 2023-08-21 PROCEDURE — G0152 HHCP-SERV OF OT,EA 15 MIN: HCPCS

## 2023-08-21 NOTE — PROGRESS NOTES
OSW completed chart review, patient has appointment with [facility] on 8/28, LSW will follow up for assessment and DT after consult.

## 2023-08-21 NOTE — PROGRESS NOTES
THORACIC ONCOLOGY MULTIDISCIPLINARY CASE REVIEW    DATE:  8/21/2023    PRESENTING DOCTOR:  Le Sprague PA-C    DIAGNOSIS:  NSCLC~Squamous Cell Carcinoma  STAGING: Clinical IB    Kris Hall is a 68 y.o. female who was presented at the Thoracic Oncology Multidisciplinary Tumor Conference today. She has significant history for CAD, status post MI CABG in 2000, A-Fib,HTN,  peripheral vascular disease status post numerous lower extremity bypass and recent left above knee amputation. CT lung screening showed occlusion of the posterior segment right upper lobe bronchus with 1.8 cm right upper lobe nodule. 7/7/23 PET CT reveals marked increased metabolic activity in association with right upper lobe nodule highly suggestive for neoplasm. No evidence for thoracic adenopathy or metastatic disease to the abdomen or pelvis. She underwent EBUB on 8/14/23 pathology reveals non small cell carcinoma consistent with squamous cell carcinoma. PHYSICIAN RECOMMENDED PLAN: Radiation Oncology for SBRT, patient scheduled for 8/28/23. Imaging reviewed:   7/18/23 CTA chest abdomen pelvis w wo contrast - Right perihilar mass, approximately 2.3 cm in diameter. 7/7/23 PET CT      Pathology reviewed:   8/14/23 - Lung, Right Upper Lobe, RUL posterior segment endobronchial biopsy:  -Non- small cell carcinoma , immunohistochemically consistent with squamous cell carcinoma   P40 Positive  TTF-1 Negative  Synaptophysin - Negative       PFT's reviewed: No    Future imaging: No    Referrals: No (Radiation Oncology referral placed on 8/14/23)    Clinical Trials Reviewed:  No  Clinical Trial Eligibility:     Team agreed to plan. NCCN guidelines were readily available for review at this discussion    The final treatment plan will be left to the discretion of the patient and the treating physician.      DISCLAIMERS:  TO THE TREATING PHYSICIAN:  This conference is a meeting of clinicians from various specialty areas who evaluate and discuss patients for whom a multidisciplinary treatment approach is being considered. Please note that the above opinion was a consensus of the conference attendees and is intended only to assist in quality care of the discussed patient. The responsibility for follow up on the input given during the conference, along with any final decisions regarding plan of care, is that of the patient and the patient's provider. Accordingly, appointments have only been recommended based on this information and have NOT been scheduled unless otherwise noted. TO THE PATIENT:  This summary is a brief record of major aspects of your cancer treatment. You may choose to share a copy with any of your doctors or nurses. However, this is not a detailed or comprehensive record of your care.

## 2023-08-21 NOTE — PROGRESS NOTES
Chart reviewed in preparation of Thoracic Tumor Conference presentation by Dr. Simone Elizondo on 8/21/23. She has significant peripheral vascular disease status post numerous lower extremity bypass , endovascular procedures and left above knee amputation. CT lung screening showed occlusion of the posterior segment right upper lobe bronchus with 1.8 cm right upper lobe nodule. 7/7/23 PET CT reveals marked increased metabolic activity in association with right upper lobe nodule highly suggestive for neoplasm. No evidence for thoracic adenopathy or metastatic disease to the abdomen or pelvis. She underwent EBUB on 8/14/23 pathology reveals non small cell carcinoma consistent with squamous cell carcinoma.

## 2023-08-22 ENCOUNTER — HOME CARE VISIT (OUTPATIENT)
Dept: HOME HEALTH SERVICES | Facility: HOME HEALTHCARE | Age: 73
End: 2023-08-22
Payer: MEDICARE

## 2023-08-22 VITALS
TEMPERATURE: 97.2 F | SYSTOLIC BLOOD PRESSURE: 118 MMHG | RESPIRATION RATE: 18 BRPM | OXYGEN SATURATION: 95 % | DIASTOLIC BLOOD PRESSURE: 64 MMHG | HEART RATE: 63 BPM

## 2023-08-22 VITALS
OXYGEN SATURATION: 98 % | HEART RATE: 62 BPM | SYSTOLIC BLOOD PRESSURE: 127 MMHG | DIASTOLIC BLOOD PRESSURE: 81 MMHG | RESPIRATION RATE: 19 BRPM

## 2023-08-22 PROBLEM — C34.91 NON-SMALL CELL CANCER OF RIGHT LUNG (HCC): Status: ACTIVE | Noted: 2023-08-22

## 2023-08-22 PROBLEM — C34.90 SCC (SQUAMOUS CELL CARCINOMA OF LUNG) (HCC): Status: ACTIVE | Noted: 2023-08-22

## 2023-08-22 PROCEDURE — G0157 HHC PT ASSISTANT EA 15: HCPCS

## 2023-08-22 PROCEDURE — G0299 HHS/HOSPICE OF RN EA 15 MIN: HCPCS

## 2023-08-23 ENCOUNTER — HOME CARE VISIT (OUTPATIENT)
Dept: HOME HEALTH SERVICES | Facility: HOME HEALTHCARE | Age: 73
End: 2023-08-23
Payer: MEDICARE

## 2023-08-24 ENCOUNTER — TELEPHONE (OUTPATIENT)
Dept: VASCULAR SURGERY | Facility: CLINIC | Age: 73
End: 2023-08-24

## 2023-08-24 ENCOUNTER — HOME CARE VISIT (OUTPATIENT)
Dept: HOME HEALTH SERVICES | Facility: HOME HEALTHCARE | Age: 73
End: 2023-08-24
Payer: MEDICARE

## 2023-08-24 ENCOUNTER — OFFICE VISIT (OUTPATIENT)
Dept: VASCULAR SURGERY | Facility: CLINIC | Age: 73
End: 2023-08-24

## 2023-08-24 VITALS
HEIGHT: 64 IN | DIASTOLIC BLOOD PRESSURE: 66 MMHG | HEART RATE: 84 BPM | OXYGEN SATURATION: 93 % | SYSTOLIC BLOOD PRESSURE: 150 MMHG | BODY MASS INDEX: 27.46 KG/M2

## 2023-08-24 DIAGNOSIS — Z89.612 STATUS POST ABOVE-KNEE AMPUTATION OF LEFT LOWER EXTREMITY (HCC): Primary | ICD-10-CM

## 2023-08-24 PROCEDURE — 99024 POSTOP FOLLOW-UP VISIT: CPT

## 2023-08-24 NOTE — PROGRESS NOTES
Assessment/Plan:    Status post above-knee amputation of left lower extremity ([address])  Patient report incisional tenderness and intermittent tingling. She is most tender along the middle aspect of her incision. She is denying any fever, chills, increased erythema or warmth at incision site. She has been washing incision daily with soap and water and has been wearing tubgrip, however she cut the bottom off so it has not been providing compression over site of incision. She complains of continued phantom limb pain. She is taking Tylenol several times daily, gabapentin twice daily, and oxycodone at night "to sleep". Staples removed today. Incision is mostly well approximated with several small areas of superficial separation. Tenderness noted to middle of incision. Mild erythema to stump without warmth or purulent drainage from incision. Incision cleansed with saline and reinforced with steristrips. Covered with gauze and wrapped with ACE wrap    Plan:  -Instructed patient to wash incision daily with soap and water, pat dry. Paint with betadine and cover with gauze to protect. Wrap with ACE wrap daily to provide light compression  -Instructed patient to continue to elevate leg often to help minimize edema.  -Patient is to notify the office should she develop any worsening tenderness, warmth, erythema, drainage, fever, or chills.  -Patient requested refill of her oxycodone. We discussed that opioids are appropriate in the immediate post operative phase for acute pain, however are not recommended for phantom limb pain and sleep. Informed patient that she should take Tylenol as needed for pain (do not exceed 4g daily) and to take gabapentin three times daily as prescribed. Patient verbalized understanding.  -Return to the office in 2 weeks for incision recheck.        Diagnoses and all orders for this visit:    Status post above-knee amputation of left lower extremity ([address])          Subjective:      Patient ID: Juan Booth Simon Rodgers is a 68 y.o. female. Patient is a 68year old female, current smoker, with PMH NSCLC, CAD s/p CABG, HTN, paroxysmal atrial fibrillation, HLD, CKD 3, and PAD. Patient has significant history of vascular intervention at outside facilities including fem-fem bypass s/p occlusion, right fem to BK pop bypass s/p occlusion (2010), bilateral iliac and femoral artery angioplasty and stenting (2014), and right axillary to bi-fem bypass (2017). Patient is now s/p L AKA 7/21/23 [facility]) and is presenting to the office today for incision recheck. Patient report incisional tenderness and intermittent tingling. She is most tender along the middle aspect of her incision. She is denying any fever, chills, increased erythema or warmth at incision site. The following portions of the patient's history were reviewed and updated as appropriate: allergies, current medications, past family history, past medical history, past social history, past surgical history and problem list.    Review of Systems   Constitutional: Negative. Negative for chills and fever. HENT: Negative. Eyes: Negative. Respiratory: Negative. Cardiovascular: Negative. Gastrointestinal: Negative. Endocrine: Negative. Genitourinary: Negative. Musculoskeletal: Negative. Skin: Positive for wound. Allergic/Immunologic: Negative. Neurological: Positive for light-headedness. Hematological: Bruises/bleeds easily. Psychiatric/Behavioral: Negative. Objective:    /66 (BP Location: Left arm, Patient Position: Sitting, Cuff Size: Adult)   Pulse 84   Ht 5' 4" (1.626 m)   SpO2 93%   BMI 27.46 kg/m²      Physical Exam  Constitutional:       General: She is not in acute distress. Appearance: Normal appearance. She is not toxic-appearing. HENT:      Head: Normocephalic and atraumatic. Cardiovascular:      Rate and Rhythm: Normal rate and regular rhythm.    Pulmonary:      Effort: Pulmonary effort is normal. No respiratory distress. Musculoskeletal:         General: Swelling (L AKA stump) and tenderness (L AKA stump) present. Right lower leg: No edema. Left lower leg: Edema present. Left Lower Extremity: Left leg is amputated above knee. Skin:     General: Skin is warm and dry. Capillary Refill: Capillary refill takes less than 2 seconds. Neurological:      General: No focal deficit present. Mental Status: She is alert and oriented to person, place, and time. Psychiatric:         Mood and Affect: Mood normal.         Behavior: Behavior normal.       I have reviewed and made appropriate changes to the review of systems input by the medical assistant.     Vitals:    08/24/23 0911   BP: 150/66   BP Location: Left arm   Patient Position: Sitting   Cuff Size: Adult   Pulse: 84   SpO2: 93%   Height: 5' 4" (1.626 m)       Patient Active Problem List   Diagnosis   • Bipolar affective disorder, depressed, severe ([address])   • CAD (coronary artery disease)   • Essential hypertension   • Hx of CABG   • S/P vascular bypass   • Thyroid nodule   • Renal artery stenosis (HCC)   • Presence of IVC filter   • PAD (peripheral artery disease) ([facility])   • Mixed hyperlipidemia   • Aortoiliac occlusive disease (HCC)   • Tobacco abuse   • PAF (paroxysmal atrial fibrillation) (HCC)   • Stage 3 chronic kidney disease, unspecified whether stage 3a or 3b CKD (HCC)   • Dizziness   • Insomnia   • Lung mass   • Hx of AKA (above knee amputation), left (HCC)   • At risk for venous thromboembolism (VTE)   • Pain   • 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)   • SCC (squamous cell carcinoma of lung) (HCC)   • Non-small cell cancer of right lung (HCC)       Past Surgical History:   Procedure Laterality Date   • AORTA - FEMORAL ARTERY BYPASS GRAFT Left    • AXILLO-FEMORAL BYASS GRAFT Bilateral    • BYPASS FEMORAL-FEMORAL     • COLONOSCOPY     • CORONARY ARTERY BYPASS GRAFT  2000   • ENDOBRONCHIAL ULTRASOUND (EBUS) N/A 8/14/2023    Procedure: ENDOBRONCHIAL ULTRASOUND (EBUS) tissue biopsy;  Surgeon: Sreekanth Alfaro MD;  Location: [facility];  Service: Thoracic   • FEMORAL ARTERY - POPLITEAL ARTERY BYPASS GRAFT Bilateral    • IVC FILTER INSERTION     • FL AMPUTATION THIGH THROUGH FEMUR ANY LEVEL Left 07/21/2023    Procedure: AMPUTATION ABOVE KNEE (AKA); Surgeon: Tia Tavera MD;  Location: [facility];  Service: Vascular   •  [address] INCL FLUOR GDNCE DX W/CELL WASHG [facility] N/A 8/14/2023    Procedure: Bri Lynn;  Surgeon: Sreekanth Alfaro MD;  Location: [facility];  Service: Thoracic   • TOTAL ABDOMINAL HYSTERECTOMY W/ BILATERAL SALPINGOOPHORECTOMY     • TOTAL HIP ARTHROPLASTY Left        Family History   Problem Relation Age of Onset   • Stomach cancer Mother        Social History     Socioeconomic History   • Marital status:       Spouse name: Not on file   • Number of children: Not on file   • Years of education: Not on file   • Highest education level: Not on file   Occupational History   • Not on file   Tobacco Use   • Smoking status: Every Day     Packs/day: 1.00     Types: Cigarettes     Start date: 5   • Smokeless tobacco: Never   Vaping Use   • Vaping Use: Never used   Substance and Sexual Activity   • Alcohol use: Not Currently     Alcohol/week: 50.0 standard drinks of alcohol     Types: 50 Cans of beer per week     Comment: has not had a drink in 243 days (7/18/23)  h/o at least 6 beers per day   • Drug use: Never   • Sexual activity: Not Currently     Partners: Male   Other Topics Concern   • Not on file   Social History Narrative   • Not on file     Social Determinants of Health     Financial Resource Strain: Not on file   Food Insecurity: No Food Insecurity (7/19/2023)    Hunger Vital Sign    • Worried About Running Out of Food in the Last Year: Never true    • Ran Out of Food in the Last Year: Never true   Transportation Needs: No Transportation Needs (7/19/2023)    Lyle Taylor - Transportation    • Lack of Transportation (Medical): No    • Lack of Transportation (Non-Medical):  No   Physical Activity: Not on file   Stress: Not on file   Social Connections: Not on file   Intimate Partner Violence: Not on file   Housing Stability: Low Risk  (7/19/2023)    Housing Stability Vital Sign    • Unable to Pay for Housing in the Last Year: No    • Number of Places Lived in the Last Year: 1    • Unstable Housing in the Last Year: No       No Known Allergies      Current Outpatient Medications:   •  acetaminophen (TYLENOL) 325 mg tablet, Take 3 tablets (975 mg total) by mouth every 8 (eight) hours (Patient taking differently: Take 975 mg by mouth if needed), Disp: , Rfl:   •  albuterol (ProAir HFA) 90 mcg/act inhaler, Inhale 2 puffs every 6 (six) hours as needed for wheezing, Disp: 8.5 g, Rfl: 2  •  clopidogrel (PLAVIX) 75 mg tablet, Take 1 tablet (75 mg total) by mouth in the morning Do not start before August 15, 2023., Disp: 90 tablet, Rfl: 0  •  diltiazem (CARDIZEM) 120 MG tablet, Take 1 tablet (120 mg total) by mouth in the morning, Disp: 30 tablet, Rfl: 3  •  ezetimibe (ZETIA) 10 mg tablet, Take 1 tablet (10 mg total) by mouth daily, Disp: 30 tablet, Rfl: 5  •  Folic Acid 0.8 MG CAPS, Take 1 capsule (0.8 mg total) by mouth in the morning (Patient taking differently: Take by mouth in the morning), Disp: 90 capsule, Rfl: 1  •  gabapentin (Neurontin) 300 mg capsule, Take 2 capsules (600 mg total) by mouth 3 (three) times a day, Disp: 90 capsule, Rfl: 0  •  isosorbide mononitrate (IMDUR) 30 mg 24 hr tablet, Take 30 mg by mouth every morning, Disp: , Rfl:   •  lamoTRIgine (LaMICtal) 100 mg tablet, Take 1 tablet (100 mg total) by mouth daily, Disp: 90 tablet, Rfl: 1  •  lisinopril (ZESTRIL) 10 mg tablet, take 1 tablet by mouth once daily, Disp: 30 tablet, Rfl: 5  •  Melatonin 5 MG TABS, Take 4 tablets (20 mg total) by mouth daily at bedtime as needed (insomnia) Patient states she takes 20MG, Disp: , Rfl:   •  metoprolol succinate (TOPROL-XL) 50 mg 24 hr tablet, Take 1 tablet (50 mg total) by mouth daily, Disp: 30 tablet, Rfl: 3  •  oxyCODONE (ROXICODONE) 10 MG TABS, Take 1/2 tablet (5 mg) for moderate pain (4-6/10) or take 1 tablet (10 mg) for severe pain (7-10/10) every 8 hours as needed. , Disp: 20 tablet, Rfl: 0  •  rOPINIRole (REQUIP) 0.5 mg tablet, Take 1 tablet (0.5 mg total) by mouth daily at bedtime, Disp: 30 tablet, Rfl: 0  •  rosuvastatin (CRESTOR) 20 MG tablet, Take 20 mg by mouth daily at bedtime, Disp: , Rfl:   •  saccharomyces boulardii (FLORASTOR) 250 mg capsule, Take 1 capsule (250 mg total) by mouth 2 (two) times a day, Disp: 60 capsule, Rfl: 0  •  traZODone (DESYREL) 50 mg tablet, Take 1 tablet (50 mg total) by mouth daily at bedtime as needed for sleep, Disp: , Rfl:     I have spent a total time of 40 minutes on 08/24/23 in caring for this patient including Prognosis, Risks and benefits of tx options, Instructions for management, Patient and family education, Importance of tx compliance, Risk factor reductions, Impressions, Counseling / Coordination of care, Documenting in the medical record, Reviewing / ordering tests, medicine, procedures   and Obtaining or reviewing history  .

## 2023-08-24 NOTE — ASSESSMENT & PLAN NOTE
Patient report incisional tenderness and intermittent tingling. She is most tender along the middle aspect of her incision. She is denying any fever, chills, increased erythema or warmth at incision site. She has been washing incision daily with soap and water and has been wearing tubgrip, however she cut the bottom off so it has not been providing compression over site of incision. She complains of continued phantom limb pain. She is taking Tylenol several times daily, gabapentin twice daily, and oxycodone at night "to sleep". Staples removed today. Incision is mostly well approximated with several small areas of superficial separation. Tenderness noted to middle of incision. Mild erythema to stump without warmth or purulent drainage from incision. Incision cleansed with saline and reinforced with steristrips. Covered with gauze and wrapped with ACE wrap    Plan:  -Instructed patient to wash incision daily with soap and water, pat dry. Paint with betadine and cover with gauze to protect. Wrap with ACE wrap daily to provide light compression  -Instructed patient to continue to elevate leg often to help minimize edema.  -Patient is to notify the office should she develop any worsening tenderness, warmth, erythema, drainage, fever, or chills.  -Patient requested refill of her oxycodone. We discussed that opioids are appropriate in the immediate post operative phase for acute pain, however are not recommended for phantom limb pain and sleep. Informed patient that she should take Tylenol as needed for pain (do not exceed 4g daily) and to take gabapentin three times daily as prescribed. Patient verbalized understanding.  -Return to the office in 2 weeks for incision recheck.

## 2023-08-24 NOTE — PATIENT INSTRUCTIONS
Wash incision daily with soap and water, paint with betadine. Cover with gauze and wrap with ACE wrap for light compression.

## 2023-08-25 ENCOUNTER — HOME CARE VISIT (OUTPATIENT)
Dept: HOME HEALTH SERVICES | Facility: HOME HEALTHCARE | Age: 73
End: 2023-08-25
Payer: MEDICARE

## 2023-08-25 VITALS — DIASTOLIC BLOOD PRESSURE: 60 MMHG | SYSTOLIC BLOOD PRESSURE: 122 MMHG | HEART RATE: 51 BPM | OXYGEN SATURATION: 94 %

## 2023-08-25 VITALS — HEART RATE: 62 BPM | OXYGEN SATURATION: 95 %

## 2023-08-25 PROCEDURE — G0152 HHCP-SERV OF OT,EA 15 MIN: HCPCS

## 2023-08-25 PROCEDURE — G0151 HHCP-SERV OF PT,EA 15 MIN: HCPCS

## 2023-08-28 ENCOUNTER — CLINICAL SUPPORT (OUTPATIENT)
Dept: RADIATION ONCOLOGY | Facility: CLINIC | Age: 73
End: 2023-08-28
Attending: RADIOLOGY
Payer: MEDICARE

## 2023-08-28 ENCOUNTER — TELEPHONE (OUTPATIENT)
Dept: CARDIAC SURGERY | Facility: CLINIC | Age: 73
End: 2023-08-28

## 2023-08-28 ENCOUNTER — RADIATION ONCOLOGY CONSULT (OUTPATIENT)
Dept: RADIATION ONCOLOGY | Facility: CLINIC | Age: 73
End: 2023-08-28

## 2023-08-28 VITALS
OXYGEN SATURATION: 94 % | TEMPERATURE: 97.1 F | SYSTOLIC BLOOD PRESSURE: 112 MMHG | DIASTOLIC BLOOD PRESSURE: 72 MMHG | HEART RATE: 56 BPM

## 2023-08-28 DIAGNOSIS — C34.91 NON-SMALL CELL CANCER OF RIGHT LUNG (HCC): Primary | ICD-10-CM

## 2023-08-28 DIAGNOSIS — C34.91 NSCLC OF RIGHT LUNG (HCC): ICD-10-CM

## 2023-08-28 PROCEDURE — 77263 THER RADIOLOGY TX PLNG CPLX: CPT | Performed by: RADIOLOGY

## 2023-08-28 PROCEDURE — 99205 OFFICE O/P NEW HI 60 MIN: CPT | Performed by: RADIOLOGY

## 2023-08-28 PROCEDURE — 99211 OFF/OP EST MAY X REQ PHY/QHP: CPT | Performed by: RADIOLOGY

## 2023-08-28 NOTE — LETTER
2023     Sreekanth Alfaro, [address] [facility] [address]  [address] [address]    Patient: Georgie Beltrán   YOB: 1950   Date of Visit: 2023       Dear Dr. Tee Miller: Thank you for referring Jay Powell to me for evaluation. Below are my notes for this consultation. If you have questions, please do not hesitate to call me. I look forward to following your patient along with you. Sincerely,        Susan Kuo MD        CC: No Recipients    Susan Kuo MD  2023  3:35 PM  Sign when Signing Visit  Consultation - Radiation Oncology      DOJ:[id number] : 1950  Encounter: [id number]  Patient Information: [address]  Chief Complaint   Patient presents with   • Consult     Cancer Staging   Non-small cell cancer of right lung [facility])  Staging form: Lung, AJCC 8th Edition  - Clinical: Stage IA2 (cT1b, cN0, cM0) - Signed by Susan Kuo MD on 2023           History of Present Illness     Georgie Beltrán 1950 is a 68 y.o. female Presents for discussion of SBRT for recently diagnosed non-small cell carcinoma, SCC of right upper lung. Referred by Dr. Tee Miller. Additional medical problems include coronary artery disease, HTN, Stage 3 CKD, CABG, peripheral artery disease, aortoiliac occlusive disease. Recent above knee amputation of LLE. Patient has 60 pack year smoking history. Underwent CT lung screening     23  CT Lung screening  IMPRESSION:  Occlusion of the posterior segment right upper lobe bronchus with 1.8 cm right upper lobe nodule, malignant until proven otherwise. Recommend pulmonary consult to determine whether bronchoscopy or PET/CT should be performed first.   Lung-RADS 4B, Very suspicious airway nodule. Referral for further clinical evaluation.      23  CT Chest wo contrast  IMPRESSION:  Unchanged 1.8 cm right upper lobe pulmonary nodule with occlusion of the posterior segment of the right upper lobe bronchus (series 6 image 45.) This is suspicious for malignancy. Again seen is moderate emphysema. 6/20/23  Complete PFT   Interpretation:   Moderate obstructive airflow defect on spirometry   No significant improvement in airflow or forced vital capacity in response to the administration to bronchodilator per ATS standards. Air trapping as indicated by the lung volumes   Severe decrease in diffusion capacity   Flow-volume loop consistent with obstruction. 7/5/23  Thoracic Surgery  Biopsy recommended. Has PET CT scheduled. Currently not interested in smoking cessation. 5/8/23  MRI of Brain (completed for dizziness)  IMPRESSION:   1. Moderate, chronic microangiopathy. 2. Large left mastoid air cell effusion. Trace right mastoid air cell effusion. 3. No acute infarction, intracranial mass or mass. 4. No cerebellopontine angle cistern mass lesion. Normal appearing bilateral 7th or 8th cranial nerve complexes. 7/7/23  PET CT  IMPRESSION:   1. Marked increased metabolic activity in association with right upper lobe nodule highly suggestive for neoplasm. 2. No evidence for thoracic adenopathy or metastatic disease to the abdomen or pelvis. 3. Right renal atrophy and multiple cystic renal lesions. Likely nonfunctioning right kidney. 4. Metabolic activity in the left psoas muscle with heterogeneity. Findings are most likely secondary to bursitis/infection or inflammation in the setting of left hip replacement. Correlate with CT pelvis. 4. Dilatation of the distal descending thoracic aorta which is tortuous and possibly aneurysmal.     7/11/23  Dr. Evonne Camacho  F/U EBUS with thoracic surgery, may be candidate for resection. If found to be Stage II or higher, will assess for adjuvant chemotherapy. 7/21/23  Emergent above knee amputation completed on left leg     8/14/23  EBUS and bronchoscopy completed  Operative Findings: evidence of cancer in the right upper lobe posterior segmental mass.   Claudia Bone non-small cell lung cancer. 8/14/23  Tissue Exam  A. Lung, Right Upper Lobe, RUL posterior segment endobronchial biopsy:  -Non- small cell carcinoma , immunohistochemically consistent with squamous cell carcinoma      8/14/23  Fine needle aspiration  A-B. Lymph Node, level 7 (ThinPrep and smear preparations): Atypical cellular changes  Rare atypical cells  Background of ciliated bronchial cells and lymphocytes seen. Historical Information   Oncology History   SCC (squamous cell carcinoma of lung) ([address])   8/14/2023 Biopsy    A.  Lung, Right Upper Lobe, RUL posterior segment endobronchial biopsy:  -Non- small cell carcinoma , immunohistochemically consistent with squamous cell carcinoma      Non-small cell cancer of right lung ([address])   8/14/2023 Initial Diagnosis    Non-small cell cancer of right lung ([address])     8/28/2023 -  Cancer Staged    Staging form: Lung, AJCC 8th Edition  - Clinical: Stage IA2 (cT1b, cN0, cM0) - Signed by Jackie Faustin MD on 8/28/2023             Past Medical History:   Diagnosis Date   • Aorto-iliac disease ([address])    • Atrial fibrillation ([address])    • CAD (coronary artery disease)    • Carotid stenosis, bilateral    • Celiac artery stenosis (HCC)    • CKD (chronic kidney disease) stage 3, GFR 30-59 ml/min (HCC)    • DVT (deep venous thrombosis) (HCC)     LLE (CFV, popl)   • Heart attack ([address]) 02/2022   • HLD (hyperlipidemia)    • Hypertension    • Lung cancer ([address])    • Lung mass     RUL   • Myocardial infarction ([address])     2022   • PAD (peripheral artery disease) ([address])    • Stroke [facility])      Past Surgical History:   Procedure Laterality Date   • AORTA - FEMORAL ARTERY BYPASS GRAFT Left    • AXILLO-FEMORAL BYASS GRAFT Bilateral    • BYPASS FEMORAL-FEMORAL     • COLONOSCOPY     • CORONARY ARTERY BYPASS GRAFT  2000   • ENDOBRONCHIAL ULTRASOUND (EBUS) N/A 8/14/2023    Procedure: ENDOBRONCHIAL ULTRASOUND (EBUS) tissue biopsy;  Surgeon: Violeta Ayoub MD;  Location: [facility];  Service: Thoracic   • FEMORAL ARTERY - POPLITEAL ARTERY BYPASS GRAFT Bilateral    • IVC FILTER INSERTION     • [place] AMPUTATION THIGH THROUGH FEMUR ANY LEVEL Left 07/21/2023    Procedure: AMPUTATION ABOVE KNEE (AKA);   Surgeon: Soila Johnston MD;  Location: [facility];  Service: Vascular   •  [address] INCL FLUOR GDNCE DX W/CELL WASHG [facility] N/A 8/14/2023    Procedure: Trever Styles;  Surgeon: Frank Ambrose MD;  Location: [facility];  Service: Thoracic   • TOTAL ABDOMINAL HYSTERECTOMY W/ BILATERAL SALPINGOOPHORECTOMY     • TOTAL HIP ARTHROPLASTY Left        Family History   Problem Relation Age of Onset   • Leukemia Mother 80   • Cancer Sister        Social History   Social History     Substance and Sexual Activity   Alcohol Use Not Currently   • Alcohol/week: 50.0 standard drinks of alcohol   • Types: 50 Cans of beer per week    Comment: no ETOH for 1.5 years     Social History     Substance and Sexual Activity   Drug Use Never     Social History     Tobacco Use   Smoking Status Every Day   • Packs/day: 1.00   • Years: 50.00   • Total pack years: 50.00   • Types: Cigarettes   • Start date: 1967   Smokeless Tobacco Never         Meds/Allergies     Current Outpatient Medications:   •  acetaminophen (TYLENOL) 325 mg tablet, Take 3 tablets (975 mg total) by mouth every 8 (eight) hours (Patient taking differently: Take 975 mg by mouth if needed), Disp: , Rfl:   •  clopidogrel (PLAVIX) 75 mg tablet, Take 1 tablet (75 mg total) by mouth in the morning Do not start before August 15, 2023., Disp: 90 tablet, Rfl: 0  •  diltiazem (CARDIZEM) 120 MG tablet, Take 1 tablet (120 mg total) by mouth in the morning, Disp: 30 tablet, Rfl: 3  •  ezetimibe (ZETIA) 10 mg tablet, Take 1 tablet (10 mg total) by mouth daily, Disp: 30 tablet, Rfl: 5  •  gabapentin (Neurontin) 300 mg capsule, Take 2 capsules (600 mg total) by mouth 3 (three) times a day, Disp: 90 capsule, Rfl: 0  •  isosorbide mononitrate (IMDUR) 30 mg 24 hr tablet, Take 30 mg by mouth every morning, Disp: , Rfl:   •  lamoTRIgine (LaMICtal) 100 mg tablet, Take 1 tablet (100 mg total) by mouth daily, Disp: 90 tablet, Rfl: 1  •  lisinopril (ZESTRIL) 10 mg tablet, take 1 tablet by mouth once daily, Disp: 30 tablet, Rfl: 5  •  Melatonin 5 MG TABS, Take 4 tablets (20 mg total) by mouth daily at bedtime as needed (insomnia) Patient states she takes 20MG, Disp: , Rfl:   •  metoprolol succinate (TOPROL-XL) 50 mg 24 hr tablet, Take 1 tablet (50 mg total) by mouth daily, Disp: 30 tablet, Rfl: 3  •  rOPINIRole (REQUIP) 0.5 mg tablet, Take 1 tablet (0.5 mg total) by mouth daily at bedtime, Disp: 30 tablet, Rfl: 0  •  rosuvastatin (CRESTOR) 20 MG tablet, Take 20 mg by mouth daily at bedtime, Disp: , Rfl:   •  saccharomyces boulardii (FLORASTOR) 250 mg capsule, Take 1 capsule (250 mg total) by mouth 2 (two) times a day, Disp: 60 capsule, Rfl: 0  •  traZODone (DESYREL) 50 mg tablet, Take 1 tablet (50 mg total) by mouth daily at bedtime as needed for sleep, Disp: , Rfl:   •  albuterol (ProAir HFA) 90 mcg/act inhaler, Inhale 2 puffs every 6 (six) hours as needed for wheezing (Patient not taking: Reported on 8/28/2023), Disp: 8.5 g, Rfl: 2  •  Folic Acid 0.8 MG CAPS, Take 1 capsule (0.8 mg total) by mouth in the morning (Patient not taking: Reported on 8/28/2023), Disp: 90 capsule, Rfl: 1  •  oxyCODONE (ROXICODONE) 10 MG TABS, Take 1/2 tablet (5 mg) for moderate pain (4-6/10) or take 1 tablet (10 mg) for severe pain (7-10/10) every 8 hours as needed. (Patient not taking: Reported on 8/28/2023), Disp: 20 tablet, Rfl: 0  No Known Allergies      Review of Systems   Constitutional: Positive for activity change and fatigue. Negative for unexpected weight change. HENT: Positive for hearing loss (left ear). Eyes:        Needs glasses   Respiratory: Positive for cough (dry cough when lying flat), shortness of breath and wheezing. Cardiovascular: Positive for palpitations.  Negative for chest pain and leg swelling. Gastrointestinal: Positive for nausea. Negative for vomiting. Endocrine: Negative. Genitourinary: Negative for dyspareunia. Musculoskeletal: Positive for gait problem. Negative for neck pain and neck stiffness. Left leg pain   Allergic/Immunologic: Negative. Negative for environmental allergies and food allergies. Neurological: Positive for weakness and numbness (bilateral hands and right arm). Psychiatric/Behavioral: Positive for sleep disturbance. OBJECTIVE:   /72   Pulse 56   Temp (!) 97.1 °F (36.2 °C)   SpO2 94%   Pain Assessment:  0  Performance Status: ECOG/Zubrod/WHO: 2 - 3    Physical Exam   She is in a wheelchair. She has left above-the-knee amputation. She is conversing appropriately. Her lungs are clear bilaterally. No supraclavicular adenopathy. RESULTS  Lab Results    Chemistry        Component Value Date/Time    K 5.0 08/07/2023 0532     (H) 08/07/2023 0532    CO2 25 08/07/2023 0532    BUN 17 08/07/2023 0532    CREATININE 0.90 08/07/2023 0532        Component Value Date/Time    CALCIUM 9.3 08/07/2023 0532    ALKPHOS 75 07/18/2023 1128    AST 28 07/18/2023 1128    ALT 25 07/18/2023 1128            Lab Results   Component Value Date    WBC 8.88 08/07/2023    HGB 12.0 08/07/2023    HCT 36.8 08/07/2023    MCV 93 08/07/2023     08/07/2023         Imaging Studies  XR chest portable    Result Date: 8/14/2023  Narrative: CHEST INDICATION:   post op EBUS. COMPARISON: Chest radiograph 7/23/2023. EXAM PERFORMED/VIEWS:  XR CHEST PORTABLE FINDINGS: Again noted is prominence of the right hilum. Stable heart size. The lungs are clear. No pneumothorax or pleural effusion. Osseous structures appear within normal limits for patient age. Impression: No acute cardiopulmonary disease. Again noted is prominence of the right hilum.  Workstation performed: [id number]     XR femur 1 vw left    Result Date: 8/9/2023  Narrative: LEFT FEMUR INDICATION: Worsening residual, limb, pain, and swelling. Status post left above-the-knee amputation on July 21, 2023. COMPARISON: 7/30/2023. VIEWS:  XR FEMUR 2 VW LEFT FINDINGS: Stable appearing left total hip arthroplasty is noted. Femur is amputated at diaphysis. There are no cortical irregularities or destructive findings. No lytic or blastic osseous lesion. Numerous surgical clips and skin staples present in post amputation left lower extremity. ..     Impression: Postsurgical changes in left above-the-knee amputation. No x-ray findings suggesting osteomyelitis at this time. Workstation performed: [id number]     XR femur 2 vw left    Result Date: 7/31/2023  Narrative: LEFT FEMUR INDICATION:   Worsening residual, limb, pain, and swelling. Status post left above-the-knee amputation on July 21, 2023. COMPARISON:  None VIEWS:  XR FEMUR 2 VW LEFT FINDINGS: Left hip prosthetic location appears satisfactory. No periprosthetic lucency. Femur is amputated at diaphysis. There are no cortical irregularities or destructive findings. No lytic or blastic osseous lesion. Numerous surgical clips and skin staples present in post amputation left lower extremity. Graft is seen in the medial soft tissues. Impression: Postsurgical changes in left above-the-knee amputation. No x-ray findings suggesting osteomyelitis at this time. Resident: Quintin Sahni, the attending radiologist, have reviewed the images and agree with the final report above. Workstation performed: [id number]                 ASSESSMENT  1. Non-small cell cancer of right lung ([address])        2.  NSCLC of right lung [facility])  Ambulatory Referral to Radiation Oncology    Ambulatory Referral to Oncology Social Worker        Cancer Staging   Non-small cell cancer of right lung [facility])  Staging form: Lung, AJCC 8th Edition  - Clinical: Stage IA2 (cT1b, cN0, cM0) - Signed by Parth Fowler MD on 8/28/2023        PLAN/DISCUSSION  Orders Placed This Encounter   Procedures   • Ambulatory Referral to Oncology Social Worker          Mane Schmidt is a 68y.o. year old female with clinical stage I right upper lobe non-small cell carcinoma. Reviewed with her and her family potential for SBRT or hypofractionated therapy. Her case was reviewed at thoracic multidisciplinary review with recommendation for SBRT. Of note I did contact the nurse navigator regarding the uptake in her pelvis work-up. They noted no further work-up was necessary. we discussed the proximity of her lesion to the central structures may require a hypofractionated regimen. Possible side effects were discussed with her both acute and long-term. This can include but not limited to, skin erythema, rib fracture, injury to the central structures including her trachea/bronchus, vessels, bleeding, pneumonitis, lung fibrosis. Has been scheduled for 4D CT simulation and campus. Depending on tumor motion or location for treatment will be determined and need for respiratory motion management. Should breath-hold technique required she will treated at the [facility] otherwise treated in [facility]. She is agreeable to the above outlined plan          Kashif Earl MD  4/01/1263,5:77 PM      Portions of the record may have been created with voice recognition software. Occasional wrong word or "sound a like" substitutions may have occurred due to the inherent limitations of voice recognition software. Read the chart carefully and recognize, using context, where substitutions have occurred.

## 2023-08-28 NOTE — PROGRESS NOTES
Marlowe Baumgarten 1950 is a 68 y.o. female Presents for discussion of SBRT for recently diagnosed non-small cell carcinoma, SCC of right upper lung. Referred by Dr. Christiano Oro. Additional medical problems include coronary artery disease, HTN, Stage 3 CKD, CABG, peripheral artery disease, aortoiliac occlusive disease. Recent above knee amputation of LLE. Patient has 60 pack year smoking history. Underwent CT lung screening    5/31/23  CT Lung screening  IMPRESSION:  Occlusion of the posterior segment right upper lobe bronchus with 1.8 cm right upper lobe nodule, malignant until proven otherwise. Recommend pulmonary consult to determine whether bronchoscopy or PET/CT should be performed first.   Lung-RADS 4B, Very suspicious airway nodule. Referral for further clinical evaluation. 6/25/23  CT Chest wo contrast  IMPRESSION:  Unchanged 1.8 cm right upper lobe pulmonary nodule with occlusion of the posterior segment of the right upper lobe bronchus (series 6 image 45.) This is suspicious for malignancy. Again seen is moderate emphysema. 6/20/23  Complete PFT   Interpretation:   Moderate obstructive airflow defect on spirometry   No significant improvement in airflow or forced vital capacity in response to the administration to bronchodilator per ATS standards. Air trapping as indicated by the lung volumes   Severe decrease in diffusion capacity   Flow-volume loop consistent with obstruction. 7/5/23  Thoracic Surgery  Biopsy recommended. Has PET CT scheduled. Currently not interested in smoking cessation. 5/8/23  MRI of Brain (completed for dizziness)  IMPRESSION:   1. Moderate, chronic microangiopathy. 2. Large left mastoid air cell effusion. Trace right mastoid air cell effusion. 3. No acute infarction, intracranial mass or mass. 4. No cerebellopontine angle cistern mass lesion. Normal appearing bilateral 7th or 8th cranial nerve complexes.     7/7/23  PET CT  IMPRESSION:   1. Marked increased metabolic activity in association with right upper lobe nodule highly suggestive for neoplasm. 2. No evidence for thoracic adenopathy or metastatic disease to the abdomen or pelvis. 3. Right renal atrophy and multiple cystic renal lesions. Likely nonfunctioning right kidney. 4. Metabolic activity in the left psoas muscle with heterogeneity. Findings are most likely secondary to bursitis/infection or inflammation in the setting of left hip replacement. Correlate with CT pelvis. 4. Dilatation of the distal descending thoracic aorta which is tortuous and possibly aneurysmal.    7/11/23  Dr. Valentine Crowder  F/U EBUS with thoracic surgery, may be candidate for resection. If found to be Stage II or higher, will assess for adjuvant chemotherapy. 7/21/23  Emergent above knee amputation completed on left leg    8/14/23  EBUS and bronchoscopy completed  Operative Findings: evidence of cancer in the right upper lobe posterior segmental mass. Favor non-small cell lung cancer. 8/14/23  Tissue Exam  A. Lung, Right Upper Lobe, RUL posterior segment endobronchial biopsy:  -Non- small cell carcinoma , immunohistochemically consistent with squamous cell carcinoma      8/14/23  Fine needle aspiration  A-B. Lymph Node, level 7 (ThinPrep and smear preparations): Atypical cellular changes  Rare atypical cells  Background of ciliated bronchial cells and lymphocytes seen. C-D. Lymph Node, 11L (ThinPrep and smear preparations):  Negative for Malignancy   Ciliated bronchial cells   Squamous cells seen. Benign appearing lymphocytes seen. 8/21/23  Thoracic Oncology Multidisciplinary Case Review   PHYSICIAN RECOMMENDED PLAN: Radiation Oncology for SBRT    Upcoming:  10/2/23 [facility]                  Oncology History   SCC (squamous cell carcinoma of lung) ([address])   8/14/2023 Biopsy    A.  Lung, Right Upper Lobe, RUL posterior segment endobronchial biopsy:  -Non- small cell carcinoma , immunohistochemically consistent with squamous cell carcinoma      Non-small cell cancer of right lung ([address])   8/14/2023 Initial Diagnosis    Non-small cell cancer of right lung ([address])         Review of Systems:  Review of Systems   Constitutional: Positive for activity change and fatigue. Negative for unexpected weight change. HENT: Positive for hearing loss (left ear). Eyes:        Needs glasses   Respiratory: Positive for cough (dry cough when lying flat), shortness of breath and wheezing. Cardiovascular: Positive for palpitations. Negative for chest pain and leg swelling. Gastrointestinal: Positive for nausea. Negative for vomiting. Endocrine: Negative. Genitourinary: Negative for dyspareunia. Musculoskeletal: Positive for gait problem. Negative for neck pain and neck stiffness. Left leg pain   Allergic/Immunologic: Negative. Negative for environmental allergies and food allergies. Neurological: Positive for weakness and numbness (bilateral hands and right arm). Psychiatric/Behavioral: Positive for sleep disturbance. Clinical Trial: no    Pregnancy test needed:  no      PFT completed 6/20/23    Prior Radiation: none    Teaching  [facility] radiation oncology booklet given.       MST completed    Implantable Devices (Port, Pacemaker, pain stimulator): Negative    Hip Replacement : left hip      [unfilled]  Health Maintenance   Topic Date Due   • COVID-19 Vaccine (1) Never done   • Breast Cancer Screening: Mammogram  Never done   • Osteoporosis Screening  Never done   • Pneumococcal Vaccine: 65+ Years (2 - PPSV23 if available, else PCV20) 08/15/2016   • PT PLAN OF CARE  04/19/2023   • Influenza Vaccine (1) 09/01/2023   • BMI: Followup Plan  12/09/2023   • Urinary Incontinence Screening  12/09/2023   • Medicare Annual Wellness Visit (AWV)  12/09/2023   • Fall Risk  03/20/2024   • BMI: Adult  08/16/2024   • Colorectal Cancer Screening  05/10/2026   • Hepatitis C Screening  Completed   • HIB Vaccine  Aged Out   • IPV Vaccine  Aged Out   • Hepatitis A Vaccine  Aged Out   • Meningococcal ACWY Vaccine  Aged Out   • HPV Vaccine  Aged Out     Past Medical History:   Diagnosis Date   • Aorto-iliac disease ([address])    • Atrial fibrillation ([address])    • CAD (coronary artery disease)    • Carotid stenosis, bilateral    • Celiac artery stenosis (HCC)    • CKD (chronic kidney disease) stage 3, GFR 30-59 ml/min (HCC)    • DVT (deep venous thrombosis) (HCC)     LLE (CFV, popl)   • Heart attack ([address]) 02/2022   • HLD (hyperlipidemia)    • Hypertension    • Lung mass     RUL   • Myocardial infarction ([address])     2022   • PAD (peripheral artery disease) ([address])    • Stroke ([address])      Past Surgical History:   Procedure Laterality Date   • AORTA - FEMORAL ARTERY BYPASS GRAFT Left    • AXILLO-FEMORAL BYASS GRAFT Bilateral    • BYPASS FEMORAL-FEMORAL     • COLONOSCOPY     • CORONARY ARTERY BYPASS GRAFT  2000   • ENDOBRONCHIAL ULTRASOUND (EBUS) N/A 8/14/2023    Procedure: ENDOBRONCHIAL ULTRASOUND (EBUS) tissue biopsy;  Surgeon: Shilpa Garnett MD;  Location: [facility];  Service: Thoracic   • FEMORAL ARTERY - POPLITEAL ARTERY BYPASS GRAFT Bilateral    • IVC FILTER INSERTION     • PA AMPUTATION THIGH THROUGH FEMUR ANY LEVEL Left 07/21/2023    Procedure: AMPUTATION ABOVE KNEE (AKA);   Surgeon: Barrington Lay MD;  Location: [facility];  Service: Vascular   •  [address] INCL FLUOR GDNCE DX W/CELL WASHG [facility] N/A 8/14/2023    Procedure: [place];  Surgeon: Shilpa Garnett MD;  Location: [facility];  Service: Thoracic   • TOTAL ABDOMINAL HYSTERECTOMY W/ BILATERAL SALPINGOOPHORECTOMY     • TOTAL HIP ARTHROPLASTY Left      Family History   Problem Relation Age of Onset   • Stomach cancer Mother      Social History     Tobacco Use   • Smoking status: Every Day     Packs/day: 1.00     Types: Cigarettes     Start date: 1967   • Smokeless tobacco: Never   Vaping Use   • Vaping Use: Never used   Substance Use Topics   • Alcohol use: Not Currently Alcohol/week: 50.0 standard drinks of alcohol     Types: 50 Cans of beer per week     Comment: has not had a drink in 243 days (7/18/23)  h/o at least 6 beers per day   • Drug use: Never        Current Outpatient Medications:   •  acetaminophen (TYLENOL) 325 mg tablet, Take 3 tablets (975 mg total) by mouth every 8 (eight) hours (Patient taking differently: Take 975 mg by mouth if needed), Disp: , Rfl:   •  albuterol (ProAir HFA) 90 mcg/act inhaler, Inhale 2 puffs every 6 (six) hours as needed for wheezing, Disp: 8.5 g, Rfl: 2  •  clopidogrel (PLAVIX) 75 mg tablet, Take 1 tablet (75 mg total) by mouth in the morning Do not start before August 15, 2023., Disp: 90 tablet, Rfl: 0  •  diltiazem (CARDIZEM) 120 MG tablet, Take 1 tablet (120 mg total) by mouth in the morning, Disp: 30 tablet, Rfl: 3  •  ezetimibe (ZETIA) 10 mg tablet, Take 1 tablet (10 mg total) by mouth daily, Disp: 30 tablet, Rfl: 5  •  Folic Acid 0.8 MG CAPS, Take 1 capsule (0.8 mg total) by mouth in the morning (Patient taking differently: Take by mouth in the morning), Disp: 90 capsule, Rfl: 1  •  gabapentin (Neurontin) 300 mg capsule, Take 2 capsules (600 mg total) by mouth 3 (three) times a day, Disp: 90 capsule, Rfl: 0  •  isosorbide mononitrate (IMDUR) 30 mg 24 hr tablet, Take 30 mg by mouth every morning, Disp: , Rfl:   •  lamoTRIgine (LaMICtal) 100 mg tablet, Take 1 tablet (100 mg total) by mouth daily, Disp: 90 tablet, Rfl: 1  •  lisinopril (ZESTRIL) 10 mg tablet, take 1 tablet by mouth once daily, Disp: 30 tablet, Rfl: 5  •  Melatonin 5 MG TABS, Take 4 tablets (20 mg total) by mouth daily at bedtime as needed (insomnia) Patient states she takes 20MG, Disp: , Rfl:   •  metoprolol succinate (TOPROL-XL) 50 mg 24 hr tablet, Take 1 tablet (50 mg total) by mouth daily, Disp: 30 tablet, Rfl: 3  •  oxyCODONE (ROXICODONE) 10 MG TABS, Take 1/2 tablet (5 mg) for moderate pain (4-6/10) or take 1 tablet (10 mg) for severe pain (7-10/10) every 8 hours as needed. , Disp: 20 tablet, Rfl: 0  •  rOPINIRole (REQUIP) 0.5 mg tablet, Take 1 tablet (0.5 mg total) by mouth daily at bedtime, Disp: 30 tablet, Rfl: 0  •  rosuvastatin (CRESTOR) 20 MG tablet, Take 20 mg by mouth daily at bedtime, Disp: , Rfl:   •  saccharomyces boulardii (FLORASTOR) 250 mg capsule, Take 1 capsule (250 mg total) by mouth 2 (two) times a day, Disp: 60 capsule, Rfl: 0  •  traZODone (DESYREL) 50 mg tablet, Take 1 tablet (50 mg total) by mouth daily at bedtime as needed for sleep, Disp: , Rfl:   No Known Allergies   There were no vitals filed for this visit.

## 2023-08-28 NOTE — PROGRESS NOTES
Consultation - Radiation Oncology      OVU:[id number] : 1950  Encounter: [id number]  Patient Information: [address]  Chief Complaint   Patient presents with   • Consult     Cancer Staging   Non-small cell cancer of right lung [facility])  Staging form: Lung, AJCC 8th Edition  - Clinical: Stage IA2 (cT1b, cN0, cM0) - Signed by Stephanei Ware MD on 2023           History of Present Illness     Higinio iSmon 1950 is a 68 y.o. female Presents for discussion of SBRT for recently diagnosed non-small cell carcinoma, SCC of right upper lung. Referred by Dr. Regan Morley. Additional medical problems include coronary artery disease, HTN, Stage 3 CKD, CABG, peripheral artery disease, aortoiliac occlusive disease. Recent above knee amputation of LLE. Patient has 60 pack year smoking history. Underwent CT lung screening     23  CT Lung screening  IMPRESSION:  Occlusion of the posterior segment right upper lobe bronchus with 1.8 cm right upper lobe nodule, malignant until proven otherwise. Recommend pulmonary consult to determine whether bronchoscopy or PET/CT should be performed first.   Lung-RADS 4B, Very suspicious airway nodule.  Referral for further clinical evaluation.     23  CT Chest wo contrast  IMPRESSION:  Unchanged 1.8 cm right upper lobe pulmonary nodule with occlusion of the posterior segment of the right upper lobe bronchus (series 6 image 45.) This is suspicious for malignancy. Again seen is moderate emphysema.     23  Complete PFT   Interpretation:   Moderate obstructive airflow defect on spirometry   No significant improvement in airflow or forced vital capacity in response to the administration to bronchodilator per ATS standards.    Air trapping as indicated by the lung volumes   Severe decrease in diffusion capacity   Flow-volume loop consistent with obstruction.     23  Thoracic Surgery  Biopsy recommended. Has PET CT scheduled.   Currently not interested in smoking cessation.     5/8/23  MRI of Brain (completed for dizziness)  IMPRESSION:   1. Moderate, chronic microangiopathy. 2. Large left mastoid air cell effusion. Trace right mastoid air cell effusion. 3. No acute infarction, intracranial mass or mass. 4. No cerebellopontine angle cistern mass lesion. Normal appearing bilateral 7th or 8th cranial nerve complexes.   7/7/23  PET CT  IMPRESSION:   1. Marked increased metabolic activity in association with right upper lobe nodule highly suggestive for neoplasm. 2. No evidence for thoracic adenopathy or metastatic disease to the abdomen or pelvis. 3. Right renal atrophy and multiple cystic renal lesions. Likely nonfunctioning right kidney. 4. Metabolic activity in the left psoas muscle with heterogeneity. Findings are most likely secondary to bursitis/infection or inflammation in the setting of left hip replacement. Correlate with CT pelvis. 4. Dilatation of the distal descending thoracic aorta which is tortuous and possibly aneurysmal.     7/11/23  Dr. Susana Chavez  F/U EBUS with thoracic surgery, may be candidate for resection. If found to be Stage II or higher, will assess for adjuvant chemotherapy.     7/21/23  Emergent above knee amputation completed on left leg     8/14/23  EBUS and bronchoscopy completed  Operative Findings: evidence of cancer in the right upper lobe posterior segmental mass.  Favor non-small cell lung cancer.     8/14/23  Tissue Exam  A. Lung, Right Upper Lobe, RUL posterior segment endobronchial biopsy:  -Non- small cell carcinoma , immunohistochemically consistent with squamous cell carcinoma      8/14/23  Fine needle aspiration  A-B. Lymph Node, level 7 (ThinPrep and smear preparations): Atypical cellular changes  Rare atypical cells  Background of ciliated bronchial cells and lymphocytes seen. Historical Information   Oncology History   SCC (squamous cell carcinoma of lung) ([address])   8/14/2023 Biopsy    A.  Lung, Right Upper Lobe, RUL posterior segment endobronchial biopsy:  -Non- small cell carcinoma , immunohistochemically consistent with squamous cell carcinoma      Non-small cell cancer of right lung ([address])   8/14/2023 Initial Diagnosis    Non-small cell cancer of right lung ([address])     8/28/2023 -  Cancer Staged    Staging form: Lung, AJCC 8th Edition  - Clinical: Stage IA2 (cT1b, cN0, cM0) - Signed by Stephanie Ware MD on 8/28/2023             Past Medical History:   Diagnosis Date   • Aorto-iliac disease ([address])    • Atrial fibrillation ([address])    • CAD (coronary artery disease)    • Carotid stenosis, bilateral    • Celiac artery stenosis (HCC)    • CKD (chronic kidney disease) stage 3, GFR 30-59 ml/min (HCC)    • DVT (deep venous thrombosis) (HCC)     LLE (CFV, popl)   • Heart attack ([address]) 02/2022   • HLD (hyperlipidemia)    • Hypertension    • Lung cancer ([address])    • Lung mass     RUL   • Myocardial infarction ([address])     2022   • PAD (peripheral artery disease) ([address])    • Stroke ([address])      Past Surgical History:   Procedure Laterality Date   • AORTA - FEMORAL ARTERY BYPASS GRAFT Left    • AXILLO-FEMORAL BYASS GRAFT Bilateral    • BYPASS FEMORAL-FEMORAL     • COLONOSCOPY     • CORONARY ARTERY BYPASS GRAFT  2000   • ENDOBRONCHIAL ULTRASOUND (EBUS) N/A 8/14/2023    Procedure: ENDOBRONCHIAL ULTRASOUND (EBUS) tissue biopsy;  Surgeon: Luann Dhillon MD;  Location: [facility];  Service: Thoracic   • FEMORAL ARTERY - POPLITEAL ARTERY BYPASS GRAFT Bilateral    • IVC FILTER INSERTION     • TX AMPUTATION THIGH THROUGH FEMUR ANY LEVEL Left 07/21/2023    Procedure: AMPUTATION ABOVE KNEE (AKA);   Surgeon: Reji Rivas MD;  Location: [facility];  Service: Vascular   •  [address] INCL FLUOR GDNCE DX W/CELL WASHG [address] [facility] [address] N/A 8/14/2023    Procedure: Rm Littlejohn;  Surgeon: Luann Dhillon MD;  Location: [facility];  Service: Thoracic   • TOTAL ABDOMINAL HYSTERECTOMY W/ BILATERAL SALPINGOOPHORECTOMY     • TOTAL HIP ARTHROPLASTY Left Family History   Problem Relation Age of Onset   • Leukemia Mother 80   • Cancer Sister        Social History   Social History     Substance and Sexual Activity   Alcohol Use Not Currently   • Alcohol/week: 50.0 standard drinks of alcohol   • Types: 50 Cans of beer per week    Comment: no ETOH for 1.5 years     Social History     Substance and Sexual Activity   Drug Use Never     Social History     Tobacco Use   Smoking Status Every Day   • Packs/day: 1.00   • Years: 50.00   • Total pack years: 50.00   • Types: Cigarettes   • Start date: 1967   Smokeless Tobacco Never         Meds/Allergies     Current Outpatient Medications:   •  acetaminophen (TYLENOL) 325 mg tablet, Take 3 tablets (975 mg total) by mouth every 8 (eight) hours (Patient taking differently: Take 975 mg by mouth if needed), Disp: , Rfl:   •  clopidogrel (PLAVIX) 75 mg tablet, Take 1 tablet (75 mg total) by mouth in the morning Do not start before August 15, 2023., Disp: 90 tablet, Rfl: 0  •  diltiazem (CARDIZEM) 120 MG tablet, Take 1 tablet (120 mg total) by mouth in the morning, Disp: 30 tablet, Rfl: 3  •  ezetimibe (ZETIA) 10 mg tablet, Take 1 tablet (10 mg total) by mouth daily, Disp: 30 tablet, Rfl: 5  •  gabapentin (Neurontin) 300 mg capsule, Take 2 capsules (600 mg total) by mouth 3 (three) times a day, Disp: 90 capsule, Rfl: 0  •  isosorbide mononitrate (IMDUR) 30 mg 24 hr tablet, Take 30 mg by mouth every morning, Disp: , Rfl:   •  lamoTRIgine (LaMICtal) 100 mg tablet, Take 1 tablet (100 mg total) by mouth daily, Disp: 90 tablet, Rfl: 1  •  lisinopril (ZESTRIL) 10 mg tablet, take 1 tablet by mouth once daily, Disp: 30 tablet, Rfl: 5  •  Melatonin 5 MG TABS, Take 4 tablets (20 mg total) by mouth daily at bedtime as needed (insomnia) Patient states she takes 20MG, Disp: , Rfl:   •  metoprolol succinate (TOPROL-XL) 50 mg 24 hr tablet, Take 1 tablet (50 mg total) by mouth daily, Disp: 30 tablet, Rfl: 3  •  rOPINIRole (REQUIP) 0.5 mg tablet, Take 1 tablet (0.5 mg total) by mouth daily at bedtime, Disp: 30 tablet, Rfl: 0  •  rosuvastatin (CRESTOR) 20 MG tablet, Take 20 mg by mouth daily at bedtime, Disp: , Rfl:   •  saccharomyces boulardii (FLORASTOR) 250 mg capsule, Take 1 capsule (250 mg total) by mouth 2 (two) times a day, Disp: 60 capsule, Rfl: 0  •  traZODone (DESYREL) 50 mg tablet, Take 1 tablet (50 mg total) by mouth daily at bedtime as needed for sleep, Disp: , Rfl:   •  albuterol (ProAir HFA) 90 mcg/act inhaler, Inhale 2 puffs every 6 (six) hours as needed for wheezing (Patient not taking: Reported on 8/28/2023), Disp: 8.5 g, Rfl: 2  •  Folic Acid 0.8 MG CAPS, Take 1 capsule (0.8 mg total) by mouth in the morning (Patient not taking: Reported on 8/28/2023), Disp: 90 capsule, Rfl: 1  •  oxyCODONE (ROXICODONE) 10 MG TABS, Take 1/2 tablet (5 mg) for moderate pain (4-6/10) or take 1 tablet (10 mg) for severe pain (7-10/10) every 8 hours as needed. (Patient not taking: Reported on 8/28/2023), Disp: 20 tablet, Rfl: 0  No Known Allergies      Review of Systems   Constitutional: Positive for activity change and fatigue. Negative for unexpected weight change. HENT: Positive for hearing loss (left ear). Eyes:        Needs glasses   Respiratory: Positive for cough (dry cough when lying flat), shortness of breath and wheezing. Cardiovascular: Positive for palpitations. Negative for chest pain and leg swelling. Gastrointestinal: Positive for nausea. Negative for vomiting. Endocrine: Negative. Genitourinary: Negative for dyspareunia. Musculoskeletal: Positive for gait problem. Negative for neck pain and neck stiffness. Left leg pain   Allergic/Immunologic: Negative. Negative for environmental allergies and food allergies. Neurological: Positive for weakness and numbness (bilateral hands and right arm). Psychiatric/Behavioral: Positive for sleep disturbance.        OBJECTIVE:   /72   Pulse 56   Temp (!) 97.1 °F (36.2 °C) SpO2 94%   Pain Assessment:  0  Performance Status: ECOG/Zubrod/WHO: 2 - 3    Physical Exam   She is in a wheelchair. She has left above-the-knee amputation. She is conversing appropriately. Her lungs are clear bilaterally. No supraclavicular adenopathy. RESULTS  Lab Results    Chemistry        Component Value Date/Time    K 5.0 08/07/2023 0532     (H) 08/07/2023 0532    CO2 25 08/07/2023 0532    BUN 17 08/07/2023 0532    CREATININE 0.90 08/07/2023 0532        Component Value Date/Time    CALCIUM 9.3 08/07/2023 0532    ALKPHOS 75 07/18/2023 1128    AST 28 07/18/2023 1128    ALT 25 07/18/2023 1128            Lab Results   Component Value Date    WBC 8.88 08/07/2023    HGB 12.0 08/07/2023    HCT 36.8 08/07/2023    MCV 93 08/07/2023     08/07/2023         Imaging Studies  XR chest portable    Result Date: 8/14/2023  Narrative: CHEST INDICATION:   post op EBUS. COMPARISON: Chest radiograph 7/23/2023. EXAM PERFORMED/VIEWS:  XR CHEST PORTABLE FINDINGS: Again noted is prominence of the right hilum. Stable heart size. The lungs are clear. No pneumothorax or pleural effusion. Osseous structures appear within normal limits for patient age. Impression: No acute cardiopulmonary disease. Again noted is prominence of the right hilum. Workstation performed: [id number]     XR femur 1 vw left    Result Date: 8/9/2023  Narrative: LEFT FEMUR INDICATION:   Worsening residual, limb, pain, and swelling. Status post left above-the-knee amputation on July 21, 2023. COMPARISON: 7/30/2023. VIEWS:  XR FEMUR 2 VW LEFT FINDINGS: Stable appearing left total hip arthroplasty is noted. Femur is amputated at diaphysis. There are no cortical irregularities or destructive findings. No lytic or blastic osseous lesion. Numerous surgical clips and skin staples present in post amputation left lower extremity. ..     Impression: Postsurgical changes in left above-the-knee amputation.  No x-ray findings suggesting osteomyelitis at this time. Workstation performed: [id number]     XR femur 2 vw left    Result Date: 7/31/2023  Narrative: LEFT FEMUR INDICATION:   Worsening residual, limb, pain, and swelling. Status post left above-the-knee amputation on July 21, 2023. COMPARISON:  None VIEWS:  XR FEMUR 2 VW LEFT FINDINGS: Left hip prosthetic location appears satisfactory. No periprosthetic lucency. Femur is amputated at diaphysis. There are no cortical irregularities or destructive findings. No lytic or blastic osseous lesion. Numerous surgical clips and skin staples present in post amputation left lower extremity. Graft is seen in the medial soft tissues. Impression: Postsurgical changes in left above-the-knee amputation. No x-ray findings suggesting osteomyelitis at this time. Resident: Florencia Sharp, the attending radiologist, have reviewed the images and agree with the final report above. Workstation performed: [id number]                 ASSESSMENT  1. Non-small cell cancer of right lung ([address])        2. NSCLC of right lung [facility])  Ambulatory Referral to Radiation Oncology    Ambulatory Referral to Oncology Social Worker        Cancer Staging   Non-small cell cancer of right lung [facility])  Staging form: Lung, AJCC 8th Edition  - Clinical: Stage IA2 (cT1b, cN0, cM0) - Signed by Cindy Carter MD on 8/28/2023        PLAN/DISCUSSION  Orders Placed This Encounter   Procedures   • Ambulatory Referral to Oncology Social Worker          Alayna Saeed is a 68y.o. year old female with clinical stage I right upper lobe non-small cell carcinoma. Reviewed with her and her family potential for SBRT or hypofractionated therapy. Her case was reviewed at thoracic multidisciplinary review with recommendation for SBRT. Of note I did contact the nurse navigator regarding the uptake in her pelvis work-up. They noted no further work-up was necessary.   we discussed the proximity of her lesion to the central structures may require a hypofractionated regimen. Possible side effects were discussed with her both acute and long-term. This can include but not limited to, skin erythema, rib fracture, injury to the central structures including her trachea/bronchus, vessels, bleeding, pneumonitis, lung fibrosis. Has been scheduled for 4D CT simulation and campus. Depending on tumor motion or location for treatment will be determined and need for respiratory motion management. [facility] technique [facility]. She is agreeable to the above outlined plan          Madyson Thomas MD  1/48/1007,0:12 PM      Portions of the record may have been created with voice recognition software. Occasional wrong word or "sound a like" substitutions may have occurred due to the inherent limitations of voice recognition software. Read the chart carefully and recognize, using context, where substitutions have occurred.

## 2023-08-28 NOTE — TELEPHONE ENCOUNTER
I called Taina Posey this afternoon and left a message on her phone to try and discuss her pathology from 2 weeks ago. I have asked her to call us back.     [employer]

## 2023-08-29 ENCOUNTER — HOME CARE VISIT (OUTPATIENT)
Dept: HOME HEALTH SERVICES | Facility: HOME HEALTHCARE | Age: 73
End: 2023-08-29
Payer: MEDICARE

## 2023-08-29 ENCOUNTER — TELEPHONE (OUTPATIENT)
Dept: VASCULAR SURGERY | Facility: CLINIC | Age: 73
End: 2023-08-29

## 2023-08-29 ENCOUNTER — PATIENT OUTREACH (OUTPATIENT)
Dept: CASE MANAGEMENT | Facility: HOSPITAL | Age: 73
End: 2023-08-29

## 2023-08-29 ENCOUNTER — TELEPHONE (OUTPATIENT)
Age: 73
End: 2023-08-29

## 2023-08-29 VITALS — SYSTOLIC BLOOD PRESSURE: 122 MMHG | HEART RATE: 77 BPM | OXYGEN SATURATION: 96 % | DIASTOLIC BLOOD PRESSURE: 62 MMHG

## 2023-08-29 VITALS
OXYGEN SATURATION: 96 % | RESPIRATION RATE: 18 BRPM | DIASTOLIC BLOOD PRESSURE: 60 MMHG | SYSTOLIC BLOOD PRESSURE: 122 MMHG | HEART RATE: 74 BPM

## 2023-08-29 VITALS — HEART RATE: 91 BPM | OXYGEN SATURATION: 93 %

## 2023-08-29 DIAGNOSIS — Z89.612 HX OF AKA (ABOVE KNEE AMPUTATION), LEFT (HCC): Primary | ICD-10-CM

## 2023-08-29 DIAGNOSIS — G62.9 PERIPHERAL NERVE DISORDER: Primary | ICD-10-CM

## 2023-08-29 PROCEDURE — G0152 HHCP-SERV OF OT,EA 15 MIN: HCPCS

## 2023-08-29 PROCEDURE — G0299 HHS/HOSPICE OF RN EA 15 MIN: HCPCS

## 2023-08-29 PROCEDURE — G0151 HHCP-SERV OF PT,EA 15 MIN: HCPCS

## 2023-08-29 RX ORDER — OXYCODONE HYDROCHLORIDE 5 MG/1
5 TABLET ORAL EVERY 8 HOURS PRN
Qty: 7 TABLET | Refills: 0 | Status: SHIPPED | OUTPATIENT
Start: 2023-08-29

## 2023-08-29 RX ORDER — GABAPENTIN 300 MG/1
300 CAPSULE ORAL
Qty: 90 CAPSULE | Refills: 0 | Status: SHIPPED | OUTPATIENT
Start: 2023-08-29 | End: 2023-11-27

## 2023-08-29 NOTE — TELEPHONE ENCOUNTER
Pt called requesting a script for oxycodone be sent to [facility] in [place]. Pt states that she is still having a lot of phantom limb pain. Only takes the oxycodone at bedtime to sleep. Pt takes gabapentin 300 mg, 2 tabs TID. Asked pt if she takes Tylenol. Pt reports taking Tylenol 500 mg 2 tabs every 2 hours. Educated pt that this is way  too much acetaminophen, and it can cause liver damage as well as other side effects. Pt states that she needs to take it because the pain is so bad. Routed to triage to advise. Next OV is scheduled for 9/5/23.

## 2023-08-29 NOTE — TELEPHONE ENCOUNTER
Spoke with pt. Read to her provider's response. Again educated pt regarding the maximum Tylenol limit/day. Pt responded, "I need to take what I need to take", and hung up the phone.

## 2023-08-29 NOTE — TELEPHONE ENCOUNTER
Caller:Patient    Doctor:Ameena    Reason for call; Patient requests that we renew her gabapentin (Neurontin) 300 mg capsule    Call back#: [phone]

## 2023-08-29 NOTE — TELEPHONE ENCOUNTER
Per my discussion with patient on 8/24/23, oxycodone is not best suited to treat her phantom limb pain. I will place a referral to pain management at this time. I will not be prescribing a refill for oxycodone at this time. Please reinforce that patient should only take up to 4g of Tylenol daily for pain.

## 2023-08-29 NOTE — PROGRESS NOTES
OSW attempted call to patient. Patient unable to talk now as her visiting nurse is about to come. Patient requesting call back around . LSW will reach out later.

## 2023-08-29 NOTE — TELEPHONE ENCOUNTER
Spoke with patient over the phone. Will provide her with 7 additional pills to help her through till her follow up appointment on 9/5/23. Again reiterated to not take anymore than 4g acetaminophen and to continue utilizing gabapentin as prescribed.

## 2023-08-31 ENCOUNTER — HOME CARE VISIT (OUTPATIENT)
Dept: HOME HEALTH SERVICES | Facility: HOME HEALTHCARE | Age: 73
End: 2023-08-31
Payer: MEDICARE

## 2023-08-31 ENCOUNTER — PATIENT OUTREACH (OUTPATIENT)
Dept: HEMATOLOGY ONCOLOGY | Facility: CLINIC | Age: 73
End: 2023-08-31

## 2023-08-31 VITALS
DIASTOLIC BLOOD PRESSURE: 64 MMHG | HEART RATE: 64 BPM | RESPIRATION RATE: 20 BRPM | OXYGEN SATURATION: 94 % | SYSTOLIC BLOOD PRESSURE: 122 MMHG

## 2023-08-31 PROCEDURE — G0299 HHS/HOSPICE OF RN EA 15 MIN: HCPCS

## 2023-08-31 NOTE — PROGRESS NOTES
Initial Outreach Attempt x1: Attempted to reach patient today to re-assess for any barriers to care and offer any supportive services needed. Pt just established with Radiation and scheduled to be SIMed on 9/6. Left VM in detail to give me a callback at my direct number [phone]. Otherwise, I will re-attempt to outreach at a later time/ date.

## 2023-09-01 ENCOUNTER — HOME CARE VISIT (OUTPATIENT)
Dept: HOME HEALTH SERVICES | Facility: HOME HEALTHCARE | Age: 73
End: 2023-09-01
Payer: MEDICARE

## 2023-09-01 VITALS — DIASTOLIC BLOOD PRESSURE: 70 MMHG | SYSTOLIC BLOOD PRESSURE: 122 MMHG | OXYGEN SATURATION: 95 % | HEART RATE: 71 BPM

## 2023-09-01 PROCEDURE — G0152 HHCP-SERV OF OT,EA 15 MIN: HCPCS

## 2023-09-01 NOTE — CASE COMMUNICATION
Pt DC from OT this day due to no further skilled OT need. Pt now has a tub transfer bench. Pt states she feels comfortable with transfers and does not anticipate having difficulty with bathing.   OTR recommended supervision for showers when cleared by MD.

## 2023-09-05 ENCOUNTER — TELEPHONE (OUTPATIENT)
Dept: VASCULAR SURGERY | Facility: CLINIC | Age: 73
End: 2023-09-05

## 2023-09-05 ENCOUNTER — OFFICE VISIT (OUTPATIENT)
Dept: VASCULAR SURGERY | Facility: CLINIC | Age: 73
End: 2023-09-05

## 2023-09-05 ENCOUNTER — HOME CARE VISIT (OUTPATIENT)
Dept: HOME HEALTH SERVICES | Facility: HOME HEALTHCARE | Age: 73
End: 2023-09-05
Payer: MEDICARE

## 2023-09-05 VITALS
RESPIRATION RATE: 20 BRPM | SYSTOLIC BLOOD PRESSURE: 120 MMHG | DIASTOLIC BLOOD PRESSURE: 68 MMHG | HEART RATE: 76 BPM | OXYGEN SATURATION: 92 %

## 2023-09-05 VITALS
HEART RATE: 74 BPM | HEIGHT: 64 IN | BODY MASS INDEX: 27.46 KG/M2 | OXYGEN SATURATION: 91 % | DIASTOLIC BLOOD PRESSURE: 68 MMHG | SYSTOLIC BLOOD PRESSURE: 142 MMHG

## 2023-09-05 DIAGNOSIS — Z89.612 S/P AKA (ABOVE KNEE AMPUTATION) UNILATERAL, LEFT (HCC): Primary | ICD-10-CM

## 2023-09-05 DIAGNOSIS — S78.112A UNILATERAL AKA, LEFT (HCC): ICD-10-CM

## 2023-09-05 PROCEDURE — 99024 POSTOP FOLLOW-UP VISIT: CPT | Performed by: SURGERY

## 2023-09-05 PROCEDURE — G0299 HHS/HOSPICE OF RN EA 15 MIN: HCPCS

## 2023-09-05 NOTE — ASSESSMENT & PLAN NOTE
Status post left AKA on 7/21/2023 by Dr. Roxanna Palmer. Patient presents to the office for an AKA stump recheck. Staples/sutures were previously removed. Steri-Strips were in place. The midportion of the stump with some overlapping skin edges with surrounding erythema. Patient reports the erythema has improved. She denies fevers/chills/rigors. I have instructed patient to keep her fingers away from incision site. Clean stump daily with soap/warm water and pat dry area. Keep stump covered with clean dry guaze. Please call office with any increased redness, fevers/chills or concerns. Return to office in 2 weeks (and sooner if needed) for a stump recheck.

## 2023-09-05 NOTE — PATIENT INSTRUCTIONS
Clean stump daily with soap and warm water. Pat dry the area and cover with dry clean gauze. Should you experience any fevers, chills, or worsening redness please call the office. Return to the office in 2 weeks for a stump recheck.

## 2023-09-05 NOTE — PROGRESS NOTES
Assessment/Plan:    Unilateral AKA, left (HCC)  Status post left AKA on 7/21/2023 by Dr. Shanae Gomez. Patient presents to the office for an AKA stump recheck. Staples/sutures were previously removed. Steri-Strips were in place. The midportion of the stump with some overlapping skin edges with surrounding erythema. Patient reports the erythema has improved. She denies fevers/chills/rigors. I have instructed patient to keep her fingers away from incision site. Clean stump daily with soap/warm water and pat dry area. Keep stump covered with clean dry guaze. Please call office with any increased redness, fevers/chills or concerns. Return to office in 2 weeks (and sooner if needed) for a stump recheck. There are no diagnoses linked to this encounter. Subjective:      Patient ID: Vinny Justice is a 68 y.o. female. Patient is here today for a 2 week wound recheck s/p a left AKA done 7/21/2023. Patient incision is draining at several places. On the right side of her incision there is a small opening. She denies any pain on most of the incision except on the right side where the small opening is. She c/o some phantom pain. Patient is taking Plavix, Zetia, and Rosuvastatin. Patient is a current smoker. Joel Ferris is a 61-year-old woman who underwent a left AKA on 7/21/2023. She presents to the office for a left AKA stump recheck. She denies any fevers chills or rigors. The following portions of the patient's history were reviewed and updated as appropriate: allergies, current medications, past family history, past medical history, past social history, past surgical history and problem list.    Review of Systems   Constitutional: Negative. HENT: Negative. Eyes: Negative. Respiratory: Negative. Cardiovascular: Negative. Gastrointestinal: Negative. Endocrine: Negative. Genitourinary: Negative. Musculoskeletal: Negative. Skin: Positive for wound.    Allergic/Immunologic: Negative. Neurological: Negative. Hematological: Negative. Psychiatric/Behavioral: Negative. Objective:      /68 (BP Location: Left arm, Patient Position: Sitting, Cuff Size: Standard)   Pulse 74   Ht 5' 4" (1.626 m)   SpO2 91%   BMI 27.46 kg/m²          Physical Exam      L AKA mid portion of "fish-mouth" flap with redundant skin and skin edge overlap. Erythema along incision site a this location is reportedly improved. I expressed my concern with this segment of flap. Should erythema worsen or develops any significant drainage may require exploration and possible VAC therapy. At this time will continue close observation. Patient to call with any concerns.   Return to office in 2 weeks (and sooner if needed) for a stump recheck

## 2023-09-06 ENCOUNTER — APPOINTMENT (OUTPATIENT)
Dept: RADIATION ONCOLOGY | Facility: HOSPITAL | Age: 73
End: 2023-09-06
Attending: RADIOLOGY
Payer: MEDICARE

## 2023-09-06 ENCOUNTER — HOME CARE VISIT (OUTPATIENT)
Dept: HOME HEALTH SERVICES | Facility: HOME HEALTHCARE | Age: 73
End: 2023-09-06
Payer: MEDICARE

## 2023-09-06 PROCEDURE — G0151 HHCP-SERV OF PT,EA 15 MIN: HCPCS

## 2023-09-06 PROCEDURE — 77334 RADIATION TREATMENT AID(S): CPT | Performed by: RADIOLOGY

## 2023-09-07 ENCOUNTER — PATIENT OUTREACH (OUTPATIENT)
Dept: HEMATOLOGY ONCOLOGY | Facility: CLINIC | Age: 73
End: 2023-09-07

## 2023-09-07 ENCOUNTER — HOME CARE VISIT (OUTPATIENT)
Dept: HOME HEALTH SERVICES | Facility: HOME HEALTHCARE | Age: 73
End: 2023-09-07
Payer: MEDICARE

## 2023-09-07 VITALS
OXYGEN SATURATION: 96 % | HEART RATE: 72 BPM | RESPIRATION RATE: 20 BRPM | DIASTOLIC BLOOD PRESSURE: 80 MMHG | SYSTOLIC BLOOD PRESSURE: 138 MMHG

## 2023-09-07 PROCEDURE — G0299 HHS/HOSPICE OF RN EA 15 MIN: HCPCS

## 2023-09-07 NOTE — PROGRESS NOTES
Initial Outreach Attempt x2: Attempted to reach patient today to re-assess for any barriers to care and offer any supportive services needed. Pt was [facility] yesterday 9/6, awaiting plan from radiation for patient to be scheduled. Will keep pt on my radar. Left VM in detail to give me a callback at my direct number [phone]. Otherwise, I will re-attempt to outreach at a later time/ date.

## 2023-09-08 DIAGNOSIS — G25.81 RESTLESS LEG SYNDROME: ICD-10-CM

## 2023-09-08 RX ORDER — ROPINIROLE 0.5 MG/1
0.5 TABLET, FILM COATED ORAL
Qty: 30 TABLET | Refills: 0 | Status: SHIPPED | OUTPATIENT
Start: 2023-09-08

## 2023-09-08 NOTE — TELEPHONE ENCOUNTER
"Hi, this is Blaine Ruiz calling from the [facility] order pharmacy for one refill for patient Beau Arredondo. MINDI YOB: 1939. Looking for the Losartan 50 milligram patient takes one daily looking for a 90 day supply. Our call back number [phone], fax number [phone]. Again this is the [facility] order pharmacy.  Thank you."

## 2023-09-13 ENCOUNTER — HOME CARE VISIT (OUTPATIENT)
Dept: HOME HEALTH SERVICES | Facility: HOME HEALTHCARE | Age: 73
End: 2023-09-13
Payer: MEDICARE

## 2023-09-13 ENCOUNTER — APPOINTMENT (OUTPATIENT)
Dept: RADIATION ONCOLOGY | Facility: CLINIC | Age: 73
End: 2023-09-13
Payer: MEDICARE

## 2023-09-13 VITALS
OXYGEN SATURATION: 96 % | SYSTOLIC BLOOD PRESSURE: 142 MMHG | DIASTOLIC BLOOD PRESSURE: 80 MMHG | RESPIRATION RATE: 20 BRPM | HEART RATE: 70 BPM

## 2023-09-13 PROCEDURE — 77293 RESPIRATOR MOTION MGMT SIMUL: CPT | Performed by: RADIOLOGY

## 2023-09-13 PROCEDURE — 77301 RADIOTHERAPY DOSE PLAN IMRT: CPT | Performed by: RADIOLOGY

## 2023-09-13 PROCEDURE — G0299 HHS/HOSPICE OF RN EA 15 MIN: HCPCS

## 2023-09-13 PROCEDURE — 77338 DESIGN MLC DEVICE FOR IMRT: CPT | Performed by: RADIOLOGY

## 2023-09-13 PROCEDURE — 77300 RADIATION THERAPY DOSE PLAN: CPT | Performed by: RADIOLOGY

## 2023-09-13 NOTE — CASE COMMUNICATION
Ship to XX Pt. Home   Insurance  AB     Wound 1 Left leg          Partial  XX  All items are ordered by the each unless otherwise noted.   PLEASE DO NOT ORDER BY THE BOX  Request specific quantity needed  For Private Insurances order should be for a 2 week period  For [facility] patients order should be for a 1 week period      Dry Dressings   (Nonsterile gauze not covered by private insurance)  (Sterile gauze may be requested for insurance ra ther than nonsterile gauze)  Gauze Micha stretch roll 4inch n/s 12 rolls per unit  [id number]        1  ABD 5x9 [id number]      1 box    Betadine  Swabs        20    Miscellaneous Wound Products  4in Ace [id number]           1

## 2023-09-14 PROCEDURE — 77427 RADIATION TX MANAGEMENT X5: CPT | Performed by: RADIOLOGY

## 2023-09-14 PROCEDURE — 77014 CHG CT GUIDANCE RADIATION THERAPY FLDS PLACEMENT: CPT | Performed by: RADIOLOGY

## 2023-09-14 PROCEDURE — 77386 HB NTSTY MODUL RAD TX DLVR CPLX: CPT | Performed by: RADIOLOGY

## 2023-09-18 ENCOUNTER — APPOINTMENT (OUTPATIENT)
Dept: RADIATION ONCOLOGY | Facility: CLINIC | Age: 73
End: 2023-09-18
Attending: RADIOLOGY
Payer: MEDICARE

## 2023-09-18 PROCEDURE — 77014 CHG CT GUIDANCE RADIATION THERAPY FLDS PLACEMENT: CPT | Performed by: RADIOLOGY

## 2023-09-18 PROCEDURE — 77386 HB NTSTY MODUL RAD TX DLVR CPLX: CPT | Performed by: RADIOLOGY

## 2023-09-20 ENCOUNTER — OFFICE VISIT (OUTPATIENT)
Dept: PODIATRY | Facility: CLINIC | Age: 73
End: 2023-09-20
Payer: MEDICARE

## 2023-09-20 ENCOUNTER — HOME CARE VISIT (OUTPATIENT)
Dept: HOME HEALTH SERVICES | Facility: HOME HEALTHCARE | Age: 73
End: 2023-09-20
Payer: MEDICARE

## 2023-09-20 VITALS
DIASTOLIC BLOOD PRESSURE: 75 MMHG | BODY MASS INDEX: 25.1 KG/M2 | HEART RATE: 73 BPM | HEIGHT: 64 IN | WEIGHT: 147 LBS | SYSTOLIC BLOOD PRESSURE: 126 MMHG

## 2023-09-20 VITALS
SYSTOLIC BLOOD PRESSURE: 142 MMHG | RESPIRATION RATE: 18 BRPM | HEART RATE: 81 BPM | OXYGEN SATURATION: 96 % | DIASTOLIC BLOOD PRESSURE: 72 MMHG

## 2023-09-20 DIAGNOSIS — S78.112A ABOVE KNEE AMPUTATION OF LEFT LOWER EXTREMITY (HCC): ICD-10-CM

## 2023-09-20 DIAGNOSIS — G62.9 PERIPHERAL NERVE DISORDER: Primary | ICD-10-CM

## 2023-09-20 PROCEDURE — G0299 HHS/HOSPICE OF RN EA 15 MIN: HCPCS

## 2023-09-20 PROCEDURE — 77014 CHG CT GUIDANCE RADIATION THERAPY FLDS PLACEMENT: CPT | Performed by: INTERNAL MEDICINE

## 2023-09-20 PROCEDURE — 99212 OFFICE O/P EST SF 10 MIN: CPT | Performed by: PODIATRIST

## 2023-09-20 PROCEDURE — 77386 HB NTSTY MODUL RAD TX DLVR CPLX: CPT | Performed by: INTERNAL MEDICINE

## 2023-09-20 RX ORDER — GABAPENTIN 300 MG/1
300 CAPSULE ORAL 3 TIMES DAILY
Qty: 270 CAPSULE | Refills: 0 | Status: SHIPPED | OUTPATIENT
Start: 2023-09-20 | End: 2023-12-19

## 2023-09-20 NOTE — PROGRESS NOTES
Patient presents for pedal assessment. At last visit, patient had severe pain due to left leg ischemia. She was referred to vascular surgery and an above-knee amputation was performed. Patient dealing with significant phantom pain. She desires an increase in the gabapentin dosage due to this pain. A 90-day supply of gabapentin 300 mg 3 times daily was prescribed. Patient will be rescheduled in 3 months. Trimmed elongated toenails right foot.

## 2023-09-22 PROCEDURE — 77386 HB NTSTY MODUL RAD TX DLVR CPLX: CPT | Performed by: RADIOLOGY

## 2023-09-22 PROCEDURE — 77014 CHG CT GUIDANCE RADIATION THERAPY FLDS PLACEMENT: CPT | Performed by: RADIOLOGY

## 2023-09-22 NOTE — PROGRESS NOTES
Hematology/Oncology Outpatient Office Note    Date of Service: 10/2/2023    [facility] HEMATOLOGY ONCOLOGY SPECIALISTS ANA MARIA Ferrari [facility]  [phone]    Reason for Consultation:   Chief Complaint   Patient presents with   • Follow-up       Cancer Stage at diagnosis: at least 1A2     Referral Physician: No ref. provider found    Primary Care Physician:  Van Porras DO     Nickname: Bhupendra Lawler lives with her: Joan Diaz ECOG: 3    Today's ECOG: 3 (sits more than half the day due to PAD)    Goals and Barriers:  Current Goal: Minimize effects of disease burden, extend life. Barriers to accomplishing this: pain when she walks in her calf/feet    Patient's Capacity to Self Care:  Patient is able to self care      ASSESSMENT & PLAN      Diagnosis ICD-10-CM Associated Orders   1. Non-small cell cancer of right lung [facility])  C34.91             This is a 68 y.o. c PMHx notable for CAD s/p MI/CABG in 2000, COURTNEY, PAD with stents, paroxysmal A-fib, HTN, CVA, IVC filter hx, renal artery stenosis, being seen in consultation for R Lung SCC s/p SBRT        Discussion of decision making  • Oncology history updated, accordingly, during this visit  • Goals of care/patient communication  o I discussed with the patient the clinical course leading up to their cancer diagnosis. I reviewed relevant office notes, imaging reports and pathology result as well.  o I told the patient that this is a case of curable disease and what this means. We discussed that the goal of anti-cancer therapy is to provide best quality of life, extend overall survival, and progression free survival as shown in clinical trials.  We also discussed that there might be a point when the cancer will no longer respond to this anti-neoplastic therapy. [id number]  • TNM/Staging At Diagnosis  o [id number]  Cancer Staging   1A2 (clinical)  • Disease Features/Tumor Markers/Genetics  o Tumor Marker: n/a  o Notable Path Features:   8/14/2023 Lung, Right Upper Lobe, RUL posterior segment endobronchial biopsy:Non- small cell carcinoma , immunohistochemically consistent with squamous cell carcinoma   - Treatment: s/p SBRT  • Other Supportive care:   • Treatment Team Members:  o Surgeon: Dr. Joan Fay: Dr. Fermin Hunter  5/8/2023 MRI Brain w/wo c: Moderate, chronic microangiopathy. Large left mastoid air cell effusion. Trace right mastoid air cell effusion. No acute infarction, intracranial mass or mass. No cerebellopontine angle cistern mass lesion. Normal appearing bilateral 7th or 8th cranial nerve complexes  7/7/2023 PET/CT: Marked increased metabolic activity in association with right upper lobe nodule highly suggestive for neoplasm. No evidence for thoracic adenopathy or metastatic disease to the abdomen or pelvis. Right renal atrophy and multiple cystic renal lesions. Likely nonfunctioning right kidney. 2 mm right upper lobe nodule series series 4/52 is unchanged. . 1.8 x 1.9 cm nodule in the right upper lobe associated with occlusion of posterior segment right upper lobe bronchus SUV 22    Discussion of decision making    I personally reviewed the case with Dr. Hema Nazario and the following lab results, the image studies, pathology, other specialty/physicians consult notes and recommendations, and outside medical records. I had a lengthy discussion with the patient and shared the work-up findings. We discussed the diagnosis and management plan as below. I spent 35 minutes reviewing the records (labs, clinician notes, outside records, medical history, ordering medicine/tests/procedures, interpreting the imaging/labs previously done) and coordination of care as well as direct time with the patient today, of which greater than 50% of the time was spent in counseling and coordination of care with the patient/family.       · Plan/Labs  · F/u Rad Onc for post SBRT care  · CT CAP w/c scheduled 12/2/2023  · Should she be found to be stage 1B or greater, then would get EGFR to determine if adjuvant anti-EGFR therapy is indicated  · Should she be found to unexpectedly be Stage II or higher, then would assess her for adjuvant chemotherapy        Follow Up: 6 months for surveillance    All questions were answered to the patient's satisfaction during this encounter. The patient knows the contact information for our office and knows to reach out for any relevant concerns related to this encounter. They are to call for any temperature 100.4 or higher, new symptoms including but not restricted to shaking chills, decreased appetite, nausea, vomiting, diarrhea, increased fatigue, shortness of breath or chest pain, confusion, and not feeling the strength to come to the clinic. For all other listed problems and medical diagnosis in their chart - they are managed by PCP and/or other specialists, which the patient acknowledges. Thank you very much for your consultation and making us a part of this patient's care. We are continuing to follow closely with you. Please do not hesitate to reach out to me with any additional questions or concerns. Quan Choi MD  Hematology & Medical Oncology Staff Physician             Disclaimer: This document was prepared using [employer] Direct technology. If a word or phrase is confusing, or does not make sense, this is likely due to recognition error which was not discovered during this clinician's review. If you believe an error has occurred, please contact me through [address] [facility] line for breanna? cation. ONCOLOGY HISTORY OF PRESENT ILLNESS        Oncology History   SCC (squamous cell carcinoma of lung) ([address])   8/14/2023 Biopsy    A.  Lung, Right Upper Lobe, RUL posterior segment endobronchial biopsy:  -Non- small cell carcinoma , immunohistochemically consistent with squamous cell carcinoma      Non-small cell cancer of right lung ([address])   8/14/2023 Initial Diagnosis    Non-small cell cancer of right lung ([address])     8/28/2023 -  Cancer Staged    Staging form: Lung, AJCC 8th Edition  - Clinical: Stage IA2 (cT1b, cN0, cM0) - Signed by Bonnie Arredondo MD on 8/28/2023             SUBJECTIVE  (INTERVAL HISTORY)      Clotting History Arterial disease related thrombi   Bleeding History None   Cancer History Lung mass   Family Cancer History Mom (gastric), sister (blood cancer)   H/O Blood/Plt Transfusion None   Tobacco/etoh/drug abuse 1 PPD x 52 years (since age 22), no etoh abuse or rec drug use       Cancer Screening history C-scope (polyps removed, due again 3 years)   Occupation Retired from cleaning work         Pain: L knee is stable      I have reviewed the relevant past medical, surgical, social and family history. I have also reviewed allergies and medications for this patient. Review of Systems    Baseline weight: 165-170 lbs    Can get a shaky feeling intermittently. Gets SOB lying down on her back (sleeps on 3 pillows). Chest palpitations is daily. Chronic fatigue. Denies F/C, N/V, CP, LH, HA, rash, itching, gen weakness, melena, hematuria, hematochezia, falls, diarrhea, or constipation at this time. Sometimes she can have days with: Nausea, chest pain or pressure, lightheadedness, brain fog, migraines, high or low blood pressure, tremors, poor sleep, muscle weakness, cold in her extremities, light or sound intolerance, abnormal sweating, increased or decreased urination, incomplete bladder emptying, blurred vision, poor concentration and memory, bloating, trouble swallowing, dry mouth, poor balance, high or low blood pressure, exercise intolerance, abdominal discomfort, joint pain or stiffness       A 10-point review of system was performed, pertinent positive and negative were detailed as above. Otherwise, the 10-point review of system was negative.       Past Medical History:   Diagnosis Date   • Aorto-iliac disease ([address])    • Atrial fibrillation [facility])    • CAD (coronary artery disease)    • Carotid stenosis, bilateral    • Celiac artery stenosis (HCC)    • CKD (chronic kidney disease) stage 3, GFR 30-59 ml/min (HCC)    • DVT (deep venous thrombosis) (HCC)     LLE (CFV, popl)   • Heart attack ([address]) 02/2022   • HLD (hyperlipidemia)    • Hypertension    • Lung cancer ([facility])    • Lung mass     RUL   • Myocardial infarction [facility])     2022   • PAD (peripheral artery disease) ([facility])    • Stroke [facility])        Past Surgical History:   Procedure Laterality Date   • AORTA - FEMORAL ARTERY BYPASS GRAFT Left    • AXILLO-FEMORAL BYASS GRAFT Bilateral    • BYPASS FEMORAL-FEMORAL     • COLONOSCOPY     • CORONARY ARTERY BYPASS GRAFT  2000   • ENDOBRONCHIAL ULTRASOUND (EBUS) N/A 8/14/2023    Procedure: ENDOBRONCHIAL ULTRASOUND (EBUS) tissue biopsy;  Surgeon: Benjie Haji MD;  [facility];  Service: Thoracic   • FEMORAL ARTERY - POPLITEAL ARTERY BYPASS GRAFT Bilateral    • IVC FILTER INSERTION     • [place] AMPUTATION THIGH THROUGH FEMUR ANY LEVEL Left 07/21/2023    Procedure: AMPUTATION ABOVE KNEE (AKA); Surgeon: Liliana Colon MD;  Location: [facility];  Service: Vascular   •  [address] INCL FLUOR GDNCE DX W/CELL WASHG [facility] N/A 8/14/2023    Procedure: Elma Sheets;  Surgeon: Benjie Haji MD;  Location: [facility];  Service: Thoracic   • TOTAL ABDOMINAL HYSTERECTOMY W/ BILATERAL SALPINGOOPHORECTOMY     • TOTAL HIP ARTHROPLASTY Left        Family History   Problem Relation Age of Onset   • Leukemia Mother 80   • Cancer Sister        Social History     Socioeconomic History   • Marital status:       Spouse name: Not on file   • Number of children: Not on file   • Years of education: Not on file   • Highest education level: Not on file   Occupational History   • Not on file   Tobacco Use   • Smoking status: Every Day     Packs/day: 1.00     Years: 50.00     Total pack years: 50.00     Types: Cigarettes     Start date: 5   • Smokeless tobacco: Never Vaping Use   • Vaping Use: Never used   Substance and Sexual Activity   • Alcohol use: Not Currently     Alcohol/week: 50.0 standard drinks of alcohol     Types: 50 Cans of beer per week     Comment: no ETOH for 1.5 years   • Drug use: Never   • Sexual activity: Not Currently     Partners: Male   Other Topics Concern   • Not on file   Social History Narrative   • Not on file     Social Determinants of Health     Financial Resource Strain: Not on file   Food Insecurity: No Food Insecurity (7/19/2023)    Hunger Vital Sign    • Worried About Running Out of Food in the Last Year: Never true    • Ran Out of Food in the Last Year: Never true   Transportation Needs: No Transportation Needs (7/19/2023)    PRAPARE - Transportation    • Lack of Transportation (Medical): No    • Lack of Transportation (Non-Medical):  No   Physical Activity: Not on file   Stress: Not on file   Social Connections: Not on file   Intimate Partner Violence: Not on file   Housing Stability: Low Risk  (7/19/2023)    Housing Stability Vital Sign    • Unable to Pay for Housing in the Last Year: No    • Number of Places Lived in the Last Year: 1    • Unstable Housing in the Last Year: No       No Known Allergies    Current Outpatient Medications   Medication Sig Dispense Refill   • acetaminophen (TYLENOL) 325 mg tablet Take 3 tablets (975 mg total) by mouth every 8 (eight) hours (Patient taking differently: Take 975 mg by mouth if needed)     • albuterol (ProAir HFA) 90 mcg/act inhaler Inhale 2 puffs every 6 (six) hours as needed for wheezing 8.5 g 2   • clopidogrel (PLAVIX) 75 mg tablet Take 1 tablet (75 mg total) by mouth in the morning Do not start before August 15, 2023. 90 tablet 0   • diltiazem (CARDIZEM) 120 MG tablet Take 1 tablet (120 mg total) by mouth in the morning 30 tablet 3   • ezetimibe (ZETIA) 10 mg tablet Take 1 tablet (10 mg total) by mouth daily 30 tablet 5   • Folic Acid 0.8 MG CAPS Take 1 capsule (0.8 mg total) by mouth in the morning 90 capsule 1   • gabapentin (Neurontin) 300 mg capsule Take 2 capsules (600 mg total) by mouth 3 (three) times a day 90 capsule 0   • gabapentin (Neurontin) 300 mg capsule Take 1 capsule (300 mg total) by mouth daily at bedtime 90 capsule 0   • gabapentin (Neurontin) 300 mg capsule Take 1 capsule (300 mg total) by mouth 3 (three) times a day 270 capsule 0   • isosorbide mononitrate (IMDUR) 30 mg 24 hr tablet Take 30 mg by mouth every morning     • lamoTRIgine (LaMICtal) 100 mg tablet Take 1 tablet (100 mg total) by mouth daily 90 tablet 1   • lisinopril (ZESTRIL) 10 mg tablet take 1 tablet by mouth once daily 30 tablet 5   • Melatonin 5 MG TABS Take 4 tablets (20 mg total) by mouth daily at bedtime as needed (insomnia) Patient states she takes 20MG     • metoprolol succinate (TOPROL-XL) 50 mg 24 hr tablet Take 1 tablet (50 mg total) by mouth daily 30 tablet 3   • oxyCODONE (Roxicodone) 5 immediate release tablet Take 1 tablet (5 mg total) by mouth every 8 (eight) hours as needed for severe pain Max Daily Amount: 15 mg 7 tablet 0   • oxyCODONE (Roxicodone) 5 immediate release tablet Take 1 tablet (5 mg total) by mouth daily 1 tab daily with dressing changes for severe pain. Max Daily Amount: 5 mg 7 tablet 0   • rOPINIRole (REQUIP) 0.5 mg tablet Take 1 tablet (0.5 mg total) by mouth daily at bedtime 30 tablet 0   • rosuvastatin (CRESTOR) 20 MG tablet Take 20 mg by mouth daily at bedtime     • saccharomyces boulardii (FLORASTOR) 250 mg capsule Take 1 capsule (250 mg total) by mouth 2 (two) times a day 60 capsule 0   • traZODone (DESYREL) 50 mg tablet Take 1 tablet (50 mg total) by mouth daily at bedtime as needed for sleep 30 tablet 0     No current facility-administered medications for this visit.        (Not in a hospital admission)      Objective:     24 Hour Vitals Assessment:     Vitals:    10/02/23 1259   BP: 140/74   Pulse: 67   Resp: 18   Temp: (!) 97.2 °F (36.2 °C)   SpO2: 94%       PHYSICIAN EXAM:    General: Appearance: alert, cooperative, no distress. HEENT: Normocephalic, atraumatic. No scleral icterus. conjunctivae clear. EOMI. Chest: No tenderness to palpation. No open wound noted. Lungs: Clear to auscultation bilaterally, Respirations unlabored. Cardiac: Regular rate and rhythm, +S1and S2  Abdomen: Soft, non-tender, non-distended. Bowel sounds are normal.  Extremities:  No edema, cyanosis, clubbing. Skin: Skin color, turgor are normal. No rashes. Lymphatics: no palpable supra-cervical, axillary, or inguinal adenopathy  Neurologic: Awake, Alert, and oriented, no gross focal deficits noted b/l. DATA REVIEW:    Pathology Result:    Final Diagnosis   Date Value Ref Range Status   08/14/2023   Final    A-B. Lymph Node, level 7 (ThinPrep and smear preparations): Atypical cellular changes  Rare atypical cells  Background of ciliated bronchial cells and lymphocytes seen. Satisfactory for evaluation. C-D. Lymph Node, 11L (ThinPrep and smear preparations):  Negative for Malignancy   Ciliated bronchial cells   Squamous cells seen. Benign appearing lymphocytes seen. Satisfactory for evaluation. 08/14/2023   Final    A. Lung, Right Upper Lobe, RUL posterior segment endobronchial biopsy:  -Non- small cell carcinoma , immunohistochemically consistent with squamous cell carcinoma     Note:  Tumor cells are  positive for CKC,  squamous markers (P40, CK5/6), Ki-67 % (>50%) and  negative for  CK7, TTF-1, Napsin synaptophysin,  chromogranin, CK20, Controls reacted appropriately . This staining pattern supports the above diagnosis. It is not clear if this biopsy represent In situ or invasive carcinoma . Clinical & radiologic correlation is recommended. Block A1  sent to [facility] [employer] [employer] for [address], The results will follow under a separate cover           Comment: This is an appended report.  These results have been appended to a previously preliminary verified report.   07/21/2023   Final    LOWER EXTREMITY, LEFT, ABOVE-THE-KNEE AMPUTATION:    - Amputated lower leg, above the knee with areas of skin discoloration and purpura as described in the gross description.     - Gross examination only; see "Gross Description" for further characterization; histologic / morphologic examination can be requested, if clinically indicated. 05/11/2023   Final    A. Large Intestine, Cecum, polyp:  - Tubular adenoma. - Negative for high-grade dysplasia and malignancy. B. Large Intestine, Right/Ascending Colon, polyp:  - Tubular adenoma. - Negative for high-grade dysplasia and malignancy. C. Large Intestine, Right/Ascending Colon, polypoid tissue:  - Polypoid colonic mucosa with no significant histopathologic abnormality.  - Negative for dysplasia and malignancy. D. Large Intestine, Transverse Colon, lipoma biopsy:  - Polypoid colonic mucosa with no significant histopathologic abnormality.  - Negative for dysplasia and malignancy. E. Large Intestine, Left/Descending Colon, polyps x 4:  - Multiple fragments of tubular adenoma. - Negative for high-grade dysplasia and malignancy. F. Large Intestine, Sigmoid Colon, polyp:  - Tubular adenoma. - Negative for high-grade dysplasia and malignancy. Image Results:   Image result are reviewed and documented in Hematology/Oncology history    XR chest portable  Narrative: CHEST    INDICATION:   post op EBUS. COMPARISON: Chest radiograph 7/23/2023. EXAM PERFORMED/VIEWS:  XR CHEST PORTABLE    FINDINGS:    Again noted is prominence of the right hilum. Stable heart size. The lungs are clear. No pneumothorax or pleural effusion. Osseous structures appear within normal limits for patient age. Impression: No acute cardiopulmonary disease. Again noted is prominence of the right hilum. Workstation performed: [id number]      LABS:  Lab data are reviewed and documented in HemOnc history.        Lab Results   Component Value Date    HGB 12.0 08/07/2023    HCT 36.8 08/07/2023    MCV 93 08/07/2023     08/07/2023    WBC 8.88 08/07/2023    NRBC 0 08/07/2023     Lab Results   Component Value Date    K 5.0 08/07/2023     (H) 08/07/2023    CO2 25 08/07/2023    BUN 17 08/07/2023    CREATININE 0.90 08/07/2023    GLUF 91 08/05/2023    CALCIUM 9.3 08/07/2023    CORRECTEDCA 10.1 11/14/2022    AST 28 07/18/2023    ALT 25 07/18/2023    ALKPHOS 75 07/18/2023    EGFR 64 08/07/2023       No results found for: "IRON", "TIBC", "FERRITIN"    No results found for: "Foye Tommy"    No results for input(s): "WBC", "CREAT", "PLT" in the last 72 hours.     By:  Braden Ramos MD, 10/2/2023, 1:18 PM

## 2023-09-25 ENCOUNTER — APPOINTMENT (OUTPATIENT)
Dept: RADIATION ONCOLOGY | Facility: CLINIC | Age: 73
End: 2023-09-25
Attending: RADIOLOGY
Payer: MEDICARE

## 2023-09-25 DIAGNOSIS — G47.00 INSOMNIA, UNSPECIFIED TYPE: ICD-10-CM

## 2023-09-25 PROCEDURE — 77014 CHG CT GUIDANCE RADIATION THERAPY FLDS PLACEMENT: CPT | Performed by: RADIOLOGY

## 2023-09-25 PROCEDURE — 77336 RADIATION PHYSICS CONSULT: CPT | Performed by: RADIOLOGY

## 2023-09-25 PROCEDURE — 77386 HB NTSTY MODUL RAD TX DLVR CPLX: CPT | Performed by: RADIOLOGY

## 2023-09-25 RX ORDER — TRAZODONE HYDROCHLORIDE 50 MG/1
50 TABLET ORAL
Qty: 30 TABLET | Refills: 0 | Status: SHIPPED | OUTPATIENT
Start: 2023-09-25

## 2023-09-25 NOTE — TELEPHONE ENCOUNTER
Received fax thru [facility] for med refill    Trazodone 50 mg tab  Take 1 tab @   #30  Alvaro JOE 8/16/23  Last refill 8/26/23

## 2023-09-26 ENCOUNTER — PATIENT OUTREACH (OUTPATIENT)
Dept: CASE MANAGEMENT | Facility: HOSPITAL | Age: 73
End: 2023-09-26

## 2023-09-26 ENCOUNTER — TELEPHONE (OUTPATIENT)
Dept: VASCULAR SURGERY | Facility: CLINIC | Age: 73
End: 2023-09-26

## 2023-09-26 ENCOUNTER — OFFICE VISIT (OUTPATIENT)
Dept: VASCULAR SURGERY | Facility: CLINIC | Age: 73
End: 2023-09-26

## 2023-09-26 VITALS
OXYGEN SATURATION: 94 % | BODY MASS INDEX: 25.23 KG/M2 | HEIGHT: 64 IN | SYSTOLIC BLOOD PRESSURE: 142 MMHG | HEART RATE: 82 BPM | DIASTOLIC BLOOD PRESSURE: 84 MMHG

## 2023-09-26 DIAGNOSIS — Z89.612 STATUS POST ABOVE-KNEE AMPUTATION OF LEFT LOWER EXTREMITY (HCC): Primary | ICD-10-CM

## 2023-09-26 PROBLEM — S78.112A UNILATERAL AKA, LEFT (HCC): Status: RESOLVED | Noted: 2023-09-05 | Resolved: 2023-09-26

## 2023-09-26 PROCEDURE — 99024 POSTOP FOLLOW-UP VISIT: CPT

## 2023-09-26 NOTE — TELEPHONE ENCOUNTER
Brandon Herrera, please bring pt back in 1wk for stump recheck. Pt has radiation on M-W-F. Thank you.

## 2023-09-26 NOTE — PROGRESS NOTES
Biopsychosocial and Barriers Assessment    Cancer Diagnosis: NSCLC of right lung  Home/Cell Phone: [phone]  Emergency Contact: sonNathan  Marital Status:   Interpretation concerns, speaks another language, preferred language: English  Cultural concerns: none  Ability to read or write: Patient reported that although she can read, she can't do so now because she has lost the ability to focus. Caregiver/Support: son, daughter-in-law and 5 grandchildren, sister  Children: Nathan rosen   Child/Elder care: none    Housing:  house  Home Setup: 2 floors. Patient reported that she can get up the steps. Lives With: son, daughter-in-law and 5 grandchildren   Daily Living Activities: independent   Durable Medical Equipment: walker, w/c scooter  Ambulation: walks with a walker    Preferred Pharmacy: [facility]  High co-pays with insurance: patient reported that she has an over $3000 bill from testing/surgery. LSW sent email to hospital finance team for [address] with medication coverage: none at this time  No medication coverage: has coverage    Primary Care Provider: Dr. Tamara Castillo  Hx of [address] [place] [address] St: has home health care currently  Hx of Short term rehab: yes  Mental Health Hx: depression/anxiety  Substance Abuse Hx:  Quit drinking 1.5 years ago.  Patient does still smoke  Employment: not working   [place] Status/Location: no  Ability to pay bills: yes  POA/LW/AD: has document, will bring into providers office  Transportation Plan/Concerns:grandson takes her to appointments       What do you know about your Cancer Diagnosis    What has your doctor told you about your cancer diagnosis:  NSCLC of Lung  What has your doctor told you about your cancer treatment:  Few more radiation treatments and believes that she will be done  What specific concerns do you have about your diagnosis and treatment:  None reported  Have you been made aware of any hair loss associated with treatment: did not discuss    Additional Comments:  OSW completed assessment and DT with patient. Patient self-rated DT at 5/10. Patient reported that she has pain where her wound is from the amputation and sometimes that keeps her up. She also has neuropathic pain. Patient reported that recent medication adjustment has helped with some of this pain. Patient does use tobacco however has stopped drinking ETOH 1.5 years ago. Patient reported that in the last 5 years she has had many health struggles; stroke 2019, 2 surgeries on her leg, amputation of leg, heart attack and COVID twice. Patient does believe that she has [place] Chiles which has contributed to many of her symptoms. Patient is interested in therapy and/or psychiatry services. [facility] but didn't feel that the therapy was helpful. Patient reported that she would be interested in zoom appointments right now as it's very difficult for her to get out for appointments. [facility] will send information regarding the [employer] [address] and [employer] card. [facility] will also send listing of local therapists who take Medicare for patient to reach out to.

## 2023-09-26 NOTE — ASSESSMENT & PLAN NOTE
Patient is currently denying any fever or chills. She reports improvement in redness and tenderness around left AKA incision. Patient has been washing incision daily with soap and water and alternating between Betadine and peroxide application. She reports continued phantom limb pain. A referral to pain management was placed several weeks ago however patient reports that she has no intention of seeing pain management. She reports that her pain is adequately managed with as needed Tylenol and scheduled gabapentin at this time. Minimal erythema noted around incision. Improved swelling around incision. 3 punctate areas of tunneling noted along middle of incision, 2 areas appear to be communicating and connected via skin bridge. Skin bridge divided between 2 areas of tunneling, wound irrigated with saline and packed with iodoform packing. Third area of tunneling near the lateral aspect of incision irrigated with saline and packed with small amount of iodoform packing. Entire incision covered with dry gauze and ABD. Plan:  -Slow healing of left AKA incision, however adequate bleeding noted with wound care today. Incision does not appear to be acutely infected at this time.  -Relationship between continued smoking and delayed wound healing discussed with patient at length.  -Patient and grandson instructed on daily wound care. Instructed to continue washing incision daily with soap and water, pack open areas with iodoform packing, cover with gauze and ABD. Change daily.  -Patient instructed to notify office should she develop any fever, chills, warmth, erythema, or purulent drainage from incision. .  -Return to the office in 1 week for wound recheck.

## 2023-09-26 NOTE — PROGRESS NOTES
Assessment/Plan:    Status post above-knee amputation of left lower extremity ([address])  Patient is currently denying any fever or chills. She reports improvement in redness and tenderness around left AKA incision. Patient has been washing incision daily with soap and water and alternating between Betadine and peroxide application. She reports continued phantom limb pain. A referral to pain management was placed several weeks ago however patient reports that she has no intention of seeing pain management. She reports that her pain is adequately managed with as needed Tylenol and scheduled gabapentin at this time. Minimal erythema noted around incision. Improved swelling around incision. 3 punctate areas of tunneling noted along middle of incision, 2 areas appear to be communicating and connected via skin bridge. Skin bridge divided between 2 areas of tunneling, wound irrigated with saline and packed with iodoform packing. Third area of tunneling near the lateral aspect of incision irrigated with saline and packed with small amount of iodoform packing. Entire incision covered with dry gauze and ABD. Plan:  -Slow healing of left AKA incision, however adequate bleeding noted with wound care today. Incision does not appear to be acutely infected at this time.  -Relationship between continued smoking and delayed wound healing discussed with patient at length.  -Patient and grandson instructed on daily wound care. Instructed to continue washing incision daily with soap and water, pack open areas with iodoform packing, cover with gauze and ABD. Change daily.  -Patient instructed to notify office should she develop any fever, chills, warmth, erythema, or purulent drainage from incision. .  -Return to the office in 1 week for wound recheck.        Diagnoses and all orders for this visit:    Status post above-knee amputation of left lower extremity ([address])        Subjective:      Patient ID: Ana Sidhu is a 68 y.o. female. Patient is a 68year old female, current smoker, with PMH NSCLC, CAD s/p CABG, HTN, paroxysmal atrial fibrillation, HLD, CKD 3, and PAD. Patient has significant history of vascular intervention at outside facilities including fem-fem bypass s/p occlusion, right fem to BK pop bypass s/p occlusion (2010), bilateral iliac and femoral artery angioplasty and stenting (2014), and right axillary to bi-fem bypass (2017). Patient is now s/p L AKA 7/21/23 Donia Bence) and is presenting to the office today for incision recheck. Patient is currently denying any fever or chills. She reports improvement in redness and tenderness around left AKA incision. Patient has been washing incision daily with soap and water and alternating between Betadine and peroxide application. She reports continued phantom limb pain. A referral to pain management was placed several weeks ago however patient reports that she has no intention of seeing pain management. She reports that her pain is adequately managed with as needed Tylenol and scheduled gabapentin at this time. Patient continues to smoke daily. She is currently medically optimized on Plavix, Zetia, and rosuvastatin. The following portions of the patient's history were reviewed and updated as appropriate: allergies, current medications, past family history, past medical history, past social history, past surgical history and problem list.    Review of Systems   Constitutional: Negative. Negative for activity change, chills and fever. HENT: Negative. Eyes: Negative. Respiratory: Negative. Negative for shortness of breath. Cardiovascular: Negative. Negative for chest pain and leg swelling. Endocrine: Negative. Genitourinary: Negative. Musculoskeletal: Positive for back pain. Skin: Positive for wound. Allergic/Immunologic: Negative. Neurological: Positive for dizziness and headaches. Hematological: Negative. Psychiatric/Behavioral: Negative. Objective:    /84 (BP Location: Left arm, Patient Position: Sitting, Cuff Size: Adult)   Pulse 82   Ht 5' 4" (1.626 m)   SpO2 94%   BMI 25.23 kg/m²      Physical Exam  Constitutional:       General: She is not in acute distress. Appearance: Normal appearance. HENT:      Head: Normocephalic and atraumatic. Cardiovascular:      Rate and Rhythm: Normal rate and regular rhythm. Pulmonary:      Effort: Pulmonary effort is normal. No respiratory distress. Musculoskeletal:         General: No tenderness. Right lower leg: No edema. Left lower leg: Edema (trace L AKA stump) present. Left Lower Extremity: Left leg is amputated above knee. Skin:     General: Skin is warm and dry. Capillary Refill: Capillary refill takes less than 2 seconds. Findings: Wound present. No erythema. Neurological:      General: No focal deficit present. Mental Status: She is alert and oriented to person, place, and time. Psychiatric:         Mood and Affect: Mood normal.         Behavior: Behavior normal.       I have reviewed and made appropriate changes to the review of systems input by the medical assistant.     Vitals:    09/26/23 1333   BP: 142/84   BP Location: Left arm   Patient Position: Sitting   Cuff Size: Adult   Pulse: 82   SpO2: 94%   Height: 5' 4" (1.626 m)       Patient Active Problem List   Diagnosis   • Bipolar affective disorder, depressed, severe ([facility])   • CAD (coronary artery disease)   • Essential hypertension   • Hx of CABG   • S/P vascular bypass   • Thyroid nodule   • Renal artery stenosis ([facility])   • Presence of IVC filter   • PAD (peripheral artery disease) ([facility])   • Mixed hyperlipidemia   • Aortoiliac occlusive disease ([facility])   • Tobacco abuse   • PAF (paroxysmal atrial fibrillation) ([facility])   • Stage 3 chronic kidney disease, unspecified whether stage 3a or 3b CKD ([facility])   • Dizziness   • Insomnia   • Lung mass   • At risk for venous thromboembolism (VTE)   • Pain   • 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)   • SCC (squamous cell carcinoma of lung) ([facility])   • Non-small cell cancer of right lung ([facility])       Past Surgical History:   Procedure Laterality Date   • AORTA - FEMORAL ARTERY BYPASS GRAFT Left    • AXILLO-FEMORAL BYASS GRAFT Bilateral    • BYPASS FEMORAL-FEMORAL     • COLONOSCOPY     • CORONARY ARTERY BYPASS GRAFT  2000   • ENDOBRONCHIAL ULTRASOUND (EBUS) N/A 8/14/2023    Procedure: ENDOBRONCHIAL ULTRASOUND (EBUS) tissue biopsy;  Surgeon: Maricruz Cortez MD;  Location: [facility];  Service: Thoracic   • FEMORAL ARTERY - POPLITEAL ARTERY BYPASS GRAFT Bilateral    • IVC FILTER INSERTION     • DC AMPUTATION THIGH THROUGH FEMUR ANY LEVEL Left 07/21/2023    Procedure: AMPUTATION ABOVE KNEE (AKA); Surgeon: Praveen Valladares MD;  Location: [facility];  Service: Vascular   •  [facility] INCL FLUOR GDNCE DX W/CELL WASHG [facility] N/A 8/14/2023    Procedure: Jewel Hebert;  Surgeon: Maricruz Cortez MD;  Location: [facility];  Service: Thoracic   • TOTAL ABDOMINAL HYSTERECTOMY W/ BILATERAL SALPINGOOPHORECTOMY     • TOTAL HIP ARTHROPLASTY Left        Family History   Problem Relation Age of Onset   • Leukemia Mother 80   • Cancer Sister        Social History     Socioeconomic History   • Marital status:       Spouse name: Not on file   • Number of children: Not on file   • Years of education: Not on file   • Highest education level: Not on file   Occupational History   • Not on file   Tobacco Use   • Smoking status: Every Day     Packs/day: 1.00     Years: 50.00     Total pack years: 50.00     Types: Cigarettes     Start date: 5   • Smokeless tobacco: Never   Vaping Use   • Vaping Use: Never used   Substance and Sexual Activity   • Alcohol use: Not Currently     Alcohol/week: 50.0 standard drinks of alcohol     Types: 50 Cans of beer per week     Comment: no ETOH for 1.5 years   • Drug use: Never   • Sexual activity: Not Currently     Partners: Male   Other Topics Concern   • Not on file   Social History Narrative   • Not on file     Social Determinants of Health     Financial Resource Strain: Not on file   Food Insecurity: No Food Insecurity (7/19/2023)    Hunger Vital Sign    • Worried About Running Out of Food in the Last Year: Never true    • Ran Out of Food in the Last Year: Never true   Transportation Needs: No Transportation Needs (7/19/2023)    PRAPARE - Transportation    • Lack of Transportation (Medical): No    • Lack of Transportation (Non-Medical):  No   Physical Activity: Not on file   Stress: Not on file   Social Connections: Not on file   Intimate Partner Violence: Not on file   Housing Stability: Low Risk  (7/19/2023)    Housing Stability Vital Sign    • Unable to Pay for Housing in the Last Year: No    • Number of Places Lived in the Last Year: 1    • Unstable Housing in the Last Year: No       No Known Allergies      Current Outpatient Medications:   •  acetaminophen (TYLENOL) 325 mg tablet, Take 3 tablets (975 mg total) by mouth every 8 (eight) hours (Patient taking differently: Take 975 mg by mouth if needed), Disp: , Rfl:   •  albuterol (ProAir HFA) 90 mcg/act inhaler, Inhale 2 puffs every 6 (six) hours as needed for wheezing, Disp: 8.5 g, Rfl: 2  •  clopidogrel (PLAVIX) 75 mg tablet, Take 1 tablet (75 mg total) by mouth in the morning Do not start before August 15, 2023., Disp: 90 tablet, Rfl: 0  •  diltiazem (CARDIZEM) 120 MG tablet, Take 1 tablet (120 mg total) by mouth in the morning, Disp: 30 tablet, Rfl: 3  •  ezetimibe (ZETIA) 10 mg tablet, Take 1 tablet (10 mg total) by mouth daily, Disp: 30 tablet, Rfl: 5  •  Folic Acid 0.8 MG CAPS, Take 1 capsule (0.8 mg total) by mouth in the morning, Disp: 90 capsule, Rfl: 1  •  gabapentin (Neurontin) 300 mg capsule, Take 2 capsules (600 mg total) by mouth 3 (three) times a day, Disp: 90 capsule, Rfl: 0  •  gabapentin (Neurontin) 300 mg capsule, Take 1 capsule (300 mg total) by mouth daily at bedtime, Disp: 90 capsule, Rfl: 0  •  gabapentin (Neurontin) 300 mg capsule, Take 1 capsule (300 mg total) by mouth 3 (three) times a day, Disp: 270 capsule, Rfl: 0  •  isosorbide mononitrate (IMDUR) 30 mg 24 hr tablet, Take 30 mg by mouth every morning, Disp: , Rfl:   •  lamoTRIgine (LaMICtal) 100 mg tablet, Take 1 tablet (100 mg total) by mouth daily, Disp: 90 tablet, Rfl: 1  •  lisinopril (ZESTRIL) 10 mg tablet, take 1 tablet by mouth once daily, Disp: 30 tablet, Rfl: 5  •  Melatonin 5 MG TABS, Take 4 tablets (20 mg total) by mouth daily at bedtime as needed (insomnia) Patient states she takes 20MG, Disp: , Rfl:   •  metoprolol succinate (TOPROL-XL) 50 mg 24 hr tablet, Take 1 tablet (50 mg total) by mouth daily, Disp: 30 tablet, Rfl: 3  •  oxyCODONE (Roxicodone) 5 immediate release tablet, Take 1 tablet (5 mg total) by mouth every 8 (eight) hours as needed for severe pain Max Daily Amount: 15 mg, Disp: 7 tablet, Rfl: 0  •  rOPINIRole (REQUIP) 0.5 mg tablet, Take 1 tablet (0.5 mg total) by mouth daily at bedtime, Disp: 30 tablet, Rfl: 0  •  rosuvastatin (CRESTOR) 20 MG tablet, Take 20 mg by mouth daily at bedtime, Disp: , Rfl:   •  saccharomyces boulardii (FLORASTOR) 250 mg capsule, Take 1 capsule (250 mg total) by mouth 2 (two) times a day, Disp: 60 capsule, Rfl: 0  •  traZODone (DESYREL) 50 mg tablet, Take 1 tablet (50 mg total) by mouth daily at bedtime as needed for sleep, Disp: 30 tablet, Rfl: 0    I have spent a total time of 40 minutes on 09/26/23 in caring for this patient including Risks and benefits of tx options, Instructions for management, Patient and family education, Importance of tx compliance, Risk factor reductions, Impressions, Counseling / Coordination of care, Documenting in the medical record, Reviewing / ordering tests, medicine, procedures   and Obtaining or reviewing history  .

## 2023-09-27 ENCOUNTER — TELEPHONE (OUTPATIENT)
Dept: VASCULAR SURGERY | Facility: CLINIC | Age: 73
End: 2023-09-27

## 2023-09-27 ENCOUNTER — PATIENT OUTREACH (OUTPATIENT)
Dept: CASE MANAGEMENT | Facility: HOSPITAL | Age: 73
End: 2023-09-27

## 2023-09-27 PROCEDURE — 77427 RADIATION TX MANAGEMENT X5: CPT | Performed by: RADIOLOGY

## 2023-09-27 PROCEDURE — 77386 HB NTSTY MODUL RAD TX DLVR CPLX: CPT | Performed by: INTERNAL MEDICINE

## 2023-09-27 PROCEDURE — 77014 CHG CT GUIDANCE RADIATION THERAPY FLDS PLACEMENT: CPT | Performed by: INTERNAL MEDICINE

## 2023-09-27 NOTE — TELEPHONE ENCOUNTER
Pt had OV with provider on 9/26/23. She performs daily wound care, and packs her AKA wound. Pt stated that packing the wound is very painful, and she is requesting that a pain med be sent to the [facility] in [place]. Stated that she tried Tylenol and this does not help relieve the pain during the treatment. Pt stated that she would only take the med prior to wound care. Routed to triage to advise.

## 2023-09-27 NOTE — PROGRESS NOTES
OSW mailed list of Medicare approved psychologists who provide virtual services per website. LSW also provided list of Oct events at the [facility] along with their brochure. Business card in event patient would have additional questions/concerns.

## 2023-09-28 DIAGNOSIS — Z89.612 HX OF AKA (ABOVE KNEE AMPUTATION), LEFT (HCC): Primary | ICD-10-CM

## 2023-09-28 RX ORDER — OXYCODONE HYDROCHLORIDE 5 MG/1
5 TABLET ORAL DAILY
Qty: 7 TABLET | Refills: 0 | Status: SHIPPED | OUTPATIENT
Start: 2023-09-28 | End: 2023-10-07

## 2023-09-28 NOTE — TELEPHONE ENCOUNTER
Chart and PDMP reviewed. Per last office note Tylenol was covering pain of AKA, however with new wound care instructions and packing of wound, patient has increased pain. Sent prescription for 1 time daily oxycodone 5 mg. Dispensed #7 in order to cover until next office visit only. Additional pain medication to be discussed at that time. Patient was recommended to see acute pain services in past.  Encourage follow-up.

## 2023-09-29 ENCOUNTER — HOME CARE VISIT (OUTPATIENT)
Dept: HOME HEALTH SERVICES | Facility: HOME HEALTHCARE | Age: 73
End: 2023-09-29
Payer: MEDICARE

## 2023-09-29 VITALS
RESPIRATION RATE: 18 BRPM | SYSTOLIC BLOOD PRESSURE: 142 MMHG | HEART RATE: 75 BPM | DIASTOLIC BLOOD PRESSURE: 66 MMHG | OXYGEN SATURATION: 95 %

## 2023-09-29 PROCEDURE — G0299 HHS/HOSPICE OF RN EA 15 MIN: HCPCS

## 2023-09-29 PROCEDURE — 77386 HB NTSTY MODUL RAD TX DLVR CPLX: CPT | Performed by: RADIOLOGY

## 2023-09-29 PROCEDURE — 77014 CHG CT GUIDANCE RADIATION THERAPY FLDS PLACEMENT: CPT | Performed by: RADIOLOGY

## 2023-09-29 NOTE — CASE COMMUNICATION
Ship to XX Pt. Home  Insurance  AB     Wound 1 Left AKA incision      Full   XX         All items are ordered by the each unless otherwise noted.   PLEASE DO NOT ORDER BY THE BOX  Request specific quantity needed  For Private Insurances order should be for a 2 week period  For [facility] patients order should be for a 1 week period      Cleansers  Saline Hollsopple  NOT STOCKED  [id number]      1    Dry Dressings   (Nonsterile gauze not covered by priv ate insurance)  (Sterile gauze may be requested for insurance rather than nonsterile gauze)  Gauze 4x4 N/S 200 4x4s per unit  [id number]         1     Tape     Transpore white  2in [id number]       2

## 2023-10-02 ENCOUNTER — APPOINTMENT (OUTPATIENT)
Dept: RADIATION ONCOLOGY | Facility: CLINIC | Age: 73
End: 2023-10-02
Attending: RADIOLOGY
Payer: MEDICARE

## 2023-10-02 ENCOUNTER — OFFICE VISIT (OUTPATIENT)
Dept: HEMATOLOGY ONCOLOGY | Facility: CLINIC | Age: 73
End: 2023-10-02
Payer: MEDICARE

## 2023-10-02 VITALS
TEMPERATURE: 97.2 F | SYSTOLIC BLOOD PRESSURE: 140 MMHG | OXYGEN SATURATION: 94 % | DIASTOLIC BLOOD PRESSURE: 74 MMHG | RESPIRATION RATE: 18 BRPM | HEART RATE: 67 BPM

## 2023-10-02 DIAGNOSIS — C34.91 NON-SMALL CELL CANCER OF RIGHT LUNG (HCC): Primary | ICD-10-CM

## 2023-10-02 PROCEDURE — 77014 CHG CT GUIDANCE RADIATION THERAPY FLDS PLACEMENT: CPT | Performed by: RADIOLOGY

## 2023-10-02 PROCEDURE — 77386 HB NTSTY MODUL RAD TX DLVR CPLX: CPT | Performed by: RADIOLOGY

## 2023-10-02 PROCEDURE — 77336 RADIATION PHYSICS CONSULT: CPT | Performed by: RADIOLOGY

## 2023-10-02 PROCEDURE — 99214 OFFICE O/P EST MOD 30 MIN: CPT | Performed by: INTERNAL MEDICINE

## 2023-10-03 ENCOUNTER — TELEPHONE (OUTPATIENT)
Dept: VASCULAR SURGERY | Facility: CLINIC | Age: 73
End: 2023-10-03

## 2023-10-03 ENCOUNTER — OFFICE VISIT (OUTPATIENT)
Dept: VASCULAR SURGERY | Facility: CLINIC | Age: 73
End: 2023-10-03

## 2023-10-03 ENCOUNTER — ANESTHESIA EVENT (OUTPATIENT)
Dept: PERIOP | Facility: HOSPITAL | Age: 73
DRG: 465 | End: 2023-10-03
Payer: MEDICARE

## 2023-10-03 ENCOUNTER — HOME CARE VISIT (OUTPATIENT)
Dept: HOME HEALTH SERVICES | Facility: HOME HEALTHCARE | Age: 73
End: 2023-10-03
Payer: MEDICARE

## 2023-10-03 VITALS
DIASTOLIC BLOOD PRESSURE: 85 MMHG | HEIGHT: 64 IN | WEIGHT: 151.6 LBS | SYSTOLIC BLOOD PRESSURE: 166 MMHG | BODY MASS INDEX: 25.88 KG/M2 | OXYGEN SATURATION: 98 % | HEART RATE: 64 BPM

## 2023-10-03 DIAGNOSIS — Z48.1 DELAYED POSTOPERATIVE WOUND CLOSURE: Primary | ICD-10-CM

## 2023-10-03 PROCEDURE — 99024 POSTOP FOLLOW-UP VISIT: CPT

## 2023-10-03 RX ORDER — CHLORHEXIDINE GLUCONATE ORAL RINSE 1.2 MG/ML
15 SOLUTION DENTAL ONCE
Status: CANCELLED | OUTPATIENT
Start: 2023-10-05 | End: 2023-10-03

## 2023-10-03 RX ORDER — CEFAZOLIN SODIUM 1 G/50ML
1000 SOLUTION INTRAVENOUS ONCE
Status: CANCELLED | OUTPATIENT
Start: 2023-10-05 | End: 2023-10-03

## 2023-10-03 NOTE — H&P (VIEW-ONLY)
Assessment/Plan:    Delayed postoperative wound closure  Patient is currently denying any fever or chills. Patient has visiting nurse that has been changing packing to L AKA wound daily. She denies any new erythema, warmth, increased tenderness, or drainage from wound. She reports continued phantom limb pain. She reports that her pain is adequately managed with as needed Tylenol and scheduled gabapentin at this time. Granulation tissue noted to previously divided portion of left AKA incision, no new warmth, erythema, or drainage. Wound does not appear to have worsened, however also does not appear to be healing. Small punctate area previously noted toward lateral aspect of incision with small amount of purulent drainage expressed. Plan:  While there appears to be no worsening of left AKA incision wound, incision does not appear to be healing. After discussion with Dr. Elina Caba I feel it would be best that patient undergo a washout and debridement of left AKA incision and wound VAC application to promote wound healing. Plan discussed with patient and she is in agreement. Tentatively plan for 10/5/2023 at [address] [place] [address] daily packing changes at this time  -Patient instructed to notify office should she develop any fever, chills, warmth, erythema, or purulent drainage from incision. Diagnoses and all orders for this visit:    Delayed postoperative wound closure  -     Case request operating room: L AKA incision washout and VAC placement; Standing  -     Case request operating room: L AKA incision washout and VAC placement    Other orders  -     Diet NPO; Sips with meds; Standing  -     Void on call to OR; Standing  -     Insert peripheral IV; Standing  -     Nursing Communication CHG bath, have staff wash entire body (neck down) per pre op bathing protocol.  Routine, evening prior to, and day of surgery.; Standing  -     Nursing Communication Swab both nares with Povidone-Iodine solution, EXCLUDE if patient has shellfish/Iodine allergy, and replace with nasal alcohol swabstick. Routine, day of surgery, on call to OR.; Standing  -     chlorhexidine (PERIDEX) 0.12 % oral rinse 15 mL  -     Place sequential compression device; Standing  -     ceFAZolin (ANCEF) IVPB (premix in dextrose) 1,000 mg 50 mL          Subjective:      Patient ID: Mane Schmidt is a 68 y.o. female. Patient is a 68year old female, current smoker, with PMH NSCLC, CAD s/p CABG, HTN, paroxysmal atrial fibrillation, HLD, CKD 3, and PAD. Patient has significant history of vascular intervention at outside facilities including fem-fem bypass s/p occlusion, right fem to BK pop bypass s/p occlusion (2010), bilateral iliac and femoral artery angioplasty and stenting (2014), and right axillary to bi-fem bypass (2017). Patient is now s/p L AKA 7/21/23 Esteban Martinez) and is presenting to the office today for incision recheck.     Patient is currently denying any fever or chills. Patient has visiting nurse that has been changing packing to L AKA wound daily. She denies any new erythema, warmth, increased tenderness, or drainage from wound. She reports continued phantom limb pain. She reports that her pain is adequately managed with as needed Tylenol and scheduled gabapentin at this time. The following portions of the patient's history were reviewed and updated as appropriate: allergies, current medications, past family history, past medical history, past social history, past surgical history and problem list.    Review of Systems   Constitutional: Negative. Negative for chills and fever. HENT: Negative. Eyes: Negative. Respiratory: Negative. Negative for shortness of breath. Cardiovascular: Negative for chest pain. Gastrointestinal: Negative. Endocrine: Negative. Genitourinary: Negative. Musculoskeletal: Negative. Skin: Positive for wound (L AKA). Allergic/Immunologic: Negative.     Neurological: Positive for dizziness. Hematological: Negative. Psychiatric/Behavioral: Negative. Objective:    /85 (BP Location: Left arm, Patient Position: Sitting, Cuff Size: Standard)   Pulse 64   Ht 5' 4" (1.626 m)   Wt 68.8 kg (151 lb 9.6 oz)   SpO2 98%   BMI 26.02 kg/m²      Physical Exam  Vitals reviewed. Constitutional:       General: She is not in acute distress. Appearance: Normal appearance. Cardiovascular:      Rate and Rhythm: Normal rate and regular rhythm. Pulmonary:      Effort: Pulmonary effort is normal. No respiratory distress. Musculoskeletal:         General: No swelling or tenderness. Right lower leg: No edema. Left lower leg: Edema (trace L AKA stump) present. Skin:     General: Skin is warm and dry. Capillary Refill: Capillary refill takes less than 2 seconds. Findings: Wound present. Neurological:      General: No focal deficit present. Mental Status: She is alert and oriented to person, place, and time. Psychiatric:         Mood and Affect: Mood normal.         Behavior: Behavior normal.       I have reviewed and made appropriate changes to the review of systems input by the medical assistant.     Vitals:    10/03/23 1055   BP: 166/85   BP Location: Left arm   Patient Position: Sitting   Cuff Size: Standard   Pulse: 64   SpO2: 98%   Weight: 68.8 kg (151 lb 9.6 oz)   Height: 5' 4" (1.626 m)       Patient Active Problem List   Diagnosis   • Bipolar affective disorder, depressed, severe ([facility])   • CAD (coronary artery disease)   • Essential hypertension   • Hx of CABG   • S/P vascular bypass   • Thyroid nodule   • Renal artery stenosis ([facility])   • Presence of IVC filter   • PAD (peripheral artery disease) ([facility])   • Mixed hyperlipidemia   • Aortoiliac occlusive disease ([facility])   • Tobacco abuse   • PAF (paroxysmal atrial fibrillation) ([facility])   • Stage 3 chronic kidney disease, unspecified whether stage 3a or 3b CKD ([facility])   • Dizziness   • Insomnia • Lung mass   • At risk for venous thromboembolism (VTE)   • Pain   • 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)   • SCC (squamous cell carcinoma of lung) ([facility])   • Non-small cell cancer of right lung ([facility])   • Delayed postoperative wound closure       Past Surgical History:   Procedure Laterality Date   • AORTA - FEMORAL ARTERY BYPASS GRAFT Left    • AXILLO-FEMORAL BYASS GRAFT Bilateral    • BYPASS FEMORAL-FEMORAL     • COLONOSCOPY     • CORONARY ARTERY BYPASS GRAFT  2000   • ENDOBRONCHIAL ULTRASOUND (EBUS) N/A 8/14/2023    Procedure: ENDOBRONCHIAL ULTRASOUND (EBUS) tissue biopsy;  Surgeon: Neil Miramontes MD;  Location: [facility];  Service: Thoracic   • FEMORAL ARTERY - POPLITEAL ARTERY BYPASS GRAFT Bilateral    • IVC FILTER INSERTION     • FL AMPUTATION THIGH THROUGH FEMUR ANY LEVEL Left 07/21/2023    Procedure: AMPUTATION ABOVE KNEE (AKA); Surgeon: Aldair Durand MD;  Location: [facility];  Service: Vascular   •  [address] INCL FLUOR GDNCE DX W/CELL WASHG [facility] N/A 8/14/2023    Procedure: Darron Baldwin;  Surgeon: Neil Miramontes MD;  Location: [facility];  Service: Thoracic   • TOTAL ABDOMINAL HYSTERECTOMY W/ BILATERAL SALPINGOOPHORECTOMY     • TOTAL HIP ARTHROPLASTY Left        Family History   Problem Relation Age of Onset   • Leukemia Mother 80   • Cancer Sister        Social History     Socioeconomic History   • Marital status:       Spouse name: Not on file   • Number of children: Not on file   • Years of education: Not on file   • Highest education level: Not on file   Occupational History   • Not on file   Tobacco Use   • Smoking status: Every Day     Packs/day: 1.00     Years: 50.00     Total pack years: 50.00     Types: Cigarettes     Start date: 5   • Smokeless tobacco: Never   Vaping Use   • Vaping Use: Never used   Substance and Sexual Activity   • Alcohol use: Not Currently     Alcohol/week: 50.0 standard drinks of alcohol     Types: 50 Cans of beer per week     Comment: no ETOH for 1.5 years   • Drug use: Never   • Sexual activity: Not Currently     Partners: Male   Other Topics Concern   • Not on file   Social History Narrative   • Not on file     Social Determinants of Health     Financial Resource Strain: Not on file   Food Insecurity: No Food Insecurity (7/19/2023)    Hunger Vital Sign    • Worried About Running Out of Food in the Last Year: Never true    • Ran Out of Food in the Last Year: Never true   Transportation Needs: No Transportation Needs (7/19/2023)    PRAPARE - Transportation    • Lack of Transportation (Medical): No    • Lack of Transportation (Non-Medical):  No   Physical Activity: Not on file   Stress: Not on file   Social Connections: Not on file   Intimate Partner Violence: Not on file   Housing Stability: Low Risk  (7/19/2023)    Housing Stability Vital Sign    • Unable to Pay for Housing in the Last Year: No    • Number of Places Lived in the Last Year: 1    • Unstable Housing in the Last Year: No       No Known Allergies      Current Outpatient Medications:   •  acetaminophen (TYLENOL) 325 mg tablet, Take 3 tablets (975 mg total) by mouth every 8 (eight) hours (Patient taking differently: Take 975 mg by mouth if needed), Disp: , Rfl:   •  albuterol (ProAir HFA) 90 mcg/act inhaler, Inhale 2 puffs every 6 (six) hours as needed for wheezing, Disp: 8.5 g, Rfl: 2  •  clopidogrel (PLAVIX) 75 mg tablet, Take 1 tablet (75 mg total) by mouth in the morning Do not start before August 15, 2023., Disp: 90 tablet, Rfl: 0  •  diltiazem (CARDIZEM) 120 MG tablet, Take 1 tablet (120 mg total) by mouth in the morning, Disp: 30 tablet, Rfl: 3  •  ezetimibe (ZETIA) 10 mg tablet, Take 1 tablet (10 mg total) by mouth daily, Disp: 30 tablet, Rfl: 5  •  Folic Acid 0.8 MG CAPS, Take 1 capsule (0.8 mg total) by mouth in the morning, Disp: 90 capsule, Rfl: 1  •  gabapentin (Neurontin) 300 mg capsule, Take 2 capsules (600 mg total) by mouth 3 (three) times a day, Disp: 90 capsule, Rfl: 0  •  gabapentin (Neurontin) 300 mg capsule, Take 1 capsule (300 mg total) by mouth daily at bedtime, Disp: 90 capsule, Rfl: 0  •  gabapentin (Neurontin) 300 mg capsule, Take 1 capsule (300 mg total) by mouth 3 (three) times a day, Disp: 270 capsule, Rfl: 0  •  isosorbide mononitrate (IMDUR) 30 mg 24 hr tablet, Take 30 mg by mouth every morning, Disp: , Rfl:   •  lamoTRIgine (LaMICtal) 100 mg tablet, Take 1 tablet (100 mg total) by mouth daily, Disp: 90 tablet, Rfl: 1  •  lisinopril (ZESTRIL) 10 mg tablet, take 1 tablet by mouth once daily, Disp: 30 tablet, Rfl: 5  •  Melatonin 5 MG TABS, Take 4 tablets (20 mg total) by mouth daily at bedtime as needed (insomnia) Patient states she takes 20MG, Disp: , Rfl:   •  metoprolol succinate (TOPROL-XL) 50 mg 24 hr tablet, Take 1 tablet (50 mg total) by mouth daily, Disp: 30 tablet, Rfl: 3  •  oxyCODONE (Roxicodone) 5 immediate release tablet, Take 1 tablet (5 mg total) by mouth every 8 (eight) hours as needed for severe pain Max Daily Amount: 15 mg, Disp: 7 tablet, Rfl: 0  •  oxyCODONE (Roxicodone) 5 immediate release tablet, Take 1 tablet (5 mg total) by mouth daily 1 tab daily with dressing changes for severe pain.  Max Daily Amount: 5 mg, Disp: 7 tablet, Rfl: 0  •  rOPINIRole (REQUIP) 0.5 mg tablet, Take 1 tablet (0.5 mg total) by mouth daily at bedtime, Disp: 30 tablet, Rfl: 0  •  rosuvastatin (CRESTOR) 20 MG tablet, Take 20 mg by mouth daily at bedtime, Disp: , Rfl:   •  saccharomyces boulardii (FLORASTOR) 250 mg capsule, Take 1 capsule (250 mg total) by mouth 2 (two) times a day, Disp: 60 capsule, Rfl: 0  •  traZODone (DESYREL) 50 mg tablet, Take 1 tablet (50 mg total) by mouth daily at bedtime as needed for sleep, Disp: 30 tablet, Rfl: 0    I have spent a total time of 30 minutes on 10/03/23 in caring for this patient including Prognosis, Risks and benefits of tx options, Instructions for management, Patient and family education, Importance of tx compliance, Risk factor reductions, Impressions, Counseling / Coordination of care, Documenting in the medical record, Reviewing / ordering tests, medicine, procedures  , Obtaining or reviewing history   and Communicating with other healthcare professionals .

## 2023-10-03 NOTE — ASSESSMENT & PLAN NOTE
Patient is currently denying any fever or chills. Patient has visiting nurse that has been changing packing to L AKA wound daily. She denies any new erythema, warmth, increased tenderness, or drainage from wound. She reports continued phantom limb pain. She reports that her pain is adequately managed with as needed Tylenol and scheduled gabapentin at this time. Granulation tissue noted to previously divided portion of left AKA incision, no new warmth, erythema, or drainage. Wound does not appear to have worsened, however also does not appear to be healing. Small punctate area previously noted toward lateral aspect of incision with small amount of purulent drainage expressed. Plan:  While there appears to be no worsening of left AKA incision wound, incision does not appear to be healing. After discussion with Dr. Татьяна Cooley I feel it would be best that patient undergo a washout and debridement of left AKA incision and wound VAC application to promote wound healing. Plan discussed with patient and she is in agreement. Tentatively plan for 10/5/2023 at [address] [place] [address] daily packing changes at this time  -Patient instructed to notify office should she develop any fever, chills, warmth, erythema, or purulent drainage from incision.

## 2023-10-03 NOTE — TELEPHONE ENCOUNTER
Verified patient's insurance   CONFIRMED - Patient's insurance is Medicare  Is patient requesting a call when authorization has been obtained? Patient did not request a call. Surgery Date: 10/5/23  Primary Surgeon: MAHENDRA // Luana Maldonado (NPI: [id number])  Assisting Surgeon: Not Applicable (N/A)  Facility: [facility] (Tax: [id number] / NPI: [id number])  Inpatient / Outpatient: Inpatient  Level: 1    Clearance Received: No clearance ordered. Consent Received: Consent will be signed day of procedure. Medication Hold / Last Dose: PLAVIX HOLD NONE  IR Notified: Not Applicable (N/A)  Rep. Notified: Not Applicable (N/A)  Equipment Needs: Not Applicable (N/A)  Vas Lab Requested: Not Applicable (N/A)  Patient Contacted: 10/3/23    Diagnosis: Z48.1   Procedure/ CPT Code(s): Wound Debridement // CPT: 66668 and VAC PLACEMENT 23553    For varicose vein related procedures:   Last LEVDR: Not Applicable (N/A)  CEAP Classification: Not Applicable (N/A)  VCSS: Not Applicable (N/A)    Post Operative Date/ Time: TBD , TBD Jad with LEV Flores (NPI: [id number])     *Please review medication hold(s), PATs, and check H&P with patient. *  PATIENT WAS MAILED SURGERY/SHOWERING/DISCHARGE/COVID INSTRUCTIONS AFTER REVIEWING WITH THEM VIA PHONE CALL.

## 2023-10-03 NOTE — PROGRESS NOTES
Assessment/Plan:    Delayed postoperative wound closure  Patient is currently denying any fever or chills. Patient has visiting nurse that has been changing packing to L AKA wound daily. She denies any new erythema, warmth, increased tenderness, or drainage from wound. She reports continued phantom limb pain. She reports that her pain is adequately managed with as needed Tylenol and scheduled gabapentin at this time. Granulation tissue noted to previously divided portion of left AKA incision, no new warmth, erythema, or drainage. Wound does not appear to have worsened, however also does not appear to be healing. Small punctate area previously noted toward lateral aspect of incision with small amount of purulent drainage expressed. Plan:  While there appears to be no worsening of left AKA incision wound, incision does not appear to be healing. After discussion with Dr. Laila Egan I feel it would be best that patient undergo a washout and debridement of left AKA incision and wound VAC application to promote wound healing. Plan discussed with patient and she is in agreement. Tentatively plan for 10/5/2023 at [address] [place] [address] daily packing changes at this time  -Patient instructed to notify office should she develop any fever, chills, warmth, erythema, or purulent drainage from incision. Diagnoses and all orders for this visit:    Delayed postoperative wound closure  -     Case request operating room: L AKA incision washout and VAC placement; Standing  -     Case request operating room: L AKA incision washout and VAC placement    Other orders  -     Diet NPO; Sips with meds; Standing  -     Void on call to OR; Standing  -     Insert peripheral IV; Standing  -     Nursing Communication CHG bath, have staff wash entire body (neck down) per pre op bathing protocol.  Routine, evening prior to, and day of surgery.; Standing  -     Nursing Communication Swab both nares with Povidone-Iodine solution, EXCLUDE if patient has shellfish/Iodine allergy, and replace with nasal alcohol swabstick. Routine, day of surgery, on call to OR.; Standing  -     chlorhexidine (PERIDEX) 0.12 % oral rinse 15 mL  -     Place sequential compression device; Standing  -     ceFAZolin (ANCEF) IVPB (premix in dextrose) 1,000 mg 50 mL          Subjective:      Patient ID: Viky Brandt is a 68 y.o. female. Patient is a 68year old female, current smoker, with PMH NSCLC, CAD s/p CABG, HTN, paroxysmal atrial fibrillation, HLD, CKD 3, and PAD. Patient has significant history of vascular intervention at outside facilities including fem-fem bypass s/p occlusion, right fem to BK pop bypass s/p occlusion (2010), bilateral iliac and femoral artery angioplasty and stenting (2014), and right axillary to bi-fem bypass (2017). Patient is now s/p L AKA 7/21/23 Rafaela Murdock) and is presenting to the office today for incision recheck.     Patient is currently denying any fever or chills. Patient has visiting nurse that has been changing packing to L AKA wound daily. She denies any new erythema, warmth, increased tenderness, or drainage from wound. She reports continued phantom limb pain. She reports that her pain is adequately managed with as needed Tylenol and scheduled gabapentin at this time. The following portions of the patient's history were reviewed and updated as appropriate: allergies, current medications, past family history, past medical history, past social history, past surgical history and problem list.    Review of Systems   Constitutional: Negative. Negative for chills and fever. HENT: Negative. Eyes: Negative. Respiratory: Negative. Negative for shortness of breath. Cardiovascular: Negative for chest pain. Gastrointestinal: Negative. Endocrine: Negative. Genitourinary: Negative. Musculoskeletal: Negative. Skin: Positive for wound (L AKA). Allergic/Immunologic: Negative.     Neurological: Positive for dizziness. Hematological: Negative. Psychiatric/Behavioral: Negative. Objective:    /85 (BP Location: Left arm, Patient Position: Sitting, Cuff Size: Standard)   Pulse 64   Ht 5' 4" (1.626 m)   Wt 68.8 kg (151 lb 9.6 oz)   SpO2 98%   BMI 26.02 kg/m²      Physical Exam  Vitals reviewed. Constitutional:       General: She is not in acute distress. Appearance: Normal appearance. Cardiovascular:      Rate and Rhythm: Normal rate and regular rhythm. Pulmonary:      Effort: Pulmonary effort is normal. No respiratory distress. Musculoskeletal:         General: No swelling or tenderness. Right lower leg: No edema. Left lower leg: Edema (trace L AKA stump) present. Skin:     General: Skin is warm and dry. Capillary Refill: Capillary refill takes less than 2 seconds. Findings: Wound present. Neurological:      General: No focal deficit present. Mental Status: She is alert and oriented to person, place, and time. Psychiatric:         Mood and Affect: Mood normal.         Behavior: Behavior normal.       I have reviewed and made appropriate changes to the review of systems input by the medical assistant.     Vitals:    10/03/23 1055   BP: 166/85   BP Location: Left arm   Patient Position: Sitting   Cuff Size: Standard   Pulse: 64   SpO2: 98%   Weight: 68.8 kg (151 lb 9.6 oz)   Height: 5' 4" (1.626 m)       Patient Active Problem List   Diagnosis   • Bipolar affective disorder, depressed, severe ([facility])   • CAD (coronary artery disease)   • Essential hypertension   • Hx of CABG   • S/P vascular bypass   • Thyroid nodule   • Renal artery stenosis ([facility])   • Presence of IVC filter   • PAD (peripheral artery disease) ([facility])   • Mixed hyperlipidemia   • Aortoiliac occlusive disease ([facility])   • Tobacco abuse   • PAF (paroxysmal atrial fibrillation) ([facility])   • Stage 3 chronic kidney disease, unspecified whether stage 3a or 3b CKD ([facility])   • Dizziness   • Insomnia • Lung mass   • At risk for venous thromboembolism (VTE)   • Pain   • 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)   • SCC (squamous cell carcinoma of lung) (HCC)   • Non-small cell cancer of right lung (HCC)   • Delayed postoperative wound closure       Past Surgical History:   Procedure Laterality Date   • AORTA - FEMORAL ARTERY BYPASS GRAFT Left    • AXILLO-FEMORAL BYASS GRAFT Bilateral    • BYPASS FEMORAL-FEMORAL     • COLONOSCOPY     • CORONARY ARTERY BYPASS GRAFT  2000   • ENDOBRONCHIAL ULTRASOUND (EBUS) N/A 8/14/2023    Procedure: ENDOBRONCHIAL ULTRASOUND (EBUS) tissue biopsy;  Surgeon: Sreekanth Alfaro MD;  Location: [facility];  Service: Thoracic   • FEMORAL ARTERY - POPLITEAL ARTERY BYPASS GRAFT Bilateral    • IVC FILTER INSERTION     • MT AMPUTATION THIGH THROUGH FEMUR ANY LEVEL Left 07/21/2023    Procedure: AMPUTATION ABOVE KNEE (AKA); Surgeon: Tia Tavera MD;  Location: [facility];  Service: Vascular   •  [address] INCL FLUOR GDNCE DX W/CELL WASHG [facility] N/A 8/14/2023    Procedure: Bri Lynn;  Surgeon: Sreekanth Alfaro MD;  Location: [facility];  Service: Thoracic   • TOTAL ABDOMINAL HYSTERECTOMY W/ BILATERAL SALPINGOOPHORECTOMY     • TOTAL HIP ARTHROPLASTY Left        Family History   Problem Relation Age of Onset   • Leukemia Mother 80   • Cancer Sister        Social History     Socioeconomic History   • Marital status:       Spouse name: Not on file   • Number of children: Not on file   • Years of education: Not on file   • Highest education level: Not on file   Occupational History   • Not on file   Tobacco Use   • Smoking status: Every Day     Packs/day: 1.00     Years: 50.00     Total pack years: 50.00     Types: Cigarettes     Start date: 5   • Smokeless tobacco: Never   Vaping Use   • Vaping Use: Never used   Substance and Sexual Activity   • Alcohol use: Not Currently     Alcohol/week: 50.0 standard drinks of alcohol     Types: 50 Cans of beer per week     Comment: no ETOH for 1.5 years   • Drug use: Never   • Sexual activity: Not Currently     Partners: Male   Other Topics Concern   • Not on file   Social History Narrative   • Not on file     Social Determinants of Health     Financial Resource Strain: Not on file   Food Insecurity: No Food Insecurity (7/19/2023)    Hunger Vital Sign    • Worried About Running Out of Food in the Last Year: Never true    • Ran Out of Food in the Last Year: Never true   Transportation Needs: No Transportation Needs (7/19/2023)    PRAPARE - Transportation    • Lack of Transportation (Medical): No    • Lack of Transportation (Non-Medical):  No   Physical Activity: Not on file   Stress: Not on file   Social Connections: Not on file   Intimate Partner Violence: Not on file   Housing Stability: Low Risk  (7/19/2023)    Housing Stability Vital Sign    • Unable to Pay for Housing in the Last Year: No    • Number of Places Lived in the Last Year: 1    • Unstable Housing in the Last Year: No       No Known Allergies      Current Outpatient Medications:   •  acetaminophen (TYLENOL) 325 mg tablet, Take 3 tablets (975 mg total) by mouth every 8 (eight) hours (Patient taking differently: Take 975 mg by mouth if needed), Disp: , Rfl:   •  albuterol (ProAir HFA) 90 mcg/act inhaler, Inhale 2 puffs every 6 (six) hours as needed for wheezing, Disp: 8.5 g, Rfl: 2  •  clopidogrel (PLAVIX) 75 mg tablet, Take 1 tablet (75 mg total) by mouth in the morning Do not start before August 15, 2023., Disp: 90 tablet, Rfl: 0  •  diltiazem (CARDIZEM) 120 MG tablet, Take 1 tablet (120 mg total) by mouth in the morning, Disp: 30 tablet, Rfl: 3  •  ezetimibe (ZETIA) 10 mg tablet, Take 1 tablet (10 mg total) by mouth daily, Disp: 30 tablet, Rfl: 5  •  Folic Acid 0.8 MG CAPS, Take 1 capsule (0.8 mg total) by mouth in the morning, Disp: 90 capsule, Rfl: 1  •  gabapentin (Neurontin) 300 mg capsule, Take 2 capsules (600 mg total) by mouth 3 (three) times a day, Disp: 90 capsule, Rfl: 0  •  gabapentin (Neurontin) 300 mg capsule, Take 1 capsule (300 mg total) by mouth daily at bedtime, Disp: 90 capsule, Rfl: 0  •  gabapentin (Neurontin) 300 mg capsule, Take 1 capsule (300 mg total) by mouth 3 (three) times a day, Disp: 270 capsule, Rfl: 0  •  isosorbide mononitrate (IMDUR) 30 mg 24 hr tablet, Take 30 mg by mouth every morning, Disp: , Rfl:   •  lamoTRIgine (LaMICtal) 100 mg tablet, Take 1 tablet (100 mg total) by mouth daily, Disp: 90 tablet, Rfl: 1  •  lisinopril (ZESTRIL) 10 mg tablet, take 1 tablet by mouth once daily, Disp: 30 tablet, Rfl: 5  •  Melatonin 5 MG TABS, Take 4 tablets (20 mg total) by mouth daily at bedtime as needed (insomnia) Patient states she takes 20MG, Disp: , Rfl:   •  metoprolol succinate (TOPROL-XL) 50 mg 24 hr tablet, Take 1 tablet (50 mg total) by mouth daily, Disp: 30 tablet, Rfl: 3  •  oxyCODONE (Roxicodone) 5 immediate release tablet, Take 1 tablet (5 mg total) by mouth every 8 (eight) hours as needed for severe pain Max Daily Amount: 15 mg, Disp: 7 tablet, Rfl: 0  •  oxyCODONE (Roxicodone) 5 immediate release tablet, Take 1 tablet (5 mg total) by mouth daily 1 tab daily with dressing changes for severe pain.  Max Daily Amount: 5 mg, Disp: 7 tablet, Rfl: 0  •  rOPINIRole (REQUIP) 0.5 mg tablet, Take 1 tablet (0.5 mg total) by mouth daily at bedtime, Disp: 30 tablet, Rfl: 0  •  rosuvastatin (CRESTOR) 20 MG tablet, Take 20 mg by mouth daily at bedtime, Disp: , Rfl:   •  saccharomyces boulardii (FLORASTOR) 250 mg capsule, Take 1 capsule (250 mg total) by mouth 2 (two) times a day, Disp: 60 capsule, Rfl: 0  •  traZODone (DESYREL) 50 mg tablet, Take 1 tablet (50 mg total) by mouth daily at bedtime as needed for sleep, Disp: 30 tablet, Rfl: 0    I have spent a total time of 30 minutes on 10/03/23 in caring for this patient including Prognosis, Risks and benefits of tx options, Instructions for management, Patient and family education, Importance of tx compliance, Risk factor reductions, Impressions, Counseling / Coordination of care, Documenting in the medical record, Reviewing / ordering tests, medicine, procedures  , Obtaining or reviewing history   and Communicating with other healthcare professionals .

## 2023-10-04 NOTE — TELEPHONE ENCOUNTER
Authorization requirements reviewed. Please refer to Sammie Reid / Juana Yusuf number [id number] for case updates.     No authorization required

## 2023-10-04 NOTE — PRE-PROCEDURE INSTRUCTIONS
Pre-Surgery Instructions:   Medication Instructions   • acetaminophen (TYLENOL) 325 mg tablet Uses PRN- OK to take day of surgery with sip of water    • clopidogrel (PLAVIX) 75 mg tablet NO HOLD On plavix per surgeon office -confirmed 10/4/23. Pt aware. • diltiazem (CARDIZEM) 120 MG tablet Take day of surgery. with sip of water   • ezetimibe (ZETIA) 10 mg tablet Take day of surgery. with sip of water    • gabapentin (Neurontin) 300 mg capsule Take day of surgery. with sip of water   • isosorbide mononitrate (IMDUR) 30 mg 24 hr tablet Take day of surgery. with sip of water   • lamoTRIgine (LaMICtal) 100 mg tablet Take night before surgery   • lisinopril (ZESTRIL) 10 mg tablet Hold day of surgery. • Melatonin 5 MG TABS Stop taking 7 days prior to surgery. Pt reports last dose on 10/3/23   • metoprolol succinate (TOPROL-XL) 50 mg 24 hr tablet Take day of surgery. with sip of water    • oxyCODONE (Roxicodone) 5 immediate release tablet Uses PRN- OK to take day of surgery-uses primarily for dressing changes only per pt    • rOPINIRole (REQUIP) 0.5 mg tablet Take night before surgery   • rosuvastatin (CRESTOR) 20 MG tablet Take night before surgery    Pt instructed on use of cleanser - reviewed instructions of use with pt for night before and morning of surgery - instructed to not have cleanser get into wound area- pt verbalized understanding of these instructions. Pt reports was not instructed on Plavix use for DOS per surgeon office - pt did hold one dose only on 10/3 - message to office to confirm instructions. Medication instructions for day surgery reviewed. Please use only a sip of water to take your instructed medications. Avoid all over the counter vitamins, supplements and NSAIDS for one week prior to surgery per anesthesia guidelines. Tylenol is ok to take as needed. You will receive a call one business day prior to surgery with an arrival time and hospital directions.  If your surgery is scheduled on a Monday, the hospital will be calling you on the Friday prior to your surgery. If you have not heard from anyone by 8pm, please call the hospital supervisor through the hospital  at [phone]. Farhad Woodall [phone]). Do not eat or drink anything after midnight the night before your surgery, including candy, mints, lifesavers, or chewing gum. Do not drink alcohol 24hrs before your surgery. Try not to smoke at least 24hrs before your surgery. Follow the pre surgery showering instructions as listed in the [facility] Surgical Experience Booklet” or otherwise provided by your surgeon's office. Do not shave the surgical area 24 hours before surgery. Do not apply any lotions, creams, including makeup, cologne, deodorant, or perfumes after showering on the day of your surgery. No contact lenses, eye make-up, or artificial eyelashes. Remove nail polish, including gel polish, and any artificial, gel, or acrylic nails if possible. Remove all jewelry including rings and body piercing jewelry. Wear causal clothing that is easy to take on and off. Consider your type of surgery. Keep any valuables, jewelry, piercings at home. Please bring any specially ordered equipment (sling, braces) if indicated. Arrange for a responsible person to drive you to and from the hospital on the day of your surgery. Visitor Guidelines discussed. Call the surgeon's office with any new illnesses, exposures, or additional questions prior to surgery. Please reference your [facility] Surgical Experience Booklet” for additional information to prepare for your upcoming surgery.

## 2023-10-05 ENCOUNTER — HOME CARE VISIT (OUTPATIENT)
Dept: HOME HEALTH SERVICES | Facility: HOME HEALTHCARE | Age: 73
End: 2023-10-05
Payer: MEDICARE

## 2023-10-05 ENCOUNTER — HOSPITAL ENCOUNTER (INPATIENT)
Facility: HOSPITAL | Age: 73
LOS: 1 days | Discharge: HOME HEALTH | DRG: 465 | End: 2023-10-07
Attending: SURGERY | Admitting: SURGERY
Payer: MEDICARE

## 2023-10-05 ENCOUNTER — ANESTHESIA (OUTPATIENT)
Dept: PERIOP | Facility: HOSPITAL | Age: 73
DRG: 465 | End: 2023-10-05
Payer: MEDICARE

## 2023-10-05 DIAGNOSIS — Z48.1 DELAYED POSTOPERATIVE WOUND CLOSURE: ICD-10-CM

## 2023-10-05 DIAGNOSIS — Z89.612 STATUS POST ABOVE-KNEE AMPUTATION OF LEFT LOWER EXTREMITY (HCC): Primary | ICD-10-CM

## 2023-10-05 LAB
PLATELET # BLD AUTO: 193 THOUSANDS/UL (ref 149–390)
PMV BLD AUTO: 10.1 FL (ref 8.9–12.7)

## 2023-10-05 PROCEDURE — 87205 SMEAR GRAM STAIN: CPT | Performed by: SURGERY

## 2023-10-05 PROCEDURE — 0JBM0ZZ EXCISION OF LEFT UPPER LEG SUBCUTANEOUS TISSUE AND FASCIA, OPEN APPROACH: ICD-10-PCS | Performed by: SURGERY

## 2023-10-05 PROCEDURE — 85049 AUTOMATED PLATELET COUNT: CPT | Performed by: SURGERY

## 2023-10-05 PROCEDURE — 87070 CULTURE OTHR SPECIMN AEROBIC: CPT | Performed by: SURGERY

## 2023-10-05 PROCEDURE — 87075 CULTR BACTERIA EXCEPT BLOOD: CPT | Performed by: SURGERY

## 2023-10-05 PROCEDURE — 11042 DBRDMT SUBQ TIS 1ST 20SQCM/<: CPT | Performed by: SURGERY

## 2023-10-05 PROCEDURE — 97605 NEG PRS WND THER DME<=50SQCM: CPT | Performed by: SURGERY

## 2023-10-05 PROCEDURE — 87147 CULTURE TYPE IMMUNOLOGIC: CPT | Performed by: SURGERY

## 2023-10-05 PROCEDURE — 87186 SC STD MICRODIL/AGAR DIL: CPT | Performed by: SURGERY

## 2023-10-05 PROCEDURE — 87077 CULTURE AEROBIC IDENTIFY: CPT | Performed by: SURGERY

## 2023-10-05 RX ORDER — CEFAZOLIN SODIUM 1 G/50ML
1000 SOLUTION INTRAVENOUS ONCE
Status: COMPLETED | OUTPATIENT
Start: 2023-10-05 | End: 2023-10-05

## 2023-10-05 RX ORDER — LANOLIN ALCOHOL/MO/W.PET/CERES
800 CREAM (GRAM) TOPICAL DAILY
Status: DISCONTINUED | OUTPATIENT
Start: 2023-10-06 | End: 2023-10-07 | Stop reason: HOSPADM

## 2023-10-05 RX ORDER — HEPARIN SODIUM 5000 [USP'U]/ML
5000 INJECTION, SOLUTION INTRAVENOUS; SUBCUTANEOUS EVERY 8 HOURS SCHEDULED
Status: DISCONTINUED | OUTPATIENT
Start: 2023-10-05 | End: 2023-10-07 | Stop reason: HOSPADM

## 2023-10-05 RX ORDER — CHLORHEXIDINE GLUCONATE ORAL RINSE 1.2 MG/ML
15 SOLUTION DENTAL ONCE
Status: COMPLETED | OUTPATIENT
Start: 2023-10-05 | End: 2023-10-05

## 2023-10-05 RX ORDER — ISOSORBIDE MONONITRATE 30 MG/1
30 TABLET, EXTENDED RELEASE ORAL EVERY MORNING
Status: DISCONTINUED | OUTPATIENT
Start: 2023-10-06 | End: 2023-10-07 | Stop reason: HOSPADM

## 2023-10-05 RX ORDER — LANOLIN ALCOHOL/MO/W.PET/CERES
9 CREAM (GRAM) TOPICAL
Status: DISCONTINUED | OUTPATIENT
Start: 2023-10-05 | End: 2023-10-07 | Stop reason: HOSPADM

## 2023-10-05 RX ORDER — EZETIMIBE 10 MG/1
10 TABLET ORAL DAILY
Status: DISCONTINUED | OUTPATIENT
Start: 2023-10-06 | End: 2023-10-07 | Stop reason: HOSPADM

## 2023-10-05 RX ORDER — ROPINIROLE 1 MG/1
0.5 TABLET, FILM COATED ORAL
Status: DISCONTINUED | OUTPATIENT
Start: 2023-10-05 | End: 2023-10-07 | Stop reason: HOSPADM

## 2023-10-05 RX ORDER — ATORVASTATIN CALCIUM 40 MG/1
40 TABLET, FILM COATED ORAL
Status: DISCONTINUED | OUTPATIENT
Start: 2023-10-05 | End: 2023-10-07 | Stop reason: HOSPADM

## 2023-10-05 RX ORDER — ALBUTEROL SULFATE 90 UG/1
2 AEROSOL, METERED RESPIRATORY (INHALATION) EVERY 6 HOURS PRN
Status: DISCONTINUED | OUTPATIENT
Start: 2023-10-05 | End: 2023-10-07 | Stop reason: HOSPADM

## 2023-10-05 RX ORDER — METOPROLOL SUCCINATE 50 MG/1
50 TABLET, EXTENDED RELEASE ORAL DAILY
Status: DISCONTINUED | OUTPATIENT
Start: 2023-10-06 | End: 2023-10-07 | Stop reason: HOSPADM

## 2023-10-05 RX ORDER — CLOPIDOGREL BISULFATE 75 MG/1
75 TABLET ORAL
Status: DISCONTINUED | OUTPATIENT
Start: 2023-10-05 | End: 2023-10-07 | Stop reason: HOSPADM

## 2023-10-05 RX ORDER — GABAPENTIN 300 MG/1
600 CAPSULE ORAL 3 TIMES DAILY
Status: DISCONTINUED | OUTPATIENT
Start: 2023-10-05 | End: 2023-10-07 | Stop reason: HOSPADM

## 2023-10-05 RX ORDER — PROPOFOL 10 MG/ML
INJECTION, EMULSION INTRAVENOUS CONTINUOUS PRN
Status: DISCONTINUED | OUTPATIENT
Start: 2023-10-05 | End: 2023-10-05

## 2023-10-05 RX ORDER — PROPOFOL 10 MG/ML
INJECTION, EMULSION INTRAVENOUS AS NEEDED
Status: DISCONTINUED | OUTPATIENT
Start: 2023-10-05 | End: 2023-10-05

## 2023-10-05 RX ORDER — LAMOTRIGINE 100 MG/1
100 TABLET ORAL
Status: DISCONTINUED | OUTPATIENT
Start: 2023-10-05 | End: 2023-10-07 | Stop reason: HOSPADM

## 2023-10-05 RX ORDER — FENTANYL CITRATE 50 UG/ML
INJECTION, SOLUTION INTRAMUSCULAR; INTRAVENOUS AS NEEDED
Status: DISCONTINUED | OUTPATIENT
Start: 2023-10-05 | End: 2023-10-05

## 2023-10-05 RX ORDER — HYDROMORPHONE HCL/PF 1 MG/ML
SYRINGE (ML) INJECTION AS NEEDED
Status: DISCONTINUED | OUTPATIENT
Start: 2023-10-05 | End: 2023-10-05

## 2023-10-05 RX ORDER — ACETAMINOPHEN 325 MG/1
650 TABLET ORAL EVERY 6 HOURS SCHEDULED
Status: DISCONTINUED | OUTPATIENT
Start: 2023-10-05 | End: 2023-10-07 | Stop reason: HOSPADM

## 2023-10-05 RX ORDER — OXYCODONE HYDROCHLORIDE 5 MG/1
5 TABLET ORAL EVERY 4 HOURS PRN
Status: DISCONTINUED | OUTPATIENT
Start: 2023-10-05 | End: 2023-10-07 | Stop reason: HOSPADM

## 2023-10-05 RX ORDER — LISINOPRIL 10 MG/1
10 TABLET ORAL DAILY
Status: DISCONTINUED | OUTPATIENT
Start: 2023-10-06 | End: 2023-10-07 | Stop reason: HOSPADM

## 2023-10-05 RX ORDER — ONDANSETRON 2 MG/ML
INJECTION INTRAMUSCULAR; INTRAVENOUS AS NEEDED
Status: DISCONTINUED | OUTPATIENT
Start: 2023-10-05 | End: 2023-10-05

## 2023-10-05 RX ORDER — GABAPENTIN 300 MG/1
300 CAPSULE ORAL
Status: DISCONTINUED | OUTPATIENT
Start: 2023-10-05 | End: 2023-10-07 | Stop reason: HOSPADM

## 2023-10-05 RX ORDER — MAGNESIUM HYDROXIDE 1200 MG/15ML
LIQUID ORAL AS NEEDED
Status: DISCONTINUED | OUTPATIENT
Start: 2023-10-05 | End: 2023-10-05 | Stop reason: HOSPADM

## 2023-10-05 RX ORDER — LIDOCAINE HYDROCHLORIDE 20 MG/ML
INJECTION, SOLUTION EPIDURAL; INFILTRATION; INTRACAUDAL; PERINEURAL AS NEEDED
Status: DISCONTINUED | OUTPATIENT
Start: 2023-10-05 | End: 2023-10-05

## 2023-10-05 RX ORDER — FENTANYL CITRATE/PF 50 MCG/ML
25 SYRINGE (ML) INJECTION
Status: COMPLETED | OUTPATIENT
Start: 2023-10-05 | End: 2023-10-05

## 2023-10-05 RX ORDER — SODIUM CHLORIDE 9 MG/ML
125 INJECTION, SOLUTION INTRAVENOUS CONTINUOUS
Status: DISCONTINUED | OUTPATIENT
Start: 2023-10-05 | End: 2023-10-05

## 2023-10-05 RX ORDER — ONDANSETRON 2 MG/ML
4 INJECTION INTRAMUSCULAR; INTRAVENOUS ONCE AS NEEDED
Status: DISCONTINUED | OUTPATIENT
Start: 2023-10-05 | End: 2023-10-05 | Stop reason: HOSPADM

## 2023-10-05 RX ADMIN — PROPOFOL 50 MG: 10 INJECTION, EMULSION INTRAVENOUS at 16:25

## 2023-10-05 RX ADMIN — CEFAZOLIN SODIUM 1000 MG: 1 SOLUTION INTRAVENOUS at 16:05

## 2023-10-05 RX ADMIN — FENTANYL CITRATE 25 MCG: 0.05 INJECTION, SOLUTION INTRAMUSCULAR; INTRAVENOUS at 17:45

## 2023-10-05 RX ADMIN — LIDOCAINE HYDROCHLORIDE 60 MG: 20 INJECTION, SOLUTION EPIDURAL; INFILTRATION; INTRACAUDAL at 16:20

## 2023-10-05 RX ADMIN — FENTANYL CITRATE 25 MCG: 0.05 INJECTION, SOLUTION INTRAMUSCULAR; INTRAVENOUS at 17:25

## 2023-10-05 RX ADMIN — SODIUM CHLORIDE 125 ML/HR: 0.9 INJECTION, SOLUTION INTRAVENOUS at 14:20

## 2023-10-05 RX ADMIN — PROPOFOL 130 MG: 10 INJECTION, EMULSION INTRAVENOUS at 16:20

## 2023-10-05 RX ADMIN — ONDANSETRON 4 MG: 2 INJECTION INTRAMUSCULAR; INTRAVENOUS at 16:45

## 2023-10-05 RX ADMIN — CHLORHEXIDINE GLUCONATE 15 ML: 1.2 RINSE ORAL at 14:36

## 2023-10-05 RX ADMIN — FENTANYL CITRATE 25 MCG: 0.05 INJECTION, SOLUTION INTRAMUSCULAR; INTRAVENOUS at 17:35

## 2023-10-05 RX ADMIN — HYDROMORPHONE HYDROCHLORIDE 0.5 MG: 1 INJECTION, SOLUTION INTRAMUSCULAR; INTRAVENOUS; SUBCUTANEOUS at 17:02

## 2023-10-05 RX ADMIN — PROPOFOL 20 MG: 10 INJECTION, EMULSION INTRAVENOUS at 16:24

## 2023-10-05 RX ADMIN — PROPOFOL 80 MCG/KG/MIN: 10 INJECTION, EMULSION INTRAVENOUS at 16:24

## 2023-10-05 RX ADMIN — FENTANYL CITRATE 50 MCG: 50 INJECTION INTRAMUSCULAR; INTRAVENOUS at 16:35

## 2023-10-05 RX ADMIN — FENTANYL CITRATE 25 MCG: 0.05 INJECTION, SOLUTION INTRAMUSCULAR; INTRAVENOUS at 17:15

## 2023-10-05 RX ADMIN — FENTANYL CITRATE 50 MCG: 50 INJECTION INTRAMUSCULAR; INTRAVENOUS at 16:31

## 2023-10-05 NOTE — ANESTHESIA PREPROCEDURE EVALUATION
Procedure:  L AKA incision washout & VAC placement (Left: Leg Lower)    Relevant Problems   CARDIO   (+) Aortoiliac occlusive disease (HCC)   (+) CAD (coronary artery disease)   (+) Essential hypertension   (+) Mixed hyperlipidemia   (+) PAF (paroxysmal atrial fibrillation) (HCC)      /RENAL   (+) Stage 3 chronic kidney disease, unspecified whether stage 3a or 3b CKD (HCC)      NEURO/PSYCH   (+) Bipolar affective disorder, depressed, severe (HCC)      Respiratory   (+) Non-small cell cancer of right lung (HCC)      Other   (+) 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)   (+) Tobacco abuse        Physical Exam    Airway    Mallampati score: II  TM Distance: >3 FB       Dental       Cardiovascular  Cardiovascular exam normal    Pulmonary  Pulmonary exam normal     Other Findings        Anesthesia Plan  ASA Score- 3     Anesthesia Type- general with ASA Monitors. Additional Monitors:   Airway Plan:           Plan Factors-    Chart reviewed. Patient is a current smoker. Patient instructed to abstain from smoking on day of procedure. Patient did not smoke on day of surgery. Obstructive sleep apnea risk education given perioperatively. Induction- intravenous. Postoperative Plan- Plan for postoperative opioid use. Planned trial extubation    Informed Consent- Anesthetic plan and risks discussed with patient.

## 2023-10-05 NOTE — OP NOTE
OPERATIVE REPORT  PATIENT NAME: Eliza Mack    :  1950  MRN: [id number]  Pt Location: [place] [facility] 09    SURGERY DATE: 10/5/2023    Surgeon(s) and Role:     Sejal Maldonado MD - Primary    Preop Diagnosis:  Nonhealing surgical wound; concern for infection    Post-Op Diagnosis Codes:  same    Procedure(s):  Left above knee amputation stump exploration, washout, debridement, and VAC placement    Specimen(s):  ID Type Source Tests Collected by Time Destination   A : LEFT STUMP WOUND Tissue Leg, Left ANAEROBIC CULTURE AND GRAM STAIN, CULTURE, TISSUE AND Milagros Elli Maldonado MD 10/5/2023 1641        Estimated Blood Loss:   20cc    Drains:  VAC    Anesthesia Type:   General LMA    Operative Indications:  Patient is a 69 yo F s/p L AKA 23 Deborah Lim, with prolonged area of nonhealing along stump incision and concern for infection. Presents for operative exploration. Operative Findings:  2cm open area and pinpoint open area laterally and another smaller medially with purulence expressed at start of procedure  Likely infection given purulent drainage from wound, mostly tracking along the lateral aspect of the stump approximately 10cm from the distal most aspect of the stump  Wound cultures were taken at the start of the case (after antibiotic started)    VAC: 1 black VAC sponge which is also extending into the lateral tract  Wound dimensions: 2cm W x 2cm D x 8cm L    Complications:   None    Procedure and Technique:  After informed consent was obtained, the patient was brought to the operating room and placed in the supine position. Perioperative IV antibiotics were given with IV ancef. She was given anesthesia and an LMA was placed. Cultures were taken from the open left stump wound. She was prepped and draped in the usual sterile fashion exposing the left stump circumferentially. A timeout was performed. The wound was examined and explored.   The bridge portion of the incision from one open area to the other was easily reopened manually. A deep tract was noted along the lateral aspect of the stump. Purulence seemed to focus from this tract. The deep closure of the wound appeared intact without any exposed bone. The wound was then irrigated with 1L solution with a pulsevac . The skin and subcutaneous tissue edges were then sharply debrided with a scalpel back to healthy tissue. Electrocautery was used for hemostasis. A single black sponge was then but to size and placed along the deep tract and along the superficial open wound. This was set to 125mmHg low continuous suction. She was allowed to awaken and was extubated. She was transferred to the PACU for postoperative care. I was present for the entire procedure.     Patient Disposition:  PACU         SIGNATURE: Kaylynn Maldonado MD  DATE: October 5, 2023  TIME: 5:04 PM

## 2023-10-05 NOTE — INTERVAL H&P NOTE
H&P reviewed. After examining the patient I find no changes in the patients condition since the H&P had been written.     Vitals:    10/05/23 1420   BP: 158/76   Pulse: 61   Resp: 18   Temp: (!) 97 °F (36.1 °C)   SpO2: 94%

## 2023-10-06 ENCOUNTER — DOCUMENTATION (OUTPATIENT)
Dept: VASCULAR SURGERY | Facility: CLINIC | Age: 73
End: 2023-10-06

## 2023-10-06 LAB
ANION GAP SERPL CALCULATED.3IONS-SCNC: 5 MMOL/L
BUN SERPL-MCNC: 10 MG/DL (ref 5–25)
CALCIUM SERPL-MCNC: 8.2 MG/DL (ref 8.4–10.2)
CHLORIDE SERPL-SCNC: 109 MMOL/L (ref 96–108)
CO2 SERPL-SCNC: 23 MMOL/L (ref 21–32)
CREAT SERPL-MCNC: 0.86 MG/DL (ref 0.6–1.3)
ERYTHROCYTE [DISTWIDTH] IN BLOOD BY AUTOMATED COUNT: 12.7 % (ref 11.6–15.1)
GFR SERPL CREATININE-BSD FRML MDRD: 67 ML/MIN/1.73SQ M
GLUCOSE SERPL-MCNC: 77 MG/DL (ref 65–140)
HCT VFR BLD AUTO: 38.4 % (ref 34.8–46.1)
HGB BLD-MCNC: 12.4 G/DL (ref 11.5–15.4)
MCH RBC QN AUTO: 29.5 PG (ref 26.8–34.3)
MCHC RBC AUTO-ENTMCNC: 32.3 G/DL (ref 31.4–37.4)
MCV RBC AUTO: 91 FL (ref 82–98)
PLATELET # BLD AUTO: 183 THOUSANDS/UL (ref 149–390)
PMV BLD AUTO: 9.9 FL (ref 8.9–12.7)
POTASSIUM SERPL-SCNC: 4.2 MMOL/L (ref 3.5–5.3)
RBC # BLD AUTO: 4.21 MILLION/UL (ref 3.81–5.12)
SODIUM SERPL-SCNC: 137 MMOL/L (ref 135–147)
WBC # BLD AUTO: 8.81 THOUSAND/UL (ref 4.31–10.16)

## 2023-10-06 PROCEDURE — 99024 POSTOP FOLLOW-UP VISIT: CPT | Performed by: NURSE PRACTITIONER

## 2023-10-06 PROCEDURE — 80048 BASIC METABOLIC PNL TOTAL CA: CPT | Performed by: SURGERY

## 2023-10-06 PROCEDURE — 85027 COMPLETE CBC AUTOMATED: CPT | Performed by: SURGERY

## 2023-10-06 RX ORDER — NICOTINE 21 MG/24HR
21 PATCH, TRANSDERMAL 24 HOURS TRANSDERMAL DAILY
Status: DISCONTINUED | OUTPATIENT
Start: 2023-10-06 | End: 2023-10-07 | Stop reason: HOSPADM

## 2023-10-06 RX ADMIN — LAMOTRIGINE 100 MG: 100 TABLET ORAL at 23:49

## 2023-10-06 RX ADMIN — ISOSORBIDE MONONITRATE 30 MG: 30 TABLET, EXTENDED RELEASE ORAL at 10:00

## 2023-10-06 RX ADMIN — OXYCODONE HYDROCHLORIDE 5 MG: 5 TABLET ORAL at 10:14

## 2023-10-06 RX ADMIN — EZETIMIBE 10 MG: 10 TABLET ORAL at 10:00

## 2023-10-06 RX ADMIN — CLOPIDOGREL BISULFATE 75 MG: 75 TABLET ORAL at 00:48

## 2023-10-06 RX ADMIN — DILTIAZEM HYDROCHLORIDE 120 MG: 30 TABLET, FILM COATED ORAL at 10:00

## 2023-10-06 RX ADMIN — OXYCODONE HYDROCHLORIDE 5 MG: 5 TABLET ORAL at 05:59

## 2023-10-06 RX ADMIN — GABAPENTIN 600 MG: 300 CAPSULE ORAL at 10:00

## 2023-10-06 RX ADMIN — ACETAMINOPHEN 325MG 650 MG: 325 TABLET ORAL at 05:54

## 2023-10-06 RX ADMIN — ATORVASTATIN CALCIUM 40 MG: 40 TABLET, FILM COATED ORAL at 16:56

## 2023-10-06 RX ADMIN — GABAPENTIN 600 MG: 300 CAPSULE ORAL at 16:56

## 2023-10-06 RX ADMIN — HEPARIN SODIUM 5000 UNITS: 5000 INJECTION INTRAVENOUS; SUBCUTANEOUS at 13:23

## 2023-10-06 RX ADMIN — GABAPENTIN 300 MG: 300 CAPSULE ORAL at 00:48

## 2023-10-06 RX ADMIN — GABAPENTIN 300 MG: 300 CAPSULE ORAL at 23:50

## 2023-10-06 RX ADMIN — CLOPIDOGREL BISULFATE 75 MG: 75 TABLET ORAL at 23:54

## 2023-10-06 RX ADMIN — ROPINIROLE 0.5 MG: 1 TABLET, FILM COATED ORAL at 00:48

## 2023-10-06 RX ADMIN — ACETAMINOPHEN 325MG 650 MG: 325 TABLET ORAL at 18:42

## 2023-10-06 RX ADMIN — ACETAMINOPHEN 325MG 650 MG: 325 TABLET ORAL at 23:49

## 2023-10-06 RX ADMIN — GABAPENTIN 600 MG: 300 CAPSULE ORAL at 01:01

## 2023-10-06 RX ADMIN — ACETAMINOPHEN 325MG 650 MG: 325 TABLET ORAL at 12:20

## 2023-10-06 RX ADMIN — LISINOPRIL 10 MG: 10 TABLET ORAL at 10:00

## 2023-10-06 RX ADMIN — OXYCODONE HYDROCHLORIDE 5 MG: 5 TABLET ORAL at 17:06

## 2023-10-06 RX ADMIN — ROPINIROLE 0.5 MG: 1 TABLET, FILM COATED ORAL at 23:49

## 2023-10-06 RX ADMIN — Medication 21 MG: at 13:23

## 2023-10-06 RX ADMIN — HEPARIN SODIUM 5000 UNITS: 5000 INJECTION INTRAVENOUS; SUBCUTANEOUS at 05:54

## 2023-10-06 RX ADMIN — Medication 9 MG: at 00:49

## 2023-10-06 RX ADMIN — FOLIC ACID TAB 400 MCG 800 MCG: 400 TAB at 10:00

## 2023-10-06 RX ADMIN — OXYCODONE HYDROCHLORIDE 5 MG: 5 TABLET ORAL at 00:58

## 2023-10-06 RX ADMIN — OXYCODONE HYDROCHLORIDE 5 MG: 5 TABLET ORAL at 23:54

## 2023-10-06 RX ADMIN — LAMOTRIGINE 100 MG: 100 TABLET ORAL at 00:48

## 2023-10-06 RX ADMIN — METOPROLOL SUCCINATE 50 MG: 50 TABLET, EXTENDED RELEASE ORAL at 10:00

## 2023-10-06 RX ADMIN — Medication 9 MG: at 23:54

## 2023-10-06 RX ADMIN — ACETAMINOPHEN 325MG 650 MG: 325 TABLET ORAL at 01:08

## 2023-10-06 NOTE — PROGRESS NOTES
[facility]  Progress Note  Name: Jess Grullon  MRN: [id number]  Unit/Bed#: E5 -01 I Date of Admission: 10/5/2023   Date of Service: 10/6/2023 I Hospital Day: 0    Assessment/Plan   Delayed postoperative wound closure  Assessment & Plan  67 yo female smoker w/ a hx of R non-small cell lung ca, CAD, HTN, PAF, HLD, CKD 3, S/P L AKA 7/21/23 w/ Dr. Jazmin Araiza w/ prolonged area of nonhealing along stump incision and concern for infection presented to [facility] on 10/5/23 for scheduled stump exploration. Pt underwent L AKA stump exploration, washout, debridement and vac placement in OR on 10/5/23. Wound cultures obtained at start of case (after ABX started). Wound dimensions 2cm W x2cm D x8cm L. Plan:  -S/P washout, debridement and wound vac placement on 10/5/23.  -Continue wound vac w/ first change on Monday, per Dr. Marisol Davies  -WBC 8.81 w/ no fever; no need for ABX at this time.  -Continue plavix, lipitor and zetia  -Case management following for dispo planning.  -Plan to D/C to home once home wound vac and VNA services are set up.  -Discussed w/ Dr. Marisol Davies. Subjective:  Pt seen for exam while sitting in bed; NAD. Pt admits to being a current smoker and is asking for a nicotine patch. Otherwise, pt denies any pain or further complaints at this time. Vitals:  /54   Pulse 67   Temp (!) 96 °F (35.6 °C) (Axillary)   Resp 16   Ht 5' 4" (1.626 m)   Wt 69.3 kg (152 lb 12.5 oz)   SpO2 92%   BMI 26.22 kg/m²     I/Os:  I/O last 3 completed shifts: In: 900 [I.V.:900]  Out: -   No intake/output data recorded.     Lab Results and Cultures:   Lab Results   Component Value Date    WBC 8.81 10/06/2023    HGB 12.4 10/06/2023    HCT 38.4 10/06/2023    MCV 91 10/06/2023     10/06/2023     Lab Results   Component Value Date    CALCIUM 8.2 (L) 10/06/2023    K 4.2 10/06/2023    CO2 23 10/06/2023     (H) 10/06/2023    BUN 10 10/06/2023    CREATININE 0.86 10/06/2023 Medications:  Current Facility-Administered Medications   Medication Dose Route Frequency   • acetaminophen (TYLENOL) tablet 650 mg  650 mg Oral Q6H [facility]   • albuterol (PROVENTIL HFA,VENTOLIN HFA) inhaler 2 puff  2 puff Inhalation Q6H PRN   • atorvastatin (LIPITOR) tablet 40 mg  40 mg Oral Daily With Dinner   • clopidogrel (PLAVIX) tablet 75 mg  75 mg Oral HS   • diltiazem (CARDIZEM) tablet 120 mg  120 mg Oral Daily   • ezetimibe (ZETIA) tablet 10 mg  10 mg Oral Daily   • folic acid (FOLVITE) tablet 800 mcg  800 mcg Oral Daily   • gabapentin (NEURONTIN) capsule 300 mg  300 mg Oral HS   • gabapentin (NEURONTIN) capsule 600 mg  600 mg Oral TID   • heparin (porcine) subcutaneous injection 5,000 Units  5,000 Units Subcutaneous Q8H [facility]   • isosorbide mononitrate (IMDUR) 24 hr tablet 30 mg  30 mg Oral QAM   • lactated ringers bolus 500 mL  500 mL Intravenous Once PRN    And   • lactated ringers bolus 500 mL  500 mL Intravenous Once PRN   • lamoTRIgine (LaMICtal) tablet 100 mg  100 mg Oral HS   • lisinopril (ZESTRIL) tablet 10 mg  10 mg Oral Daily   • melatonin tablet 9 mg  9 mg Oral HS PRN   • metoprolol succinate (TOPROL-XL) 24 hr tablet 50 mg  50 mg Oral Daily   • nicotine (NICODERM CQ) 21 mg/24 hr TD 24 hr patch 21 mg  21 mg Transdermal Daily   • oxyCODONE (ROXICODONE) IR tablet 5 mg  5 mg Oral Q4H PRN   • rOPINIRole (REQUIP) tablet 0.5 mg  0.5 mg Oral HS   • sodium chloride 0.9 % bolus 500 mL  500 mL Intravenous Once PRN    And   • sodium chloride 0.9 % bolus 500 mL  500 mL Intravenous Once PRN       Physical Exam:    General appearance: alert and oriented, in no acute distress  Skin: Skin color, texture, turgor normal. No rashes or lesions  Neurologic: Grossly normal  Head: Normocephalic, without obvious abnormality, atraumatic  Lungs: clear to auscultation bilaterally  Heart: regular rate and rhythm  Abdomen: soft, non-tender; bowel sounds normal; no masses,  no organomegaly  Extremities: extremities normal, warm and well-perfused; no cyanosis, clubbing, or edema; L AKA    Wound/Incision:  L AKA wound vac intact w/ good seal. Surrounding skin healthy and intact.     Pulse exam:  Radial: Right: 2+ Left[de-identified] 2+      LEV Francis  10/6/2023

## 2023-10-06 NOTE — CASE MANAGEMENT
[facility] has received request for [employer] wound vac from Care Manager. Request submitted via [employer] website.    Pending order #: [id number]

## 2023-10-06 NOTE — ANESTHESIA POSTPROCEDURE EVALUATION
Post-Op Assessment Note    CV Status:  Stable    Pain management: adequate     Mental Status:  Alert and awake   Hydration Status:  Euvolemic   PONV Controlled:  Controlled   Airway Patency:  Patent      Post Op Vitals Reviewed: Yes      Staff: Anesthesiologist, CRNA         No notable events documented.     BP      Temp     Pulse     Resp      SpO2      /75 (BP Location: Left arm)   Pulse 63   Temp (!) 97.3 °F (36.3 °C)   Resp 16   Ht 5' 4" (1.626 m)   Wt 69.3 kg (152 lb 12.5 oz)   SpO2 95%   BMI 26.22 kg/m²

## 2023-10-06 NOTE — PROGRESS NOTES
Vascular Nurse Navigator Post Op Education    Met with patient at bedside to introduce myself as Vascular Nurse Navigator and explained my role. Patient is appropriate and accepting to education. Patient was educated with Review of written materials provided, Teachback, Explanation, Demonstration and Question & Answer on expectations of post op care and recovery on Left above knee amputation stump exploration, washout, debridement, and VAC placement. Patient is a smoker  (1 1/2 ppd x 56 yrs), as such Smoking effects on the lungs, tobacco triggers and Smoking cessation was reviewed. Education provided to patient on infection prevention, activity limitations, when to call the office, importance of follow up, and incisional care. Discharge instruction handout provided to patient to review.

## 2023-10-06 NOTE — ASSESSMENT & PLAN NOTE
69 yo female smoker w/ a hx of R non-small cell lung ca, CAD, HTN, PAF, HLD, CKD 3, S/P L AKA 7/21/23 w/ Dr. Krystin Sen w/ prolonged area of nonhealing along stump incision and concern for infection presented to [facility] on 10/5/23 for scheduled stump exploration. Pt underwent L AKA stump exploration, washout, debridement and vac placement in OR on 10/5/23. Wound cultures obtained at start of case (after ABX started). Wound dimensions 2cm W x2cm D x8cm L. Plan:  -S/P washout, debridement and wound vac placement on 10/5/23.  -Continue wound vac w/ first change on Monday, per Dr. Adal Kessler  -WBC 8.81 w/ no fever; no need for ABX at this time.  -Continue plavix, lipitor and zetia  -Case management following for dispo planning.  -Plan to D/C to home once home wound vac and VNA services are set up.  -Discussed w/ Dr. Adal Kessler.

## 2023-10-06 NOTE — CASE MANAGEMENT
Case Management Discharge Planning Note    Patient name Vinny Justice  Location [facility] /E5 [address] [facility]-* MRN [id number]  : 1950 Date 10/6/2023       Current Admission Date: 10/5/2023  Current Admission Diagnosis:Aortoiliac occlusive disease [facility])   Patient Active Problem List    Diagnosis Date Noted   • Delayed postoperative wound closure 10/03/2023   • SCC (squamous cell carcinoma of lung) ([address]) 2023   • Non-small cell cancer of right lung ([address]) 2023   • Status post above-knee amputation of left lower extremity ([address]) 2023   • At risk for venous thromboembolism (VTE) 2023   • Pain 2023   • Lung mass 2023   • Insomnia 02/10/2023   • PAF (paroxysmal atrial fibrillation) ([address]) 2023   • Stage 3 chronic kidney disease, unspecified whether stage 3a or 3b CKD ([address]) 2023   • Dizziness 2023   • Tobacco abuse 2023   • Aortoiliac occlusive disease ([address]) 2022   • Mixed hyperlipidemia 2022   • Bipolar affective disorder, depressed, severe ([address]) 2017   • S/P vascular bypass 2017   • Hx of CABG 2016   • Presence of IVC filter 2016   • Essential hypertension 2016   • Thyroid nodule 2016   • CAD (coronary artery disease) 2014   • Renal artery stenosis ([address]) 2014   • PAD (peripheral artery disease) ([address]) 2014      LOS (days): 0  Geometric Mean LOS (GMLOS) (days):   Days to GMLOS:     OBJECTIVE:            Current admission status: Outpatient Surgery   Preferred Pharmacy:   [facility] [id number] [address] [place]  Phone: [phone] Fax: [phone]    Primary Care Provider: Kati Wilson DO    Primary Insurance: MEDICARE  Secondary Insurance:     DISCHARGE DETAILS:    Discharge planning discussed with[de-identified] patient  Freedom of Choice: Yes  Comments - Freedom of Choice: patient wants to resume care w/ [address] Is the patient interested in [facility] [address] at discharge?: Yes  [facility] requested[de-identified] [facility] Name[de-identified] [place] Provider[de-identified] PCP  Home Health Services Needed[de-identified] Other (comment) (wound vac)  Homebound Criteria Met[de-identified] Requires the Assistance of Another Person for Safe Ambulation or to Leave the Home, Uses an Assist Device (i.e. cane, walker, etc)  Supporting Clincal Findings[de-identified] Limited Endurance, Fatigues Easliy in [employer]    DME Referral Provided  Referral made for DME?: Yes  DME referral completed for the following items[de-identified] Other (wound vac)  DME Supplier Name[de-identified] Other (Add supplier name in comments) ([employer])    Other Referral/Resources/Interventions Provided:  Interventions: [address]    Treatment Team Recommendation: Home with [address]   Discharge Destination Plan[de-identified] Home with [address]     Additional Comments: Outpatient surgery patient. Vascular needs a wound vac for patient. [employer] form sent to [address] [place] [address] discharge support. [facility]EENA referral sent to [facility] (Patient was unsure which VNA she has currently) patient has [facility] and would like to resume care. Vascular asking to discharge patient today. CM will have to see if vac can get approved same day.

## 2023-10-07 VITALS
OXYGEN SATURATION: 93 % | DIASTOLIC BLOOD PRESSURE: 59 MMHG | HEIGHT: 64 IN | RESPIRATION RATE: 18 BRPM | TEMPERATURE: 98.3 F | BODY MASS INDEX: 26.05 KG/M2 | WEIGHT: 152.56 LBS | HEART RATE: 58 BPM | SYSTOLIC BLOOD PRESSURE: 103 MMHG

## 2023-10-07 PROCEDURE — 99024 POSTOP FOLLOW-UP VISIT: CPT | Performed by: SURGERY

## 2023-10-07 PROCEDURE — 2W0RX6Z CHANGE PRESSURE DRESSING ON LEFT LOWER LEG: ICD-10-PCS | Performed by: SURGERY

## 2023-10-07 PROCEDURE — NC001 PR NO CHARGE: Performed by: SURGERY

## 2023-10-07 RX ORDER — HYDROMORPHONE HCL IN WATER/PF 6 MG/30 ML
0.2 PATIENT CONTROLLED ANALGESIA SYRINGE INTRAVENOUS ONCE
Status: COMPLETED | OUTPATIENT
Start: 2023-10-07 | End: 2023-10-07

## 2023-10-07 RX ORDER — OXYCODONE HYDROCHLORIDE 5 MG/1
5 TABLET ORAL EVERY 8 HOURS PRN
Qty: 10 TABLET | Refills: 0 | Status: SHIPPED | OUTPATIENT
Start: 2023-10-07

## 2023-10-07 RX ADMIN — HEPARIN SODIUM 5000 UNITS: 5000 INJECTION INTRAVENOUS; SUBCUTANEOUS at 06:11

## 2023-10-07 RX ADMIN — Medication 21 MG: at 08:59

## 2023-10-07 RX ADMIN — GABAPENTIN 600 MG: 300 CAPSULE ORAL at 08:59

## 2023-10-07 RX ADMIN — HYDROMORPHONE HYDROCHLORIDE 0.2 MG: 0.2 INJECTION, SOLUTION INTRAMUSCULAR; INTRAVENOUS; SUBCUTANEOUS at 13:58

## 2023-10-07 RX ADMIN — ACETAMINOPHEN 325MG 650 MG: 325 TABLET ORAL at 06:10

## 2023-10-07 RX ADMIN — EZETIMIBE 10 MG: 10 TABLET ORAL at 08:59

## 2023-10-07 RX ADMIN — FOLIC ACID TAB 400 MCG 800 MCG: 400 TAB at 08:59

## 2023-10-07 RX ADMIN — ACETAMINOPHEN 325MG 650 MG: 325 TABLET ORAL at 11:38

## 2023-10-07 RX ADMIN — OXYCODONE HYDROCHLORIDE 5 MG: 5 TABLET ORAL at 13:09

## 2023-10-07 RX ADMIN — HEPARIN SODIUM 5000 UNITS: 5000 INJECTION INTRAVENOUS; SUBCUTANEOUS at 13:09

## 2023-10-07 RX ADMIN — OXYCODONE HYDROCHLORIDE 5 MG: 5 TABLET ORAL at 06:11

## 2023-10-07 NOTE — CASE MANAGEMENT
[address] [place] [address] has received approval to release wound vac to patient. Assigned vac #: [id number]  Proof of delivery PPW given to Care Manager to deliver to patient.

## 2023-10-07 NOTE — PLAN OF CARE
Problem: Prexisting or High Potential for Compromised Skin Integrity  Goal: Skin integrity is maintained or improved  Description: INTERVENTIONS:  - Identify patients at risk for skin breakdown  - Assess and monitor skin integrity  - Assess and monitor nutrition and hydration status  - Monitor labs   - Assess for incontinence   - Turn and reposition patient  - Assist with mobility/ambulation  - Relieve pressure over bony prominences  - Avoid friction and shearing  - Provide appropriate hygiene as needed including keeping skin clean and dry  - Evaluate need for skin moisturizer/barrier cream  - Collaborate with interdisciplinary team   - Patient/family teaching  - Consider wound care consult   Outcome: Progressing     Problem: MOBILITY - ADULT  Goal: Maintain or return to baseline ADL function  Description: INTERVENTIONS:  -  Assess patient's ability to carry out ADLs; assess patient's baseline for ADL function and identify physical deficits which impact ability to perform ADLs (bathing, care of mouth/teeth, toileting, grooming, dressing, etc.)  - Assess/evaluate cause of self-care deficits   - Assess range of motion  - Assess patient's mobility; develop plan if impaired  - Assess patient's need for assistive devices and provide as appropriate  - Encourage maximum independence but intervene and supervise when necessary  - Involve family in performance of ADLs  - Assess for home care needs following discharge   - Consider OT consult to assist with ADL evaluation and planning for discharge  - Provide patient education as appropriate  Outcome: Progressing  Goal: Maintains/Returns to pre admission functional level  Description: INTERVENTIONS:  - Perform BMAT or MOVE assessment daily.   - Set and communicate daily mobility goal to care team and patient/family/caregiver.    - Collaborate with rehabilitation services on mobility goals if consulted  - Out of bed for toileting  - Record patient progress and toleration of activity level   Outcome: Progressing     Problem: Nutrition/Hydration-ADULT  Goal: Nutrient/Hydration intake appropriate for improving, restoring or maintaining nutritional needs  Description: Monitor and assess patient's nutrition/hydration status for malnutrition. Collaborate with interdisciplinary team and initiate plan and interventions as ordered. Monitor patient's weight and dietary intake as ordered or per policy. Utilize nutrition screening tool and intervene as necessary. Determine patient's food preferences and provide high-protein, high-caloric foods as appropriate.      INTERVENTIONS:  - Monitor oral intake, urinary output, labs, and treatment plans  - Assess nutrition and hydration status and recommend course of action  - Evaluate amount of meals eaten  - Assist patient with eating if necessary   - Allow adequate time for meals  - Recommend/ encourage appropriate diets, oral nutritional supplements, and vitamin/mineral supplements  - Order, calculate, and assess calorie counts as needed  - Recommend, monitor, and adjust tube feedings and TPN/PPN based on assessed needs  - Assess need for intravenous fluids  - Provide specific nutrition/hydration education as appropriate  - Include patient/family/caregiver in decisions related to nutrition  Outcome: Progressing

## 2023-10-07 NOTE — PROCEDURES
Vascular Surgery  Bedside V.A.C. Procedure Note      Location of wound: Left AKA site    Dressings and Foam removed:  1 Black Foa  0 White Foam    Dimensions of wound: 8 cm x 1 cm x 2 cm    Description of wound: On inspection of the wound today, the wound was healthy appearing with no obvious signs of infection. The wound extends down to muscle. Approximately 100% of the wound base is now pink and healthy and there is granulation tissue. The periwound skin remains clean and intact and unremarkable. VAC dressing application:  The periwound skin was cleaned and dried. 1 black foam were cut to size of the wound and placed into the wound. The dressings were then covered with VAC drape. Additional VAC drape and black foam was used to create a base for the track pad. The track pad was then placed over the base of black foam. The VAC was then set to 125 mmHg low Continuous suction. The patient tolerated the procedure well and there were no complications. The patient did not require any excisional debridement during today's dressing change. VAC settings:  125 mmHg  Continuous    Wound Images: Additional Notes: The [facility] sticker placed over the dressing per protocol. The next [facility] dressing change will be planned for 10/9/2023. The wound vac was connected to the patients home vac and she was instructed on its use. This dressing change took greater than 15 minutes to complete.     Shanta Vera MD  10/7/2023 2:45 PM

## 2023-10-07 NOTE — DISCHARGE SUMMARY
Discharge Summary - Coco Gay 68 y.o. female MRN: [id number]    Unit/Bed#: E5 -01 Encounter: [id number]    Admission Date:   Admission Orders (From admission, onward)     Ordered        10/06/23 1634  Inpatient Admission  Once            10/05/23 1721  Place in Observation  Once                        Admitting Diagnosis: Delayed postoperative wound closure [Z48.1]    HPI: Patient is a 68year old female, current smoker, with PMH NSCLC, CAD s/p CABG, HTN, paroxysmal atrial fibrillation, HLD, CKD 3, and PAD. Patient has significant history of vascular intervention at outside facilities including fem-fem bypass s/p occlusion, right fem to BK pop bypass s/p occlusion (2010), bilateral iliac and femoral artery angioplasty and stenting (2014), and right axillary to bi-fem bypass (2017). Patient is now s/p L AKA 7/21/23 [facility]) and is presenting to the office today for incision recheck.     Patient is currently denying any fever or chills. Patient has visiting nurse that has been changing packing to L AKA wound daily. She denies any new erythema, warmth, increased tenderness, or drainage from wound. She reports continued phantom limb pain. She reports that her pain is adequately managed with as needed Tylenol and scheduled gabapentin at this time. Procedures Performed:   Orders Placed This Encounter   Procedures   • Wound vacum dressing application           76/8 Left above knee amputation stump exploration, washout, debridement, and VAC placement    Summary of Hospital Course: Patient was admitted for above listed procedure. She tolerated the procedure well with no immediate post-operative complications. See operative note for more details. Her post-operative course was uncomplicated. She was continued on plavix, lipitor, and zetia. Her wound vac was changed on 10/7 and patient tolerated it at bedside. Wound appeared healthy at that time with no signs of infection.  Home health was arranged and patient was deemed stable for discharge home with routine wound vac changes. At the time of discharge, the patient was tolerating a regular diet, voiding spontaneously, afebrile and hemodynamically stable, and her pain was controlled. Significant Findings, Care, Treatment and Services Provided: No results found. Complications: None    Discharge Diagnosis: Delayed postoperative wound closure [Z48.1]    Medical Problems     Resolved Problems  Date Reviewed: 9/26/2023   None         Condition at Discharge: stable         Discharge instructions/Information to patient and family:   See after visit summary for information provided to patient and family. Provisions for Follow-Up Care:  See after visit summary for information related to follow-up care and any pertinent home health orders. PCP: Shena Oneil DO    Disposition: See After Visit Summary for discharge disposition information. Planned Readmission: No      Discharge Statement   I spent 15 minutes discharging the patient. This time was spent on the day of discharge. I had direct contact with the patient on the day of discharge. Additional documentation is required if more than 30 minutes were spent on discharge. Discharge Medications:  See after visit summary for reconciled discharge medications provided to patient and family.

## 2023-10-07 NOTE — PLAN OF CARE
Problem: Prexisting or High Potential for Compromised Skin Integrity  Goal: Skin integrity is maintained or improved  Description: INTERVENTIONS:  - Identify patients at risk for skin breakdown  - Assess and monitor skin integrity  - Assess and monitor nutrition and hydration status  - Monitor labs   - Assess for incontinence   - Turn and reposition patient  - Assist with mobility/ambulation  - Relieve pressure over bony prominences  - Avoid friction and shearing  - Provide appropriate hygiene as needed including keeping skin clean and dry  - Evaluate need for skin moisturizer/barrier cream  - Collaborate with interdisciplinary team   - Patient/family teaching  - Consider wound care consult   Outcome: Progressing     Problem: MOBILITY - ADULT  Goal: Maintain or return to baseline ADL function  Description: INTERVENTIONS:  -  Assess patient's ability to carry out ADLs; assess patient's baseline for ADL function and identify physical deficits which impact ability to perform ADLs (bathing, care of mouth/teeth, toileting, grooming, dressing, etc.)  - Assess/evaluate cause of self-care deficits   - Assess range of motion  - Assess patient's mobility; develop plan if impaired  - Assess patient's need for assistive devices and provide as appropriate  - Encourage maximum independence but intervene and supervise when necessary  - Involve family in performance of ADLs  - Assess for home care needs following discharge   - Consider OT consult to assist with ADL evaluation and planning for discharge  - Provide patient education as appropriate  Outcome: Progressing  Goal: Maintains/Returns to pre admission functional level  Description: INTERVENTIONS:  - Perform BMAT or MOVE assessment daily.   - Set and communicate daily mobility goal to care team and patient/family/caregiver. - Collaborate with rehabilitation services on mobility goals if consulted  - Perform Range of Motion 3 times a day.   - Reposition patient every 2 hours.  - Dangle patient 3 times a day  - Stand patient 3 times a day  - Ambulate patient 3 times a day  - Out of bed to chair 3 times a day   - Out of bed for meals 3 times a day  - Out of bed for toileting  - Record patient progress and toleration of activity level   Outcome: Progressing     Problem: Nutrition/Hydration-ADULT  Goal: Nutrient/Hydration intake appropriate for improving, restoring or maintaining nutritional needs  Description: Monitor and assess patient's nutrition/hydration status for malnutrition. Collaborate with interdisciplinary team and initiate plan and interventions as ordered. Monitor patient's weight and dietary intake as ordered or per policy. Utilize nutrition screening tool and intervene as necessary. Determine patient's food preferences and provide high-protein, high-caloric foods as appropriate.      INTERVENTIONS:  - Monitor oral intake, urinary output, labs, and treatment plans  - Assess nutrition and hydration status and recommend course of action  - Evaluate amount of meals eaten  - Assist patient with eating if necessary   - Allow adequate time for meals  - Recommend/ encourage appropriate diets, oral nutritional supplements, and vitamin/mineral supplements  - Order, calculate, and assess calorie counts as needed  - Recommend, monitor, and adjust tube feedings and TPN/PPN based on assessed needs  - Assess need for intravenous fluids  - Provide specific nutrition/hydration education as appropriate  - Include patient/family/caregiver in decisions related to nutrition  Outcome: Progressing

## 2023-10-07 NOTE — NURSING NOTE
Patient discharged home with home health services. Discharge instructions reviewed and questions answered. All personal belongings sent alone.

## 2023-10-07 NOTE — DISCHARGE INSTR - AVS FIRST PAGE
DISCHARGE INSTRUCTIONS  LEG SURGERY    ACTIVITY:   Limit your physical activity for the first week and then increase your activity as tolerated. If you become short of breath or tired, stop and rest.  Most people tire easily for the first few weeks following leg surgery. This improves as conditioning returns. Avoid strenuous activity such as vigorous exercise. Avoid heavy lifting (do not lift more than 15 pounds) for the first four weeks after surgery. DIET:  Resume your normal diet. Good nutrition is important for healing of your incision. If you are discharged on narcotics for pain control, continue taking your stool softeners until you are having regular bowel movements. INCISION:  You should sponge bath while you have a wound VAC in place. The VAC will be changed Monday - Wednesday - Friday by a visiting nurse. DO NOT put any powders, creams, ointments, or lotions on your incision. LEG SWELLING: Most patients have noticeable leg swelling after leg surgery. Avoid sitting with the leg hanging down for prolonged periods of time. FOLLOW UP APPOINTMENTS:  Making and keeping follow up appointments are important to your recovery. If you have difficulty making it to or keeping your follow up appointments, call the office. If you have increased pain, fever >101.5, increased drainage, redness or a bad smell at your surgery site, new coldness/numbness of your arm or leg, please call us immediately and GO directly to the ER. PLEASE CALL THE OFFICE IF YOU HAVE ANY QUESTIONS  [phone]  [phone]  [address] [facility], [address], [place]), [address] [facility]  3000 [facility], [address]  [address]  [facility], [address]  [address] [facility], [address], [place], [address]  1001 [facility],Sixth Floor, 1st Floor, [place], [address]  [address], [address], [address], [address] [facility] [place]  [address], [address], [place]), [address] [facility]  1501 [facility], 1st Floor, [address], [place], [facility]5 [address] [place] Harjinder Grier, [address]

## 2023-10-07 NOTE — PROGRESS NOTES
Progress Note -vascular surgery  : Vascular surgery Resident on Scottie Jones 68 y.o. female MRN: [id number]  Unit/Bed#: E5 -01 Encounter: [id number]    Assessment:  68 y.o. female  smoker w/ a hx of R non-small cell lung ca, CAD, HTN, PAF, HLD, CKD 3, S/P L AKA 7/21/23 w/ Dr. Jazmin Araiza w/ prolonged area of nonhealing along stump incision and concern for infection presented to [facility] on 10/5/23 for scheduled stump exploration. Pt underwent L AKA stump exploration, washout, debridement and vac placement in OR on 10/5/23. Wound cultures obtained at start of case (after ABX started). Wound dimensions 2cm W x2cm D x8cm L. Desaturation to 89% SPO2 at 2301 on 10/6, otherwise vital signs stable within normal limits on 1 1/2 L nasal cannula    Urine output is 1 unmeasured  Left AKA wound VAC minimal dark serosanguineous    Plan:  Diet as tolerated  Encourage ambulation/out of bed, 3 times daily  Encourage incentive spirometer use, 10 times per hour  Follow-up case management for discharge planning today  Anticoagulation Plan-Lovenox  Infection Plan -none  Disposition Plan -pending home wound VAC and VNA per case management    Subjective/Objective     Subjective: No acute events overnight. Patient's pain is improving. Patient is tolerating a diet without nausea and vomiting. Patient denies fevers chills shortness of breath at this time. Patient is passing gas. Patient expressed no acute concerns the team this morning      Objective:     Physical Exam:  GEN: NAD  HEENT: MMM  CV: Perfused regular rate  Lung: Normal effort  Ab: Soft, ND/NT  Extrem: No lower extremity edema bilaterally, left AKA with wound VAC in good seal minimal dark serosanguineous output, tender to palpation laterally, no erythema  Neuro: A+Ox3     Vitals: Temp:  [97.4 °F (36.3 °C)-98.5 °F (36.9 °C)] 98.3 °F (36.8 °C)  HR:  [53-67] 58  Resp:  [18] 18  BP: (103-130)/(50-59) 103/59  Body mass index is 26.19 kg/m².     I/O 10/05 0701  10/06 0700 10/06 0701  10/07 0700 10/07 0701  10/08 0700    I.V. (mL/kg) 900 (13)      Total Intake(mL/kg) 900 (13)      Net +900             Unmeasured Urine Occurrence  1 x             Lab, Imaging and other studies: I have personally reviewed pertinent reports.   , CBC with diff: No results found for: "WBC", "HGB", "HCT", "MCV", "PLT", "ADJUSTEDWBC", "RBC", "MCH", "MCHC", "RDW", "MPV", "NRBC", BMP/CMP: No results found for: "SODIUM", "K", "CL", "CO2", "ANIONGAP", "BUN", "CREATININE", "GLUCOSE", "CALCIUM", "AST", "ALT", "ALKPHOS", "PROT", "BILITOT", "EGFR"  VTE Pharmacologic Prophylaxis: Enoxaparin (Lovenox)  VTE Mechanical Prophylaxis: sequential compression device      Zina Medina MD  10/7/2023 9:13 AM

## 2023-10-07 NOTE — CASE MANAGEMENT
Case Management Discharge Planning Note    Patient name Higinio Simon  [place] [address] /E5 [id number] [facility]-* MRN [id number]  : 1950 Date 10/7/2023       Current Admission Date: 10/5/2023  Current Admission Diagnosis:Aortoiliac occlusive disease [facility])   Patient Active Problem List    Diagnosis Date Noted   • Delayed postoperative wound closure 10/03/2023   • SCC (squamous cell carcinoma of lung) ([address]) 2023   • Non-small cell cancer of right lung ([address]) 2023   • Status post above-knee amputation of left lower extremity ([address]) 2023   • At risk for venous thromboembolism (VTE) 2023   • Pain 2023   • Lung mass 2023   • Insomnia 02/10/2023   • PAF (paroxysmal atrial fibrillation) ([address]) 2023   • Stage 3 chronic kidney disease, unspecified whether stage 3a or 3b CKD ([address]) 2023   • Dizziness 2023   • Tobacco abuse 2023   • Aortoiliac occlusive disease ([address]) 2022   • Mixed hyperlipidemia 2022   • Bipolar affective disorder, depressed, severe ([address]) 2017   • S/P vascular bypass 2017   • Hx of CABG 2016   • Presence of IVC filter 2016   • Essential hypertension 2016   • Thyroid nodule 2016   • CAD (coronary artery disease) 2014   • Renal artery stenosis ([address]) 2014   • PAD (peripheral artery disease) ([address]) 2014      LOS (days): 1  Geometric Mean LOS (GMLOS) (days):   Days to GMLOS:     OBJECTIVE:  Risk of Unplanned Readmission Score: 18.8         Current admission status: Inpatient   Preferred Pharmacy:   [address] [place] [address] [address]  Phone: [phone] Fax: [phone]    Primary Care Provider: Shad Velazquez DO    Primary Insurance: MEDICARE  Secondary Insurance:     DISCHARGE DETAILS:          Additional Comments: Wound vac delivered to patients room

## 2023-10-08 ENCOUNTER — HOME CARE VISIT (OUTPATIENT)
Dept: HOME HEALTH SERVICES | Facility: HOME HEALTHCARE | Age: 73
End: 2023-10-08
Payer: MEDICARE

## 2023-10-08 LAB
BACTERIA SPEC ANAEROBE CULT: NORMAL
BACTERIA TISS AEROBE CULT: ABNORMAL
GRAM STN SPEC: ABNORMAL

## 2023-10-09 ENCOUNTER — HOME CARE VISIT (OUTPATIENT)
Dept: HOME HEALTH SERVICES | Facility: HOME HEALTHCARE | Age: 73
End: 2023-10-09
Payer: MEDICARE

## 2023-10-09 VITALS
SYSTOLIC BLOOD PRESSURE: 148 MMHG | HEART RATE: 61 BPM | RESPIRATION RATE: 18 BRPM | DIASTOLIC BLOOD PRESSURE: 92 MMHG | TEMPERATURE: 97 F | OXYGEN SATURATION: 96 %

## 2023-10-09 LAB
BACTERIA TISS AEROBE CULT: ABNORMAL
GRAM STN SPEC: ABNORMAL

## 2023-10-09 PROCEDURE — G0299 HHS/HOSPICE OF RN EA 15 MIN: HCPCS

## 2023-10-09 NOTE — CASE COMMUNICATION
[facility] has admitted your patient to [facility] service with the following disciplines:      SN  This report is informational only, no responses is needed  Primary focus of home health care: Wound VAC change MWF  Patient stated goals of care: wound to heal without complication  Anticipated visit pattern and next visit date 3w8 10/11/23  See medication list - meds in home differ from AVS NONE  Significant clinical findings NONE   Potential barriers to goal achievement: Pain in tremendous amount of pain during VAC changes, encouraged to take oxycodone 1 hour before SN arrival    Thank you for allowing us to participate in the care of your patient.       Da CONNORA

## 2023-10-10 ENCOUNTER — TELEPHONE (OUTPATIENT)
Dept: VASCULAR SURGERY | Facility: CLINIC | Age: 73
End: 2023-10-10

## 2023-10-10 DIAGNOSIS — Z89.612 STATUS POST ABOVE-KNEE AMPUTATION OF LEFT LOWER EXTREMITY (HCC): ICD-10-CM

## 2023-10-10 RX ORDER — OXYCODONE HYDROCHLORIDE 5 MG/1
5 TABLET ORAL EVERY 8 HOURS PRN
Qty: 15 TABLET | Refills: 0 | Status: SHIPPED | OUTPATIENT
Start: 2023-10-10 | End: 2023-10-15

## 2023-10-10 NOTE — TELEPHONE ENCOUNTER
Reviewed. Can we please move up her appointment to more accurately assess surgical site for signs of infection and make further recommendations?

## 2023-10-10 NOTE — TELEPHONE ENCOUNTER
Pt is coming into the office on Friday. She states that she will not have enough oxycodone to last until then. She only has 2 tablets left.

## 2023-10-10 NOTE — TELEPHONE ENCOUNTER
Pt called the office to request refill of oxycodone or for "something stronger". She has 2 tablets left. She has been taking 1 tablet TID along w/ extra strength tylenol around the clock. She states that the oxycodone does help the pain but when she has the VAC changed the pain is "unbearable" and the oxycodone does not help during that time. The visiting nurse was out to change her VAC yesterday and she noted that pt was in a considerable amount of pain w/ VAC change. There is a picture under media tab. Pt also asking if an antibiotic needs to be ordered. Per result note from Dr. Jose Navas on wound culture, "Wound cultures positive for multiple organisms, proteus, pseudomonas, beta hemolytic strept B   Patient s/p washout and no s/s of infection   Continues outpatient wound care   Patient will be seen by Faiza Candelario 10/18/23 (who is cc'ed on this message).  If patient exhibited s/s of infection or had continued nonhealing, consider adding antibiotics". Pt states she feels "feverish" but states she does not have a fever. She reports that the wound is very "sensitive" and she cannot even touch the area due to the pain. Pt uses [employer] in [place]. Informed her I would send a message to our triage provider and we will call her back.

## 2023-10-10 NOTE — TELEPHONE ENCOUNTER
PDMP reviewed. Will provide new script today for oxycodone 5 mg q8h PRN. She can try taking 2 tablets (total of 10 mg) prior to vac changes to assist with pain control. OV scheduled 10/13 for post op evaluation. If she develops fevers, chills, or worsening symptoms she should notify the office or go to the ED.

## 2023-10-11 ENCOUNTER — HOME CARE VISIT (OUTPATIENT)
Dept: HOME HEALTH SERVICES | Facility: HOME HEALTHCARE | Age: 73
End: 2023-10-11
Payer: MEDICARE

## 2023-10-11 ENCOUNTER — TELEPHONE (OUTPATIENT)
Dept: VASCULAR SURGERY | Facility: CLINIC | Age: 73
End: 2023-10-11

## 2023-10-11 VITALS
OXYGEN SATURATION: 96 % | RESPIRATION RATE: 18 BRPM | SYSTOLIC BLOOD PRESSURE: 122 MMHG | HEART RATE: 68 BPM | DIASTOLIC BLOOD PRESSURE: 60 MMHG

## 2023-10-11 PROCEDURE — 400016 VN ROC

## 2023-10-11 PROCEDURE — G0299 HHS/HOSPICE OF RN EA 15 MIN: HCPCS

## 2023-10-11 NOTE — TELEPHONE ENCOUNTER
Problem: Bleeding (Knee Arthroplasty)  Goal: Absence of Bleeding  Outcome: Ongoing, Progressing   Goals ongoing   Vascular Nurse Navigator Post Op Call    Procedure:  Left above knee amputation stump exploration, washout, debridement, and VAC placement     Date of Procedure:  10/5/23    Surgeon:     Ye Maldonado MD - Primary     Discharge Date:  10/7/23    Discharge Disposition: Home with [facility]    Chest Pain?:No    Shortness of Breath?:No     Increased Pain, Swelling, Warmth, or Redness? :No    Purulent Drainage?:No    Foul- smelling drainage?: No    Signs of dehiscence?:No    Fever/Chills? :No    Bleeding?:No    Anticoagulation pt was discharged on post op?: Clopidogrel (Plavix)    Statin pt was discharged on post op?: Rosuvastatin (Crestor)    Uncontrolled Pain?:No    Plan for Prosthetic in Future?: Yes    Prosthetic Provider?:  [address] [place] [address]      Reviewed discharge instructions and incision care with patient. NEXT OFFICE VISIT SCHEDULED:  10/13/23 at 2:00 pm with LEV [facility] at The [address] [place] [address]    Transportation:  Yes      Any further questions/concerns? Patient stated that she is doing good since discharge. She stated that her pain is better controlled and the wound vac change went better today with the use of Tylenol, oxycodone, and ice. She stated that the nurse was out to see her today and said that her wound looks good and without any signs of infection. Reviewed discharge medications - Plavix. All questions answered. No concerns expressed at this time.

## 2023-10-13 ENCOUNTER — TELEPHONE (OUTPATIENT)
Dept: VASCULAR SURGERY | Facility: CLINIC | Age: 73
End: 2023-10-13

## 2023-10-13 ENCOUNTER — OFFICE VISIT (OUTPATIENT)
Dept: VASCULAR SURGERY | Facility: CLINIC | Age: 73
End: 2023-10-13

## 2023-10-13 ENCOUNTER — HOME CARE VISIT (OUTPATIENT)
Dept: HOME HEALTH SERVICES | Facility: HOME HEALTHCARE | Age: 73
End: 2023-10-13
Payer: MEDICARE

## 2023-10-13 VITALS — HEART RATE: 51 BPM | SYSTOLIC BLOOD PRESSURE: 124 MMHG | OXYGEN SATURATION: 91 % | DIASTOLIC BLOOD PRESSURE: 80 MMHG

## 2023-10-13 DIAGNOSIS — G47.00 INSOMNIA, UNSPECIFIED TYPE: ICD-10-CM

## 2023-10-13 DIAGNOSIS — G25.81 RESTLESS LEG SYNDROME: ICD-10-CM

## 2023-10-13 DIAGNOSIS — Z48.1 DELAYED POSTOPERATIVE WOUND CLOSURE: Primary | ICD-10-CM

## 2023-10-13 DIAGNOSIS — I25.810 CORONARY ARTERY DISEASE INVOLVING CORONARY BYPASS GRAFT OF NATIVE HEART WITHOUT ANGINA PECTORIS: ICD-10-CM

## 2023-10-13 PROCEDURE — 99024 POSTOP FOLLOW-UP VISIT: CPT | Performed by: NURSE PRACTITIONER

## 2023-10-13 RX ORDER — ROPINIROLE 0.5 MG/1
0.5 TABLET, FILM COATED ORAL
Qty: 30 TABLET | Refills: 0 | Status: SHIPPED | OUTPATIENT
Start: 2023-10-13

## 2023-10-13 RX ORDER — DILTIAZEM HYDROCHLORIDE 120 MG/1
120 TABLET, FILM COATED ORAL DAILY
Qty: 30 TABLET | Refills: 3 | Status: SHIPPED | OUTPATIENT
Start: 2023-10-13

## 2023-10-13 RX ORDER — TRAZODONE HYDROCHLORIDE 50 MG/1
50 TABLET ORAL
Qty: 30 TABLET | Refills: 0 | Status: SHIPPED | OUTPATIENT
Start: 2023-10-13

## 2023-10-13 NOTE — ASSESSMENT & PLAN NOTE
67 yo female smoker w/ a hx of R non-small cell lung ca, CAD, HTN, PAF, HLD, CKD 3, S/P L AKA 7/21/23 w/ Dr. Elina Caba w/ prolonged area of nonhealing along stump incision and concern for infection presented to [facility] on 10/5/23 for scheduled stump exploration. Pt underwent L AKA stump exploration, washout, debridement and vac placement in OR on 10/5/23 by Dr. Rad Gallegos. Pt returns to the office today for first post-op visit with wound vac. Wound vac taken down without incident. Reports she is doing well. Arrived in the office in the wheelchair with her grandson. POLI MARTELL noted to have diffuse swelling to the area. L AKA surgical incision measures 7.5 cm W x 1.2 cm D x 1.5 cm H. Wound bed demonstrates heathy tissue with red beefy granulation. See clinical photos. She denies any new erythema, warmth, increased tenderness, or drainage from wound. She reports continued phantom limb pain. She is taking oxycodone regularly, discussed using this for only severe breakthrough pain and she should transition to NSAID for pain relief. Discussed taking one oxycodone tablet instead of two prior to wound vac changes. Recommendations  -Return to the office in 2-3 weeks for wound re-check.  -Call the office for any new or worsening wounds, increase in pain, skin discoloration, increase in drainage or fever/ chills.   -Continue L AKA wound vac therapy using black foam with continuous suction at 125mmHg. To be changed every other day (M-W-F). Keep dressing clean, dry and intact. No showers/ tub baths.   -Advised to monitor R foot closely, do regular foot checks, wear well fitted footwear. F/U with podiatry regularly.   -Continue with statin therapy daily as per PCP. -Continue plavix, lipitor and zetia.

## 2023-10-13 NOTE — PROGRESS NOTES
Assessment/Plan:    Delayed postoperative wound closure  69 yo female smoker w/ a hx of R non-small cell lung ca, CAD, HTN, PAF, HLD, CKD 3, S/P L AKA 7/21/23 w/ Dr. Elaine Rao w/ prolonged area of nonhealing along stump incision and concern for infection presented to [facility] on 10/5/23 for scheduled stump exploration. Pt underwent L AKA stump exploration, washout, debridement and vac placement in OR on 10/5/23 by Dr. Sergey Peace. Pt returns to the office today for first post-op visit with wound vac. Wound vac taken down without incident. Reports she is doing well. Arrived in the office in the wheelchair with her grandson. POLI MARTELL noted to have diffuse swelling to the area. L AKA surgical incision measures 7.5 cm W x 1.2 cm D x 1.5 cm H. Wound bed demonstrates heathy tissue with red beefy granulation. See clinical photos. She denies any new erythema, warmth, increased tenderness, or drainage from wound. She reports continued phantom limb pain. She is taking oxycodone regularly, discussed using this for only severe breakthrough pain and she should transition to NSAID for pain relief. Discussed taking one oxycodone tablet instead of two prior to wound vac changes. Recommendations  -Return to the office in 2-3 weeks for wound re-check.  -Call the office for any new or worsening wounds, increase in pain, skin discoloration, increase in drainage or fever/ chills.   -Continue L AKA wound vac therapy using black foam with continuous suction at 125mmHg. To be changed every other day (M-W-F). Keep dressing clean, dry and intact. No showers/ tub baths.   -Advised to monitor R foot closely, do regular foot checks, wear well fitted footwear. F/U with podiatry regularly.   -Continue with statin therapy daily as per PCP. -Continue plavix, lipitor and zetia. Diagnoses and all orders for this visit:    Delayed postoperative wound closure          Subjective:      Patient ID: Marquita Almodovar is a 68 y.o. female. Patient is s/p L AKA washout and vac placement done 10/5/2023 by Dr. Doctor. Wound vac removed without incident. She denies fever or chills. She is taking Plavix, Zetia, and Rosuvastatin. 69 yo female smoker w/ a hx of R non-small cell lung ca, CAD, HTN, PAF, HLD, CKD 3, S/P L AKA 7/21/23 w/ Dr. Zachary Pereira w/ prolonged area of nonhealing along stump incision and concern for infection presented to [facility] on 10/5/23 for scheduled stump exploration. Pt underwent L AKA stump exploration, washout, debridement and vac placement in OR on 10/5/23. Arrived in the office in a wheelchair with her grandson. States she is transitioning to tylenol and gabapentin for pain. States she has some phantom limb pain at times. Visiting nurses are changing wound site M-W-F. States she had an issue with the wound vac yesterday. The wound vac lost suction due to a leak at the black foam site. The following portions of the patient's history were reviewed and updated as appropriate: allergies, current medications, past family history, past medical history, past social history, past surgical history, and problem list.    Review of Systems   Constitutional: Negative. HENT: Negative. Eyes: Negative. Respiratory: Negative. Negative for shortness of breath. Cardiovascular: Negative. Negative for chest pain. Gastrointestinal: Negative. Endocrine: Negative. Genitourinary: Negative. Musculoskeletal: Negative. Skin:  Positive for wound (L AKA site). Allergic/Immunologic: Negative. Neurological: Negative. Hematological: Negative. Psychiatric/Behavioral: Negative. Objective:      /80 (BP Location: Right arm, Patient Position: Sitting, Cuff Size: Standard)   Pulse (!) 51   SpO2 91%          Physical Exam  Vitals and nursing note reviewed. Constitutional:       Appearance: Normal appearance. HENT:      Head: Normocephalic and atraumatic.    Cardiovascular:      Rate and Rhythm: Normal rate and regular rhythm. Pulses:           Dorsalis pedis pulses are detected w/ Doppler on the right side. Left dorsalis pedis pulse not accessible. Posterior tibial pulses are detected w/ Doppler on the right side. Left posterior tibial pulse not accessible. Pulmonary:      Effort: Pulmonary effort is normal. No respiratory distress. Breath sounds: Normal breath sounds. Musculoskeletal:      Left Lower Extremity: Left leg is amputated above knee. Skin:     General: Skin is dry. Capillary Refill: Capillary refill takes more than 3 seconds. Neurological:      General: No focal deficit present. Mental Status: She is alert and oriented to person, place, and time. Psychiatric:         Mood and Affect: Mood normal.         Behavior: Behavior normal.           I have reviewed and made appropriate changes to the review of systems input by the medical assistant.     Vitals:    10/13/23 1409   BP: 124/80   BP Location: Right arm   Patient Position: Sitting   Cuff Size: Standard   Pulse: (!) 51   SpO2: 91%       Patient Active Problem List   Diagnosis    Bipolar affective disorder, depressed, severe (HCC)    CAD (coronary artery disease)    Essential hypertension    Hx of CABG    S/P vascular bypass    Thyroid nodule    Renal artery stenosis (HCC)    Presence of IVC filter    PAD (peripheral artery disease) (HCC)    Mixed hyperlipidemia    Aortoiliac occlusive disease (HCC)    Tobacco abuse    PAF (paroxysmal atrial fibrillation) (HCC)    Stage 3 chronic kidney disease, unspecified whether stage 3a or 3b CKD (HCC)    Dizziness    Insomnia    Lung mass    At risk for venous thromboembolism (VTE)    Pain    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)    SCC (squamous cell carcinoma of lung) (HCC)    Non-small cell cancer of right lung (HCC)    Delayed postoperative wound closure       Past Surgical History:   Procedure Laterality Date    AORTA - FEMORAL ARTERY BYPASS GRAFT Left     AXILLO-FEMORAL BYASS GRAFT Bilateral     BYPASS FEMORAL-FEMORAL      COLONOSCOPY      CORONARY ARTERY BYPASS GRAFT  2000    ENDOBRONCHIAL ULTRASOUND (EBUS) N/A 8/14/2023    Procedure: ENDOBRONCHIAL ULTRASOUND (EBUS) tissue biopsy;  Surgeon: Isabell Armijo MD;  Location: [facility];  Service: Thoracic    FEMORAL ARTERY - POPLITEAL ARTERY BYPASS GRAFT Bilateral     IVC FILTER INSERTION      MS AMPUTATION THIGH THROUGH FEMUR ANY LEVEL Left 07/21/2023    Procedure: AMPUTATION ABOVE KNEE (AKA); Surgeon: Juana Flores MD;  Location: [facility];  Service: Vascular     [address] INCL FLUOR GDNCE DX W/CELL WASHG [address] [facility] N/A 8/14/2023    Procedure: Seth Meléndez;  Surgeon: Isabell Armijo MD;  Location: [facility];  Service: Thoracic    TOTAL ABDOMINAL HYSTERECTOMY W/ BILATERAL SALPINGOOPHORECTOMY      TOTAL HIP ARTHROPLASTY Left     WOUND DEBRIDEMENT Left 10/5/2023    Procedure: Left above knee amputation stump exploration, washout, debridement, and VAC placement;  Surgeon: Arian Maldonado MD;  Location: [facility];  Service: Vascular       Family History   Problem Relation Age of Onset    Leukemia Mother 80    Cancer Sister     Anesthesia problems Neg Hx        Social History     Socioeconomic History    Marital status:       Spouse name: Not on file    Number of children: Not on file    Years of education: Not on file    Highest education level: Not on file   Occupational History    Not on file   Tobacco Use    Smoking status: Every Day     Packs/day: 1.00     Years: 50.00     Total pack years: 50.00     Types: Cigarettes     Start date: 5    Smokeless tobacco: Never    Tobacco comments:     Smokes daily - avg 15 cigs/daily           10/5 smoked 3 cigarettes today   Vaping Use    Vaping Use: Never used   Substance and Sexual Activity    Alcohol use: Not Currently     Alcohol/week: 50.0 standard drinks of alcohol     Types: 50 Cans of beer per week     Comment: no ETOH for 1.5 years Drug use: Never     Comment: Denies any drug use per pt    Sexual activity: Not Currently     Partners: Male     Comment: Not active at this time per pt   Other Topics Concern    Not on file   Social History Narrative    Not on file     Social Determinants of Health     Financial Resource Strain: Not on file   Food Insecurity: No Food Insecurity (7/19/2023)    Hunger Vital Sign     Worried About Running Out of Food in the Last Year: Never true     Ran Out of Food in the Last Year: Never true   Transportation Needs: No Transportation Needs (7/19/2023)    PRAPARE - Transportation     Lack of Transportation (Medical): No     Lack of Transportation (Non-Medical): No   Physical Activity: Not on file   Stress: Not on file   Social Connections: Not on file   Intimate Partner Violence: Not on file   Housing Stability: Low Risk  (7/19/2023)    Housing Stability Vital Sign     Unable to Pay for Housing in the Last Year: No     Number of Places Lived in the Last Year: 1     Unstable Housing in the Last Year: No       No Known Allergies      Current Outpatient Medications:     acetaminophen (TYLENOL) 325 mg tablet, Take 3 tablets (975 mg total) by mouth every 8 (eight) hours (Patient taking differently: Take 975 mg by mouth if needed), Disp: , Rfl:     clopidogrel (PLAVIX) 75 mg tablet, Take 1 tablet (75 mg total) by mouth in the morning Do not start before August 15, 2023.  (Patient taking differently: Take 75 mg by mouth daily at bedtime), Disp: 90 tablet, Rfl: 0    diltiazem (CARDIZEM) 120 MG tablet, Take 1 tablet (120 mg total) by mouth in the morning, Disp: 30 tablet, Rfl: 3    ezetimibe (ZETIA) 10 mg tablet, Take 1 tablet (10 mg total) by mouth daily, Disp: 30 tablet, Rfl: 5    gabapentin (Neurontin) 300 mg capsule, Take 2 capsules (600 mg total) by mouth 3 (three) times a day, Disp: 90 capsule, Rfl: 0    lamoTRIgine (LaMICtal) 100 mg tablet, Take 1 tablet (100 mg total) by mouth daily (Patient taking differently: Take 100 mg by mouth daily at bedtime), Disp: 90 tablet, Rfl: 1    lisinopril (ZESTRIL) 10 mg tablet, take 1 tablet by mouth once daily, Disp: 30 tablet, Rfl: 5    Melatonin 5 MG TABS, Take 4 tablets (20 mg total) by mouth daily at bedtime as needed (insomnia) Patient states she takes 20MG, Disp: , Rfl:     metoprolol succinate (TOPROL-XL) 50 mg 24 hr tablet, Take 1 tablet (50 mg total) by mouth daily, Disp: 30 tablet, Rfl: 3    oxyCODONE (Roxicodone) 5 immediate release tablet, Take 1 tablet (5 mg total) by mouth every 8 (eight) hours as needed for severe pain for up to 5 days Take 1-2 tablets (5-10 mg total) by mouth 30 minutes prior to wound vac change as needed.  Max Daily Amount: 15 mg, Disp: 15 tablet, Rfl: 0    rOPINIRole (REQUIP) 0.5 mg tablet, Take 1 tablet (0.5 mg total) by mouth daily at bedtime, Disp: 30 tablet, Rfl: 0    rosuvastatin (CRESTOR) 20 MG tablet, Take 20 mg by mouth daily at bedtime, Disp: , Rfl:     traZODone (DESYREL) 50 mg tablet, Take 1 tablet (50 mg total) by mouth daily at bedtime as needed for sleep, Disp: 30 tablet, Rfl: 0    albuterol (ProAir HFA) 90 mcg/act inhaler, Inhale 2 puffs every 6 (six) hours as needed for wheezing, Disp: 8.5 g, Rfl: 2    Folic Acid 0.8 MG CAPS, Take 1 capsule (0.8 mg total) by mouth in the morning, Disp: 90 capsule, Rfl: 1    gabapentin (Neurontin) 300 mg capsule, Take 1 capsule (300 mg total) by mouth daily at bedtime, Disp: 90 capsule, Rfl: 0    gabapentin (Neurontin) 300 mg capsule, Take 1 capsule (300 mg total) by mouth 3 (three) times a day, Disp: 270 capsule, Rfl: 0    isosorbide mononitrate (IMDUR) 30 mg 24 hr tablet, Take 30 mg by mouth every morning, Disp: , Rfl:     saccharomyces boulardii (FLORASTOR) 250 mg capsule, Take 1 capsule (250 mg total) by mouth 2 (two) times a day, Disp: 60 capsule, Rfl: 0  I have spent a total time of 30 minutes on 10/13/23 in caring for this patient including Instructions for management, Patient and family education, Importance of tx compliance, Risk factor reductions, Documenting in the medical record, Reviewing / ordering tests, medicine, procedures  , and Obtaining or reviewing history  .

## 2023-10-13 NOTE — PATIENT INSTRUCTIONS
-Return to the office in 2-3 weeks for wound re-check.    -Call the office for any new or worsening wounds, increase in pain, skin discoloration, increase in drainage or fever/ chills.     -Continue LUE wound vac therapy using black foam with continuous suction at 125mmHg. To be changed every other day (M-W-F). Keep dressing clean, dry and intact. No showers/ tub baths.     -Monitor R foot and toes daily, wear comfortable shoes, call the office with any new or worsening wounds/ open areas.    -Continue with plavix, zetia and statin therapy daily as per PCP.

## 2023-10-15 DIAGNOSIS — E78.2 MIXED HYPERLIPIDEMIA: ICD-10-CM

## 2023-10-16 ENCOUNTER — HOME CARE VISIT (OUTPATIENT)
Dept: HOME HEALTH SERVICES | Facility: HOME HEALTHCARE | Age: 73
End: 2023-10-16
Payer: MEDICARE

## 2023-10-16 VITALS
DIASTOLIC BLOOD PRESSURE: 80 MMHG | SYSTOLIC BLOOD PRESSURE: 142 MMHG | OXYGEN SATURATION: 91 % | RESPIRATION RATE: 16 BRPM | HEART RATE: 77 BPM

## 2023-10-16 PROCEDURE — G0299 HHS/HOSPICE OF RN EA 15 MIN: HCPCS

## 2023-10-16 RX ORDER — EZETIMIBE 10 MG/1
10 TABLET ORAL DAILY
Qty: 30 TABLET | Refills: 5 | Status: SHIPPED | OUTPATIENT
Start: 2023-10-16

## 2023-10-18 ENCOUNTER — HOME CARE VISIT (OUTPATIENT)
Dept: HOME HEALTH SERVICES | Facility: HOME HEALTHCARE | Age: 73
End: 2023-10-18
Payer: MEDICARE

## 2023-10-18 VITALS
SYSTOLIC BLOOD PRESSURE: 138 MMHG | RESPIRATION RATE: 20 BRPM | OXYGEN SATURATION: 94 % | HEART RATE: 73 BPM | DIASTOLIC BLOOD PRESSURE: 68 MMHG

## 2023-10-18 PROCEDURE — G0299 HHS/HOSPICE OF RN EA 15 MIN: HCPCS

## 2023-10-19 ENCOUNTER — TELEPHONE (OUTPATIENT)
Dept: VASCULAR SURGERY | Facility: CLINIC | Age: 73
End: 2023-10-19

## 2023-10-19 NOTE — TELEPHONE ENCOUNTER
----- Message from Etta sent at 10/18/2023 10:55 PM EDT -----  Regarding: Wound care orders  Hello,    Wound care diann texted me today about changing some wound care orders for this patient. The patient is refusing to have the wound vac placed on at this time and some yellow slough is building up in the area. Can you place a note to the visiting nurse to cleanse surgical site with saline, place small strip of calcium alginate with silver to the open area and cover with wet to dry dressing. Call the office with any additional concerns.      Thank you,    Daxa High

## 2023-10-19 NOTE — TELEPHONE ENCOUNTER
I s/w Ila at [facility] and made her aware of recommendations, she can also see Evelyn's note in chart.

## 2023-10-20 ENCOUNTER — HOME CARE VISIT (OUTPATIENT)
Dept: HOME HEALTH SERVICES | Facility: HOME HEALTHCARE | Age: 73
End: 2023-10-20
Payer: MEDICARE

## 2023-10-20 VITALS
TEMPERATURE: 97.8 F | RESPIRATION RATE: 18 BRPM | SYSTOLIC BLOOD PRESSURE: 142 MMHG | DIASTOLIC BLOOD PRESSURE: 80 MMHG | HEART RATE: 76 BPM | OXYGEN SATURATION: 97 %

## 2023-10-20 PROCEDURE — G0299 HHS/HOSPICE OF RN EA 15 MIN: HCPCS

## 2023-10-23 ENCOUNTER — HOME CARE VISIT (OUTPATIENT)
Dept: HOME HEALTH SERVICES | Facility: HOME HEALTHCARE | Age: 73
End: 2023-10-23
Payer: MEDICARE

## 2023-10-23 VITALS
RESPIRATION RATE: 18 BRPM | DIASTOLIC BLOOD PRESSURE: 78 MMHG | SYSTOLIC BLOOD PRESSURE: 142 MMHG | OXYGEN SATURATION: 98 % | TEMPERATURE: 98.7 F | HEART RATE: 78 BPM

## 2023-10-23 PROCEDURE — G0299 HHS/HOSPICE OF RN EA 15 MIN: HCPCS

## 2023-10-25 ENCOUNTER — HOME CARE VISIT (OUTPATIENT)
Dept: HOME HEALTH SERVICES | Facility: HOME HEALTHCARE | Age: 73
End: 2023-10-25
Payer: MEDICARE

## 2023-10-25 VITALS
HEART RATE: 72 BPM | SYSTOLIC BLOOD PRESSURE: 138 MMHG | OXYGEN SATURATION: 93 % | RESPIRATION RATE: 18 BRPM | DIASTOLIC BLOOD PRESSURE: 68 MMHG

## 2023-10-25 PROCEDURE — G0299 HHS/HOSPICE OF RN EA 15 MIN: HCPCS

## 2023-10-27 ENCOUNTER — HOME CARE VISIT (OUTPATIENT)
Dept: HOME HEALTH SERVICES | Facility: HOME HEALTHCARE | Age: 73
End: 2023-10-27
Payer: MEDICARE

## 2023-10-27 VITALS
DIASTOLIC BLOOD PRESSURE: 62 MMHG | OXYGEN SATURATION: 94 % | SYSTOLIC BLOOD PRESSURE: 128 MMHG | HEART RATE: 68 BPM | RESPIRATION RATE: 18 BRPM

## 2023-10-27 PROCEDURE — G0299 HHS/HOSPICE OF RN EA 15 MIN: HCPCS

## 2023-10-30 ENCOUNTER — HOME CARE VISIT (OUTPATIENT)
Dept: HOME HEALTH SERVICES | Facility: HOME HEALTHCARE | Age: 73
End: 2023-10-30
Payer: MEDICARE

## 2023-10-30 ENCOUNTER — OFFICE VISIT (OUTPATIENT)
Dept: VASCULAR SURGERY | Facility: CLINIC | Age: 73
End: 2023-10-30
Payer: MEDICARE

## 2023-10-30 VITALS — OXYGEN SATURATION: 92 % | HEART RATE: 73 BPM | DIASTOLIC BLOOD PRESSURE: 76 MMHG | SYSTOLIC BLOOD PRESSURE: 136 MMHG

## 2023-10-30 DIAGNOSIS — Z48.1 DELAYED POSTOPERATIVE WOUND CLOSURE: ICD-10-CM

## 2023-10-30 DIAGNOSIS — I74.09 AORTOILIAC OCCLUSIVE DISEASE (HCC): ICD-10-CM

## 2023-10-30 DIAGNOSIS — I73.9 PAD (PERIPHERAL ARTERY DISEASE) (HCC): Primary | ICD-10-CM

## 2023-10-30 PROCEDURE — 99213 OFFICE O/P EST LOW 20 MIN: CPT | Performed by: NURSE PRACTITIONER

## 2023-10-30 NOTE — PROGRESS NOTES
Assessment/Plan:    Delayed postoperative wound closure  67 yo female current smoker w/ a hx of R non-small cell lung ca, CAD, HTN, PAF, HLD, CKD 3, S/P L AKA 7/21/23 w/ Dr. Sara Beltran w/ prolonged area of nonhealing along stump incision and concern for infection presented to [facility] on 10/5/23 for scheduled stump exploration. Pt underwent L AKA stump exploration, washout, debridement and vac placement in OR on 10/5/23 by Dr. Rad Perry Doctor. Pt returns to the office today for first post-op visit with wound vac. Wound vac taken down October 18th by visiting nurse at the request of the patient. She reports her wound has been healing well since wound vac d/c. Reports she is doing well. Arrived in the office in the wheelchair with her grandson. Denies any pain and is no longer taking any pain medications at this time. L AKA demonstrates majority healed. Incision site is clean and dry, well approximated with scant drainage. Small (<.03cm) dehiscence at the medial aspect of incision site. See clinical photos. She denies any new erythema, warmth, increased tenderness, or drainage from wound. She reports occasional phantom limb pain. Recommendations  -Return to the office in 3-4 weeks for wound re-check.  -Continue with daily wound care to the L AKA. Wash incision site daily with saline, pat dry, cover with calcium alginate with silver, ABD and Micha  -Call the office for any new or worsening wounds, increase in pain, skin discoloration, increase in drainage or fever/ chills. -Advised to monitor R foot closely, do regular foot checks, wear well fitted footwear. F/U with podiatry regularly.   -Continue with statin therapy daily as per PCP. -Continue plavix, lipitor and zetia.        Diagnoses and all orders for this visit:    PAD (peripheral artery disease) (HCC)  -     VAS lower limb arterial duplex, limited/unilateral; Future    Aortoiliac occlusive disease (HCC)  -     VAS lower limb arterial duplex, limited/unilateral; Future    Delayed postoperative wound closure          Subjective:      Patient ID: Valentine Haro is a 68 y.o. female. Patient returns to office for L AKA wound check. Bandages removed without incident. Wound has improved. She is taking Plavix, Zetia, and Rosuvastatin. She is a current smoker. 67 yo female smoker w/ a hx of R non-small cell lung ca, CAD, HTN, PAF, HLD, CKD 3, S/P L AKA 7/21/23 w/ Dr. Reji Pearl w/ prolonged area of nonhealing along stump incision and concern for infection presented to [facility] on 10/5/23 for scheduled stump exploration. Pt underwent L AKA stump exploration, washout, debridement and vac placement in OR on 10/5/23 by Dr. Juan Maldonado. In the office in a wheelchair accompanied by her grandson. Pt presents without complaint. States that she has no pain and is no longer taking any pain medication. Wound vac has been off for several weeks, Visiting nurses are coming 3 times a week to provide dressing changes. Denies pain in the R foot, denies rest pain, no wounds/ tissue loss in the R foot. Continues to smoke 1ppd, not interested in quitting. BP re-check is 136/76 mmHg. The following portions of the patient's history were reviewed and updated as appropriate: allergies, current medications, past family history, past medical history, past social history, past surgical history, and problem list.    Review of Systems   Constitutional: Negative. HENT: Negative. Eyes: Negative. Respiratory: Negative. Negative for shortness of breath. Cardiovascular: Negative. Negative for chest pain. Gastrointestinal: Negative. Endocrine: Negative. Genitourinary: Negative. Musculoskeletal: Negative. Skin:  Positive for wound (L AKA site). Allergic/Immunologic: Negative. Neurological: Negative. Hematological: Negative. Psychiatric/Behavioral: Negative.            Objective:      /76 (BP Location: Right arm)   Pulse 73   SpO2 92% Physical Exam  Vitals and nursing note reviewed. Constitutional:       Appearance: Normal appearance. HENT:      Head: Normocephalic and atraumatic. Cardiovascular:      Rate and Rhythm: Normal rate and regular rhythm. Pulses:           Radial pulses are 2+ on the right side and 2+ on the left side. Dorsalis pedis pulses are 0 on the right side. Left dorsalis pedis pulse not accessible. Posterior tibial pulses are 0 on the right side. Left posterior tibial pulse not accessible. Heart sounds: No murmur heard. Pulmonary:      Effort: Pulmonary effort is normal. No respiratory distress. Breath sounds: Normal breath sounds. Skin:     General: Skin is warm and dry. Neurological:      General: No focal deficit present. Mental Status: She is alert and oriented to person, place, and time. Psychiatric:         Mood and Affect: Mood normal.         Behavior: Behavior normal.         I have reviewed and made appropriate changes to the review of systems input by the medical assistant.           Vitals:    10/30/23 0829 10/30/23 0905   BP: 170/78 136/76   BP Location: Right arm Right arm   Patient Position: Sitting    Cuff Size: Standard    Pulse: 73    SpO2: 92%        Patient Active Problem List   Diagnosis    Bipolar affective disorder, depressed, severe (HCC)    CAD (coronary artery disease)    Essential hypertension    Hx of CABG    S/P vascular bypass    Thyroid nodule    Renal artery stenosis (HCC)    Presence of IVC filter    PAD (peripheral artery disease) (HCC)    Mixed hyperlipidemia    Aortoiliac occlusive disease (HCC)    Tobacco abuse    PAF (paroxysmal atrial fibrillation) (HCC)    Stage 3 chronic kidney disease, unspecified whether stage 3a or 3b CKD (HCC)    Dizziness    Insomnia    Lung mass    At risk for venous thromboembolism (VTE)    Pain    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)    SCC (squamous cell carcinoma of lung) ([address]) Non-small cell cancer of right lung ([address] [facility])    Delayed postoperative wound closure       Past Surgical History:   Procedure Laterality Date    AORTA - FEMORAL ARTERY BYPASS GRAFT Left     AXILLO-FEMORAL BYASS GRAFT Bilateral     BYPASS FEMORAL-FEMORAL      COLONOSCOPY      CORONARY ARTERY BYPASS GRAFT  2000    ENDOBRONCHIAL ULTRASOUND (EBUS) N/A 8/14/2023    Procedure: ENDOBRONCHIAL ULTRASOUND (EBUS) tissue biopsy;  Surgeon: Neil Miramontes MD;  Location: [facility];  Service: Thoracic    FEMORAL ARTERY - POPLITEAL ARTERY BYPASS GRAFT Bilateral     IVC FILTER INSERTION      LA AMPUTATION THIGH THROUGH FEMUR ANY LEVEL Left 07/21/2023    Procedure: AMPUTATION ABOVE KNEE (AKA); Surgeon: Aldair Durand MD;  Location: [facility];  Service: Vascular     [address] INCL FLUOR GDNCE DX W/CELL WASHG [address] [facility] N/A 8/14/2023    Procedure: Darron Baldwin;  Surgeon: Neil Miramontes MD;  Location: [facility];  Service: Thoracic    TOTAL ABDOMINAL HYSTERECTOMY W/ BILATERAL SALPINGOOPHORECTOMY      TOTAL HIP ARTHROPLASTY Left     WOUND DEBRIDEMENT Left 10/5/2023    Procedure: Left above knee amputation stump exploration, washout, debridement, and VAC placement;  Surgeon: Kelley Maldonado MD;  Location: [facility];  Service: Vascular       Family History   Problem Relation Age of Onset    Leukemia Mother 80    Cancer Sister     Anesthesia problems Neg Hx        Social History     Socioeconomic History    Marital status:       Spouse name: Not on file    Number of children: Not on file    Years of education: Not on file    Highest education level: Not on file   Occupational History    Not on file   Tobacco Use    Smoking status: Every Day     Packs/day: 1.00     Years: 50.00     Total pack years: 50.00     Types: Cigarettes     Start date: 5    Smokeless tobacco: Never    Tobacco comments:     Smokes daily - avg 15 cigs/daily           10/5 smoked 3 cigarettes today   Vaping Use    Vaping Use: Never used   Substance and Sexual Activity    Alcohol use: Not Currently     Alcohol/week: 50.0 standard drinks of alcohol     Types: 50 Cans of beer per week     Comment: no ETOH for 1.5 years    Drug use: Never     Comment: Denies any drug use per pt    Sexual activity: Not Currently     Partners: Male     Comment: Not active at this time per pt   Other Topics Concern    Not on file   Social History Narrative    Not on file     Social Determinants of Health     Financial Resource Strain: Not on file   Food Insecurity: No Food Insecurity (7/19/2023)    Hunger Vital Sign     Worried About Running Out of Food in the Last Year: Never true     Ran Out of Food in the Last Year: Never true   Transportation Needs: No Transportation Needs (7/19/2023)    PRAPARE - Transportation     Lack of Transportation (Medical): No     Lack of Transportation (Non-Medical): No   Physical Activity: Not on file   Stress: Not on file   Social Connections: Not on file   Intimate Partner Violence: Not on file   Housing Stability: Low Risk  (7/19/2023)    Housing Stability Vital Sign     Unable to Pay for Housing in the Last Year: No     Number of Places Lived in the Last Year: 1     Unstable Housing in the Last Year: No       No Known Allergies      Current Outpatient Medications:     acetaminophen (TYLENOL) 325 mg tablet, Take 3 tablets (975 mg total) by mouth every 8 (eight) hours (Patient taking differently: Take 975 mg by mouth if needed), Disp: , Rfl:     albuterol (ProAir HFA) 90 mcg/act inhaler, Inhale 2 puffs every 6 (six) hours as needed for wheezing, Disp: 8.5 g, Rfl: 2    clopidogrel (PLAVIX) 75 mg tablet, Take 1 tablet (75 mg total) by mouth in the morning Do not start before August 15, 2023.  (Patient taking differently: Take 75 mg by mouth daily at bedtime), Disp: 90 tablet, Rfl: 0    diltiazem (CARDIZEM) 120 MG tablet, Take 1 tablet (120 mg total) by mouth in the morning, Disp: 30 tablet, Rfl: 3    ezetimibe (ZETIA) 10 mg tablet, take 1 tablet by mouth once daily, Disp: 30 tablet, Rfl: 5    gabapentin (Neurontin) 300 mg capsule, Take 2 capsules (600 mg total) by mouth 3 (three) times a day, Disp: 90 capsule, Rfl: 0    gabapentin (Neurontin) 300 mg capsule, Take 1 capsule (300 mg total) by mouth daily at bedtime, Disp: 90 capsule, Rfl: 0    gabapentin (Neurontin) 300 mg capsule, Take 1 capsule (300 mg total) by mouth 3 (three) times a day, Disp: 270 capsule, Rfl: 0    lamoTRIgine (LaMICtal) 100 mg tablet, Take 1 tablet (100 mg total) by mouth daily (Patient taking differently: Take 100 mg by mouth daily at bedtime), Disp: 90 tablet, Rfl: 1    lisinopril (ZESTRIL) 10 mg tablet, take 1 tablet by mouth once daily, Disp: 30 tablet, Rfl: 5    Melatonin 5 MG TABS, Take 4 tablets (20 mg total) by mouth daily at bedtime as needed (insomnia) Patient states she takes 20MG, Disp: , Rfl:     metoprolol succinate (TOPROL-XL) 50 mg 24 hr tablet, Take 1 tablet (50 mg total) by mouth daily, Disp: 30 tablet, Rfl: 3    rOPINIRole (REQUIP) 0.5 mg tablet, Take 1 tablet (0.5 mg total) by mouth daily at bedtime, Disp: 30 tablet, Rfl: 0    rosuvastatin (CRESTOR) 20 MG tablet, Take 20 mg by mouth daily at bedtime, Disp: , Rfl:     saccharomyces boulardii (FLORASTOR) 250 mg capsule, Take 1 capsule (250 mg total) by mouth 2 (two) times a day, Disp: 60 capsule, Rfl: 0    traZODone (DESYREL) 50 mg tablet, Take 1 tablet (50 mg total) by mouth daily at bedtime as needed for sleep, Disp: 30 tablet, Rfl: 0    Folic Acid 0.8 MG CAPS, Take 1 capsule (0.8 mg total) by mouth in the morning, Disp: 90 capsule, Rfl: 1    isosorbide mononitrate (IMDUR) 30 mg 24 hr tablet, Take 30 mg by mouth every morning, Disp: , Rfl:   I have spent a total time of 30 minutes on 10/30/23 in caring for this patient including Instructions for management, Patient and family education, Importance of tx compliance, Risk factor reductions, Documenting in the medical record, Reviewing / ordering tests, medicine, procedures  , and Obtaining or reviewing history  .

## 2023-10-30 NOTE — ASSESSMENT & PLAN NOTE
69 yo female current smoker w/ a hx of R non-small cell lung ca, CAD, HTN, PAF, HLD, CKD 3, S/P L AKA 7/21/23 w/ Dr. Anatoliy Cabral w/ prolonged area of nonhealing along stump incision and concern for infection presented to [facility] on 10/5/23 for scheduled stump exploration. Pt underwent L AKA stump exploration, washout, debridement and vac placement in OR on 10/5/23 by Dr. Savannah Jimenez Doctor. Pt returns to the office today for first post-op visit with wound vac. Wound vac taken down October 18th by visiting nurse at the request of the patient. She reports her wound has been healing well since wound vac d/c. Reports she is doing well. Arrived in the office in the wheelchair with her grandson. Denies any pain and is no longer taking any pain medications at this time. L AKA demonstrates majority healed. Incision site is clean and dry, well approximated with scant drainage. Small (<.03cm) dehiscence at the medial aspect of incision site. See clinical photos. She denies any new erythema, warmth, increased tenderness, or drainage from wound. She reports occasional phantom limb pain. Recommendations  -Return to the office in 3-4 weeks for wound re-check.  -Continue with daily wound care to the L AKA. Wash incision site daily with saline, pat dry, cover with calcium alginate with silver, ABD and Micha  -Call the office for any new or worsening wounds, increase in pain, skin discoloration, increase in drainage or fever/ chills. -Advised to monitor R foot closely, do regular foot checks, wear well fitted footwear. F/U with podiatry regularly.   -Continue with statin therapy daily as per PCP. -Continue plavix, lipitor and zetia.

## 2023-10-30 NOTE — PATIENT INSTRUCTIONS
-Return to the office in 4 weeks for L AKA incision check, anticipate prosthetic and limb .    -Continue to wash L AKA incision site daily with saline, pat dry, cover incision site with calcium alginate with silver, ABD and Micha wrap.     -Call the office with any increase in pain, swelling, discoloration, drainage or fever/chills.    -Schedule Lower extremity ultrasound for June of next year for surveillance of R foot. Call the office for any new or worsening pain.     -Continue to take plavix, zetia and rosuvastatin.

## 2023-11-01 ENCOUNTER — HOME CARE VISIT (OUTPATIENT)
Dept: HOME HEALTH SERVICES | Facility: HOME HEALTHCARE | Age: 73
End: 2023-11-01
Payer: MEDICARE

## 2023-11-01 VITALS
DIASTOLIC BLOOD PRESSURE: 72 MMHG | HEART RATE: 76 BPM | OXYGEN SATURATION: 96 % | SYSTOLIC BLOOD PRESSURE: 128 MMHG | RESPIRATION RATE: 18 BRPM

## 2023-11-01 PROCEDURE — G0299 HHS/HOSPICE OF RN EA 15 MIN: HCPCS

## 2023-11-03 ENCOUNTER — HOME CARE VISIT (OUTPATIENT)
Dept: HOME HEALTH SERVICES | Facility: HOME HEALTHCARE | Age: 73
End: 2023-11-03
Payer: MEDICARE

## 2023-11-03 VITALS
HEART RATE: 58 BPM | SYSTOLIC BLOOD PRESSURE: 132 MMHG | RESPIRATION RATE: 18 BRPM | OXYGEN SATURATION: 97 % | DIASTOLIC BLOOD PRESSURE: 72 MMHG

## 2023-11-03 PROCEDURE — G0299 HHS/HOSPICE OF RN EA 15 MIN: HCPCS

## 2023-11-06 ENCOUNTER — HOME CARE VISIT (OUTPATIENT)
Dept: HOME HEALTH SERVICES | Facility: HOME HEALTHCARE | Age: 73
End: 2023-11-06
Payer: MEDICARE

## 2023-11-08 ENCOUNTER — HOME CARE VISIT (OUTPATIENT)
Dept: HOME HEALTH SERVICES | Facility: HOME HEALTHCARE | Age: 73
End: 2023-11-08
Payer: MEDICARE

## 2023-11-08 VITALS
OXYGEN SATURATION: 95 % | SYSTOLIC BLOOD PRESSURE: 132 MMHG | DIASTOLIC BLOOD PRESSURE: 72 MMHG | HEART RATE: 63 BPM | RESPIRATION RATE: 18 BRPM

## 2023-11-08 DIAGNOSIS — G25.81 RESTLESS LEG SYNDROME: ICD-10-CM

## 2023-11-08 PROCEDURE — G0299 HHS/HOSPICE OF RN EA 15 MIN: HCPCS

## 2023-11-08 RX ORDER — ROPINIROLE 0.5 MG/1
0.5 TABLET, FILM COATED ORAL
Qty: 30 TABLET | Refills: 0 | Status: SHIPPED | OUTPATIENT
Start: 2023-11-08 | End: 2023-11-16

## 2023-11-10 ENCOUNTER — HOME CARE VISIT (OUTPATIENT)
Dept: HOME HEALTH SERVICES | Facility: HOME HEALTHCARE | Age: 73
End: 2023-11-10
Payer: MEDICARE

## 2023-11-10 VITALS
HEART RATE: 62 BPM | SYSTOLIC BLOOD PRESSURE: 138 MMHG | OXYGEN SATURATION: 96 % | DIASTOLIC BLOOD PRESSURE: 62 MMHG | RESPIRATION RATE: 18 BRPM

## 2023-11-10 DIAGNOSIS — G47.00 INSOMNIA, UNSPECIFIED TYPE: ICD-10-CM

## 2023-11-10 PROCEDURE — G0299 HHS/HOSPICE OF RN EA 15 MIN: HCPCS

## 2023-11-10 RX ORDER — TRAZODONE HYDROCHLORIDE 50 MG/1
50 TABLET ORAL
Qty: 30 TABLET | Refills: 1 | Status: SHIPPED | OUTPATIENT
Start: 2023-11-10

## 2023-11-13 ENCOUNTER — HOME CARE VISIT (OUTPATIENT)
Dept: HOME HEALTH SERVICES | Facility: HOME HEALTHCARE | Age: 73
End: 2023-11-13
Payer: MEDICARE

## 2023-11-15 ENCOUNTER — HOME CARE VISIT (OUTPATIENT)
Dept: HOME HEALTH SERVICES | Facility: HOME HEALTHCARE | Age: 73
End: 2023-11-15
Payer: MEDICARE

## 2023-11-15 VITALS
OXYGEN SATURATION: 95 % | RESPIRATION RATE: 18 BRPM | HEART RATE: 75 BPM | DIASTOLIC BLOOD PRESSURE: 72 MMHG | SYSTOLIC BLOOD PRESSURE: 136 MMHG

## 2023-11-15 PROCEDURE — G0299 HHS/HOSPICE OF RN EA 15 MIN: HCPCS

## 2023-11-16 ENCOUNTER — OFFICE VISIT (OUTPATIENT)
Dept: FAMILY MEDICINE CLINIC | Facility: CLINIC | Age: 73
End: 2023-11-16
Payer: MEDICARE

## 2023-11-16 VITALS
BODY MASS INDEX: 25.75 KG/M2 | RESPIRATION RATE: 18 BRPM | HEART RATE: 64 BPM | SYSTOLIC BLOOD PRESSURE: 120 MMHG | TEMPERATURE: 97.5 F | WEIGHT: 150 LBS | OXYGEN SATURATION: 97 % | DIASTOLIC BLOOD PRESSURE: 78 MMHG

## 2023-11-16 DIAGNOSIS — R06.02 SHORTNESS OF BREATH: ICD-10-CM

## 2023-11-16 DIAGNOSIS — F31.4 BIPOLAR AFFECTIVE DISORDER, DEPRESSED, SEVERE (HCC): ICD-10-CM

## 2023-11-16 DIAGNOSIS — C34.91 NON-SMALL CELL CANCER OF RIGHT LUNG (HCC): ICD-10-CM

## 2023-11-16 DIAGNOSIS — Z72.0 TOBACCO ABUSE: ICD-10-CM

## 2023-11-16 DIAGNOSIS — Z23 ENCOUNTER FOR IMMUNIZATION: ICD-10-CM

## 2023-11-16 DIAGNOSIS — Z89.612 STATUS POST ABOVE-KNEE AMPUTATION OF LEFT LOWER EXTREMITY (HCC): Primary | ICD-10-CM

## 2023-11-16 PROCEDURE — 90662 IIV NO PRSV INCREASED AG IM: CPT

## 2023-11-16 PROCEDURE — 99214 OFFICE O/P EST MOD 30 MIN: CPT | Performed by: FAMILY MEDICINE

## 2023-11-16 PROCEDURE — G0008 ADMIN INFLUENZA VIRUS VAC: HCPCS

## 2023-11-16 PROCEDURE — G0009 ADMIN PNEUMOCOCCAL VACCINE: HCPCS

## 2023-11-16 PROCEDURE — 90677 PCV20 VACCINE IM: CPT

## 2023-11-16 RX ORDER — ROPINIROLE 1 MG/1
1 TABLET, FILM COATED ORAL
Qty: 30 TABLET | Refills: 3 | Status: SHIPPED | OUTPATIENT
Start: 2023-11-16

## 2023-11-16 RX ORDER — ALBUTEROL SULFATE 90 UG/1
2 AEROSOL, METERED RESPIRATORY (INHALATION) EVERY 6 HOURS PRN
Qty: 8.5 G | Refills: 2 | Status: SHIPPED | OUTPATIENT
Start: 2023-11-16

## 2023-11-16 NOTE — ASSESSMENT & PLAN NOTE
No signs of infection; as per vascular surgery; c/w home care as per recommendations; c/w statin asa, and zetia; advised on RSV vaccine update and updated vaccines today; f/u guidance given

## 2023-11-16 NOTE — PROGRESS NOTES
Assessment/Plan:     1. 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)  Assessment & Plan:  No signs of infection; as per vascular surgery; c/w home care as per recommendations; c/w statin asa, and zetia; advised on RSV vaccine update and updated vaccines today; f/u guidance given    Orders:  -     rOPINIRole (REQUIP) 1 mg tablet; Take 1 tablet (1 mg total) by mouth daily at bedtime    2. Encounter for immunization  -     influenza vaccine, high-dose, PF 0.7 mL (FLUZONE HIGH-DOSE)  -     Pneumococcal Conjugate Vaccine 20-valent (Pcv20)    3. Tobacco abuse  Assessment & Plan:  Advised on importance of smoking cessation but patient is not ready to quit      4. Shortness of breath  Assessment & Plan:  Refilled albuterol for pt to use at night PRN wheezing    Orders:  -     albuterol (ProAir HFA) 90 mcg/act inhaler; Inhale 2 puffs every 6 (six) hours as needed for wheezing    5. Bipolar affective disorder, depressed, severe ([address])  Assessment & Plan:  Stable; c/w current tx; still unable to establish with psych; will c/w current tx and close f/u given pt dealing with ongoing serious health conditions; f/u guidance given      6. Non-small cell cancer of right lung (HCC)          Subjective:      Patient ID: Valentine Haro is a 68 y.o. female. Patient is here for follow up. Recent hospitalization for nonhealing wound closure in beginning of October. Patient is doing well. Mood has been stable. Requesting increase in ropinirole to help with symptoms of sensation of leg restlessness at night. Still smoking and not ready to quit. Has upcoming CT scan for lung mass and f/u with oncology in beginning of month. No recent fever or chills. Doing well and skin healing well since take down of wound vac. Not currently requiring any pain medication. Had recent radiation for lung mass. Has f/u CT and f/u with heme/onc next month.             Hospital course in October:  "Procedures Performed:   Orders Placed This Encounter  Procedures  · Wound vacum dressing application           31/5 Left above knee amputation stump exploration, washout, debridement, and VAC placement     Summary of Hospital Course: Patient was admitted for above listed procedure. She tolerated the procedure well with no immediate post-operative complications. See operative note for more details. Her post-operative course was uncomplicated. She was continued on plavix, lipitor, and zetia. Her wound vac was changed on 10/7 and patient tolerated it at bedside. Wound appeared healthy at that time with no signs of infection. Home health was arranged and patient was deemed stable for discharge home with routine wound vac changes. At the time of discharge, the patient was tolerating a regular diet, voiding spontaneously, afebrile and hemodynamically stable, and her pain was controlled."             The following portions of the patient's history were reviewed and updated as appropriate: allergies, current medications, past family history, past medical history, past social history, past surgical history, and problem list.    Review of Systems   Constitutional:  Negative for chills and fever. Respiratory:  Negative for shortness of breath. Cardiovascular:  Negative for chest pain. Psychiatric/Behavioral:  Negative for suicidal ideas. Objective:      /78   Pulse 64   Temp 97.5 °F (36.4 °C) (Temporal)   Resp 18   Wt 68 kg (150 lb)   SpO2 97%   BMI 25.75 kg/m²          Physical Exam  Vitals reviewed. Constitutional:       General: She is not in acute distress. Appearance: Normal appearance. She is not ill-appearing, toxic-appearing or diaphoretic. HENT:      Head: Normocephalic and atraumatic. Eyes:      General: No scleral icterus. Right eye: No discharge. Left eye: No discharge. Conjunctiva/sclera: Conjunctivae normal.   Cardiovascular:      Rate and Rhythm: Normal rate and regular rhythm.       Pulses: Normal pulses. Heart sounds: Normal heart sounds. No murmur heard. No gallop. Pulmonary:      Effort: Pulmonary effort is normal. No respiratory distress. Breath sounds: Normal breath sounds. No stridor. No wheezing, rhonchi or rales. Musculoskeletal:      Right lower leg: No edema. Neurological:      General: No focal deficit present. Mental Status: She is alert and oriented to person, place, and time. Psychiatric:         Mood and Affect: Mood normal.         Behavior: Behavior normal.         Thought Content:  Thought content normal.         Judgment: Judgment normal.

## 2023-11-16 NOTE — ASSESSMENT & PLAN NOTE
Stable; c/w current tx; still unable to establish with psych; will c/w current tx and close f/u given pt dealing with ongoing serious health conditions; f/u guidance given

## 2023-11-17 ENCOUNTER — HOME CARE VISIT (OUTPATIENT)
Dept: HOME HEALTH SERVICES | Facility: HOME HEALTHCARE | Age: 73
End: 2023-11-17
Payer: MEDICARE

## 2023-11-17 VITALS
HEART RATE: 67 BPM | OXYGEN SATURATION: 94 % | RESPIRATION RATE: 18 BRPM | SYSTOLIC BLOOD PRESSURE: 124 MMHG | DIASTOLIC BLOOD PRESSURE: 72 MMHG

## 2023-11-17 PROCEDURE — G0299 HHS/HOSPICE OF RN EA 15 MIN: HCPCS

## 2023-11-20 ENCOUNTER — HOME CARE VISIT (OUTPATIENT)
Dept: HOME HEALTH SERVICES | Facility: HOME HEALTHCARE | Age: 73
End: 2023-11-20
Payer: MEDICARE

## 2023-11-22 ENCOUNTER — HOME CARE VISIT (OUTPATIENT)
Dept: HOME HEALTH SERVICES | Facility: HOME HEALTHCARE | Age: 73
End: 2023-11-22
Payer: MEDICARE

## 2023-11-22 VITALS
RESPIRATION RATE: 18 BRPM | SYSTOLIC BLOOD PRESSURE: 138 MMHG | DIASTOLIC BLOOD PRESSURE: 64 MMHG | OXYGEN SATURATION: 94 % | HEART RATE: 67 BPM

## 2023-11-22 PROCEDURE — G0299 HHS/HOSPICE OF RN EA 15 MIN: HCPCS

## 2023-11-24 ENCOUNTER — HOME CARE VISIT (OUTPATIENT)
Dept: HOME HEALTH SERVICES | Facility: HOME HEALTHCARE | Age: 73
End: 2023-11-24
Payer: MEDICARE

## 2023-11-24 VITALS
RESPIRATION RATE: 18 BRPM | HEART RATE: 64 BPM | DIASTOLIC BLOOD PRESSURE: 68 MMHG | OXYGEN SATURATION: 94 % | SYSTOLIC BLOOD PRESSURE: 138 MMHG

## 2023-11-24 PROCEDURE — G0299 HHS/HOSPICE OF RN EA 15 MIN: HCPCS

## 2023-11-24 NOTE — PROGRESS NOTES
Hematology/Oncology Outpatient Office Note    Date of Service: 12/4/2023    [facility]  [phone]    Reason for Consultation:   Chief Complaint   Patient presents with    Follow-up       Cancer Stage at diagnosis: at least 1A2     Referral Physician: No ref. provider found    Primary Care Physician:  Kati Wilson DO     Nickname: Mack Wallace lives with her: Liliya Davis ECOG: 3    Today's ECOG: 3 (sits more than half the day due to PAD)    Goals and Barriers:  Current Goal: Minimize effects of disease burden, extend life. Barriers to accomplishing this: pain when she walks in her calf/feet    Patient's Capacity to Self Care:  Patient is able to self care      ASSESSMENT & PLAN      Diagnosis ICD-10-CM Associated Orders   1. Non-small cell cancer of right lung [facility])  C34.91             This is a 68 y.o. c PMHx notable for CAD s/p MI/CABG in 2000, COURTNEY, L AKA 5/2023, PAD with stents, paroxysmal A-fib, HTN, CVA, IVC filter hx, renal artery stenosis, being seen in consultation for R Lung SCC s/p SBRT        Discussion of decision making  Oncology history updated, accordingly, during this visit  Goals of care/patient communication  I discussed with the patient the clinical course leading up to their cancer diagnosis. I reviewed relevant office notes, imaging reports and pathology result as well. I told the patient that this is a case of curable disease and what this means. We discussed that the goal of anti-cancer therapy is to provide best quality of life, extend overall survival, and progression free survival as shown in clinical trials.  We also discussed that there might be a point when the cancer will no longer respond to this anti-neoplastic therapy. [id number]  TNM/Staging At Diagnosis  [id number]  Cancer Staging   1A2 (clinical)  Disease Features/Tumor Markers/Genetics  Tumor Marker: n/a  Notable Path Features:   8/14/2023 Lung, Right Upper Lobe, RUL posterior segment endobronchial biopsy:Non- small cell carcinoma , immunohistochemically consistent with squamous cell carcinoma   Treatment: s/p SBRT  Other Supportive care:   Treatment Team Members:  Surgeon: Dr. Qi Price: Dr. Tessa Shane  5/8/2023 MRI Brain w/wo c: Moderate, chronic microangiopathy. Large left mastoid air cell effusion. Trace right mastoid air cell effusion. No acute infarction, intracranial mass or mass. No cerebellopontine angle cistern mass lesion. Normal appearing bilateral 7th or 8th cranial nerve complexes  7/7/2023 PET/CT: Marked increased metabolic activity in association with right upper lobe nodule highly suggestive for neoplasm. No evidence for thoracic adenopathy or metastatic disease to the abdomen or pelvis. Right renal atrophy and multiple cystic renal lesions. Likely nonfunctioning right kidney. 2 mm right upper lobe nodule series series 4/52 is unchanged. . 1.8 x 1.9 cm nodule in the right upper lobe associated with occlusion of posterior segment right upper lobe bronchus SUV 22  12/2/2023 CT Chest w/o c: * read pending, appears without new POD per my read    Discussion of decision making    I personally reviewed the case with Dr. Jaya Deras and the following lab results, the image studies, pathology, other specialty/physicians consult notes and recommendations, and outside medical records. I had a lengthy discussion with the patient and shared the work-up findings. We discussed the diagnosis and management plan as below.  I spent 32 minutes reviewing the records (labs, clinician notes, outside records, medical history, ordering medicine/tests/procedures, interpreting the imaging/labs previously done) and coordination of care as well as direct time with the patient today, of which greater than 50% of the time was spent in counseling and coordination of care with the patient/family. Plan/Labs  F/u Rad Onc for post SBRT care  CT CAP w/c ordered in 6 months  Should she be found to be stage 1B or greater, then would get EGFR to determine if adjuvant anti-EGFR therapy is indicated  Should she be found to unexpectedly be Stage II or higher, then would assess her for adjuvant chemotherapy        Follow Up: 6 months for surveillance    All questions were answered to the patient's satisfaction during this encounter. The patient knows the contact information for our office and knows to reach out for any relevant concerns related to this encounter. They are to call for any temperature 100.4 or higher, new symptoms including but not restricted to shaking chills, decreased appetite, nausea, vomiting, diarrhea, increased fatigue, shortness of breath or chest pain, confusion, and not feeling the strength to come to the clinic. For all other listed problems and medical diagnosis in their chart - they are managed by PCP and/or other specialists, which the patient acknowledges. Thank you very much for your consultation and making us a part of this patient's care. We are continuing to follow closely with you. Please do not hesitate to reach out to me with any additional questions or concerns. Quan Bingham MD  Hematology & Medical Oncology Staff Physician             Disclaimer: This document was prepared using [employer] Direct technology. If a word or phrase is confusing, or does not make sense, this is likely due to recognition error which was not discovered during this clinician's review. If you believe an error has occurred, please contact me through [facility] line for breanna? cation. ONCOLOGY HISTORY OF PRESENT ILLNESS        Oncology History   SCC (squamous cell carcinoma of lung) ([address] [facility])   8/14/2023 Biopsy    A.  Lung, Right Upper Lobe, RUL posterior segment endobronchial biopsy:  -Non- small cell carcinoma , immunohistochemically consistent with squamous cell carcinoma      Non-small cell cancer of right lung ([address])   8/14/2023 Initial Diagnosis    Non-small cell cancer of right lung ([address])     8/28/2023 -  Cancer Staged    Staging form: Lung, AJCC 8th Edition  - Clinical: Stage IA2 (cT1b, cN0, cM0) - Signed by Kashif Earl MD on 8/28/2023             SUBJECTIVE  (INTERVAL HISTORY)      Clotting History Arterial disease related thrombi   Bleeding History None   Cancer History Lung mass   Family Cancer History Mom (gastric), sister (blood cancer)   H/O Blood/Plt Transfusion None   Tobacco/etoh/drug abuse 1 PPD x 52 years (since age 22), no etoh abuse or rec drug use       Cancer Screening history C-scope (polyps removed, due again 3 years)   Occupation Retired from cleaning work         Pain: L knee is stable    Can get a shaky feeling intermittently. Gets SOB lying down on her back (still sleeps on 3 pillows). Chest palpitations is daily. Chronic fatigue. Did well with the SBRT. I have reviewed the relevant past medical, surgical, social and family history. I have also reviewed allergies and medications for this patient. Review of Systems    Baseline weight: 165-170 lbs      Denies F/C, N/V, CP, LH, HA, rash, itching, gen weakness, melena, hematuria, hematochezia, falls, diarrhea, or constipation at this time. A 10-point review of system was performed, pertinent positive and negative were detailed as above. Otherwise, the 10-point review of system was negative.       Past Medical History:   Diagnosis Date    Anxiety     Aorto-iliac disease ([address])     Atrial fibrillation ([address])     CAD (coronary artery disease)     Carotid stenosis, bilateral     Celiac artery stenosis (HCC)     Chronic headaches     10/4/23 Per pt chronic headaches    CKD (chronic kidney disease) stage 3, GFR 30-59 ml/min ([address])     10/4/23 per pt - "unaware of stage 3 CKD"    Depression     10/4/23 Per pt "severely depressed "    DVT (deep venous thrombosis) ([address]) 07/2023    LLE (CFV, popl) Heart attack ([address]) 02/2022    HLD (hyperlipidemia)     Hypertension     Intermittent lightheadedness     10/4/23 "comes and goes per pt"    Lung cancer ([address])     10/4/23 Per pt just had last radiation on 10/2/23. Lung mass     RUL    Myocardial infarction ([address])     2022    Nausea     10/4/23 Per pt has daily    PAD (peripheral artery disease) ([address])     Shortness of breath     10/4/23 Chronic SOB since heart attack per pt -nothing new    Stroke ([address])     10/4/23 Per pt -"yrs ago"-reported 2019       Past Surgical History:   Procedure Laterality Date    AORTA - FEMORAL ARTERY BYPASS GRAFT Left     AXILLO-FEMORAL BYASS GRAFT Bilateral     BYPASS FEMORAL-FEMORAL      COLONOSCOPY      CORONARY ARTERY BYPASS GRAFT  2000    ENDOBRONCHIAL ULTRASOUND (EBUS) N/A 8/14/2023    Procedure: ENDOBRONCHIAL ULTRASOUND (EBUS) tissue biopsy;  Surgeon: Naima Browning MD;  Location: [facility];  Service: Thoracic    FEMORAL ARTERY - POPLITEAL ARTERY BYPASS GRAFT Bilateral     IVC FILTER INSERTION      NH AMPUTATION THIGH THROUGH FEMUR ANY LEVEL Left 07/21/2023    Procedure: AMPUTATION ABOVE KNEE (AKA); Surgeon: Simba Pascual MD;  Location: [facility];  Service: Vascular     [address] INCL FLUOR GDNCE DX W/CELL WASHG 44 [facility] N/A 8/14/2023    Procedure: Selmer Aase;  Surgeon: Naima Browning MD;  Location: [facility];  Service: Thoracic    TOTAL ABDOMINAL HYSTERECTOMY W/ BILATERAL SALPINGOOPHORECTOMY      TOTAL HIP ARTHROPLASTY Left     WOUND DEBRIDEMENT Left 10/5/2023    Procedure: Left above knee amputation stump exploration, washout, debridement, and VAC placement;  Surgeon: Deep Maldonado MD;  Location: [facility];  Service: Vascular       Family History   Problem Relation Age of Onset    Leukemia Mother 80    Cancer Sister     Anesthesia problems Neg Hx        Social History     Socioeconomic History    Marital status:       Spouse name: Not on file    Number of children: Not on file    Years of education: Not on file    Highest education level: Not on file   Occupational History    Not on file   Tobacco Use    Smoking status: Every Day     Packs/day: 1.00     Years: 50.00     Total pack years: 50.00     Types: Cigarettes     Start date: 5    Smokeless tobacco: Never    Tobacco comments:     Smokes daily - avg 15 cigs/daily           10/5 smoked 3 cigarettes today   Vaping Use    Vaping Use: Never used   Substance and Sexual Activity    Alcohol use: Not Currently     Alcohol/week: 50.0 standard drinks of alcohol     Types: 50 Cans of beer per week     Comment: no ETOH for 1.5 years    Drug use: Never     Comment: Denies any drug use per pt    Sexual activity: Not Currently     Partners: Male     Comment: Not active at this time per pt   Other Topics Concern    Not on file   Social History Narrative    Not on file     Social Determinants of Health     Financial Resource Strain: Not on file   Food Insecurity: No Food Insecurity (7/19/2023)    Hunger Vital Sign     Worried About Running Out of Food in the Last Year: Never true     Ran Out of Food in the Last Year: Never true   Transportation Needs: No Transportation Needs (7/19/2023)    PRAPARE - Transportation     Lack of Transportation (Medical): No     Lack of Transportation (Non-Medical):  No   Physical Activity: Not on file   Stress: Not on file   Social Connections: Not on file   Intimate Partner Violence: Not on file   Housing Stability: Low Risk  (7/19/2023)    Housing Stability Vital Sign     Unable to Pay for Housing in the Last Year: No     Number of Places Lived in the Last Year: 1     Unstable Housing in the Last Year: No       No Known Allergies    Current Outpatient Medications   Medication Sig Dispense Refill    acetaminophen (TYLENOL) 325 mg tablet Take 3 tablets (975 mg total) by mouth every 8 (eight) hours (Patient taking differently: Take 975 mg by mouth if needed)      albuterol (ProAir HFA) 90 mcg/act inhaler Inhale 2 puffs every 6 (six) hours as needed for wheezing 8.5 g 2    clopidogrel (PLAVIX) 75 mg tablet Take 1 tablet (75 mg total) by mouth in the morning Do not start before August 15, 2023. (Patient taking differently: Take 75 mg by mouth daily at bedtime) 90 tablet 0    diltiazem (CARDIZEM) 120 MG tablet Take 1 tablet (120 mg total) by mouth in the morning 30 tablet 3    ezetimibe (ZETIA) 10 mg tablet take 1 tablet by mouth once daily 30 tablet 5    Folic Acid 0.8 MG CAPS Take 1 capsule (0.8 mg total) by mouth in the morning 90 capsule 1    gabapentin (Neurontin) 300 mg capsule Take 2 capsules (600 mg total) by mouth 3 (three) times a day 90 capsule 0    lisinopril (ZESTRIL) 10 mg tablet take 1 tablet by mouth once daily 30 tablet 5    Melatonin 5 MG TABS Take 4 tablets (20 mg total) by mouth daily at bedtime as needed (insomnia) Patient states she takes 20MG      metoprolol succinate (TOPROL-XL) 50 mg 24 hr tablet Take 1 tablet (50 mg total) by mouth daily 30 tablet 3    rOPINIRole (REQUIP) 1 mg tablet Take 1 tablet (1 mg total) by mouth daily at bedtime 30 tablet 3    rosuvastatin (CRESTOR) 20 MG tablet Take 20 mg by mouth daily at bedtime      traZODone (DESYREL) 50 mg tablet Take 1 tablet (50 mg total) by mouth daily at bedtime as needed for sleep 30 tablet 1    isosorbide mononitrate (IMDUR) 30 mg 24 hr tablet Take 30 mg by mouth every morning (Patient not taking: Reported on 11/29/2023)      lamoTRIgine (LaMICtal) 100 mg tablet Take 1 tablet (100 mg total) by mouth daily (Patient not taking: Reported on 12/4/2023) 90 tablet 1    saccharomyces boulardii (FLORASTOR) 250 mg capsule Take 1 capsule (250 mg total) by mouth 2 (two) times a day (Patient not taking: Reported on 11/16/2023) 60 capsule 0     No current facility-administered medications for this visit.        (Not in a hospital admission)      Objective:     24 Hour Vitals Assessment:     Vitals:    12/04/23 0937   BP: 124/78   Pulse: 95   Resp: 18   Temp: (!) 96.9 °F (36.1 °C)   SpO2: 95% PHYSICIAN EXAM:    General: Appearance: alert, cooperative, no distress. HEENT: Normocephalic, atraumatic. No scleral icterus. conjunctivae clear. EOMI. Chest: No tenderness to palpation. No open wound noted. Lungs: diminished BS b/l, otherwise clear to auscultation bilaterally, Respirations unlabored. Cardiac: Regular rate and rhythm, +S1and S2  Abdomen: Soft, non-tender, non-distended. Bowel sounds are normal.  Extremities:  No edema, cyanosis, clubbing. Skin: Skin color, turgor are normal. No rashes. Lymphatics: no palpable supra-cervical, axillary, or inguinal adenopathy  Neurologic: Awake, Alert, and oriented, no gross focal deficits noted b/l. DATA REVIEW:    Pathology Result:    Final Diagnosis   Date Value Ref Range Status   08/14/2023   Final    A-B. Lymph Node, level 7 (ThinPrep and smear preparations): Atypical cellular changes  Rare atypical cells  Background of ciliated bronchial cells and lymphocytes seen. Satisfactory for evaluation. C-D. Lymph Node, 11L (ThinPrep and smear preparations):  Negative for Malignancy   Ciliated bronchial cells   Squamous cells seen. Benign appearing lymphocytes seen. Satisfactory for evaluation. 08/14/2023   Final    A. Lung, Right Upper Lobe, RUL posterior segment endobronchial biopsy:  -Non- small cell carcinoma , immunohistochemically consistent with squamous cell carcinoma     Note:  Tumor cells are  positive for CKC,  squamous markers (P40, CK5/6), Ki-67 % (>50%) and  negative for  CK7, TTF-1, Napsin synaptophysin,  chromogranin, CK20, Controls reacted appropriately . This staining pattern supports the above diagnosis. It is not clear if this biopsy represent In situ or invasive carcinoma . Clinical & radiologic correlation is recommended. Block A1  sent to [facility] for [address], The results will follow under a separate cover           Comment: This is an appended report.  These results have been appended to a previously preliminary verified report.   07/21/2023   Final    LOWER EXTREMITY, LEFT, ABOVE-THE-KNEE AMPUTATION:    - Amputated lower leg, above the knee with areas of skin discoloration and purpura as described in the gross description.     - Gross examination only; see "Gross Description" for further characterization; histologic / morphologic examination can be requested, if clinically indicated. 05/11/2023   Final    A. Large Intestine, Cecum, polyp:  - Tubular adenoma. - Negative for high-grade dysplasia and malignancy. B. Large Intestine, Right/Ascending Colon, polyp:  - Tubular adenoma. - Negative for high-grade dysplasia and malignancy. C. Large Intestine, Right/Ascending Colon, polypoid tissue:  - Polypoid colonic mucosa with no significant histopathologic abnormality.  - Negative for dysplasia and malignancy. D. Large Intestine, Transverse Colon, lipoma biopsy:  - Polypoid colonic mucosa with no significant histopathologic abnormality.  - Negative for dysplasia and malignancy. E. Large Intestine, Left/Descending Colon, polyps x 4:  - Multiple fragments of tubular adenoma. - Negative for high-grade dysplasia and malignancy. F. Large Intestine, Sigmoid Colon, polyp:  - Tubular adenoma. - Negative for high-grade dysplasia and malignancy. Image Results:   Image result are reviewed and documented in Hematology/Oncology history    XR chest portable  Narrative: CHEST    INDICATION:   post op EBUS. COMPARISON: Chest radiograph 7/23/2023. EXAM PERFORMED/VIEWS:  XR CHEST PORTABLE    FINDINGS:    Again noted is prominence of the right hilum. Stable heart size. The lungs are clear. No pneumothorax or pleural effusion. Osseous structures appear within normal limits for patient age. Impression: No acute cardiopulmonary disease. Again noted is prominence of the right hilum.     Workstation performed: [id number]      LABS:  Lab data are reviewed and documented in HemOnc history. Lab Results   Component Value Date    HGB 12.4 10/06/2023    HCT 38.4 10/06/2023    MCV 91 10/06/2023     10/06/2023    WBC 8.81 10/06/2023    NRBC 0 08/07/2023     Lab Results   Component Value Date    K 4.2 10/06/2023     (H) 10/06/2023    CO2 23 10/06/2023    BUN 10 10/06/2023    CREATININE 0.86 10/06/2023    GLUF 91 08/05/2023    CALCIUM 8.2 (L) 10/06/2023    CORRECTEDCA 10.1 11/14/2022    AST 28 07/18/2023    ALT 25 07/18/2023    ALKPHOS 75 07/18/2023    EGFR 67 10/06/2023       No results found for: "IRON", "TIBC", "FERRITIN"    No results found for: "Alea Siemens"    No results for input(s): "WBC", "CREAT", "PLT" in the last 72 hours.     By:  Claudean Holding, MD, 12/4/2023, 9:49 AM

## 2023-11-27 ENCOUNTER — HOME CARE VISIT (OUTPATIENT)
Dept: HOME HEALTH SERVICES | Facility: HOME HEALTHCARE | Age: 73
End: 2023-11-27
Payer: MEDICARE

## 2023-11-29 ENCOUNTER — HOME CARE VISIT (OUTPATIENT)
Dept: HOME HEALTH SERVICES | Facility: HOME HEALTHCARE | Age: 73
End: 2023-11-29
Payer: MEDICARE

## 2023-11-29 ENCOUNTER — OFFICE VISIT (OUTPATIENT)
Dept: VASCULAR SURGERY | Facility: CLINIC | Age: 73
End: 2023-11-29

## 2023-11-29 ENCOUNTER — TELEPHONE (OUTPATIENT)
Dept: CARDIOLOGY CLINIC | Facility: CLINIC | Age: 73
End: 2023-11-29

## 2023-11-29 VITALS
DIASTOLIC BLOOD PRESSURE: 78 MMHG | HEIGHT: 64 IN | OXYGEN SATURATION: 98 % | HEART RATE: 59 BPM | BODY MASS INDEX: 25.75 KG/M2 | SYSTOLIC BLOOD PRESSURE: 128 MMHG

## 2023-11-29 DIAGNOSIS — Z89.612 S/P AKA (ABOVE KNEE AMPUTATION) UNILATERAL, LEFT (HCC): Primary | ICD-10-CM

## 2023-11-29 DIAGNOSIS — I74.09 AORTOILIAC OCCLUSIVE DISEASE (HCC): ICD-10-CM

## 2023-11-29 PROCEDURE — 99024 POSTOP FOLLOW-UP VISIT: CPT

## 2023-11-29 NOTE — PROGRESS NOTES
Assessment/Plan:    S/P AKA (above knee amputation) unilateral, left ([address])  Patient is denying any specific concerns today. She reports that she is feeling well and that her incision is completely healed. She is denying any fever, chills, redness, warmth, pain, or drainage from left AKA stump. Patient has been washing her stump daily with soap and water. Patient is no longer requiring oxycodone for pain as she was prior to washout and debridement. Pain is currently well-managed with as needed Tylenol, gabapentin, and Requip. Patient is eager to start the process for acquiring prosthetic for left lower extremity. Plan:  - Left AKA incision is completely healed at this time.  -Patient is cleared for prosthetic use and weightbearing to left AKA stump.  -Patient instructed to notify the office should she develop any warmth, erythema, swelling, or pain to left AKA stump. Aortoiliac occlusive disease ([address])  Patient previously completing surveillance every 6 months with AOIL and LEAD. -Patient is due for repeat AOIL, order placed to repeat at patient's earliest convenience  -Continue current medical optimization with Plavix, rosuvastatin, and Zetia  -Continue to monitor right foot with skin checks and notify the office of any tissue loss, claudication, or rest pain       Diagnoses and all orders for this visit:    S/P AKA (above knee amputation) unilateral, left (HCC)  -     Prosthetic    Aortoiliac occlusive disease ([address])  -     VAS abdominal aorta/iliacs; complete study; Future          Subjective:      Patient ID: Marlowe Baumgarten is a 68 y.o. female. Patient is a 68year old female, current smoker, with PMH NSCLC, CAD s/p CABG, HTN, paroxysmal atrial fibrillation, HLD, CKD 3, and PAD.  Patient has significant history of vascular intervention at outside facilities including fem-fem bypass s/p occlusion, right fem to BK pop bypass s/p occlusion (2010), bilateral iliac and femoral artery angioplasty and stenting (2014), and right axillary to bi-fem bypass (2017). Patient is s/p L AKA 7/21/23 Sylvester Gunlakia) with prolonged wound healing along stump incision and concern for underlying infection. Patient underwent stump exploration, washout/debridement, and VAC placement of L AKA stump in OR on 10/5/23 (Doctor). Wound VAC was discontinued on 10/18/2023 and patient is presenting to the office today for left AKA stump recheck. Patient is denying any specific concerns today. She reports that she is feeling well and that her incision is completely healed. She is denying any fever, chills, redness, warmth, pain, or drainage from left AKA stump. Patient has been washing her stump daily with soap and water. Patient is no longer requiring oxycodone for pain as she was prior to washout and debridement. Pain is currently well-managed with as needed Tylenol, gabapentin, and Requip. Patient is eager to start the process for acquiring prosthetic for left lower extremity. Wound Check        The following portions of the patient's history were reviewed and updated as appropriate: allergies, current medications, past family history, past medical history, past social history, past surgical history, and problem list.    Review of Systems   Constitutional: Negative. Negative for chills and fever. HENT: Negative. Eyes: Negative. Respiratory: Negative. Negative for shortness of breath. Cardiovascular: Negative. Negative for chest pain and leg swelling. Gastrointestinal: Negative. Endocrine: Negative. Genitourinary: Negative. Musculoskeletal: Negative. Skin: Negative. Negative for color change, pallor and wound. Allergic/Immunologic: Negative. Neurological: Negative. Hematological: Negative. Psychiatric/Behavioral: Negative.        Objective:    /78 (BP Location: Left arm, Patient Position: Sitting, Cuff Size: Standard)   Pulse 59   Ht 5' 4" (1.626 m)   SpO2 98%   BMI 25.75 kg/m² Physical Exam  Constitutional:       General: She is not in acute distress. Appearance: Normal appearance. HENT:      Head: Normocephalic and atraumatic. Cardiovascular:      Rate and Rhythm: Normal rate and regular rhythm. Heart sounds: Normal heart sounds. No murmur heard. Pulmonary:      Effort: Pulmonary effort is normal. No respiratory distress. Breath sounds: Normal breath sounds. Musculoskeletal:         General: No swelling or tenderness. Right lower leg: No edema. Left lower leg: No edema. Skin:     General: Skin is warm and dry. Capillary Refill: Capillary refill takes less than 2 seconds. Findings: No lesion, rash or wound. Comments: Healed left AKA stump incision   Neurological:      General: No focal deficit present. Mental Status: She is alert and oriented to person, place, and time. Psychiatric:         Mood and Affect: Mood normal.         Behavior: Behavior normal.         I have reviewed and made appropriate changes to the review of systems input by the medical assistant.     Vitals:    11/29/23 1534   BP: 128/78   BP Location: Left arm   Patient Position: Sitting   Cuff Size: Standard   Pulse: 59   SpO2: 98%   Height: 5' 4" (1.626 m)       Patient Active Problem List   Diagnosis    Bipolar affective disorder, depressed, severe (HCC)    CAD (coronary artery disease)    Essential hypertension    Hx of CABG    S/P vascular bypass    Thyroid nodule    Renal artery stenosis (HCC)    Presence of IVC filter    PAD (peripheral artery disease) (HCC)    Mixed hyperlipidemia    Aortoiliac occlusive disease (HCC)    Tobacco abuse    PAF (paroxysmal atrial fibrillation) (HCC)    Stage 3 chronic kidney disease, unspecified whether stage 3a or 3b CKD (HCC)    Dizziness    Insomnia    Lung mass    At risk for venous thromboembolism (VTE)    Pain    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)    SCC (squamous cell carcinoma of lung) ([address]) Non-small cell cancer of right lung (HCC)    Delayed postoperative wound closure    Shortness of breath    S/P AKA (above knee amputation) unilateral, left (HCC)       Past Surgical History:   Procedure Laterality Date    AORTA - FEMORAL ARTERY BYPASS GRAFT Left     AXILLO-FEMORAL BYASS GRAFT Bilateral     BYPASS FEMORAL-FEMORAL      COLONOSCOPY      CORONARY ARTERY BYPASS GRAFT  2000    ENDOBRONCHIAL ULTRASOUND (EBUS) N/A 8/14/2023    Procedure: ENDOBRONCHIAL ULTRASOUND (EBUS) tissue biopsy;  Surgeon: Sreekanth Alfaro MD;  Location: [facility];  Service: Thoracic    FEMORAL ARTERY - POPLITEAL ARTERY BYPASS GRAFT Bilateral     IVC FILTER INSERTION      SC AMPUTATION THIGH THROUGH FEMUR ANY LEVEL Left 07/21/2023    Procedure: AMPUTATION ABOVE KNEE (AKA); Surgeon: Tia Tavera MD;  Location: [facility];  Service: Vascular     [address] INCL FLUOR GDNCE DX W/CELL WASHG [address] [facility] N/A 8/14/2023    Procedure: Bri Lynn;  Surgeon: Sreekanth Alfaro MD;  Location: [facility];  Service: Thoracic    TOTAL ABDOMINAL HYSTERECTOMY W/ BILATERAL SALPINGOOPHORECTOMY      TOTAL HIP ARTHROPLASTY Left     WOUND DEBRIDEMENT Left 10/5/2023    Procedure: Left above knee amputation stump exploration, washout, debridement, and VAC placement;  Surgeon: Nereida Maldonado MD;  Location: [facility];  Service: Vascular       Family History   Problem Relation Age of Onset    Leukemia Mother 80    Cancer Sister     Anesthesia problems Neg Hx        Social History     Socioeconomic History    Marital status:       Spouse name: Not on file    Number of children: Not on file    Years of education: Not on file    Highest education level: Not on file   Occupational History    Not on file   Tobacco Use    Smoking status: Every Day     Packs/day: 1.00     Years: 50.00     Total pack years: 50.00     Types: Cigarettes     Start date: 5    Smokeless tobacco: Never    Tobacco comments:     Smokes daily - avg 15 cigs/daily           10/5 smoked 3 cigarettes today   Vaping Use    Vaping Use: Never used   Substance and Sexual Activity    Alcohol use: Not Currently     Alcohol/week: 50.0 standard drinks of alcohol     Types: 50 Cans of beer per week     Comment: no ETOH for 1.5 years    Drug use: Never     Comment: Denies any drug use per pt    Sexual activity: Not Currently     Partners: Male     Comment: Not active at this time per pt   Other Topics Concern    Not on file   Social History Narrative    Not on file     Social Determinants of Health     Financial Resource Strain: Not on file   Food Insecurity: No Food Insecurity (7/19/2023)    Hunger Vital Sign     Worried About Running Out of Food in the Last Year: Never true     Ran Out of Food in the Last Year: Never true   Transportation Needs: No Transportation Needs (7/19/2023)    PRAPARE - Transportation     Lack of Transportation (Medical): No     Lack of Transportation (Non-Medical): No   Physical Activity: Not on file   Stress: Not on file   Social Connections: Not on file   Intimate Partner Violence: Not on file   Housing Stability: Low Risk  (7/19/2023)    Housing Stability Vital Sign     Unable to Pay for Housing in the Last Year: No     Number of Places Lived in the Last Year: 1     Unstable Housing in the Last Year: No       No Known Allergies      Current Outpatient Medications:     acetaminophen (TYLENOL) 325 mg tablet, Take 3 tablets (975 mg total) by mouth every 8 (eight) hours (Patient taking differently: Take 975 mg by mouth if needed), Disp: , Rfl:     albuterol (ProAir HFA) 90 mcg/act inhaler, Inhale 2 puffs every 6 (six) hours as needed for wheezing, Disp: 8.5 g, Rfl: 2    clopidogrel (PLAVIX) 75 mg tablet, Take 1 tablet (75 mg total) by mouth in the morning Do not start before August 15, 2023.  (Patient taking differently: Take 75 mg by mouth daily at bedtime), Disp: 90 tablet, Rfl: 0    diltiazem (CARDIZEM) 120 MG tablet, Take 1 tablet (120 mg total) by mouth in the morning, Disp: 30 tablet, Rfl: 3    ezetimibe (ZETIA) 10 mg tablet, take 1 tablet by mouth once daily, Disp: 30 tablet, Rfl: 5    Folic Acid 0.8 MG CAPS, Take 1 capsule (0.8 mg total) by mouth in the morning, Disp: 90 capsule, Rfl: 1    gabapentin (Neurontin) 300 mg capsule, Take 2 capsules (600 mg total) by mouth 3 (three) times a day, Disp: 90 capsule, Rfl: 0    lamoTRIgine (LaMICtal) 100 mg tablet, Take 1 tablet (100 mg total) by mouth daily (Patient taking differently: Take 100 mg by mouth daily at bedtime), Disp: 90 tablet, Rfl: 1    lisinopril (ZESTRIL) 10 mg tablet, take 1 tablet by mouth once daily, Disp: 30 tablet, Rfl: 5    Melatonin 5 MG TABS, Take 4 tablets (20 mg total) by mouth daily at bedtime as needed (insomnia) Patient states she takes 20MG, Disp: , Rfl:     metoprolol succinate (TOPROL-XL) 50 mg 24 hr tablet, Take 1 tablet (50 mg total) by mouth daily, Disp: 30 tablet, Rfl: 3    rOPINIRole (REQUIP) 1 mg tablet, Take 1 tablet (1 mg total) by mouth daily at bedtime, Disp: 30 tablet, Rfl: 3    rosuvastatin (CRESTOR) 20 MG tablet, Take 20 mg by mouth daily at bedtime, Disp: , Rfl:     traZODone (DESYREL) 50 mg tablet, Take 1 tablet (50 mg total) by mouth daily at bedtime as needed for sleep, Disp: 30 tablet, Rfl: 1    isosorbide mononitrate (IMDUR) 30 mg 24 hr tablet, Take 30 mg by mouth every morning (Patient not taking: Reported on 11/29/2023), Disp: , Rfl:     saccharomyces boulardii (FLORASTOR) 250 mg capsule, Take 1 capsule (250 mg total) by mouth 2 (two) times a day (Patient not taking: Reported on 11/16/2023), Disp: 60 capsule, Rfl: 0    I have spent a total time of 25 minutes on 11/29/23 in caring for this patient including Prognosis, Risks and benefits of tx options, Instructions for management, Patient and family education, Importance of tx compliance, Risk factor reductions, Impressions, Counseling / Coordination of care, Documenting in the medical record, Reviewing / ordering tests, medicine, procedures  , and Obtaining or reviewing history  .

## 2023-11-29 NOTE — ASSESSMENT & PLAN NOTE
Patient previously completing surveillance every 6 months with AOIL and LEAD.     -Patient is due for repeat AOIL, order placed to repeat at patient's earliest convenience  -Continue current medical optimization with Plavix, rosuvastatin, and Zetia  -Continue to monitor right foot with skin checks and notify the office of any tissue loss, claudication, or rest pain

## 2023-11-29 NOTE — ASSESSMENT & PLAN NOTE
Patient is denying any specific concerns today. She reports that she is feeling well and that her incision is completely healed. She is denying any fever, chills, redness, warmth, pain, or drainage from left AKA stump. Patient has been washing her stump daily with soap and water. Patient is no longer requiring oxycodone for pain as she was prior to washout and debridement. Pain is currently well-managed with as needed Tylenol, gabapentin, and Requip. Patient is eager to start the process for acquiring prosthetic for left lower extremity. Plan:  - Left AKA incision is completely healed at this time.  -Patient is cleared for prosthetic use and weightbearing to left AKA stump.  -Patient instructed to notify the office should she develop any warmth, erythema, swelling, or pain to left AKA stump.

## 2023-11-29 NOTE — TELEPHONE ENCOUNTER
Pt was in for appt with vascular today. ..reports is out of Indur 30 mg ---is not sure if she is to be taking was on in the hospital but marked as d/c in chart upon d/c. Please advise if you would like he on this still or not. . Had patient make appt with Dr Antwan Russell since was due for recall in Oct.  Please advise.

## 2023-12-01 ENCOUNTER — HOME CARE VISIT (OUTPATIENT)
Dept: HOME HEALTH SERVICES | Facility: HOME HEALTHCARE | Age: 73
End: 2023-12-01
Payer: MEDICARE

## 2023-12-01 PROCEDURE — G0299 HHS/HOSPICE OF RN EA 15 MIN: HCPCS

## 2023-12-02 ENCOUNTER — HOSPITAL ENCOUNTER (OUTPATIENT)
Dept: CT IMAGING | Facility: HOSPITAL | Age: 73
Discharge: HOME | End: 2023-12-02
Attending: RADIOLOGY
Payer: MEDICARE

## 2023-12-02 VITALS
OXYGEN SATURATION: 97 % | HEART RATE: 54 BPM | RESPIRATION RATE: 18 BRPM | DIASTOLIC BLOOD PRESSURE: 64 MMHG | SYSTOLIC BLOOD PRESSURE: 130 MMHG

## 2023-12-02 DIAGNOSIS — C34.91 NON-SMALL CELL CANCER OF RIGHT LUNG (HCC): ICD-10-CM

## 2023-12-02 PROCEDURE — 71250 CT THORAX DX C-: CPT

## 2023-12-02 PROCEDURE — G1004 CDSM NDSC: HCPCS

## 2023-12-04 ENCOUNTER — OFFICE VISIT (OUTPATIENT)
Dept: HEMATOLOGY ONCOLOGY | Facility: CLINIC | Age: 73
End: 2023-12-04
Payer: MEDICARE

## 2023-12-04 VITALS
DIASTOLIC BLOOD PRESSURE: 78 MMHG | WEIGHT: 150 LBS | HEART RATE: 95 BPM | BODY MASS INDEX: 25.75 KG/M2 | TEMPERATURE: 96.9 F | OXYGEN SATURATION: 95 % | RESPIRATION RATE: 18 BRPM | SYSTOLIC BLOOD PRESSURE: 124 MMHG

## 2023-12-04 DIAGNOSIS — C34.91 NON-SMALL CELL CANCER OF RIGHT LUNG (HCC): Primary | ICD-10-CM

## 2023-12-04 PROCEDURE — 99214 OFFICE O/P EST MOD 30 MIN: CPT | Performed by: INTERNAL MEDICINE

## 2023-12-05 ENCOUNTER — HOME CARE VISIT (OUTPATIENT)
Dept: HOME HEALTH SERVICES | Facility: HOME HEALTHCARE | Age: 73
End: 2023-12-05
Payer: MEDICARE

## 2023-12-05 VITALS
RESPIRATION RATE: 18 BRPM | SYSTOLIC BLOOD PRESSURE: 133 MMHG | DIASTOLIC BLOOD PRESSURE: 74 MMHG | OXYGEN SATURATION: 93 % | HEART RATE: 64 BPM

## 2023-12-05 PROCEDURE — G0299 HHS/HOSPICE OF RN EA 15 MIN: HCPCS

## 2023-12-11 ENCOUNTER — RADIATION ONCOLOGY FOLLOW-UP (OUTPATIENT)
Dept: RADIATION ONCOLOGY | Facility: CLINIC | Age: 73
End: 2023-12-11
Payer: MEDICARE

## 2023-12-11 ENCOUNTER — CLINICAL SUPPORT (OUTPATIENT)
Dept: RADIATION ONCOLOGY | Facility: CLINIC | Age: 73
End: 2023-12-11
Attending: RADIOLOGY
Payer: MEDICARE

## 2023-12-11 VITALS
BODY MASS INDEX: 25.48 KG/M2 | HEART RATE: 72 BPM | HEIGHT: 64 IN | TEMPERATURE: 98 F | SYSTOLIC BLOOD PRESSURE: 172 MMHG | OXYGEN SATURATION: 95 % | WEIGHT: 149.25 LBS | DIASTOLIC BLOOD PRESSURE: 88 MMHG | RESPIRATION RATE: 16 BRPM

## 2023-12-11 DIAGNOSIS — C34.91 NON-SMALL CELL CANCER OF RIGHT LUNG (HCC): Primary | ICD-10-CM

## 2023-12-11 PROCEDURE — 99024 POSTOP FOLLOW-UP VISIT: CPT | Performed by: RADIOLOGY

## 2023-12-11 PROCEDURE — 99211 OFF/OP EST MAY X REQ PHY/QHP: CPT | Performed by: RADIOLOGY

## 2023-12-11 NOTE — PROGRESS NOTES
Follow-up - Radiation Oncology   Robin Aleman 1950 68 y.o. female [id number]      History of Present Illness   Cancer Staging   Non-small cell cancer of right lung ([address])  Staging form: Lung, AJCC 8th Edition  - Clinical: Stage IA2 (cT1b, cN0, cM0) - Signed by Milagros Preston MD on 2023            Interval History:  Robin Aleman 1950 is a 68 y.o. female With diagnosed non-small cell carcinoma, SCC of right upper lung. Patient has 60 pack year smoking history. Underwent CT lung screening. Completed SBRT to RUL on 10/2/23 and presents today for EOT follow-up. Additional medical problems include coronary artery disease, HTN, Stage 3 CKD, CABG, peripheral artery disease, aortoiliac occlusive disease. Recent above knee amputation of LLE. Patient has 60 pack year smoking history. Underwent CT lung screening. 23 CT chest wo contrast  IMPRESSION:  1. Expected postprocedural changes related to radiation therapy of a right hilar squamous cell carcinoma. 2.  No new concerning pulmonary nodules. Emphysema. 23 Dr. Susana Chavez  CT CAP w/c ordered in 6 months. Should she be found to be stage 1B or greater, then would get EGFR to determine if adjuvant anti-EGFR therapy is indicated. Should she be found to unexpectedly be Stage II or higher, then would assess her for adjuvant chemotherapy. Upcomin24 CT chest  24 Hem Onc      Historical Information   Oncology History   SCC (squamous cell carcinoma of lung) ([address])   2023 Biopsy    A.  Lung, Right Upper Lobe, RUL posterior segment endobronchial biopsy:  -Non- small cell carcinoma , immunohistochemically consistent with squamous cell carcinoma      Non-small cell cancer of right lung ([address])   2023 Initial Diagnosis    Non-small cell cancer of right lung ([address])     2023 -  Cancer Staged    Staging form: Lung, AJCC 8th Edition  - Clinical: Stage IA2 (cT1b, cN0, cM0) - Signed by Milagros Preston MD on 2023 9/14/2023 - 10/2/2023 Radiation    Treatment:  Course: C1 SBRT    Plan ID Energy Fractions Dose per Fraction (cGy) Dose Correction (cGy) Total Dose Delivered (cGy) Elapsed Days   SBRT RUL 6X-FFF 8 / 8 750 0 6,000 18      Treatment dates:  C1 SBRT: 9/14/2023 - 10/2/2023         Past Medical History:   Diagnosis Date    Anxiety     Aorto-iliac disease (HCC)     Atrial fibrillation (HCC)     CAD (coronary artery disease)     Carotid stenosis, bilateral     Celiac artery stenosis (HCC)     Chronic headaches     10/4/23 Per pt chronic headaches    CKD (chronic kidney disease) stage 3, GFR 30-59 ml/min ([address])     10/4/23 per pt - "unaware of stage 3 CKD"    Depression     10/4/23 Per pt "severely depressed "    DVT (deep venous thrombosis) ([address]) 07/2023    LLE (CFV, popl)    Heart attack ([address]) 02/2022    HLD (hyperlipidemia)     Hypertension     Intermittent lightheadedness     10/4/23 "comes and goes per pt"    Lung cancer ([address])     10/4/23 Per pt just had last radiation on 10/2/23. Lung mass     RUL    Myocardial infarction ([address])     2022    Nausea     10/4/23 Per pt has daily    PAD (peripheral artery disease) ([address])     Shortness of breath     10/4/23 Chronic SOB since heart attack per pt -nothing new    Stroke ([address])     10/4/23 Per pt -"yrs ago"-reported 2019     Past Surgical History:   Procedure Laterality Date    AORTA - FEMORAL ARTERY BYPASS GRAFT Left     AXILLO-FEMORAL BYASS GRAFT Bilateral     BYPASS FEMORAL-FEMORAL      COLONOSCOPY      CORONARY ARTERY BYPASS GRAFT  2000    ENDOBRONCHIAL ULTRASOUND (EBUS) N/A 8/14/2023    Procedure: ENDOBRONCHIAL ULTRASOUND (EBUS) tissue biopsy;  Surgeon: Opal Rousseau MD;  Location: [facility];  Service: Thoracic    FEMORAL ARTERY - POPLITEAL ARTERY BYPASS GRAFT Bilateral     IVC FILTER INSERTION      UT AMPUTATION THIGH THROUGH FEMUR ANY LEVEL Left 07/21/2023    Procedure: AMPUTATION ABOVE KNEE (AKA);   Surgeon: Laina Palafox MD;  Location: [facility];  Service: Vascular     [address] INCL FLUOR GDNCE DX W/CELL WASHG SPX N/A 8/14/2023    Procedure: Junie Mckeon;  Surgeon: Violeta Ayoub MD;  Location:  MAIN OR;  Service: Thoracic    TOTAL ABDOMINAL HYSTERECTOMY W/ BILATERAL SALPINGOOPHORECTOMY      TOTAL HIP ARTHROPLASTY Left     WOUND DEBRIDEMENT Left 10/5/2023    Procedure: Left above knee amputation stump exploration, washout, debridement, and VAC placement;  Surgeon: Juve Maldonado MD;  Location: [facility];  Service: Vascular       Social History   Social History     Substance and Sexual Activity   Alcohol Use Not Currently    Alcohol/week: 50.0 standard drinks of alcohol    Types: 50 Cans of beer per week    Comment: no ETOH for 1.5 years     Social History     Substance and Sexual Activity   Drug Use Yes    Types: Marijuana    Comment: took some gummies for sleep     Social History     Tobacco Use   Smoking Status Every Day    Packs/day: 1.00    Years: 50.00    Total pack years: 50.00    Types: Cigarettes    Start date: 1966   Smokeless Tobacco Never   Tobacco Comments    As of 12/11/23 Smokes daily - avg 10 cigs/daily trying to quit         Meds/Allergies     Current Outpatient Medications:     acetaminophen (TYLENOL) 325 mg tablet, Take 3 tablets (975 mg total) by mouth every 8 (eight) hours (Patient taking differently: Take 975 mg by mouth if needed), Disp: , Rfl:     albuterol (ProAir HFA) 90 mcg/act inhaler, Inhale 2 puffs every 6 (six) hours as needed for wheezing, Disp: 8.5 g, Rfl: 2    clopidogrel (PLAVIX) 75 mg tablet, Take 1 tablet (75 mg total) by mouth in the morning Do not start before August 15, 2023.  (Patient taking differently: Take 75 mg by mouth daily at bedtime), Disp: 90 tablet, Rfl: 0    diltiazem (CARDIZEM) 120 MG tablet, Take 1 tablet (120 mg total) by mouth in the morning, Disp: 30 tablet, Rfl: 3    ezetimibe (ZETIA) 10 mg tablet, take 1 tablet by mouth once daily, Disp: 30 tablet, Rfl: 5    Folic Acid 0.8 MG CAPS, Take 1 capsule (0.8 mg total) by mouth in the morning, Disp: 90 capsule, Rfl: 1    lamoTRIgine (LaMICtal) 100 mg tablet, Take 1 tablet (100 mg total) by mouth daily, Disp: 90 tablet, Rfl: 1    lisinopril (ZESTRIL) 10 mg tablet, take 1 tablet by mouth once daily, Disp: 30 tablet, Rfl: 5    Melatonin 5 MG TABS, Take 4 tablets (20 mg total) by mouth daily at bedtime as needed (insomnia) Patient states she takes 20MG, Disp: , Rfl:     metoprolol succinate (TOPROL-XL) 50 mg 24 hr tablet, Take 1 tablet (50 mg total) by mouth daily, Disp: 30 tablet, Rfl: 3    rOPINIRole (REQUIP) 1 mg tablet, Take 1 tablet (1 mg total) by mouth daily at bedtime, Disp: 30 tablet, Rfl: 3    rosuvastatin (CRESTOR) 20 MG tablet, Take 20 mg by mouth daily at bedtime, Disp: , Rfl:     gabapentin (Neurontin) 300 mg capsule, Take 2 capsules (600 mg total) by mouth 3 (three) times a day (Patient not taking: Reported on 12/11/2023), Disp: 90 capsule, Rfl: 0    isosorbide mononitrate (IMDUR) 30 mg 24 hr tablet, Take 30 mg by mouth every morning (Patient not taking: Reported on 12/11/2023), Disp: , Rfl:     saccharomyces boulardii (FLORASTOR) 250 mg capsule, Take 1 capsule (250 mg total) by mouth 2 (two) times a day (Patient not taking: Reported on 12/11/2023), Disp: 60 capsule, Rfl: 0    traZODone (DESYREL) 50 mg tablet, Take 1 tablet (50 mg total) by mouth daily at bedtime as needed for sleep (Patient not taking: Reported on 12/11/2023), Disp: 30 tablet, Rfl: 1  No Known Allergies      Review of Systems  Constitutional:  Positive for activity change (left AKA) and fatigue. Negative for appetite change, chills, fever and unexpected weight change. HENT:  Positive for hearing loss (left ear) and sore throat. Eyes:  Positive for visual disturbance (states vision is bad). Needs glasses   Respiratory:  Positive for cough (moist cough with clear phlegm when laying flat) and wheezing. Negative for shortness of breath.          States has not really had to use inhaler   Cardiovascular:  Positive for palpitations (usually in mornings). Negative for chest pain and leg swelling. B/p elevated today, pt reports feeling really stressed and anxious about visit. Discussed following with cardiologist about b/p   Gastrointestinal:  Positive for nausea (daily unsure why every morning). Negative for abdominal pain, constipation, diarrhea and vomiting. Endocrine: Negative. Negative for cold intolerance and heat intolerance. Genitourinary:  Negative for difficulty urinating, dysuria, hematuria, vaginal bleeding and vaginal discharge. Musculoskeletal:  Positive for arthralgias (bilateral hips d/t sitting in wheelchair) and gait problem (in w/c left AKA). Negative for neck pain and neck stiffness. Skin: Negative. Negative for wound. Allergic/Immunologic: Negative. Negative for environmental allergies and food allergies. Neurological:  Positive for dizziness, weakness, light-headedness, numbness (bilateral hands and right arm) and headaches (states always). States has POTS and always has dizziness, lightheadedness and nausea in the mornings. Hematological:  Bruises/bleeds easily (on plavix). Psychiatric/Behavioral:  Positive for decreased concentration, dysphoric mood and sleep disturbance. Negative for behavioral problems and suicidal ideas. The patient is nervous/anxious. States she is always depressed is hoping to improve once left leg prosthesis is completed. OBJECTIVE:   BP (!) 172/88 (BP Location: Right arm, Patient Position: Sitting, Cuff Size: Standard) Comment: rechecked after sitting for a while, patient is still anxious about visit.  Suggested she follows with cardiologist for b/p  Pulse 72   Temp 98 °F (36.7 °C) (Temporal)   Resp 16   Ht 5' 4" (1.626 m)   Wt 67.7 kg (149 lb 4 oz)   SpO2 95%   BMI 25.62 kg/m²   Pain Assessment:  0  ECOG/[place]ubrod/WHO: 2 - Symptomatic, <50% confined to bed    Physical Exam   She is conversing appropriately. Her lungs are clear. Cardiac regular rhythm. She is in wheelchair for lower extremity amputation. RESULTS    Lab Results: No results found for this or any previous visit (from the past 672 hour(s)). Imaging Studies:CT chest wo contrast    Result Date: 12/7/2023  Narrative: CT CHEST WITHOUT IV CONTRAST INDICATION:   C34.91: Malignant neoplasm of unspecified part of right bronchus or lung. History of squamous cell carcinoma of the right upper lobe, status post radiation therapy. COMPARISON: CT, dated 7/18/2023. PET/CT, dated 7/7/2023. CT, dated 6/25/2023. CT, dated 5/31/2023. TECHNIQUE: CT examination of the chest was performed without intravenous contrast. Multiplanar 2D reformatted images were created from the source data. This examination, like all CT scans performed in the [facility], was performed utilizing techniques to minimize radiation dose exposure, including the use of iterative reconstruction and automated exposure control. Radiation dose length product (DLP) for this visit:  239 mGy-cm FINDINGS: LUNGS: Expected postprocedural changes related to radiation therapy of the hilar right upper lobe squamous cell carcinoma. It now measures approximately 11 x 8 mm (series 5, image 44), previously approximately 18 mm. Mild associated architectural distortion. No new concerning pulmonary nodule. Mild bibasilar subsegmental atelectasis. Redemonstrated emphysema. Unchanged occlusion of the posterior right upper lobe bronchus. Airways are otherwise normal. PLEURA:  Unremarkable. HEART/GREAT VESSELS: Mild cardiac enlargement. No pericardial effusion. Coronary atherosclerotic calcifications with sequela of prior CABG. No thoracic aortic aneurysm. MEDIASTINUM AND ROSIBEL:  Unremarkable. CHEST WALL AND LOWER NECK: No enlarged lymph nodes. Redemonstrated bilateral subclavian-femoral bypass grafting.  VISUALIZED STRUCTURES IN THE UPPER ABDOMEN: Redemonstrated right adrenal adenoma. OSSEOUS STRUCTURES:  Spinal degenerative changes are noted. No acute fracture or destructive osseous lesion. Stable sclerotic lesion in the lateral left third rib, presumed benign. Impression: 1. Expected postprocedural changes related to radiation therapy of a right hilar squamous cell carcinoma. 2.  No new concerning pulmonary nodules. Emphysema. Workstation performed: [id number]           Assessment/Plan:  No orders of the defined types were placed in this encounter. Francis Bhardwaj is a 68y.o. year old female who is 2 months status post hypofractionated radiation to the right upper lobe. I reviewed her CT scan from 12/2/2023 which reveals decreased size and postradiation changes at the treated site. No other new lesions identified. She has follow-up CT already ordered by [facility] on 5/24/2024. She will have follow-up here in 6 months. Parth Fowler MD  12/11/2023,10:48 AM    Portions of the record may have been created with voice recognition software. Occasional wrong word or "sound a like" substitutions may have occurred due to the inherent limitations of voice recognition software. Read the chart carefully and recognize, using context, where substitutions have occurred.

## 2023-12-11 NOTE — PROGRESS NOTES
Maranda Mario 1950 is a 68 y.o. female With diagnosed non-small cell carcinoma, SCC of right upper lung. Patient has 60 pack year smoking history. Underwent CT lung screening. Completed SBRT to RUL on 10/2/23 and presents today for EOT follow-up. Additional medical problems include coronary artery disease, HTN, Stage 3 CKD, CABG, peripheral artery disease, aortoiliac occlusive disease. Recent above knee amputation of LLE. Patient has 60 pack year smoking history. Underwent CT lung screening. 23 CT chest wo contrast  IMPRESSION:  1. Expected postprocedural changes related to radiation therapy of a right hilar squamous cell carcinoma. 2.  No new concerning pulmonary nodules. Emphysema. 23 Dr. Lilli Cota  CT CAP w/c ordered in 6 months. Should she be found to be stage 1B or greater, then would get EGFR to determine if adjuvant anti-EGFR therapy is indicated. Should she be found to unexpectedly be Stage II or higher, then would assess her for adjuvant chemotherapy. Upcomin24 CT chest  24 Hem Onc    Follow up visit     Oncology History   SCC (squamous cell carcinoma of lung) ([address])   2023 Biopsy    A.  Lung, Right Upper Lobe, RUL posterior segment endobronchial biopsy:  -Non- small cell carcinoma , immunohistochemically consistent with squamous cell carcinoma      Non-small cell cancer of right lung (HCC)   2023 Initial Diagnosis    Non-small cell cancer of right lung ([address])     2023 -  Cancer Staged    Staging form: Lung, AJCC 8th Edition  - Clinical: Stage IA2 (cT1b, cN0, cM0) - Signed by Madyson Thomas MD on 2023 - 10/2/2023 Radiation    Treatment:  Course: C1 SBRT    Plan ID Energy Fractions Dose per Fraction (cGy) Dose Correction (cGy) Total Dose Delivered (cGy) Elapsed Days   SBRT RUL 6X-FFF  750 0 6,000 18      Treatment dates:  C1 SBRT: 2023 - 10/2/2023         Review of Systems:  Review of Systems   Constitutional: Positive for activity change (left AKA) and fatigue. Negative for appetite change, chills, fever and unexpected weight change. HENT:  Positive for hearing loss (left ear) and sore throat. Eyes:  Positive for visual disturbance (states vision is bad). Needs glasses   Respiratory:  Positive for cough (moist cough with clear phlegm when laying flat) and wheezing. Negative for shortness of breath. States has not really had to use inhaler   Cardiovascular:  Positive for palpitations (usually in mornings). Negative for chest pain and leg swelling. B/p elevated today, pt reports feeling really stressed and anxious about visit. Discussed following with cardiologist about b/p   Gastrointestinal:  Positive for nausea (daily unsure why every morning). Negative for abdominal pain, constipation, diarrhea and vomiting. Endocrine: Negative. Negative for cold intolerance and heat intolerance. Genitourinary:  Negative for difficulty urinating, dysuria, hematuria, vaginal bleeding and vaginal discharge. Musculoskeletal:  Positive for arthralgias (bilateral hips d/t sitting in wheelchair) and gait problem (in w/c left AKA). Negative for neck pain and neck stiffness. Skin: Negative. Negative for wound. Allergic/Immunologic: Negative. Negative for environmental allergies and food allergies. Neurological:  Positive for dizziness, weakness, light-headedness, numbness (bilateral hands and right arm) and headaches (states always). States has POTS and always has dizziness, lightheadedness and nausea in the mornings. Hematological:  Bruises/bleeds easily (on plavix). Psychiatric/Behavioral:  Positive for decreased concentration, dysphoric mood and sleep disturbance. Negative for behavioral problems and suicidal ideas. The patient is nervous/anxious. States she is always depressed is hoping to improve once left leg prosthesis is completed.         Clinical Trial: no    Teaching completed    Health Maintenance   Topic Date Due    COVID-19 Vaccine (1) Never done    Osteoporosis Screening  Never done    Breast Cancer Screening: Mammogram  04/24/2018    PT PLAN OF CARE  04/19/2023    Urinary Incontinence Screening  12/09/2023    BMI: Followup Plan  12/09/2023    Medicare Annual Wellness Visit (AWV)  12/09/2023    Fall Risk  03/20/2024    BMI: Adult  12/04/2024    Colorectal Cancer Screening  05/10/2026    Hepatitis C Screening  Completed    Pneumococcal Vaccine: 65+ Years  Completed    Influenza Vaccine  Completed    HIB Vaccine  Aged Out    IPV Vaccine  Aged Out    Hepatitis A Vaccine  Aged Out    Meningococcal ACWY Vaccine  Aged Out    HPV Vaccine  Aged Out     Patient Active Problem List   Diagnosis    Bipolar affective disorder, depressed, severe ([address])    CAD (coronary artery disease)    Essential hypertension    Hx of CABG    S/P vascular bypass    Thyroid nodule    Renal artery stenosis (HCC)    Presence of IVC filter    PAD (peripheral artery disease) ([address])    Mixed hyperlipidemia    Aortoiliac occlusive disease (HCC)    Tobacco abuse    PAF (paroxysmal atrial fibrillation) ([address])    Stage 3 chronic kidney disease, unspecified whether stage 3a or 3b CKD ([address])    Dizziness    Insomnia    Lung mass    At risk for venous thromboembolism (VTE)    Pain    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)    SCC (squamous cell carcinoma of lung) (HCC)    Non-small cell cancer of right lung ([address])    Delayed postoperative wound closure    Shortness of breath    S/P AKA (above knee amputation) unilateral, left ([address])     Past Medical History:   Diagnosis Date    Anxiety     Aorto-iliac disease ([address])     Atrial fibrillation ([address])     CAD (coronary artery disease)     Carotid stenosis, bilateral     Celiac artery stenosis (HCC)     Chronic headaches     10/4/23 Per pt chronic headaches    CKD (chronic kidney disease) stage 3, GFR 30-59 ml/min ([address])     10/4/23 per pt - "unaware of stage 3 CKD"    Depression     10/4/23 Per pt "severely depressed "    DVT (deep venous thrombosis) ([address]) 07/2023    LLE (CFV, popl)    Heart attack ([address]) 02/2022    HLD (hyperlipidemia)     Hypertension     Intermittent lightheadedness     10/4/23 "comes and goes per pt"    Lung cancer ([address])     10/4/23 Per pt just had last radiation on 10/2/23. Lung mass     RUL    Myocardial infarction ([address])     2022    Nausea     10/4/23 Per pt has daily    PAD (peripheral artery disease) ([address])     Shortness of breath     10/4/23 Chronic SOB since heart attack per pt -nothing new    Stroke ([address])     10/4/23 Per pt -"yrs ago"-reported 2019     Past Surgical History:   Procedure Laterality Date    AORTA - FEMORAL ARTERY BYPASS GRAFT Left     AXILLO-FEMORAL BYASS GRAFT Bilateral     BYPASS FEMORAL-FEMORAL      COLONOSCOPY      CORONARY ARTERY BYPASS GRAFT  2000    ENDOBRONCHIAL ULTRASOUND (EBUS) N/A 8/14/2023    Procedure: ENDOBRONCHIAL ULTRASOUND (EBUS) tissue biopsy;  Surgeon: Luann Dhillon MD;  Location: [facility];  Service: Thoracic    FEMORAL ARTERY - POPLITEAL ARTERY BYPASS GRAFT Bilateral     IVC FILTER INSERTION      AZ AMPUTATION THIGH THROUGH FEMUR ANY LEVEL Left 07/21/2023    Procedure: AMPUTATION ABOVE KNEE (AKA);   Surgeon: Reji Rivas MD;  Location: [facility];  Service: Vascular     [address] INCL FLUOR GDNCE DX W/CELL WASHG [address] [facility] N/A 8/14/2023    Procedure: Rm Perking;  Surgeon: Luann Dhillon MD;  Location: [facility];  Service: Thoracic    TOTAL ABDOMINAL HYSTERECTOMY W/ BILATERAL SALPINGOOPHORECTOMY      TOTAL HIP ARTHROPLASTY Left     WOUND DEBRIDEMENT Left 10/5/2023    Procedure: Left above knee amputation stump exploration, washout, debridement, and VAC placement;  Surgeon: Chaparrita Maldonado MD;  Location: [facility];  Service: Vascular     Family History   Problem Relation Age of Onset    Leukemia Mother 80    Cancer Sister     Anesthesia problems Neg Hx      Social History Socioeconomic History    Marital status:      Spouse name: Not on file    Number of children: Not on file    Years of education: Not on file    Highest education level: Not on file   Occupational History    Not on file   Tobacco Use    Smoking status: Every Day     Packs/day: 1.00     Years: 50.00     Total pack years: 50.00     Types: Cigarettes     Start date: 5    Smokeless tobacco: Never    Tobacco comments:     As of 12/11/23 Smokes daily - avg 10 cigs/daily trying to quit   Vaping Use    Vaping Use: Never used   Substance and Sexual Activity    Alcohol use: Not Currently     Alcohol/week: 50.0 standard drinks of alcohol     Types: 50 Cans of beer per week     Comment: no ETOH for 1.5 years    Drug use: Yes     Types: Marijuana     Comment: took some gummies for sleep    Sexual activity: Not Currently     Partners: Male     Comment: Not active at this time per pt   Other Topics Concern    Not on file   Social History Narrative    Not on file     Social Determinants of Health     Financial Resource Strain: Not on file   Food Insecurity: No Food Insecurity (7/19/2023)    Hunger Vital Sign     Worried About Running Out of Food in the Last Year: Never true     Ran Out of Food in the Last Year: Never true   Transportation Needs: No Transportation Needs (7/19/2023)    PRAPARE - Transportation     Lack of Transportation (Medical): No     Lack of Transportation (Non-Medical):  No   Physical Activity: Not on file   Stress: Not on file   Social Connections: Not on file   Intimate Partner Violence: Not on file   Housing Stability: Low Risk  (7/19/2023)    Housing Stability Vital Sign     Unable to Pay for Housing in the Last Year: No     Number of Places Lived in the Last Year: 1     Unstable Housing in the Last Year: No       Current Outpatient Medications:     acetaminophen (TYLENOL) 325 mg tablet, Take 3 tablets (975 mg total) by mouth every 8 (eight) hours (Patient taking differently: Take 975 mg by mouth if needed), Disp: , Rfl:     albuterol (ProAir HFA) 90 mcg/act inhaler, Inhale 2 puffs every 6 (six) hours as needed for wheezing, Disp: 8.5 g, Rfl: 2    clopidogrel (PLAVIX) 75 mg tablet, Take 1 tablet (75 mg total) by mouth in the morning Do not start before August 15, 2023.  (Patient taking differently: Take 75 mg by mouth daily at bedtime), Disp: 90 tablet, Rfl: 0    diltiazem (CARDIZEM) 120 MG tablet, Take 1 tablet (120 mg total) by mouth in the morning, Disp: 30 tablet, Rfl: 3    ezetimibe (ZETIA) 10 mg tablet, take 1 tablet by mouth once daily, Disp: 30 tablet, Rfl: 5    Folic Acid 0.8 MG CAPS, Take 1 capsule (0.8 mg total) by mouth in the morning, Disp: 90 capsule, Rfl: 1    lamoTRIgine (LaMICtal) 100 mg tablet, Take 1 tablet (100 mg total) by mouth daily, Disp: 90 tablet, Rfl: 1    lisinopril (ZESTRIL) 10 mg tablet, take 1 tablet by mouth once daily, Disp: 30 tablet, Rfl: 5    Melatonin 5 MG TABS, Take 4 tablets (20 mg total) by mouth daily at bedtime as needed (insomnia) Patient states she takes 20MG, Disp: , Rfl:     metoprolol succinate (TOPROL-XL) 50 mg 24 hr tablet, Take 1 tablet (50 mg total) by mouth daily, Disp: 30 tablet, Rfl: 3    rOPINIRole (REQUIP) 1 mg tablet, Take 1 tablet (1 mg total) by mouth daily at bedtime, Disp: 30 tablet, Rfl: 3    rosuvastatin (CRESTOR) 20 MG tablet, Take 20 mg by mouth daily at bedtime, Disp: , Rfl:     gabapentin (Neurontin) 300 mg capsule, Take 2 capsules (600 mg total) by mouth 3 (three) times a day (Patient not taking: Reported on 12/11/2023), Disp: 90 capsule, Rfl: 0    isosorbide mononitrate (IMDUR) 30 mg 24 hr tablet, Take 30 mg by mouth every morning (Patient not taking: Reported on 12/11/2023), Disp: , Rfl:     saccharomyces boulardii (FLORASTOR) 250 mg capsule, Take 1 capsule (250 mg total) by mouth 2 (two) times a day (Patient not taking: Reported on 12/11/2023), Disp: 60 capsule, Rfl: 0    traZODone (DESYREL) 50 mg tablet, Take 1 tablet (50 mg total) by mouth daily at bedtime as needed for sleep (Patient not taking: Reported on 12/11/2023), Disp: 30 tablet, Rfl: 1  No Known Allergies  Vitals:    12/11/23 1017 12/11/23 1036   BP: (!) 197/92 (!) 172/88   BP Location: Right arm Right arm   Patient Position: Sitting Sitting   Cuff Size: Standard Standard   Pulse: 72    Resp: 16    Temp: 98 °F (36.7 °C)    TempSrc: Temporal    SpO2: 95%    Weight: 67.7 kg (149 lb 4 oz)    Height: 5' 4" (1.626 m)       Pain Score: 0-No pain

## 2023-12-28 ENCOUNTER — HOSPITAL ENCOUNTER (EMERGENCY)
Facility: HOSPITAL | Age: 73
Discharge: HOME | End: 2023-12-28
Attending: EMERGENCY MEDICINE
Payer: MEDICARE

## 2023-12-28 ENCOUNTER — OFFICE VISIT (OUTPATIENT)
Dept: FAMILY MEDICINE CLINIC | Facility: CLINIC | Age: 73
End: 2023-12-28
Payer: MEDICARE

## 2023-12-28 ENCOUNTER — APPOINTMENT (EMERGENCY)
Dept: CT IMAGING | Facility: HOSPITAL | Age: 73
End: 2023-12-28
Payer: MEDICARE

## 2023-12-28 VITALS
TEMPERATURE: 98 F | BODY MASS INDEX: 25.44 KG/M2 | OXYGEN SATURATION: 92 % | DIASTOLIC BLOOD PRESSURE: 76 MMHG | WEIGHT: 148.2 LBS | HEART RATE: 73 BPM | SYSTOLIC BLOOD PRESSURE: 156 MMHG

## 2023-12-28 VITALS
BODY MASS INDEX: 25.22 KG/M2 | HEIGHT: 64 IN | SYSTOLIC BLOOD PRESSURE: 180 MMHG | DIASTOLIC BLOOD PRESSURE: 80 MMHG | HEART RATE: 55 BPM | WEIGHT: 147.71 LBS | OXYGEN SATURATION: 95 % | RESPIRATION RATE: 18 BRPM

## 2023-12-28 DIAGNOSIS — T39.1X1A ACCIDENTAL ACETAMINOPHEN OVERDOSE, INITIAL ENCOUNTER: Primary | ICD-10-CM

## 2023-12-28 DIAGNOSIS — I71.9 DESCENDING AORTIC ANEURYSM (HCC): ICD-10-CM

## 2023-12-28 DIAGNOSIS — N18.30 STAGE 3 CHRONIC KIDNEY DISEASE, UNSPECIFIED WHETHER STAGE 3A OR 3B CKD (HCC): ICD-10-CM

## 2023-12-28 DIAGNOSIS — R11.2 NAUSEA AND VOMITING, UNSPECIFIED VOMITING TYPE: ICD-10-CM

## 2023-12-28 DIAGNOSIS — Z00.00 MEDICARE ANNUAL WELLNESS VISIT, SUBSEQUENT: Primary | ICD-10-CM

## 2023-12-28 DIAGNOSIS — R11.2 NAUSEA VOMITING AND DIARRHEA: ICD-10-CM

## 2023-12-28 DIAGNOSIS — Z12.31 ENCOUNTER FOR SCREENING MAMMOGRAM FOR BREAST CANCER: ICD-10-CM

## 2023-12-28 DIAGNOSIS — R19.7 NAUSEA VOMITING AND DIARRHEA: ICD-10-CM

## 2023-12-28 DIAGNOSIS — F31.4 BIPOLAR AFFECTIVE DISORDER, DEPRESSED, SEVERE (HCC): ICD-10-CM

## 2023-12-28 DIAGNOSIS — C34.91 NON-SMALL CELL CANCER OF RIGHT LUNG (HCC): ICD-10-CM

## 2023-12-28 DIAGNOSIS — R10.11 RUQ PAIN: ICD-10-CM

## 2023-12-28 DIAGNOSIS — I10 ESSENTIAL HYPERTENSION: ICD-10-CM

## 2023-12-28 DIAGNOSIS — Z78.0 POSTMENOPAUSE: ICD-10-CM

## 2023-12-28 DIAGNOSIS — M54.9 UPPER BACK PAIN ON RIGHT SIDE: ICD-10-CM

## 2023-12-28 LAB
2HR DELTA HS TROPONIN: -1 NG/L
ALBUMIN SERPL BCP-MCNC: 4.1 G/DL (ref 3.5–5)
ALP SERPL-CCNC: 71 U/L (ref 34–104)
ALT SERPL W P-5'-P-CCNC: 8 U/L (ref 7–52)
ANION GAP SERPL CALCULATED.3IONS-SCNC: 7 MMOL/L
APAP SERPL-MCNC: 11 UG/ML (ref 10–20)
AST SERPL W P-5'-P-CCNC: 14 U/L (ref 13–39)
ATRIAL RATE: 59 BPM
ATRIAL RATE: 72 BPM
ATRIAL RATE: 74 BPM
BASOPHILS # BLD AUTO: 0.05 THOUSANDS/ÂΜL (ref 0–0.1)
BASOPHILS NFR BLD AUTO: 1 % (ref 0–1)
BILIRUB SERPL-MCNC: 0.41 MG/DL (ref 0.2–1)
BILIRUB UR QL STRIP: NEGATIVE
BUN SERPL-MCNC: 9 MG/DL (ref 5–25)
CALCIUM SERPL-MCNC: 9.5 MG/DL (ref 8.4–10.2)
CARDIAC TROPONIN I PNL SERPL HS: 10 NG/L
CARDIAC TROPONIN I PNL SERPL HS: 11 NG/L
CHLORIDE SERPL-SCNC: 108 MMOL/L (ref 96–108)
CLARITY UR: CLEAR
CO2 SERPL-SCNC: 24 MMOL/L (ref 21–32)
COLOR UR: YELLOW
CREAT SERPL-MCNC: 0.81 MG/DL (ref 0.6–1.3)
EOSINOPHIL # BLD AUTO: 0.17 THOUSAND/ÂΜL (ref 0–0.61)
EOSINOPHIL NFR BLD AUTO: 2 % (ref 0–6)
ERYTHROCYTE [DISTWIDTH] IN BLOOD BY AUTOMATED COUNT: 13 % (ref 11.6–15.1)
ETHANOL SERPL-MCNC: <10 MG/DL
GFR SERPL CREATININE-BSD FRML MDRD: 72 ML/MIN/1.73SQ M
GLUCOSE SERPL-MCNC: 85 MG/DL (ref 65–140)
GLUCOSE UR STRIP-MCNC: NEGATIVE MG/DL
HCT VFR BLD AUTO: 45.8 % (ref 34.8–46.1)
HGB BLD-MCNC: 15.1 G/DL (ref 11.5–15.4)
HGB UR QL STRIP.AUTO: NEGATIVE
IMM GRANULOCYTES # BLD AUTO: 0.05 THOUSAND/UL (ref 0–0.2)
IMM GRANULOCYTES NFR BLD AUTO: 1 % (ref 0–2)
KETONES UR STRIP-MCNC: NEGATIVE MG/DL
LEUKOCYTE ESTERASE UR QL STRIP: NEGATIVE
LIPASE SERPL-CCNC: 17 U/L (ref 11–82)
LYMPHOCYTES # BLD AUTO: 1.02 THOUSANDS/ÂΜL (ref 0.6–4.47)
LYMPHOCYTES NFR BLD AUTO: 11 % (ref 14–44)
MCH RBC QN AUTO: 29.2 PG (ref 26.8–34.3)
MCHC RBC AUTO-ENTMCNC: 33 G/DL (ref 31.4–37.4)
MCV RBC AUTO: 89 FL (ref 82–98)
MONOCYTES # BLD AUTO: 0.5 THOUSAND/ÂΜL (ref 0.17–1.22)
MONOCYTES NFR BLD AUTO: 5 % (ref 4–12)
NEUTROPHILS # BLD AUTO: 7.48 THOUSANDS/ÂΜL (ref 1.85–7.62)
NEUTS SEG NFR BLD AUTO: 80 % (ref 43–75)
NITRITE UR QL STRIP: NEGATIVE
NRBC BLD AUTO-RTO: 0 /100 WBCS
P AXIS: 56 DEGREES
P AXIS: 65 DEGREES
P AXIS: 80 DEGREES
PH UR STRIP.AUTO: 6 [PH] (ref 4.5–8)
PLATELET # BLD AUTO: 248 THOUSANDS/UL (ref 149–390)
PMV BLD AUTO: 10.2 FL (ref 8.9–12.7)
POTASSIUM SERPL-SCNC: 4.4 MMOL/L (ref 3.5–5.3)
PR INTERVAL: 136 MS
PR INTERVAL: 140 MS
PR INTERVAL: 140 MS
PROT SERPL-MCNC: 6.7 G/DL (ref 6.4–8.4)
PROT UR STRIP-MCNC: NEGATIVE MG/DL
QRS AXIS: 21 DEGREES
QRS AXIS: 81 DEGREES
QRS AXIS: 84 DEGREES
QRSD INTERVAL: 134 MS
QRSD INTERVAL: 92 MS
QRSD INTERVAL: 92 MS
QT INTERVAL: 394 MS
QT INTERVAL: 408 MS
QT INTERVAL: 464 MS
QTC INTERVAL: 437 MS
QTC INTERVAL: 446 MS
QTC INTERVAL: 459 MS
RBC # BLD AUTO: 5.17 MILLION/UL (ref 3.81–5.12)
SALICYLATES SERPL-MCNC: <5 MG/DL (ref 3–20)
SODIUM SERPL-SCNC: 139 MMOL/L (ref 135–147)
SP GR UR STRIP.AUTO: <=1.005 (ref 1–1.03)
T WAVE AXIS: 104 DEGREES
T WAVE AXIS: 127 DEGREES
T WAVE AXIS: 134 DEGREES
UROBILINOGEN UR QL STRIP.AUTO: 0.2 E.U./DL
VENTRICULAR RATE: 59 BPM
VENTRICULAR RATE: 72 BPM
VENTRICULAR RATE: 74 BPM
WBC # BLD AUTO: 9.27 THOUSAND/UL (ref 4.31–10.16)

## 2023-12-28 PROCEDURE — 93005 ELECTROCARDIOGRAM TRACING: CPT

## 2023-12-28 PROCEDURE — G1004 CDSM NDSC: HCPCS

## 2023-12-28 PROCEDURE — 83690 ASSAY OF LIPASE: CPT

## 2023-12-28 PROCEDURE — 85025 COMPLETE CBC W/AUTO DIFF WBC: CPT

## 2023-12-28 PROCEDURE — 80143 DRUG ASSAY ACETAMINOPHEN: CPT

## 2023-12-28 PROCEDURE — 84484 ASSAY OF TROPONIN QUANT: CPT

## 2023-12-28 PROCEDURE — 71275 CT ANGIOGRAPHY CHEST: CPT

## 2023-12-28 PROCEDURE — 74174 CTA ABD&PLVS W/CONTRAST: CPT

## 2023-12-28 PROCEDURE — G0439 PPPS, SUBSEQ VISIT: HCPCS | Performed by: FAMILY MEDICINE

## 2023-12-28 PROCEDURE — 99285 EMERGENCY DEPT VISIT HI MDM: CPT

## 2023-12-28 PROCEDURE — 81003 URINALYSIS AUTO W/O SCOPE: CPT

## 2023-12-28 PROCEDURE — 80053 COMPREHEN METABOLIC PANEL: CPT

## 2023-12-28 PROCEDURE — 99214 OFFICE O/P EST MOD 30 MIN: CPT | Performed by: FAMILY MEDICINE

## 2023-12-28 PROCEDURE — 80179 DRUG ASSAY SALICYLATE: CPT

## 2023-12-28 PROCEDURE — 36415 COLL VENOUS BLD VENIPUNCTURE: CPT

## 2023-12-28 PROCEDURE — 82077 ASSAY SPEC XCP UR&BREATH IA: CPT

## 2023-12-28 RX ORDER — METOPROLOL SUCCINATE 50 MG/1
50 TABLET, EXTENDED RELEASE ORAL DAILY
Qty: 90 TABLET | Refills: 1 | Status: SHIPPED | OUTPATIENT
Start: 2023-12-28

## 2023-12-28 RX ORDER — ONDANSETRON 4 MG/1
4 TABLET, FILM COATED ORAL EVERY 6 HOURS
Qty: 12 TABLET | Refills: 0 | Status: SHIPPED | OUTPATIENT
Start: 2023-12-28

## 2023-12-28 RX ORDER — LIDOCAINE 50 MG/G
1 PATCH TOPICAL ONCE
Status: DISCONTINUED | OUTPATIENT
Start: 2023-12-28 | End: 2023-12-28 | Stop reason: HOSPADM

## 2023-12-28 RX ORDER — ISOSORBIDE MONONITRATE 30 MG/1
30 TABLET, EXTENDED RELEASE ORAL EVERY MORNING
Qty: 90 TABLET | Refills: 1 | Status: SHIPPED | OUTPATIENT
Start: 2023-12-28

## 2023-12-28 RX ADMIN — LIDOCAINE 1 PATCH: 50 PATCH TOPICAL at 14:13

## 2023-12-28 RX ADMIN — IOHEXOL 100 ML: 350 INJECTION, SOLUTION INTRAVENOUS at 12:54

## 2023-12-28 NOTE — CONSULTS
INTERPROFESSIONAL (PHONE) CONSULTATION - Medical Toxicology  Mary Ellen Steve 73 y.o. female MRN: [id number]  Unit/Bed#: ED-28 Encounter: [id number]      Reason for Consult / Principal Problem: Acetaminophen ingestion    Inpatient consult to Toxicology  Consult performed by: Velia Mark MD  Consult ordered by: Flory Ray PA-C        12/28/23    ASSESSMENT:  Acetaminophen ingestion    RECOMMENDATIONS:  Continue supportive care measures, including airway monitoring, head of bed elevation, aspiration precautions, cardiac telemetry, and continuous pulse oximetry.    Patient's acetaminophen level upon arrival is 11 with last dose around 9 a.m. LFTs are normal. At this time, no treatment is needed with NAC.    Please  patient on safe acetaminophen usage.    For further questions, please contact the medical  on call via [employer] between 8am and 9pm. If between 9pm and 8am, please reach out to the Poison Center at [phone].     Please see additional teaching note below:    Medical Toxicology Teaching Note  [facility]  Acetaminophen Toxicity  Last revised October 2017     Acetaminophen (Tylenol) is a nonopiod analgesic and antipyretic medication found in many over-the-counter and prescription products such as Tylenol PM, Norco, Percocet, Nyquil, Vicks Formula 44-D. The recommended maximum daily dose of acetaminophen for adults is 3g/day, and 75-90mg/kg/day for children. Alcoholics may safely take Tylenol in therapeutic doses, but they may be at increased risk for hepatotoxicity in overdose.     Mechanism of Toxicity: Acetaminophen is primarily metabolized by the liver. In therapeutic doses, about 90% of acetaminophen is conjugated to nontoxic metabolites (glucoronides and sulfates). A small portion (<5%) is conjugated by cytochrome P450 enzyme, subunit CYP2E1, to a toxic metabolite, N-acetyl-p-benzoquinoneimine (NAPQI). This metabolite is further  conjugated by glutathione, to nontoxic metabolites eliminated by the kidneys.   Liver Injury:  In toxic doses, the usual metabolic pathways are overwhelmed; acetaminophen is shunted to the cytochrome P450 pathway, creating NAPQI. Glutathione stores are depleted and NAPQI is produced. Cellular injury and hepatic necrosis may occur as NAPQI accumulates.   Renal Injury:  Cytochrome P450 activity in the kidneys is thought to cause direct renal damage. Renal insufficiency may also develop during fulminant hepatic failure due to hepatorenal syndrome. Renal toxicity is usually associated with liver injury.   Pharmacokinetics:  Acetaminophen is rapidly absorbed. Peak levels occur within  minutes with normal doses. Delayed absorption may occur with sustained release products or with co-ingestions that slow the GI tract (opiods, anticholinergics). The elimination half-life is 1-3 hours after therapeutic doses and may extend to 12 hours after overdose.   Toxic Dose:  Toxicity in adults may occur with acute ingestions of 7g, and 200mg/kg in children. Hepatic injury following chronic ingestions may occur at any dose above the daily recommended dose.     Clinical Presentation:    Acute Ingestion: Within 8 hrs of an acute ingestion, there are usually few symptoms. Between 8-30 hours after a toxic, acute ingestion, a transaminitis will develop. Nausea, vomiting, and right upper quadrant pain may occur. Within 12-36 hours, worsening AST/ALT develops with elevated bilirubin and INR. The most severe cases will develop fulminant liver failure with hepatic encephalopathy and acidosis, usually within 3-7 days post overdose. The patient should be evaluated for a liver transplantation.   Repeated Supra-therapeutic Ingestion: Due to a sub-acute course, patients may present anywhere along a spectrum - normal LFTS to asymptomatic elevation of enzymes to hepatic failure.     Diagnosis   Acute Ingestion (Time of Ingestion Known): After an  acute ingestion at a known time, obtain a 4-hour post-ingestion serum acetaminophen level and plot the level on the Rumack-Fredis’s nomogram (see below). This nomogram is used to predict the likelihood of hepatic toxicity based on the level of acetaminophen between 4 and 24 hours post-ingestion. The nomogram CANNOT be used if the time of ingestion is unknown.   The dotted line (Rumack-Fredis line), marking a 4-hour level at 200 mcg/ml, is the original line developed from the study above which hepatic toxicity will probably occur. The solid line (Treatment Line), marking a 4-hour level at 150ug/ml. is the treatment line accepted as the standard of care in the [place] and is 25% lower as a safety margin. If the patient’s serum APAP level falls above the treatment line, start treatment with N-acetylcysteine (NAC). (see Treatment below)           Acute Ingestion (Time of Ingestion Unknown) or Repeated Supra-therapeutic Ingestion An acetaminophen level CANNOT be plotted on the Rumack-Fredis’s nomogram. Draw an APAP level and AST/ALT at time of presentation. Anyone with an APAP level> 10mcg/ml OR elevated AST/ALT should start NAC. (see Treatment below)     TREATMENT   Emergency and Supportive Care: Treat nausea and vomiting to protect airway and support safe administration of charcoal and NAC, when indicated (see below). Provide standard supportive care for liver and renal failure. Contact liver transplant team if fulminant hepatic failure occurs.   Decontamination:  Administer activated charcoal within 2 hours of ingestion (consider later if extended release preparations). Use antiemetics for nausea. Activated charcoal does bind to NAC, but the effect is not thought to be clinically significant. Gastric emptying is not recommended.   Specific Drugs and Antidotes.   Acute Ingestion Treat with NAC if the APAP level falls above the Treatment Line on the nomogram. The maximal benefit occurs if given within 8 hours of  acute ingestion. Therefore, it is recommended to empirically start NAC before a level is obtained if there is a reasonable concern of a toxic ingestion presenting close to 8 hours or beyond. In late presenters (>8hrs), start NAC and treat for a full course or longer if LFTS remain abnormal. Treatment maybe stopped when AST/ALT peak and then downtrend, with an INR <2 and patient is clinically well. If abnormal labs persist, continue NAC and call Toxicology. There are two routes of administration for NAC, oral and IV. The treatment protocols are described below.   Acute Ingestion (Time of Ingestion Unknown) or Repeated Supra-therapeutic Ingestion   The nomogram CANNOT be used to estimate the risk of hepatotoxicity. At presentation, check a serum APAP level and AST/ALT. If the APAP level is above 10 mcg/ml or the AST/ALT are elevated, start NAC treatment for 12 hours. If abnormalities persist, continue NAC treatment and call toxicology. If the APAP level is undetectable and AST and ALT are downtrending at the end of 12 hours, treatment may be stopped.     Intravenous (Acetadote)   Loading dose- 150mg/kg infused over 15-60 minutes   Maintenance Infusion #1- 50mg/kg (12.5mg/kg/hr) over 4 hours   Maintenance Infusion #2 -100mg/kg (6.25 mg/kg/hr) until treatment endpoint   Treatment Endpoint: 20 hours or more   NAC should be continued for the full course.   NAC can be stopped when APAP is undetectable, AST/ALT have peaked and are downtrending, and patient appears clinically well. Consultation with a medical  /poison center is recommended before changes in the duration of therapy are made.     Acetaminophen Toxicity Do’s and Don’ts   Acute Ingestions   DO give charcoal for decontamination within 2 hours of ingestion if the patient can adequately protect their airway.   DO start NAC empirically, i.e. without an APAP level, if the ingestion is likely a large overdose presenting at 8 hours or more after ingestion.    DO contact the Liver Transplant Team early if liver failure is developing.   DO NOT get a level before 4hrs post-ingestion if the time of ingestion is certain in an acute overdose.   DO NOT stop NAC therapy until full course is finished or truncated therapy is recommended by the Poison Center.   Repeated Supra-Therapeutic Ingestions (RSI)   DO ask patients with pain complaints (toothaches, back pain, cancer) about the amount of acetaminophen they use.   DO NOT use the Rumack-Allen nomogram to determine if the APAP level is toxic.   DO NOT stop NAC therapy until full course is finished or truncated therapy is recommended by the Poison Center.   NAC Protocols   DO stop IV NAC if an anaphylactoid reaction occurs (rare). Treat the reaction appropriately and call the Poison Center for recommendations on continued NAC therapy.   DO give charcoal with oral NAC when charcoal is indicated.   References   Ludivina BURRIS Acetaminophen. In Ludivina BURRIS, Lavonne SINGH, Kasey OLIVA et al eds. Medical Toxicology 3rd edition. [place]: [employer], 2004: pp.723-737.   Jigar ROBERSON Acetaminophen. In Jigar ROBERSON     Hx and PE limited by the dynamics of a phone consultation. I have not personally interviewed or evaluated the patient, but only advised based on the information provided to me. Primary provider is responsible for all clinical decisions.     Pertinent history, physical exam and clinical findings and course discussed: Mary Ellen Steve is a 73 y.o. year old female who presents with 2 weeks of vomiting and diarrhea in addition to back and abdominal pain. Reported taking multiple tablets of acetaminophen yesterday and today for back pain.    Review of systems and physical exam not performed by me.    Historical Information   Past Medical History:   Diagnosis Date    Anxiety     Aorto-iliac disease (HCC)     Atrial fibrillation (HCC)     CAD (coronary artery disease)     Carotid stenosis, bilateral     Celiac artery  "stenosis (HCC)     Chronic headaches     10/4/23 Per pt chronic headaches    CKD (chronic kidney disease) stage 3, GFR 30-59 ml/min (HCC)     10/4/23 per pt - \"unaware of stage 3 CKD\"    Depression     10/4/23 Per pt \"severely depressed \"    DVT (deep venous thrombosis) (HCC) 07/2023    LLE (CFV, popl)    Heart attack (HCC) 02/2022    HLD (hyperlipidemia)     Hypertension     Intermittent lightheadedness     10/4/23 \"comes and goes per pt\"    Lung cancer (HCC)     10/4/23 Per pt just had last radiation on 10/2/23.    Lung mass     RUL    Myocardial infarction (HCC)     2022    Nausea     10/4/23 Per pt has daily    PAD (peripheral artery disease) (HCC)     Shortness of breath     10/4/23 Chronic SOB since heart attack per pt -nothing new    Stroke (HCC)     10/4/23 Per pt -\"yrs ago\"-reported 2019     Past Surgical History:   Procedure Laterality Date    AORTA - FEMORAL ARTERY BYPASS GRAFT Left     AXILLO-FEMORAL BYASS GRAFT Bilateral     BYPASS FEMORAL-FEMORAL      COLONOSCOPY      CORONARY ARTERY BYPASS GRAFT  2000    ENDOBRONCHIAL ULTRASOUND (EBUS) N/A 8/14/2023    Procedure: ENDOBRONCHIAL ULTRASOUND (EBUS) tissue biopsy;  Surgeon: Osito Remy MD;  Location: [facility];  Service: Thoracic    FEMORAL ARTERY - POPLITEAL ARTERY BYPASS GRAFT Bilateral     IVC FILTER INSERTION      MD AMPUTATION THIGH THROUGH FEMUR ANY LEVEL Left 07/21/2023    Procedure: AMPUTATION ABOVE KNEE (AKA);  Surgeon: Alma Gilbert MD;  Location: [facility];  Service: Vascular    MD BRNCC INCL FLUOR GDNCE DX W/CELL WASHG SPX N/A 8/14/2023    Procedure: BRONCHOSCOPY FLEXIBLE;  Surgeon: Osito Remy MD;  Location: [facility];  Service: Thoracic    TOTAL ABDOMINAL HYSTERECTOMY W/ BILATERAL SALPINGOOPHORECTOMY      TOTAL HIP ARTHROPLASTY Left     WOUND DEBRIDEMENT Left 10/5/2023    Procedure: Left above knee amputation stump exploration, washout, debridement, and VAC placement;  Surgeon: Malu Maldonado MD;  Location: [facility];  " Service: Vascular     Social History   Social History     Substance and Sexual Activity   Alcohol Use Not Currently    Alcohol/week: 50.0 standard drinks of alcohol    Types: 50 Cans of beer per week    Comment: no ETOH for 1.5 years     Social History     Substance and Sexual Activity   Drug Use Yes    Types: Marijuana    Comment: took some gummies for sleep     Social History     Tobacco Use   Smoking Status Every Day    Current packs/day: 1.00    Average packs/day: 1 pack/day for 57.0 years (57.0 ttl pk-yrs)    Types: Cigarettes    Start date: 1967   Smokeless Tobacco Never   Tobacco Comments         Family History   Problem Relation Age of Onset    Leukemia Mother 81    Cancer Sister     Anesthesia problems Neg Hx         Prior to Admission medications    Medication Sig Start Date End Date Taking? Authorizing Provider   acetaminophen (TYLENOL) 325 mg tablet Take 3 tablets (975 mg total) by mouth every 8 (eight) hours  Patient taking differently: Take 975 mg by mouth if needed 7/25/23   Dori Desai PA-C   albuterol (ProAir HFA) 90 mcg/act inhaler Inhale 2 puffs every 6 (six) hours as needed for wheezing 11/16/23   Lucinda Shelton DO   clopidogrel (PLAVIX) 75 mg tablet Take 1 tablet (75 mg total) by mouth in the morning Do not start before August 15, 2023.  Patient taking differently: Take 75 mg by mouth daily at bedtime 8/15/23   Bill Jolley MD   diltiazem (CARDIZEM) 120 MG tablet Take 1 tablet (120 mg total) by mouth in the morning 10/13/23   Lucinda Shelton DO   ezetimibe (ZETIA) 10 mg tablet take 1 tablet by mouth once daily 10/16/23   Ricardo Cho MD   Folic Acid 0.8 MG CAPS Take 1 capsule (0.8 mg total) by mouth in the morning 4/14/23   Lucinda Shelton DO   gabapentin (Neurontin) 300 mg capsule Take 2 capsules (600 mg total) by mouth 3 (three) times a day  Patient not taking: Reported on 12/11/2023 8/8/23   LEV Turner   isosorbide mononitrate (IMDUR) 30 mg 24 hr  tablet Take 1 tablet (30 mg total) by mouth every morning 12/28/23   Lucinda Shelton DO   lamoTRIgine (LaMICtal) 100 mg tablet Take 1 tablet (100 mg total) by mouth daily 8/16/23   Lucinda Shelton DO   lisinopril (ZESTRIL) 10 mg tablet take 1 tablet by mouth once daily 5/15/23   Ricardo Cho MD   Melatonin 5 MG TABS Take 4 tablets (20 mg total) by mouth daily at bedtime as needed (insomnia) Patient states she takes 20MG 7/25/23   Dori Desai PA-C   metoprolol succinate (TOPROL-XL) 50 mg 24 hr tablet Take 1 tablet (50 mg total) by mouth daily 12/28/23   Lucinda Shelton DO   rOPINIRole (REQUIP) 1 mg tablet Take 1 tablet (1 mg total) by mouth daily at bedtime 11/16/23   Lucinda Shelton DO   rosuvastatin (CRESTOR) 20 MG tablet Take 20 mg by mouth daily at bedtime    Historical Provider, MD   saccharomyces boulardii (FLORASTOR) 250 mg capsule Take 1 capsule (250 mg total) by mouth 2 (two) times a day  Patient not taking: Reported on 12/11/2023 8/8/23   LEV Turner   traZODone (DESYREL) 50 mg tablet Take 1 tablet (50 mg total) by mouth daily at bedtime as needed for sleep  Patient not taking: Reported on 12/11/2023 11/10/23   Lucinda Shelton DO   isosorbide mononitrate (IMDUR) 30 mg 24 hr tablet Take 30 mg by mouth every morning  Patient not taking: Reported on 12/11/2023 12/11/22 12/28/23  Historical Provider, MD   metoprolol succinate (TOPROL-XL) 50 mg 24 hr tablet Take 1 tablet (50 mg total) by mouth daily 6/14/23 12/28/23  Lucinda Shelton DO       No current facility-administered medications for this encounter.       No Known Allergies    Objective     No intake or output data in the 24 hours ending 12/28/23 1342    Invasive Devices:   Peripheral IV 12/28/23 Left Antecubital (Active)       Vitals   Vitals:    12/28/23 1013 12/28/23 1130 12/28/23 1200 12/28/23 1230   BP: (!) 192/89 157/76 163/77 (!) 173/74   Pulse: 85 63 (!) 53 (!) 53   Resp: 19 14 20 18   Patient  "Position - Orthostatic VS:  Lying Lying Lying         EKG, Pathology, and/or Other Studies: I have personally reviewed pertinent reports.        Lab Results: I have personally reviewed pertinent reports.      Labs:    Results from last 7 days   Lab Units 12/28/23  1112   WBC Thousand/uL 9.27   HEMOGLOBIN g/dL 15.1   HEMATOCRIT % 45.8   PLATELETS Thousands/uL 248   NEUTROS PCT % 80*   LYMPHS PCT % 11*   MONOS PCT % 5   EOS PCT % 2      Results from last 7 days   Lab Units 12/28/23  1112   SODIUM mmol/L 139   POTASSIUM mmol/L 4.4   CHLORIDE mmol/L 108   CO2 mmol/L 24   BUN mg/dL 9   CREATININE mg/dL 0.81   CALCIUM mg/dL 9.5   ALK PHOS U/L 71   ALT U/L 8   AST U/L 14              No results found for: \"TROPONINI\"      Results from last 7 days   Lab Units 12/28/23  1112   ACETAMINOPHEN LVL ug/mL 11   ETHANOL LVL mg/dL <10   SALICYLATE LVL mg/dL <5     Invalid input(s): \"EXTPREGUR\"      Imaging Studies:  not yet available    Counseling / Coordination of Care  Total time spent today 12 minutes. This was a phone consultation; greater than 50% of time spent in discussion with primary provider and coordination of care.  "

## 2023-12-28 NOTE — ASSESSMENT & PLAN NOTE
Elevated in office; advised need to restart  imdur as per cardiology recommendations; refilled medication as pt has been out

## 2023-12-28 NOTE — ASSESSMENT & PLAN NOTE
Lab Results   Component Value Date    EGFR 67 10/06/2023    EGFR 64 08/07/2023    EGFR 69 08/05/2023    CREATININE 0.86 10/06/2023    CREATININE 0.90 08/07/2023    CREATININE 0.84 08/05/2023   Given hx of N/V/D advised needs labs due to concern of dehydration

## 2023-12-28 NOTE — ASSESSMENT & PLAN NOTE
Denies any SI; however did unintentionally take too much tylenol the last 2 days due to back pain; advised ER for further evaluation will be taken there by family member here at office visit today

## 2023-12-28 NOTE — PATIENT INSTRUCTIONS
Go directly to ER.     Medicare Preventive Visit Patient Instructions  Thank you for completing your Welcome to Medicare Visit or Medicare Annual Wellness Visit today. Your next wellness visit will be due in one year (12/28/2024).  The screening/preventive services that you may require over the next 5-10 years are detailed below. Some tests may not apply to you based off risk factors and/or age. Screening tests ordered at today's visit but not completed yet may show as past due. Also, please note that scanned in results may not display below.  Preventive Screenings:  Service Recommendations Previous Testing/Comments   Colorectal Cancer Screening  * Colonoscopy    * Fecal Occult Blood Test (FOBT)/Fecal Immunochemical Test (FIT)  * Fecal DNA/Cologuard Test  * Flexible Sigmoidoscopy Age: 45-75 years old   Colonoscopy: every 10 years (may be performed more frequently if at higher risk)  OR  FOBT/FIT: every 1 year  OR  Cologuard: every 3 years  OR  Sigmoidoscopy: every 5 years  Screening may be recommended earlier than age 45 if at higher risk for colorectal cancer. Also, an individualized decision between you and your healthcare provider will decide whether screening between the ages of 76-85 would be appropriate. Colonoscopy: 05/11/2023  FOBT/FIT: Not on file  Cologuard: Not on file  Sigmoidoscopy: Not on file    Screening Current     Breast Cancer Screening Age: 40+ years old  Frequency: every 1-2 years  Not required if history of left and right mastectomy Mammogram: 04/24/2017        Cervical Cancer Screening Between the ages of 21-29, pap smear recommended once every 3 years.   Between the ages of 30-65, can perform pap smear with HPV co-testing every 5 years.   Recommendations may differ for women with a history of total hysterectomy, cervical cancer, or abnormal pap smears in past. Pap Smear: Not on file    Screening Not Indicated   Hepatitis C Screening Once for adults born between 1945 and 1965  More frequently  in patients at high risk for Hepatitis C Hep C Antibody: 07/20/2017    Screening Current   Diabetes Screening 1-2 times per year if you're at risk for diabetes or have pre-diabetes Fasting glucose: 91 mg/dL (8/5/2023)  A1C: No results in last 5 years (No results in last 5 years)  Screening Current   Cholesterol Screening Once every 5 years if you don't have a lipid disorder. May order more often based on risk factors. Lipid panel: 11/14/2022    Screening Not Indicated  History Lipid Disorder     Other Preventive Screenings Covered by Medicare:  Abdominal Aortic Aneurysm (AAA) Screening: covered once if your at risk. You're considered to be at risk if you have a family history of AAA.  Lung Cancer Screening: covers low dose CT scan once per year if you meet all of the following conditions: (1) Age 55-77; (2) No signs or symptoms of lung cancer; (3) Current smoker or have quit smoking within the last 15 years; (4) You have a tobacco smoking history of at least 20 pack years (packs per day multiplied by number of years you smoked); (5) You get a written order from a healthcare provider.  Glaucoma Screening: covered annually if you're considered high risk: (1) You have diabetes OR (2) Family history of glaucoma OR (3)  aged 50 and older OR (4)  American aged 65 and older  Osteoporosis Screening: covered every 2 years if you meet one of the following conditions: (1) You're estrogen deficient and at risk for osteoporosis based off medical history and other findings; (2) Have a vertebral abnormality; (3) On glucocorticoid therapy for more than 3 months; (4) Have primary hyperparathyroidism; (5) On osteoporosis medications and need to assess response to drug therapy.   Last bone density test (DXA Scan): Not on file.  HIV Screening: covered annually if you're between the age of 15-65. Also covered annually if you are younger than 15 and older than 65 with risk factors for HIV infection. For pregnant  patients, it is covered up to 3 times per pregnancy.    Immunizations:  Immunization Recommendations   Influenza Vaccine Annual influenza vaccination during flu season is recommended for all persons aged >= 6 months who do not have contraindications   Pneumococcal Vaccine   * Pneumococcal conjugate vaccine = PCV13 (Prevnar 13), PCV15 (Vaxneuvance), PCV20 (Prevnar 20)  * Pneumococcal polysaccharide vaccine = PPSV23 (Pneumovax) Adults 19-63 yo with certain risk factors or if 65+ yo  If never received any pneumonia vaccine: recommend Prevnar 20 (PCV20)  Give PCV20 if previously received 1 dose of PCV13 or PPSV23   Hepatitis B Vaccine 3 dose series if at intermediate or high risk (ex: diabetes, end stage renal disease, liver disease)   Respiratory syncytial virus (RSV) Vaccine - COVERED BY MEDICARE PART D  * RSVPreF3 (Arexvy) CDC recommends that adults 60 years of age and older may receive a single dose of RSV vaccine using shared clinical decision-making (SCDM)   Tetanus (Td) Vaccine - COST NOT COVERED BY MEDICARE PART B Following completion of primary series, a booster dose should be given every 10 years to maintain immunity against tetanus. Td may also be given as tetanus wound prophylaxis.   Tdap Vaccine - COST NOT COVERED BY MEDICARE PART B Recommended at least once for all adults. For pregnant patients, recommended with each pregnancy.   Shingles Vaccine (Shingrix) - COST NOT COVERED BY MEDICARE PART B  2 shot series recommended in those 19 years and older who have or will have weakened immune systems or those 50 years and older     Health Maintenance Due:      Topic Date Due    Breast Cancer Screening: Mammogram  04/24/2018    Colorectal Cancer Screening  05/10/2026    Hepatitis C Screening  Completed     Immunizations Due:      Topic Date Due    COVID-19 Vaccine (1) Never done     Advance Directives   What are advance directives?  Advance directives are legal documents that state your wishes and plans for  medical care. These plans are made ahead of time in case you lose your ability to make decisions for yourself. Advance directives can apply to any medical decision, such as the treatments you want, and if you want to donate organs.   What are the types of advance directives?  There are many types of advance directives, and each state has rules about how to use them. You may choose a combination of any of the following:  Living will:  This is a written record of the treatment you want. You can also choose which treatments you do not want, which to limit, and which to stop at a certain time. This includes surgery, medicine, IV fluid, and tube feedings.   Durable power of  for healthcare (DPAHC):  This is a written record that states who you want to make healthcare choices for you when you are unable to make them for yourself. This person, called a proxy, is usually a family member or a friend. You may choose more than 1 proxy.  Do not resuscitate (DNR) order:  A DNR order is used in case your heart stops beating or you stop breathing. It is a request not to have certain forms of treatment, such as CPR. A DNR order may be included in other types of advance directives.  Medical directive:  This covers the care that you want if you are in a coma, near death, or unable to make decisions for yourself. You can list the treatments you want for each condition. Treatment may include pain medicine, surgery, blood transfusions, dialysis, IV or tube feedings, and a ventilator (breathing machine).  Values history:  This document has questions about your views, beliefs, and how you feel and think about life. This information can help others choose the care that you would choose.  Why are advance directives important?  An advance directive helps you control your care. Although spoken wishes may be used, it is better to have your wishes written down. Spoken wishes can be misunderstood, or not followed. Treatments may be given  even if you do not want them. An advance directive may make it easier for your family to make difficult choices about your care.   Urinary Incontinence   Urinary incontinence (UI)  is when you lose control of your bladder. UI develops because your bladder cannot store or empty urine properly. The 3 most common types of UI are stress incontinence, urge incontinence, or both.  Medicines:   May be given to help strengthen your bladder control. Report any side effects of medication to your healthcare provider.  Do pelvic muscle exercises often:  Your pelvic muscles help you stop urinating. Squeeze these muscles tight for 5 seconds, then relax for 5 seconds. Gradually work up to squeezing for 10 seconds. Do 3 sets of 15 repetitions a day, or as directed. This will help strengthen your pelvic muscles and improve bladder control.  Train your bladder:  Go to the bathroom at set times, such as every 2 hours, even if you do not feel the urge to go. You can also try to hold your urine when you feel the urge to go. For example, hold your urine for 5 minutes when you feel the urge to go. As that becomes easier, hold your urine for 10 minutes.   Self-care:   Keep a UI record.  Write down how often you leak urine and how much you leak. Make a note of what you were doing when you leaked urine.  Drink liquids as directed. You may need to limit the amount of liquid you drink to help control your urine leakage. Do not drink any liquid right before you go to bed. Limit or do not have drinks that contain caffeine or alcohol.   Prevent constipation.  Eat a variety of high-fiber foods. Good examples are high-fiber cereals, beans, vegetables, and whole-grain breads. Walking is the best way to trigger your intestines to have a bowel movement.  Exercise regularly and maintain a healthy weight.  Weight loss and exercise will decrease pressure on your bladder and help you control your leakage.   Use a catheter as directed  to help empty your  bladder. A catheter is a tiny, plastic tube that is put into your bladder to drain your urine.   Go to behavior therapy as directed.  Behavior therapy may be used to help you learn to control your urge to urinate.    Cigarette Smoking and Your Health   Risks to your health if you smoke:  Nicotine and other chemicals found in tobacco damage every cell in your body. Even if you are a light smoker, you have an increased risk for cancer, heart disease, and lung disease. If you are pregnant or have diabetes, smoking increases your risk for complications.   Benefits to your health if you stop smoking:   You decrease respiratory symptoms such as coughing, wheezing, and shortness of breath.   You reduce your risk for cancers of the lung, mouth, throat, kidney, bladder, pancreas, stomach, and cervix. If you already have cancer, you increase the benefits of chemotherapy. You also reduce your risk for cancer returning or a second cancer from developing.   You reduce your risk for heart disease, blood clots, heart attack, and stroke.   You reduce your risk for lung infections, and diseases such as pneumonia, asthma, chronic bronchitis, and emphysema.  Your circulation improves. More oxygen can be delivered to your body. If you have diabetes, you lower your risk for complications, such as kidney, artery, and eye diseases. You also lower your risk for nerve damage. Nerve damage can lead to amputations, poor vision, and blindness.  You improve your body's ability to heal and to fight infections.  For more information and support to stop smoking:   [employer]  Phone: 3- 415 - [phone]  Web Address: [email or web address]  Weight Management   Why it is important to manage your weight:  Being overweight increases your risk of health conditions such as heart disease, high blood pressure, type 2 diabetes, and certain types of cancer. It can also increase your risk for osteoarthritis, sleep apnea, and other respiratory problems. Aim for a  slow, steady weight loss. Even a small amount of weight loss can lower your risk of health problems.  How to lose weight safely:  A safe and healthy way to lose weight is to eat fewer calories and get regular exercise. You can lose up about 1 pound a week by decreasing the number of calories you eat by 500 calories each day.   Healthy meal plan for weight management:  A healthy meal plan includes a variety of foods, contains fewer calories, and helps you stay healthy. A healthy meal plan includes the following:  Eat whole-grain foods more often.  A healthy meal plan should contain fiber. Fiber is the part of grains, fruits, and vegetables that is not broken down by your body. Whole-grain foods are healthy and provide extra fiber in your diet. Some examples of whole-grain foods are whole-wheat breads and pastas, oatmeal, brown rice, and bulgur.  Eat a variety of vegetables every day.  Include dark, leafy greens such as spinach, kale, ra greens, and mustard greens. Eat yellow and orange vegetables such as carrots, sweet potatoes, and winter squash.   Eat a variety of fruits every day.  Choose fresh or canned fruit (canned in its own juice or light syrup) instead of juice. Fruit juice has very little or no fiber.  Eat low-fat dairy foods.  Drink fat-free (skim) milk or 1% milk. Eat fat-free yogurt and low-fat cottage cheese. Try low-fat cheeses such as mozzarella and other reduced-fat cheeses.  Choose meat and other protein foods that are low in fat.  Choose beans or other legumes such as split peas or lentils. Choose fish, skinless poultry (chicken or turkey), or lean cuts of red meat (beef or pork). Before you cook meat or poultry, cut off any visible fat.   Use less fat and oil.  Try baking foods instead of frying them. Add less fat, such as margarine, sour cream, regular salad dressing and mayonnaise to foods. Eat fewer high-fat foods. Some examples of high-fat foods include french fries, doughnuts, ice cream,  and cakes.  Eat fewer sweets.  Limit foods and drinks that are high in sugar. This includes candy, cookies, regular soda, and sweetened drinks.  Exercise:  Exercise at least 30 minutes per day on most days of the week. Some examples of exercise include walking, biking, dancing, and swimming. You can also fit in more physical activity by taking the stairs instead of the elevator or parking farther away from stores. Ask your healthcare provider about the best exercise plan for you.      © Copyright [employer] 2018 Information is for End User's use only and may not be sold, redistributed or otherwise used for commercial purposes. All illustrations and images included in CareNotes® are the copyrighted property of [employer] or [employer]

## 2023-12-28 NOTE — DISCHARGE INSTRUCTIONS
You may use over-the-counter Lidoderm patches.  Follow-up with cardiology.  Follow-up with primary care.  Use Zofran as needed for nausea.  Return for worsening symptoms.

## 2023-12-28 NOTE — ED PROVIDER NOTES
History  Chief Complaint   Patient presents with    GI Problem     Patient reports for 2 weeks has been unable to keep anything down, has been having vomiting, diarrhea, generalized abd pain. Also having pain in upper back between shoulders.     Overdose - Accidental     Also has OD with tylenol dosage unintentionally 7500 mg yesterday and 6000 mg since this am     Mary Ellen is a 73-year-old female with significant past medical history including stroke, ACS, CKD sent to the emergency room via EMS from primary care due to 1 day of right upper quadrant pain following accidental overdose of Tylenol.  Patient states that yesterday and today she took  6000 mg of acetaminophen each day.  She states that this was due to the pain, denies SI.  She has been experiencing vomiting and diarrhea for the past 2 weeks.  Denies hematemesis and hematochezia.  In the same timeline she began experiencing low back pain that radiates up to bilateral scapula.  This pain was sharp and severe enough that she was inclined to take 6000 mg of Tylenol.  She states that she has previously had the pain before.  She is not supposed to take Motrin as she has CKD.      GI Problem  Associated symptoms: diarrhea and vomiting    Associated symptoms: no abdominal pain, no chest pain, no congestion, no cough, no ear pain, no fatigue, no fever, no myalgias, no rash, no rhinorrhea, no shortness of breath, no sore throat and no wheezing    Overdose - Accidental  Associated symptoms: diarrhea and vomiting    Associated symptoms: no abdominal pain, no chest pain, no cough, no diaphoresis and no shortness of breath        Prior to Admission Medications   Prescriptions Last Dose Informant Patient Reported? Taking?   Folic Acid 0.8 MG CAPS  Self No No   Sig: Take 1 capsule (0.8 mg total) by mouth in the morning   Melatonin 5 MG TABS  Self No No   Sig: Take 4 tablets (20 mg total) by mouth daily at bedtime as needed (insomnia) Patient states she takes 20MG    acetaminophen (TYLENOL) 325 mg tablet  Self No No   Sig: Take 3 tablets (975 mg total) by mouth every 8 (eight) hours   Patient taking differently: Take 975 mg by mouth if needed   albuterol (ProAir HFA) 90 mcg/act inhaler  Self No No   Sig: Inhale 2 puffs every 6 (six) hours as needed for wheezing   clopidogrel (PLAVIX) 75 mg tablet  Self No No   Sig: Take 1 tablet (75 mg total) by mouth in the morning Do not start before August 15, 2023.   Patient taking differently: Take 75 mg by mouth daily at bedtime   diltiazem (CARDIZEM) 120 MG tablet  Self No No   Sig: Take 1 tablet (120 mg total) by mouth in the morning   ezetimibe (ZETIA) 10 mg tablet  Self No No   Sig: take 1 tablet by mouth once daily   gabapentin (Neurontin) 300 mg capsule  Self No No   Sig: Take 2 capsules (600 mg total) by mouth 3 (three) times a day   Patient not taking: Reported on 12/11/2023   isosorbide mononitrate (IMDUR) 30 mg 24 hr tablet   No No   Sig: Take 1 tablet (30 mg total) by mouth every morning   lamoTRIgine (LaMICtal) 100 mg tablet  Self No No   Sig: Take 1 tablet (100 mg total) by mouth daily   lisinopril (ZESTRIL) 10 mg tablet  Self No No   Sig: take 1 tablet by mouth once daily   metoprolol succinate (TOPROL-XL) 50 mg 24 hr tablet   No No   Sig: Take 1 tablet (50 mg total) by mouth daily   rOPINIRole (REQUIP) 1 mg tablet  Self No No   Sig: Take 1 tablet (1 mg total) by mouth daily at bedtime   rosuvastatin (CRESTOR) 20 MG tablet  Self Yes No   Sig: Take 20 mg by mouth daily at bedtime   saccharomyces boulardii (FLORASTOR) 250 mg capsule  Self No No   Sig: Take 1 capsule (250 mg total) by mouth 2 (two) times a day   Patient not taking: Reported on 12/11/2023   traZODone (DESYREL) 50 mg tablet  Self No No   Sig: Take 1 tablet (50 mg total) by mouth daily at bedtime as needed for sleep   Patient not taking: Reported on 12/11/2023      Facility-Administered Medications: None       Past Medical History:   Diagnosis Date    Anxiety      "Aorto-iliac disease (HCC)     Atrial fibrillation (HCC)     CAD (coronary artery disease)     Carotid stenosis, bilateral     Celiac artery stenosis (HCC)     Chronic headaches     10/4/23 Per pt chronic headaches    CKD (chronic kidney disease) stage 3, GFR 30-59 ml/min (HCC)     10/4/23 per pt - \"unaware of stage 3 CKD\"    Depression     10/4/23 Per pt \"severely depressed \"    DVT (deep venous thrombosis) (HCC) 07/2023    LLE (CFV, popl)    Heart attack (HCC) 02/2022    HLD (hyperlipidemia)     Hypertension     Intermittent lightheadedness     10/4/23 \"comes and goes per pt\"    Lung cancer (HCC)     10/4/23 Per pt just had last radiation on 10/2/23.    Lung mass     RUL    Myocardial infarction (HCC)     2022    Nausea     10/4/23 Per pt has daily    PAD (peripheral artery disease) (HCC)     Shortness of breath     10/4/23 Chronic SOB since heart attack per pt -nothing new    Stroke (HCC)     10/4/23 Per pt -\"yrs ago\"-reported 2019       Past Surgical History:   Procedure Laterality Date    AORTA - FEMORAL ARTERY BYPASS GRAFT Left     AXILLO-FEMORAL BYASS GRAFT Bilateral     BYPASS FEMORAL-FEMORAL      COLONOSCOPY      CORONARY ARTERY BYPASS GRAFT  2000    ENDOBRONCHIAL ULTRASOUND (EBUS) N/A 8/14/2023    Procedure: ENDOBRONCHIAL ULTRASOUND (EBUS) tissue biopsy;  Surgeon: Osito Remy MD;  Location: [facility];  Service: Thoracic    FEMORAL ARTERY - POPLITEAL ARTERY BYPASS GRAFT Bilateral     IVC FILTER INSERTION      IN AMPUTATION THIGH THROUGH FEMUR ANY LEVEL Left 07/21/2023    Procedure: AMPUTATION ABOVE KNEE (AKA);  Surgeon: Alma Gilbert MD;  [facility];  Service: Vascular    [facility] [place] W/CELL WASHG SPX N/A 8/14/2023    Procedure: BRONCHOSCOPY FLEXIBLE;  Surgeon: Osito Remy MD;  Location: [facility];  Service: Thoracic    TOTAL ABDOMINAL HYSTERECTOMY W/ BILATERAL SALPINGOOPHORECTOMY      TOTAL HIP ARTHROPLASTY Left     WOUND DEBRIDEMENT Left 10/5/2023    Procedure: " Left above knee amputation stump exploration, washout, debridement, and VAC placement;  Surgeon: Malu Maldonado MD;  Location: [facility];  Service: Vascular       Family History   Problem Relation Age of Onset    Leukemia Mother 81    Cancer Sister     Anesthesia problems Neg Hx      I have reviewed and agree with the history as documented.    E-Cigarette/Vaping    E-Cigarette Use Never User     Comments Denies any use per pt      E-Cigarette/Vaping Substances     Social History     Tobacco Use    Smoking status: Every Day     Current packs/day: 1.00     Average packs/day: 1 pack/day for 57.0 years (57.0 ttl pk-yrs)     Types: Cigarettes     Start date: 1967    Smokeless tobacco: Never    Tobacco comments:        Vaping Use    Vaping status: Never Used   Substance Use Topics    Alcohol use: Not Currently     Alcohol/week: 50.0 standard drinks of alcohol     Types: 50 Cans of beer per week     Comment: no ETOH for 1.5 years    Drug use: Yes     Types: Marijuana     Comment: took some gummies for sleep       Review of Systems   Constitutional:  Negative for chills, diaphoresis, fatigue and fever.   HENT:  Negative for congestion, ear discharge, ear pain, rhinorrhea, sinus pressure and sore throat.    Eyes:  Negative for pain and visual disturbance.   Respiratory:  Negative for cough, shortness of breath and wheezing.    Cardiovascular:  Negative for chest pain, palpitations and leg swelling.   Gastrointestinal:  Positive for diarrhea and vomiting. Negative for abdominal distention, abdominal pain, anal bleeding and blood in stool.   Genitourinary:  Negative for dysuria, flank pain and hematuria.   Musculoskeletal:  Positive for back pain. Negative for arthralgias, gait problem, joint swelling and myalgias.   Skin:  Negative for color change and rash.   Neurological:  Negative for dizziness, seizures, syncope and weakness.   All other systems reviewed and are negative.      Physical Exam  Physical Exam  Vitals and  nursing note reviewed.   Constitutional:       General: She is not in acute distress.     Appearance: She is well-developed.   HENT:      Head: Normocephalic and atraumatic.      Nose: No congestion.      Mouth/Throat:      Pharynx: No oropharyngeal exudate.   Eyes:      Conjunctiva/sclera: Conjunctivae normal.      Pupils: Pupils are equal, round, and reactive to light.   Cardiovascular:      Rate and Rhythm: Normal rate and regular rhythm.      Pulses: Normal pulses.      Heart sounds: Normal heart sounds. No murmur heard.     No friction rub. No gallop.   Pulmonary:      Effort: Pulmonary effort is normal. No respiratory distress.      Breath sounds: Normal breath sounds. No stridor. No wheezing, rhonchi or rales.   Chest:      Chest wall: No tenderness.   Abdominal:      General: Bowel sounds are normal. There is no distension.      Palpations: Abdomen is soft.      Tenderness: There is no abdominal tenderness. There is no right CVA tenderness, left CVA tenderness or guarding.   Musculoskeletal:         General: No swelling.      Cervical back: Neck supple. No rigidity or tenderness.      Right lower leg: No edema.      Comments: Back pain from lumbar region up to bilateral scapula not reproducible.  No bony tenderness.  No rashes or skin abnormalities.  Left lower leg amputation.   Skin:     General: Skin is warm and dry.      Capillary Refill: Capillary refill takes less than 2 seconds.   Neurological:      General: No focal deficit present.      Mental Status: She is alert and oriented to person, place, and time. Mental status is at baseline.      Cranial Nerves: No cranial nerve deficit.   Psychiatric:         Mood and Affect: Mood normal.         Vital Signs  ED Triage Vitals   Temp Pulse Respirations Blood Pressure SpO2   -- 12/28/23 1013 12/28/23 1013 12/28/23 1013 12/28/23 1013    85 19 (!) 192/89 96 %      Temp src Heart Rate Source Patient Position - Orthostatic VS BP Location FiO2 (%)   -- 12/28/23  1130 12/28/23 1130 12/28/23 1130 --    Monitor Lying Right arm       Pain Score       12/28/23 1013       7           Vitals:    12/28/23 1130 12/28/23 1200 12/28/23 1230 12/28/23 1330   BP: 157/76 163/77 (!) 173/74 (!) 180/80   Pulse: 63 (!) 53 (!) 53 55   Patient Position - Orthostatic VS: Lying Lying Lying Lying         Visual Acuity      ED Medications  Medications   lidocaine (LIDODERM) 5 % patch 1 patch (1 patch Topical Medication Applied 12/28/23 1413)   iohexol (OMNIPAQUE) 350 MG/ML injection (SINGLE-DOSE) 100 mL (100 mL Intravenous Given 12/28/23 1254)       Diagnostic Studies  Results Reviewed       Procedure Component Value Units Date/Time    HS Troponin I 2hr [[id number]]  (Normal) Collected: 12/28/23 1320    Lab Status: Final result Specimen: Blood from Arm, Left Updated: 12/28/23 1350     hs TnI 2hr 10 ng/L      Delta 2hr hsTnI -1 ng/L     Lipase [[id number]]  (Normal) Collected: 12/28/23 1112    Lab Status: Final result Specimen: Blood from Arm, Left Updated: 12/28/23 1233     Lipase 17 u/L     Comprehensive metabolic panel [[id number]] Collected: 12/28/23 1112    Lab Status: Final result Specimen: Blood from Arm, Left Updated: 12/28/23 1233     Sodium 139 mmol/L      Potassium 4.4 mmol/L      Chloride 108 mmol/L      CO2 24 mmol/L      ANION GAP 7 mmol/L      BUN 9 mg/dL      Creatinine 0.81 mg/dL      Glucose 85 mg/dL      Calcium 9.5 mg/dL      AST 14 U/L      ALT 8 U/L      Alkaline Phosphatase 71 U/L      Total Protein 6.7 g/dL      Albumin 4.1 g/dL      Total Bilirubin 0.41 mg/dL      eGFR 72 ml/min/1.73sq m     Narrative:      [employer] guidelines for Chronic Kidney Disease (CKD):     Stage 1 with normal or high GFR (GFR > 90 mL/min/1.73 square meters)    Stage 2 Mild CKD (GFR = 60-89 mL/min/1.73 square meters)    Stage 3A Moderate CKD (GFR = 45-59 mL/min/1.73 square meters)    Stage 3B Moderate CKD (GFR = 30-44 mL/min/1.73 square meters)    Stage 4 Severe CKD (GFR = 15-29  mL/min/1.73 square meters)    Stage 5 End Stage CKD (GFR <15 mL/min/1.73 square meters)  Note: GFR calculation is accurate only with a steady state creatinine    Salicylate level [[id number]]  (Normal) Collected: 12/28/23 1112    Lab Status: Final result Specimen: Blood from Arm, Left Updated: 12/28/23 1233     Salicylate Lvl <5 mg/dL     Acetaminophen level-If concentration is detectable, please discuss with medical  on call. [[id number]]  (Normal) Collected: 12/28/23 1112    Lab Status: Final result Specimen: Blood from Arm, Left Updated: 12/28/23 1233     Acetaminophen Level 11 ug/mL     HS Troponin I 4hr [[id number]]     Lab Status: No result Specimen: Blood     HS Troponin 0hr (reflex protocol) [[id number]]  (Normal) Collected: 12/28/23 1112    Lab Status: Final result Specimen: Blood from Arm, Left Updated: 12/28/23 1150     hs TnI 0hr 11 ng/L     Ethanol [[id number]]  (Normal) Collected: 12/28/23 1112    Lab Status: Final result Specimen: Blood from Arm, Left Updated: 12/28/23 1144     Ethanol Lvl <10 mg/dL     Urine Macroscopic, POC [[id number]] Collected: 12/28/23 1126    Lab Status: Final result Specimen: Urine Updated: 12/28/23 1128     Color, UA Yellow     Clarity, UA Clear     pH, UA 6.0     Leukocytes, UA Negative     Nitrite, UA Negative     Protein, UA Negative mg/dl      Glucose, UA Negative mg/dl      Ketones, UA Negative mg/dl      Urobilinogen, UA 0.2 E.U./dl      Bilirubin, UA Negative     Occult Blood, UA Negative     Specific Gravity, UA <=1.005    Narrative:      CLINITEK RESULT    CBC and differential [[id number]]  (Abnormal) Collected: 12/28/23 1112    Lab Status: Final result Specimen: Blood from Arm, Left Updated: 12/28/23 1123     WBC 9.27 Thousand/uL      RBC 5.17 Million/uL      Hemoglobin 15.1 g/dL      Hematocrit 45.8 %      MCV 89 fL      MCH 29.2 pg      MCHC 33.0 g/dL      RDW 13.0 %      MPV 10.2 fL      Platelets 248 Thousands/uL      nRBC 0 /100 WBCs      Neutrophils  Relative 80 %      Immat GRANS % 1 %      Lymphocytes Relative 11 %      Monocytes Relative 5 %      Eosinophils Relative 2 %      Basophils Relative 1 %      Neutrophils Absolute 7.48 Thousands/µL      Immature Grans Absolute 0.05 Thousand/uL      Lymphocytes Absolute 1.02 Thousands/µL      Monocytes Absolute 0.50 Thousand/µL      Eosinophils Absolute 0.17 Thousand/µL      Basophils Absolute 0.05 Thousands/µL                    CTA dissection protocol chest abdomen pelvis w wo contrast   Final Result by Liana Ramirez MD (12/28 1429)      No intramural hematoma or aortic dissection. Chronic, partially thrombosed descending thoracic aortic aneurysm. Increased narrowing of the abdominal aorta.      Otherwise no acute pathology in the chest, abdomen or pelvis. Stable and nonemergent findings above.                  Workstation performed: [id number]                    Procedures  Procedures         ED Course  ED Course as of 12/28/23 1555   Thu Dec 28, 2023   1408 Delta 2hr hsTnI: -1  Negative delta   1409 ACETAMINOPHEN LEVEL: 11  Consulted tox   1409 Lipase: 17  WNL   1409 ECG demonstrated new RBBB                                             Medical Decision Making  Patient presents here from primary care who sent her over due to taking over 6000 mg of Tylenol yesterday and 6000 mg of Tylenol again this morning.  Her last dose was around 9 AM this morning.  Acetaminophen level was 11.  Consulted toxicology and they did not recommend NAC.  She does not require further workup for Tylenol overdose at this time.  She also complains of right upper quadrant pain and that sharp back pain.  She canceled her aortic study previously.  CT chest abdomen pelvis, dissection study performed to rule out aortic aneurysm, metastases of lung cancer, pancreatitis, acute liver injury.  CT showed stable 3.7 cm aortic aneurysm.  Showed unchanged lung masses.  Educated patient to follow-up with oncology and cardiology.  Discussed finding of  right bundle jessica block on EKG.  Given stable condition this is able to be followed up outpatient.  Discussed this with Dr. Arriaza.  Again discussed this with the patient and to follow-up with cardiology.  Nausea and vomiting without acute abdominal tenderness, and no acute abdominal process visualized on CT.  Likely gastroenteritis.  Gave indications for follow-up.  Provided Zofran for symptomatic management.  Patient understands and agrees with plan.  Gave appropriate follow-up.    Amount and/or Complexity of Data Reviewed  Labs: ordered. Decision-making details documented in ED Course.  Radiology: ordered.    Risk  Prescription drug management.             Disposition  Final diagnoses:   Accidental acetaminophen overdose, initial encounter   Descending aortic aneurysm (HCC)   Nausea vomiting and diarrhea     Time reflects when diagnosis was documented in both MDM as applicable and the Disposition within this note       Time User Action Codes Description Comment    12/28/2023  1:41 PM Flory Ray [T39.1X1A] Accidental acetaminophen overdose, initial encounter     12/28/2023  2:32 PM Flory Ray Add [I71.9] Descending aortic aneurysm (HCC)     12/28/2023  3:11 PM Flory Ray Add [R11.2,  R19.7] Nausea vomiting and diarrhea           ED Disposition       ED Disposition   Discharge    Condition   Stable    Date/Time   Thu Dec 28, 2023  3:11 PM    Comment   Mary Ellen Steve discharge to home/self care.                   Follow-up Information       Follow up With Specialties Details Why Contact Info    Lucinda Shelton DO Family Medicine   [address]  [address]  [place] [id number] [phone]              Discharge Medication List as of 12/28/2023  3:12 PM        START taking these medications    Details   ondansetron (ZOFRAN) 4 mg tablet Take 1 tablet (4 mg total) by mouth every 6 (six) hours, Starting Thu 12/28/2023, Normal           CONTINUE these medications which have NOT CHANGED     Details   acetaminophen (TYLENOL) 325 mg tablet Take 3 tablets (975 mg total) by mouth every 8 (eight) hours, Starting Tue 7/25/2023, No Print      albuterol (ProAir HFA) 90 mcg/act inhaler Inhale 2 puffs every 6 (six) hours as needed for wheezing, Starting Thu 11/16/2023, Normal      clopidogrel (PLAVIX) 75 mg tablet Take 1 tablet (75 mg total) by mouth in the morning Do not start before August 15, 2023., Starting Tue 8/15/2023, Normal      diltiazem (CARDIZEM) 120 MG tablet Take 1 tablet (120 mg total) by mouth in the morning, Starting Fri 10/13/2023, Normal      ezetimibe (ZETIA) 10 mg tablet take 1 tablet by mouth once daily, Starting Mon 10/16/2023, Normal      Folic Acid 0.8 MG CAPS Take 1 capsule (0.8 mg total) by mouth in the morning, Starting Fri 4/14/2023, Normal      gabapentin (Neurontin) 300 mg capsule Take 2 capsules (600 mg total) by mouth 3 (three) times a day, Starting Tue 8/8/2023, Normal      isosorbide mononitrate (IMDUR) 30 mg 24 hr tablet Take 1 tablet (30 mg total) by mouth every morning, Starting Thu 12/28/2023, Normal      lamoTRIgine (LaMICtal) 100 mg tablet Take 1 tablet (100 mg total) by mouth daily, Starting Wed 8/16/2023, Normal      lisinopril (ZESTRIL) 10 mg tablet take 1 tablet by mouth once daily, Normal      Melatonin 5 MG TABS Take 4 tablets (20 mg total) by mouth daily at bedtime as needed (insomnia) Patient states she takes 20MG, Starting Tue 7/25/2023, No Print      metoprolol succinate (TOPROL-XL) 50 mg 24 hr tablet Take 1 tablet (50 mg total) by mouth daily, Starting Thu 12/28/2023, Normal      rOPINIRole (REQUIP) 1 mg tablet Take 1 tablet (1 mg total) by mouth daily at bedtime, Starting Thu 11/16/2023, Normal      rosuvastatin (CRESTOR) 20 MG tablet Take 20 mg by mouth daily at bedtime, Historical Med      saccharomyces boulardii (FLORASTOR) 250 mg capsule Take 1 capsule (250 mg total) by mouth 2 (two) times a day, Starting Tue 8/8/2023, Normal      traZODone (DESYREL) 50 mg  tablet Take 1 tablet (50 mg total) by mouth daily at bedtime as needed for sleep, Starting Fri 11/10/2023, Normal             No discharge procedures on file.    PDMP Review         Value Time User    PDMP Reviewed  Yes 10/10/2023 10:44 AM Maria Luisa Kaur PA-C            ED Provider  Electronically Signed by             Flory Ray PA-C  12/28/23 3296

## 2023-12-28 NOTE — PROGRESS NOTES
Assessment and Plan:     Problem List Items Addressed This Visit       Bipolar affective disorder, depressed, severe (HCC)     Denies any SI; however did unintentionally take too much tylenol the last 2 days due to back pain; advised ER for further evaluation will be taken there by family member here at office visit today         Essential hypertension     Elevated in office; advised need to restart  imdur as per cardiology recommendations; refilled medication as pt has been out         Relevant Medications    metoprolol succinate (TOPROL-XL) 50 mg 24 hr tablet    isosorbide mononitrate (IMDUR) 30 mg 24 hr tablet    Medicare annual wellness visit, subsequent - Primary     Advised due for mammogram and DEXA scan; has living will; has good family support         Stage 3 chronic kidney disease, unspecified whether stage 3a or 3b CKD ([facility])     Lab Results   Component Value Date    EGFR 67 10/06/2023    EGFR 64 08/07/2023    EGFR 69 08/05/2023    CREATININE 0.86 10/06/2023    CREATININE 0.90 08/07/2023    CREATININE 0.84 08/05/2023   Given hx of N/V/D advised needs labs due to concern of dehydration         Non-small cell cancer of right lung (HCC)    Upper back pain on right side     Suspect muscular strain; no noted lesions on recent CT; tx pending ER f/u; advised need to d/c all Tylenol at this time         RUQ pain     Advised ER for further evaluation given exam and hx; pt agreeable with plan; declined EMS, will be transported by family member         Relevant Orders    Transfer to other facility (Completed)     Other Visit Diagnoses       Encounter for screening mammogram for breast cancer        Relevant Orders    Mammo screening bilateral w 3d & cad    Postmenopause        Relevant Orders    DXA bone density spine hip and pelvis    Nausea and vomiting, unspecified vomiting type        Relevant Orders    Transfer to other facility (Completed)             Preventive health issues were discussed with patient, and  age appropriate screening tests were ordered as noted in patient's After Visit Summary.  Personalized health advice and appropriate referrals for health education or preventive services given if needed, as noted in patient's After Visit Summary.     History of Present Illness:     Patient presents for a Medicare Wellness Visit    Patient states she has been having vomiting and diarrhea the last 2 weeks. Seemed to be getting better after 3 days then was okay for a day or so and started again. Denies any significant abdominal pain. No blood in stool. No fevers but has been feeling chills. Last time she vomited was this am. States she is unable to keep anything down. Taking peptobismol. She started with right posterior shoulder/back pain the last few days. She states she took 12 tylenol this am since 5 am and yesterday took about 15 tablets of extra strength tylenol. States she has been taking that much tylenol because it was the only thing helping the back pain. Denies intentional overdose. Denies any SI. States she did not think about dosage just was trying to take something that helped the pain. Pain in back started about 2-3 days ago. Feel severe at times.            Patient Care Team:  Lucinda Shelton DO as PCP - General (Family Medicine)  Wendi Escobar RN as Nurse Navigator (Oncology)  Osito Remy MD (Thoracic Surgery)  Becky Menjivar MD (Radiation Oncology)  UGO Saucedo as  Care Manager (Oncology)     Review of Systems:     Review of Systems   Constitutional:  Positive for chills. Negative for fever.   Respiratory:  Positive for cough. Negative for shortness of breath.    Cardiovascular:  Negative for chest pain and leg swelling.   Gastrointestinal:  Positive for abdominal pain, diarrhea and vomiting. Negative for blood in stool.        Problem List:     Patient Active Problem List   Diagnosis    Bipolar affective disorder, depressed, severe (HCC)    CAD (coronary artery  "disease)    Essential hypertension    Hx of CABG    S/P vascular bypass    Thyroid nodule    Renal artery stenosis (HCC)    Presence of IVC filter    PAD (peripheral artery disease) (HCC)    Medicare annual wellness visit, subsequent    Mixed hyperlipidemia    Aortoiliac occlusive disease (HCC)    Tobacco abuse    PAF (paroxysmal atrial fibrillation) (HCC)    Stage 3 chronic kidney disease, unspecified whether stage 3a or 3b CKD (HCC)    Dizziness    Insomnia    Lung mass    At risk for venous thromboembolism (VTE)    Pain    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)    SCC (squamous cell carcinoma of lung) (HCC)    Non-small cell cancer of right lung (HCC)    Delayed postoperative wound closure    Shortness of breath    S/P AKA (above knee amputation) unilateral, left (HCC)    Upper back pain on right side    RUQ pain      Past Medical and Surgical History:     Past Medical History:   Diagnosis Date    Anxiety     Aorto-iliac disease (HCC)     Atrial fibrillation (HCC)     CAD (coronary artery disease)     Carotid stenosis, bilateral     Celiac artery stenosis (HCC)     Chronic headaches     10/4/23 Per pt chronic headaches    CKD (chronic kidney disease) stage 3, GFR 30-59 ml/min ([facility])     10/4/23 per pt - \"unaware of stage 3 CKD\"    Depression     10/4/23 Per pt \"severely depressed \"    DVT (deep venous thrombosis) ([facility]) 07/2023    LLE (CFV, popl)    Heart attack ([facility]) 02/2022    HLD (hyperlipidemia)     Hypertension     Intermittent lightheadedness     10/4/23 \"comes and goes per pt\"    Lung cancer (HCC)     10/4/23 Per pt just had last radiation on 10/2/23.    Lung mass     RUL    Myocardial infarction (HCC)     2022    Nausea     10/4/23 Per pt has daily    PAD (peripheral artery disease) ([facility])     Shortness of breath     10/4/23 Chronic SOB since heart attack per pt -nothing new    Stroke (HCC)     10/4/23 Per pt -\"yrs ago\"-reported 2019     Past Surgical History:   Procedure Laterality " Date    AORTA - FEMORAL ARTERY BYPASS GRAFT Left     AXILLO-FEMORAL BYASS GRAFT Bilateral     BYPASS FEMORAL-FEMORAL      COLONOSCOPY      CORONARY ARTERY BYPASS GRAFT  2000    ENDOBRONCHIAL ULTRASOUND (EBUS) N/A 8/14/2023    Procedure: ENDOBRONCHIAL ULTRASOUND (EBUS) tissue biopsy;  Surgeon: Osito Remy MD;  Location: [facility];  Service: Thoracic    FEMORAL ARTERY - POPLITEAL ARTERY BYPASS GRAFT Bilateral     IVC FILTER INSERTION      [place] AMPUTATION THIGH THROUGH FEMUR ANY LEVEL Left 07/21/2023    Procedure: AMPUTATION ABOVE KNEE (AKA);  Surgeon: Alma Gilbert MD;  Location: [facility];  Service: Vascular    [place] [facility] INCL FLUOR GDNCE DX W/CELL WASHG SPX N/A 8/14/2023    Procedure: BRONCHOSCOPY FLEXIBLE;  Surgeon: Osito Remy MD;  Location: [facility];  Service: Thoracic    TOTAL ABDOMINAL HYSTERECTOMY W/ BILATERAL SALPINGOOPHORECTOMY      TOTAL HIP ARTHROPLASTY Left     WOUND DEBRIDEMENT Left 10/5/2023    Procedure: Left above knee amputation stump exploration, washout, debridement, and VAC placement;  Surgeon: Malu Maldonado MD;  Location: [facility];  Service: Vascular      Family History:     Family History   Problem Relation Age of Onset    Leukemia Mother 81    Cancer Sister     Anesthesia problems Neg Hx       Social History:     Social History     Socioeconomic History    Marital status:      Spouse name: None    Number of children: None    Years of education: None    Highest education level: None   Occupational History    None   Tobacco Use    Smoking status: Every Day     Current packs/day: 1.00     Average packs/day: 1 pack/day for 57.0 years (57.0 ttl pk-yrs)     Types: Cigarettes     Start date: 1967    Smokeless tobacco: Never    Tobacco comments:        Vaping Use    Vaping status: Never Used   Substance and Sexual Activity    Alcohol use: Not Currently     Alcohol/week: 50.0 standard drinks of alcohol     Types: 50 Cans of beer per week     Comment: no ETOH for 1.5 years     Drug use: Yes     Types: Marijuana     Comment: took some gummies for sleep    Sexual activity: Not Currently     Partners: Male     Comment: Not active at this time per pt   Other Topics Concern    None   Social History Narrative    None     Social Determinants of Health     Financial Resource Strain: Low Risk  (12/28/2023)    Overall Financial Resource Strain (CARDIA)     Difficulty of Paying Living Expenses: Not very hard   Food Insecurity: No Food Insecurity (7/19/2023)    Hunger Vital Sign     Worried About Running Out of Food in the Last Year: Never true     Ran Out of Food in the Last Year: Never true   Transportation Needs: No Transportation Needs (12/28/2023)    PRAPARE - Transportation     Lack of Transportation (Medical): No     Lack of Transportation (Non-Medical): No   Physical Activity: Not on file   Stress: Not on file   Social Connections: Not on file   Intimate Partner Violence: Not on file   Housing Stability: Low Risk  (7/19/2023)    Housing Stability Vital Sign     Unable to Pay for Housing in the Last Year: No     Number of Places Lived in the Last Year: 1     Unstable Housing in the Last Year: No      Medications and Allergies:     Current Outpatient Medications   Medication Sig Dispense Refill    acetaminophen (TYLENOL) 325 mg tablet Take 3 tablets (975 mg total) by mouth every 8 (eight) hours (Patient taking differently: Take 975 mg by mouth if needed)      albuterol (ProAir HFA) 90 mcg/act inhaler Inhale 2 puffs every 6 (six) hours as needed for wheezing 8.5 g 2    clopidogrel (PLAVIX) 75 mg tablet Take 1 tablet (75 mg total) by mouth in the morning Do not start before August 15, 2023. (Patient taking differently: Take 75 mg by mouth daily at bedtime) 90 tablet 0    diltiazem (CARDIZEM) 120 MG tablet Take 1 tablet (120 mg total) by mouth in the morning 30 tablet 3    ezetimibe (ZETIA) 10 mg tablet take 1 tablet by mouth once daily 30 tablet 5    Folic Acid 0.8 MG CAPS Take 1 capsule (0.8 mg  total) by mouth in the morning 90 capsule 1    isosorbide mononitrate (IMDUR) 30 mg 24 hr tablet Take 1 tablet (30 mg total) by mouth every morning 90 tablet 1    lamoTRIgine (LaMICtal) 100 mg tablet Take 1 tablet (100 mg total) by mouth daily 90 tablet 1    lisinopril (ZESTRIL) 10 mg tablet take 1 tablet by mouth once daily 30 tablet 5    Melatonin 5 MG TABS Take 4 tablets (20 mg total) by mouth daily at bedtime as needed (insomnia) Patient states she takes 20MG      metoprolol succinate (TOPROL-XL) 50 mg 24 hr tablet Take 1 tablet (50 mg total) by mouth daily 90 tablet 1    rOPINIRole (REQUIP) 1 mg tablet Take 1 tablet (1 mg total) by mouth daily at bedtime 30 tablet 3    rosuvastatin (CRESTOR) 20 MG tablet Take 20 mg by mouth daily at bedtime      gabapentin (Neurontin) 300 mg capsule Take 2 capsules (600 mg total) by mouth 3 (three) times a day (Patient not taking: Reported on 12/11/2023) 90 capsule 0    saccharomyces boulardii (FLORASTOR) 250 mg capsule Take 1 capsule (250 mg total) by mouth 2 (two) times a day (Patient not taking: Reported on 12/11/2023) 60 capsule 0    traZODone (DESYREL) 50 mg tablet Take 1 tablet (50 mg total) by mouth daily at bedtime as needed for sleep (Patient not taking: Reported on 12/11/2023) 30 tablet 1     No current facility-administered medications for this visit.     No Known Allergies   Immunizations:     Immunization History   Administered Date(s) Administered    Influenza, high dose seasonal 0.7 mL 11/08/2022, 11/16/2023    Pneumococcal Conjugate 13-Valent 06/20/2016    Pneumococcal Conjugate Vaccine 20-valent (Pcv20), Polysace 11/16/2023      Health Maintenance:         Topic Date Due    Breast Cancer Screening: Mammogram  04/24/2018    Colorectal Cancer Screening  05/10/2026    Hepatitis C Screening  Completed         Topic Date Due    COVID-19 Vaccine (1) Never done      Medicare Screening Tests and Risk Assessments:     Mary Ellen is here for her Subsequent Wellness visit.      Health Risk Assessment:   Patient rates overall health as poor. Patient feels that their physical health rating is slightly worse. Patient is dissatisfied with their life. Eyesight was rated as much worse. Hearing was rated as same. Patient feels that their emotional and mental health rating is much worse. Patients states they are sometimes angry. Patient states they are often unusually tired/fatigued. Pain experienced in the last 7 days has been some. Patient's pain rating has been 8/10. Patient states that she has experienced no weight loss or gain in last 6 months.     Depression Screening:   PHQ-2 Score: 4  PHQ-9 Score: 15      Fall Risk Screening:   In the past year, patient has experienced: no history of falling in past year      Urinary Incontinence Screening:   Patient has leaked urine accidently in the last six months.     Home Safety:  Patient has trouble with stairs inside or outside of their home. Patient has working smoke alarms and has working carbon monoxide detector. Home safety hazards include: none.     Nutrition:   Current diet is Regular.     Medications:   Patient is currently taking over-the-counter supplements. OTC medications include: see medication list. Patient is able to manage medications.     Activities of Daily Living (ADLs)/Instrumental Activities of Daily Living (IADLs):   Walk and transfer into and out of bed and chair?: Yes  Dress and groom yourself?: Yes    Bathe or shower yourself?: Yes    Feed yourself? Yes  Do your laundry/housekeeping?: No  Manage your money, pay your bills and track your expenses?: Yes  Make your own meals?: Yes    Do your own shopping?: Yes    Previous Hospitalizations:   Any hospitalizations or ED visits within the last 12 months?: Yes    How many hospitalizations have you had in the last year?: 3-4    Advance Care Planning:   Living will: Yes    Durable POA for healthcare: Yes    Advanced directive: Yes    ACP document given: Yes      Cognitive  Screening:   Provider or family/friend/caregiver concerned regarding cognition?: No    PREVENTIVE SCREENINGS      Cardiovascular Screening:    General: Screening Not Indicated and History Lipid Disorder      Diabetes Screening:     General: Screening Current      Colorectal Cancer Screening:     General: Screening Current      Breast Cancer Screening:     General: Risks and Benefits Discussed    Due for: Mammogram        Cervical Cancer Screening:    General: Screening Not Indicated      Osteoporosis Screening:    General: Risks and Benefits Discussed    Due for: Bone Density Ultrasound      Abdominal Aortic Aneurysm (AAA) Screening:        General: Screening Not Indicated      Lung Cancer Screening:     General: Screening Not Indicated and History Lung Cancer      Hepatitis C Screening:    General: Screening Current    Screening, Brief Intervention, and Referral to Treatment (SBIRT)    Screening  Typical number of drinks in a day: 0  Typical number of drinks in a week: 0  Interpretation: Low risk drinking behavior.    Single Item Drug Screening:  How often have you used an illegal drug (including marijuana) or a prescription medication for non-medical reasons in the past year? never    Single Item Drug Screen Score: 0  Interpretation: Negative screen for possible drug use disorder    No results found.     Physical Exam:     /76 (BP Location: Left arm, Patient Position: Sitting, Cuff Size: Adult)   Pulse 73   Temp 98 °F (36.7 °C)   Wt 67.2 kg (148 lb 3.2 oz)   SpO2 92%   BMI 25.44 kg/m²     Physical Exam  Vitals reviewed.   Constitutional:       General: She is not in acute distress.     Appearance: Normal appearance. She is normal weight. She is not ill-appearing or toxic-appearing.   HENT:      Head: Normocephalic and atraumatic.      Mouth/Throat:      Mouth: Mucous membranes are moist.      Pharynx: No oropharyngeal exudate or posterior oropharyngeal erythema.   Eyes:      General: No scleral icterus.         Left eye: No discharge.      Conjunctiva/sclera: Conjunctivae normal.   Neck:      Thyroid: No thyroid mass, thyromegaly or thyroid tenderness.   Cardiovascular:      Rate and Rhythm: Normal rate and regular rhythm.      Heart sounds: Normal heart sounds. No murmur heard.  Pulmonary:      Effort: Pulmonary effort is normal. No respiratory distress.      Breath sounds: Normal breath sounds. No wheezing, rhonchi or rales.   Abdominal:      General: There is no distension.      Palpations: There is no mass.      Tenderness: There is abdominal tenderness in the right upper quadrant. There is guarding. There is no rebound.      Hernia: No hernia is present.   Musculoskeletal:        Arms:       Cervical back: Neck supple. No muscular tenderness.      Right lower leg: No edema.   Skin:     Findings: No rash.   Neurological:      Mental Status: She is alert and oriented to person, place, and time.   Psychiatric:         Mood and Affect: Mood normal.         Behavior: Behavior normal.         Thought Content: Thought content normal.         Judgment: Judgment normal.          Lucinda Shelton DO

## 2023-12-28 NOTE — ASSESSMENT & PLAN NOTE
Advised ER for further evaluation given exam and hx; pt agreeable with plan; declined EMS, will be transported by family member

## 2023-12-28 NOTE — ASSESSMENT & PLAN NOTE
Suspect muscular strain; no noted lesions on recent CT; tx pending ER f/u; advised need to d/c all Tylenol at this time

## 2024-01-09 ENCOUNTER — HOSPITAL ENCOUNTER (OUTPATIENT)
Dept: NON INVASIVE DIAGNOSTICS | Facility: CLINIC | Age: 74
Discharge: HOME | End: 2024-01-09
Payer: MEDICARE

## 2024-01-09 DIAGNOSIS — I74.09 AORTOILIAC OCCLUSIVE DISEASE (HCC): ICD-10-CM

## 2024-01-09 PROCEDURE — 93978 VASCULAR STUDY: CPT

## 2024-01-09 PROCEDURE — 93978 VASCULAR STUDY: CPT | Performed by: SURGERY

## 2024-01-09 PROCEDURE — 93923 UPR/LXTR ART STDY 3+ LVLS: CPT

## 2024-01-09 PROCEDURE — 93922 UPR/L XTREMITY ART 2 LEVELS: CPT | Performed by: SURGERY

## 2024-01-15 ENCOUNTER — TELEPHONE (OUTPATIENT)
Dept: VASCULAR SURGERY | Facility: CLINIC | Age: 74
End: 2024-01-15

## 2024-01-15 DIAGNOSIS — G47.00 INSOMNIA, UNSPECIFIED TYPE: ICD-10-CM

## 2024-01-15 RX ORDER — TRAZODONE HYDROCHLORIDE 50 MG/1
50 TABLET ORAL
Qty: 30 TABLET | Refills: 1 | Status: SHIPPED | OUTPATIENT
Start: 2024-01-15

## 2024-01-15 NOTE — TELEPHONE ENCOUNTER
Received call from Betty from [facility] asking for pt's recent ovs with LEV Riley regarding order for prosthetic. Ovs sent to provided fax number, [phone].

## 2024-01-24 ENCOUNTER — TELEPHONE (OUTPATIENT)
Dept: VASCULAR SURGERY | Facility: CLINIC | Age: 74
End: 2024-01-24

## 2024-01-24 NOTE — TELEPHONE ENCOUNTER
Received call from Betty from [facility] stating she needs the standard written order signed by LEV Riley. Form was signed on 1/19/2024. Faxed form to [phone].

## 2024-02-01 ENCOUNTER — TELEPHONE (OUTPATIENT)
Age: 74
End: 2024-02-01

## 2024-02-01 DIAGNOSIS — Z89.612 HX OF AKA (ABOVE KNEE AMPUTATION), LEFT (HCC): Primary | ICD-10-CM

## 2024-02-02 NOTE — TELEPHONE ENCOUNTER
Spoke with patient. She would like a referral for In-Patient Physical Therapy. She did mention that she will not be able to start this for about 3-4 weeks.

## 2024-02-12 DIAGNOSIS — F31.4 BIPOLAR AFFECTIVE DISORDER, DEPRESSED, SEVERE (HCC): ICD-10-CM

## 2024-02-12 RX ORDER — LAMOTRIGINE 100 MG/1
100 TABLET ORAL DAILY
Qty: 90 TABLET | Refills: 1 | Status: SHIPPED | OUTPATIENT
Start: 2024-02-12

## 2024-02-14 DIAGNOSIS — I25.810 CORONARY ARTERY DISEASE INVOLVING CORONARY BYPASS GRAFT OF NATIVE HEART WITHOUT ANGINA PECTORIS: ICD-10-CM

## 2024-02-14 RX ORDER — DILTIAZEM HYDROCHLORIDE 120 MG/1
120 TABLET, FILM COATED ORAL DAILY
Qty: 30 TABLET | Refills: 5 | Status: SHIPPED | OUTPATIENT
Start: 2024-02-14

## 2024-02-21 PROBLEM — Z00.00 MEDICARE ANNUAL WELLNESS VISIT, SUBSEQUENT: Status: RESOLVED | Noted: 2022-12-09 | Resolved: 2024-02-21

## 2024-03-01 ENCOUNTER — PATIENT OUTREACH (OUTPATIENT)
Dept: CASE MANAGEMENT | Facility: HOSPITAL | Age: 74
End: 2024-03-01

## 2024-04-01 ENCOUNTER — TELEPHONE (OUTPATIENT)
Dept: CARDIOLOGY CLINIC | Facility: CLINIC | Age: 74
End: 2024-04-01

## 2024-04-01 DIAGNOSIS — I25.810 CORONARY ARTERY DISEASE INVOLVING CORONARY BYPASS GRAFT OF NATIVE HEART WITHOUT ANGINA PECTORIS: ICD-10-CM

## 2024-04-01 RX ORDER — DILTIAZEM HYDROCHLORIDE 120 MG/1
120 TABLET, FILM COATED ORAL DAILY
Qty: 30 TABLET | Refills: 0 | Status: SHIPPED | OUTPATIENT
Start: 2024-04-01 | End: 2024-04-04 | Stop reason: SDUPTHER

## 2024-04-01 NOTE — TELEPHONE ENCOUNTER
Pt calling out of medication overdue for OV pt cancelled LOV in January. Placed order for diltizem with no refills til OV.  Clerical scheduled OV.

## 2024-04-02 ENCOUNTER — TELEPHONE (OUTPATIENT)
Dept: NEUROLOGY | Facility: CLINIC | Age: 74
End: 2024-04-02

## 2024-04-03 PROBLEM — R91.8 LUNG MASS: Status: RESOLVED | Noted: 2023-06-14 | Resolved: 2024-04-03

## 2024-04-03 PROBLEM — C34.90 SCC (SQUAMOUS CELL CARCINOMA OF LUNG) (HCC): Status: RESOLVED | Noted: 2023-08-22 | Resolved: 2024-04-03

## 2024-04-03 NOTE — PROGRESS NOTES
Cardiology Consultation     Mary Ellen Steve  [id number]  1950  [facility] CARDIOLOGY [place]  [address] [facility] [address]      1. Coronary artery disease involving coronary bypass graft of native heart without angina pectoris  clopidogrel (PLAVIX) 75 mg tablet    diltiazem (CARDIZEM) 120 MG tablet    nitroglycerin (NITROSTAT) 0.4 mg SL tablet      2. Aortoiliac occlusive disease (HCC)        3. PAD (peripheral artery disease) (HCC)        4. PAF (paroxysmal atrial fibrillation) (HCC)  carvedilol (COREG) 6.25 mg tablet      5. Essential hypertension  isosorbide mononitrate (IMDUR) 30 mg 24 hr tablet    lisinopril (ZESTRIL) 20 mg tablet    carvedilol (COREG) 6.25 mg tablet      6. Renal artery stenosis (HCC)        7. Non-small cell cancer of right lung (HCC)        8. Stage 3 chronic kidney disease, unspecified whether stage 3a or 3b CKD (HCC)        9. Tobacco abuse        10. 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)        11. Mixed hyperlipidemia  rosuvastatin (CRESTOR) 20 MG tablet    ezetimibe (ZETIA) 10 mg tablet      12. Hx of CABG  rosuvastatin (CRESTOR) 20 MG tablet            Discussion/Summary:  Coronary artery disease  - s/p CABG done in 2000 at [facility]  -Continue with Plavix 75 mg daily  -Reports exertional dyspnea and generalized fatigue  -TTE 1/26/2023 showed EF 60%, mildly dilated RV, mild to moderate AI, mild AS, mild TR, estimated PA pressure 40 mmHg  - Nuclear pharmacologic stress test 1/26/2023 showed a perfusion defect in the anterior wall that was likely due to artifact from breast attenuation, cannot completely exclude a small area of ischemia, normal LV systolic function  Hypertension  -Currently on diltiazem 120 mg daily, Imdur 30 mg daily, Toprol-XL 50 mg daily, lisinopril 10 mg daily  -Patient reports running out of her diltiazem several days ago and blood pressure is significantly elevated today  -Will continue diltiazem 120 mg daily, and Imdur 30  mg daily  - Will change Toprol-XL to Coreg 12.5 mg twice daily and increase lisinopril to 20 mg daily  -Consider starting thiazide or loop diuretic next  Hyperlipidemia  -Continue with rosuvastatin 20 mg daily and Zetia 10 mg daily  -Lipid panel 11/14/2022 showed total cholesterol 165, triglycerides 155, HDL 33,   - Pending repeat lipid profile  Paroxysmal atrial fibrillation  - She reports having an episode of atrial fibrillation while being hospitalized earlier this year  -2-week Zio patch 3/17/2023 showed 5 episodes of SVT the longest of which was 7 beats, 2 triggered events correlated with sinus rhythm, no other significant arrhythmias  -Continue with diltiazem 120 mg daily and Coreg 12.5 mg twice daily  Peripheral arterial disease  -Aorto right renal artery bypass graft in 2000  - 8/6/2014 femoral/popliteal with stents and angioplasty, iliac arteries with stents and angioplasty and tibial peroneal artery angioplasty  -Right Fem to below-knee bypass occluded in 2010  - Fem-Fem bypass graft left to right occluded  -2/21/2017 right axillary to Bi-Fem bypass  -11/15/2022 lower extremity arterial ultrasound right lower limb shows 50-69% stenosis distal to the SFA with high-grade stenosis versus occlusion in the posterior tibial artery LUANN 0.53 (severe range), right Fem to below-knee bypass graft occluded, left lower limb patent CFA to posterior tibial bypass graft, LUANN 0.96  - 11/15/2022 abdominal aorta/iliac study showed occlusion of bilateral common and external iliac arteries, greater than 70% stenosis in the celiac artery, patent right axillo bifemoral arterial bypass graft  -11/15/2022 carotid ultrasound showed less than 50% bilateral carotid stenosis  -Status post left AKA on 7/21/2023 for acute limb ischemia -> found to have an infrarenal aortic mural thrombus-> c/b nonhealing stump requiring subsequent reexploration, washout debridement and VAC placement on 10/5/2023  -Following with vascular  surgery  -Continue with Plavix 75 mg daily  Non-small cell lung cancer of the right lung  - Diagnosed on bronchoscopy August 2023  -Completed radiation therapy  - Pending repeat CT for surveillance  DVT  -Lower extremity venous duplex 11/10/2022 showed normal right lower extremity, left lower extremity had a chronic nonocclusive DVT in the common femoral, proximal femoral and popliteal veins  -s/p IVC filter  Tobacco use  -Continuing to smoke  -Tried Wellbutrin prescribed by vascular surgery for 1 week, however discontinued due to side effects  -Again encouraged smoking cessation  Renal artery stenosis    Follow-up in 2 weeks for nursing visit for BP check and then follow-up with me in 6 to 8 weeks.    History of Present Illness:  Mary Ellen Steve is a 74 y.o. year old female with a past medical history of CAD s/p CABG, hypertension, hyperlipidemia, PAD, bipolar disorder, IVC filter, renal artery stenosis and tobacco use.    Patient was hospitalized in July 2023 with acute left lower limb ischemia.  She underwent a left AKA.  She had subsequent poor wound healing with concern for infection and underwent a reexploration with washout and wound VAC placement October 2023.  She was also diagnosed with lung cancer last year and underwent radiation in the fall.  She is pending repeat CT chest.  Patient reports she feels like she is falling apart currently.  Reports having intermittent chest discomforts at rest lasting several seconds.  She is unsure if it is due to her anxiety or musculoskeletal.  She also has been having elevated blood pressures.  Whenever she puts on her compression sock for her left extremity stump, she notices a pounding sensation in her head and feels her blood pressure significantly rises.     Patient Active Problem List   Diagnosis    Bipolar affective disorder, depressed, severe (HCC)    CAD (coronary artery disease)    Essential hypertension    Hx of CABG    S/P vascular bypass    Thyroid nodule  "   Renal artery stenosis (HCC)    Presence of IVC filter    PAD (peripheral artery disease) (HCC)    Mixed hyperlipidemia    Aortoiliac occlusive disease (HCC)    Tobacco abuse    PAF (paroxysmal atrial fibrillation) (HCC)    Stage 3 chronic kidney disease, unspecified whether stage 3a or 3b CKD (HCC)    Dizziness    Insomnia    At risk for venous thromboembolism (VTE)    Pain    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)    Non-small cell cancer of right lung (HCC)    Delayed postoperative wound closure    Shortness of breath    S/P AKA (above knee amputation) unilateral, left (HCC)    Upper back pain on right side    RUQ pain     Past Medical History:   Diagnosis Date    Anxiety     Aorto-iliac disease (HCC)     Atrial fibrillation (HCC)     CAD (coronary artery disease)     Carotid stenosis, bilateral     Celiac artery stenosis (HCC)     Chronic headaches     10/4/23 Per pt chronic headaches    CKD (chronic kidney disease) stage 3, GFR 30-59 ml/min ([facility])     10/4/23 per pt - \"unaware of stage 3 CKD\"    Depression     10/4/23 Per pt \"severely depressed \"    DVT (deep venous thrombosis) ([facility]) 07/2023    LLE (CFV, popl)    Heart attack ([facility]) 02/2022    HLD (hyperlipidemia)     Hypertension     Intermittent lightheadedness     10/4/23 \"comes and goes per pt\"    Lung cancer (HCC)     10/4/23 Per pt just had last radiation on 10/2/23.    Lung mass     RUL    Myocardial infarction (HCC)     2022    Nausea     10/4/23 Per pt has daily    PAD (peripheral artery disease) ([facility])     Shortness of breath     10/4/23 Chronic SOB since heart attack per pt -nothing new    Stroke (HCC)     10/4/23 Per pt -\"yrs ago\"-reported 2019     Social History     Socioeconomic History    Marital status:      Spouse name: Not on file    Number of children: Not on file    Years of education: Not on file    Highest education level: Not on file   Occupational History    Not on file   Tobacco Use    Smoking status: " Every Day     Current packs/day: 1.00     Average packs/day: 1 pack/day for 57.3 years (57.3 ttl pk-yrs)     Types: Cigarettes     Start date: 1967    Smokeless tobacco: Never    Tobacco comments:        Vaping Use    Vaping status: Never Used   Substance and Sexual Activity    Alcohol use: Not Currently     Alcohol/week: 50.0 standard drinks of alcohol     Types: 50 Cans of beer per week     Comment: no ETOH for 1.5 years    Drug use: Yes     Types: Marijuana     Comment: took some gummies for sleep    Sexual activity: Not Currently     Partners: Male     Comment: Not active at this time per pt   Other Topics Concern    Not on file   Social History Narrative    Not on file     Social Determinants of Health     Financial Resource Strain: Low Risk  (12/28/2023)    Overall Financial Resource Strain (CARDIA)     Difficulty of Paying Living Expenses: Not very hard   Food Insecurity: No Food Insecurity (7/19/2023)    Hunger Vital Sign     Worried About Running Out of Food in the Last Year: Never true     Ran Out of Food in the Last Year: Never true   Transportation Needs: No Transportation Needs (12/28/2023)    PRAPARE - Transportation     Lack of Transportation (Medical): No     Lack of Transportation (Non-Medical): No   Physical Activity: Not on file   Stress: Not on file   Social Connections: Not on file   Intimate Partner Violence: Not on file   Housing Stability: Low Risk  (7/19/2023)    Housing Stability Vital Sign     Unable to Pay for Housing in the Last Year: No     Number of Places Lived in the Last Year: 1     Unstable Housing in the Last Year: No      Family History   Problem Relation Age of Onset    Leukemia Mother 81    Cancer Sister     Anesthesia problems Neg Hx      Past Surgical History:   Procedure Laterality Date    AORTA - FEMORAL ARTERY BYPASS GRAFT Left     AXILLO-FEMORAL BYASS GRAFT Bilateral     BYPASS FEMORAL-FEMORAL      COLONOSCOPY      CORONARY ARTERY BYPASS GRAFT  2000    ENDOBRONCHIAL  ULTRASOUND (EBUS) N/A 8/14/2023    Procedure: ENDOBRONCHIAL ULTRASOUND (EBUS) tissue biopsy;  Surgeon: Osito Remy MD;  Location: [facility];  Service: Thoracic    FEMORAL ARTERY - POPLITEAL ARTERY BYPASS GRAFT Bilateral     IVC FILTER INSERTION      OR AMPUTATION THIGH THROUGH FEMUR ANY LEVEL Left 07/21/2023    Procedure: AMPUTATION ABOVE KNEE (AKA);  Surgeon: Alma Gilbert MD;  Location: [facility];  Service: Vascular    OR [facility] INCL FLUOR GDNCE DX W/CELL WASHG SPX N/A 8/14/2023    Procedure: BRONCHOSCOPY FLEXIBLE;  Surgeon: Osito Remy MD;  Location: [facility];  Service: Thoracic    TOTAL ABDOMINAL HYSTERECTOMY W/ BILATERAL SALPINGOOPHORECTOMY      TOTAL HIP ARTHROPLASTY Left     WOUND DEBRIDEMENT Left 10/5/2023    Procedure: Left above knee amputation stump exploration, washout, debridement, and VAC placement;  Surgeon: Malu Maldonado MD;  Location: [facility];  Service: Vascular       Current Outpatient Medications:     acetaminophen (TYLENOL) 325 mg tablet, Take 3 tablets (975 mg total) by mouth every 8 (eight) hours (Patient taking differently: Take 975 mg by mouth if needed), Disp: , Rfl:     albuterol (ProAir HFA) 90 mcg/act inhaler, Inhale 2 puffs every 6 (six) hours as needed for wheezing, Disp: 8.5 g, Rfl: 2    carvedilol (COREG) 6.25 mg tablet, Take 2 tablets (12.5 mg total) by mouth 2 (two) times a day with meals, Disp: 180 tablet, Rfl: 1    clopidogrel (PLAVIX) 75 mg tablet, Take 1 tablet (75 mg total) by mouth daily at bedtime, Disp: 90 tablet, Rfl: 1    diltiazem (CARDIZEM) 120 MG tablet, Take 1 tablet (120 mg total) by mouth in the morning, Disp: 90 tablet, Rfl: 1    ezetimibe (ZETIA) 10 mg tablet, Take 1 tablet (10 mg total) by mouth daily, Disp: 90 tablet, Rfl: 1    Folic Acid 0.8 MG CAPS, Take 1 capsule (0.8 mg total) by mouth in the morning, Disp: 90 capsule, Rfl: 1    gabapentin (Neurontin) 300 mg capsule, Take 2 capsules (600 mg total) by mouth 3 (three) times a day, Disp: 90  capsule, Rfl: 0    isosorbide mononitrate (IMDUR) 30 mg 24 hr tablet, Take 1 tablet (30 mg total) by mouth daily, Disp: 90 tablet, Rfl: 1    lamoTRIgine (LaMICtal) 100 mg tablet, Take 1 tablet (100 mg total) by mouth daily, Disp: 90 tablet, Rfl: 1    lisinopril (ZESTRIL) 20 mg tablet, Take 1 tablet (20 mg total) by mouth daily, Disp: 90 tablet, Rfl: 1    Melatonin 5 MG TABS, Take 4 tablets (20 mg total) by mouth daily at bedtime as needed (insomnia) Patient states she takes 20MG, Disp: , Rfl:     nitroglycerin (NITROSTAT) 0.4 mg SL tablet, Place 1 tablet (0.4 mg total) under the tongue every 5 (five) minutes as needed for chest pain, Disp: 30 tablet, Rfl: 1    rOPINIRole (REQUIP) 1 mg tablet, Take 1 tablet (1 mg total) by mouth daily at bedtime, Disp: 30 tablet, Rfl: 3    rosuvastatin (CRESTOR) 20 MG tablet, Take 1 tablet (20 mg total) by mouth daily at bedtime, Disp: 90 tablet, Rfl: 1    traZODone (DESYREL) 50 mg tablet, Take 1 tablet (50 mg total) by mouth daily at bedtime as needed for sleep, Disp: 30 tablet, Rfl: 1    ondansetron (ZOFRAN) 4 mg tablet, Take 1 tablet (4 mg total) by mouth every 6 (six) hours, Disp: 12 tablet, Rfl: 0    saccharomyces boulardii (FLORASTOR) 250 mg capsule, Take 1 capsule (250 mg total) by mouth 2 (two) times a day (Patient not taking: Reported on 12/11/2023), Disp: 60 capsule, Rfl: 0  No Known Allergies      Labs:  Lab Results   Component Value Date    ALT 8 12/28/2023    AST 14 12/28/2023    BUN 9 12/28/2023    CALCIUM 9.5 12/28/2023     12/28/2023    CO2 24 12/28/2023    CREATININE 0.81 12/28/2023    HDL 33 (L) 11/14/2022    HCT 45.8 12/28/2023    HGB 15.1 12/28/2023     12/28/2023    K 4.4 12/28/2023    TRIG 155 (H) 11/14/2022    WBC 9.27 12/28/2023       Imaging: VAS abdominal aorta/iliacs; complete study    Result Date: 12/22/2022  Narrative:  [facility] REPORT CLINICAL: Indications:  Aortic Occlusive disease [I70.0].   Evaluate for progression of  Aorto-Iliac occlusive disease. Operative History: 2017-02-21 Right Axillary to Bi-Fem Bypass, outside facility 2016-11-15 CT Angiogram Aorta w/ runoff, outside facility 2014-08-06 Catheterization Abdominal/Pelvic LE Arterial Branch, outside facility 2014-08-06 Femoral/popliteal w/ stents and angioplasty, outside facility 2014-08-06 Iliac arteries w/ stents and angioplasty, occluded, outside facility 2014-08-06 Tibio-Peroneal Artery Angioplasty, outside facility Aorta-Right Renal A Bypass Graft, 2000, outside facility CABG, 2000, outside facility Cardiac stents x 2, outside facility Right Fem to Below Knee Bypass, occluded, 2010, outside facility Fem-Fem Bypass Graft, Left to Right, occluded, outside facility IVC Filter, outside facility Risk Factors The patient has history of HTN, PAD, CAD and smoking (current) 1.5 ppd.  FINDINGS:  Unilateral       Impression  PSV (cm/s)  EDV (cm/s)  AP (cm)  Sup-Jing Ao                           50          17      1.8  Jux Renal Aorta                      79          22           Px Inf-Arsalan Ao                        52                       Ds Inf-Arsalan Ao                        61                  2.1  Celiac           >70%               267          62           Prox. SMA                           112          15           ADA                                 307                        Right                       PSV (cm/s)  EDV (cm/s)  Common Femoral Artery               89           0  Prox SFA                            67           0  Dist Third (Graft)                  82              Dsital Anastomosis (Graft)          41              Middle Third (Graft)                92              Prox Third (Graft)                  72                 CONCLUSION:  Impression The abdominal aorta is patent and normal in caliber.  Known occlusion of the bilateral common and external iliac arteries.  >70% stenosis identified in the celiac artery. Superior mesenteric artery is patent.  Patent  right Axillo bi-fem artery bypass graft.  SIGNATURE: Electronically Signed by: JANENT SESAY DO, RPVI on 2022-12-22 05:13:49 PM    VAS carotid complete study    Result Date: 12/22/2022  Narrative:  [facility] REPORT CLINICAL: Indications:  Bilateral Carotid disease w/o CVA [I65.29].   Patient presents with a cervical bruit and multiple cardiovascular risk factors.  Patient is asymptomatic from a cerebral vascular standpoint. Operative History: 2017-02-21 Right Axillary to Bi-Fem Bypass, outside facility 2016-11-15 CT Angiogram Aorta w/ runoff, outside facility 2014-08-06 Catheterization Abdominal/Pelvic LE Arterial Branch, outside facility 2014-08-06 Femoral/popliteal w/ stents and angioplasty, outside facility 2014-08-06 Iliac arteries w/ stents and angioplasty, occluded, outside facility 2014-08-06 Tibio-Peroneal Artery Angioplasty, outside facility Aorta-Right Renal A Bypass Graft, 2000, outside facility CABG, 2000, outside facility Cardiac stents x 2, outside facility Right Fem to Below Knee Bypass, occluded, 2010, outside facility Fem-Fem Bypass Graft, Left to Right, occluded, outside facility IVC Filter, outside facility Risk Factors The patient has history of HTN, PAD, CAD and smoking (current) 1.5 ppd. Clinical Right Pressure:  166/ mm Hg, Left Pressure:  179/ mm Hg.  FINDINGS:  Right        Impression  PSV  EDV (cm/s)  Direction of Flow  Ratio  Dist. ICA                 67          17                      1.50  Mid. ICA                 139          39                      3.14  Prox. ICA    1 - 49%      53          16                      1.20  Dist CCA                  50          14                            Mid CCA                   44          10                      0.77  Prox CCA                  58          12                            Ext Carotid               64           7                      1.43  Prox Vert                 60          15  Antegrade                 Subclavian                130           0                             Left         Impression  PSV  EDV (cm/s)  Direction of Flow  Ratio  Dist. ICA                 35          15                      0.59  Mid. ICA                  99          34                      1.65  Prox. ICA    1 - 49%      76          25                      1.27  Dist CCA                  50          14                            Mid CCA                   60          15                      0.90  Prox CCA                  67          14                            Ext Carotid               60           6                      1.00  Prox Vert                 72          21  Antegrade                 Subclavian               129           0                               CONCLUSION: Impression RIGHT: There is <50% stenosis noted in the internal carotid artery. Plaque is heterogenous and irregular. Vertebral artery flow is antegrade. There is no significant subclavian artery disease. LEFT: There is <50% stenosis noted in the internal carotid artery. Plaque is heterogenous and irregular. Vertebral artery flow is antegrade. There is no significant subclavian artery disease.  SIGNATURE: Electronically Signed by: GUDELIA FENTON DO on 2022-12-22 01:13:47 PM    VAS lower limb arterial duplex, complete bilateral    Result Date: 12/22/2022  Narrative:  [facility] REPORT CLINICAL: Indications:  Bilateral PVD, Unspecified [I73.9].   Physician wants to determine patency of LE bypass graft.Operative History: 2017-02-21 Right Axillary to Bi-Fem Bypass, outside facility 2016-11-15 CT Angiogram Aorta w/ runoff, outside facility 2014-08-06 Catheterization Abdominal/Pelvic LE Arterial Branch, outside facility 2014-08-06 Femoral/popliteal w/ stents and angioplasty, outside facility 2014-08-06 Iliac arteries w/ stents and angioplasty, occluded, outside facility 2014-08-06 Tibio-Peroneal Artery Angioplasty, outside facility Aorta-Right Renal A Bypass Graft, 2000, outside  facility CABG, 2000, outside facility Cardiac stents x 2, outside facility Right Fem to Below Knee Bypass, occluded, 2010, outside facility Fem-Fem Bypass Graft, Left to Right, occluded, outside facility IVC Filter, outside facility Risk Factors The patient has history of HTN, PAD, CAD and smoking (current) 1.5 ppd. Clinical Right Pressure:  166/ mm Hg, Left Pressure:  179/ mm Hg.  FINDINGS:  Unilateral     PSV (cm/s)  EDV  Ds Inf-Arsalan Ao          61    0   Segment                Right            Left                                          PSV (cm/s)  EDV  PSV (cm/s)  EDV  High Thigh (Graft)                              34       Mid Thigh (Graft)                               49       Low Thigh (Graft)                               50       Near Knee (Graft)                               45    5  Outflow Anastomosis                             39       Common Femoral Artery          89                        Prox SFA                       67                        Mid SFA                        65                        Dist SFA                      162    6                   Proximal Pop                   29                        Distal Pop                     22                        Prox Post Tibial                                47       Dist Post Tibial                                28    2  Dist. Ant. Tibial              22                        Dist Peroneal                  35                           CONCLUSION: Impression: RIGHT LOWER LIMB: Diffuse disease throghout the femoral and popliteal arteries with a 50-69% stenosis identified in the distal SFA. High grade stenosis versus occlusion identified in the posterior tibial artery. Known right fem to below knee bypass graft occluded. Ankle/Brachial index:  0.53 (Severe Range) PVR/ PPG tracings are dampened. Metatarsal pressure of 83mmHg Great toe pressure of 54mmHg, within the healing range  LEFT LOWER LIMB: Patent CFA to Posterior tibial bypass  graft. Ankle/Brachial index:   0.96 (Normal Range) PVR/ PPG tracings are normal. Metatarsal pressure of 138mmHg Great toe pressure of 117mmHg, within the healing range  SIGNATURE: Electronically Signed by: GUDELIA FENTON DO on 2022 01:14:49 PM      EC2022 normal sinus rhythm, incomplete RBBB, nonspecific T wave changes in the lateral leads, borderline left atrial enlargement    Review of Systems:  Review of Systems   Constitutional:  Positive for fatigue. Negative for chills, diaphoresis and fever.   HENT:  Positive for congestion.    Eyes:  Negative for photophobia and visual disturbance.   Respiratory:  Negative for chest tightness and shortness of breath.    Cardiovascular:  Negative for chest pain, palpitations and leg swelling.   Gastrointestinal:  Negative for abdominal distention, abdominal pain, diarrhea, nausea and vomiting.   Genitourinary:  Negative for difficulty urinating and dysuria.   Musculoskeletal:  Negative for arthralgias, gait problem and joint swelling.   Skin:  Negative for color change, pallor and rash.   Neurological:  Negative for dizziness, syncope, numbness and headaches.   Psychiatric/Behavioral:  Negative for agitation, behavioral problems and confusion.          Vitals:    24 0958   BP: (!) 216/90   Pulse: 69        Vitals:             Physical Exam:  General appearance:  Appears stated age, alert, well appearing and in no distress  HEENT:  PERRLA, EOMI, no scleral icterus, no conjunctival pallor  NECK:  Supple, No elevated JVP, no thyromegaly, no carotid bruits  HEART:  Regular rate and rhythm, normal S1/S2, 3/6 systolic murmur  LUNGS:  Clear to auscultation bilaterally  ABDOMEN:  Soft, non-tender, positive bowel sounds, no rebound or guarding, no organomegaly   EXTREMITIES: Trace right lower extremity edema, status post left AKA  VASCULAR: Warm right lower extremity  SKIN: No lesions or rashes on exposed skin  NEURO:  CN II-XII intact, no focal deficits

## 2024-04-04 ENCOUNTER — OFFICE VISIT (OUTPATIENT)
Dept: CARDIOLOGY CLINIC | Facility: CLINIC | Age: 74
End: 2024-04-04
Payer: MEDICARE

## 2024-04-04 VITALS — DIASTOLIC BLOOD PRESSURE: 90 MMHG | SYSTOLIC BLOOD PRESSURE: 216 MMHG | HEART RATE: 69 BPM

## 2024-04-04 DIAGNOSIS — I74.09 AORTOILIAC OCCLUSIVE DISEASE (HCC): ICD-10-CM

## 2024-04-04 DIAGNOSIS — I25.810 CORONARY ARTERY DISEASE INVOLVING CORONARY BYPASS GRAFT OF NATIVE HEART WITHOUT ANGINA PECTORIS: Primary | ICD-10-CM

## 2024-04-04 DIAGNOSIS — Z72.0 TOBACCO ABUSE: ICD-10-CM

## 2024-04-04 DIAGNOSIS — C34.91 NON-SMALL CELL CANCER OF RIGHT LUNG (HCC): ICD-10-CM

## 2024-04-04 DIAGNOSIS — I48.0 PAF (PAROXYSMAL ATRIAL FIBRILLATION) (HCC): ICD-10-CM

## 2024-04-04 DIAGNOSIS — Z95.1 HX OF CABG: ICD-10-CM

## 2024-04-04 DIAGNOSIS — Z89.612 STATUS POST ABOVE-KNEE AMPUTATION OF LEFT LOWER EXTREMITY (HCC): ICD-10-CM

## 2024-04-04 DIAGNOSIS — I70.1 RENAL ARTERY STENOSIS (HCC): ICD-10-CM

## 2024-04-04 DIAGNOSIS — I10 ESSENTIAL HYPERTENSION: ICD-10-CM

## 2024-04-04 DIAGNOSIS — E78.2 MIXED HYPERLIPIDEMIA: ICD-10-CM

## 2024-04-04 DIAGNOSIS — I73.9 PAD (PERIPHERAL ARTERY DISEASE) (HCC): ICD-10-CM

## 2024-04-04 DIAGNOSIS — N18.30 STAGE 3 CHRONIC KIDNEY DISEASE, UNSPECIFIED WHETHER STAGE 3A OR 3B CKD (HCC): ICD-10-CM

## 2024-04-04 PROCEDURE — 99214 OFFICE O/P EST MOD 30 MIN: CPT | Performed by: STUDENT IN AN ORGANIZED HEALTH CARE EDUCATION/TRAINING PROGRAM

## 2024-04-04 PROCEDURE — G2211 COMPLEX E/M VISIT ADD ON: HCPCS | Performed by: STUDENT IN AN ORGANIZED HEALTH CARE EDUCATION/TRAINING PROGRAM

## 2024-04-04 RX ORDER — CARVEDILOL 6.25 MG/1
12.5 TABLET ORAL 2 TIMES DAILY WITH MEALS
Qty: 180 TABLET | Refills: 1 | Status: SHIPPED | OUTPATIENT
Start: 2024-04-04

## 2024-04-04 RX ORDER — ROSUVASTATIN CALCIUM 20 MG/1
20 TABLET, COATED ORAL
Qty: 90 TABLET | Refills: 1 | Status: SHIPPED | OUTPATIENT
Start: 2024-04-04

## 2024-04-04 RX ORDER — CLOPIDOGREL BISULFATE 75 MG/1
75 TABLET ORAL
Qty: 90 TABLET | Refills: 1 | Status: SHIPPED | OUTPATIENT
Start: 2024-04-04

## 2024-04-04 RX ORDER — NITROGLYCERIN 0.4 MG/1
0.4 TABLET SUBLINGUAL
Qty: 30 TABLET | Refills: 1 | Status: SHIPPED | OUTPATIENT
Start: 2024-04-04

## 2024-04-04 RX ORDER — EZETIMIBE 10 MG/1
10 TABLET ORAL DAILY
Qty: 90 TABLET | Refills: 1 | Status: SHIPPED | OUTPATIENT
Start: 2024-04-04

## 2024-04-04 RX ORDER — LISINOPRIL 20 MG/1
20 TABLET ORAL DAILY
Qty: 90 TABLET | Refills: 1 | Status: SHIPPED | OUTPATIENT
Start: 2024-04-04

## 2024-04-04 RX ORDER — ISOSORBIDE MONONITRATE 30 MG/1
30 TABLET, EXTENDED RELEASE ORAL DAILY
Qty: 90 TABLET | Refills: 1 | Status: SHIPPED | OUTPATIENT
Start: 2024-04-04

## 2024-04-04 RX ORDER — DILTIAZEM HYDROCHLORIDE 120 MG/1
120 TABLET, FILM COATED ORAL DAILY
Qty: 90 TABLET | Refills: 1 | Status: SHIPPED | OUTPATIENT
Start: 2024-04-04

## 2024-04-18 ENCOUNTER — TELEPHONE (OUTPATIENT)
Dept: CARDIOLOGY CLINIC | Facility: CLINIC | Age: 74
End: 2024-04-18

## 2024-04-18 ENCOUNTER — CLINICAL SUPPORT (OUTPATIENT)
Dept: CARDIOLOGY CLINIC | Facility: CLINIC | Age: 74
End: 2024-04-18
Payer: MEDICARE

## 2024-04-18 VITALS
HEART RATE: 53 BPM | SYSTOLIC BLOOD PRESSURE: 140 MMHG | HEIGHT: 64 IN | DIASTOLIC BLOOD PRESSURE: 58 MMHG | WEIGHT: 147 LBS | BODY MASS INDEX: 25.1 KG/M2

## 2024-04-18 DIAGNOSIS — I10 ESSENTIAL HYPERTENSION: Primary | ICD-10-CM

## 2024-04-18 PROCEDURE — 99211 OFF/OP EST MAY X REQ PHY/QHP: CPT

## 2024-04-18 NOTE — PROGRESS NOTES
Patient came in for BP check/nurse visit.  Patient brought in a list of BP's from home, which she states are irratic, up and down.  AT ALL times, whether BP is elevated or not, she is dizzy, lighted, and nauseous. BP today is 140/58 and HR 53.  Patient is on Diltiazem 120 qd, Imdur 30 mgs qd, Metoprolol Lisinopril 20 mgs qd.     Per Dr Cho, patient is to continue medications as directed.  No new directions or advisement was given today.

## 2024-04-19 DIAGNOSIS — F31.4 BIPOLAR AFFECTIVE DISORDER, DEPRESSED, SEVERE (HCC): ICD-10-CM

## 2024-04-19 RX ORDER — LAMOTRIGINE 100 MG/1
100 TABLET ORAL DAILY
Qty: 90 TABLET | Refills: 1 | Status: SHIPPED | OUTPATIENT
Start: 2024-04-19

## 2024-04-19 NOTE — TELEPHONE ENCOUNTER
Reason for call:   [x] Refill   [] Prior Auth  [] Other:     Office:   [x] PCP/Provider -   [] Specialty/Provider -     LAMICTAL  100 MG    [id number] - [place] [facility] [address] [address]  Phone: [phone]  Fax: [phone]  CESAR #: --     Does the patient have enough for 3 days?   [] Yes   [x] No - Send as HP to POD

## 2024-05-24 ENCOUNTER — HOSPITAL ENCOUNTER (OUTPATIENT)
Dept: CT IMAGING | Facility: HOSPITAL | Age: 74
Discharge: HOME | End: 2024-05-24
Attending: INTERNAL MEDICINE
Payer: MEDICARE

## 2024-05-24 DIAGNOSIS — C34.91 NON-SMALL CELL CANCER OF RIGHT LUNG (HCC): ICD-10-CM

## 2024-05-24 PROCEDURE — G1004 CDSM NDSC: HCPCS

## 2024-05-24 PROCEDURE — 71250 CT THORAX DX C-: CPT

## 2024-05-28 ENCOUNTER — RADIATION ONCOLOGY FOLLOW-UP (OUTPATIENT)
Dept: RADIATION ONCOLOGY | Facility: CLINIC | Age: 74
End: 2024-05-28
Attending: RADIOLOGY
Payer: MEDICARE

## 2024-05-28 VITALS
TEMPERATURE: 97.5 F | OXYGEN SATURATION: 94 % | SYSTOLIC BLOOD PRESSURE: 178 MMHG | HEART RATE: 64 BPM | DIASTOLIC BLOOD PRESSURE: 70 MMHG

## 2024-05-28 DIAGNOSIS — C34.91 NON-SMALL CELL CANCER OF RIGHT LUNG (HCC): Primary | ICD-10-CM

## 2024-05-28 PROCEDURE — 99213 OFFICE O/P EST LOW 20 MIN: CPT | Performed by: RADIOLOGY

## 2024-05-28 PROCEDURE — 99211 OFF/OP EST MAY X REQ PHY/QHP: CPT | Performed by: RADIOLOGY

## 2024-05-28 NOTE — PROGRESS NOTES
Follow-up - Radiation Oncology   Mary Ellen Steve 1950 74 y.o. female [id number]    Cancer Staging   Non-small cell cancer of right lung (HCC)  Staging form: Lung, AJCC 8th Edition  - Clinical: Stage IA2 (cT1b, cN0, cM0) - Signed by Becky Menjivar MD on 8/28/2023    Assessment/Plan: Ms. Steve has done well in follow-up with an excellent radiographic response to treatment with no evidence of recurrent or progressive disease.  The patient will be moving to [place] at the end of this month.  She will establish care with a primary care physician and will also be requesting referral to a local pulmonologist.  It was explained that moving forward we would typically recommend a repeat CT of the chest in 6 months.  It is reasonable for her pulmonologist to be the one ordering her surveillance CTs and then if necessary can refer to either thoracic oncology or radiation oncology as appropriate.  If she has any difficulty obtaining her records or establishing care she was advised to contact our department.  Otherwise she would reach out on an as-needed basis.    RTC in PRN    Wesley Velarde MD  Department of Radiation Oncology  [facility]    Total Time Spent  20 minutes spent reviewing EMR in preparation for visit, with the patient, coordination with other providers, and documentation.    No orders of the defined types were placed in this encounter.       History of Present Illness   Interval History:  Mary Ellen Steve 1950 is a 74 y.o. female  with h/o non-small cell carcinoma, SCC of right upper lung. Patient has 60 pack year smoking history.  Completed SBRT to RUL on 10/2/2023.  Last seen in radiation oncology with Dr. Menjivar on 12/11/2023.  Today's visit is for routine follow-up.     5/24/24  CT chest wo contrast  MPRESSION:     Stable posttreatment changes in the suprahilar right upper lobe since December 2023 with chronic occlusion of the posterior segment right upper lobe bronchus;  "nothing to indicate recurrent tumor.   Mild ectasia of the ascending aorta at 4.1 cm. This should be followed on a yearly basis. It can be done on the follow-up scans for lung cancer.   Pulmonary artery enlargement which can be a normal variant or can be seen with pulmonary hypertension.      Upcomin24  Dr. Multani    Historical Information   Oncology History   SCC (squamous cell carcinoma of lung) (HCC) (Resolved)   2023 Biopsy    A. Lung, Right Upper Lobe, RUL posterior segment endobronchial biopsy:  -Non- small cell carcinoma , immunohistochemically consistent with squamous cell carcinoma      Non-small cell cancer of right lung (HCC)   2023 Initial Diagnosis    Non-small cell cancer of right lung (HCC)     2023 -  Cancer Staged    Staging form: Lung, AJCC 8th Edition  - Clinical: Stage IA2 (cT1b, cN0, cM0) - Signed by Becky Menjivar MD on 2023 - 10/2/2023 Radiation    Treatment:  Course: C1 SBRT    Plan ID Energy Fractions Dose per Fraction (cGy) Dose Correction (cGy) Total Dose Delivered (cGy) Elapsed Days   SBRT RUL 6X-FFF  750 0 6,000 18      Treatment dates:  C1 SBRT: 2023 - 10/2/2023       Past Medical History:   Diagnosis Date    Anxiety     Aorto-iliac disease (HCC)     Atrial fibrillation (HCC)     CAD (coronary artery disease)     Carotid stenosis, bilateral     Celiac artery stenosis (HCC)     Chronic headaches     10/4/23 Per pt chronic headaches    CKD (chronic kidney disease) stage 3, GFR 30-59 ml/min (HCC)     10/4/23 per pt - \"unaware of stage 3 CKD\"    Depression     10/4/23 Per pt \"severely depressed \"    DVT (deep venous thrombosis) (HCC) 2023    LLE (CFV, popl)    Heart attack (HCC) 2022    HLD (hyperlipidemia)     Hypertension     Intermittent lightheadedness     10/4/23 \"comes and goes per pt\"    Lung cancer (HCC)     10/4/23 Per pt just had last radiation on 10/2/23.    Lung mass     RUL    Myocardial infarction (HCC)         " "Nausea     10/4/23 Per pt has daily    PAD (peripheral artery disease) (HCC)     Shortness of breath     10/4/23 Chronic SOB since heart attack per pt -nothing new    Stroke (HCC)     10/4/23 Per pt -\"yrs ago\"-reported 2019     Past Surgical History:   Procedure Laterality Date    AORTA - FEMORAL ARTERY BYPASS GRAFT Left     AXILLO-FEMORAL BYASS GRAFT Bilateral     BYPASS FEMORAL-FEMORAL      COLONOSCOPY      CORONARY ARTERY BYPASS GRAFT  2000    ENDOBRONCHIAL ULTRASOUND (EBUS) N/A 8/14/2023    Procedure: ENDOBRONCHIAL ULTRASOUND (EBUS) tissue biopsy;  Surgeon: Osito Remy MD;  [facility];  Service: Thoracic    FEMORAL ARTERY - POPLITEAL ARTERY BYPASS GRAFT Bilateral     IVC FILTER INSERTION      [place] AMPUTATION THIGH THROUGH FEMUR ANY LEVEL Left 07/21/2023    Procedure: AMPUTATION ABOVE KNEE (AKA);  Surgeon: Alma Gilbert MD;  [facility];  Service: Vascular    [facility] INCL FLUOR GDNCE DX W/CELL WASHG SPX N/A 8/14/2023    Procedure: BRONCHOSCOPY FLEXIBLE;  Surgeon: Osito Remy MD;  [facility];  Service: Thoracic    TOTAL ABDOMINAL HYSTERECTOMY W/ BILATERAL SALPINGOOPHORECTOMY      TOTAL HIP ARTHROPLASTY Left     WOUND DEBRIDEMENT Left 10/5/2023    Procedure: Left above knee amputation stump exploration, washout, debridement, and VAC placement;  Surgeon: Malu Maldonado MD;  [facility];  Service: Vascular     Social History   Social History     Substance and Sexual Activity   Alcohol Use Not Currently    Alcohol/week: 50.0 standard drinks of alcohol    Types: 50 Cans of beer per week    Comment: no ETOH for 1.5 years     Social History     Substance and Sexual Activity   Drug Use Yes    Types: Marijuana    Comment: took some gummies for sleep     Social History     Tobacco Use   Smoking Status Every Day    Current packs/day: 1.00    Average packs/day: 1 pack/day for 57.4 years (57.4 ttl pk-yrs)    Types: Cigarettes    Start date: 1967   Smokeless Tobacco Never "   Tobacco Comments           Meds/Allergies     Current Outpatient Medications:     acetaminophen (TYLENOL) 325 mg tablet, Take 3 tablets (975 mg total) by mouth every 8 (eight) hours (Patient taking differently: Take 975 mg by mouth if needed), Disp: , Rfl:     carvedilol (COREG) 6.25 mg tablet, Take 2 tablets (12.5 mg total) by mouth 2 (two) times a day with meals, Disp: 180 tablet, Rfl: 1    clopidogrel (PLAVIX) 75 mg tablet, Take 1 tablet (75 mg total) by mouth daily at bedtime, Disp: 90 tablet, Rfl: 1    diltiazem (CARDIZEM) 120 MG tablet, Take 1 tablet (120 mg total) by mouth in the morning, Disp: 90 tablet, Rfl: 1    ezetimibe (ZETIA) 10 mg tablet, Take 1 tablet (10 mg total) by mouth daily, Disp: 90 tablet, Rfl: 1    Folic Acid 0.8 MG CAPS, Take 1 capsule (0.8 mg total) by mouth in the morning, Disp: 90 capsule, Rfl: 1    isosorbide mononitrate (IMDUR) 30 mg 24 hr tablet, Take 1 tablet (30 mg total) by mouth daily, Disp: 90 tablet, Rfl: 1    lamoTRIgine (LaMICtal) 100 mg tablet, Take 1 tablet (100 mg total) by mouth daily, Disp: 90 tablet, Rfl: 1    lisinopril (ZESTRIL) 20 mg tablet, Take 1 tablet (20 mg total) by mouth daily, Disp: 90 tablet, Rfl: 1    Melatonin 5 MG TABS, Take 4 tablets (20 mg total) by mouth daily at bedtime as needed (insomnia) Patient states she takes 20MG, Disp: , Rfl:     rosuvastatin (CRESTOR) 20 MG tablet, Take 1 tablet (20 mg total) by mouth daily at bedtime, Disp: 90 tablet, Rfl: 1    traZODone (DESYREL) 50 mg tablet, Take 1 tablet (50 mg total) by mouth daily at bedtime as needed for sleep, Disp: 30 tablet, Rfl: 1    albuterol (ProAir HFA) 90 mcg/act inhaler, Inhale 2 puffs every 6 (six) hours as needed for wheezing (Patient not taking: Reported on 5/28/2024), Disp: 8.5 g, Rfl: 2    gabapentin (Neurontin) 300 mg capsule, Take 2 capsules (600 mg total) by mouth 3 (three) times a day (Patient not taking: Reported on 5/28/2024), Disp: 90 capsule, Rfl: 0    nitroglycerin (NITROSTAT)  0.4 mg SL tablet, Place 1 tablet (0.4 mg total) under the tongue every 5 (five) minutes as needed for chest pain (Patient not taking: Reported on 5/28/2024), Disp: 30 tablet, Rfl: 1    ondansetron (ZOFRAN) 4 mg tablet, Take 1 tablet (4 mg total) by mouth every 6 (six) hours (Patient not taking: Reported on 5/28/2024), Disp: 12 tablet, Rfl: 0    rOPINIRole (REQUIP) 1 mg tablet, Take 1 tablet (1 mg total) by mouth daily at bedtime (Patient not taking: Reported on 5/28/2024), Disp: 30 tablet, Rfl: 3    saccharomyces boulardii (FLORASTOR) 250 mg capsule, Take 1 capsule (250 mg total) by mouth 2 (two) times a day (Patient not taking: Reported on 12/11/2023), Disp: 60 capsule, Rfl: 0  No Known Allergies    Review of Systems:  Constitutional: Negative.    HENT:  Positive for postnasal drip, sore throat, trouble swallowing and voice change.    Eyes: Negative.    Respiratory:  Positive for cough (productive for thick white), shortness of breath and wheezing.    Cardiovascular: Negative.    Gastrointestinal:  Positive for nausea (nausea and dizziness first thing in morning).   Endocrine: Negative.    Genitourinary: Negative.    Musculoskeletal:  Positive for gait problem.        Has new prosthesis.  Currently attending therapy   Skin: Negative.    Allergic/Immunologic: Negative.    Neurological:  Positive for numbness (bilateral hands and right foot).   Psychiatric/Behavioral:  Positive for sleep disturbance (using CBD gummies for sleep).      OBJECTIVE:   BP (!) 178/70   Pulse 64   Temp 97.5 °F (36.4 °C)   SpO2 94%     Physical Exam:   General Appearance:  Alert, cooperative, no distress, appears stated age  Cardiovascular:  Extremities warm and well perfused  Lungs: Respirations unlabored, no cyanosis, able to speak in complete sentences without dyspnea. Lungs clear to auscultation bilaterally.  Abdomen: Non-distended  Extremities: No cyanosis or edema  Skin: No generalized rash or dermatitis  Neurologic: AAOx3, speech  "and cognition intact.    Portions of the record may have been created with voice recognition software.  Occasional wrong word or \"sound a like\" substitutions may have occurred due to the inherent limitations of voice recognition software.  Read the chart carefully and recognize, using context, where substitutions have occurred.  "

## 2024-05-28 NOTE — PROGRESS NOTES
Mary Ellen Steve 1950 is a 74 y.o. female  with h/o non-small cell carcinoma, SCC of right upper lung. Patient has 60 pack year smoking history.  Completed SBRT to RUL on 10/2/2023.  Last seen in radiation oncology with Dr. Menjivar on 2023.  Today's visit is for routine follow-up.     24  CT chest wo contrast  MPRESSION:     Stable posttreatment changes in the suprahilar right upper lobe since 2023 with chronic occlusion of the posterior segment right upper lobe bronchus; nothing to indicate recurrent tumor.   Mild ectasia of the ascending aorta at 4.1 cm. This should be followed on a yearly basis. It can be done on the follow-up scans for lung cancer.   Pulmonary artery enlargement which can be a normal variant or can be seen with pulmonary hypertension.      Upcomin24  Dr. Multani    Follow up visit     Oncology History   SCC (squamous cell carcinoma of lung) (HCC) (Resolved)   2023 Biopsy    A. Lung, Right Upper Lobe, RUL posterior segment endobronchial biopsy:  -Non- small cell carcinoma , immunohistochemically consistent with squamous cell carcinoma      Non-small cell cancer of right lung (HCC)   2023 Initial Diagnosis    Non-small cell cancer of right lung (HCC)     2023 -  Cancer Staged    Staging form: Lung, AJCC 8th Edition  - Clinical: Stage IA2 (cT1b, cN0, cM0) - Signed by Becky Menjivar MD on 2023 - 10/2/2023 Radiation    Treatment:  Course: C1 SBRT    Plan ID Energy Fractions Dose per Fraction (cGy) Dose Correction (cGy) Total Dose Delivered (cGy) Elapsed Days   SBRT RUL 6X-FFF  750 0 6,000 18      Treatment dates:  C1 SBRT: 2023 - 10/2/2023         Review of Systems:  Review of Systems   Constitutional: Negative.    HENT:  Positive for postnasal drip, sore throat, trouble swallowing and voice change.    Eyes: Negative.    Respiratory:  Positive for cough (productive for thick white), shortness of breath and wheezing.     Cardiovascular: Negative.    Gastrointestinal:  Positive for nausea (nausea and dizziness first thing in morning).   Endocrine: Negative.    Genitourinary: Negative.    Musculoskeletal:  Positive for gait problem.        Has new prosthesis.  Currently attending therapy   Skin: Negative.    Allergic/Immunologic: Negative.    Neurological:  Positive for numbness (bilateral hands and right foot).   Psychiatric/Behavioral:  Positive for sleep disturbance (using CBD gummies for sleep).        Clinical Trial: no    Pregnancy test needed:  no    Teaching completed    Health Maintenance   Topic Date Due    COVID-19 Vaccine (1) Never done    Zoster Vaccine (1 of 2) Never done    Osteoporosis Screening  Never done    RSV Vaccine Age 60+ Years (1 - 1-dose 60+ series) Never done    Breast Cancer Screening: Mammogram  04/24/2018    PT PLAN OF CARE  04/19/2023    BMI: Followup Plan  12/09/2023    Fall Risk  12/28/2024    Urinary Incontinence Screening  12/28/2024    Medicare Annual Wellness Visit (AWV)  12/28/2024    BMI: Adult  04/18/2025    Colorectal Cancer Screening  05/10/2026    Hepatitis C Screening  Completed    Pneumococcal Vaccine: 65+ Years  Completed    Influenza Vaccine  Completed    RSV Vaccine age 0-20 Months  Aged Out    HIB Vaccine  Aged Out    IPV Vaccine  Aged Out    Hepatitis A Vaccine  Aged Out    Meningococcal ACWY Vaccine  Aged Out    HPV Vaccine  Aged Out     Patient Active Problem List   Diagnosis    Bipolar affective disorder, depressed, severe (HCC)    CAD (coronary artery disease)    Essential hypertension    Hx of CABG    S/P vascular bypass    Thyroid nodule    Renal artery stenosis (HCC)    Presence of IVC filter    PAD (peripheral artery disease) (HCC)    Mixed hyperlipidemia    Aortoiliac occlusive disease (HCC)    Tobacco abuse    PAF (paroxysmal atrial fibrillation) (HCC)    Stage 3 chronic kidney disease, unspecified whether stage 3a or 3b CKD (HCC)    Dizziness    Insomnia    At risk for  "venous thromboembolism (VTE)    Pain    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)    Non-small cell cancer of right lung (HCC)    Delayed postoperative wound closure    Shortness of breath    S/P AKA (above knee amputation) unilateral, left (HCC)    Upper back pain on right side    RUQ pain     Past Medical History:   Diagnosis Date    Anxiety     Aorto-iliac disease (HCC)     Atrial fibrillation (HCC)     CAD (coronary artery disease)     Carotid stenosis, bilateral     Celiac artery stenosis (HCC)     Chronic headaches     10/4/23 Per pt chronic headaches    CKD (chronic kidney disease) stage 3, GFR 30-59 ml/min (HCC)     10/4/23 per pt - \"unaware of stage 3 CKD\"    Depression     10/4/23 Per pt \"severely depressed \"    DVT (deep venous thrombosis) (HCC) 07/2023    LLE (CFV, popl)    Heart attack (HCC) 02/2022    HLD (hyperlipidemia)     Hypertension     Intermittent lightheadedness     10/4/23 \"comes and goes per pt\"    Lung cancer (HCC)     10/4/23 Per pt just had last radiation on 10/2/23.    Lung mass     RUL    Myocardial infarction (HCC)     2022    Nausea     10/4/23 Per pt has daily    PAD (peripheral artery disease) (HCC)     Shortness of breath     10/4/23 Chronic SOB since heart attack per pt -nothing new    Stroke (HCC)     10/4/23 Per pt -\"yrs ago\"-reported 2019     Past Surgical History:   Procedure Laterality Date    AORTA - FEMORAL ARTERY BYPASS GRAFT Left     AXILLO-FEMORAL BYASS GRAFT Bilateral     BYPASS FEMORAL-FEMORAL      COLONOSCOPY      CORONARY ARTERY BYPASS GRAFT  2000    ENDOBRONCHIAL ULTRASOUND (EBUS) N/A 8/14/2023    Procedure: ENDOBRONCHIAL ULTRASOUND (EBUS) tissue biopsy;  Surgeon: Osito Remy MD;  [facility];  Service: Thoracic    FEMORAL ARTERY - POPLITEAL ARTERY BYPASS GRAFT Bilateral     IVC FILTER INSERTION      [place] AMPUTATION THIGH THROUGH FEMUR ANY LEVEL Left 07/21/2023    Procedure: AMPUTATION ABOVE KNEE (AKA);  Surgeon: Alma Gilbert MD;  " [facility];  Service: Vascular    [place] [facility] INCL FLUOR GDNCE DX W/CELL WASHG SPX N/A 8/14/2023    Procedure: BRONCHOSCOPY FLEXIBLE;  Surgeon: Osito Remy MD;  [facility];  Service: Thoracic    TOTAL ABDOMINAL HYSTERECTOMY W/ BILATERAL SALPINGOOPHORECTOMY      TOTAL HIP ARTHROPLASTY Left     WOUND DEBRIDEMENT Left 10/5/2023    Procedure: Left above knee amputation stump exploration, washout, debridement, and VAC placement;  Surgeon: Malu Maldonado MD;  [facility];  Service: Vascular     Family History   Problem Relation Age of Onset    Leukemia Mother 81    Cancer Sister     Anesthesia problems Neg Hx      Social History     Socioeconomic History    Marital status:      Spouse name: Not on file    Number of children: Not on file    Years of education: Not on file    Highest education level: Not on file   Occupational History    Not on file   Tobacco Use    Smoking status: Every Day     Current packs/day: 1.00     Average packs/day: 1 pack/day for 57.4 years (57.4 ttl pk-yrs)     Types: Cigarettes     Start date: 1967    Smokeless tobacco: Never    Tobacco comments:        Vaping Use    Vaping status: Never Used   Substance and Sexual Activity    Alcohol use: Not Currently     Alcohol/week: 50.0 standard drinks of alcohol     Types: 50 Cans of beer per week     Comment: no ETOH for 1.5 years    Drug use: Yes     Types: Marijuana     Comment: took some gummies for sleep    Sexual activity: Not Currently     Partners: Male     Comment: Not active at this time per pt   Other Topics Concern    Not on file   Social History Narrative    Not on file     Social Determinants of Health     Financial Resource Strain: Low Risk  (12/28/2023)    Overall Financial Resource Strain (CARDIA)     Difficulty of Paying Living Expenses: Not very hard   Food Insecurity: No Food Insecurity (7/19/2023)    Hunger Vital Sign     Worried About Running Out of Food in the Last Year: Never true     Ran  Out of Food in the Last Year: Never true   Transportation Needs: No Transportation Needs (12/28/2023)    PRAPARE - Transportation     Lack of Transportation (Medical): No     Lack of Transportation (Non-Medical): No   Physical Activity: Not on file   Stress: Not on file   Social Connections: Not on file   Intimate Partner Violence: Not on file   Housing Stability: Low Risk  (7/19/2023)    Housing Stability Vital Sign     Unable to Pay for Housing in the Last Year: No     Number of Places Lived in the Last Year: 1     Unstable Housing in the Last Year: No       Current Outpatient Medications:     acetaminophen (TYLENOL) 325 mg tablet, Take 3 tablets (975 mg total) by mouth every 8 (eight) hours (Patient taking differently: Take 975 mg by mouth if needed), Disp: , Rfl:     albuterol (ProAir HFA) 90 mcg/act inhaler, Inhale 2 puffs every 6 (six) hours as needed for wheezing, Disp: 8.5 g, Rfl: 2    carvedilol (COREG) 6.25 mg tablet, Take 2 tablets (12.5 mg total) by mouth 2 (two) times a day with meals, Disp: 180 tablet, Rfl: 1    clopidogrel (PLAVIX) 75 mg tablet, Take 1 tablet (75 mg total) by mouth daily at bedtime, Disp: 90 tablet, Rfl: 1    diltiazem (CARDIZEM) 120 MG tablet, Take 1 tablet (120 mg total) by mouth in the morning, Disp: 90 tablet, Rfl: 1    ezetimibe (ZETIA) 10 mg tablet, Take 1 tablet (10 mg total) by mouth daily, Disp: 90 tablet, Rfl: 1    Folic Acid 0.8 MG CAPS, Take 1 capsule (0.8 mg total) by mouth in the morning, Disp: 90 capsule, Rfl: 1    gabapentin (Neurontin) 300 mg capsule, Take 2 capsules (600 mg total) by mouth 3 (three) times a day, Disp: 90 capsule, Rfl: 0    isosorbide mononitrate (IMDUR) 30 mg 24 hr tablet, Take 1 tablet (30 mg total) by mouth daily, Disp: 90 tablet, Rfl: 1    lamoTRIgine (LaMICtal) 100 mg tablet, Take 1 tablet (100 mg total) by mouth daily, Disp: 90 tablet, Rfl: 1    lisinopril (ZESTRIL) 20 mg tablet, Take 1 tablet (20 mg total) by mouth daily, Disp: 90 tablet, Rfl:  1    Melatonin 5 MG TABS, Take 4 tablets (20 mg total) by mouth daily at bedtime as needed (insomnia) Patient states she takes 20MG, Disp: , Rfl:     nitroglycerin (NITROSTAT) 0.4 mg SL tablet, Place 1 tablet (0.4 mg total) under the tongue every 5 (five) minutes as needed for chest pain, Disp: 30 tablet, Rfl: 1    ondansetron (ZOFRAN) 4 mg tablet, Take 1 tablet (4 mg total) by mouth every 6 (six) hours, Disp: 12 tablet, Rfl: 0    rOPINIRole (REQUIP) 1 mg tablet, Take 1 tablet (1 mg total) by mouth daily at bedtime, Disp: 30 tablet, Rfl: 3    rosuvastatin (CRESTOR) 20 MG tablet, Take 1 tablet (20 mg total) by mouth daily at bedtime, Disp: 90 tablet, Rfl: 1    saccharomyces boulardii (FLORASTOR) 250 mg capsule, Take 1 capsule (250 mg total) by mouth 2 (two) times a day (Patient not taking: Reported on 12/11/2023), Disp: 60 capsule, Rfl: 0    traZODone (DESYREL) 50 mg tablet, Take 1 tablet (50 mg total) by mouth daily at bedtime as needed for sleep, Disp: 30 tablet, Rfl: 1  No Known Allergies  There were no vitals filed for this visit.

## 2024-06-04 ENCOUNTER — TELEPHONE (OUTPATIENT)
Dept: HEMATOLOGY ONCOLOGY | Facility: CLINIC | Age: 74
End: 2024-06-04

## 2024-06-04 NOTE — TELEPHONE ENCOUNTER
Patient will be moving to [facility] on 06/20/2024 --if she needs to know anything else, [facility]

## 2024-07-31 ENCOUNTER — TRANSCRIBE ORDERS (OUTPATIENT)
Dept: GASTROENTEROLOGY | Facility: CLINIC | Age: 74
End: 2024-07-31

## 2024-11-12 NOTE — TELEPHONE ENCOUNTER
Patient: Miles Valencia    Procedure: Procedure(s):  Colonoscopy with polpectomy       Anesthesia Type:  MAC    Note:  Disposition: Outpatient   Postop Pain Control: Uneventful            Sign Out: Well controlled pain   PONV: No   Neuro/Psych: Uneventful            Sign Out: Acceptable/Baseline neuro status   Airway/Respiratory: Uneventful            Sign Out: Acceptable/Baseline resp. status   CV/Hemodynamics: Uneventful            Sign Out: Acceptable CV status; No obvious hypovolemia; No obvious fluid overload   Other NRE: NONE   DID A NON-ROUTINE EVENT OCCUR? No           Last vitals:  Vitals Value Taken Time   /88 11/12/24 1030   Temp 96.9  F (36.1  C) 11/12/24 0959   Pulse 82 11/12/24 1034   Resp     SpO2 98 % 11/12/24 1034   Vitals shown include unfiled device data.    Electronically Signed By: Pablo Madden MD  November 12, 2024  10:46 AM   Upon review of the In Basket request we were able to locate, review, and update the patient chart as requested for Hepatitis C   Any additional questions or concerns should be emailed to the Practice Liaisons via the appropriate education email address, please do not reply via In Basket      Thank you  Luis Alberto Rasheed

## 2025-02-14 ENCOUNTER — TELEPHONE (OUTPATIENT)
Dept: FAMILY MEDICINE CLINIC | Facility: CLINIC | Age: 75
End: 2025-02-14

## 2025-02-14 NOTE — TELEPHONE ENCOUNTER
1st call    Lvm that pcp left practice and should call to schedule overdue physical with another provider.

## 2025-03-18 NOTE — PCC PHYSICAL THERAPY
Pt. Demonstrates good progress since last week and able to achieve independent level in bed mobility, transfers w/c level and w/c mobility. Pt. to be progressed today to IRP after her OT session. Pt. Cont to be limited by pain at times and still currently on Abx. Pt. Had chosen [facility] [employer] for her prosthesis vendor. Ramp also had already been installed for their IMANI and w/c already been ordered and FT also had been completed with pt's son and grandson's. Pt. Anticipated to be d/c to home  on 8/9 with family support and continued PT through home health services . Will cont to benefit from skilled PT to maximized independence and safety until d/c. Tawanna Dasilva PT. 8/7      Pt barriers cont with pain lvl 5-8 10 pain , wrapping limb, fall risk nees WC for home DC and ramp for entering home . Plus family members need to be updated for home DC and overall functional lvl. Pt needs to chose amputee vendor and working on handout booklet also pt needs to learn to wrap limb and skin checks.  Pt will cont to benefit with skilled PT to meet DC goals fair balance

## 2025-04-09 ENCOUNTER — TELEPHONE (OUTPATIENT)
Dept: FAMILY MEDICINE CLINIC | Facility: CLINIC | Age: 75
End: 2025-04-09

## 2025-04-23 NOTE — TELEPHONE ENCOUNTER
04/23/25 4:15 PM        The office's request has been received, reviewed, and the patient chart updated. The PCP has successfully been removed with a patient attribution note. This message will now be completed.        Thank you  Walter Moreno

## 2025-05-14 ENCOUNTER — DOCUMENTATION (OUTPATIENT)
Dept: ADMINISTRATIVE | Facility: OTHER | Age: 75
End: 2025-05-14

## 2025-05-14 NOTE — PROGRESS NOTES
05/14/25 1:39 PM      Annual Wellness Visit outreach is not required, Patient contacted to schedule Annual Wellness Visit.   Patient transferred care out of network.     Thank you.  Ban Chavez MA  [facility]

## (undated) DEVICE — NEEDLE HYPO 2G X 1 IN

## (undated) DEVICE — MEDI-VAC YANK SUCT HNDL W/TPRD BULBOUS TIP: Brand: CARDINAL HEALTH

## (undated) DEVICE — VAC DRESSING SENSATRAC SMALL

## (undated) DEVICE — DRAPE EQUIPMENT RF WAND

## (undated) DEVICE — SUT SILK 2-0 18 IN A185H

## (undated) DEVICE — IV EXTENSION TUBING 33 IN

## (undated) DEVICE — DRAPE SHEET THREE QUARTER

## (undated) DEVICE — TUBING SUCTION 5MM X 12 FT

## (undated) DEVICE — 1840 FOAM BLOCK NEEDLE COUNTER: Brand: DEVON

## (undated) DEVICE — Device: Brand: BALLOON

## (undated) DEVICE — SUT VICRYL 2-0 SH 27 IN UNDYED J417H

## (undated) DEVICE — SINGLE USE BIOPSY VALVE MAJ-210: Brand: SINGLE USE BIOPSY VALVE (STERILE)

## (undated) DEVICE — SUT SILK 3-0 18 IN A184H

## (undated) DEVICE — SPONGE STICK WITH PVP-I: Brand: KENDALL

## (undated) DEVICE — VAC CANISTER 500ML

## (undated) DEVICE — 3M™ PRECISE™ MULTI-SHOT DS DISPOSABLE SKIN STAPLER, DS-5: Brand: 3M™ PRECISE™

## (undated) DEVICE — GAUZE SPONGES,16 PLY: Brand: CURITY

## (undated) DEVICE — STOPCOCK 4-WAY

## (undated) DEVICE — NAVIGATION PATIENT PATCHES (QUANTITY OF 60): Brand: NAVIGATION PATIENT PATCHES

## (undated) DEVICE — UTILITY MARKER,BLACK WITH LABELS: Brand: DEVON

## (undated) DEVICE — STOCKINETTE REGULAR

## (undated) DEVICE — ADAPTOR TRACH SWIVEL

## (undated) DEVICE — SINGLE USE ASPIRATION NEEDLE: Brand: SINGLE USE ASPIRATION NEEDLE

## (undated) DEVICE — SUT ETHILON 3-0 FSLX 30 IN 1673H

## (undated) DEVICE — BULB SYRINGE,IRRIGATION WITH PROTECTIVE CAP: Brand: DOVER

## (undated) DEVICE — SUT SILK 2-0 SH CR/8 18 IN C012D

## (undated) DEVICE — BETHLEHEM UNIVERSAL MINOR GEN: Brand: CARDINAL HEALTH

## (undated) DEVICE — Device: Brand: MEDEX

## (undated) DEVICE — SINGLE USE SUCTION VALVE MAJ-209: Brand: SINGLE USE SUCTION VALVE (STERILE)

## (undated) DEVICE — SUT SILK 3-0 SH CR/8 18 IN C013D

## (undated) DEVICE — INTENDED FOR TISSUE SEPARATION, AND OTHER PROCEDURES THAT REQUIRE A SHARP SURGICAL BLADE TO PUNCTURE OR CUT.: Brand: BARD-PARKER SAFETY BLADES SIZE 10, STERILE

## (undated) DEVICE — SYRINGE 10ML SLIP TIP LF

## (undated) DEVICE — FIRST STEP BEDSIDE KIT - STAND-UP POUCH, ENDOSCOPIC CLEANING PAD - 1 POUCH: Brand: FIRST STEP BEDSIDE KIT - STAND-UP POUCH, ENDOSCOPIC CLEANING PAD

## (undated) DEVICE — GLOVE INDICATOR PI UNDERGLOVE SZ 6.5 BLUE

## (undated) DEVICE — SYRINGE 10ML LL

## (undated) DEVICE — ANTIBACTERIAL UNDYED BRAIDED (POLYGLACTIN 910), SYNTHETIC ABSORBABLE SUTURE: Brand: COATED VICRYL

## (undated) DEVICE — PROXIMATE PLUS MD MULTI-DIRECTIONAL RELEASE SKIN STAPLERS CONTAINS 35 STAINLESS STEEL STAPLES APPROXIMATE CLOSED DIMENSIONS: 6.9MM X 3.9MM WIDE: Brand: PROXIMATE

## (undated) DEVICE — BETADINE SURGICAL SCRUB 32OZ

## (undated) DEVICE — HANDPIECE SET WITH RETRACTABLE COAXIAL FAN SPRAY TIP AND SUCTION TUBE: Brand: INTERPULSE

## (undated) DEVICE — DISPOSABLE CYTOLOGY BRUSH: Brand: DISPOSABLE CYTOLOGY BRUSH

## (undated) DEVICE — SPECIMEN CONTAINER STERILE PEEL PACK

## (undated) DEVICE — MEDI-VAC YANKAUER SUCTION HANDLE W/BULBOUS AND CONTROL VENT: Brand: CARDINAL HEALTH

## (undated) DEVICE — HEAVY DUTY TABLE COVER: Brand: CONVERTORS

## (undated) DEVICE — PENCIL ELECTROSURG E-Z CLEAN -0035H

## (undated) DEVICE — ACE WRAP 6 IN UNSTERILE

## (undated) DEVICE — OCCLUSIVE GAUZE STRIP,3% BISMUTH TRIBROMOPHENATE IN PETROLATUM BLEND: Brand: XEROFORM

## (undated) DEVICE — PREP PAD BNS: Brand: CONVERTORS

## (undated) DEVICE — STERILE EMESIS BASIN                 070: Brand: CARDINAL HEALTH

## (undated) DEVICE — SUT VICRYL 0 REEL 54 IN J287G

## (undated) DEVICE — DRESSING XEROFORM 5 X 9

## (undated) DEVICE — TIBURON SPLIT SHEET: Brand: CONVERTORS

## (undated) DEVICE — BETHLEHEM UNIV MAJ EXT ,KIT: Brand: CARDINAL HEALTH

## (undated) DEVICE — ABDOMINAL PAD: Brand: DERMACEA

## (undated) DEVICE — SPONGE LAP 18 X 18 IN

## (undated) DEVICE — CHLORAPREP HI-LITE 26ML ORANGE

## (undated) DEVICE — 3000CC GUARDIAN II: Brand: GUARDIAN

## (undated) DEVICE — INTENDED FOR TISSUE SEPARATION, AND OTHER PROCEDURES THAT REQUIRE A SHARP SURGICAL BLADE TO PUNCTURE OR CUT.: Brand: BARD-PARKER SAFETY BLADES SIZE 15, STERILE

## (undated) DEVICE — KERLIX BANDAGE ROLL: Brand: KERLIX

## (undated) DEVICE — GLOVE SRG BIOGEL ECLIPSE 6

## (undated) DEVICE — PAD GROUNDING ADULT

## (undated) DEVICE — DEVON™ KNEE AND BODY STRAP 80" X 4" (1.5 M X 10.2 CM): Brand: CARDINAL HEALTH

## (undated) DEVICE — 2108 SERIES SAGITTAL BLADE (18.6 X 0.64 X 61.1MM)

## (undated) DEVICE — ANTIBACTERIAL VIOLET BRAIDED (POLYGLACTIN 910), SYNTHETIC ABSORBABLE SUTURE: Brand: COATED VICRYL

## (undated) DEVICE — SYRINGE 20ML LL

## (undated) DEVICE — GLOVE SRG BIOGEL 6